# Patient Record
Sex: FEMALE | Race: WHITE | NOT HISPANIC OR LATINO | Employment: PART TIME | URBAN - METROPOLITAN AREA
[De-identification: names, ages, dates, MRNs, and addresses within clinical notes are randomized per-mention and may not be internally consistent; named-entity substitution may affect disease eponyms.]

---

## 2021-04-09 ENCOUNTER — APPOINTMENT (OUTPATIENT)
Dept: CT IMAGING | Facility: HOSPITAL | Age: 51
End: 2021-04-09

## 2021-04-09 ENCOUNTER — HOSPITAL ENCOUNTER (INPATIENT)
Facility: HOSPITAL | Age: 51
LOS: 5 days | Discharge: NURSING, REHAB OR LONG-TERM CARE | End: 2021-04-14
Attending: EMERGENCY MEDICINE | Admitting: STUDENT IN AN ORGANIZED HEALTH CARE EDUCATION/TRAINING PROGRAM

## 2021-04-09 ENCOUNTER — APPOINTMENT (OUTPATIENT)
Dept: GENERAL RADIOLOGY | Facility: HOSPITAL | Age: 51
End: 2021-04-09

## 2021-04-09 DIAGNOSIS — I26.93 SINGLE SUBSEGMENTAL PULMONARY EMBOLISM WITHOUT ACUTE COR PULMONALE (HCC): ICD-10-CM

## 2021-04-09 DIAGNOSIS — Z99.2 ESRD ON HEMODIALYSIS (HCC): ICD-10-CM

## 2021-04-09 DIAGNOSIS — E86.1 HYPOVOLEMIA: ICD-10-CM

## 2021-04-09 DIAGNOSIS — A04.72 COLITIS, CLOSTRIDIUM DIFFICILE: Primary | ICD-10-CM

## 2021-04-09 DIAGNOSIS — N18.6 ESRD ON HEMODIALYSIS (HCC): ICD-10-CM

## 2021-04-09 LAB
ADV 40+41 DNA STL QL NAA+NON-PROBE: NOT DETECTED
ALBUMIN SERPL-MCNC: 3.1 G/DL (ref 3.5–5.2)
ALBUMIN/GLOB SERPL: 0.8 G/DL
ALP SERPL-CCNC: 163 U/L (ref 39–117)
ALT SERPL W P-5'-P-CCNC: 15 U/L (ref 1–33)
ANION GAP SERPL CALCULATED.3IONS-SCNC: 15.6 MMOL/L (ref 5–15)
APTT PPP: 45.2 SECONDS (ref 22.7–35.4)
AST SERPL-CCNC: 12 U/L (ref 1–32)
ASTRO TYP 1-8 RNA STL QL NAA+NON-PROBE: NOT DETECTED
BASOPHILS # BLD MANUAL: 0.17 10*3/MM3 (ref 0–0.2)
BASOPHILS # BLD MANUAL: 0.23 10*3/MM3 (ref 0–0.2)
BASOPHILS # BLD MANUAL: 0.46 10*3/MM3 (ref 0–0.2)
BASOPHILS NFR BLD AUTO: 1 % (ref 0–1.5)
BASOPHILS NFR BLD AUTO: 1 % (ref 0–1.5)
BASOPHILS NFR BLD AUTO: 2 % (ref 0–1.5)
BILIRUB SERPL-MCNC: 0.2 MG/DL (ref 0–1.2)
BUN SERPL-MCNC: 28 MG/DL (ref 6–20)
BUN/CREAT SERPL: 6.7 (ref 7–25)
C CAYETANENSIS DNA STL QL NAA+NON-PROBE: NOT DETECTED
C COLI+JEJ+UPSA DNA STL QL NAA+NON-PROBE: NOT DETECTED
C DIFF TOX GENS STL QL NAA+PROBE: POSITIVE
CALCIUM SPEC-SCNC: 8.6 MG/DL (ref 8.6–10.5)
CHLORIDE SERPL-SCNC: 96 MMOL/L (ref 98–107)
CO2 SERPL-SCNC: 21.4 MMOL/L (ref 22–29)
CREAT SERPL-MCNC: 4.19 MG/DL (ref 0.57–1)
CRYPTOSP DNA STL QL NAA+NON-PROBE: NOT DETECTED
D DIMER PPP FEU-MCNC: 2.22 MCGFEU/ML (ref 0–0.49)
D-LACTATE SERPL-SCNC: 1.8 MMOL/L (ref 0.5–2)
DEPRECATED RDW RBC AUTO: 48.1 FL (ref 37–54)
DEPRECATED RDW RBC AUTO: 49.8 FL (ref 37–54)
E HISTOLYT DNA STL QL NAA+NON-PROBE: NOT DETECTED
EAEC PAA PLAS AGGR+AATA ST NAA+NON-PRB: NOT DETECTED
EC STX1+STX2 GENES STL QL NAA+NON-PROBE: NOT DETECTED
EPEC EAE GENE STL QL NAA+NON-PROBE: NOT DETECTED
ERYTHROCYTE [DISTWIDTH] IN BLOOD BY AUTOMATED COUNT: 14.5 % (ref 12.3–15.4)
ERYTHROCYTE [DISTWIDTH] IN BLOOD BY AUTOMATED COUNT: 14.5 % (ref 12.3–15.4)
ETEC LTA+ST1A+ST1B TOX ST NAA+NON-PROBE: NOT DETECTED
G LAMBLIA DNA STL QL NAA+NON-PROBE: NOT DETECTED
GFR SERPL CREATININE-BSD FRML MDRD: 11 ML/MIN/1.73
GFR SERPL CREATININE-BSD FRML MDRD: ABNORMAL ML/MIN/{1.73_M2}
GLOBULIN UR ELPH-MCNC: 3.8 GM/DL
GLUCOSE SERPL-MCNC: 106 MG/DL (ref 65–99)
HCT VFR BLD AUTO: 29.7 % (ref 34–46.6)
HCT VFR BLD AUTO: 31.2 % (ref 34–46.6)
HGB BLD-MCNC: 10.3 G/DL (ref 12–15.9)
HGB BLD-MCNC: 9.7 G/DL (ref 12–15.9)
HOLD SPECIMEN: NORMAL
HOLD SPECIMEN: NORMAL
INR PPP: 1.48 (ref 0.9–1.1)
LYMPHOCYTES # BLD MANUAL: 1.42 10*3/MM3 (ref 0.7–3.1)
LYMPHOCYTES # BLD MANUAL: 3.06 10*3/MM3 (ref 0.7–3.1)
LYMPHOCYTES # BLD MANUAL: 5.53 10*3/MM3 (ref 0.7–3.1)
LYMPHOCYTES NFR BLD MANUAL: 13.3 % (ref 19.6–45.3)
LYMPHOCYTES NFR BLD MANUAL: 24 % (ref 19.6–45.3)
LYMPHOCYTES NFR BLD MANUAL: 3.1 % (ref 5–12)
LYMPHOCYTES NFR BLD MANUAL: 4 % (ref 5–12)
LYMPHOCYTES NFR BLD MANUAL: 8.2 % (ref 19.6–45.3)
LYMPHOCYTES NFR BLD MANUAL: 8.2 % (ref 5–12)
MAGNESIUM SERPL-MCNC: 1.8 MG/DL (ref 1.6–2.6)
MCH RBC QN AUTO: 30.6 PG (ref 26.6–33)
MCH RBC QN AUTO: 30.7 PG (ref 26.6–33)
MCHC RBC AUTO-ENTMCNC: 32.7 G/DL (ref 31.5–35.7)
MCHC RBC AUTO-ENTMCNC: 33 G/DL (ref 31.5–35.7)
MCV RBC AUTO: 92.6 FL (ref 79–97)
MCV RBC AUTO: 94 FL (ref 79–97)
METAMYELOCYTES NFR BLD MANUAL: 1 % (ref 0–0)
MONOCYTES # BLD AUTO: 0.54 10*3/MM3 (ref 0.1–0.9)
MONOCYTES # BLD AUTO: 0.92 10*3/MM3 (ref 0.1–0.9)
MONOCYTES # BLD AUTO: 1.89 10*3/MM3 (ref 0.1–0.9)
MYELOCYTES NFR BLD MANUAL: 2.1 % (ref 0–0)
NEUTROPHILS # BLD AUTO: 14.64 10*3/MM3 (ref 1.7–7)
NEUTROPHILS # BLD AUTO: 16.21 10*3/MM3 (ref 1.7–7)
NEUTROPHILS # BLD AUTO: 16.35 10*3/MM3 (ref 1.7–7)
NEUTROPHILS NFR BLD MANUAL: 70.4 % (ref 42.7–76)
NEUTROPHILS NFR BLD MANUAL: 71 % (ref 42.7–76)
NEUTROPHILS NFR BLD MANUAL: 84.5 % (ref 42.7–76)
NOROVIRUS GI+II RNA STL QL NAA+NON-PROBE: NOT DETECTED
NRBC BLD AUTO-RTO: 0 /100 WBC (ref 0–0.2)
NRBC BLD AUTO-RTO: 0 /100 WBC (ref 0–0.2)
NT-PROBNP SERPL-MCNC: 4129 PG/ML (ref 0–450)
OTHER CELLS %: 6.1 % (ref 0–0)
P SHIGELLOIDES DNA STL QL NAA+NON-PROBE: NOT DETECTED
PLAT MORPH BLD: NORMAL
PLATELET # BLD AUTO: 287 10*3/MM3 (ref 140–450)
PLATELET # BLD AUTO: 323 10*3/MM3 (ref 140–450)
PMV BLD AUTO: 9.3 FL (ref 6–12)
PMV BLD AUTO: 9.5 FL (ref 6–12)
POTASSIUM SERPL-SCNC: 3.3 MMOL/L (ref 3.5–5.2)
PROCALCITONIN SERPL-MCNC: 3.83 NG/ML (ref 0–0.25)
PROT SERPL-MCNC: 6.9 G/DL (ref 6–8.5)
PROTHROMBIN TIME: 17.7 SECONDS (ref 11.7–14.2)
RBC # BLD AUTO: 3.16 10*6/MM3 (ref 3.77–5.28)
RBC # BLD AUTO: 3.37 10*6/MM3 (ref 3.77–5.28)
RBC MORPH BLD: NORMAL
RVA RNA STL QL NAA+NON-PROBE: NOT DETECTED
S ENT+BONG DNA STL QL NAA+NON-PROBE: NOT DETECTED
SAPO I+II+IV+V RNA STL QL NAA+NON-PROBE: NOT DETECTED
SHIGELLA SP+EIEC IPAH ST NAA+NON-PROBE: NOT DETECTED
SODIUM SERPL-SCNC: 133 MMOL/L (ref 136–145)
TROPONIN T SERPL-MCNC: 0.03 NG/ML (ref 0–0.03)
V CHOL+PARA+VUL DNA STL QL NAA+NON-PROBE: NOT DETECTED
V CHOLERAE DNA STL QL NAA+NON-PROBE: NOT DETECTED
WBC # BLD AUTO: 17.33 10*3/MM3 (ref 3.4–10.8)
WBC # BLD AUTO: 23.03 10*3/MM3 (ref 3.4–10.8)
WBC MORPH BLD: NORMAL
WHOLE BLOOD HOLD SPECIMEN: NORMAL
WHOLE BLOOD HOLD SPECIMEN: NORMAL
Y ENTEROCOL DNA STL QL NAA+NON-PROBE: NOT DETECTED

## 2021-04-09 PROCEDURE — 80053 COMPREHEN METABOLIC PANEL: CPT | Performed by: EMERGENCY MEDICINE

## 2021-04-09 PROCEDURE — 85730 THROMBOPLASTIN TIME PARTIAL: CPT | Performed by: EMERGENCY MEDICINE

## 2021-04-09 PROCEDURE — 87493 C DIFF AMPLIFIED PROBE: CPT | Performed by: EMERGENCY MEDICINE

## 2021-04-09 PROCEDURE — U0005 INFEC AGEN DETEC AMPLI PROBE: HCPCS | Performed by: EMERGENCY MEDICINE

## 2021-04-09 PROCEDURE — 93005 ELECTROCARDIOGRAM TRACING: CPT | Performed by: EMERGENCY MEDICINE

## 2021-04-09 PROCEDURE — 0097U HC BIOFIRE FILMARRAY GI PANEL: CPT | Performed by: EMERGENCY MEDICINE

## 2021-04-09 PROCEDURE — 93010 ELECTROCARDIOGRAM REPORT: CPT | Performed by: INTERNAL MEDICINE

## 2021-04-09 PROCEDURE — 85610 PROTHROMBIN TIME: CPT | Performed by: EMERGENCY MEDICINE

## 2021-04-09 PROCEDURE — U0004 COV-19 TEST NON-CDC HGH THRU: HCPCS | Performed by: EMERGENCY MEDICINE

## 2021-04-09 PROCEDURE — 84484 ASSAY OF TROPONIN QUANT: CPT | Performed by: EMERGENCY MEDICINE

## 2021-04-09 PROCEDURE — 85379 FIBRIN DEGRADATION QUANT: CPT | Performed by: EMERGENCY MEDICINE

## 2021-04-09 PROCEDURE — 71045 X-RAY EXAM CHEST 1 VIEW: CPT

## 2021-04-09 PROCEDURE — 71275 CT ANGIOGRAPHY CHEST: CPT

## 2021-04-09 PROCEDURE — 25010000002 HEPARIN (PORCINE) PER 1000 UNITS: Performed by: EMERGENCY MEDICINE

## 2021-04-09 PROCEDURE — 74177 CT ABD & PELVIS W/CONTRAST: CPT

## 2021-04-09 PROCEDURE — 83880 ASSAY OF NATRIURETIC PEPTIDE: CPT | Performed by: EMERGENCY MEDICINE

## 2021-04-09 PROCEDURE — 87040 BLOOD CULTURE FOR BACTERIA: CPT | Performed by: EMERGENCY MEDICINE

## 2021-04-09 PROCEDURE — 83605 ASSAY OF LACTIC ACID: CPT | Performed by: EMERGENCY MEDICINE

## 2021-04-09 PROCEDURE — 0 IOPAMIDOL PER 1 ML: Performed by: EMERGENCY MEDICINE

## 2021-04-09 PROCEDURE — 83735 ASSAY OF MAGNESIUM: CPT | Performed by: NURSE PRACTITIONER

## 2021-04-09 PROCEDURE — 85025 COMPLETE CBC W/AUTO DIFF WBC: CPT | Performed by: EMERGENCY MEDICINE

## 2021-04-09 PROCEDURE — 99285 EMERGENCY DEPT VISIT HI MDM: CPT

## 2021-04-09 PROCEDURE — 84145 PROCALCITONIN (PCT): CPT | Performed by: EMERGENCY MEDICINE

## 2021-04-09 PROCEDURE — 85007 BL SMEAR W/DIFF WBC COUNT: CPT | Performed by: EMERGENCY MEDICINE

## 2021-04-09 RX ORDER — HEPARIN SODIUM 10000 [USP'U]/100ML
18 INJECTION, SOLUTION INTRAVENOUS
Status: DISCONTINUED | OUTPATIENT
Start: 2021-04-09 | End: 2021-04-10

## 2021-04-09 RX ORDER — HEPARIN SODIUM 5000 [USP'U]/ML
80 INJECTION, SOLUTION INTRAVENOUS; SUBCUTANEOUS ONCE
Status: COMPLETED | OUTPATIENT
Start: 2021-04-09 | End: 2021-04-09

## 2021-04-09 RX ORDER — SODIUM BICARBONATE 650 MG/1
650 TABLET ORAL 4 TIMES DAILY
COMMUNITY
End: 2021-04-29 | Stop reason: HOSPADM

## 2021-04-09 RX ORDER — MONTELUKAST SODIUM 10 MG/1
10 TABLET ORAL NIGHTLY
Status: ON HOLD | COMMUNITY
End: 2021-04-29 | Stop reason: SDUPTHER

## 2021-04-09 RX ORDER — LEVOTHYROXINE SODIUM 112 UG/1
CAPSULE ORAL EVERY 24 HOURS
COMMUNITY
End: 2021-04-14 | Stop reason: HOSPADM

## 2021-04-09 RX ORDER — SODIUM CHLORIDE 0.9 % (FLUSH) 0.9 %
10 SYRINGE (ML) INJECTION AS NEEDED
Status: DISCONTINUED | OUTPATIENT
Start: 2021-04-09 | End: 2021-04-14 | Stop reason: HOSPADM

## 2021-04-09 RX ORDER — POTASSIUM CHLORIDE 20 MEQ/1
TABLET, EXTENDED RELEASE ORAL EVERY 24 HOURS
COMMUNITY
End: 2021-04-14 | Stop reason: HOSPADM

## 2021-04-09 RX ORDER — HEPARIN SODIUM 5000 [USP'U]/ML
40-80 INJECTION, SOLUTION INTRAVENOUS; SUBCUTANEOUS EVERY 6 HOURS PRN
Status: DISCONTINUED | OUTPATIENT
Start: 2021-04-09 | End: 2021-04-10

## 2021-04-09 RX ORDER — ESCITALOPRAM OXALATE 20 MG/1
TABLET ORAL EVERY 24 HOURS
Status: ON HOLD | COMMUNITY
End: 2021-04-26

## 2021-04-09 RX ADMIN — SODIUM CHLORIDE 1000 ML: 9 INJECTION, SOLUTION INTRAVENOUS at 16:25

## 2021-04-09 RX ADMIN — HEPARIN SODIUM 18 UNITS/KG/HR: 10000 INJECTION, SOLUTION INTRAVENOUS at 20:22

## 2021-04-09 RX ADMIN — HEPARIN SODIUM 6100 UNITS: 5000 INJECTION INTRAVENOUS; SUBCUTANEOUS at 20:22

## 2021-04-09 RX ADMIN — IOPAMIDOL 100 ML: 755 INJECTION, SOLUTION INTRAVENOUS at 18:39

## 2021-04-09 RX ADMIN — VANCOMYCIN 125 MG: KIT at 19:20

## 2021-04-09 RX ADMIN — SODIUM CHLORIDE 1000 ML: 9 INJECTION, SOLUTION INTRAVENOUS at 19:19

## 2021-04-09 NOTE — ED NOTES
Cande with [facility] called.When they went to check her PD dialysis cather, it was noted to have pus from the site and concerned there is an infection. They believe it was not used or looked at during her stay at [place]. Pt has a alvarado pd dialysis tubing. Nurse was able to drain it and flush it today but patient is having pain at the site and with use.      Jane Beckford, RN  04/09/21 2165

## 2021-04-09 NOTE — PHARMACY RECOMMENDATION
[facility] Clinical Pharmacy Services: C. Difficile Medication Changes    Pharmacy has been consulted to look over Ambar Lara's profile to check patient's medications for changes due to C. Difficile diagnosis per Marcos Jenkins MD's request.    Relevant clinical data and objective history reviewed:  51 y.o. female        Past Medical History:   Diagnosis Date   • Dialysis patient (CMS/[facility])    • Renal disorder      Creatinine   Date Value Ref Range Status   04/09/2021 4.19 (H) 0.57 - 1.00 mg/dL Final     BUN   Date Value Ref Range Status   04/09/2021 28 (H) 6 - 20 mg/dL Final     CrCl cannot be calculated (Unknown ideal weight.).    Assessment/Plan    1. Proton pump inhibitor - none noted on home med rec    2. Antiperistalic agents or stool softeners/laxatives - nothing noted on med rec or inpatient at this time    Thank you for allowing me to participate in your patient's care.  Please call pharmacy with any questions or concerns.  Dayne Ballard [facility]    Clinical Staff Pharmacist

## 2021-04-09 NOTE — ED PROVIDER NOTES
EMERGENCY DEPARTMENT ENCOUNTER    Room Number:  12/12  Date seen:  4/9/2021  PCP: Jet Manuel MD  Historian: Patient      HPI:  Chief Complaint: Weakness, lightheaded  A complete HPI/ROS/PMH/PSH/SH/FH are unobtainable due to: Nothing  Context: Ambar Lara is a 51 y.o. female who presents to the ED c/o weakness and lightheadedness onset this morning.  Patient reports she was just discharged from [facility] yesterday.  She reports that she was diagnosed with Covid in February and then was in a coma for a month.  She also has a history of ESRD and has been on peritoneal dialysis for the last 3 years.  Since February 13, she has been getting hemodialysis, last got dialysis yesterday.  She also reports that she has been having chronic diarrhea and she believes that she may have had C. difficile but she is not sure.  She denies chest pain currently but has had some shortness of air today.  She believes that she was getting Lovenox injections while she was at [facility].  She has a tracheostomy but has been decannulated.  She apparently was told by dialysis nurse that she may have an infection involving her peritoneal dialysis catheter.            PAST MEDICAL HISTORY  Active Ambulatory Problems     Diagnosis Date Noted   • No Active Ambulatory Problems     Resolved Ambulatory Problems     Diagnosis Date Noted   • No Resolved Ambulatory Problems     Past Medical History:   Diagnosis Date   • Dialysis patient (CMS/[facility])    • Renal disorder          PAST SURGICAL HISTORY  Past Surgical History:   Procedure Laterality Date   • HYSTERECTOMY     • TRACHEOSTOMY           FAMILY HISTORY  No family history on file.      SOCIAL HISTORY  Social History     Socioeconomic History   • Marital status:      Spouse name: Not on file   • Number of children: Not on file   • Years of education: Not on file   • Highest education level: Not on file         ALLERGIES  Penicillins        REVIEW OF SYSTEMS  Review of Systems    Review of all 14 systems is negative other than stated in the HPI above.      PHYSICAL EXAM  ED Triage Vitals   Temp Heart Rate Resp BP SpO2   04/09/21 1533 04/09/21 1533 04/09/21 1533 04/09/21 1533 04/09/21 1533   98.5 °F (36.9 °C) 116 16 92/58 96 %      Temp src Heart Rate Source Patient Position BP Location FiO2 (%)   -- -- 04/09/21 1609 -- --     Sitting           GENERAL: Awake and alert, no acute distress  HENT: nares patent  EYES: no scleral icterus, pupils 3 mm reactive bilaterally  CV: regular rhythm, tachycardic, dialysis catheter right anterior chest wall  RESPIRATORY: normal effort, lungs clear to auscultation bilaterally.  Tracheostomy present with occlusive bandage in place.  ABDOMEN: soft, nondistended, nontender throughout, PD catheter in place with no surrounding erythema, no drainage around the tubing.  MUSCULOSKELETAL: no deformity  NEURO: alert, moves all extremities, follows commands  PSYCH:  calm, cooperative  SKIN: warm, dry    Vital signs and nursing notes reviewed.          LAB RESULTS  Recent Results (from the past 24 hour(s))   Light Blue Top    Collection Time: 04/09/21  4:31 PM   Result Value Ref Range    Extra Tube hold for add-on    Green Top (Gel)    Collection Time: 04/09/21  4:31 PM   Result Value Ref Range    Extra Tube Hold for add-ons.    Lavender Top    Collection Time: 04/09/21  4:31 PM   Result Value Ref Range    Extra Tube hold for add-on    Gold Top - SST    Collection Time: 04/09/21  4:31 PM   Result Value Ref Range    Extra Tube Hold for add-ons.    Comprehensive Metabolic Panel    Collection Time: 04/09/21  4:31 PM    Specimen: Blood   Result Value Ref Range    Glucose 106 (H) 65 - 99 mg/dL    BUN 28 (H) 6 - 20 mg/dL    Creatinine 4.19 (H) 0.57 - 1.00 mg/dL    Sodium 133 (L) 136 - 145 mmol/L    Potassium 3.3 (L) 3.5 - 5.2 mmol/L    Chloride 96 (L) 98 - 107 mmol/L    CO2 21.4 (L) 22.0 - 29.0 mmol/L    Calcium 8.6 8.6 - 10.5 mg/dL    Total Protein 6.9 6.0 - 8.5 g/dL     Albumin 3.10 (L) 3.50 - 5.20 g/dL    ALT (SGPT) 15 1 - 33 U/L    AST (SGOT) 12 1 - 32 U/L    Alkaline Phosphatase 163 (H) 39 - 117 U/L    Total Bilirubin 0.2 0.0 - 1.2 mg/dL    eGFR Non African Amer 11 (L) >60 mL/min/1.73    eGFR  African Amer      Globulin 3.8 gm/dL    A/G Ratio 0.8 g/dL    BUN/Creatinine Ratio 6.7 (L) 7.0 - 25.0    Anion Gap 15.6 (H) 5.0 - 15.0 mmol/L   Troponin    Collection Time: 04/09/21  4:31 PM    Specimen: Blood   Result Value Ref Range    Troponin T 0.030 0.000 - 0.030 ng/mL   BNP    Collection Time: 04/09/21  4:31 PM    Specimen: Blood   Result Value Ref Range    proBNP 4,129.0 (H) 0.0 - 450.0 pg/mL   D-dimer, Quantitative    Collection Time: 04/09/21  4:31 PM    Specimen: Blood   Result Value Ref Range    D-Dimer, Quantitative 2.22 (H) 0.00 - 0.49 MCGFEU/mL   Lactic Acid, Plasma    Collection Time: 04/09/21  4:31 PM    Specimen: Blood   Result Value Ref Range    Lactate 1.8 0.5 - 2.0 mmol/L   Procalcitonin    Collection Time: 04/09/21  4:31 PM    Specimen: Blood   Result Value Ref Range    Procalcitonin 3.83 (H) 0.00 - 0.25 ng/mL   CBC Auto Differential    Collection Time: 04/09/21  4:31 PM    Specimen: Blood   Result Value Ref Range    WBC 23.03 (H) 3.40 - 10.80 10*3/mm3    RBC 3.37 (L) 3.77 - 5.28 10*6/mm3    Hemoglobin 10.3 (L) 12.0 - 15.9 g/dL    Hematocrit 31.2 (L) 34.0 - 46.6 %    MCV 92.6 79.0 - 97.0 fL    MCH 30.6 26.6 - 33.0 pg    MCHC 33.0 31.5 - 35.7 g/dL    RDW 14.5 12.3 - 15.4 %    RDW-SD 48.1 37.0 - 54.0 fl    MPV 9.5 6.0 - 12.0 fL    Platelets 323 140 - 450 10*3/mm3    nRBC 0.0 0.0 - 0.2 /100 WBC   Manual Differential    Collection Time: 04/09/21  4:31 PM    Specimen: Blood   Result Value Ref Range    Neutrophil % 70.4 42.7 - 76.0 %    Lymphocyte % 13.3 (L) 19.6 - 45.3 %    Monocyte % 8.2 5.0 - 12.0 %    Basophil % 2.0 (H) 0.0 - 1.5 %    Other Cells % 6.1 (H) 0.0 - 0.0 %    Neutrophils Absolute 16.21 (H) 1.70 - 7.00 10*3/mm3    Lymphocytes Absolute 3.06 0.70 - 3.10 10*3/mm3     Monocytes Absolute 1.89 (H) 0.10 - 0.90 10*3/mm3    Basophils Absolute 0.46 (H) 0.00 - 0.20 10*3/mm3    RBC Morphology Normal Normal    WBC Morphology Normal Normal    Platelet Morphology Normal Normal   Manual Differential    Collection Time: 04/09/21  4:31 PM    Specimen: Blood   Result Value Ref Range    Neutrophil % 71.0 42.7 - 76.0 %    Lymphocyte % 24.0 19.6 - 45.3 %    Monocyte % 4.0 (L) 5.0 - 12.0 %    Basophil % 1.0 0.0 - 1.5 %    Neutrophils Absolute 16.35 (H) 1.70 - 7.00 10*3/mm3    Lymphocytes Absolute 5.53 (H) 0.70 - 3.10 10*3/mm3    Monocytes Absolute 0.92 (H) 0.10 - 0.90 10*3/mm3    Basophils Absolute 0.23 (H) 0.00 - 0.20 10*3/mm3    RBC Morphology Normal Normal    WBC Morphology Normal Normal    Platelet Morphology Normal Normal   Gastrointestinal Panel, PCR - Stool, Per Rectum    Collection Time: 04/09/21  5:22 PM    Specimen: Per Rectum; Stool   Result Value Ref Range    Campylobacter Not Detected Not Detected    Plesiomonas shigelloides Not Detected Not Detected    Salmonella Not Detected Not Detected    Vibrio Not Detected Not Detected    Vibrio cholerae Not Detected Not Detected    Yersinia enterocolitica Not Detected Not Detected    Enteroaggregative E. coli (EAEC) Not Detected Not Detected    Enteropathogenic E. coli (EPEC) Not Detected Not Detected    Enterotoxigenic E. coli (ETEC) lt/st Not Detected Not Detected    Shiga-like toxin-producing E. coli (STEC) stx1/stx2 Not Detected Not Detected    Shigella/Enteroinvasive E. coli (EIEC) Not Detected Not Detected    Cryptosporidium Not Detected Not Detected    Cyclospora cayetanensis Not Detected Not Detected    Entamoeba histolytica Not Detected Not Detected    Giardia lamblia Not Detected Not Detected    Adenovirus F40/41 Not Detected Not Detected    Astrovirus Not Detected Not Detected    Norovirus GI/GII Not Detected Not Detected    Rotavirus A Not Detected Not Detected    Sapovirus (I, II, IV or V) Not Detected Not Detected   Clostridium  Difficile Toxin, PCR - Stool, Per Rectum    Collection Time: 04/09/21  5:22 PM    Specimen: Per Rectum; Stool   Result Value Ref Range    C. Difficile Toxins by PCR Positive (A) Negative   ECG 12 Lead    Collection Time: 04/09/21  5:46 PM   Result Value Ref Range    QT Interval 323 ms   Protime-INR    Collection Time: 04/09/21  8:17 PM    Specimen: Blood   Result Value Ref Range    Protime 17.7 (H) 11.7 - 14.2 Seconds    INR 1.48 (H) 0.90 - 1.10   aPTT    Collection Time: 04/09/21  8:17 PM    Specimen: Blood   Result Value Ref Range    PTT 45.2 (H) 22.7 - 35.4 seconds   CBC Auto Differential    Collection Time: 04/09/21  8:17 PM    Specimen: Blood   Result Value Ref Range    WBC 17.33 (H) 3.40 - 10.80 10*3/mm3    RBC 3.16 (L) 3.77 - 5.28 10*6/mm3    Hemoglobin 9.7 (L) 12.0 - 15.9 g/dL    Hematocrit 29.7 (L) 34.0 - 46.6 %    MCV 94.0 79.0 - 97.0 fL    MCH 30.7 26.6 - 33.0 pg    MCHC 32.7 31.5 - 35.7 g/dL    RDW 14.5 12.3 - 15.4 %    RDW-SD 49.8 37.0 - 54.0 fl    MPV 9.3 6.0 - 12.0 fL    Platelets 287 140 - 450 10*3/mm3    nRBC 0.0 0.0 - 0.2 /100 WBC   Manual Differential    Collection Time: 04/09/21  8:17 PM    Specimen: Blood   Result Value Ref Range    Neutrophil % 84.5 (H) 42.7 - 76.0 %    Lymphocyte % 8.2 (L) 19.6 - 45.3 %    Monocyte % 3.1 (L) 5.0 - 12.0 %    Basophil % 1.0 0.0 - 1.5 %    Metamyelocyte % 1.0 (H) 0.0 - 0.0 %    Myelocyte % 2.1 (H) 0.0 - 0.0 %    Neutrophils Absolute 14.64 (H) 1.70 - 7.00 10*3/mm3    Lymphocytes Absolute 1.42 0.70 - 3.10 10*3/mm3    Monocytes Absolute 0.54 0.10 - 0.90 10*3/mm3    Basophils Absolute 0.17 0.00 - 0.20 10*3/mm3    RBC Morphology Normal Normal    WBC Morphology Normal Normal    Platelet Morphology Normal Normal       Ordered the above labs and reviewed the results.        RADIOLOGY  XR Chest 1 View    Result Date: 4/9/2021  PORTABLE CHEST 01/09/2021 AT 4:34 PM  CLINICAL HISTORY: Hypotension  There is minimal patchy bibasilar atelectasis. No acute focal infiltrates are  identified. There are no pleural effusions. The heart is normal in size. A right internal jugular dialysis catheter is in place in satisfactory position.  IMPRESSIONS: No evidence of acute disease within the chest.  This report was finalized on 4/9/2021 4:57 PM by Dr. Cecil Roldan M.D.      CT Angiogram Chest, CT Abdomen Pelvis With Contrast    Result Date: 4/9/2021  CT ANGIOGRAM CHEST-, CT ABDOMEN PELVIS W CONTRAST-  CLINICAL HISTORY: Tachycardia. Dyspnea. Elevated d-dimer. Abdominal pain. Leukocytosis. History of severe COVID 19 infection requiring intubation in the recent past.  TECHNIQUE: Spiral CT images were obtained through the chest during rapid IV injection of contrast and were reconstructed in 2 mm thick axial slices. Multiple coronal and sagittal and 3-D reconstructions were produced. Subsequently, spiral CT images were obtained through the abdomen and pelvis with IV contrast.  Radiation dose reduction techniques were utilized, including automated exposure control and exposure modulation based on body size.  COMPARISON: None  FINDINGS: A patent tracheostomy is noted. The main pulmonary arteries and their lobar and segmental branches are fairly well opacified. There is a small filling defect within a subsegmental branch of the right lower lobe pulmonary artery in the medial aspect of the right lung base suspicious for a pulmonary embolus. No other filling defects are identified. The thoracic aorta was also fairly well opacified and is unremarkable. There is no evidence of aneurysm or dissection. Lung window images demonstrate moderately extensive coarse reticular and linear opacities distributed throughout both lungs compatible with fibrotic scarring due to previous pneumonia. Patchy groundglass opacities are also present within both lungs indicating at least some degree of residual acute inflammation.. There are no focal areas of dense consolidation. There is no bronchiectasis. There are no discrete  lung masses. No emphysematous changes are evident. There are no pleural effusions. There are a few mildly enlarged lymph nodes scattered throughout the mediastinum that are quite likely benign reactive lymph nodes.  There is diminished attenuation throughout the liver parenchyma consistent with mild hepatic steatosis. No focal hepatic lesions are identified. The spleen and pancreas  are unremarkable. Numerous tiny bilateral simple renal cysts are present. An addition, both kidneys appear somewhat small in size. The right kidney measures 8.7 cm in length. The left kidney measures 7.7 cm in length. There is no hydronephrosis or hydroureter. There is moderate diffuse thickening of the left adrenal gland consistent with hyperplasia. The right adrenal gland is markedly atrophic. The stomach and small bowel appear within normal limits. There is moderate diffuse edema throughout the walls of the colon that is most prominent in the rectosigmoid region where there is marked edema and mucosal hyperenhancement. The majority of the colon is contracted and contains no formed stool. The findings are consistent with extensive colitis. A peritoneal dialysis catheter is in place and is looped within the anterior aspect of the pelvis to the right of midline. There is very tiny amount of free fluid adjacent to the right lobe of the liver. The uterus is absent.      1. Solitary small filling defect within a subsegmental branch of the right lower lobe pulmonary artery suspicious for a small pulmonary embolus. No other pulmonary emboli are identified.  2. Patchy areas of fairly extensive linear and reticular fibrotic scarring throughout both lungs that also mild patchy groundglass opacities within both lungs. Mildly enlarged mediastinal lymph nodes are likely benign reactive lymph nodes.  4. CT findings consistent with extensive diffuse colitis as described. Bowel wall edema is most prominent within the sigmoid colon and rectum.  5.  Atrophic right adrenal gland. Diffusely thickened left adrenal gland consistent with hyperplasia.  6. Atrophic kidneys containing multiple tiny cysts. Peritoneal dialysis catheter in place within the pelvis.  This report was finalized on 4/9/2021 7:52 PM by Dr. Cecil Roldan M.D.        Ordered the above noted radiological studies. Reviewed by me in PACS.            PROCEDURES  Critical Care  Performed by: Marcos Blackamn MD  Authorized by: Marcos Blackman MD     Critical care provider statement:     Critical care time (minutes):  30    Critical care was necessary to treat or prevent imminent or life-threatening deterioration of the following conditions:  Circulatory failure (hypotension, hypovolemia, PE)    Critical care was time spent personally by me on the following activities:  Discussions with consultants, evaluation of patient's response to treatment, examination of patient, obtaining history from patient or surrogate, ordering and review of radiographic studies, ordering and review of laboratory studies, ordering and performing treatments and interventions, pulse oximetry, re-evaluation of patient's condition and development of treatment plan with patient or surrogate                  MEDICATIONS GIVEN IN ER  Medications   sodium chloride 0.9 % flush 10 mL (has no administration in time range)   Pharmacy Consult (has no administration in time range)   vancomycin oral solution reconstituted 125 mg (125 mg Oral Given 4/9/21 1920)   heparin 51122 units/250 mL (100 units/mL) in 0.45 % NaCl infusion (18 Units/kg/hr × 76.6 kg Intravenous New Bag 4/9/21 2022)   heparin (porcine) 5000 UNIT/ML injection 3,100-6,100 Units (has no administration in time range)   sodium chloride 0.9 % bolus 1,000 mL (0 mL Intravenous Stopped 4/9/21 1625)   sodium chloride 0.9 % bolus 1,000 mL (1,000 mL Intravenous New Bag 4/9/21 1919)   iopamidol (ISOVUE-370) 76 % injection 100 mL (100 mL Intravenous Given by Other 4/9/21  1839)   heparin (porcine) 5000 UNIT/ML injection 6,100 Units (6,100 Units Intravenous Given 4/9/21 2022)                   MEDICAL DECISION MAKING, PROGRESS, and CONSULTS    All labs have been independently reviewed by me.  All radiology studies have been reviewed by me and discussed with radiologist dictating the report.   EKG's independently viewed and interpreted by me.  Discussion below represents my analysis of pertinent findings related to patient's condition, differential diagnosis, treatment plan and final disposition.    ED Course as of Apr 09 2208 Fri Apr 09, 2021 1719 Creatinine(!): 4.19 [JR]   1719 Potassium(!): 3.3 [JR]   1719 Troponin T: 0.030 [JR]   1719 Lactate: 1.8 [JR]   1719 WBC(!): 23.03 [JR]   1720 D-Dimer, Quant(!): 2.22 [JR]   1759 EKG          EKG time: 1746  Rhythm/Rate: Sinus tach, 109  P waves and FL: Borderline first-degree AV block  QRS, axis: Normal axis  ST and T waves: No acute ischemic changes    Interpreted Contemporaneously by me, independently viewed            [JR]   1825 Clostridium difficile (toxin A/B)(!): Positive [JR]   1958 I discussed the CTA chest and CT abdomen with Dr. Roldan, radiologist.  He reports findings of colitis as well as a single pulmonary embolus in the right lower lobe.    [JR]   1959 I have ordered a heparin infusion for the pulmonary embolus.  There is no evidence of RV strain on CT.  I think that her hypotension and tachycardia are related to hypovolemia in the setting of her C. difficile colitis rather than obstructive shock.    [JR]   2101 Discussed with FACUNDO Bonner for A, who agrees to admit on behalf of of Dr. Mitchell.    [JR]      ED Course User Index  [JR] Marcos Blackman MD              I wore a mask, face shield, and gloves during this patient encounter.  Patient also wearing a surgical mask.  Hand hygeine performed before and after seeing the patient.    DIAGNOSIS  Final diagnoses:   Colitis, Clostridium difficile   Single  subsegmental pulmonary embolism without acute cor pulmonale (CMS/HCC)   Hypovolemia   ESRD on hemodialysis (CMS/HCC)         DISPOSITION  ADMIT            Latest Documented Vital Signs:  As of 22:08 EDT  BP- 107/67 HR- 113 Temp- 98.5 °F (36.9 °C) O2 sat- 93%        --    Please note that portions of this were completed with a voice recognition program.          Marcos Blackman MD  04/09/21 3934

## 2021-04-09 NOTE — ED TRIAGE NOTES
EMS called out for SOA, on arrival, pt not SOA but symptomatic hypotension. Pt recent hospitalization for COVID. Pt was decanulated and stoma may have infection. PT was complaining of dizziness on scene. A&O x 4 in triage, in no apparent distress. Pt had a nursing home stay but is currently residing at home.   Patient wearing mask during interaction. Universal precautions plus mask, goggles utilized by this RN

## 2021-04-10 PROBLEM — I50.22 CHRONIC SYSTOLIC HEART FAILURE: Status: ACTIVE | Noted: 2020-06-03

## 2021-04-10 PROBLEM — K21.9 GASTROESOPHAGEAL REFLUX DISEASE: Status: ACTIVE | Noted: 2021-02-19

## 2021-04-10 PROBLEM — Z98.890 HISTORY OF TRACHEOSTOMY: Status: ACTIVE | Noted: 2021-04-10

## 2021-04-10 PROBLEM — F41.9 ANXIETY: Status: ACTIVE | Noted: 2018-06-07

## 2021-04-10 PROBLEM — E03.9 HYPOTHYROIDISM: Status: ACTIVE | Noted: 2018-06-06

## 2021-04-10 PROBLEM — I42.9 CARDIOMYOPATHY: Status: ACTIVE | Noted: 2020-06-03

## 2021-04-10 PROBLEM — N18.6 ESRD (END STAGE RENAL DISEASE) ON DIALYSIS (HCC): Status: ACTIVE | Noted: 2018-06-15

## 2021-04-10 PROBLEM — E87.6 GITELMAN SYNDROME: Status: ACTIVE | Noted: 2019-06-19

## 2021-04-10 PROBLEM — I95.9 HYPOTENSION: Status: ACTIVE | Noted: 2021-04-10

## 2021-04-10 PROBLEM — Z99.2 PERITONEAL DIALYSIS STATUS (HCC): Status: ACTIVE | Noted: 2021-02-15

## 2021-04-10 PROBLEM — E83.42 GITELMAN SYNDROME: Status: ACTIVE | Noted: 2019-06-19

## 2021-04-10 PROBLEM — E78.5 HLD (HYPERLIPIDEMIA): Status: ACTIVE | Noted: 2018-06-07

## 2021-04-10 PROBLEM — Z99.2 ESRD (END STAGE RENAL DISEASE) ON DIALYSIS (HCC): Status: ACTIVE | Noted: 2018-06-15

## 2021-04-10 PROBLEM — I26.99 ACUTE PULMONARY EMBOLISM (HCC): Status: ACTIVE | Noted: 2021-04-10

## 2021-04-10 LAB
ANION GAP SERPL CALCULATED.3IONS-SCNC: 14.4 MMOL/L (ref 5–15)
APTT PPP: 46 SECONDS (ref 22.7–35.4)
APTT PPP: 71.9 SECONDS (ref 22.7–35.4)
BUN SERPL-MCNC: 29 MG/DL (ref 6–20)
BUN/CREAT SERPL: 6.9 (ref 7–25)
CALCIUM SPEC-SCNC: 8.1 MG/DL (ref 8.6–10.5)
CHLORIDE SERPL-SCNC: 97 MMOL/L (ref 98–107)
CO2 SERPL-SCNC: 20.6 MMOL/L (ref 22–29)
CREAT SERPL-MCNC: 4.18 MG/DL (ref 0.57–1)
DEPRECATED RDW RBC AUTO: 53 FL (ref 37–54)
ERYTHROCYTE [DISTWIDTH] IN BLOOD BY AUTOMATED COUNT: 14.9 % (ref 12.3–15.4)
GFR SERPL CREATININE-BSD FRML MDRD: 11 ML/MIN/1.73
GFR SERPL CREATININE-BSD FRML MDRD: ABNORMAL ML/MIN/{1.73_M2}
GLUCOSE SERPL-MCNC: 92 MG/DL (ref 65–99)
HCT VFR BLD AUTO: 30.1 % (ref 34–46.6)
HGB BLD-MCNC: 9.3 G/DL (ref 12–15.9)
INR PPP: 1.41 (ref 0.9–1.1)
LYMPHOCYTES # BLD MANUAL: 1.51 10*3/MM3 (ref 0.7–3.1)
LYMPHOCYTES NFR BLD MANUAL: 4 % (ref 5–12)
LYMPHOCYTES NFR BLD MANUAL: 9.1 % (ref 19.6–45.3)
MAGNESIUM SERPL-MCNC: 1.9 MG/DL (ref 1.6–2.6)
MCH RBC QN AUTO: 30.2 PG (ref 26.6–33)
MCHC RBC AUTO-ENTMCNC: 30.9 G/DL (ref 31.5–35.7)
MCV RBC AUTO: 97.7 FL (ref 79–97)
MONOCYTES # BLD AUTO: 0.66 10*3/MM3 (ref 0.1–0.9)
MYELOCYTES NFR BLD MANUAL: 6.1 % (ref 0–0)
NEUTROPHILS # BLD AUTO: 13.41 10*3/MM3 (ref 1.7–7)
NEUTROPHILS NFR BLD MANUAL: 80.8 % (ref 42.7–76)
NRBC BLD AUTO-RTO: 0 /100 WBC (ref 0–0.2)
NRBC SPEC MANUAL: 1 /100 WBC (ref 0–0.2)
PHOSPHATE SERPL-MCNC: 4.7 MG/DL (ref 2.5–4.5)
PLAT MORPH BLD: NORMAL
PLATELET # BLD AUTO: 285 10*3/MM3 (ref 140–450)
PMV BLD AUTO: 9.7 FL (ref 6–12)
POTASSIUM SERPL-SCNC: 3.2 MMOL/L (ref 3.5–5.2)
PROTHROMBIN TIME: 17 SECONDS (ref 11.7–14.2)
RBC # BLD AUTO: 3.08 10*6/MM3 (ref 3.77–5.28)
RBC MORPH BLD: NORMAL
SARS-COV-2 ORF1AB RESP QL NAA+PROBE: NOT DETECTED
SODIUM SERPL-SCNC: 132 MMOL/L (ref 136–145)
TSH SERPL DL<=0.05 MIU/L-ACNC: 2.91 UIU/ML (ref 0.27–4.2)
WBC # BLD AUTO: 16.6 10*3/MM3 (ref 3.4–10.8)
WBC MORPH BLD: NORMAL

## 2021-04-10 PROCEDURE — 80048 BASIC METABOLIC PNL TOTAL CA: CPT | Performed by: NURSE PRACTITIONER

## 2021-04-10 PROCEDURE — 25010000002 HEPARIN (PORCINE) PER 1000 UNITS: Performed by: EMERGENCY MEDICINE

## 2021-04-10 PROCEDURE — 97161 PT EVAL LOW COMPLEX 20 MIN: CPT

## 2021-04-10 PROCEDURE — 83735 ASSAY OF MAGNESIUM: CPT | Performed by: NURSE PRACTITIONER

## 2021-04-10 PROCEDURE — 36415 COLL VENOUS BLD VENIPUNCTURE: CPT | Performed by: EMERGENCY MEDICINE

## 2021-04-10 PROCEDURE — 99223 1ST HOSP IP/OBS HIGH 75: CPT | Performed by: INTERNAL MEDICINE

## 2021-04-10 PROCEDURE — 97110 THERAPEUTIC EXERCISES: CPT

## 2021-04-10 PROCEDURE — 85610 PROTHROMBIN TIME: CPT | Performed by: NURSE PRACTITIONER

## 2021-04-10 PROCEDURE — 63710000001 DIPHENHYDRAMINE PER 50 MG: Performed by: NURSE PRACTITIONER

## 2021-04-10 PROCEDURE — 97530 THERAPEUTIC ACTIVITIES: CPT

## 2021-04-10 PROCEDURE — 85730 THROMBOPLASTIN TIME PARTIAL: CPT | Performed by: STUDENT IN AN ORGANIZED HEALTH CARE EDUCATION/TRAINING PROGRAM

## 2021-04-10 PROCEDURE — 85007 BL SMEAR W/DIFF WBC COUNT: CPT | Performed by: EMERGENCY MEDICINE

## 2021-04-10 PROCEDURE — 85025 COMPLETE CBC W/AUTO DIFF WBC: CPT | Performed by: EMERGENCY MEDICINE

## 2021-04-10 PROCEDURE — 84443 ASSAY THYROID STIM HORMONE: CPT | Performed by: NURSE PRACTITIONER

## 2021-04-10 PROCEDURE — 84100 ASSAY OF PHOSPHORUS: CPT | Performed by: NURSE PRACTITIONER

## 2021-04-10 RX ORDER — NITROGLYCERIN 0.4 MG/1
0.4 TABLET SUBLINGUAL
Status: DISCONTINUED | OUTPATIENT
Start: 2021-04-10 | End: 2021-04-14 | Stop reason: HOSPADM

## 2021-04-10 RX ORDER — ACETAMINOPHEN 650 MG/1
650 SUPPOSITORY RECTAL EVERY 4 HOURS PRN
Status: DISCONTINUED | OUTPATIENT
Start: 2021-04-10 | End: 2021-04-14 | Stop reason: HOSPADM

## 2021-04-10 RX ORDER — DIPHENHYDRAMINE HCL 25 MG
25 CAPSULE ORAL EVERY 6 HOURS PRN
Status: DISCONTINUED | OUTPATIENT
Start: 2021-04-10 | End: 2021-04-14 | Stop reason: HOSPADM

## 2021-04-10 RX ORDER — MONTELUKAST SODIUM 10 MG/1
5 TABLET ORAL NIGHTLY
Status: DISCONTINUED | OUTPATIENT
Start: 2021-04-10 | End: 2021-04-14 | Stop reason: HOSPADM

## 2021-04-10 RX ORDER — LEVOTHYROXINE SODIUM 0.03 MG/1
25 TABLET ORAL
Status: DISCONTINUED | OUTPATIENT
Start: 2021-04-10 | End: 2021-04-14 | Stop reason: HOSPADM

## 2021-04-10 RX ORDER — POTASSIUM CHLORIDE 750 MG/1
20 TABLET, FILM COATED, EXTENDED RELEASE ORAL ONCE
Status: COMPLETED | OUTPATIENT
Start: 2021-04-10 | End: 2021-04-10

## 2021-04-10 RX ORDER — SODIUM CHLORIDE 0.9 % (FLUSH) 0.9 %
10 SYRINGE (ML) INJECTION EVERY 12 HOURS SCHEDULED
Status: DISCONTINUED | OUTPATIENT
Start: 2021-04-10 | End: 2021-04-14 | Stop reason: HOSPADM

## 2021-04-10 RX ORDER — SODIUM CHLORIDE, SODIUM LACTATE, POTASSIUM CHLORIDE, CALCIUM CHLORIDE 600; 310; 30; 20 MG/100ML; MG/100ML; MG/100ML; MG/100ML
75 INJECTION, SOLUTION INTRAVENOUS CONTINUOUS
Status: ACTIVE | OUTPATIENT
Start: 2021-04-10 | End: 2021-04-11

## 2021-04-10 RX ORDER — ONDANSETRON 2 MG/ML
4 INJECTION INTRAMUSCULAR; INTRAVENOUS EVERY 6 HOURS PRN
Status: DISCONTINUED | OUTPATIENT
Start: 2021-04-10 | End: 2021-04-14 | Stop reason: HOSPADM

## 2021-04-10 RX ORDER — SODIUM CHLORIDE 0.9 % (FLUSH) 0.9 %
10 SYRINGE (ML) INJECTION AS NEEDED
Status: DISCONTINUED | OUTPATIENT
Start: 2021-04-10 | End: 2021-04-14 | Stop reason: HOSPADM

## 2021-04-10 RX ORDER — ESCITALOPRAM OXALATE 20 MG/1
20 TABLET ORAL DAILY
Status: DISCONTINUED | OUTPATIENT
Start: 2021-04-10 | End: 2021-04-14 | Stop reason: HOSPADM

## 2021-04-10 RX ORDER — SODIUM BICARBONATE 650 MG/1
650 TABLET ORAL 3 TIMES DAILY
Status: DISCONTINUED | OUTPATIENT
Start: 2021-04-10 | End: 2021-04-14 | Stop reason: HOSPADM

## 2021-04-10 RX ORDER — ACETAMINOPHEN 325 MG/1
650 TABLET ORAL EVERY 4 HOURS PRN
Status: DISCONTINUED | OUTPATIENT
Start: 2021-04-10 | End: 2021-04-14 | Stop reason: HOSPADM

## 2021-04-10 RX ORDER — ACETAMINOPHEN 160 MG/5ML
650 SOLUTION ORAL EVERY 4 HOURS PRN
Status: DISCONTINUED | OUTPATIENT
Start: 2021-04-10 | End: 2021-04-14 | Stop reason: HOSPADM

## 2021-04-10 RX ADMIN — VANCOMYCIN 125 MG: KIT at 00:52

## 2021-04-10 RX ADMIN — DIPHENHYDRAMINE HYDROCHLORIDE 25 MG: 25 CAPSULE ORAL at 21:22

## 2021-04-10 RX ADMIN — VANCOMYCIN 125 MG: KIT at 17:30

## 2021-04-10 RX ADMIN — SODIUM CHLORIDE, POTASSIUM CHLORIDE, SODIUM LACTATE AND CALCIUM CHLORIDE 75 ML/HR: 600; 310; 30; 20 INJECTION, SOLUTION INTRAVENOUS at 15:23

## 2021-04-10 RX ADMIN — VANCOMYCIN 125 MG: KIT at 06:48

## 2021-04-10 RX ADMIN — VANCOMYCIN 125 MG: KIT at 11:41

## 2021-04-10 RX ADMIN — HEPARIN SODIUM 22 UNITS/KG/HR: 10000 INJECTION, SOLUTION INTRAVENOUS at 04:59

## 2021-04-10 RX ADMIN — SODIUM BICARBONATE 650 MG: 650 TABLET ORAL at 11:40

## 2021-04-10 RX ADMIN — HEPARIN SODIUM 6100 UNITS: 5000 INJECTION INTRAVENOUS; SUBCUTANEOUS at 04:58

## 2021-04-10 RX ADMIN — ESCITALOPRAM 20 MG: 20 TABLET, FILM COATED ORAL at 11:40

## 2021-04-10 RX ADMIN — SODIUM CHLORIDE, PRESERVATIVE FREE 10 ML: 5 INJECTION INTRAVENOUS at 11:40

## 2021-04-10 RX ADMIN — POTASSIUM CHLORIDE 20 MEQ: 750 TABLET, EXTENDED RELEASE ORAL at 17:29

## 2021-04-10 RX ADMIN — DIPHENHYDRAMINE HYDROCHLORIDE 25 MG: 25 CAPSULE ORAL at 06:48

## 2021-04-10 RX ADMIN — LEVOTHYROXINE SODIUM 25 MCG: 0.03 TABLET ORAL at 06:48

## 2021-04-10 RX ADMIN — SODIUM BICARBONATE 650 MG: 650 TABLET ORAL at 17:30

## 2021-04-10 RX ADMIN — APIXABAN 5 MG: 5 TABLET, FILM COATED ORAL at 21:21

## 2021-04-10 RX ADMIN — SODIUM CHLORIDE, PRESERVATIVE FREE 10 ML: 5 INJECTION INTRAVENOUS at 21:21

## 2021-04-10 RX ADMIN — MONTELUKAST SODIUM 5 MG: 10 TABLET, FILM COATED ORAL at 21:22

## 2021-04-10 RX ADMIN — SODIUM BICARBONATE 650 MG: 650 TABLET ORAL at 21:22

## 2021-04-10 NOTE — THERAPY EVALUATION
Patient Name: Ambar Lara  : 1970    MRN: [id number]                              Today's Date: 4/10/2021       Admit Date: 2021    Visit Dx:     ICD-10-CM ICD-9-CM   1. Colitis, Clostridium difficile  A04.72 008.45   2. Single subsegmental pulmonary embolism without acute cor pulmonale (CMS/[facility])  I26.93 415.19   3. Hypovolemia  E86.1 276.52   4. ESRD on hemodialysis (CMS/[facility])  N18.6 585.6    Z99.2 V45.11     Patient Active Problem List   Diagnosis   • Colitis, Clostridium difficile   • Anxiety   • Cardiomyopathy (CMS/[facility])   • Chronic systolic heart failure (CMS/[facility])   • ESRD (end stage renal disease) on dialysis (CMS/[facility])   • Gastroesophageal reflux disease   • Gitelman syndrome   • HLD (hyperlipidemia)   • Hypothyroidism   • Peritoneal dialysis status (CMS/[facility])   • History of tracheostomy   • Hypotension   • Acute pulmonary embolism (CMS/[facility])     Past Medical History:   Diagnosis Date   • CHF (congestive heart failure) (CMS/[facility])    • Dialysis patient (CMS/[facility])    • Disease of thyroid gland    • Elevated cholesterol    • GERD (gastroesophageal reflux disease)    • Renal disorder    • Sleep apnea      Past Surgical History:   Procedure Laterality Date   • HYSTERECTOMY     • TRACHEOSTOMY       General Information     Row Name 04/10/21 1238          Physical Therapy Time and Intention    Document Type  evaluation  -     Mode of Treatment  physical therapy  -MW     Row Name 04/10/21 1238          General Information    Patient Profile Reviewed  yes  -MW     Prior Level of Function  (S) mod assist:;min assist:;transfer;w/c or scooter after recent d/c from rehab: pt able to peform STS at RW with A, took 3 steps with RW at rehab, uses w/c otherwise  -MW     Existing Precautions/Restrictions  fall  -MW     Barriers to Rehab  medically complex  -MW     Row Name 04/10/21 1238          Home Main Entrance    Number of Stairs, Main Entrance  two  -MW     Row Name 04/10/21 1238          Cognition     Orientation Status (Cognition)  oriented x 4  -MW     Row Name 04/10/21 1238          Safety Issues, Functional Mobility    Impairments Affecting Function (Mobility)  balance;endurance/activity tolerance;pain;postural/trunk control;strength  -MW       User Key  (r) = Recorded By, (t) = Taken By, (c) = Cosigned By    Initials Name Provider Type    Miracle Davison PT Physical Therapist        Mobility     Row Name 04/10/21 1242          Bed Mobility    Bed Mobility  rolling right;sidelying-sit;scooting/bridging  -MW     Rolling Right Portal (Bed Mobility)  verbal cues;minimum assist (75% patient effort)  -MW     Scooting/Bridging Portal (Bed Mobility)  maximum assist (25% patient effort);verbal cues  -MW     Sidelying-Sit Portal (Bed Mobility)  maximum assist (25% patient effort);verbal cues  -MW     Assistive Device (Bed Mobility)  bed rails;head of bed elevated  -MW     Comment (Bed Mobility)  HOB partially elevated to assist sidely to sit transition  -     Row Name 04/10/21 1242          Transfers    Comment (Transfers)  Unable to tolerate transfers this date due to weakness and decreased activity tolerance with sitting EOB, also c/o dizziness  -MW     Row Name 04/10/21 1242          Bed-Chair Transfer    Bed-Chair Portal (Transfers)  unable to assess  -MW     Row Name 04/10/21 1242          Sit-Stand Transfer    Sit-Stand Portal (Transfers)  unable to assess  -MW     Row Name 04/10/21 1242          Gait/Stairs (Locomotion)    Portal Level (Gait)  unable to assess  -MW       User Key  (r) = Recorded By, (t) = Taken By, (c) = Cosigned By    Initials Name Provider Type    Miracle Davison PT Physical Therapist        Obj/Interventions     Row Name 04/10/21 1244          Range of Motion Comprehensive    General Range of Motion  bilateral lower extremity ROM WFL  -MW     Comment, General Range of Motion  Endrange pain at ankles and knees due to muscle tightness  -     Row  Name 04/10/21 1244          Strength Comprehensive (MMT)    General Manual Muscle Testing (MMT) Assessment  lower extremity strength deficits identified  -MW     Comment, General Manual Muscle Testing (MMT) Assessment  (B) knees and ankles 3/5; R hip 3-/5, L hip 2+/5  -MW     Row Name 04/10/21 1244          Balance    Balance Assessment  sitting static balance  -MW     Static Sitting Balance  mild impairment;supported  -MW     Balance Interventions  sitting  -MW     Comment, Balance  Able to sit for approx 3 min. C/o dizziness when sitting, BP and O2 sats WNL  -MW     Row Name 04/10/21 1244          Sensory Assessment (Somatosensory)    Sensory Assessment (Somatosensory)  (S) other (see comments) Pt reports decreased sensation to light touch R plantar surface of foot  -MW       User Key  (r) = Recorded By, (t) = Taken By, (c) = Cosigned By    Initials Name Provider Type    Miracle Davison, PT Physical Therapist        Goals/Plan     Row Name 04/10/21 1259          Bed Mobility Goal 1 (PT)    Activity/Assistive Device (Bed Mobility Goal 1, PT)  bed mobility activities, all  -MW     [place] Level/Cues Needed (Bed Mobility Goal 1, PT)  minimum assist (75% or more patient effort)  -MW     Time Frame (Bed Mobility Goal 1, PT)  5 days  -MW     Row Name 04/10/21 1259          Transfer Goal 1 (PT)    Activity/Assistive Device (Transfer Goal 1, PT)  transfers, all;walker, rolling  -MW     [place] Level/Cues Needed (Transfer Goal 1, PT)  minimum assist (75% or more patient effort);contact guard assist  -MW     Time Frame (Transfer Goal 1, PT)  5 days  -MW     Row Name 04/10/21 1259          Gait Training Goal 1 (PT)    Activity/Assistive Device (Gait Training Goal 1, PT)  gait (walking locomotion);assistive device use  -MW     [place] Level (Gait Training Goal 1, PT)  minimum assist (75% or more patient effort)  -MW     Distance (Gait Training Goal 1, PT)  25  -MW     Time Frame (Gait Training Goal 1, PT)  5  days  -MW     Row Name 04/10/21 1259          Stairs Goal 1 (PT)    Activity/Assistive Device (Stairs Goal 1, PT)  stairs, all skills;using handrail, left;using handrail, right  -MW     [place] Level/Cues Needed (Stairs Goal 1, PT)  minimum assist (75% or more patient effort)  -MW     Number of Stairs (Stairs Goal 1, PT)  2  -MW     Time Frame (Stairs Goal 1, PT)  5 days  -MW       User Key  (r) = Recorded By, (t) = Taken By, (c) = Cosigned By    Initials Name Provider Type    MW Miracle Vora, PT Physical Therapist        Clinical Impression     Row Name 04/10/21 7811          Pain    Additional Documentation  Pain Scale: Numbers Pre/Post-Treatment (Group)  -     Row Name 04/10/21 7403          Pain Scale: Numbers Pre/Post-Treatment    Pretreatment Pain Rating  0/10 - no pain  -MW     Posttreatment Pain Rating  0/10 - no pain  -MW     Pre/Posttreatment Pain Comment  Reports pain related to muscle tightness during mobility  -MW     Pain Intervention(s)  Repositioned;Rest  -     Row Name 04/10/21 6267          Plan of Care Review    Plan of Care Reviewed With  patient  -MW     Outcome Summary  Pt is a 52 yo female with h/o COVID pneumonia with ARDS s/p trach, dialysis, GItleman syndrome, ESRD, & acute PE admitted for C-diff, subsegmental right lower lobe PE, and ESRD. She was recently discharged home from [facility] 2 days ago. She lives in a home with 2 ANA, daughter present to assist. Has been using w/c for mobility and RW for transfers with assistance. Today she presents with overall weakness and decreased activity tolerance. She was able to sit at EOB with Max A for approx 3 min, c/o dizziness, vitals WFL. Acute PT indicated to address functional deficits.  -     Row Name 04/10/21 5895          Therapy Assessment/Plan (PT)    Rehab Potential (PT)  good, to achieve stated therapy goals  -     Criteria for Skilled Interventions Met (PT)  yes;meets criteria;skilled treatment is necessary  -MW      Predicted Duration of Therapy Intervention (PT)  5 days  -MW     Row Name 04/10/21 1247          Vital Signs    Pre Systolic BP Rehab  97  -MW     Pre Treatment Diastolic BP  65  -MW     O2 Delivery Pre Treatment  room air  -MW     O2 Delivery Intra Treatment  room air  -MW     O2 Delivery Post Treatment  room air  -MW     Pre Patient Position  Supine  -MW     Intra Patient Position  Sitting  -MW     Post Patient Position  Supine  -MW     Row Name 04/10/21 1247          Positioning and Restraints    Pre-Treatment Position  in bed  -MW     Post Treatment Position  bed  -MW     In Bed  supine;exit alarm on;encouraged to call for assist;call light within reach;heels elevated  -MW       User Key  (r) = Recorded By, (t) = Taken By, (c) = Cosigned By    Initials Name Provider Type    Miracle Davison PT Physical Therapist        Outcome Measures     Row Name 04/10/21 1305          How much help from another person do you currently need...    Turning from your back to your side while in flat bed without using bedrails?  3  -MW     Moving from lying on back to sitting on the side of a flat bed without bedrails?  2  -MW     Moving to and from a bed to a chair (including a wheelchair)?  1  -MW     Standing up from a chair using your arms (e.g., wheelchair, bedside chair)?  1  -MW     Climbing 3-5 steps with a railing?  1  -MW     To walk in hospital room?  1  -MW     AM-PAC 6 Clicks Score (PT)  9  -MW     Row Name 04/10/21 1305          Functional Assessment    Outcome Measure Options  AM-PAC 6 Clicks Basic Mobility (PT)  -MW       User Key  (r) = Recorded By, (t) = Taken By, (c) = Cosigned By    Initials Name Provider Type    Miracle Davison, CARINA Physical Therapist        Physical Therapy Education                 Title: PT OT SLP Therapies (In Progress)     Topic: Physical Therapy (In Progress)     Point: Mobility training (Done)     Learning Progress Summary           Patient Acceptance, E, VU,NR by RD at 4/10/2021 1309     Comment: Elevate heels, AROM, safety with mobility, call for assist, use of call light                   Point: Home exercise program (Done)     Learning Progress Summary           Patient Acceptance, E, VU,NR by  at 4/10/2021 1306    Comment: Elevate heels, AROM, safety with mobility, call for assist, use of call light                   Point: Body mechanics (Not Started)     Learner Progress:  Not documented in this visit.          Point: Precautions (Done)     Learning Progress Summary           Patient Acceptance, E, VU,NR by  at 4/10/2021 1306    Comment: Elevate heels, AROM, safety with mobility, call for assist, use of call light                               User Key     Initials Effective Dates Name Provider Type Discipline     09/17/19 -  Miracle Vora, PT Physical Therapist PT              PT Recommendation and Plan  Planned Therapy Interventions (PT): balance training, bed mobility training, gait training, home exercise program, patient/family education, postural re-education, stair training, ROM (range of motion), strengthening, stretching, transfer training  Plan of Care Reviewed With: patient  Outcome Summary: Pt is a 50 yo female with h/o COVID pneumonia with ARDS s/p trach, dialysis, GItleman syndrome, ESRD, & acute PE admitted for C-diff, subsegmental right lower lobe PE, and ESRD. She was recently discharged home from [facility] 2 days ago. She lives in a home with 2 ANA, daughter present to assist. Has been using w/c for mobility and RW for transfers with assistance. Today she presents with overall weakness and decreased activity tolerance. She was able to sit at EOB with Max A for approx 3 min, c/o dizziness, vitals WFL. Acute PT indicated to address functional deficits.     Time Calculation:   PT Charges     Row Name 04/10/21 1213             Time Calculation    Start Time  1135  -MW      Stop Time  1201  -MW      Time Calculation (min)  26 min  -MW      PT Received On  04/10/21   -MW      PT - Next Appointment  04/12/21  -MW      PT Goal Re-Cert Due Date  04/17/21  -MW         Time Calculation- PT    Total Timed Code Minutes- PT  24 minute(s)  -MW        User Key  (r) = Recorded By, (t) = Taken By, (c) = Cosigned By    Initials Name Provider Type    Miracle Davison PT Physical Therapist        Therapy Charges for Today     Code Description Service Date Service Provider Modifiers Qty    30882395652 HC PT EVAL LOW COMPLEXITY 2 4/10/2021 Miracle Vora, PT GP 1    24239393232 HC PT THERAPEUTIC ACT EA 15 MIN 4/10/2021 Miracle Vora, PT GP 1    15179393952 HC PT THER PROC EA 15 MIN 4/10/2021 Miracle Vora, PT GP 1          PT G-Codes  Outcome Measure Options: AM-PAC 6 Clicks Basic Mobility (PT)  AM-PAC 6 Clicks Score (PT): 9    Miracle Vora PT  4/10/2021

## 2021-04-10 NOTE — CONSULTS
Referring Provider: Mauricio Mitchell MD  Reason for Consultation:   Chief Complaint   Patient presents with   • Hypotension   • Dizziness         Subjective   History of present illness:    Very nice 51-year-old we are asked for evaluation regarding cough recurrent C. difficile.    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  She contracted Covid earlier this year and require prolonged hospitalization at [facility].  There she developed Covid associated ARDS and required tracheostomy.  She was in a prolonged coma but eventually improved and was transferred to [facility].  During her admission at [facility], she developed C. difficile colitis and was started on oral vancomycin which she completed.    Her tracheostomy has now been decannulated.  Due to her critical illness and hospitalization, she had a right tunneled dialysis catheter placed and had been receiving hemodialysis.  Yesterday she went to her dialysis clinic for peritoneal dialysis and was found to be hypotensive and weak so was transferred to [facility] by EMS.  Here she was found to be C. difficile positive again and also found to have a right lower lobe segmental PE.  CT abdomen pelvis showed diffuse colitis.    Patient says that she gets her days a little bit confused given her prolonged coma but basically think she has been having watery diarrhea over the past 10 days which occurs as frequently as every 15 minutes.  This is associated with some mild abdominal pain.  She is not having any constitutional symptoms of infection such as fever, chills, night sweats.  She was started on oral vancomycin on admission but has not yet had much improvement in her symptoms.        Past Medical History:   Diagnosis Date   • CHF (congestive heart failure) (CMS/[facility])    • Dialysis patient (CMS/[facility])    • Disease of thyroid gland     • Elevated cholesterol    • GERD (gastroesophageal reflux disease)    • Renal disorder    • Sleep apnea        Past Surgical History:   Procedure Laterality Date   • HYSTERECTOMY     • TRACHEOSTOMY         No family history of infectious diseases  Social history: She lives in [place], [place].  .    Allergies   Allergen Reactions   • Penicillins Anaphylaxis   • Nickel Rash       Medication:  Antibiotics:  Anti-Infectives (From admission, onward)    Ordered     Dose/Rate Route Frequency Start Stop    04/10/21 0042  vancomycin oral solution reconstituted 125 mg     Ordering Provider: Jesusita Pozo, APRN    125 mg Oral Every Other Day 05/04/21 0900 05/18/21 0859    04/10/21 0042  vancomycin oral solution reconstituted 125 mg     Ordering Provider: Jesusita Pozo, APRN    125 mg Oral Every 24 Hours 04/27/21 0000 05/03/21 2359    04/10/21 0042  vancomycin oral solution reconstituted 125 mg     Ordering Provider: Jesusita Pozo, APRN    125 mg Oral Every 12 Hours 04/20/21 0000 04/26/21 2359    04/10/21 0042  vancomycin oral solution reconstituted 125 mg     Ordering Provider: Jesusita Pozo, APRN    125 mg Oral Every 6 Hours Scheduled 04/10/21 0130 04/19/21 2359          Review of Systems  Pertinent items are noted in HPI, all other systems reviewed and negative    Objective     Physical Exam:   Vital Signs   Temp:  [98.5 °F (36.9 °C)-99 °F (37.2 °C)] 99 °F (37.2 °C)  Heart Rate:  [102-117] 106  Resp:  [16-28] 16  BP: ()/(43-69) 97/65    GENERAL: Awake and alert, in no acute distress.   HEENT: Oropharynx is clear. Hearing is grossly normal.   EYES: PERRL. No conjunctival injection. No lid lag.   LYMPHATICS: No lymphadenopathy of the neck or inguinal regions.   HEART: Regular rate and regular rhythm. No peripheral edema.   LUNGS: Clear to auscultation anteriorly with normal respiratory effort.   GI: Soft, nontender, nondistended. No appreciable organomegaly.  No erythema or  drainage from peritoneal catheter  SKIN: Warm and dry without cutaneous eruptions   PSYCHIATRIC: Appropriate mood, affect, insight, and judgment.     Results Review:   White count was 23.03 on admission now down to 16.6  Procalcitonin 3.83  Creatinine 4.18  Alk phos 163  Liver function tests otherwise normal  Potassium 3.2  Hemoglobin 9.3  Platelets 285  Microbiology:  4/9 C. difficile positive  4/9 GI PCR negative  4/9 blood cultures pending  4/9 Covid screen pending (+ on 2/14/2021)    Radiology: As per HPI      Assessment/Plan   1.  Sepsis secondary to #2  2.  Recurrent C. Difficile  3.  Recent history of Covid  4.  Subsegmental right lower lobe PE  5.  End-stage renal disease on peritoneal dialysis traditionally, but now on hemodialysis  6.  IBS, complicating above    Agree with vancomycin 125 mg p.o. every 6 hours.  She will need prolonged taper.  There is some report apparently that the PD catheter was initially filled with purulent material.  This may have just been inflammation from the C. difficile colitis, but I am going to check peritoneal cell counts and cultures to make sure that there was no infection on her peritoneal catheter.           Thank you for this consult.  We will continue to follow along and tailor antibiotics as the patient's clinical course evolves.      Mayco Contreras MD  04/10/21  09:27 EDT

## 2021-04-10 NOTE — CONSULTS
Referring Provider: Dr. Joseph Maria  Reason for Consultation: ESRD    Subjective     Chief complaint   Chief Complaint   Patient presents with   • Hypotension   • Dizziness       History of present illness:  52 yo WF with ESRD attributed to interstitial nephritis on PD for the past few years, tho recently transitioned to HD 1-2 months ago due to critical illness from Covid-19 leading to respiratory failure and multiorgan compromise, complicated by C. difficile colitis and immobility syndrome, admitted from home yesterday for further evaluation of diarrhea, hypotension, and near-syncope.  Found to have a pulmonary embolism and recurrent C. difficile colitis.      Dialysis care provided by Dr. Gino Contreras ([facility]).  Full PMH outlined below.  Last HD was 4/8 via right chest TDC.  She reports visit yesterday with PD nurse at [facility] renal clinic, where a PD exchange was done without any difficulty or complication.  She was just released from [facility] to home two days ago.    · Continuous diarrhea for several days  · No fever or chills  · Reports that dressing around PD catheter was unchanged for over 1 month; states that her daughter cleaned the area upon patient's arrival home 2 days ago; patient describes serous drainage from PD catheter at the time of dressing change and no discharge since  · No abdominal pain  · Significant weakness in legs; remains unable to walk  · No shortness of breath; no orthopnea; no chest pain; no significant leg swelling  · Still makes urine, and no dysuria or hematuria    Past Medical History:   Diagnosis Date   • CHF (congestive heart failure) (CMS/[facility])    • Dialysis patient (CMS/[facility])    • Disease of thyroid gland    • Elevated cholesterol    • GERD (gastroesophageal reflux disease)    • Renal disorder    • Sleep apnea      Past Surgical History:   Procedure Laterality Date   • HYSTERECTOMY     • TRACHEOSTOMY       History reviewed. No pertinent  "family history.  Social History     Tobacco Use   • Smoking status: Never Smoker   Substance Use Topics   • Alcohol use: Not on file   • Drug use: Not on file     Medications Prior to Admission   Medication Sig Dispense Refill Last Dose   • escitalopram (Lexapro) 20 MG tablet Daily.   4/8/2021 at Unknown time   • levothyroxine sodium (TIROSINT) 112 MCG capsule Daily.   4/8/2021 at Unknown time   • montelukast (Singulair) 10 MG tablet Daily.   4/8/2021 at Unknown time   • potassium chloride (KLOR-CON) 20 MEQ CR tablet Daily.   4/8/2021 at Unknown time   • sodium bicarbonate 650 MG tablet Every 8 (Eight) Hours.   4/8/2021 at Unknown time   • SPIRONOLACTONE PO Take  by mouth.        Allergies:  Penicillins and Nickel    Review of Systems  14-point ROS performed and all negative except for pertinent +/-'s detailed in HPI.     Objective     Vital Signs  Temp:  [98.5 °F (36.9 °C)-99 °F (37.2 °C)] 99 °F (37.2 °C)  Heart Rate:  [102-117] 106  Resp:  [16-28] 16  BP: ()/(43-69) 97/65    Flowsheet Rows      First Filed Value   Admission Height  154.9 cm (61\") Documented at 04/09/2021 2000   Admission Weight  76.6 kg (168 lb 14.4 oz) Documented at 04/09/2021 2000           I/O this shift:  In: 120 [P.O.:120]  Out: -   I/O last 3 completed shifts:  In: 2900 [P.O.:150; I.V.:750; IV Piggyback:2000]  Out: -     Intake/Output Summary (Last 24 hours) at 4/10/2021 1244  Last data filed at 4/10/2021 0900  Gross per 24 hour   Intake 3020 ml   Output --   Net 3020 ml       Physical Exam:  NAD; pleasant; oriented; looks stated age  Chronically ill-appearing  Flat affect, depressed mood  Dry MM; AT/NC   No eye discharge; no scleral icterus  No JVD; no carotid bruits; tracheostomy  Coarse breath sounds bilat; not labored  RRR, no rub  Right chest TDC  Soft, NT, +D, BS+, PD catheter right lower quadrant without any exudate or drainage  No significant edema  No clubbing  No asterixis  Moves all extremities  Speech is fluent    Results " Review:  Results from last 7 days   Lab Units 04/10/21  0235 04/09/21  1631   SODIUM mmol/L 132* 133*   POTASSIUM mmol/L 3.2* 3.3*   CHLORIDE mmol/L 97* 96*   CO2 mmol/L 20.6* 21.4*   BUN mg/dL 29* 28*   CREATININE mg/dL 4.18* 4.19*   CALCIUM mg/dL 8.1* 8.6   BILIRUBIN mg/dL  --  0.2   ALK PHOS U/L  --  163*   ALT (SGPT) U/L  --  15   AST (SGOT) U/L  --  12   GLUCOSE mg/dL 92 106*       Estimated Creatinine Clearance: 15 mL/min (A) (by C-G formula based on SCr of 4.18 mg/dL (H)).    Results from last 7 days   Lab Units 04/10/21  0235 04/09/21  1631   MAGNESIUM mg/dL 1.9 1.8   PHOSPHORUS mg/dL 4.7*  --        Results from last 7 days   Lab Units 04/10/21  0235 04/09/21  2017 04/09/21  1631   WBC 10*3/mm3 16.60* 17.33* 23.03*   HEMOGLOBIN g/dL 9.3* 9.7* 10.3*   PLATELETS 10*3/mm3 285 287 323       Results from last 7 days   Lab Units 04/10/21  0231 04/09/21  2017   INR  1.41* 1.48*       Active Medications  escitalopram, 20 mg, Oral, Daily  levothyroxine, 25 mcg, Oral, Q AM  montelukast, 5 mg, Oral, Nightly  sodium bicarbonate, 650 mg, Oral, TID  sodium chloride, 10 mL, Intravenous, Q12H  vancomycin, 125 mg, Oral, Q6H  [START ON 4/20/2021] vancomycin, 125 mg, Oral, Q12H  [START ON 4/27/2021] vancomycin, 125 mg, Oral, Q24H  [START ON 5/4/2021] vancomycin, 125 mg, Oral, Every Other Day      heparin (porcine), 18 Units/kg/hr, Last Rate: 22 Units/kg/hr (04/10/21 0459)  Pharmacy Consult,         Assessment/Plan   Assessment  1.  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  Hypovolemic by exam  2.  Recurrent C. difficile colitis  3.  Covid-pneumonia earlier this year, with prolonged respiratory failure requiring ultimately tracheostomy  4.  Immobility syndrome  5.  Pulmonary embolism  6.  Anemia of ESRD  7.  Hypotension      Colitis, Clostridium difficile    Anxiety     Cardiomyopathy (CMS/HCC)    Chronic systolic heart failure (CMS/HCC)    ESRD (end stage renal disease) on dialysis (CMS/HCC)    Gastroesophageal reflux disease    Gitelman syndrome    HLD (hyperlipidemia)    Hypothyroidism    Peritoneal dialysis status (CMS/HCC)    History of tracheostomy    Hypotension    Acute pulmonary embolism (CMS/HCC)      Plan  1.  Replace potassium, and give 1 L of LR  2.  HD tomorrow without fluid removal; will return her to TTS schedule next week  3.  Once her colitis has completely resolved, suspect PD can be resumed then.  I will discuss with her primary nephrologist, Dr. Gino Contreras after weekend  4.  Surveillance labs    I discussed the patient's findings and my recommendations with the patient.     Sanjay Waggoner MD  04/10/21  12:44 EDT

## 2021-04-10 NOTE — PLAN OF CARE
Goal Outcome Evaluation:  Plan of Care Reviewed With: patient     Outcome Summary: Pt is a 52 yo female with h/o COVID pneumonia with ARDS s/p trach, dialysis, GItleman syndrome, ESRD, & acute PE admitted for C-diff, subsegmental right lower lobe PE, and ESRD. She was recently discharged home from [facility] 2 days ago. She lives in a home with 2 ANA, daughter present to assist. Has been using w/c for mobility and RW for transfers with assistance. Today she presents with overall weakness and decreased activity tolerance. She was able to sit at EOB with Max A for approx 3 min, c/o dizziness, vitals WFL. Acute PT indicated to address functional deficits.  ..Patient was not wearing a face mask during this therapy encounter. Therapist used appropriate personal protective equipment including gown, eye protection, mask and gloves.  Mask used was standard procedure mask. Appropriate PPE was worn during the entire therapy session. Hand hygiene was completed before and after therapy session. Patient is not in enhanced droplet precautions.

## 2021-04-10 NOTE — ED NOTES
Pt and bed cleaned and new bedding placed by EDT and soheila RN. PPE worn by all individuals.      Mauricio Carrero, SRIDEVI  04/09/21 7537

## 2021-04-10 NOTE — PROGRESS NOTES
"Pharmacy Consult - Apixaban    Ambar Lara has been consulted for pharmacy to dose apixaban for PE per Dr. Maria's request.     Patient is undergoing hemodialysis     Relevant clinical data and objective history reviewed:  51 y.o. female 154.9 cm (61\") 73.9 kg (162 lb 14.7 oz)    Home Anticoagulation: none    Past Medical History:   Diagnosis Date    CHF (congestive heart failure) (CMS/[facility])     Dialysis patient (CMS/[facility])     Disease of thyroid gland     Elevated cholesterol     GERD (gastroesophageal reflux disease)     Renal disorder     Sleep apnea      is allergic to penicillins and nickel.    Lab Results   Component Value Date    WBC 16.60 (H) 04/10/2021    HGB 9.3 (L) 04/10/2021    HCT 30.1 (L) 04/10/2021    MCV 97.7 (H) 04/10/2021     04/10/2021     Lab Results   Component Value Date    GLUCOSE 92 04/10/2021    CALCIUM 8.1 (L) 04/10/2021     (L) 04/10/2021    K 3.2 (L) 04/10/2021    CO2 20.6 (L) 04/10/2021    CL 97 (L) 04/10/2021    BUN 29 (H) 04/10/2021    CREATININE 4.18 (H) 04/10/2021    EGFRIFAFRI  04/10/2021      Comment:      <15 Indicative of kidney failure.    EGFRIFNONA 11 (L) 04/10/2021    BCR 6.9 (L) 04/10/2021    ANIONGAP 14.4 04/10/2021       Estimated Creatinine Clearance: 15 mL/min (A) (by C-G formula based on SCr of 4.18 mg/dL (H)).    Active Inpatient Anticoagulation Orders: weight based heparin protocol     Discussion was had with Dr. Crow alfonso risks and benefits of apixaban loading dose. Based on current anticoagulation and ESRD on HD, will forego loading apixaban.     Assessment/Plan  Will initiate patient on apixaban 5mg BID   Discussed with RN about transition of UFH to apixaban.   Will monitor for s/sx of bleeding, and the consult will be discontinued at this time.     Fern Saldana, [facility]    "

## 2021-04-10 NOTE — PROGRESS NOTES
Name: Ambar Lara ADMIT: 2021   : 1970  PCP: Jet Manuel MD    MRN: [id number] LOS: 1 days   AGE/SEX: 51 y.o. female  ROOM: [facility]     Subjective   Subjective   Patient reports feeling better.  Decreased abdominal pain.  No nausea or vomiting.  Tolerating regular diet well.  Continues with diarrhea but less frequency.  No melena or fresh bright blood per rectum.  No fever or chills.    Review of Systems  .  No dysuria or hematuria.  Cardiovascular/respiratory.  No chest pain/no shortness of breath/no cough/no wheeze/no hemoptysis.     Objective   Objective   Vital Signs  Temp:  [98.9 °F (37.2 °C)-99 °F (37.2 °C)] 99 °F (37.2 °C)  Heart Rate:  [102-113] 104  Resp:  [16-18] 18  BP: ()/(59-71) 104/71  SpO2:  [91 %-95 %] 92 %  on   ;   Device (Oxygen Therapy): room air    Intake/Output Summary (Last 24 hours) at 4/10/2021 1851  Last data filed at 4/10/2021 1825  Gross per 24 hour   Intake 1630 ml   Output --   Net 1630 ml     Body mass index is 30.78 kg/m².      21  2353   Weight: 76.6 kg (168 lb 14.4 oz) 73.9 kg (162 lb 14.7 oz)     Physical Exam  General.  Middle-aged female.  Alert oriented x3 in no apparent pain distress or diaphoresis normal mood and affect  Eyes.  No pallor or jaundice normal conjunctive and lids intact extraocular musculature is  Oral cavity moist mucous membrane  Neck.  Dressed tracheostomy wound.  No JVD.  Chest.  Tunneled catheter in the right upper chest.  Clear to auscultation bilaterally with no added sounds.  Abdomen.  Positive lower abdominal tenderness.  No guarding or rebound.  No distention.  No organomegaly.  Normal bowel sounds.    Extremities.  No clubbing cyanosis or edema.  CNS.  No acute focal neurological deficits.    Results Review:      Results from last 7 days   Lab Units 04/10/21  0235 21  1631   SODIUM mmol/L 132* 133*   POTASSIUM mmol/L 3.2* 3.3*   CHLORIDE mmol/L 97* 96*   CO2 mmol/L 20.6* 21.4*   BUN mg/dL 29*  28*   CREATININE mg/dL 4.18* 4.19*   GLUCOSE mg/dL 92 106*   CALCIUM mg/dL 8.1* 8.6   AST (SGOT) U/L  --  12   ALT (SGPT) U/L  --  15     Estimated Creatinine Clearance: 15 mL/min (A) (by C-G formula based on SCr of 4.18 mg/dL (H)).          Results from last 7 days   Lab Units 04/09/21  1631   TROPONIN T ng/mL 0.030     Results from last 7 days   Lab Units 04/09/21  1631   PROBNP pg/mL 4,129.0*     Results from last 7 days   Lab Units 04/10/21  0235   TSH uIU/mL 2.910     Results from last 7 days   Lab Units 04/10/21  0235 04/09/21  1631   MAGNESIUM mg/dL 1.9 1.8           Invalid input(s): LDLCALC  Results from last 7 days   Lab Units 04/10/21  0235 04/09/21 2017 04/09/21  1631   WBC 10*3/mm3 16.60* 17.33* 23.03*   HEMOGLOBIN g/dL 9.3* 9.7* 10.3*   HEMATOCRIT % 30.1* 29.7* 31.2*   PLATELETS 10*3/mm3 285 287 323   MCV fL 97.7* 94.0 92.6   MCH pg 30.2 30.7 30.6   MCHC g/dL 30.9* 32.7 33.0   RDW % 14.9 14.5 14.5   RDW-SD fl 53.0 49.8 48.1   MPV fL 9.7 9.3 9.5   NEUTROS ABS 10*3/mm3 13.41* 14.64* 16.35*  16.21*   BASOS ABS 10*3/mm3  --  0.17 0.23*  0.46*   NRBC /100 WBC 1.0*  0.0 0.0 0.0     Results from last 7 days   Lab Units 04/10/21  1047 04/10/21  0231 04/09/21  2017   INR   --  1.41* 1.48*   APTT seconds 71.9* 46.0* 45.2*         Results from last 7 days   Lab Units 04/09/21  1631   PROCALCITONIN ng/mL 3.83*   LACTATE mmol/L 1.8             Results from last 7 days   Lab Units 04/09/21  1804 04/09/21  1631   BLOODCX  No growth at 24 hours No growth at 24 hours     Results from last 7 days   Lab Units 04/09/21  1722   ADENOVIRUS  Not Detected               Imaging:  Imaging Results (Last 24 Hours)     ** No results found for the last 24 hours. **             I reviewed the patient's new clinical results / labs / tests / procedures      Assessment/Plan     Active Hospital Problems    Diagnosis  POA   • **Colitis, Clostridium difficile [A04.72]  Yes   • History of tracheostomy [Z98.890]  Not Applicable   •  Hypotension [I95.9]  Unknown   • Acute pulmonary embolism (CMS/HCC) [I26.99]  Unknown   • Gastroesophageal reflux disease [K21.9]  Yes   • Peritoneal dialysis status (CMS/[facility]) [Z99.2]  Not Applicable   • Cardiomyopathy (CMS/HCC) [I42.9]  Yes   • Chronic systolic heart failure (CMS/[facility]) [I50.22]  Yes   • Gitelman syndrome [E83.42, E87.6]  Yes   • ESRD (end stage renal disease) on dialysis (CMS/[facility]) [N18.6, Z99.2]  Not Applicable   • Anxiety [F41.9]  Yes   • HLD (hyperlipidemia) [E78.5]  Yes   • Hypothyroidism [E03.9]  Yes      Resolved Hospital Problems   No resolved problems to display.           · Sepsis secondary to C. difficile colitis.  Improving sepsis indices.  Improving symptomatology of the C. difficile colitis.  Benign GI examination except lower abdominal tenderness.  Negative blood cultures till now.  Negative GI PCR.  Currently on vancomycin.  Infectious disease following and is checking peritoneal fluid for any infection.  Proving leukocytosis.  · End-stage renal disease/hypokalemia.  Magnesium is normal.  For hemodialysis without volume removal tomorrow per nephrology.  Potassium being substituted per nephrology.  · Recent Covid infection.  Asymptomatic.  Repeat Covid is negative.  · Small right pulmonary embolus.  No respiratory compromise or right ventricular strain.  Will ask pharmacy to switch to Eliquis.  · Anemia.  Stable hemoglobin.  Will monitor.    · Discussed with patient.      Joseph Maria MD  [facility]  04/10/21  18:51 EDT

## 2021-04-10 NOTE — ED NOTES
Pt has been changed a total of 6 times thus far, 2 of which have been full bed/linen changes.    Pt reports needing to be changed again due to large watery bowel movement. Will change/clean pt and then transport upstairs.      Ambar Arellano RN  04/09/21 6831

## 2021-04-10 NOTE — PLAN OF CARE
Goal Outcome Evaluation:  Plan of Care Reviewed With: patient  Progress: no change  Outcome Summary: SBP 90s, asymptomatic. No falls. No c/o pain. Pt having frequent stools. Wound on right buttock cleaned and covered with a mepliex, wound consult placed. Turning Q2. Heparin gtt continued. Resting comfortably. Monitoring closely.

## 2021-04-10 NOTE — H&P
"    Patient Name:  Ambar Lara  YOB: 1970  MRN:  [id number]  Admit Date:  4/9/2021  Patient Care Team:  Jet Manuel MD as PCP - General (Internal Medicine)      Subjective   History Present Illness     Chief Complaint   Patient presents with   • Hypotension   • Dizziness     History of Present Illness   Mrs. Lara is a 51 year  With history of covid 19, ESRD, CHF, cardiomyopathy, hypothyroidism, Gitelman syndrome who presents to the ED with hypotension from dialysis center.  Patient was just released from [facility] a few days ago after and extensive hospital stay when she was diagnosed with COVID-19 pneumonia with ARDS and septic shock from C. difficile colitis.  She was treated with a course of antibiotics (IV flagyl and po vanco) as well as steroids, remdesivir, plasma and then eventually transferred to [facility]. She did require trach placement while hospitalized.  She states that she was sent home and has not really been able to ambulate on her own due to severe weakness in her legs.  Ang denies any significant shortness of breath or chest pain. She diud have a trach that has been removed.  Patient states she has been able to eat a regular diet at home.  She does complain of 10-12 episodes of diarrhea daily at  Home, she has occasional abdominal pain and some incontinence of stool.  SHe state she r legs just feel generally weak but she denies any swelling or specific pain in her legs or calves.She did do peritoneal dialysis at home prior to her illness in February but has since been started on hemodialysis.  At the dialysis center today she was noted to have some hypotension and some \"pus\" from her peritoneal catheter, they were able to drain some fluid and flush it at the center.  In the ED INR 1.48, PTT 45.2, Potssium 3.3, sodium 133, calcium 8.6, creat 4.19, bun 28, d dimer 2.22, procal 3.83, lactic 1.8.  Cta chest shows small PE, started on heparin drip.  Ct abdomen shows diffuse " colitis, stool positive for c diff.  WBC 23.03, hgb 10.3, hct 31.2, platelet 323.    Review of Systems   Constitutional: Negative for appetite change and fever.   HENT: Negative for nosebleeds and trouble swallowing.    Eyes: Negative for photophobia, redness and visual disturbance.   Respiratory: Negative for cough, chest tightness, shortness of breath and wheezing.    Cardiovascular: Negative for chest pain, palpitations and leg swelling.        Hypotension   Gastrointestinal: Positive for abdominal pain and diarrhea. Negative for abdominal distention, nausea and vomiting.   Endocrine: Negative.    Genitourinary: Negative.    Musculoskeletal: Negative for gait problem and joint swelling.   Skin: Negative.    Neurological: Positive for weakness (generalized). Negative for dizziness, seizures, speech difficulty, light-headedness and headaches.   Hematological: Negative.    Psychiatric/Behavioral: Negative for behavioral problems and confusion.        Personal History     Past Medical History:   Diagnosis Date   • CHF (congestive heart failure) (CMS/[facility])    • Dialysis patient (CMS/[facility])    • Disease of thyroid gland    • Elevated cholesterol    • GERD (gastroesophageal reflux disease)    • Renal disorder    • Sleep apnea      Past Surgical History:   Procedure Laterality Date   • HYSTERECTOMY     • TRACHEOSTOMY       No family history on file.  Social History     Tobacco Use   • Smoking status: Not on file   Substance Use Topics   • Alcohol use: Not on file   • Drug use: Not on file     No current facility-administered medications on file prior to encounter.     Current Outpatient Medications on File Prior to Encounter   Medication Sig Dispense Refill   • escitalopram (Lexapro) 20 MG tablet Daily.     • levothyroxine sodium (TIROSINT) 112 MCG capsule Daily.     • montelukast (Singulair) 10 MG tablet Daily.     • potassium chloride (KLOR-CON) 20 MEQ CR tablet Daily.     • sodium bicarbonate 650 MG tablet Every 8  (Eight) Hours.     • SPIRONOLACTONE PO Take  by mouth.       Allergies   Allergen Reactions   • Penicillins Anaphylaxis   • Nickel Rash       Objective    Objective     Vital Signs  Temp:  [98.5 °F (36.9 °C)-98.9 °F (37.2 °C)] 98.9 °F (37.2 °C)  Heart Rate:  [102-117] 102  Resp:  [16-28] 16  BP: ()/(43-69) 97/69  SpO2:  [91 %-98 %] 95 %  on   ;   Device (Oxygen Therapy): room air  Body mass index is 30.78 kg/m².    Physical Exam  Vitals and nursing note reviewed.   Constitutional:       General: She is not in acute distress.     Appearance: She is well-developed.   HENT:      Head: Normocephalic.   Neck:      Vascular: No JVD.   Cardiovascular:      Rate and Rhythm: Normal rate and regular rhythm.      Heart sounds: Normal heart sounds.      Comments: NSR on the monitor with rat 72 during my exam  Pulmonary:      Effort: Pulmonary effort is normal.      Breath sounds: Normal breath sounds.      Comments: Lung sounds clear, sats 97% on room air, trach stoma clean, covered with dressing, no drainage  Chest:      Comments: shiley catheter in right chest, no redness or drainage around site  Abdominal:      General: Bowel sounds are increased. There is no distension.      Palpations: Abdomen is soft.      Tenderness: There is abdominal tenderness in the periumbilical area and suprapubic area.      Comments: Peritoneal dialysis catheter in place, no surrounding redness or drainage noted, some mild tenderness on plapation.   Musculoskeletal:         General: Normal range of motion.      Cervical back: Normal range of motion.      Right lower leg: No edema.      Left lower leg: No edema.   Skin:     General: Skin is warm and dry.      Capillary Refill: Capillary refill takes less than 2 seconds.   Neurological:      General: No focal deficit present.      Mental Status: She is alert and oriented to person, place, and time.      Comments: No focal weakness, but did not attempt to ambulate patient at this time    Psychiatric:         Attention and Perception: Attention normal.         Mood and Affect: Mood normal.         Speech: Speech normal.         Behavior: Behavior normal.         Thought Content: Thought content normal.         Cognition and Memory: Cognition normal.         Judgment: Judgment normal.         Results Review:  I reviewed the patient's new clinical results.  I reviewed the patient's new imaging results and agree with the interpretation.  I reviewed the patient's other test results and agree with the interpretation  I personally viewed and interpreted the patient's EKG/Telemetry data  Discussed with ED provider.    Lab Results (last 24 hours)     Procedure Component Value Units Date/Time    CBC & Differential [[id number]]  (Abnormal) Collected: 04/09/21 1631    Specimen: Blood Updated: 04/09/21 1656    Narrative:      The following orders were created for panel order CBC & Differential.  Procedure                               Abnormality         Status                     ---------                               -----------         ------                     CBC Auto Differential[[id number]]        Abnormal            Final result                 Please view results for these tests on the individual orders.    Comprehensive Metabolic Panel [[id number]]  (Abnormal) Collected: 04/09/21 1631    Specimen: Blood Updated: 04/09/21 1718     Glucose 106 mg/dL      BUN 28 mg/dL      Creatinine 4.19 mg/dL      Sodium 133 mmol/L      Potassium 3.3 mmol/L      Chloride 96 mmol/L      CO2 21.4 mmol/L      Calcium 8.6 mg/dL      Total Protein 6.9 g/dL      Albumin 3.10 g/dL      ALT (SGPT) 15 U/L      AST (SGOT) 12 U/L      Alkaline Phosphatase 163 U/L      Total Bilirubin 0.2 mg/dL      eGFR Non African Amer 11 mL/min/1.73      Comment: <15 Indicative of kidney failure.        eGFR   Amer --     Comment: <15 Indicative of kidney failure.        Globulin 3.8 gm/dL      A/G Ratio 0.8 g/dL      BUN/Creatinine  Ratio 6.7     Anion Gap 15.6 mmol/L     Narrative:      GFR Normal >60  Chronic Kidney Disease <60  Kidney Failure <15      Troponin [[id number]]  (Normal) Collected: 04/09/21 1631    Specimen: Blood Updated: 04/09/21 1710     Troponin T 0.030 ng/mL     Narrative:      Troponin T Reference Range:  <= 0.03 ng/mL-   Negative for AMI  >0.03 ng/mL-     Abnormal for myocardial necrosis.  Clinicians would have to utilize clinical acumen, EKG, Troponin and serial changes to determine if it is an Acute Myocardial Infarction or myocardial injury due to an underlying chronic condition.       Results may be falsely decreased if patient taking Biotin.      BNP [[id number]]  (Abnormal) Collected: 04/09/21 1631    Specimen: Blood Updated: 04/09/21 1710     proBNP 4,129.0 pg/mL     Narrative:      Among patients with dyspnea, NT-proBNP is highly sensitive for the detection of acute congestive heart failure. In addition NT-proBNP of <300 pg/ml effectively rules out acute congestive heart failure with 99% negative predictive value.    Results may be falsely decreased if patient taking Biotin.      D-dimer, Quantitative [[id number]]  (Abnormal) Collected: 04/09/21 1631    Specimen: Blood Updated: 04/09/21 1658     D-Dimer, Quantitative 2.22 MCGFEU/mL     Narrative:      The Stago D-Dimer test used in conjunction with a clinical pretest probability (PTP) assessment model, has been approved by the FDA to rule out the presence of venous thromboembolism (VTE) in outpatients suspected of deep venous thrombosis (DVT) or pulmonary embolism (PE). The cut-off for negative predictive value is <0.50 MCGFEU/mL.    Blood Culture - Blood, Arm, Left [[id number]] Collected: 04/09/21 1631    Specimen: Blood from Arm, Left Updated: 04/09/21 1640    Lactic Acid, Plasma [[id number]]  (Normal) Collected: 04/09/21 1631    Specimen: Blood Updated: 04/09/21 1712     Lactate 1.8 mmol/L     Procalcitonin [[id number]]  (Abnormal) Collected: 04/09/21 1631     "Specimen: Blood Updated: 04/09/21 1719     Procalcitonin 3.83 ng/mL     Narrative:      As a Marker for Sepsis (Non-Neonates):     1. <0.5 ng/mL represents a low risk of severe sepsis and/or septic shock.  2. >2 ng/mL represents a high risk of severe sepsis and/or septic shock.    As a Marker for Lower Respiratory Tract Infections that require antibiotic therapy:  PCT on Admission     Antibiotic Therapy             6-12 Hrs later  >0.5                          Strongly Recommended            >0.25 - <0.5             Recommended  0.1 - 0.25                  Discouraged                       Remeasure/reassess PCT  <0.1                         Strongly Discouraged         Remeasure/reassess PCT      As 28 day mortality risk marker: \"Change in Procalcitonin Result\" (>80% or <=80%) if Day 0 (or Day 1) and Day 4 values are available. Refer to http://www.ClearSaleingpct-calculator.com/    Change in PCT <=80 %   A decrease of PCT levels below or equal to 80% defines a positive change in PCT test result representing a higher risk for 28-day all-cause mortality of patients diagnosed with severe sepsis or septic shock.    Change in PCT >80 %   A decrease of PCT levels of more than 80% defines a negative change in PCT result representing a lower risk for 28-day all-cause mortality of patients diagnosed with severe sepsis or septic shock.              Results may be falsely decreased if patient taking Biotin.     CBC Auto Differential [[id number]]  (Abnormal) Collected: 04/09/21 1631    Specimen: Blood Updated: 04/09/21 1656     WBC 23.03 10*3/mm3      RBC 3.37 10*6/mm3      Hemoglobin 10.3 g/dL      Hematocrit 31.2 %      MCV 92.6 fL      MCH 30.6 pg      MCHC 33.0 g/dL      RDW 14.5 %      RDW-SD 48.1 fl      MPV 9.5 fL      Platelets 323 10*3/mm3      nRBC 0.0 /100 WBC     Manual Differential [[id number]]  (Abnormal) Collected: 04/09/21 1631    Specimen: Blood Updated: 04/09/21 1725     Neutrophil % 70.4 %      Lymphocyte % 13.3 %  "     Monocyte % 8.2 %      Basophil % 2.0 %      Other Cells % 6.1 %      Neutrophils Absolute 16.21 10*3/mm3      Lymphocytes Absolute 3.06 10*3/mm3      Monocytes Absolute 1.89 10*3/mm3      Basophils Absolute 0.46 10*3/mm3      RBC Morphology Normal     WBC Morphology Normal     Platelet Morphology Normal    Manual Differential [[id number]]  (Abnormal) Collected: 04/09/21 1631    Specimen: Blood Updated: 04/09/21 1733     Neutrophil % 71.0 %      Lymphocyte % 24.0 %      Monocyte % 4.0 %      Basophil % 1.0 %      Neutrophils Absolute 16.35 10*3/mm3      Lymphocytes Absolute 5.53 10*3/mm3      Monocytes Absolute 0.92 10*3/mm3      Basophils Absolute 0.23 10*3/mm3      RBC Morphology Normal     WBC Morphology Normal     Platelet Morphology Normal    Magnesium [[id number]]  (Normal) Collected: 04/09/21 1631    Specimen: Blood Updated: 04/09/21 2302     Magnesium 1.8 mg/dL     Gastrointestinal Panel, PCR - Stool, Per Rectum [[id number]]  (Normal) Collected: 04/09/21 1722    Specimen: Stool from Per Rectum Updated: 04/09/21 1850     Campylobacter Not Detected     Plesiomonas shigelloides Not Detected     Salmonella Not Detected     Vibrio Not Detected     Vibrio cholerae Not Detected     Yersinia enterocolitica Not Detected     Enteroaggregative E. coli (EAEC) Not Detected     Enteropathogenic E. coli (EPEC) Not Detected     Enterotoxigenic E. coli (ETEC) lt/st Not Detected     Shiga-like toxin-producing E. coli (STEC) stx1/stx2 Not Detected     Shigella/Enteroinvasive E. coli (EIEC) Not Detected     Cryptosporidium Not Detected     Cyclospora cayetanensis Not Detected     Entamoeba histolytica Not Detected     Giardia lamblia Not Detected     Adenovirus F40/41 Not Detected     Astrovirus Not Detected     Norovirus GI/GII Not Detected     Rotavirus A Not Detected     Sapovirus (I, II, IV or V) Not Detected    Narrative:      If Aeromonas, Staphylococcus aureus or Bacillus cereus are suspected, please order [id number]: Stool  Culture, Aeromonas, S aureus, B Cereus.    Clostridium Difficile Toxin - Stool, Per Rectum [[id number]]  (Abnormal) Collected: 04/09/21 1722    Specimen: Stool from Per Rectum Updated: 04/09/21 1824    Narrative:      The following orders were created for panel order Clostridium Difficile Toxin - Stool, Per Rectum.  Procedure                               Abnormality         Status                     ---------                               -----------         ------                     Clostridium Difficile To...[[id number]]  Abnormal            Final result                 Please view results for these tests on the individual orders.    Clostridium Difficile Toxin, PCR - Stool, Per Rectum [[id number]]  (Abnormal) Collected: 04/09/21 1722    Specimen: Stool from Per Rectum Updated: 04/09/21 1824     C. Difficile Toxins by PCR Positive    Blood Culture - Blood, Arm, Right [[id number]] Collected: 04/09/21 1804    Specimen: Blood from Arm, Right Updated: 04/09/21 1807    Protime-INR [[id number]]  (Abnormal) Collected: 04/09/21 2017    Specimen: Blood Updated: 04/09/21 2045     Protime 17.7 Seconds      INR 1.48    aPTT [[id number]]  (Abnormal) Collected: 04/09/21 2017    Specimen: Blood Updated: 04/09/21 2045     PTT 45.2 seconds     CBC & Differential [[id number]]  (Abnormal) Collected: 04/09/21 2017    Specimen: Blood Updated: 04/09/21 2039    Narrative:      The following orders were created for panel order CBC & Differential.  Procedure                               Abnormality         Status                     ---------                               -----------         ------                     CBC Auto Differential[[id number]]        Abnormal            Final result                 Please view results for these tests on the individual orders.    CBC Auto Differential [[id number]]  (Abnormal) Collected: 04/09/21 2017    Specimen: Blood Updated: 04/09/21 2039     WBC 17.33 10*3/mm3      RBC 3.16 10*6/mm3      Hemoglobin  9.7 g/dL      Hematocrit 29.7 %      MCV 94.0 fL      MCH 30.7 pg      MCHC 32.7 g/dL      RDW 14.5 %      RDW-SD 49.8 fl      MPV 9.3 fL      Platelets 287 10*3/mm3      nRBC 0.0 /100 WBC     Manual Differential [[id number]]  (Abnormal) Collected: 04/09/21 2017    Specimen: Blood Updated: 04/09/21 2133     Neutrophil % 84.5 %      Lymphocyte % 8.2 %      Monocyte % 3.1 %      Basophil % 1.0 %      Metamyelocyte % 1.0 %      Myelocyte % 2.1 %      Neutrophils Absolute 14.64 10*3/mm3      Lymphocytes Absolute 1.42 10*3/mm3      Monocytes Absolute 0.54 10*3/mm3      Basophils Absolute 0.17 10*3/mm3      RBC Morphology Normal     WBC Morphology Normal     Platelet Morphology Normal    COVID PRE-OP / PRE-PROCEDURE SCREENING ORDER (NO ISOLATION) - Swab, Nasopharynx [[id number]] Collected: 04/09/21 2304    Specimen: Swab from Nasopharynx Updated: 04/09/21 2359    Narrative:      The following orders were created for panel order COVID PRE-OP / PRE-PROCEDURE SCREENING ORDER (NO ISOLATION) - Swab, Nasopharynx.  Procedure                               Abnormality         Status                     ---------                               -----------         ------                     COVID-19,APTIMA PANTHER,...[[id number]]                      In process                   Please view results for these tests on the individual orders.    COVID-19,APTIMA PANTHER,[facility], NP/OP SWAB IN UTM/VTM/[facility]SPORT MEDIA,24 HR TAT - Swab, Nasopharynx [[id number]] Collected: 04/09/21 2304    Specimen: Swab from Nasopharynx Updated: 04/09/21 2359          Imaging Results (Last 24 Hours)     Procedure Component Value Units Date/Time    CT Angiogram Chest [[id number]] Collected: 04/09/21 1922     Updated: 04/09/21 1955    Narrative:      CT ANGIOGRAM CHEST-, CT ABDOMEN PELVIS W CONTRAST-     CLINICAL HISTORY: Tachycardia. Dyspnea. Elevated d-dimer. Abdominal  pain. Leukocytosis. History of severe COVID 19 infection requiring  intubation in  the recent past.     TECHNIQUE: Spiral CT images were obtained through the chest during rapid  IV injection of contrast and were reconstructed in 2 mm thick axial  slices. Multiple coronal and sagittal and 3-D reconstructions were  produced. Subsequently, spiral CT images were obtained through the  abdomen and pelvis with IV contrast.     Radiation dose reduction techniques were utilized, including automated  exposure control and exposure modulation based on body size.     COMPARISON: None     FINDINGS: A patent tracheostomy is noted. The main pulmonary arteries  and their lobar and segmental branches are fairly well opacified. There  is a small filling defect within a subsegmental branch of the right  lower lobe pulmonary artery in the medial aspect of the right lung base  suspicious for a pulmonary embolus. No other filling defects are  identified. The thoracic aorta was also fairly well opacified and is  unremarkable. There is no evidence of aneurysm or dissection. Lung  window images demonstrate moderately extensive coarse reticular and  linear opacities distributed throughout both lungs compatible with  fibrotic scarring due to previous pneumonia. Patchy groundglass  opacities are also present within both lungs indicating at least some  degree of residual acute inflammation.. There are no focal areas of  dense consolidation. There is no bronchiectasis. There are no discrete  lung masses. No emphysematous changes are evident. There are no pleural  effusions. There are a few mildly enlarged lymph nodes scattered  throughout the mediastinum that are quite likely benign reactive lymph  nodes.     There is diminished attenuation throughout the liver parenchyma  consistent with mild hepatic steatosis. No focal hepatic lesions are  identified. The spleen and pancreas  are unremarkable. Numerous tiny  bilateral simple renal cysts are present. An addition, both kidneys  appear somewhat small in size. The right kidney  measures 8.7 cm in  length. The left kidney measures 7.7 cm in length. There is no  hydronephrosis or hydroureter. There is moderate diffuse thickening of  the left adrenal gland consistent with hyperplasia. The right adrenal  gland is markedly atrophic. The stomach and small bowel appear within  normal limits. There is moderate diffuse edema throughout the walls of  the colon that is most prominent in the rectosigmoid region where there  is marked edema and mucosal hyperenhancement. The majority of the colon  is contracted and contains no formed stool. The findings are consistent  with extensive colitis. A peritoneal dialysis catheter is in place and  is looped within the anterior aspect of the pelvis to the right of  midline. There is very tiny amount of free fluid adjacent to the right  lobe of the liver. The uterus is absent.       Impression:      1. Solitary small filling defect within a subsegmental branch of the  right lower lobe pulmonary artery suspicious for a small pulmonary  embolus. No other pulmonary emboli are identified.     2. Patchy areas of fairly extensive linear and reticular fibrotic  scarring throughout both lungs that also mild patchy groundglass  opacities within both lungs. Mildly enlarged mediastinal lymph nodes are  likely benign reactive lymph nodes.     4. CT findings consistent with extensive diffuse colitis as described.  Bowel wall edema is most prominent within the sigmoid colon and rectum.     5. Atrophic right adrenal gland. Diffusely thickened left adrenal gland  consistent with hyperplasia.     6. Atrophic kidneys containing multiple tiny cysts. Peritoneal dialysis  catheter in place within the pelvis.     This report was finalized on 4/9/2021 7:52 PM by Dr. Cecil Roldan M.D.       CT Abdomen Pelvis With Contrast [[id number]] Collected: 04/09/21 1922     Updated: 04/09/21 1955    Narrative:      CT ANGIOGRAM CHEST-, CT ABDOMEN PELVIS W CONTRAST-     CLINICAL HISTORY:  Tachycardia. Dyspnea. Elevated d-dimer. Abdominal  pain. Leukocytosis. History of severe COVID 19 infection requiring  intubation in the recent past.     TECHNIQUE: Spiral CT images were obtained through the chest during rapid  IV injection of contrast and were reconstructed in 2 mm thick axial  slices. Multiple coronal and sagittal and 3-D reconstructions were  produced. Subsequently, spiral CT images were obtained through the  abdomen and pelvis with IV contrast.     Radiation dose reduction techniques were utilized, including automated  exposure control and exposure modulation based on body size.     COMPARISON: None     FINDINGS: A patent tracheostomy is noted. The main pulmonary arteries  and their lobar and segmental branches are fairly well opacified. There  is a small filling defect within a subsegmental branch of the right  lower lobe pulmonary artery in the medial aspect of the right lung base  suspicious for a pulmonary embolus. No other filling defects are  identified. The thoracic aorta was also fairly well opacified and is  unremarkable. There is no evidence of aneurysm or dissection. Lung  window images demonstrate moderately extensive coarse reticular and  linear opacities distributed throughout both lungs compatible with  fibrotic scarring due to previous pneumonia. Patchy groundglass  opacities are also present within both lungs indicating at least some  degree of residual acute inflammation.. There are no focal areas of  dense consolidation. There is no bronchiectasis. There are no discrete  lung masses. No emphysematous changes are evident. There are no pleural  effusions. There are a few mildly enlarged lymph nodes scattered  throughout the mediastinum that are quite likely benign reactive lymph  nodes.     There is diminished attenuation throughout the liver parenchyma  consistent with mild hepatic steatosis. No focal hepatic lesions are  identified. The spleen and pancreas  are unremarkable.  Numerous tiny  bilateral simple renal cysts are present. An addition, both kidneys  appear somewhat small in size. The right kidney measures 8.7 cm in  length. The left kidney measures 7.7 cm in length. There is no  hydronephrosis or hydroureter. There is moderate diffuse thickening of  the left adrenal gland consistent with hyperplasia. The right adrenal  gland is markedly atrophic. The stomach and small bowel appear within  normal limits. There is moderate diffuse edema throughout the walls of  the colon that is most prominent in the rectosigmoid region where there  is marked edema and mucosal hyperenhancement. The majority of the colon  is contracted and contains no formed stool. The findings are consistent  with extensive colitis. A peritoneal dialysis catheter is in place and  is looped within the anterior aspect of the pelvis to the right of  midline. There is very tiny amount of free fluid adjacent to the right  lobe of the liver. The uterus is absent.       Impression:      1. Solitary small filling defect within a subsegmental branch of the  right lower lobe pulmonary artery suspicious for a small pulmonary  embolus. No other pulmonary emboli are identified.     2. Patchy areas of fairly extensive linear and reticular fibrotic  scarring throughout both lungs that also mild patchy groundglass  opacities within both lungs. Mildly enlarged mediastinal lymph nodes are  likely benign reactive lymph nodes.     4. CT findings consistent with extensive diffuse colitis as described.  Bowel wall edema is most prominent within the sigmoid colon and rectum.     5. Atrophic right adrenal gland. Diffusely thickened left adrenal gland  consistent with hyperplasia.     6. Atrophic kidneys containing multiple tiny cysts. Peritoneal dialysis  catheter in place within the pelvis.     This report was finalized on 4/9/2021 7:52 PM by Dr. Cecil Roldan M.D.       XR Chest 1 View [[id number]] Collected: 04/09/21 6172     Updated:  04/09/21 1700    Narrative:      PORTABLE CHEST 01/09/2021 AT 4:34 PM     CLINICAL HISTORY: Hypotension     There is minimal patchy bibasilar atelectasis. No acute focal  infiltrates are identified. There are no pleural effusions. The heart is  normal in size. A right internal jugular dialysis catheter is in place  in satisfactory position.     IMPRESSIONS: No evidence of acute disease within the chest.     This report was finalized on 4/9/2021 4:57 PM by Dr. Cecil Roldan M.D.                 ECG 12 Lead   Preliminary Result   HEART RATE= 109  bpm   RR Interval= 548  ms   LA Interval= 209  ms   P Horizontal Axis= 48  deg   P Front Axis= 20  deg   QRSD Interval= 98  ms   QT Interval= 323  ms   QRS Axis= 4  deg   T Wave Axis=   deg   - ABNORMAL ECG -   Sinus tachycardia   Prolonged LA interval   Probable left ventricular hypertrophy   Nonspecific T abnormalities, lateral leads   Electronically Signed By:    Date and Time of Study: 2021-04-09 17:46:29           Assessment/Plan     Active Hospital Problems    Diagnosis  POA   • **Colitis, Clostridium difficile [A04.72]  Yes   • History of tracheostomy [Z98.890]  Not Applicable   • Hypotension [I95.9]  Unknown   • Acute pulmonary embolism (CMS/HCC) [I26.99]  Unknown   • Gastroesophageal reflux disease [K21.9]  Yes   • Peritoneal dialysis status (CMS/HCC) [Z99.2]  Not Applicable   • Cardiomyopathy (CMS/HCC) [I42.9]  Yes   • Chronic systolic heart failure (CMS/HCC) [I50.22]  Yes   • 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 Lara is a 51 year  With history of covid 19, ESRD, CHF, cardiomyopathy, hypothyroidism, Gitelman syndrome who presents to the ED with hypotension from dialysis center.    Colitis/C-diff  -start on oral vancomycin  -consult infectious disease  - diet as  tolerated    Pulmonary embolism  -started on heparin drip in the ed, will continue  -telemetry unit for monitoring   -o2 to keep sats above 93%    ESRD  -consult nephrology  -patient was doing peritoneal dialysis at home prior to recent lengthy hospitalization when she was started on hemodialysis.   -culture fluid from peritoneal dialysis catheter, gram stain and cell count  -patient was unable to receive dialysis today.  --bmp in am    Gitelman syndrome  -monitor bmp, check magnesium  -nephrology consulted      · I discussed the patient's findings and my recommendations with patient and ED provider.    VTE Prophylaxis - heparin drip.  Code Status - Full code.       FACUNDO Mckinney  [facility]  04/10/21  00:44 EDT

## 2021-04-11 PROBLEM — E43 SEVERE MALNUTRITION (HCC): Status: ACTIVE | Noted: 2021-04-11

## 2021-04-11 LAB
ALBUMIN SERPL-MCNC: 2.8 G/DL (ref 3.5–5.2)
ALBUMIN/GLOB SERPL: 0.9 G/DL
ALP SERPL-CCNC: 125 U/L (ref 39–117)
ALT SERPL W P-5'-P-CCNC: 14 U/L (ref 1–33)
ANION GAP SERPL CALCULATED.3IONS-SCNC: 18.1 MMOL/L (ref 5–15)
APPEARANCE FLD: CLEAR
APTT PPP: 46.5 SECONDS (ref 22.7–35.4)
AST SERPL-CCNC: 15 U/L (ref 1–32)
BILIRUB SERPL-MCNC: 0.2 MG/DL (ref 0–1.2)
BUN SERPL-MCNC: 37 MG/DL (ref 6–20)
BUN/CREAT SERPL: 8.1 (ref 7–25)
CALCIUM SPEC-SCNC: 8.4 MG/DL (ref 8.6–10.5)
CHLORIDE SERPL-SCNC: 95 MMOL/L (ref 98–107)
CO2 SERPL-SCNC: 18.9 MMOL/L (ref 22–29)
COLOR FLD: YELLOW
CREAT SERPL-MCNC: 4.56 MG/DL (ref 0.57–1)
DEPRECATED RDW RBC AUTO: 50.1 FL (ref 37–54)
EOSINOPHIL # BLD MANUAL: 0.12 10*3/MM3 (ref 0–0.4)
EOSINOPHIL NFR BLD MANUAL: 1 % (ref 0.3–6.2)
ERYTHROCYTE [DISTWIDTH] IN BLOOD BY AUTOMATED COUNT: 14.5 % (ref 12.3–15.4)
GFR SERPL CREATININE-BSD FRML MDRD: 10 ML/MIN/1.73
GFR SERPL CREATININE-BSD FRML MDRD: ABNORMAL ML/MIN/{1.73_M2}
GLOBULIN UR ELPH-MCNC: 3.1 GM/DL
GLUCOSE SERPL-MCNC: 93 MG/DL (ref 65–99)
HCT VFR BLD AUTO: 29.7 % (ref 34–46.6)
HGB BLD-MCNC: 9.6 G/DL (ref 12–15.9)
INR PPP: 1.47 (ref 0.9–1.1)
LYMPHOCYTES # BLD MANUAL: 1.99 10*3/MM3 (ref 0.7–3.1)
LYMPHOCYTES NFR BLD MANUAL: 16.2 % (ref 19.6–45.3)
LYMPHOCYTES NFR BLD MANUAL: 6.1 % (ref 5–12)
LYMPHOCYTES NFR FLD MANUAL: 90 %
MAGNESIUM SERPL-MCNC: 2 MG/DL (ref 1.6–2.6)
MCH RBC QN AUTO: 30.4 PG (ref 26.6–33)
MCHC RBC AUTO-ENTMCNC: 32.3 G/DL (ref 31.5–35.7)
MCV RBC AUTO: 94 FL (ref 79–97)
METAMYELOCYTES NFR BLD MANUAL: 1 % (ref 0–0)
METHOD: NORMAL
MONOCYTES # BLD AUTO: 0.75 10*3/MM3 (ref 0.1–0.9)
MYELOCYTES NFR BLD MANUAL: 3 % (ref 0–0)
NEUTROPHILS # BLD AUTO: 8.91 10*3/MM3 (ref 1.7–7)
NEUTROPHILS NFR BLD MANUAL: 72.7 % (ref 42.7–76)
NEUTROPHILS NFR FLD MANUAL: 10 %
NRBC BLD AUTO-RTO: 0.1 /100 WBC (ref 0–0.2)
NUC CELL # FLD: 39 /MM3
PHOSPHATE SERPL-MCNC: 7.1 MG/DL (ref 2.5–4.5)
PLAT MORPH BLD: NORMAL
PLATELET # BLD AUTO: 300 10*3/MM3 (ref 140–450)
PMV BLD AUTO: 9.1 FL (ref 6–12)
POTASSIUM SERPL-SCNC: 3.2 MMOL/L (ref 3.5–5.2)
PROT SERPL-MCNC: 5.9 G/DL (ref 6–8.5)
PROTHROMBIN TIME: 17.6 SECONDS (ref 11.7–14.2)
QT INTERVAL: 323 MS
RBC # BLD AUTO: 3.16 10*6/MM3 (ref 3.77–5.28)
RBC # FLD AUTO: 32 /MM3
RBC MORPH BLD: NORMAL
SODIUM SERPL-SCNC: 132 MMOL/L (ref 136–145)
URATE SERPL-MCNC: 11.8 MG/DL (ref 2.4–5.7)
WBC # BLD AUTO: 12.26 10*3/MM3 (ref 3.4–10.8)
WBC MORPH BLD: NORMAL

## 2021-04-11 PROCEDURE — 87205 SMEAR GRAM STAIN: CPT | Performed by: NURSE PRACTITIONER

## 2021-04-11 PROCEDURE — 25010000002 HEPARIN (PORCINE) PER 1000 UNITS: Performed by: INTERNAL MEDICINE

## 2021-04-11 PROCEDURE — 84100 ASSAY OF PHOSPHORUS: CPT | Performed by: INTERNAL MEDICINE

## 2021-04-11 PROCEDURE — 99232 SBSQ HOSP IP/OBS MODERATE 35: CPT | Performed by: INTERNAL MEDICINE

## 2021-04-11 PROCEDURE — 85610 PROTHROMBIN TIME: CPT | Performed by: INTERNAL MEDICINE

## 2021-04-11 PROCEDURE — 85007 BL SMEAR W/DIFF WBC COUNT: CPT | Performed by: EMERGENCY MEDICINE

## 2021-04-11 PROCEDURE — 84550 ASSAY OF BLOOD/URIC ACID: CPT | Performed by: INTERNAL MEDICINE

## 2021-04-11 PROCEDURE — 85025 COMPLETE CBC W/AUTO DIFF WBC: CPT | Performed by: EMERGENCY MEDICINE

## 2021-04-11 PROCEDURE — 89051 BODY FLUID CELL COUNT: CPT | Performed by: NURSE PRACTITIONER

## 2021-04-11 PROCEDURE — 63710000001 DIPHENHYDRAMINE PER 50 MG: Performed by: NURSE PRACTITIONER

## 2021-04-11 PROCEDURE — 85730 THROMBOPLASTIN TIME PARTIAL: CPT | Performed by: INTERNAL MEDICINE

## 2021-04-11 PROCEDURE — 83735 ASSAY OF MAGNESIUM: CPT | Performed by: INTERNAL MEDICINE

## 2021-04-11 PROCEDURE — 5A1D70Z PERFORMANCE OF URINARY FILTRATION, INTERMITTENT, LESS THAN 6 HOURS PER DAY: ICD-10-PCS | Performed by: INTERNAL MEDICINE

## 2021-04-11 PROCEDURE — 25010000002 ALBUMIN HUMAN 25% PER 50 ML: Performed by: INTERNAL MEDICINE

## 2021-04-11 PROCEDURE — 80053 COMPREHEN METABOLIC PANEL: CPT | Performed by: INTERNAL MEDICINE

## 2021-04-11 PROCEDURE — 87070 CULTURE OTHR SPECIMN AEROBIC: CPT | Performed by: NURSE PRACTITIONER

## 2021-04-11 PROCEDURE — P9047 ALBUMIN (HUMAN), 25%, 50ML: HCPCS | Performed by: INTERNAL MEDICINE

## 2021-04-11 RX ORDER — SODIUM CHLORIDE, SODIUM LACTATE, POTASSIUM CHLORIDE, CALCIUM CHLORIDE 600; 310; 30; 20 MG/100ML; MG/100ML; MG/100ML; MG/100ML
75 INJECTION, SOLUTION INTRAVENOUS CONTINUOUS
Status: ACTIVE | OUTPATIENT
Start: 2021-04-11 | End: 2021-04-12

## 2021-04-11 RX ORDER — HEPARIN SODIUM 5000 [USP'U]/ML
40-80 INJECTION, SOLUTION INTRAVENOUS; SUBCUTANEOUS EVERY 6 HOURS PRN
Status: DISCONTINUED | OUTPATIENT
Start: 2021-04-11 | End: 2021-04-14 | Stop reason: HOSPADM

## 2021-04-11 RX ORDER — HEPARIN SODIUM 10000 [USP'U]/100ML
18 INJECTION, SOLUTION INTRAVENOUS
Status: DISCONTINUED | OUTPATIENT
Start: 2021-04-11 | End: 2021-04-11

## 2021-04-11 RX ORDER — HEPARIN SODIUM 10000 [USP'U]/100ML
18 INJECTION, SOLUTION INTRAVENOUS
Status: DISCONTINUED | OUTPATIENT
Start: 2021-04-11 | End: 2021-04-14

## 2021-04-11 RX ORDER — WARFARIN SODIUM 7.5 MG/1
7.5 TABLET ORAL
Status: DISCONTINUED | OUTPATIENT
Start: 2021-04-11 | End: 2021-04-11

## 2021-04-11 RX ORDER — WARFARIN SODIUM 7.5 MG/1
7.5 TABLET ORAL
Status: DISCONTINUED | OUTPATIENT
Start: 2021-04-11 | End: 2021-04-12

## 2021-04-11 RX ORDER — HEPARIN SODIUM 1000 [USP'U]/ML
3800 INJECTION, SOLUTION INTRAVENOUS; SUBCUTANEOUS AS NEEDED
Status: DISCONTINUED | OUTPATIENT
Start: 2021-04-11 | End: 2021-04-14 | Stop reason: HOSPADM

## 2021-04-11 RX ORDER — ALBUMIN (HUMAN) 12.5 G/50ML
12.5 SOLUTION INTRAVENOUS AS NEEDED
Status: DISPENSED | OUTPATIENT
Start: 2021-04-11 | End: 2021-04-11

## 2021-04-11 RX ADMIN — VANCOMYCIN 125 MG: KIT at 06:43

## 2021-04-11 RX ADMIN — ACETAMINOPHEN 650 MG: 325 TABLET ORAL at 22:13

## 2021-04-11 RX ADMIN — ACETAMINOPHEN 650 MG: 325 TABLET ORAL at 01:15

## 2021-04-11 RX ADMIN — SODIUM CHLORIDE, PRESERVATIVE FREE 10 ML: 5 INJECTION INTRAVENOUS at 08:14

## 2021-04-11 RX ADMIN — ALBUMIN HUMAN 12.5 G: 0.25 SOLUTION INTRAVENOUS at 13:03

## 2021-04-11 RX ADMIN — SODIUM BICARBONATE 650 MG: 650 TABLET ORAL at 21:43

## 2021-04-11 RX ADMIN — ACETAMINOPHEN 650 MG: 325 TABLET ORAL at 11:40

## 2021-04-11 RX ADMIN — ESCITALOPRAM 20 MG: 20 TABLET, FILM COATED ORAL at 08:13

## 2021-04-11 RX ADMIN — APIXABAN 5 MG: 5 TABLET, FILM COATED ORAL at 08:13

## 2021-04-11 RX ADMIN — SODIUM BICARBONATE 650 MG: 650 TABLET ORAL at 15:53

## 2021-04-11 RX ADMIN — HEPARIN SODIUM 3800 UNITS: 1000 INJECTION INTRAVENOUS; SUBCUTANEOUS at 11:17

## 2021-04-11 RX ADMIN — VANCOMYCIN 125 MG: KIT at 11:40

## 2021-04-11 RX ADMIN — SODIUM CHLORIDE, POTASSIUM CHLORIDE, SODIUM LACTATE AND CALCIUM CHLORIDE 75 ML/HR: 600; 310; 30; 20 INJECTION, SOLUTION INTRAVENOUS at 17:05

## 2021-04-11 RX ADMIN — VANCOMYCIN 125 MG: KIT at 00:58

## 2021-04-11 RX ADMIN — MONTELUKAST SODIUM 5 MG: 10 TABLET, FILM COATED ORAL at 21:44

## 2021-04-11 RX ADMIN — LEVOTHYROXINE SODIUM 25 MCG: 0.03 TABLET ORAL at 06:43

## 2021-04-11 RX ADMIN — SODIUM BICARBONATE 650 MG: 650 TABLET ORAL at 08:13

## 2021-04-11 RX ADMIN — VANCOMYCIN 125 MG: KIT at 17:15

## 2021-04-11 RX ADMIN — DIPHENHYDRAMINE HYDROCHLORIDE 25 MG: 25 CAPSULE ORAL at 19:56

## 2021-04-11 RX ADMIN — DIPHENHYDRAMINE HYDROCHLORIDE 25 MG: 25 CAPSULE ORAL at 12:05

## 2021-04-11 RX ADMIN — SODIUM CHLORIDE, PRESERVATIVE FREE 10 ML: 5 INJECTION INTRAVENOUS at 21:44

## 2021-04-11 RX ADMIN — HEPARIN SODIUM 18 UNITS/KG/HR: 10000 INJECTION, SOLUTION INTRAVENOUS at 21:05

## 2021-04-11 RX ADMIN — ALBUMIN HUMAN 12.5 G: 0.25 SOLUTION INTRAVENOUS at 11:17

## 2021-04-11 NOTE — PROGRESS NOTES
Pharmacy Consult: Warfarin Dosing/ Monitoring    Ambar Lara is a 51 y.o. female, estimated creatinine clearance is 13.7 mL/min (A) (by C-G formula based on SCr of 4.56 mg/dL (H)). weighing 73.9 kg (162 lb 14.7 oz).     has a past medical history of CHF (congestive heart failure) (CMS/[facility]), Dialysis patient (CMS/[facility]), Disease of thyroid gland, Elevated cholesterol, GERD (gastroesophageal reflux disease), Renal disorder, and Sleep apnea.    Social History     Tobacco Use   • Smoking status: Never Smoker   Substance Use Topics   • Alcohol use: Not on file   • Drug use: Not on file       Results from last 7 days   Lab Units 04/11/21  0522 04/10/21  1047 04/10/21  0235 04/10/21  0231 04/09/21  2017 04/09/21  1631   INR   --   --   --  1.41* 1.48*  --    APTT seconds  --  71.9*  --  46.0* 45.2*  --    HEMOGLOBIN g/dL 9.6*  --  9.3*  --  9.7* 10.3*   HEMATOCRIT % 29.7*  --  30.1*  --  29.7* 31.2*   PLATELETS 10*3/mm3 300  --  285  --  287 323     Results from last 7 days   Lab Units 04/11/21  0522 04/10/21  0235 04/09/21  1631   SODIUM mmol/L 132* 132* 133*   POTASSIUM mmol/L 3.2* 3.2* 3.3*   CHLORIDE mmol/L 95* 97* 96*   CO2 mmol/L 18.9* 20.6* 21.4*   BUN mg/dL 37* 29* 28*   CREATININE mg/dL 4.56* 4.18* 4.19*   CALCIUM mg/dL 8.4* 8.1* 8.6   BILIRUBIN mg/dL 0.2  --  0.2   ALK PHOS U/L 125*  --  163*   ALT (SGPT) U/L 14  --  15   AST (SGOT) U/L 15  --  12   GLUCOSE mg/dL 93 92 106*     Anticoagulation history: not previously on warfarin    Hospital Anticoagulation:  Consulting provider: Dr. Maria  Start date: 4/11/21  Indication:active dvt/pe   (Acute RLL subsegmental PE)  Target INR: 2-3  Expected duration: TBD  Bridge Therapy: Yes              heparin drip  Date 4/10  4/11           INR 1.41 pendi           Warfarin dose   7.5 mg             Potential drug interactions: Acetaminophen: May enhance the anticoagulation effect of  ( usually exceeding 2 grams/day)   Levothyroxine: increases metabolism of vitamin  K-dependent clotting factors, resulting in an increased risk of bleeding..   Escitalopram : Increased risk of bleeding        Relevant nutrition status: regular diet/ hs snack    Other:     Education complete?/ Date: TBD    Assessment/Plan:  Dose 7.5mg x1 dose  Monitor   INR and s/s bleeding   Follow up 4/12/21    Pharmacy will continue to follow until discharge or discontinuation of warfarin.   Marian Huerta, [facility]  4/11/2021

## 2021-04-11 NOTE — PROGRESS NOTES
ID note for C. difficile  Subjective: She is feeling tremendously better.  Diarrhea has significantly slowed down.  She denies any fevers chills or night sweats.  Her appetite is much improved.    Objective: Afebrile, chronically ill-appearing but no acute distress, abdomen is soft nontender nondistended, appropriate mood and affect    Assessment and plan  1.  Sepsis secondary to #2  2.  Recurrent C. Difficile  3.  Recent history of Covid, negative for Covid here   4.  Subsegmental right lower lobe PE  5.  End-stage renal disease on peritoneal dialysis traditionally, but now on hemodialysis  6.  IBS, complicating above       Continue vancomycin 125 mg p.o. every 6 hours x10 days for now.  She will then proceed with prolonged vancomycin taper as ordered in epic.

## 2021-04-11 NOTE — PROGRESS NOTES
"Malnutrition Severity Assessment    Patient Name:  Ambar Lara  YOB: 1970  MRN: [id number]  Admit Date:  4/9/2021    Patient meets criteria for : Severe Malnutrition    Comments:  Pt meets criteria for severe malnutrition based on weight loss of 47 lb since 2/14/21 (22% change). Pt reports very poor intake & appetite since she was diagnosed with covid-19 virus in February 2021. Pt indicated she spent the past three weeks at a rehab facility and \"just could not eat the food - no appetite.\"     Full RD assessment in separate note.    Malnutrition Severity Assessment  Malnutrition Type: Chronic Disease - Related Malnutrition     Malnutrition Type (last 8 hours)      Malnutrition Severity Assessment     Row Name 04/11/21 1339       Malnutrition Severity Assessment    Malnutrition Type  Chronic Disease - Related Malnutrition    Row Name 04/11/21 1339       Insufficient Energy Intake     Insufficient Energy Intake Findings  Severe    Insufficient Energy Intake   <75% of est. energy requirement for > or equal to 1 month    Row Name 04/11/21 1339       Unintentional Weight Loss     Unintentional Weight Loss Findings  Severe    Unintentional Weight Loss   Weight loss greater than 7.5% in three months 22% loss since 2/14/21    Row Name 04/11/21 1339       Criteria Met (Must meet criteria for severity in at least 2 of these categories: M Wasting, Fat Loss, Fluid, Secondary Signs, Wt. Status, Intake)    Patient meets criteria for   Severe Malnutrition          Electronically signed by:  Lily Chacon RD  04/11/21 13:41 EDT  "

## 2021-04-11 NOTE — CONSULTS
Adult Nutrition  Assessment/PES    Patient Name:  Ambar Lara  YOB: 1970  MRN: [id number]  Admit Date:  4/9/2021    Assessment Date:  4/11/2021    Comments:  Consult per nurse admission screen for unintentional weight loss. Pt with R gluteal pressure injury as well.     EMR reviewed and spoke w/ pt by room phone - she is currently in isolation for (+) cdiff. She reports since her hospitalization with covid-19 in February, she has lost ~ >40 lb. She weighed 209 lb on day she went into hospital. She believes most of the weight was lost while at [facility] over the past month - says she could not eat the food there, had no appetite. Current wt 162 lb - 22% change which is quite significant.     Meal/food preferences obtained & adjusted menus likewise. She declines any type of oral nutrition supplement, as they are too sweet and cause nausea. I encouraged her to follow more of a GI soft/low residue diet while her diarrhea is so severe. She did not want me to change her order in Epic. HS snack ordered as well per pt request.     MSA done - severe malnutrition based on energy intake and weight loss. RD to continue to follow.     Reason for Assessment     Row Name 04/11/21 1255          Reason for Assessment    Reason For Assessment  nurse/nurse practitioner consult     Diagnosis  infection/sepsis;gastrointestinal disease;renal disease;cardiac disease Cdiff infection; hx CKD/PD, chf, anxiety; giltelmans syndrome     Identified At Risk by Screening Criteria  unintentional loss of 10 lbs or more in the past 2 mos;MST SCORE 2+;large or nonhealing wound, burn or pressure injury         Nutrition/Diet History     Row Name 04/11/21 1303          Nutrition/Diet History    Typical Food/Fluid Intake  Info from ED note: Pt had covid in February, in coma/vent for ~ 1 month reportedly. Had been on PD for ESRD for 3 yrs but since Feb, on HD. She had just been dc from [facility] on 4/8. She's been having  "10-15 watery BMs daily. +cdiff in ED.     Food Preferences  Spoke w/ pt by room phone: loves fresh fruit plate w/ cheese cubes (ordered for HS snack), spaghetti w/ marinara, biscuits/gravy, eggs, OJ, 2% milk, soups     Meal/Snack Patterns  Has not been eating well since Feb d/t covid and complications. She spent last 3 weeks at a step-down rehab center and says she \"just couldn't eat there.\"     Supplemental Drinks/Foods/Additives  Has tried boost - too sweet, makes her nauseated - she declined any type of supplement I offered     Factors Affecting Nutritional Intake  abdominal pain;altered gastrointestinal function;diarrhea         Anthropometrics     Row Name 04/11/21 1258          Anthropometrics    Height  154.9 cm (61\")        Admit Weight    Admit Weight Method  measured     Admit Weight  73.5 kg (162 lb)        Ideal Body Weight (IBW)    Ideal Body Weight (IBW) (kg)  48.15     % of Ideal Body Weight Assessment  other (see comments) 159%        Usual Body Weight (UBW)    Usual Body Weight  94.8 kg (209 lb) 202# 3/19/21 per [facility]'s chart note; 1/2016 156#, 182 in 2018 (kid[facility] center note)     % of Usual Body Weight Assessment  75-84% - moderate deficit 77     Weight Loss  unintentional loss of ~ 47 lb since her covid dx     Weight Loss Time Frame  2 months (2/14/21) - wt on that date 209 lb. Pt states with her dialysis, weight can vary from 189-209 lb range.        Body Mass Index (BMI)    BMI Assessment  BMI 30-34.9: obesity grade I BMI 30.7         Labs/Tests/Procedures/Meds     Row Name 04/11/21 1309          Labs/Procedures/Meds    Lab Results Reviewed  reviewed, pertinent     Lab Results Comments  Na 132, K 3.2, Cl 95, Cr 4.56, BUN 37, Alb 2.8, PHos 7.1, PCN 3.83, WBC 12k, Hgb 9.6,        Diagnostic Tests/Procedures    Diagnostic Test/Procedure Reviewed  reviewed, pertinent     Diagnostic Test/Procedures Comments  CTA chest and CT abdomen - findings of colitis as well as a single pulmonary embolus in the " "right lower lobe.        Medications    Pertinent Medications Reviewed  reviewed, pertinent     Pertinent Medications Comments  eliquis, synthroid, vanc,         Physical Findings     Row Name 04/11/21 1312          Physical Findings    Overall Physical Appearance  obese     Gastrointestinal  diarrhea     Skin  pressure injury R gluteal PI indicated - pink, intact         Estimated/Assessed Needs     Row Name 04/11/21 1319 04/11/21 1258       Calculation Measurements    Weight Used For Calculations  73.5 kg (162 lb)  --    Height  --  154.9 cm (61\")       Estimated/Assessed Needs    Additional Documentation  Protein Requirements (Group);KCAL/KG (Group);Fluid Requirements (Group)  --       KCAL/KG    KCAL/KG  25 Kcal/Kg (kcal);30 Kcal/Kg (kcal)  --    25 Kcal/Kg (kcal)  1837.075  --    30 Kcal/Kg (kcal)  2204.49  --       Protein Requirements    Weight Used For Protein Calculations  73.5 kg (162 lb)  --    Est Protein Requirement Amount (gms/kg)  1.3 gm protein  --    Estimated Protein Requirements (gms/day)  95.53  --       Fluid Requirements    Fluid Requirements (mL/day)  1800  --    Estimated Fluid Requirement Method  RDA Method  --    RDA Method (mL)  1800  --        Nutrition Prescription Ordered     Row Name 04/11/21 1339          Nutrition Prescription PO    Current PO Diet  Regular     Fluid Consistency  Thin         Evaluation of Received Nutrient/Fluid Intake     Row Name 04/11/21 1339          PO Evaluation    Number of Days PO Intake Evaluated  1 day     Number of Meals  1     % PO Intake  25%           Malnutrition Severity Assessment     Row Name 04/11/21 1330          Malnutrition Severity Assessment    Malnutrition Type  Chronic Disease - Related Malnutrition        Insufficient Energy Intake     Insufficient Energy Intake Findings  Severe     Insufficient Energy Intake   <75% of est. energy requirement for > or equal to 1 month        Unintentional Weight Loss     Unintentional Weight Loss Findings  " Severe     Unintentional Weight Loss   Weight loss greater than 7.5% in three months 22% loss since 2/14/21        Criteria Met (Must meet criteria for severity in at least 2 of these categories: M Wasting, Fat Loss, Fluid, Secondary Signs, Wt. Status, Intake)    Patient meets criteria for   Severe Malnutrition           Problem/Interventions:  Problem 1     Row Name 04/11/21 1344          Nutrition Diagnoses Problem 1    Problem 1  Malnutrition     Etiology (related to)  Medical Diagnosis     Infectious Disease  Sepsis;Other (comment) covid 19     Pulmonary/Critical Care  Acute respiratory failure     Renal  ESRD;Hemodialysis     Signs/Symptoms (evidenced by)  Report of Mnimal PO Intake;% UBW;Unintended Weight Change     Percent (%) UBW  77 %     Unintended Weight Change  Loss     Number of Pounds Lost  47 lb     Weight loss time period  2 months               Intervention Goal     Row Name 04/11/21 1345          Intervention Goal    General  Maintain nutrition;Disease management/therapy;Reduce/improve symptoms;Meet nutritional needs for age/condition     PO  Tolerate PO;Increase intake;PO intake (%)     PO Intake %  75 %     Weight  Maintain weight         Nutrition Intervention     Row Name 04/11/21 1349          Nutrition Intervention    RD/Tech Action  Advise alternate selection;Advise available snack;Interview for preference;Menu adjusted;Encourage intake;Recommend/ordered;Supplement offered/refused;Follow Tx progress;Care plan reviewd     Recommended/Ordered  Snack         Nutrition Prescription     Row Name 04/11/21 1343          Nutrition Prescription PO    PO Prescription  Begin/change supplement     Supplement  Other (comment) small fresh fruit plate with cheese cubes     Supplement Frequency  Snack time     Snack Times  Bedtime     New PO Prescription Ordered?  Yes         Education/Evaluation     Row Name 04/11/21 6782          Education    Education  Provided education regarding;Advised regarding  habits/behavior     Provided education regarding  Key food habit change     Advised Regarding Habits/Behavior  Food choices;Snacks;Other (comment) advised pt to try lower fiber foods for now - avoid raw veggies, hard fruits. She declined change to GI soft diet.        Monitor/Evaluation    Monitor  Per protocol           Electronically signed by:  Lily Chacon RD  04/11/21 13:49 EDT

## 2021-04-11 NOTE — PROGRESS NOTES
Name: Ambar Lara ADMIT: 2021   : 1970  PCP: Jet Manuel MD    MRN: [id number] LOS: 2 days   AGE/SEX: 51 y.o. female  ROOM: [facility]     Subjective   Subjective   Decreased frequency of the diarrhea now semiformed.  Decreased abdominal pain.  No nausea or vomiting.  Tolerating regular diet well.    No melena or fresh bright blood per rectum.  No fever or chills.    Review of Systems    .  No dysuria or hematuria.  Cardiovascular/respiratory.  No chest pain/no shortness of breath/no cough/no wheeze/no hemoptysis.     Objective   Objective   Vital Signs  Temp:  [97.4 °F (36.3 °C)-98.4 °F (36.9 °C)] 97.7 °F (36.5 °C)  Heart Rate:  [] 100  Resp:  [16-18] 16  BP: ()/(60-71) 96/66  SpO2:  [92 %-98 %] 93 %  on   ;   Device (Oxygen Therapy): room air    Intake/Output Summary (Last 24 hours) at 2021 1458  Last data filed at 2021 1400  Gross per 24 hour   Intake 1361.25 ml   Output 0 ml   Net 1361.25 ml     Body mass index is 30.78 kg/m².      21  2353   Weight: 76.6 kg (168 lb 14.4 oz) 73.9 kg (162 lb 14.7 oz)     Physical Exam    General.  Middle-aged female.  Alert oriented x3 in no apparent pain distress or diaphoresis normal mood and affect  Eyes.  No pallor or jaundice normal conjunctive and lids intact extraocular musculature is  Oral cavity moist mucous membrane  Neck.  Dressed tracheostomy wound.  No JVD.  Chest.  Tunneled catheter in the right upper chest.  Clear to auscultation bilaterally with no added sounds.  Cardiovascular.  Regular rate and rhythm grade 2 systolic murmur.  Abdomen.  Resolved lower abdominal tenderness..  No guarding or rebound.  No distention.  No organomegaly.  Normal bowel sounds.    Extremities.  No clubbing cyanosis or edema.  CNS.  No acute focal neurological deficits.    Results Review:      Results from last 7 days   Lab Units 21  0522 04/10/21  0235 21  1631   SODIUM mmol/L 132* 132* 133*   POTASSIUM  mmol/L 3.2* 3.2* 3.3*   CHLORIDE mmol/L 95* 97* 96*   CO2 mmol/L 18.9* 20.6* 21.4*   BUN mg/dL 37* 29* 28*   CREATININE mg/dL 4.56* 4.18* 4.19*   GLUCOSE mg/dL 93 92 106*   CALCIUM mg/dL 8.4* 8.1* 8.6   AST (SGOT) U/L 15  --  12   ALT (SGPT) U/L 14  --  15     Estimated Creatinine Clearance: 13.7 mL/min (A) (by C-G formula based on SCr of 4.56 mg/dL (H)).          Results from last 7 days   Lab Units 04/09/21  1631   TROPONIN T ng/mL 0.030     Results from last 7 days   Lab Units 04/09/21  1631   PROBNP pg/mL 4,129.0*     Results from last 7 days   Lab Units 04/10/21  0235   TSH uIU/mL 2.910     Results from last 7 days   Lab Units 04/11/21  0522 04/10/21  0235 04/09/21  1631   MAGNESIUM mg/dL 2.0 1.9 1.8           Invalid input(s): LDLCALC  Results from last 7 days   Lab Units 04/11/21  0522 04/10/21  0235 04/09/21  2017 04/09/21  1631   WBC 10*3/mm3 12.26* 16.60* 17.33* 23.03*   HEMOGLOBIN g/dL 9.6* 9.3* 9.7* 10.3*   HEMATOCRIT % 29.7* 30.1* 29.7* 31.2*   PLATELETS 10*3/mm3 300 285 287 323   MCV fL 94.0 97.7* 94.0 92.6   MCH pg 30.4 30.2 30.7 30.6   MCHC g/dL 32.3 30.9* 32.7 33.0   RDW % 14.5 14.9 14.5 14.5   RDW-SD fl 50.1 53.0 49.8 48.1   MPV fL 9.1 9.7 9.3 9.5   NEUTROS ABS 10*3/mm3 8.91* 13.41* 14.64* 16.35*  16.21*   EOS ABS 10*3/mm3 0.12  --   --   --    BASOS ABS 10*3/mm3  --   --  0.17 0.23*  0.46*   NRBC /100 WBC 0.1 1.0*  0.0 0.0 0.0     Results from last 7 days   Lab Units 04/10/21  1047 04/10/21  0231 04/09/21 2017   INR   --  1.41* 1.48*   APTT seconds 71.9* 46.0* 45.2*         Results from last 7 days   Lab Units 04/09/21  1631   PROCALCITONIN ng/mL 3.83*   LACTATE mmol/L 1.8             Results from last 7 days   Lab Units 04/09/21  1804 04/09/21  1631   BLOODCX  No growth at 24 hours No growth at 24 hours     Results from last 7 days   Lab Units 04/09/21  1722   ADENOVIRUS  Not Detected         Results from last 7 days   Lab Units 04/11/21  0522   URIC ACID mg/dL 11.8*       Imaging:  Imaging  Results (Last 24 Hours)     ** No results found for the last 24 hours. **             I reviewed the patient's new clinical results / labs / tests / procedures      Assessment/Plan     Active Hospital Problems    Diagnosis  POA   • **Colitis, Clostridium difficile [A04.72]  Yes   • Severe malnutrition (CMS/HCC) [E43]  Yes   • History of tracheostomy [Z98.890]  Not Applicable   • Hypotension [I95.9]  Unknown   • Acute pulmonary embolism (CMS/[facility]) [I26.99]  Unknown   • Gastroesophageal reflux disease [K21.9]  Yes   • Peritoneal dialysis status (CMS/[facility]) [Z99.2]  Not Applicable   • Cardiomyopathy (CMS/[facility]) [I42.9]  Yes   • Chronic systolic heart failure (CMS/[facility]) [I50.22]  Yes   • 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           · Sepsis secondary to C. difficile colitis.  Improving sepsis indices.  Improving symptomatology of the C. difficile colitis.  Benign GI examination.  Negative blood cultures till now.  Negative GI PCR.  Currently on vancomycin.  Infectious disease following and is checking peritoneal fluid for any infection.  Improving leukocytosis.  · End-stage renal disease/hypokalemia.  Magnesium is normal.  Status post hemodialysis without volume removal today.  Nephrology decided that the patient will be on hemodialysis until C. difficile infection has resolved..  Potassium being substituted per nephrology.  · Recent Covid infection.  Asymptomatic.  Repeat Covid is negative.  · Small right pulmonary embolus.  No respiratory compromise or right ventricular strain.  Secondary to patient being on dialysis for the time being we will stop Eliquis and initiate Coumadin for anticoagulation.  · Anemia.  Stable hemoglobin.  Will monitor.    · Discussed with  patient.      Joseph Maria MD  [facility]  04/11/21  14:58 EDT

## 2021-04-11 NOTE — PROGRESS NOTES
Assessment & Plan  Subjective:  Symptoms:  No shortness of breath, chest pain, chest pressure or anxiety.  (No further diarrhea).      Temp:  [97.4 °F (36.3 °C)-98.4 °F (36.9 °C)] 97.4 °F (36.3 °C)  Heart Rate:  [] 85  Resp:  [18] 18  BP: ()/(60-71) 86/60  I/O last 3 completed shifts:  In: 2531.3 [P.O.:490; I.V.:1041.3; IV Piggyback:1000]  Out: -   No intake/output data recorded.       Current Facility-Administered Medications:   •  acetaminophen (TYLENOL) tablet 650 mg, 650 mg, Oral, Q4H PRN, 650 mg at 04/11/21 0115 **OR** acetaminophen (TYLENOL) 160 MG/5ML solution 650 mg, 650 mg, Oral, Q4H PRN **OR** acetaminophen (TYLENOL) suppository 650 mg, 650 mg, Rectal, Q4H PRN, Jesusita Pozo APRN  •  albumin human 25 % IV SOLN 12.5 g, 12.5 g, Intravenous, PRN, Sanjay Waggoner MD  •  apixaban (ELIQUIS) tablet 5 mg, 5 mg, Oral, Q12H, Joseph Maria MD, 5 mg at 04/11/21 0813  •  diphenhydrAMINE (BENADRYL) capsule 25 mg, 25 mg, Oral, Q6H PRN, Jesusita Pozo APRN, 25 mg at 04/10/21 2122  •  escitalopram (LEXAPRO) tablet 20 mg, 20 mg, Oral, Daily, Jesusita Pozo APRN, 20 mg at 04/11/21 0813  •  heparin (porcine) injection 3,800 Units, 3,800 Units, Intracatheter, PRN, Sanjay Waggoner MD  •  levothyroxine (SYNTHROID, LEVOTHROID) tablet 25 mcg, 25 mcg, Oral, Q AM, Jesusita Pozo APRN, 25 mcg at 04/11/21 0643  •  montelukast (SINGULAIR) tablet 5 mg, 5 mg, Oral, Nightly, Jesusita Pozo APRN, 5 mg at 04/10/21 2122  •  nitroglycerin (NITROSTAT) SL tablet 0.4 mg, 0.4 mg, Sublingual, Q5 Min PRN, Jesusita Pozo APRN  •  ondansetron (ZOFRAN) injection 4 mg, 4 mg, Intravenous, Q6H PRN, Jesusita Pozo APRN  •  Pharmacy Consult, , Does not apply, Continuous PRN, Jesusita Pozo APRN  •  sodium bicarbonate tablet 650 mg, 650 mg, Oral, TID, Jesusita Pozo APRN, 650 mg at 04/11/21 0813  •  [COMPLETED] Insert peripheral IV, , , Once **AND** sodium chloride 0.9 %  "flush 10 mL, 10 mL, Intravenous, PRN, Marcos Blackman MD  •  sodium chloride 0.9 % flush 10 mL, 10 mL, Intravenous, Q12H, Jesusita Pozo, APRN, 10 mL at 04/11/21 0814  •  sodium chloride 0.9 % flush 10 mL, 10 mL, Intravenous, PRN, Sánchez Jesusita M, APRN  •  vancomycin oral solution reconstituted 125 mg, 125 mg, Oral, Q6H, SánchezJesusita M, APRN, 125 mg at 04/11/21 0643  •  [START ON 4/20/2021] vancomycin oral solution reconstituted 125 mg, 125 mg, Oral, Q12H, SánchezJesusita M, APRN  •  [START ON 4/27/2021] vancomycin oral solution reconstituted 125 mg, 125 mg, Oral, Q24H, Jesusita Pozo M, APRN  •  [START ON 5/4/2021] vancomycin oral solution reconstituted 125 mg, 125 mg, Oral, Every Other Day, Mayco Cnotreras MD      Objective:  General Appearance:  Comfortable and in no acute distress.    Vital signs: (most recent): Blood pressure (!) 86/60, pulse 85, temperature 97.4 °F (36.3 °C), temperature source Oral, resp. rate 18, height 154.9 cm (61\"), weight 73.9 kg (162 lb 14.7 oz), SpO2 93 %.  Vital signs are normal.    Output: Producing urine and producing stool.    HEENT: Normal HEENT exam.    Lungs:  Normal effort and normal respiratory rate.  Breath sounds clear to auscultation.    Heart: Normal rate.  Regular rhythm.  S1 normal and S2 normal.  Positive for murmur.    Abdomen: Bowel sounds are normal.   There is no mass. There is no splenomegaly. There is no hepatomegaly.   Extremities: There is no dependent edema.    Neurological: Patient is alert and oriented to person, place and time.    Pupils:  Pupils are equal, round, and reactive to light.    Skin:  Warm and dry.            Lab Results (last 24 hours)     Procedure Component Value Units Date/Time    Manual Differential [[id number]]  (Abnormal) Collected: 04/11/21 0522    Specimen: Blood Updated: 04/11/21 0630     Neutrophil % 72.7 %      Lymphocyte % 16.2 %      Monocyte % 6.1 %      Eosinophil % 1.0 %      Metamyelocyte % " 1.0 %      Myelocyte % 3.0 %      Neutrophils Absolute 8.91 10*3/mm3      Lymphocytes Absolute 1.99 10*3/mm3      Monocytes Absolute 0.75 10*3/mm3      Eosinophils Absolute 0.12 10*3/mm3      RBC Morphology Normal     WBC Morphology Normal     Platelet Morphology Normal    Phosphorus [[id number]]  (Abnormal) Collected: 04/11/21 0522    Specimen: Blood Updated: 04/11/21 0620     Phosphorus 7.1 mg/dL     Comprehensive Metabolic Panel [[id number]]  (Abnormal) Collected: 04/11/21 0522    Specimen: Blood Updated: 04/11/21 0620     Glucose 93 mg/dL      BUN 37 mg/dL      Creatinine 4.56 mg/dL      Sodium 132 mmol/L      Potassium 3.2 mmol/L      Chloride 95 mmol/L      CO2 18.9 mmol/L      Calcium 8.4 mg/dL      Total Protein 5.9 g/dL      Albumin 2.80 g/dL      ALT (SGPT) 14 U/L      AST (SGOT) 15 U/L      Alkaline Phosphatase 125 U/L      Total Bilirubin 0.2 mg/dL      eGFR Non African Amer 10 mL/min/1.73      Comment: <15 Indicative of kidney failure.        eGFR   Amer --     Comment: <15 Indicative of kidney failure.        Globulin 3.1 gm/dL      A/G Ratio 0.9 g/dL      BUN/Creatinine Ratio 8.1     Anion Gap 18.1 mmol/L     Narrative:      GFR Normal >60  Chronic Kidney Disease <60  Kidney Failure <15      Uric Acid [[id number]]  (Abnormal) Collected: 04/11/21 0522    Specimen: Blood Updated: 04/11/21 0614     Uric Acid 11.8 mg/dL     Magnesium [[id number]]  (Normal) Collected: 04/11/21 0522    Specimen: Blood Updated: 04/11/21 0614     Magnesium 2.0 mg/dL     CBC & Differential [[id number]]  (Abnormal) Collected: 04/11/21 0522    Specimen: Blood Updated: 04/11/21 0547    Narrative:      The following orders were created for panel order CBC & Differential.  Procedure                               Abnormality         Status                     ---------                               -----------         ------                     CBC Auto Differential[[id number]]        Abnormal            Final result                  Please view results for these tests on the individual orders.    CBC Auto Differential [[id number]]  (Abnormal) Collected: 04/11/21 0522    Specimen: Blood Updated: 04/11/21 0547     WBC 12.26 10*3/mm3      RBC 3.16 10*6/mm3      Hemoglobin 9.6 g/dL      Hematocrit 29.7 %      MCV 94.0 fL      MCH 30.4 pg      MCHC 32.3 g/dL      RDW 14.5 %      RDW-SD 50.1 fl      MPV 9.1 fL      Platelets 300 10*3/mm3      nRBC 0.1 /100 WBC     Blood Culture - Blood, Arm, Right [[id number]] Collected: 04/09/21 1804    Specimen: Blood from Arm, Right Updated: 04/10/21 1815     Blood Culture No growth at 24 hours    Blood Culture - Blood, Arm, Left [[id number]] Collected: 04/09/21 1631    Specimen: Blood from Arm, Left Updated: 04/10/21 1645     Blood Culture No growth at 24 hours              Colitis, Clostridium difficile    Anxiety    Cardiomyopathy (CMS/HCC)    Chronic systolic heart failure (CMS/HCC)    ESRD (end stage renal disease) on dialysis (CMS/[facility])    Gastroesophageal reflux disease    Gitelman syndrome    HLD (hyperlipidemia)    Hypothyroidism    Peritoneal dialysis status (CMS/[facility])    History of tracheostomy    Hypotension    Acute pulmonary embolism (CMS/HCC)        1. esrd-hd today with no volume removal and a 4K bath- she is having no diarrhea but dr foster recommended to her to continue hemodialysis on her outpatient schedule until she has been told that her c diff colitis is resolved; I recommended this to her as well; dc home anytime is ok from a renal standpoint  2. giltelmans syndrome  3. HFrEF  4. Hypotension  5. hyperlipidemia

## 2021-04-12 LAB
ALBUMIN SERPL-MCNC: 3.3 G/DL (ref 3.5–5.2)
ALBUMIN/GLOB SERPL: 1.1 G/DL
ALP SERPL-CCNC: 120 U/L (ref 39–117)
ALT SERPL W P-5'-P-CCNC: 18 U/L (ref 1–33)
ANION GAP SERPL CALCULATED.3IONS-SCNC: 11.1 MMOL/L (ref 5–15)
APTT PPP: 66.1 SECONDS (ref 22.7–35.4)
APTT PPP: 85.8 SECONDS (ref 22.7–35.4)
AST SERPL-CCNC: 19 U/L (ref 1–32)
BILIRUB SERPL-MCNC: <0.2 MG/DL (ref 0–1.2)
BUN SERPL-MCNC: 12 MG/DL (ref 6–20)
BUN/CREAT SERPL: 4.5 (ref 7–25)
CALCIUM SPEC-SCNC: 7.8 MG/DL (ref 8.6–10.5)
CHLORIDE SERPL-SCNC: 100 MMOL/L (ref 98–107)
CO2 SERPL-SCNC: 24.9 MMOL/L (ref 22–29)
CREAT SERPL-MCNC: 2.67 MG/DL (ref 0.57–1)
DEPRECATED RDW RBC AUTO: 50.8 FL (ref 37–54)
EOSINOPHIL # BLD MANUAL: 0.23 10*3/MM3 (ref 0–0.4)
EOSINOPHIL NFR BLD MANUAL: 2 % (ref 0.3–6.2)
ERYTHROCYTE [DISTWIDTH] IN BLOOD BY AUTOMATED COUNT: 14.6 % (ref 12.3–15.4)
GFR SERPL CREATININE-BSD FRML MDRD: 19 ML/MIN/1.73
GLOBULIN UR ELPH-MCNC: 2.9 GM/DL
GLUCOSE SERPL-MCNC: 82 MG/DL (ref 65–99)
HCT VFR BLD AUTO: 29.9 % (ref 34–46.6)
HGB BLD-MCNC: 9.3 G/DL (ref 12–15.9)
INR PPP: 1.28 (ref 0.9–1.1)
LYMPHOCYTES # BLD MANUAL: 2.25 10*3/MM3 (ref 0.7–3.1)
LYMPHOCYTES NFR BLD MANUAL: 19.2 % (ref 19.6–45.3)
LYMPHOCYTES NFR BLD MANUAL: 3 % (ref 5–12)
MCH RBC QN AUTO: 29.2 PG (ref 26.6–33)
MCHC RBC AUTO-ENTMCNC: 31.1 G/DL (ref 31.5–35.7)
MCV RBC AUTO: 93.7 FL (ref 79–97)
METAMYELOCYTES NFR BLD MANUAL: 3 % (ref 0–0)
MONOCYTES # BLD AUTO: 0.35 10*3/MM3 (ref 0.1–0.9)
MYELOCYTES NFR BLD MANUAL: 4 % (ref 0–0)
NEUTROPHILS # BLD AUTO: 8.05 10*3/MM3 (ref 1.7–7)
NEUTROPHILS NFR BLD MANUAL: 68.7 % (ref 42.7–76)
NRBC BLD AUTO-RTO: 0 /100 WBC (ref 0–0.2)
PLAT MORPH BLD: NORMAL
PLATELET # BLD AUTO: 316 10*3/MM3 (ref 140–450)
PMV BLD AUTO: 9.3 FL (ref 6–12)
POTASSIUM SERPL-SCNC: 3.1 MMOL/L (ref 3.5–5.2)
PROT SERPL-MCNC: 6.2 G/DL (ref 6–8.5)
PROTHROMBIN TIME: 15.8 SECONDS (ref 11.7–14.2)
RBC # BLD AUTO: 3.19 10*6/MM3 (ref 3.77–5.28)
RBC MORPH BLD: NORMAL
SODIUM SERPL-SCNC: 136 MMOL/L (ref 136–145)
WBC # BLD AUTO: 11.72 10*3/MM3 (ref 3.4–10.8)
WBC MORPH BLD: NORMAL

## 2021-04-12 PROCEDURE — 97535 SELF CARE MNGMENT TRAINING: CPT

## 2021-04-12 PROCEDURE — 85025 COMPLETE CBC W/AUTO DIFF WBC: CPT | Performed by: EMERGENCY MEDICINE

## 2021-04-12 PROCEDURE — 85730 THROMBOPLASTIN TIME PARTIAL: CPT | Performed by: INTERNAL MEDICINE

## 2021-04-12 PROCEDURE — 63710000001 DIPHENHYDRAMINE PER 50 MG: Performed by: NURSE PRACTITIONER

## 2021-04-12 PROCEDURE — 85007 BL SMEAR W/DIFF WBC COUNT: CPT | Performed by: EMERGENCY MEDICINE

## 2021-04-12 PROCEDURE — 25010000002 HEPARIN (PORCINE) PER 1000 UNITS: Performed by: INTERNAL MEDICINE

## 2021-04-12 PROCEDURE — 97110 THERAPEUTIC EXERCISES: CPT

## 2021-04-12 PROCEDURE — 85610 PROTHROMBIN TIME: CPT | Performed by: INTERNAL MEDICINE

## 2021-04-12 PROCEDURE — 97530 THERAPEUTIC ACTIVITIES: CPT

## 2021-04-12 PROCEDURE — 80053 COMPREHEN METABOLIC PANEL: CPT | Performed by: INTERNAL MEDICINE

## 2021-04-12 PROCEDURE — 99232 SBSQ HOSP IP/OBS MODERATE 35: CPT | Performed by: INTERNAL MEDICINE

## 2021-04-12 PROCEDURE — 97166 OT EVAL MOD COMPLEX 45 MIN: CPT

## 2021-04-12 RX ORDER — ALBUMIN (HUMAN) 12.5 G/50ML
12.5 SOLUTION INTRAVENOUS AS NEEDED
Status: CANCELLED | OUTPATIENT
Start: 2021-04-13 | End: 2021-04-13

## 2021-04-12 RX ORDER — CALCIUM ACETATE 667 MG/1
1334 CAPSULE ORAL
Status: DISCONTINUED | OUTPATIENT
Start: 2021-04-12 | End: 2021-04-14 | Stop reason: HOSPADM

## 2021-04-12 RX ORDER — ZOLPIDEM TARTRATE 5 MG/1
5 TABLET ORAL NIGHTLY PRN
Status: DISCONTINUED | OUTPATIENT
Start: 2021-04-12 | End: 2021-04-14 | Stop reason: HOSPADM

## 2021-04-12 RX ORDER — WARFARIN SODIUM 10 MG/1
10 TABLET ORAL
Status: COMPLETED | OUTPATIENT
Start: 2021-04-12 | End: 2021-04-12

## 2021-04-12 RX ADMIN — SODIUM BICARBONATE 650 MG: 650 TABLET ORAL at 20:00

## 2021-04-12 RX ADMIN — VANCOMYCIN 125 MG: KIT at 06:08

## 2021-04-12 RX ADMIN — ZOLPIDEM TARTRATE 5 MG: 5 TABLET ORAL at 21:29

## 2021-04-12 RX ADMIN — SODIUM BICARBONATE 650 MG: 650 TABLET ORAL at 09:06

## 2021-04-12 RX ADMIN — DIPHENHYDRAMINE HYDROCHLORIDE 25 MG: 25 CAPSULE ORAL at 19:54

## 2021-04-12 RX ADMIN — WARFARIN 10 MG: 10 TABLET ORAL at 17:29

## 2021-04-12 RX ADMIN — CALCIUM ACETATE 1334 MG: 667 CAPSULE ORAL at 19:53

## 2021-04-12 RX ADMIN — SODIUM BICARBONATE 650 MG: 650 TABLET ORAL at 17:29

## 2021-04-12 RX ADMIN — ESCITALOPRAM 20 MG: 20 TABLET, FILM COATED ORAL at 09:06

## 2021-04-12 RX ADMIN — MONTELUKAST SODIUM 5 MG: 10 TABLET, FILM COATED ORAL at 20:00

## 2021-04-12 RX ADMIN — ACETAMINOPHEN 650 MG: 325 TABLET ORAL at 19:54

## 2021-04-12 RX ADMIN — VANCOMYCIN 125 MG: KIT at 00:21

## 2021-04-12 RX ADMIN — HEPARIN SODIUM 3000 UNITS: 5000 INJECTION INTRAVENOUS; SUBCUTANEOUS at 04:39

## 2021-04-12 RX ADMIN — SODIUM CHLORIDE, PRESERVATIVE FREE 10 ML: 5 INJECTION INTRAVENOUS at 09:07

## 2021-04-12 RX ADMIN — VANCOMYCIN 125 MG: KIT at 17:29

## 2021-04-12 RX ADMIN — LEVOTHYROXINE SODIUM 25 MCG: 0.03 TABLET ORAL at 06:08

## 2021-04-12 RX ADMIN — VANCOMYCIN 125 MG: KIT at 12:02

## 2021-04-12 RX ADMIN — HEPARIN SODIUM 20 UNITS/KG/HR: 10000 INJECTION, SOLUTION INTRAVENOUS at 12:02

## 2021-04-12 RX ADMIN — SODIUM CHLORIDE, PRESERVATIVE FREE 10 ML: 5 INJECTION INTRAVENOUS at 20:00

## 2021-04-12 NOTE — PROGRESS NOTES
ID note for C. difficile  Subjective: She denies any significant diarrhea.  Abdominal pain basically resolved at this point.  She is having fevers or chills or night sweats.  She has a vigorous appetite.    Objective: Afebrile, chronically ill-appearing but no acute distress, abdomen is soft nontender nondistended, appropriate mood and affect    Assessment and plan  1.  Sepsis secondary to #2  2.  Recurrent C. Difficile  3.  Recent history of Covid, negative for Covid here   4.  Subsegmental right lower lobe PE  5.  End-stage renal disease on peritoneal dialysis traditionally, but now on hemodialysis  6.  IBS, complicating above     Seems to be doing pretty well.  Plans are to hold on any type of peritoneal dialysis for the time being.  She continues on vancomycin 125 mg right now every 6 hours.  We will plan to do this for 10 days then proceed with prolonged vancomycin taper.  Those orders are in epic.  I think she can be discharged whenever okay with others.

## 2021-04-12 NOTE — NURSING NOTE
"Patient refusing to take scheduled coumadin, she did however agree to begin the heparin drip as prescribed. Patient states she does not want to introduce \"another new drug\" into her body.  She would like clarification from MD about which drugs he is prescribing. Educated patient benefits vs risks taking medications as ordered.   "

## 2021-04-12 NOTE — PLAN OF CARE
Goal Outcome Evaluation:  Plan of Care Reviewed With: patient  Progress: improving  VSS except BP low since dialysis, AOX4, room air, patient tearful and agitated most of shift, several BM's perineal care maintained orange cream applied, wound consult ordered for today, heparin drip as ordered, will continue to monitor.      Problem: Skin Injury Risk Increased  Goal: Skin Health and Integrity  Outcome: Ongoing, Progressing  Intervention: Optimize Skin Protection  Recent Flowsheet Documentation  Taken 4/12/2021 0610 by Apryl Herrera, RN  Head of Bed (HOB): HOB at 30 degrees  Taken 4/12/2021 0410 by Apryl Herrera, RN  Head of Bed (HOB): HOB at 20-30 degrees  Taken 4/11/2021 2000 by Apryl Herrera, RN  Pressure Reduction Techniques:   frequent weight shift encouraged   weight shift assistance provided  Pressure Reduction Devices:   alternating pressure pump (ADD)   pressure-redistributing mattress utilized  Skin Protection:   adhesive use limited   incontinence pads utilized   transparent dressing maintained   tubing/devices free from skin contact

## 2021-04-12 NOTE — PROGRESS NOTES
Discharge Planning Assessment  [facility]     Patient Name: Ambar Lara  MRN: [id number]  Today's Date: 4/12/2021    Admit Date: 4/9/2021    Discharge Needs Assessment     Row Name 04/12/21 1122       Living Environment    Lives With  child(richar), adult    Current Living Arrangements  home/apartment/condo    Potentially Unsafe Housing Conditions  other (see comments) no concerns    Primary Care Provided by  child(richar);self    Provides Primary Care For  no one    Family Caregiver if Needed  child(richar), adult    Quality of Family Relationships  involved;helpful;supportive       Transition Planning    Patient/Family Anticipates Transition to  inpatient rehabilitation facility    Patient/Family Anticipated Services at Transition  skilled nursing    Transportation Anticipated  family or friend will provide       Discharge Needs Assessment    Current Outpatient/Agency/Support Group  skilled nursing facility    Equipment Currently Used at Home  grab bar;shower chair;commode;walker, rolling;wheelchair    Concerns to be Addressed  discharge planning    Outpatient/Agency/Support Group Needs  skilled nursing facility    Discharge Facility/Level of Care Needs  nursing facility, skilled        Discharge Plan     Row Name 04/12/21 1123       Plan    Plan  Pending referral to [facility]    [facility] Post Acute Provider Quality & Resource List?  N/A    Patient/Family in Agreement with Plan  yes    Plan Comments  CCP made inbound call to patient’s room due to isolation precautions. CCP role explained and discharge planning discussed. Face sheet verified and IMM noted. Patient’s PCP is Dr. Manuel. Patient states her daughter has been staying with her 24/7 and her son assists as needed. Patient has two steps to the entrance of her home (handrail present). Patient’s bedroom and bathroom are on the main level. Patient has a commode, wheelchair, walker and states her daughter is getting a shower chair for her. Patient reports  she was recently at [facility]. Patient states they did have home health set up but could not recall which agency. Patient states she is setup for HD at [facility]. Patient states she feels like she needs more rehab and has requested a referral to [facility]. Patient reports her family can transport her to her HD appointments while she is in rehab. Patient reports her family or wheelchair van can transport at discharge. CCP will follow for pending referral to [facility] and assist as needed. Cecilia GOODWIN        Continued Care and Services - Admitted Since 4/9/2021    Coordination has not been started for this encounter.         Demographic Summary     Row Name 04/12/21 1122       General Information    Admission Type  inpatient    Arrived From  emergency department    Required Notices Provided  Important Message from Medicare    Referral Source  admission list    Reason for Consult  discharge planning    Preferred Language  English     Used During This Interaction  no        Functional Status     Row Name 04/12/21 1122       Functional Status    Usual Activity Tolerance  moderate    Current Activity Tolerance  fair       Functional Status, IADL    Medications  assistive equipment and person    Meal Preparation  assistive equipment and person    Housekeeping  assistive equipment and person    Laundry  assistive equipment and person    Shopping  assistive equipment and person        Psychosocial    No documentation.       Abuse/Neglect    No documentation.       Legal    No documentation.       Substance Abuse    No documentation.       Patient Forms    No documentation.           BUDDY Sinclair

## 2021-04-12 NOTE — PROGRESS NOTES
Pharmacy Consult: Warfarin Dosing/ Monitoring    Ambar Lara is a 51 y.o. female, estimated creatinine clearance is 23.4 mL/min (A) (by C-G formula based on SCr of 2.67 mg/dL (H)). weighing 73.9 kg (162 lb 14.7 oz).     has a past medical history of CHF (congestive heart failure) (CMS/[facility]), Dialysis patient (CMS/[facility]), Disease of thyroid gland, Elevated cholesterol, GERD (gastroesophageal reflux disease), Renal disorder, and Sleep apnea.    Social History     Tobacco Use   • Smoking status: Never Smoker   Substance Use Topics   • Alcohol use: Not on file   • Drug use: Not on file       Results from last 7 days   Lab Units 04/12/21  1020 04/12/21  0307 04/11/21  1607 04/11/21  0522 04/10/21  1047 04/10/21  0235 04/10/21  0231 04/09/21 2017 04/09/21  1631   INR   --  1.28* 1.47*  --   --   --  1.41* 1.48*  --    APTT seconds 85.8* 66.1* 46.5*  --  71.9*  --  46.0* 45.2*  --    HEMOGLOBIN g/dL  --  9.3*  --  9.6*  --  9.3*  --  9.7* 10.3*   HEMATOCRIT %  --  29.9*  --  29.7*  --  30.1*  --  29.7* 31.2*   PLATELETS 10*3/mm3  --  316  --  300  --  285  --  287 323     Results from last 7 days   Lab Units 04/12/21  0307 04/11/21  0522 04/10/21  0235 04/09/21  1631   SODIUM mmol/L 136 132* 132* 133*   POTASSIUM mmol/L 3.1* 3.2* 3.2* 3.3*   CHLORIDE mmol/L 100 95* 97* 96*   CO2 mmol/L 24.9 18.9* 20.6* 21.4*   BUN mg/dL 12 37* 29* 28*   CREATININE mg/dL 2.67* 4.56* 4.18* 4.19*   CALCIUM mg/dL 7.8* 8.4* 8.1* 8.6   BILIRUBIN mg/dL <0.2 0.2  --  0.2   ALK PHOS U/L 120* 125*  --  163*   ALT (SGPT) U/L 18 14  --  15   AST (SGOT) U/L 19 15  --  12   GLUCOSE mg/dL 82 93 92 106*     Anticoagulation history: New start     Hospital Anticoagulation:  Consulting provider: Dr. Maria  Start date: 4/11/21  Indication: Acute RLL subsegmental PE  Target INR: 2-3  Expected duration: TBD  Bridge Therapy: Yes              heparin drip  Date 4/10  4/11 4/12          INR 1.41 1.47 1.28          Warfarin dose  Patient refused  10 mg             Potential drug interactions:   · Levothyroxine: increases metabolism of vitamin K-dependent clotting factors, resulting in an increased risk of bleeding  · Escitalopram : Increased risk of bleeding  · Heparin: increased risk of bleeding         Relevant nutrition status: Regular diet/ Bedtime snack (fruit/cheese/crackers)    Education complete?/ Date: TBD    Assessment/Plan:  · INR is subtherapeutic at 1.28 (Goal 2-3). Patient refused her planned dose of warfarin yesterday evening but is presently agreeable. In light of missed dose and downtrend on INR I will give the patient a bolus dose of warfarin 10 mg x 1 dose and follow up INR in the am. CBC remains stable.  · Continue heparin drip to bridge until INR >/= 2.    Pharmacy will continue to follow until discharge or discontinuation of warfarin.   Cheyanne Ashford [facility]  4/12/2021

## 2021-04-12 NOTE — DISCHARGE PLACEMENT REQUEST
"Ambar Doan (51 y.o. Female)     Date of Birth Social Security Number Address Home Phone MRN    1970  [address] [address] [phone] [id number]    Mu-ism Marital Status          Unknown        Admission Date Admission Type Admitting Provider Attending Provider Department, Room/Bed    4/9/21 Emergency Mauricio Mitchell MD Hussein, Samer H, MD [address], [address]    Discharge Date Discharge Disposition Discharge Destination                       Attending Provider: Joseph Maria MD    Allergies: Penicillins, Nickel    Isolation: Spore   Infection: C.difficile (04/09/21)   Code Status: CPR    Ht: 154.9 cm (61\")   Wt: 73.9 kg (162 lb 14.7 oz)    Admission Cmt: None   Principal Problem: Colitis, Clostridium difficile [A04.72]                 Active Insurance as of 4/9/2021     Primary Coverage     Payor Plan Insurance Group Employer/Plan Group    MEDICARE MEDICARE A & B      Payor Plan Address Payor Plan Phone Number Payor Plan Fax Number Effective Dates    [address] [phone]  4/1/2019 - None Entered    [facility] [id number]       Subscriber Name Subscriber Birth Date Member ID       AMBAR DOAN 1970 [id number]           Secondary Coverage     Payor Plan Insurance Group Employer/Plan Group    [facility] [id number]     Payor Plan Address Payor Plan Phone Number Payor Plan Fax Number Effective Dates    [address]   1/1/2021 - None Entered    [facility] [id number]       Subscriber Name Subscriber Birth Date Member ID       AMBAR DOAN 1970 [id number]                 Emergency Contacts      (Rel.) Home Phone Work Phone Mobile Phone    Roland Doan (Son) [phone] -- --        "

## 2021-04-12 NOTE — THERAPY EVALUATION
Patient Name: Ambar Lara  : 1970    MRN: [id number]                              Today's Date: 2021       Admit Date: 2021    Visit Dx:     ICD-10-CM ICD-9-CM   1. Colitis, Clostridium difficile  A04.72 008.45   2. Single subsegmental pulmonary embolism without acute cor pulmonale (CMS/[facility])  I26.93 415.19   3. Hypovolemia  E86.1 276.52   4. ESRD on hemodialysis (CMS/[facility])  N18.6 585.6    Z99.2 V45.11     Patient Active Problem List   Diagnosis   • Colitis, Clostridium difficile   • Anxiety   • Cardiomyopathy (CMS/[facility])   • Chronic systolic heart failure (CMS/[facility])   • ESRD (end stage renal disease) on dialysis (CMS/[facility])   • Gastroesophageal reflux disease   • Gitelman syndrome   • HLD (hyperlipidemia)   • Hypothyroidism   • Peritoneal dialysis status (CMS/[facility])   • History of tracheostomy   • Hypotension   • Acute pulmonary embolism (CMS/[facility])   • Severe malnutrition (CMS/[facility])     Past Medical History:   Diagnosis Date   • CHF (congestive heart failure) (CMS/[facility])    • Dialysis patient (CMS/[facility])    • Disease of thyroid gland    • Elevated cholesterol    • GERD (gastroesophageal reflux disease)    • Renal disorder    • Sleep apnea      Past Surgical History:   Procedure Laterality Date   • HYSTERECTOMY     • TRACHEOSTOMY       General Information     Row Name 21 1351          OT Time and Intention    Document Type  evaluation;therapy note (daily note)  -LE     Mode of Treatment  individual therapy;occupational therapy  -LE     Row Name 21 1354          General Information    Patient Profile Reviewed  yes  -LE     Prior Level of Function  -- d/c home from [place] with w/c.  pt reports not walking. home one day from [place] before admit to [facility]  -LE     Existing Precautions/Restrictions  fall trach stoma, Cdiff, hemodialysis pt.  Re: small PE, RN reports on blood thinnner and therapeutic and activity OK  -LE     Barriers to Rehab  medically complex;previous functional deficit  -LE     Row  Name 21 1350          Occupational Profile    Reason for Services/Referral (Occupational Profile)  Pt home one day from [place] (h/o COVID in Feb., on vent, in coma for a month, trach).  Pt with h/o Pertinoneal dialysis and now hemodialysis.  Pt with reported weakness and presents from home with weakness and lightheaded  Pt's gerald  suddenly this past labor day, pt bought a new house in January and only in house 2 weeks before admit with COVID in Feb.  Pt has 3 grown children.  -LE     Row Name 21 1352          Living Environment    Lives With  child(richar), adult  -LE     Row Name 21 1352          Cognition    Orientation Status (Cognition)  oriented x 4  -LE     Row Name 21 1352          Safety Issues, Functional Mobility    Comment, Safety Issues/Impairments (Mobility)  pt reports feeling anxious and scared during standing and xfers.  afterward felt good about herself for being able to xfer to chair.  -LE       User Key  (r) = Recorded By, (t) = Taken By, (c) = Cosigned By    Initials Name Provider Type    Fernanda Pickett, CORINA Occupational Therapist          Mobility/ADL's     Row Name 21 5747          Bed Mobility    Bed Mobility  supine-sit  -LE     Supine-Sit [place] (Bed Mobility)  minimum assist (75% patient effort);set up;verbal cues  -RODERICK     Assistive Device (Bed Mobility)  bed rails;head of bed elevated  -LE     Row Name 21 7898          Transfers    Transfers  sit-stand transfer;bed-chair transfer  -LE     Bed-Chair [place] (Transfers)  moderate assist (50% patient effort);set up;verbal cues  -RODERICK     Assistive Device (Bed-Chair Transfers)  -- OT in front of pt, minimal blocking of knees at times.  -LE     Sit-Stand [place] (Transfers)  moderate assist (50% patient effort) first time stand had buckle of L knee after about 30 seconds of standing.  second  prep for xfer no overt buckle.  cue pt on posture for upright standing.  -LE     Row Name  04/12/21 1359          Functional Mobility    Functional Mobility- Comment  defer due to fatigue with first time to chair and xfer  -[facility] Name 04/12/21 1359          Activities of Daily Living    BADL Assessment/Intervention  lower body dressing;toileting;feeding;grooming  -LE     Row Name 04/12/21 1359          Lower Body Dressing Assessment/Training    [place] Level (Lower Body Dressing)  don;socks;verbal cues;standby assist  -LE     Position (Lower Body Dressing)  supine  -LE     Comment (Lower Body Dressing)  while supine. cues to bring legs up to chest.  -[facility] Name 04/12/21 1359          Toileting Assessment/Training    Comment (Toileting)  assist brief change due to Cdiff.   discuss use of BSC with RN and aid. pt has not yet attempted but was able to xfer to chair.  rec.squat pivot xfer with chair/BSC close.  -[facility] 04/12/21 1359          Self-Feeding Assessment/Training    [place] Level (Feeding)  feeding skills  -     Comment (Feeding)  pt denies difficulty  -LE     Row Name 04/12/21 [facility]9          Grooming Assessment/Training    Comment (Grooming)  set up per pt.  -       User Key  (r) = Recorded By, (t) = Taken By, (c) = Cosigned By    Initials Name Provider Type    Fernanda Pickett OTR Occupational Therapist        Obj/Interventions     [facility] Name 04/12/21 1401          Range of Motion Comprehensive    Comment, General Range of Motion  B UE 2/3 AROM.  -LE     Row Name 04/12/21 1401          Strength Comprehensive (MMT)    Comment, General Manual Muscle Testing (MMT) Assessment  B UE grossly 4-/5.  generalized weakness from prolonged hospitalizations  -LE     Row Name 04/12/21 1401          Shoulder (Therapeutic Exercise)    Shoulder (Therapeutic Exercise)  AROM (active range of motion)  -     Shoulder AROM (Therapeutic Exercise)  10 repetitions;bilateral;flexion pt reports want to increase activity. ed to complete 10 arm raises several times throughout the day  -RODERICK      Row Name 04/12/21 1401          Balance    Balance Assessment  sitting static balance  -LE     Static Sitting Balance  WFL over 5 min at EOB  -LE     Balance Interventions  standing;supported;core stability exercise  -LE     Comment, Balance  pt stands about 30 seconds then for xfer to chair. cued for trunk activation, posture with pt correction and increase in stability.  -LE     Row Name 04/12/21 1401          Therapeutic Exercise    Therapeutic Exercise  shoulder  -LE       User Key  (r) = Recorded By, (t) = Taken By, (c) = Cosigned By    Initials Name Provider Type    Fernanda Pickett OTR Occupational Therapist        Goals/Plan     Row Name 04/12/21 1413          Transfer Goal 1 (OT)    Activity/Assistive Device (Transfer Goal 1, OT)  bed-to-chair/chair-to-bed;sit-to-stand/stand-to-sit;toilet;commode, 3-in-1;walker, rolling  -LE     [place] Level/Cues Needed (Transfer Goal 1, OT)  minimum assist (75% or more patient effort)  -LE     Time Frame (Transfer Goal 1, OT)  2 weeks  -LE     Progress/Outcome (Transfer Goal 1, OT)  goal ongoing  -LE     Row Name 04/12/21 1413          Dressing Goal 1 (OT)    Activity/Device (Dressing Goal 1, OT)  upper body dressing;lower body dressing  -LE     [place]/Cues Needed (Dressing Goal 1, OT)  minimum assist (75% or more patient effort);set-up required;standby assist  -LE     Time Frame (Dressing Goal 1, OT)  2 weeks  -LE     Progress/Outcome (Dressing Goal 1, OT)  goal ongoing  -LE     Row Name 04/12/21 1413          Toileting Goal 1 (OT)    Activity/Device (Toileting Goal 1, OT)  adjust/manage clothing;perform perineal hygiene;commode, 3-in-1  -LE     [place] Level/Cues Needed (Toileting Goal 1, OT)  moderate assist (50-74% patient effort)  -LE     Time Frame (Toileting Goal 1, OT)  2 weeks  -LE     Progress/Outcome (Toileting Goal 1, OT)  goal ongoing  -LE     Row Name 04/12/21 1413          Strength Goal 1 (OT)    Strength Goal 1 (OT)  SBA for B UE strength  HEP to increase sustained reach and hold during ADL tasks.  -LE     Time Frame (Strength Goal 1, OT)  2 weeks  -LE     Progress/Outcome (Strength Goal 1, OT)  goal ongoing  -     Row Name 04/12/21 1413          Therapy Assessment/Plan (OT)    Planned Therapy Interventions (OT)  activity tolerance training;adaptive equipment training;BADL retraining;occupation/activity based interventions;patient/caregiver education/training;transfer/mobility retraining;functional balance retraining;strengthening exercise  -LE       User Key  (r) = Recorded By, (t) = Taken By, (c) = Cosigned By    Initials Name Provider Type    Fernanda Pickett OTR Occupational Therapist        Clinical Impression     Row Name 04/12/21 1403          Pain Scale: Numbers Pre/Post-Treatment    Pretreatment Pain Rating  0/10 - no pain  -LE     Posttreatment Pain Rating  0/10 - no pain  -LE     Row Name 04/12/21 1403          Plan of Care Review    Plan of Care Reviewed With  patient  -LE     Outcome Summary  Pt presents from home with recent d/c home after prolonged hospitalization for COVID.  Pt seen by OT today and was assisted to chair with one person assist.  Pt with generalized weakness and buckling at times at knees.  Pt requiring assist with ADL tasks of lower body and may benefit from skilled OT to increase safety and independence with ADL, BR mobility and UE function.   Recommend rehab at d/c.  -RODERICK     Row Name 04/12/21 1403          Therapy Assessment/Plan (OT)    Patient/Family Therapy Goal Statement (OT)  get stronger, feel better, take care of self  -LE     Rehab Potential (OT)  good, to achieve stated therapy goals  -LE     Criteria for Skilled Therapeutic Interventions Met (OT)  meets criteria;yes  -LE     Therapy Frequency (OT)  5 times/wk  -     Row Name 04/12/21 1403          Therapy Plan Review/Discharge Plan (OT)    Equipment Needs Upon Discharge (OT)  -- owns w/c, walker, shower chair.  -[facility] Name 04/12/21 1403           Vital Signs    Pre Systolic BP Rehab  95  -LE     Pre Treatment Diastolic BP  64  -LE     Post Systolic BP Rehab  88  -LE     Post Treatment Diastolic BP  65 aid called RN to room. pt not symptomatic and pt states BP in high 80's at times.  -LE     Posttreatment Heart Rate (beats/min)  106  -LE     Pre SpO2 (%)  98  -LE     O2 Delivery Pre Treatment  room air  -LE     O2 Delivery Intra Treatment  room air  -LE     O2 Delivery Post Treatment  room air  -LE     Pre Patient Position  Supine  -LE     Intra Patient Position  Standing  -LE     Post Patient Position  Sitting  -LE     Row Name 04/12/21 1403          Positioning and Restraints    Pre-Treatment Position  in bed  -LE     Post Treatment Position  chair  -LE     In Chair  notified nsg;reclined;call light within reach;encouraged to call for assist;exit alarm on;with nsg with aid and RN  -LE       User Key  (r) = Recorded By, (t) = Taken By, (c) = Cosigned By    Initials Name Provider Type    Fernanda Pickett OTR Occupational Therapist        Outcome Measures     Row Name 04/12/21 1415          How much help from another is currently needed...    Putting on and taking off regular lower body clothing?  2  -LE     Bathing (including washing, rinsing, and drying)  2  -LE     Toileting (which includes using toilet bed pan or urinal)  2  -LE     Putting on and taking off regular upper body clothing  3  -LE     Taking care of personal grooming (such as brushing teeth)  3  -LE     Eating meals  4  -LE     AM-PAC 6 Clicks Score (OT)  16  -LE     Row Name 04/12/21 1415          Modified [place] Scale    Modified [place] Scale  4 - Moderately severe disability.  Unable to walk without assistance, and unable to attend to own bodily needs without assistance.  -LE     Row Name 04/12/21 1415          Functional Assessment    Outcome Measure Options  AM-PAC 6 Clicks Daily Activity (OT);Modified [place]  -LE       User Key  (r) = Recorded By, (t) = Taken By, (c) = Cosigned By     Initials Name Provider Type    Fernanda Pickett OTR Occupational Therapist        Occupational Therapy Education                 Title: PT OT SLP Therapies (In Progress)     Topic: Occupational Therapy (Done)     Point: ADL training (Done)     Description:   Instruct learner(s) on proper safety adaptation and remediation techniques during self care or transfers.   Instruct in proper use of assistive devices.              Learning Progress Summary           Patient Acceptance, E,D, DU,VU by RODERICK at 4/12/2021 1415    Comment: role of OT,plan of care. encouragemet and ed step by step for xfer to chair.  ed benefit of activity. fall risk and call light use.                   Point: Precautions (Done)     Description:   Instruct learner(s) on prescribed precautions during self-care and functional transfers.              Learning Progress Summary           Patient Acceptance, E,D, DU,VU by RODERICK at 4/12/2021 1415    Comment: role of OT,plan of care. encouragemet and ed step by step for xfer to chair.  ed benefit of activity. fall risk and call light use.                   Point: Body mechanics (Done)     Description:   Instruct learner(s) on proper positioning and spine alignment during self-care, functional mobility activities and/or exercises.              Learning Progress Summary           Patient Acceptance, E,D, DU,VU by RODERICK at 4/12/2021 1415    Comment: role of OT,plan of care. encouragemet and ed step by step for xfer to chair.  ed benefit of activity. fall risk and call light use.                               User Key     Initials Effective Dates Name Provider Type Discipline    RODERICK 06/08/18 -  Fernanda Porter OTR Occupational Therapist OT              OT Recommendation and Plan  Planned Therapy Interventions (OT): activity tolerance training, adaptive equipment training, BADL retraining, occupation/activity based interventions, patient/caregiver education/training, transfer/mobility retraining, functional balance  retraining, strengthening exercise  Therapy Frequency (OT): 5 times/wk  Plan of Care Review  Plan of Care Reviewed With: patient  Outcome Summary: Pt presents from home with recent d/c home after prolonged hospitalization for COVID.  Pt seen by OT today and was assisted to chair with one person assist.  Pt with generalized weakness and buckling at times at knees.  Pt requiring assist with ADL tasks of lower body and may benefit from skilled OT to increase safety and independence with ADL, BR mobility and UE function.   Recommend rehab at d/c.     Time Calculation:   Time Calculation- OT     Row Name 04/12/21 1416             Time Calculation- OT    OT Start Time  1320  -LE      OT Stop Time  1348  -LE      OT Time Calculation (min)  28 min  -LE      Total Timed Code Minutes- OT  23 minute(s)  -LE      OT Received On  04/12/21  -LE      OT - Next Appointment  04/13/21  -LE      OT Goal Re-Cert Due Date  04/26/21  -LE        User Key  (r) = Recorded By, (t) = Taken By, (c) = Cosigned By    Initials Name Provider Type    Fernanda Pickett OTR Occupational Therapist        Therapy Charges for Today     Code Description Service Date Service Provider Modifiers Qty    76326608444 HC OT EVAL MOD COMPLEXITY 2 4/12/2021 Fernanda Porter OTR GO 1    [id number]  OT SELF CARE/MGMT/TRAIN EA 15 MIN 4/12/2021 Fernanda Porter OTR GO 1    [id number]  OT THERAPEUTIC ACT EA 15 MIN 4/12/2021 Fernanda Porter OTR GO 1               CORINA Lopez  4/12/2021

## 2021-04-12 NOTE — PLAN OF CARE
Goal Outcome Evaluation:  Plan of Care Reviewed With: patient  Progress: improving  Outcome Summary: Pt tolerated treatment with no complaints. Pt in chair for about an hour and ready to get btb. Pt performed 2 sit<->stand transfers from chair. Pt needed Birgit/CGA. Pt used walker for support while standing to get cleaned up. slight R knee buckling noted with first stand. Pt took several side steps from the chair to bed with Birgit. sequencing cues given but no knee buckling noted. Pt needed CGA with sit to supine. Will continue to progress pt as tolerated. Pt would continue to benefit from skilled PT to improve strength/balance and improve gait.  ..Patient was not wearing a face mask during this therapy encounter. Therapist used appropriate personal protective equipment including eye protection, mask, and gloves.  Mask used was standard procedure mask. Appropriate PPE was worn during the entire therapy session. Hand hygiene was completed before and after therapy session. Patient is not in enhanced droplet precautions.

## 2021-04-12 NOTE — THERAPY TREATMENT NOTE
Patient Name: Ambar Lara  : 1970    MRN: [id number]                              Today's Date: 2021       Admit Date: 2021    Visit Dx:     ICD-10-CM ICD-9-CM   1. Colitis, Clostridium difficile  A04.72 008.45   2. Single subsegmental pulmonary embolism without acute cor pulmonale (CMS/[facility])  I26.93 415.19   3. Hypovolemia  E86.1 276.52   4. ESRD on hemodialysis (CMS/[facility])  N18.6 585.6    Z99.2 V45.11     Patient Active Problem List   Diagnosis   • Colitis, Clostridium difficile   • Anxiety   • Cardiomyopathy (CMS/[facility])   • Chronic systolic heart failure (CMS/[facility])   • ESRD (end stage renal disease) on dialysis (CMS/[facility])   • Gastroesophageal reflux disease   • Gitelman syndrome   • HLD (hyperlipidemia)   • Hypothyroidism   • Peritoneal dialysis status (CMS/[facility])   • History of tracheostomy   • Hypotension   • Acute pulmonary embolism (CMS/[facility])   • Severe malnutrition (CMS/[facility])     Past Medical History:   Diagnosis Date   • CHF (congestive heart failure) (CMS/[facility])    • Dialysis patient (CMS/[facility])    • Disease of thyroid gland    • Elevated cholesterol    • GERD (gastroesophageal reflux disease)    • Renal disorder    • Sleep apnea      Past Surgical History:   Procedure Laterality Date   • HYSTERECTOMY     • TRACHEOSTOMY       General Information     Row Name 21          Physical Therapy Time and Intention    Document Type  therapy note (daily note)  -EB     Mode of Treatment  individual therapy;physical therapy  -EB     Row Name 21 164          General Information    Existing Precautions/Restrictions  fall  -EB     Row Name 21 164          Cognition    Orientation Status (Cognition)  oriented x 4  -EB     Row Name 21 164          Safety Issues, Functional Mobility    Impairments Affecting Function (Mobility)  balance;endurance/activity tolerance;strength;postural/trunk control  -EB       User Key  (r) = Recorded By, (t) = Taken By, (c) = Cosigned By    Initials Name  Provider Type     Erin Zayas PTA Physical Therapy Assistant        Mobility     Row Name 04/12/21 1641          Bed Mobility    Bed Mobility  sit-supine  -EB     Supine-Sit [place] (Bed Mobility)  not tested  -EB     Sit-Supine [place] (Bed Mobility)  contact guard;nonverbal cues (demo/gesture);verbal cues  -EB     Assistive Device (Bed Mobility)  bed rails;head of bed elevated  -EB     Comment (Bed Mobility)  pt needed some assistance with getting LEs into bed.  -EB     Row Name 04/12/21 1641          Transfers    Comment (Transfers)  2 sit<->stand transfers from chair, for functional strengthening and for pt to stand to assist nsg with brief needs.  -EB     Row Name 04/12/21 1641          Sit-Stand Transfer    Sit-Stand [place] (Transfers)  minimum assist (75% patient effort);contact guard pt needed CGA/Min. slight right knee buckling. Less assistance when getting up from chair.  -     Row Name 04/12/21 1641          Gait/Stairs (Locomotion)    [place] Level (Gait)  minimum assist (75% patient effort);nonverbal cues (demo/gesture);verbal cues  -EB     Assistive Device (Gait)  walker, front-wheeled  -EB     Distance in Feet (Gait)  Pt took several side steps from chair to bed.  -EB     Bilateral Gait Deviations  forward flexed posture  -EB     Comment (Gait/Stairs)  cues for posture correction. Pt has not ambulated since February. pt did well with getting back from the chair to the bed, no knee buckling. TS needed for working on further ambulation.  -EB       User Key  (r) = Recorded By, (t) = Taken By, (c) = Cosigned By    Initials Name Provider Type     Erin Zayas PTA Physical Therapy Assistant        Obj/Interventions     Row Name 04/12/21 1646          Motor Skills    Therapeutic Exercise  -- BLE: AP, HS, hip abd/add (X10)  -     Row Name 04/12/21 1646          Balance    Balance Assessment  sitting static balance;standing static balance  -EB     Static Sitting Balance   sitting, edge of bed;WFL  -EB     Static Standing Balance  mild impairment CGA/Min. stood for a couple of minutes while na getting pt's brief changed.  -EB       User Key  (r) = Recorded By, (t) = Taken By, (c) = Cosigned By    Initials Name Provider Type    Erin Calloway PTA Physical Therapy Assistant        Goals/Plan    No documentation.       Clinical Impression     Row Name 04/12/21 1646          Plan of Care Review    Plan of Care Reviewed With  patient  -EB     Progress  improving  -EB     Outcome Summary  Pt tolerated treatment with no complaints. Pt in chair for about an hour and ready to get btb. Pt performed 2 sit<->stand transfers from chair. Pt needed Birgit/CGA. Pt used walker for support while standing to get cleaned up. slight R knee buckling noted with first stand. Pt took several side steps from the chair to bed with Birgit. sequencing cues given but no knee buckling noted. Pt needed CGA with sit to supine. Will continue to progress pt as tolerated. Pt would continue to benefit from skilled PT to improve strength/balance and improve gait.  -     Row Name 04/12/21 2222          Positioning and Restraints    Pre-Treatment Position  sitting in chair/recliner  -EB     Post Treatment Position  bed  -EB     In Bed  supine;call light within reach;encouraged to call for assist;exit alarm on  -EB       User Key  (r) = Recorded By, (t) = Taken By, (c) = Cosigned By    Initials Name Provider Type    Erin Calloway PTA Physical Therapy Assistant        Outcome Measures     Row Name 04/12/21 1121          How much help from another person do you currently need...    Turning from your back to your side while in flat bed without using bedrails?  3  -EB     Moving from lying on back to sitting on the side of a flat bed without bedrails?  2  -EB     Moving to and from a bed to a chair (including a wheelchair)?  3  -EB     Standing up from a chair using your arms (e.g., wheelchair, bedside chair)?  3  -EB      Climbing 3-5 steps with a railing?  1  -EB     To walk in hospital room?  2  -EB     AM-PAC 6 Clicks Score (PT)  14  -EB     Row Name 04/12/21 1651          Modified Otis Scale    Modified [place] Scale  4 - Moderately severe disability.  Unable to walk without assistance, and unable to attend to own bodily needs without assistance.  -       User Key  (r) = Recorded By, (t) = Taken By, (c) = Cosigned By    Initials Name Provider Type    EB Erin Zayas PTA Physical Therapy Assistant        Physical Therapy Education                 Title: PT OT SLP Therapies (Done)     Topic: Physical Therapy (Done)     Point: Mobility training (Done)     Learning Progress Summary           Patient Acceptance, E,D, VU,NR by  at 4/12/2021 1651    Acceptance, E, VU,NR by  at 4/10/2021 1306    Comment: Elevate heels, AROM, safety with mobility, call for assist, use of call light                   Point: Home exercise program (Done)     Learning Progress Summary           Patient Acceptance, E,D, VU,NR by  at 4/12/2021 1651    Acceptance, E, VU,NR by  at 4/10/2021 1306    Comment: Elevate heels, AROM, safety with mobility, call for assist, use of call light                   Point: Body mechanics (Done)     Learning Progress Summary           Patient Acceptance, E,D, VU,NR by  at 4/12/2021 1651                   Point: Precautions (Done)     Learning Progress Summary           Patient Acceptance, E,D, VU,NR by  at 4/12/2021 1651    Acceptance, E, VU,NR by  at 4/10/2021 1306    Comment: Elevate heels, AROM, safety with mobility, call for assist, use of call light                               User Key     Initials Effective Dates Name Provider Type Discipline     03/10/21 -  Erin Zayas PTA Physical Therapy Assistant PT     09/17/19 -  Miracle Vora PT Physical Therapist PT              PT Recommendation and Plan     Plan of Care Reviewed With: patient  Progress: improving  Outcome Summary: Pt tolerated  treatment with no complaints. Pt in chair for about an hour and ready to get btb. Pt performed 2 sit<->stand transfers from chair. Pt needed Birgit/CGA. Pt used walker for support while standing to get cleaned up. slight R knee buckling noted with first stand. Pt took several side steps from the chair to bed with Birgit. sequencing cues given but no knee buckling noted. Pt needed CGA with sit to supine. Will continue to progress pt as tolerated. Pt would continue to benefit from skilled PT to improve strength/balance and improve gait.     Time Calculation:   PT Charges     Row Name 04/12/21 1640             Time Calculation    Start Time  1541  -EB      Stop Time  1604  -EB      Time Calculation (min)  23 min  -EB      PT Received On  04/12/21  -EB      PT - Next Appointment  04/13/21  -         Time Calculation- PT    Total Timed Code Minutes- PT  23 minute(s)  -EB        User Key  (r) = Recorded By, (t) = Taken By, (c) = Cosigned By    Initials Name Provider Type     Erin Zayas PTA Physical Therapy Assistant        Therapy Charges for Today     Code Description Service Date Service Provider Modifiers Qty    52528146881  PT THER PROC EA 15 MIN 4/12/2021 Erin Zayas, VICKIE GP 1    [id number]  PT THERAPEUTIC ACT EA 15 MIN 4/12/2021 Erin Zayas, VICKIE GP 1          PT G-Codes  Outcome Measure Options: AM-PAC 6 Clicks Daily Activity (OT), Modified [place]  AM-PAC 6 Clicks Score (PT): 14  AM-PAC 6 Clicks Score (OT): 16  Modified Otis Scale: 4 - Moderately severe disability.  Unable to walk without assistance, and unable to attend to own bodily needs without assistance.    Erin Zayas PTA  4/12/2021

## 2021-04-12 NOTE — PLAN OF CARE
Goal Outcome Evaluation:  Plan of Care Reviewed With: patient     Outcome Summary: Pt presents from home with recent d/c home after prolonged hospitalization for COVID.  Pt seen by OT today and was assisted to chair with one person assist.  Pt with generalized weakness and buckling at times at knees.  Pt requiring assist with ADL tasks of lower body and may benefit from skilled OT to increase safety and independence with ADL, BR mobility and UE function.   Recommend rehab at d/c.        Patient was placed in a face mask during this therapy encounter. Therapist used appropriate personal protective equipment including surgical mask, eye shield and gloves during the entire therapy session. Hand hygiene was completed before and after therapy session. Patient is not in enhanced droplet precautions.

## 2021-04-12 NOTE — PROGRESS NOTES
Name: Ambar Lara ADMIT: 2021   : 1970  PCP: Jet Manuel MD    MRN: [id number] LOS: 3 days   AGE/SEX: 51 y.o. female  ROOM: [facility]     Subjective   Subjective   Patient very upset and weepy today.  She reports that she HAS to get out of the hospital to a rehab center today.  She has refused her Coumadin yesterday.  She reports decreased frequency of the diarrhea now semiformed.  No abdominal pain.  No nausea or vomiting.  Tolerating regular diet well.    No melena or fresh bright blood per rectum.  No fever or chills.    Review of Systems    .  No dysuria or hematuria.  Cardiovascular/respiratory.  No chest pain/no shortness of breath/no cough/no wheeze/no hemoptysis.     Objective   Objective   Vital Signs  Temp:  [96.6 °F (35.9 °C)-99.4 °F (37.4 °C)] 98.8 °F (37.1 °C)  Heart Rate:  [] 109  Resp:  [16-18] 16  BP: (93-99)/(54-66) 95/64  SpO2:  [93 %-96 %] 93 %  on   ;   Device (Oxygen Therapy): room air    Intake/Output Summary (Last 24 hours) at 2021 1224  Last data filed at 2021 1400  Gross per 24 hour   Intake 50 ml   Output 0 ml   Net 50 ml     Body mass index is 30.78 kg/m².      21  2353   Weight: 76.6 kg (168 lb 14.4 oz) 73.9 kg (162 lb 14.7 oz)     Physical Exam    General.  Middle-aged female.  Alert oriented x3 in no apparent pain distress or diaphoresis.  Depressed.  Eyes.  No pallor or jaundice normal conjunctive and lids intact extraocular musculature is  Oral cavity moist mucous membrane  Neck.  Dressed tracheostomy wound.  No JVD.  Chest.  Tunneled catheter in the right upper chest.  Clear to auscultation bilaterally with no added sounds.  Cardiovascular.  Regular rate and rhythm grade 2 systolic murmur.  Abdomen.  No  tenderness..  No guarding or rebound.  No distention.  No organomegaly.  Normal bowel sounds.    Extremities.  No clubbing cyanosis or edema.  CNS.  No acute focal neurological deficits.    Results Review:      Results  from last 7 days   Lab Units 04/12/21  0307 04/11/21  0522 04/10/21  0235 04/09/21  1631   SODIUM mmol/L 136 132* 132* 133*   POTASSIUM mmol/L 3.1* 3.2* 3.2* 3.3*   CHLORIDE mmol/L 100 95* 97* 96*   CO2 mmol/L 24.9 18.9* 20.6* 21.4*   BUN mg/dL 12 37* 29* 28*   CREATININE mg/dL 2.67* 4.56* 4.18* 4.19*   GLUCOSE mg/dL 82 93 92 106*   CALCIUM mg/dL 7.8* 8.4* 8.1* 8.6   AST (SGOT) U/L 19 15  --  12   ALT (SGPT) U/L 18 14  --  15     Estimated Creatinine Clearance: 23.4 mL/min (A) (by C-G formula based on SCr of 2.67 mg/dL (H)).          Results from last 7 days   Lab Units 04/09/21  1631   TROPONIN T ng/mL 0.030     Results from last 7 days   Lab Units 04/09/21  1631   PROBNP pg/mL 4,129.0*     Results from last 7 days   Lab Units 04/10/21  0235   TSH uIU/mL 2.910     Results from last 7 days   Lab Units 04/11/21  0522 04/10/21  0235 04/09/21  1631   MAGNESIUM mg/dL 2.0 1.9 1.8           Invalid input(s): LDLCALC  Results from last 7 days   Lab Units 04/12/21  0307 04/11/21  0522 04/10/21  0235 04/10/21  0235 04/09/21  2017 04/09/21  1631   WBC 10*3/mm3 11.72* 12.26*  --  16.60* 17.33* 23.03*   HEMOGLOBIN g/dL 9.3* 9.6*  --  9.3* 9.7* 10.3*   HEMATOCRIT % 29.9* 29.7*  --  30.1* 29.7* 31.2*   PLATELETS 10*3/mm3 316 300  --  285 287 323   MCV fL 93.7 94.0  --  97.7* 94.0 92.6   MCH pg 29.2 30.4  --  30.2 30.7 30.6   MCHC g/dL 31.1* 32.3  --  30.9* 32.7 33.0   RDW % 14.6 14.5  --  14.9 14.5 14.5   RDW-SD fl 50.8 50.1  --  53.0 49.8 48.1   MPV fL 9.3 9.1  --  9.7 9.3 9.5   NEUTROS ABS 10*3/mm3 8.05* 8.91*  --  13.41* 14.64* 16.35*  16.21*   EOS ABS 10*3/mm3 0.23 0.12   < >  --   --   --    BASOS ABS 10*3/mm3  --   --   --   --  0.17 0.23*  0.46*   NRBC /100 WBC 0.0 0.1  --  1.0*  0.0 0.0 0.0    < > = values in this interval not displayed.     Results from last 7 days   Lab Units 04/12/21  1020 04/12/21  0307 04/11/21  1607 04/10/21  1047 04/10/21  0231 04/09/21 2017   INR   --  1.28* 1.47*  --  1.41* 1.48*   APTT  seconds 85.8* 66.1* 46.5* 71.9* 46.0* 45.2*         Results from last 7 days   Lab Units 04/09/21  1631   PROCALCITONIN ng/mL 3.83*   LACTATE mmol/L 1.8             Results from last 7 days   Lab Units 04/11/21  2046 04/09/21  1804 04/09/21  1631   BLOODCX   --  No growth at 2 days No growth at 2 days   BODYFLDCX  Culture in progress  --   --      Results from last 7 days   Lab Units 04/09/21  1722   ADENOVIRUS  Not Detected         Results from last 7 days   Lab Units 04/11/21  0522   URIC ACID mg/dL 11.8*       Imaging:  Imaging Results (Last 24 Hours)     ** No results found for the last 24 hours. **             I reviewed the patient's new clinical results / labs / tests / procedures      Assessment/Plan     Active Hospital Problems    Diagnosis  POA   • **Colitis, Clostridium difficile [A04.72]  Yes   • Severe malnutrition (CMS/[facility]) [E43]  Yes   • History of tracheostomy [Z98.890]  Not Applicable   • Hypotension [I95.9]  Unknown   • Acute pulmonary embolism (CMS/[facility]) [I26.99]  Unknown   • Gastroesophageal reflux disease [K21.9]  Yes   • Peritoneal dialysis status (CMS/[facility]) [Z99.2]  Not Applicable   • Cardiomyopathy (CMS/[facility]) [I42.9]  Yes   • Chronic systolic heart failure (CMS/[facility]) [I50.22]  Yes   • Gitelman syndrome [E83.42, E87.6]  Yes   • ESRD (end stage renal disease) on dialysis (CMS/[facility]) [N18.6, Z99.2]  Not Applicable   • Anxiety [F41.9]  Yes   • HLD (hyperlipidemia) [E78.5]  Yes   • Hypothyroidism [E03.9]  Yes      Resolved Hospital Problems   No resolved problems to display.           · Sepsis secondary to C. difficile colitis.  Improving sepsis indices.  Improving symptomatology of the C. difficile colitis.  Benign GI examination.  Negative blood cultures till now.  Negative GI PCR.  Currently on vancomycin.  Infectious disease following peritoneal fluid culture is pending.  Improved leukocytosis.  No fever.  ID okay to discharge.  · End-stage renal disease/hypokalemia.  Magnesium is normal.  Status  post hemodialysis without volume removal yesterday.  Nephrology decided that the patient will be on hemodialysis until C. difficile infection has resolved..  Potassium being substituted per nephrology.  · Recent Covid infection.  Asymptomatic.  Repeat Covid is negative.  · Small right pulmonary embolus.  No respiratory compromise or right ventricular strain.  Secondary to patient being on HD she is being anticoagulated with Coumadin.  Currently on heparin.  She was counseled about the importance of compliance with the Coumadin and she is agreeable now.  · Anemia.  Stable hemoglobin.  Will monitor.  · Disposition.  Awaiting rehab placement.  Referral has been done and we are awaiting for confirmation.    · Discussed with patient/discharge planner..      Joseph Maria MD  [facility]  04/12/21  12:24 EDT

## 2021-04-12 NOTE — NURSING NOTE
CWOCN- patient has a partial thickness wound on her right gluteal. Unknown origin, possibly pressure prior to admission. Additionally, patient has diagnosis of c-diff which could be exacerbating any skin issues. Recommend to use a silicone border dressing over the wound to manage drainage and protect it from loose bm. Change every other day + PRN.      04/12/21 1540   Wound 04/10/21 0037 Right gluteal   Placement Date/Time: 04/10/21 0037   Present on Hospital Admission: Yes  Side: Right  Location: gluteal  Stage, Pressure Injury : Stage 2   Dressing Appearance intact   Base moist;pink   Periwound pink   Edges open

## 2021-04-12 NOTE — PROGRESS NOTES
NEPHROLOGY PROGRESS NOTE    PATIENT IDENTIFICATION:   Name:  Ambar Lara      MRN:  [id number]     51 y.o.  female             Reason for visit: ESRD    SUBJECTIVE:   HD yesterday without difficulty; was very depressed and tearful this morning, but states that she feels much better now, and is eager for release to rehab near her home hopefully soon; no shortness of breath on room air; Coumadin loading in progress; heparin drip infusing    OBJECTIVE:  Vitals:    04/12/21 0405 04/12/21 1050 04/12/21 1344 04/12/21 1349   BP: 93/54 95/64 (!) 88/65 96/64   BP Location: Right arm Right arm Left arm    Patient Position: Lying Lying Sitting    Pulse: 87 109 108 107   Resp: 16 16 16    Temp: 96.6 °F (35.9 °C) 98.8 °F (37.1 °C) 97.9 °F (36.6 °C)    TempSrc: Oral Oral Oral    SpO2: 96% 93% 96% 97%   Weight:       Height:               Body mass index is 30.78 kg/m².  No intake or output data in the 24 hours ending 04/12/21 1651  Wt Readings from Last 1 Encounters:   04/09/21 2353 73.9 kg (162 lb 14.7 oz)   04/09/21 2000 76.6 kg (168 lb 14.4 oz)     Wt Readings from Last 3 Encounters:   04/09/21 73.9 kg (162 lb 14.7 oz)   01/20/16 70.8 kg (156 lb)   11/17/15 74.8 kg (164 lb 15.9 oz)         Exam:  NAD; pleasant; oriented; looks stated age  Chronically ill-appearing  Flat affect, depressed mood  Dry MM; AT/NC   No eye discharge; no scleral icterus  No JVD; no carotid bruits; tracheostomy  Coarse breath sounds bilat; not labored  RRR, no rub  Right chest TDC  Soft, NT, +D, BS+, PD catheter right lower quadrant without any exudate or drainage  No significant edema  No clubbing  No asterixis  Moves all extremities  Speech is fluent    Scheduled meds:    escitalopram, 20 mg, Oral, Daily  levothyroxine, 25 mcg, Oral, Q AM  montelukast, 5 mg, Oral, Nightly  sodium bicarbonate, 650 mg, Oral, TID  sodium chloride, 10 mL, Intravenous, Q12H  vancomycin, 125 mg, Oral, Q6H  [START ON 4/20/2021] vancomycin, 125 mg, Oral,  Q12H  [START ON 4/27/2021] vancomycin, 125 mg, Oral, Q24H  [START ON 5/4/2021] vancomycin, 125 mg, Oral, Every Other Day  warfarin, 10 mg, Oral, Once      IV meds:                        heparin (porcine), 18 Units/kg/hr, Last Rate: 20 Units/kg/hr (04/12/21 1202)  Pharmacy Consult,   Pharmacy to dose warfarin,         Data Review:    Results from last 7 days   Lab Units 04/12/21  0307 04/11/21  0522 04/10/21  0235 04/09/21  1631   SODIUM mmol/L 136 132* 132* 133*   POTASSIUM mmol/L 3.1* 3.2* 3.2* 3.3*   CHLORIDE mmol/L 100 95* 97* 96*   CO2 mmol/L 24.9 18.9* 20.6* 21.4*   BUN mg/dL 12 37* 29* 28*   CREATININE mg/dL 2.67* 4.56* 4.18* 4.19*   CALCIUM mg/dL 7.8* 8.4* 8.1* 8.6   BILIRUBIN mg/dL <0.2 0.2  --  0.2   ALK PHOS U/L 120* 125*  --  163*   ALT (SGPT) U/L 18 14  --  15   AST (SGOT) U/L 19 15  --  12   GLUCOSE mg/dL 82 93 92 106*     Estimated Creatinine Clearance: 23.4 mL/min (A) (by C-G formula based on SCr of 2.67 mg/dL (H)).  Results from last 7 days   Lab Units 04/11/21 0522   URIC ACID mg/dL 11.8*     Results from last 7 days   Lab Units 04/11/21  0522 04/10/21  0235 04/09/21  1631   MAGNESIUM mg/dL 2.0 1.9 1.8   PHOSPHORUS mg/dL 7.1* 4.7*  --        Results from last 7 days   Lab Units 04/12/21  0307 04/11/21  0522 04/10/21  0235 04/09/21  2017 04/09/21  1631   WBC 10*3/mm3 11.72* 12.26* 16.60* 17.33* 23.03*   HEMOGLOBIN g/dL 9.3* 9.6* 9.3* 9.7* 10.3*   PLATELETS 10*3/mm3 316 300 285 287 323       Results from last 7 days   Lab Units 04/12/21  0307 04/11/21  1607 04/10/21  0231 04/09/21 2017   INR  1.28* 1.47* 1.41* 1.48*             ASSESSMENT:     Colitis, Clostridium difficile    Anxiety    Cardiomyopathy (CMS/[facility])    Chronic systolic heart failure (CMS/[facility])    ESRD (end stage renal disease) on dialysis (CMS/[facility])    Gastroesophageal reflux disease    Gitelman syndrome    HLD (hyperlipidemia)    Hypothyroidism    Peritoneal dialysis status (CMS/[facility])    History of tracheostomy    Hypotension    Acute  pulmonary embolism (CMS/[facility])    Severe malnutrition (CMS/[facility])    1.  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  High phosphorus and low calcium due to bone mineral disease of ESRD.  Volume status appropriate  2.  Recurrent C. difficile colitis  3.  Covid-pneumonia earlier this year, with prolonged respiratory failure requiring ultimately tracheostomy  4.  Immobility syndrome  5.  Pulmonary embolism  6.  Anemia of ESRD  7.  Hypotension: Spironolactone off for now        PLAN:  1.  Begin calcium acetate  2.  HD tomorrow (TTS)  3.  Coumadin loading, with ongoing heparin bridge  4.  Outpatient arrangements for HD and rehab being made  5.  Discussed with Dr. Gino Contreras ([facility]), patient's primary nephrologist    Sanjay Waggoner MD  4/12/2021    16:51 EDT

## 2021-04-12 NOTE — PLAN OF CARE
Goal Outcome Evaluation:   VSS. SBP in 90's. Heparin theraputic and PTT scheduled for am. Coumadin given this evening. To have HD tomorrow. Still having multiple liquid BM's. Will continue to monitor

## 2021-04-13 LAB
ALBUMIN SERPL-MCNC: 3 G/DL (ref 3.5–5.2)
ANION GAP SERPL CALCULATED.3IONS-SCNC: 14.8 MMOL/L (ref 5–15)
APTT PPP: 108.3 SECONDS (ref 22.7–35.4)
APTT PPP: 109.6 SECONDS (ref 22.7–35.4)
APTT PPP: 117.7 SECONDS (ref 22.7–35.4)
BUN SERPL-MCNC: 19 MG/DL (ref 6–20)
BUN/CREAT SERPL: 5.6 (ref 7–25)
CALCIUM SPEC-SCNC: 7.8 MG/DL (ref 8.6–10.5)
CHLORIDE SERPL-SCNC: 99 MMOL/L (ref 98–107)
CO2 SERPL-SCNC: 22.2 MMOL/L (ref 22–29)
CREAT SERPL-MCNC: 3.4 MG/DL (ref 0.57–1)
DEPRECATED RDW RBC AUTO: 48.6 FL (ref 37–54)
EOSINOPHIL # BLD MANUAL: 0.26 10*3/MM3 (ref 0–0.4)
EOSINOPHIL NFR BLD MANUAL: 2 % (ref 0.3–6.2)
ERYTHROCYTE [DISTWIDTH] IN BLOOD BY AUTOMATED COUNT: 14.5 % (ref 12.3–15.4)
GFR SERPL CREATININE-BSD FRML MDRD: 14 ML/MIN/1.73
GFR SERPL CREATININE-BSD FRML MDRD: ABNORMAL ML/MIN/{1.73_M2}
GLUCOSE SERPL-MCNC: 83 MG/DL (ref 65–99)
HCT VFR BLD AUTO: 28.8 % (ref 34–46.6)
HGB BLD-MCNC: 9.4 G/DL (ref 12–15.9)
HYPOCHROMIA BLD QL: ABNORMAL
INR PPP: 1.32 (ref 0.9–1.1)
LYMPHOCYTES # BLD MANUAL: 1.18 10*3/MM3 (ref 0.7–3.1)
LYMPHOCYTES NFR BLD MANUAL: 4 % (ref 5–12)
LYMPHOCYTES NFR BLD MANUAL: 9 % (ref 19.6–45.3)
MAGNESIUM SERPL-MCNC: 1.6 MG/DL (ref 1.6–2.6)
MCH RBC QN AUTO: 30.4 PG (ref 26.6–33)
MCHC RBC AUTO-ENTMCNC: 32.6 G/DL (ref 31.5–35.7)
MCV RBC AUTO: 93.2 FL (ref 79–97)
METAMYELOCYTES NFR BLD MANUAL: 10 % (ref 0–0)
MONOCYTES # BLD AUTO: 0.53 10*3/MM3 (ref 0.1–0.9)
MYELOCYTES NFR BLD MANUAL: 3 % (ref 0–0)
NEUTROPHILS # BLD AUTO: 9.21 10*3/MM3 (ref 1.7–7)
NEUTROPHILS NFR BLD MANUAL: 70 % (ref 42.7–76)
PHOSPHATE SERPL-MCNC: 3.8 MG/DL (ref 2.5–4.5)
PLAT MORPH BLD: NORMAL
PLATELET # BLD AUTO: 315 10*3/MM3 (ref 140–450)
PMV BLD AUTO: 9.4 FL (ref 6–12)
POTASSIUM SERPL-SCNC: 3 MMOL/L (ref 3.5–5.2)
PROMYELOCYTES NFR BLD MANUAL: 2 % (ref 0–0)
PROTHROMBIN TIME: 16.2 SECONDS (ref 11.7–14.2)
RBC # BLD AUTO: 3.09 10*6/MM3 (ref 3.77–5.28)
SODIUM SERPL-SCNC: 136 MMOL/L (ref 136–145)
WBC # BLD AUTO: 13.16 10*3/MM3 (ref 3.4–10.8)
WBC MORPH BLD: NORMAL

## 2021-04-13 PROCEDURE — 85610 PROTHROMBIN TIME: CPT | Performed by: INTERNAL MEDICINE

## 2021-04-13 PROCEDURE — 83735 ASSAY OF MAGNESIUM: CPT | Performed by: INTERNAL MEDICINE

## 2021-04-13 PROCEDURE — 25010000003 MAGNESIUM SULFATE 4 GM/100ML SOLUTION: Performed by: INTERNAL MEDICINE

## 2021-04-13 PROCEDURE — 99232 SBSQ HOSP IP/OBS MODERATE 35: CPT | Performed by: INTERNAL MEDICINE

## 2021-04-13 PROCEDURE — 85730 THROMBOPLASTIN TIME PARTIAL: CPT | Performed by: INTERNAL MEDICINE

## 2021-04-13 PROCEDURE — 63710000001 DIPHENHYDRAMINE PER 50 MG: Performed by: NURSE PRACTITIONER

## 2021-04-13 PROCEDURE — 25010000002 HEPARIN (PORCINE) PER 1000 UNITS: Performed by: INTERNAL MEDICINE

## 2021-04-13 PROCEDURE — 80069 RENAL FUNCTION PANEL: CPT | Performed by: INTERNAL MEDICINE

## 2021-04-13 PROCEDURE — 5A1D70Z PERFORMANCE OF URINARY FILTRATION, INTERMITTENT, LESS THAN 6 HOURS PER DAY: ICD-10-PCS | Performed by: INTERNAL MEDICINE

## 2021-04-13 PROCEDURE — 85025 COMPLETE CBC W/AUTO DIFF WBC: CPT | Performed by: EMERGENCY MEDICINE

## 2021-04-13 PROCEDURE — 85007 BL SMEAR W/DIFF WBC COUNT: CPT | Performed by: EMERGENCY MEDICINE

## 2021-04-13 RX ORDER — POTASSIUM CHLORIDE 1.5 G/1.77G
40 POWDER, FOR SOLUTION ORAL AS NEEDED
Status: DISCONTINUED | OUTPATIENT
Start: 2021-04-13 | End: 2021-04-13

## 2021-04-13 RX ORDER — MAGNESIUM SULFATE HEPTAHYDRATE 40 MG/ML
2 INJECTION, SOLUTION INTRAVENOUS AS NEEDED
Status: DISCONTINUED | OUTPATIENT
Start: 2021-04-13 | End: 2021-04-13

## 2021-04-13 RX ORDER — MAGNESIUM SULFATE HEPTAHYDRATE 40 MG/ML
4 INJECTION, SOLUTION INTRAVENOUS AS NEEDED
Status: DISCONTINUED | OUTPATIENT
Start: 2021-04-13 | End: 2021-04-13

## 2021-04-13 RX ORDER — POTASSIUM CHLORIDE 750 MG/1
20 TABLET, FILM COATED, EXTENDED RELEASE ORAL ONCE
Status: DISCONTINUED | OUTPATIENT
Start: 2021-04-13 | End: 2021-04-14 | Stop reason: HOSPADM

## 2021-04-13 RX ORDER — GENTAMICIN SULFATE 1 MG/G
OINTMENT TOPICAL DAILY
Status: DISCONTINUED | OUTPATIENT
Start: 2021-04-13 | End: 2021-04-14 | Stop reason: HOSPADM

## 2021-04-13 RX ORDER — WARFARIN SODIUM 10 MG/1
10 TABLET ORAL
Status: COMPLETED | OUTPATIENT
Start: 2021-04-13 | End: 2021-04-13

## 2021-04-13 RX ORDER — POTASSIUM CHLORIDE 750 MG/1
40 TABLET, FILM COATED, EXTENDED RELEASE ORAL AS NEEDED
Status: DISCONTINUED | OUTPATIENT
Start: 2021-04-13 | End: 2021-04-13

## 2021-04-13 RX ADMIN — DIPHENHYDRAMINE HYDROCHLORIDE 25 MG: 25 CAPSULE ORAL at 20:27

## 2021-04-13 RX ADMIN — HEPARIN SODIUM 18 UNITS/KG/HR: 10000 INJECTION, SOLUTION INTRAVENOUS at 12:58

## 2021-04-13 RX ADMIN — LEVOTHYROXINE SODIUM 25 MCG: 0.03 TABLET ORAL at 06:33

## 2021-04-13 RX ADMIN — ACETAMINOPHEN 650 MG: 325 TABLET ORAL at 20:26

## 2021-04-13 RX ADMIN — MONTELUKAST SODIUM 5 MG: 10 TABLET, FILM COATED ORAL at 20:26

## 2021-04-13 RX ADMIN — SODIUM BICARBONATE 650 MG: 650 TABLET ORAL at 17:11

## 2021-04-13 RX ADMIN — CALCIUM ACETATE 1334 MG: 667 CAPSULE ORAL at 17:11

## 2021-04-13 RX ADMIN — ZOLPIDEM TARTRATE 5 MG: 5 TABLET ORAL at 20:27

## 2021-04-13 RX ADMIN — POTASSIUM CHLORIDE 40 MEQ: 750 TABLET, EXTENDED RELEASE ORAL at 14:52

## 2021-04-13 RX ADMIN — VANCOMYCIN 125 MG: KIT at 06:33

## 2021-04-13 RX ADMIN — SODIUM CHLORIDE, PRESERVATIVE FREE 10 ML: 5 INJECTION INTRAVENOUS at 20:27

## 2021-04-13 RX ADMIN — CALCIUM ACETATE 1334 MG: 667 CAPSULE ORAL at 12:53

## 2021-04-13 RX ADMIN — WARFARIN 10 MG: 10 TABLET ORAL at 17:11

## 2021-04-13 RX ADMIN — VANCOMYCIN 125 MG: KIT at 23:50

## 2021-04-13 RX ADMIN — SODIUM BICARBONATE 650 MG: 650 TABLET ORAL at 12:53

## 2021-04-13 RX ADMIN — MAGNESIUM SULFATE 4 G: 4 INJECTION INTRAVENOUS at 14:52

## 2021-04-13 RX ADMIN — VANCOMYCIN 125 MG: KIT at 17:10

## 2021-04-13 RX ADMIN — VANCOMYCIN 125 MG: KIT at 00:38

## 2021-04-13 RX ADMIN — VANCOMYCIN 125 MG: KIT at 12:53

## 2021-04-13 RX ADMIN — SODIUM BICARBONATE 650 MG: 650 TABLET ORAL at 20:26

## 2021-04-13 RX ADMIN — ESCITALOPRAM 20 MG: 20 TABLET, FILM COATED ORAL at 12:53

## 2021-04-13 NOTE — PROGRESS NOTES
Continued Stay Note  [facility]     Patient Name: Ambar Lara  MRN: [id number]  Today's Date: 4/13/2021    Admit Date: 4/9/2021    Discharge Plan     Row Name 04/13/21 1141       Plan    Plan  [place] pending HD set up as outpatient    Plan Comments  CCP received call from Arlene/[facility] [phone]; states patient’s family has requested patient to be transferred to [facility] clinic in [place] which is closer to her house. Arlene states she has started the referral process for [facility] but they have no confirmed if they will have a chair for her. Per Arlene; if dialysis is transitioned to [facility], MD will need to address PD catheter. CCP spoke with patient via hospital phone due to isolation; patient confirmed she is hoping to change to clinic closer to her home so it is easier on her children. CCP spoke with Vickey/[facility] [phone]; states he will follow [facility] with [facility] process and let CCP know if it is finalized. Cecilia GOODWIN    Row Name 04/13/21 1031       Plan    Plan  [facility], bed available when medically stable    Plan Comments  Outbound call to Annette/[facility]. They are able to accept and confirmed with insurance no pre-cert is needed (they have patient's [place] policy as primary). Provided information regarding HD center to Annette. She confirmed she had spoken with a friend who came to visit facility yesterday that between the friend and patient's children they will transport her to her HD clinic. Partial packet in CCP office and CCP will follow. Apryl CARMONA RN CCP        Discharge Codes    No documentation.       Expected Discharge Date and Time     Expected Discharge Date Expected Discharge Time    Apr 14, 2021             BUDDY Sinclair

## 2021-04-13 NOTE — PROGRESS NOTES
ID note for C. difficile  Subjective: She apparently had a rough day with dialysis yesterday but is feeling pretty well this morning. Diarrhea improving. No fevers or chills or night sweats. Appetite good.    Objective: Afebrile, chronically ill-appearing but no acute distress, abdomen is soft nontender nondistended, appropriate mood and affect    Assessment and plan  1.  Sepsis secondary to #2  2.  Recurrent C. Difficile  3.  Recent history of Covid, negative for Covid here   4.  Subsegmental right lower lobe PE  5.  End-stage renal disease on peritoneal dialysis traditionally, but now on hemodialysis  6.  IBS, complicating above     Diarrhea is improving. Sepsis is resolving. Continues on vancomycin 125 mg p.o. every 6 hours x10 days followed by prolonged vancomycin taper.  Discussed with patient potential probiotics and need to avoid systemic antibiotics in the future.  Plan for discharge whenever okay with others

## 2021-04-13 NOTE — PLAN OF CARE
Goal Outcome Evaluation:   VSS. Heparin adjusted per protocol, currently at 16units/kg/hr. Mag given and KCL 40meq. Pt still waiting for HD placement at HD. PD catheter dsg changed per Dr. Moura. Will continue to monitor

## 2021-04-13 NOTE — PAYOR COMM NOTE
"CLINICAL FOR REVIEW    REF #[id number]    F: [phone]  P: [phone]        Ambar Doan (51 y.o. Female)     Date of Birth Social Security Number Address Home Phone MRN    1970  [address] [address] [phone] [id number]    Mandaen Marital Status          Unknown        Admission Date Admission Type Admitting Provider Attending Provider Department, Room/Bed    4/9/21 Emergency Mauricio Mitchell MD Hussein, Samer H, MD [address], P593/1    Discharge Date Discharge Disposition Discharge Destination                       Attending Provider: Joseph Maria MD    Allergies: Penicillins, Nickel    Isolation: Spore   Infection: C.difficile (04/09/21)   Code Status: CPR    Ht: 154.9 cm (61\")   Wt: 73.9 kg (162 lb 14.7 oz)    Admission Cmt: None   Principal Problem: Colitis, Clostridium difficile [A04.72]                 Active Insurance as of 4/9/2021     Primary Coverage     Payor Plan Insurance Group Employer/Plan Group    MEDICARE MEDICARE A & B      Payor Plan Address Payor Plan Phone Number Payor Plan Fax Number Effective Dates    [address] [phone]  4/1/2019 - None Entered    [facility] [id number]       Subscriber Name Subscriber Birth Date Member ID       AMBAR DOAN 1970 [id number]           Secondary Coverage     Payor Plan Insurance Group Employer/Plan Group    [facility] [id number]     Payor Plan Address Payor Plan Phone Number Payor Plan Fax Number Effective Dates    [address]   1/1/2021 - None Entered    [facility] [id number]       Subscriber Name Subscriber Birth Date Member ID       AMBAR DOAN 1970 [id number]                 Emergency Contacts      (Rel.) Home Phone Work Phone Mobile Phone    Roland Doan (Son) [phone] -- --               History & Physical      Jesusita Pozo, APRN at 04/09/21 7993     Attestation signed by Mauricio Mitchell MD at " 04/10/21 020      Addendum: I have reviewed the history and plan as obtained by FACUNDO Hendrickson and preformed my own independent history adjust documentation as needed. I have personally examined the patient. My exam confirms her physical findings and I agree with the plan as listed above, with the addition to the followin-year-old woman who presents with weakness and lightheadedness.  She was just discharged from [facility] yesterday.  Dialysis center called and said that they noted that she was having pus coming from her PD dialysis catheter site.  Reportedly, the nurse was able to drain it and flush it today but the patient was having pain at the site with use. She also endorses 10-15 episodes of diarrhea per day. She complains of intermittent lower abdominal cramping. She is c. Diff positive in the ED.    Patient was hospitalized at [facility] from 2021 to 2021 after initially presenting there with near syncope and lightheadedness.  She was diagnosed with COVID-19 pneumonia with ARDS and septic shock from C. difficile colitis.  She was treated with a course of antibiotics (IV flagyl and po vanco) as well as steroids, remdesivir, plasma and then eventually transferred to LTAC. She did require trach placement while hospitalized.    On presentation, pt was hypotensive with tachycardia  Labs were significant for:  Probnp 4129  Potassium 3.3  D-dimer 2.22  Procal 3.83  Wbc 23  Hgb 10.3  C. Diff pcr positive  Cxray right dialysis catheter, no acute disease  CT chest/abdomen/pelvis:   1) RLL subsegmental PE  2) mild patchy GGO in both lungs with mildly enlarged lymph nodes, extensive linear and reticular fibrotic scarring in both lungs  3) extensive diffuse colitis    General: alert and oriented, no distress, anxious  HEENT: eomi, ncat, bandage over decannulated trach  Chest: right tunneled dialysis catheter without erythema or tenderness  Abd: right sided PD catheter  without erythema, swelling, or tenderness, abd soft, nt/nd    Acute RLL subsegmental PE-continue heparin drip for now, likely will need to be transitioned to warfarin, or a low dose DOAC if okay with nephrology    Second recurrence of C. Diff colitis causing sepsis-hypotension is improved. check peritoneal cell count, gram stain, and cultures to rule out peritonitis. continue oral vancomycin. Follow blood cultures. Consult to ID.    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    Mauricio Mitchell MD  01:59 EDT                       Patient Name:  Ambar Lara  YOB: 1970  MRN:  [id number]  Admit Date:  4/9/2021  Patient Care Team:  Jet Manuel MD as PCP - General (Internal Medicine)      Subjective   History Present Illness     Chief Complaint   Patient presents with   • Hypotension   • Dizziness     History of Present Illness   Mrs. Lara is a 51 year  With history of covid 19, ESRD, CHF, cardiomyopathy, hypothyroidism, Gitelman syndrome who presents to the ED with hypotension from dialysis center.  Patient was just released from [facility] a few days ago after and extensive hospital stay when she was diagnosed with COVID-19 pneumonia with ARDS and septic shock from C. difficile colitis.  She was treated with a course of antibiotics (IV flagyl and po vanco) as well as steroids, remdesivir, plasma and then eventually transferred to LTAC. She did require trach placement while hospitalized.  She states that she was sent home and has not really been  "able to ambulate on her own due to severe weakness in her legs.  Ang denies any significant shortness of breath or chest pain. She diud have a trach that has been removed.  Patient states she has been able to eat a regular diet at home.  She does complain of 10-12 episodes of diarrhea daily at  Home, she has occasional abdominal pain and some incontinence of stool.  SHe state she r legs just feel generally weak but she denies any swelling or specific pain in her legs or calves.She did do peritoneal dialysis at home prior to her illness in February but has since been started on hemodialysis.  At the dialysis center today she was noted to have some hypotension and some \"pus\" from her peritoneal catheter, they were able to drain some fluid and flush it at the center.  In the ED INR 1.48, PTT 45.2, Potssium 3.3, sodium 133, calcium 8.6, creat 4.19, bun 28, d dimer 2.22, procal 3.83, lactic 1.8.  Cta chest shows small PE, started on heparin drip.  Ct abdomen shows diffuse colitis, stool positive for c diff.  WBC 23.03, hgb 10.3, hct 31.2, platelet 323.    Review of Systems   Constitutional: Negative for appetite change and fever.   HENT: Negative for nosebleeds and trouble swallowing.    Eyes: Negative for photophobia, redness and visual disturbance.   Respiratory: Negative for cough, chest tightness, shortness of breath and wheezing.    Cardiovascular: Negative for chest pain, palpitations and leg swelling.        Hypotension   Gastrointestinal: Positive for abdominal pain and diarrhea. Negative for abdominal distention, nausea and vomiting.   Endocrine: Negative.    Genitourinary: Negative.    Musculoskeletal: Negative for gait problem and joint swelling.   Skin: Negative.    Neurological: Positive for weakness (generalized). Negative for dizziness, seizures, speech difficulty, light-headedness and headaches.   Hematological: Negative.    Psychiatric/Behavioral: Negative for behavioral problems and confusion.        "     Personal History     Past Medical History:   Diagnosis Date   • CHF (congestive heart failure) (CMS/[facility])    • Dialysis patient (CMS/[facility])    • Disease of thyroid gland    • Elevated cholesterol    • GERD (gastroesophageal reflux disease)    • Renal disorder    • Sleep apnea      Past Surgical History:   Procedure Laterality Date   • HYSTERECTOMY     • TRACHEOSTOMY       No family history on file.  Social History     Tobacco Use   • Smoking status: Not on file   Substance Use Topics   • Alcohol use: Not on file   • Drug use: Not on file     No current facility-administered medications on file prior to encounter.     Current Outpatient Medications on File Prior to Encounter   Medication Sig Dispense Refill   • escitalopram (Lexapro) 20 MG tablet Daily.     • levothyroxine sodium (TIROSINT) 112 MCG capsule Daily.     • montelukast (Singulair) 10 MG tablet Daily.     • potassium chloride (KLOR-CON) 20 MEQ CR tablet Daily.     • sodium bicarbonate 650 MG tablet Every 8 (Eight) Hours.     • SPIRONOLACTONE PO Take  by mouth.       Allergies   Allergen Reactions   • Penicillins Anaphylaxis   • Nickel Rash       Objective    Objective     Vital Signs  Temp:  [98.5 °F (36.9 °C)-98.9 °F (37.2 °C)] 98.9 °F (37.2 °C)  Heart Rate:  [102-117] 102  Resp:  [16-28] 16  BP: ()/(43-69) 97/69  SpO2:  [91 %-98 %] 95 %  on   ;   Device (Oxygen Therapy): room air  Body mass index is 30.78 kg/m².    Physical Exam  Vitals and nursing note reviewed.   Constitutional:       General: She is not in acute distress.     Appearance: She is well-developed.   HENT:      Head: Normocephalic.   Neck:      Vascular: No JVD.   Cardiovascular:      Rate and Rhythm: Normal rate and regular rhythm.      Heart sounds: Normal heart sounds.      Comments: NSR on the monitor with rat 72 during my exam  Pulmonary:      Effort: Pulmonary effort is normal.      Breath sounds: Normal breath sounds.      Comments: Lung sounds clear, sats 97% on room air,  trach stoma clean, covered with dressing, no drainage  Chest:      Comments: shiley catheter in right chest, no redness or drainage around site  Abdominal:      General: Bowel sounds are increased. There is no distension.      Palpations: Abdomen is soft.      Tenderness: There is abdominal tenderness in the periumbilical area and suprapubic area.      Comments: Peritoneal dialysis catheter in place, no surrounding redness or drainage noted, some mild tenderness on plapation.   Musculoskeletal:         General: Normal range of motion.      Cervical back: Normal range of motion.      Right lower leg: No edema.      Left lower leg: No edema.   Skin:     General: Skin is warm and dry.      Capillary Refill: Capillary refill takes less than 2 seconds.   Neurological:      General: No focal deficit present.      Mental Status: She is alert and oriented to person, place, and time.      Comments: No focal weakness, but did not attempt to ambulate patient at this time   Psychiatric:         Attention and Perception: Attention normal.         Mood and Affect: Mood normal.         Speech: Speech normal.         Behavior: Behavior normal.         Thought Content: Thought content normal.         Cognition and Memory: Cognition normal.         Judgment: Judgment normal.         Results Review:  I reviewed the patient's new clinical results.  I reviewed the patient's new imaging results and agree with the interpretation.  I reviewed the patient's other test results and agree with the interpretation  I personally viewed and interpreted the patient's EKG/Telemetry data  Discussed with ED provider.    Lab Results (last 24 hours)     Procedure Component Value Units Date/Time    CBC & Differential [[id number]]  (Abnormal) Collected: 04/09/21 1631    Specimen: Blood Updated: 04/09/21 1656    Narrative:      The following orders were created for panel order CBC & Differential.  Procedure                               Abnormality          Status                     ---------                               -----------         ------                     CBC Auto Differential[[id number]]        Abnormal            Final result                 Please view results for these tests on the individual orders.    Comprehensive Metabolic Panel [[id number]]  (Abnormal) Collected: 04/09/21 1631    Specimen: Blood Updated: 04/09/21 1718     Glucose 106 mg/dL      BUN 28 mg/dL      Creatinine 4.19 mg/dL      Sodium 133 mmol/L      Potassium 3.3 mmol/L      Chloride 96 mmol/L      CO2 21.4 mmol/L      Calcium 8.6 mg/dL      Total Protein 6.9 g/dL      Albumin 3.10 g/dL      ALT (SGPT) 15 U/L      AST (SGOT) 12 U/L      Alkaline Phosphatase 163 U/L      Total Bilirubin 0.2 mg/dL      eGFR Non African Amer 11 mL/min/1.73      Comment: <15 Indicative of kidney failure.        eGFR   Amer --     Comment: <15 Indicative of kidney failure.        Globulin 3.8 gm/dL      A/G Ratio 0.8 g/dL      BUN/Creatinine Ratio 6.7     Anion Gap 15.6 mmol/L     Narrative:      GFR Normal >60  Chronic Kidney Disease <60  Kidney Failure <15      Troponin [[id number]]  (Normal) Collected: 04/09/21 1631    Specimen: Blood Updated: 04/09/21 1710     Troponin T 0.030 ng/mL     Narrative:      Troponin T Reference Range:  <= 0.03 ng/mL-   Negative for AMI  >0.03 ng/mL-     Abnormal for myocardial necrosis.  Clinicians would have to utilize clinical acumen, EKG, Troponin and serial changes to determine if it is an Acute Myocardial Infarction or myocardial injury due to an underlying chronic condition.       Results may be falsely decreased if patient taking Biotin.      BNP [[id number]]  (Abnormal) Collected: 04/09/21 1631    Specimen: Blood Updated: 04/09/21 1710     proBNP 4,129.0 pg/mL     Narrative:      Among patients with dyspnea, NT-proBNP is highly sensitive for the detection of acute congestive heart failure. In addition NT-proBNP of <300 pg/ml effectively rules out acute congestive  "heart failure with 99% negative predictive value.    Results may be falsely decreased if patient taking Biotin.      D-dimer, Quantitative [[id number]]  (Abnormal) Collected: 04/09/21 1631    Specimen: Blood Updated: 04/09/21 1658     D-Dimer, Quantitative 2.22 MCGFEU/mL     Narrative:      The Stago D-Dimer test used in conjunction with a clinical pretest probability (PTP) assessment model, has been approved by the FDA to rule out the presence of venous thromboembolism (VTE) in outpatients suspected of deep venous thrombosis (DVT) or pulmonary embolism (PE). The cut-off for negative predictive value is <0.50 MCGFEU/mL.    Blood Culture - Blood, Arm, Left [[id number]] Collected: 04/09/21 1631    Specimen: Blood from Arm, Left Updated: 04/09/21 1640    Lactic Acid, Plasma [[id number]]  (Normal) Collected: 04/09/21 1631    Specimen: Blood Updated: 04/09/21 1712     Lactate 1.8 mmol/L     Procalcitonin [[id number]]  (Abnormal) Collected: 04/09/21 1631    Specimen: Blood Updated: 04/09/21 1719     Procalcitonin 3.83 ng/mL     Narrative:      As a Marker for Sepsis (Non-Neonates):     1. <0.5 ng/mL represents a low risk of severe sepsis and/or septic shock.  2. >2 ng/mL represents a high risk of severe sepsis and/or septic shock.    As a Marker for Lower Respiratory Tract Infections that require antibiotic therapy:  PCT on Admission     Antibiotic Therapy             6-12 Hrs later  >0.5                          Strongly Recommended            >0.25 - <0.5             Recommended  0.1 - 0.25                  Discouraged                       Remeasure/reassess PCT  <0.1                         Strongly Discouraged         Remeasure/reassess PCT      As 28 day mortality risk marker: \"Change in Procalcitonin Result\" (>80% or <=80%) if Day 0 (or Day 1) and Day 4 values are available. Refer to http://www.PPSs-pct-calculator.com/    Change in PCT <=80 %   A decrease of PCT levels below or equal to 80% defines a positive change " in PCT test result representing a higher risk for 28-day all-cause mortality of patients diagnosed with severe sepsis or septic shock.    Change in PCT >80 %   A decrease of PCT levels of more than 80% defines a negative change in PCT result representing a lower risk for 28-day all-cause mortality of patients diagnosed with severe sepsis or septic shock.              Results may be falsely decreased if patient taking Biotin.     CBC Auto Differential [[id number]]  (Abnormal) Collected: 04/09/21 1631    Specimen: Blood Updated: 04/09/21 1656     WBC 23.03 10*3/mm3      RBC 3.37 10*6/mm3      Hemoglobin 10.3 g/dL      Hematocrit 31.2 %      MCV 92.6 fL      MCH 30.6 pg      MCHC 33.0 g/dL      RDW 14.5 %      RDW-SD 48.1 fl      MPV 9.5 fL      Platelets 323 10*3/mm3      nRBC 0.0 /100 WBC     Manual Differential [[id number]]  (Abnormal) Collected: 04/09/21 1631    Specimen: Blood Updated: 04/09/21 1725     Neutrophil % 70.4 %      Lymphocyte % 13.3 %      Monocyte % 8.2 %      Basophil % 2.0 %      Other Cells % 6.1 %      Neutrophils Absolute 16.21 10*3/mm3      Lymphocytes Absolute 3.06 10*3/mm3      Monocytes Absolute 1.89 10*3/mm3      Basophils Absolute 0.46 10*3/mm3      RBC Morphology Normal     WBC Morphology Normal     Platelet Morphology Normal    Manual Differential [[id number]]  (Abnormal) Collected: 04/09/21 1631    Specimen: Blood Updated: 04/09/21 1733     Neutrophil % 71.0 %      Lymphocyte % 24.0 %      Monocyte % 4.0 %      Basophil % 1.0 %      Neutrophils Absolute 16.35 10*3/mm3      Lymphocytes Absolute 5.53 10*3/mm3      Monocytes Absolute 0.92 10*3/mm3      Basophils Absolute 0.23 10*3/mm3      RBC Morphology Normal     WBC Morphology Normal     Platelet Morphology Normal    Magnesium [[id number]]  (Normal) Collected: 04/09/21 1631    Specimen: Blood Updated: 04/09/21 2302     Magnesium 1.8 mg/dL     Gastrointestinal Panel, PCR - Stool, Per Rectum [[id number]]  (Normal) Collected: 04/09/21 1722     Specimen: Stool from Per Rectum Updated: 04/09/21 1850     Campylobacter Not Detected     Plesiomonas shigelloides Not Detected     Salmonella Not Detected     Vibrio Not Detected     Vibrio cholerae Not Detected     Yersinia enterocolitica Not Detected     Enteroaggregative E. coli (EAEC) Not Detected     Enteropathogenic E. coli (EPEC) Not Detected     Enterotoxigenic E. coli (ETEC) lt/st Not Detected     Shiga-like toxin-producing E. coli (STEC) stx1/stx2 Not Detected     Shigella/Enteroinvasive E. coli (EIEC) Not Detected     Cryptosporidium Not Detected     Cyclospora cayetanensis Not Detected     Entamoeba histolytica Not Detected     Giardia lamblia Not Detected     Adenovirus F40/41 Not Detected     Astrovirus Not Detected     Norovirus GI/GII Not Detected     Rotavirus A Not Detected     Sapovirus (I, II, IV or V) Not Detected    Narrative:      If Aeromonas, Staphylococcus aureus or Bacillus cereus are suspected, please order [id number]: Stool Culture, Aeromonas, S aureus, B Cereus.    Clostridium Difficile Toxin - Stool, Per Rectum [[id number]]  (Abnormal) Collected: 04/09/21 1722    Specimen: Stool from Per Rectum Updated: 04/09/21 1824    Narrative:      The following orders were created for panel order Clostridium Difficile Toxin - Stool, Per Rectum.  Procedure                               Abnormality         Status                     ---------                               -----------         ------                     Clostridium Difficile To...[[id number]]  Abnormal            Final result                 Please view results for these tests on the individual orders.    Clostridium Difficile Toxin, PCR - Stool, Per Rectum [[id number]]  (Abnormal) Collected: 04/09/21 1722    Specimen: Stool from Per Rectum Updated: 04/09/21 1824     C. Difficile Toxins by PCR Positive    Blood Culture - Blood, Arm, Right [[id number]] Collected: 04/09/21 1804    Specimen: Blood from Arm, Right Updated: 04/09/21 1807     Protime-INR [[id number]]  (Abnormal) Collected: 04/09/21 2017    Specimen: Blood Updated: 04/09/21 2045     Protime 17.7 Seconds      INR 1.48    aPTT [[id number]]  (Abnormal) Collected: 04/09/21 2017    Specimen: Blood Updated: 04/09/21 2045     PTT 45.2 seconds     CBC & Differential [[id number]]  (Abnormal) Collected: 04/09/21 2017    Specimen: Blood Updated: 04/09/21 2039    Narrative:      The following orders were created for panel order CBC & Differential.  Procedure                               Abnormality         Status                     ---------                               -----------         ------                     CBC Auto Differential[[id number]]        Abnormal            Final result                 Please view results for these tests on the individual orders.    CBC Auto Differential [[id number]]  (Abnormal) Collected: 04/09/21 2017    Specimen: Blood Updated: 04/09/21 2039     WBC 17.33 10*3/mm3      RBC 3.16 10*6/mm3      Hemoglobin 9.7 g/dL      Hematocrit 29.7 %      MCV 94.0 fL      MCH 30.7 pg      MCHC 32.7 g/dL      RDW 14.5 %      RDW-SD 49.8 fl      MPV 9.3 fL      Platelets 287 10*3/mm3      nRBC 0.0 /100 WBC     Manual Differential [[id number]]  (Abnormal) Collected: 04/09/21 2017    Specimen: Blood Updated: 04/09/21 2133     Neutrophil % 84.5 %      Lymphocyte % 8.2 %      Monocyte % 3.1 %      Basophil % 1.0 %      Metamyelocyte % 1.0 %      Myelocyte % 2.1 %      Neutrophils Absolute 14.64 10*3/mm3      Lymphocytes Absolute 1.42 10*3/mm3      Monocytes Absolute 0.54 10*3/mm3      Basophils Absolute 0.17 10*3/mm3      RBC Morphology Normal     WBC Morphology Normal     Platelet Morphology Normal    COVID PRE-OP / PRE-PROCEDURE SCREENING ORDER (NO ISOLATION) - Swab, Nasopharynx [[id number]] Collected: 04/09/21 2304    Specimen: Swab from Nasopharynx Updated: 04/09/21 9736    Narrative:      The following orders were created for panel order COVID PRE-OP / PRE-PROCEDURE SCREENING ORDER  (NO ISOLATION) - Swab, Nasopharynx.  Procedure                               Abnormality         Status                     ---------                               -----------         ------                     COVID-19,APTIMA PANTHER,...[[id number]]                      In process                   Please view results for these tests on the individual orders.    COVID-19,APTIMA PANTHER,[facility] IN-HOUSE, NP/OP SWAB IN UTM/VTM/[facility]ALINE TRANSPORT MEDIA,24 HR TAT - Swab, Nasopharynx [[id number]] Collected: 04/09/21 2304    Specimen: Swab from Nasopharynx Updated: 04/09/21 0525          Imaging Results (Last 24 Hours)     Procedure Component Value Units Date/Time    CT Angiogram Chest [[id number]] Collected: 04/09/21 1922     Updated: 04/09/21 1955    Narrative:      CT ANGIOGRAM CHEST-, CT ABDOMEN PELVIS W CONTRAST-     CLINICAL HISTORY: Tachycardia. Dyspnea. Elevated d-dimer. Abdominal  pain. Leukocytosis. History of severe COVID 19 infection requiring  intubation in the recent past.     TECHNIQUE: Spiral CT images were obtained through the chest during rapid  IV injection of contrast and were reconstructed in 2 mm thick axial  slices. Multiple coronal and sagittal and 3-D reconstructions were  produced. Subsequently, spiral CT images were obtained through the  abdomen and pelvis with IV contrast.     Radiation dose reduction techniques were utilized, including automated  exposure control and exposure modulation based on body size.     COMPARISON: None     FINDINGS: A patent tracheostomy is noted. The main pulmonary arteries  and their lobar and segmental branches are fairly well opacified. There  is a small filling defect within a subsegmental branch of the right  lower lobe pulmonary artery in the medial aspect of the right lung base  suspicious for a pulmonary embolus. No other filling defects are  identified. The thoracic aorta was also fairly well opacified and is  unremarkable. There is no evidence of aneurysm or  dissection. Lung  window images demonstrate moderately extensive coarse reticular and  linear opacities distributed throughout both lungs compatible with  fibrotic scarring due to previous pneumonia. Patchy groundglass  opacities are also present within both lungs indicating at least some  degree of residual acute inflammation.. There are no focal areas of  dense consolidation. There is no bronchiectasis. There are no discrete  lung masses. No emphysematous changes are evident. There are no pleural  effusions. There are a few mildly enlarged lymph nodes scattered  throughout the mediastinum that are quite likely benign reactive lymph  nodes.     There is diminished attenuation throughout the liver parenchyma  consistent with mild hepatic steatosis. No focal hepatic lesions are  identified. The spleen and pancreas  are unremarkable. Numerous tiny  bilateral simple renal cysts are present. An addition, both kidneys  appear somewhat small in size. The right kidney measures 8.7 cm in  length. The left kidney measures 7.7 cm in length. There is no  hydronephrosis or hydroureter. There is moderate diffuse thickening of  the left adrenal gland consistent with hyperplasia. The right adrenal  gland is markedly atrophic. The stomach and small bowel appear within  normal limits. There is moderate diffuse edema throughout the walls of  the colon that is most prominent in the rectosigmoid region where there  is marked edema and mucosal hyperenhancement. The majority of the colon  is contracted and contains no formed stool. The findings are consistent  with extensive colitis. A peritoneal dialysis catheter is in place and  is looped within the anterior aspect of the pelvis to the right of  midline. There is very tiny amount of free fluid adjacent to the right  lobe of the liver. The uterus is absent.       Impression:      1. Solitary small filling defect within a subsegmental branch of the  right lower lobe pulmonary artery  suspicious for a small pulmonary  embolus. No other pulmonary emboli are identified.     2. Patchy areas of fairly extensive linear and reticular fibrotic  scarring throughout both lungs that also mild patchy groundglass  opacities within both lungs. Mildly enlarged mediastinal lymph nodes are  likely benign reactive lymph nodes.     4. CT findings consistent with extensive diffuse colitis as described.  Bowel wall edema is most prominent within the sigmoid colon and rectum.     5. Atrophic right adrenal gland. Diffusely thickened left adrenal gland  consistent with hyperplasia.     6. Atrophic kidneys containing multiple tiny cysts. Peritoneal dialysis  catheter in place within the pelvis.     This report was finalized on 4/9/2021 7:52 PM by Dr. Cecil Roldan M.D.       CT Abdomen Pelvis With Contrast [[id number]] Collected: 04/09/21 1922     Updated: 04/09/21 1955    Narrative:      CT ANGIOGRAM CHEST-, CT ABDOMEN PELVIS W CONTRAST-     CLINICAL HISTORY: Tachycardia. Dyspnea. Elevated d-dimer. Abdominal  pain. Leukocytosis. History of severe COVID 19 infection requiring  intubation in the recent past.     TECHNIQUE: Spiral CT images were obtained through the chest during rapid  IV injection of contrast and were reconstructed in 2 mm thick axial  slices. Multiple coronal and sagittal and 3-D reconstructions were  produced. Subsequently, spiral CT images were obtained through the  abdomen and pelvis with IV contrast.     Radiation dose reduction techniques were utilized, including automated  exposure control and exposure modulation based on body size.     COMPARISON: None     FINDINGS: A patent tracheostomy is noted. The main pulmonary arteries  and their lobar and segmental branches are fairly well opacified. There  is a small filling defect within a subsegmental branch of the right  lower lobe pulmonary artery in the medial aspect of the right lung base  suspicious for a pulmonary embolus. No other filling defects  are  identified. The thoracic aorta was also fairly well opacified and is  unremarkable. There is no evidence of aneurysm or dissection. Lung  window images demonstrate moderately extensive coarse reticular and  linear opacities distributed throughout both lungs compatible with  fibrotic scarring due to previous pneumonia. Patchy groundglass  opacities are also present within both lungs indicating at least some  degree of residual acute inflammation.. There are no focal areas of  dense consolidation. There is no bronchiectasis. There are no discrete  lung masses. No emphysematous changes are evident. There are no pleural  effusions. There are a few mildly enlarged lymph nodes scattered  throughout the mediastinum that are quite likely benign reactive lymph  nodes.     There is diminished attenuation throughout the liver parenchyma  consistent with mild hepatic steatosis. No focal hepatic lesions are  identified. The spleen and pancreas  are unremarkable. Numerous tiny  bilateral simple renal cysts are present. An addition, both kidneys  appear somewhat small in size. The right kidney measures 8.7 cm in  length. The left kidney measures 7.7 cm in length. There is no  hydronephrosis or hydroureter. There is moderate diffuse thickening of  the left adrenal gland consistent with hyperplasia. The right adrenal  gland is markedly atrophic. The stomach and small bowel appear within  normal limits. There is moderate diffuse edema throughout the walls of  the colon that is most prominent in the rectosigmoid region where there  is marked edema and mucosal hyperenhancement. The majority of the colon  is contracted and contains no formed stool. The findings are consistent  with extensive colitis. A peritoneal dialysis catheter is in place and  is looped within the anterior aspect of the pelvis to the right of  midline. There is very tiny amount of free fluid adjacent to the right  lobe of the liver. The uterus is absent.        Impression:      1. Solitary small filling defect within a subsegmental branch of the  right lower lobe pulmonary artery suspicious for a small pulmonary  embolus. No other pulmonary emboli are identified.     2. Patchy areas of fairly extensive linear and reticular fibrotic  scarring throughout both lungs that also mild patchy groundglass  opacities within both lungs. Mildly enlarged mediastinal lymph nodes are  likely benign reactive lymph nodes.     4. CT findings consistent with extensive diffuse colitis as described.  Bowel wall edema is most prominent within the sigmoid colon and rectum.     5. Atrophic right adrenal gland. Diffusely thickened left adrenal gland  consistent with hyperplasia.     6. Atrophic kidneys containing multiple tiny cysts. Peritoneal dialysis  catheter in place within the pelvis.     This report was finalized on 4/9/2021 7:52 PM by Dr. Cecil Roldan M.D.       XR Chest 1 View [[id number]] Collected: 04/09/21 1656     Updated: 04/09/21 1700    Narrative:      PORTABLE CHEST 01/09/2021 AT 4:34 PM     CLINICAL HISTORY: Hypotension     There is minimal patchy bibasilar atelectasis. No acute focal  infiltrates are identified. There are no pleural effusions. The heart is  normal in size. A right internal jugular dialysis catheter is in place  in satisfactory position.     IMPRESSIONS: No evidence of acute disease within the chest.     This report was finalized on 4/9/2021 4:57 PM by Dr. Cecil Roldan M.D.                 ECG 12 Lead   Preliminary Result   HEART RATE= 109  bpm   RR Interval= 548  ms   ME Interval= 209  ms   P Horizontal Axis= 48  deg   P Front Axis= 20  deg   QRSD Interval= 98  ms   QT Interval= 323  ms   QRS Axis= 4  deg   T Wave Axis=   deg   - ABNORMAL ECG -   Sinus tachycardia   Prolonged ME interval   Probable left ventricular hypertrophy   Nonspecific T abnormalities, lateral leads   Electronically Signed By:    Date and Time of Study: 2021-04-09 17:46:29              Assessment/Plan     Active Hospital Problems    Diagnosis  POA   • **Colitis, Clostridium difficile [A04.72]  Yes   • History of tracheostomy [Z98.890]  Not Applicable   • Hypotension [I95.9]  Unknown   • Acute pulmonary embolism (CMS/HCC) [I26.99]  Unknown   • Gastroesophageal reflux disease [K21.9]  Yes   • Peritoneal dialysis status (CMS/HCC) [Z99.2]  Not Applicable   • Cardiomyopathy (CMS/HCC) [I42.9]  Yes   • Chronic systolic heart failure (CMS/HCC) [I50.22]  Yes   • 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 Lara is a 51 year  With history of covid 19, ESRD, CHF, cardiomyopathy, hypothyroidism, Gitelman syndrome who presents to the ED with hypotension from dialysis center.    Colitis/C-diff  -start on oral vancomycin  -consult infectious disease  - diet as tolerated    Pulmonary embolism  -started on heparin drip in the ed, will continue  -telemetry unit for monitoring   -o2 to keep sats above 93%    ESRD  -consult nephrology  -patient was doing peritoneal dialysis at home prior to recent lengthy hospitalization when she was started on hemodialysis.   -culture fluid from peritoneal dialysis catheter, gram stain and cell count  -patient was unable to receive dialysis today.  --bmp in am    Gitelman syndrome  -monitor bmp, check magnesium  -nephrology consulted      · I discussed the patient's findings and my recommendations with patient and ED provider.    VTE Prophylaxis - heparin drip.  Code Status - Full code.       FACUNDO Mckinney  [facility]  04/10/21  00:44 EDT      Electronically signed by Mauricio Mitchell MD at 04/10/21 0202          Emergency Department Notes      Jane Beckford, RN at 04/09/21 1529        EMS called out for SOA, on arrival, pt not SOA but symptomatic  hypotension. Pt recent hospitalization for COVID. Pt was decanulated and stoma may have infection. PT was complaining of dizziness on scene. A&O x 4 in triage, in no apparent distress. Pt had a nursing home stay but is currently residing at home.   Patient wearing mask during interaction. Universal precautions plus mask, goggles utilized by this RN    Electronically signed by Jane Beckford RN at 04/09/21 1532     Jane Beckford RN at 04/09/21 1547        Cande with [facility] called.When they went to check her PD dialysis cather, it was noted to have pus from the site and concerned there is an infection. They believe it was not used or looked at during her stay at [facility]. Pt has a alvarado pd dialysis tubing. Nurse was able to drain it and flush it today but patient is having pain at the site and with use.      Jane Beckford RN  04/09/21 1551      Electronically signed by Jane Beckford RN at 04/09/21 1551     Marcos Blackman MD at 04/09/21 1624      Procedure Orders    1. Critical Care [[id number]] ordered by Marcos Blackman MD                EMERGENCY DEPARTMENT ENCOUNTER    Room Number:  12/12  Date seen:  4/9/2021  PCP: Jet Manuel MD  Historian: Patient      HPI:  Chief Complaint: Weakness, lightheaded  A complete HPI/ROS/PMH/PSH/SH/FH are unobtainable due to: Nothing  Context: Ambar Lara is a 51 y.o. female who presents to the ED c/o weakness and lightheadedness onset this morning.  Patient reports she was just discharged from [facility] yesterday.  She reports that she was diagnosed with Covid in February and then was in a coma for a month.  She also has a history of ESRD and has been on peritoneal dialysis for the last 3 years.  Since February 13, she has been getting hemodialysis, last got dialysis yesterday.  She also reports that she has been having chronic diarrhea and she believes that she may have had C.  difficile but she is not sure.  She denies chest pain currently but has had some shortness of air today.  She believes that she was getting Lovenox injections while she was at [facility].  She has a tracheostomy but has been decannulated.  She apparently was told by dialysis nurse that she may have an infection involving her peritoneal dialysis catheter.            PAST MEDICAL HISTORY  Active Ambulatory Problems     Diagnosis Date Noted   • No Active Ambulatory Problems     Resolved Ambulatory Problems     Diagnosis Date Noted   • No Resolved Ambulatory Problems     Past Medical History:   Diagnosis Date   • Dialysis patient (CMS/[facility])    • Renal disorder          PAST SURGICAL HISTORY  Past Surgical History:   Procedure Laterality Date   • HYSTERECTOMY     • TRACHEOSTOMY           FAMILY HISTORY  No family history on file.      SOCIAL HISTORY  Social History     Socioeconomic History   • Marital status:      Spouse name: Not on file   • Number of children: Not on file   • Years of education: Not on file   • Highest education level: Not on file         ALLERGIES  Penicillins        REVIEW OF SYSTEMS  Review of Systems   Review of all 14 systems is negative other than stated in the HPI above.      PHYSICAL EXAM  ED Triage Vitals   Temp Heart Rate Resp BP SpO2   04/09/21 1533 04/09/21 1533 04/09/21 1533 04/09/21 1533 04/09/21 1533   98.5 °F (36.9 °C) 116 16 92/58 96 %      Temp src Heart Rate Source Patient Position BP Location FiO2 (%)   -- -- 04/09/21 1609 -- --     Sitting           GENERAL: Awake and alert, no acute distress  HENT: nares patent  EYES: no scleral icterus, pupils 3 mm reactive bilaterally  CV: regular rhythm, tachycardic, dialysis catheter right anterior chest wall  RESPIRATORY: normal effort, lungs clear to auscultation bilaterally.  Tracheostomy present with occlusive bandage in place.  ABDOMEN: soft, nondistended, nontender throughout, PD catheter in place with no surrounding erythema, no  drainage around the tubing.  MUSCULOSKELETAL: no deformity  NEURO: alert, moves all extremities, follows commands  PSYCH:  calm, cooperative  SKIN: warm, dry    Vital signs and nursing notes reviewed.          LAB RESULTS  Recent Results (from the past 24 hour(s))   Light Blue Top    Collection Time: 04/09/21  4:31 PM   Result Value Ref Range    Extra Tube hold for add-on    Green Top (Gel)    Collection Time: 04/09/21  4:31 PM   Result Value Ref Range    Extra Tube Hold for add-ons.    Lavender Top    Collection Time: 04/09/21  4:31 PM   Result Value Ref Range    Extra Tube hold for add-on    Gold Top - SST    Collection Time: 04/09/21  4:31 PM   Result Value Ref Range    Extra Tube Hold for add-ons.    Comprehensive Metabolic Panel    Collection Time: 04/09/21  4:31 PM    Specimen: Blood   Result Value Ref Range    Glucose 106 (H) 65 - 99 mg/dL    BUN 28 (H) 6 - 20 mg/dL    Creatinine 4.19 (H) 0.57 - 1.00 mg/dL    Sodium 133 (L) 136 - 145 mmol/L    Potassium 3.3 (L) 3.5 - 5.2 mmol/L    Chloride 96 (L) 98 - 107 mmol/L    CO2 21.4 (L) 22.0 - 29.0 mmol/L    Calcium 8.6 8.6 - 10.5 mg/dL    Total Protein 6.9 6.0 - 8.5 g/dL    Albumin 3.10 (L) 3.50 - 5.20 g/dL    ALT (SGPT) 15 1 - 33 U/L    AST (SGOT) 12 1 - 32 U/L    Alkaline Phosphatase 163 (H) 39 - 117 U/L    Total Bilirubin 0.2 0.0 - 1.2 mg/dL    eGFR Non African Amer 11 (L) >60 mL/min/1.73    eGFR  African Amer      Globulin 3.8 gm/dL    A/G Ratio 0.8 g/dL    BUN/Creatinine Ratio 6.7 (L) 7.0 - 25.0    Anion Gap 15.6 (H) 5.0 - 15.0 mmol/L   Troponin    Collection Time: 04/09/21  4:31 PM    Specimen: Blood   Result Value Ref Range    Troponin T 0.030 0.000 - 0.030 ng/mL   BNP    Collection Time: 04/09/21  4:31 PM    Specimen: Blood   Result Value Ref Range    proBNP 4,129.0 (H) 0.0 - 450.0 pg/mL   D-dimer, Quantitative    Collection Time: 04/09/21  4:31 PM    Specimen: Blood   Result Value Ref Range    D-Dimer, Quantitative 2.22 (H) 0.00 - 0.49 MCGFEU/mL   Lactic  Acid, Plasma    Collection Time: 04/09/21  4:31 PM    Specimen: Blood   Result Value Ref Range    Lactate 1.8 0.5 - 2.0 mmol/L   Procalcitonin    Collection Time: 04/09/21  4:31 PM    Specimen: Blood   Result Value Ref Range    Procalcitonin 3.83 (H) 0.00 - 0.25 ng/mL   CBC Auto Differential    Collection Time: 04/09/21  4:31 PM    Specimen: Blood   Result Value Ref Range    WBC 23.03 (H) 3.40 - 10.80 10*3/mm3    RBC 3.37 (L) 3.77 - 5.28 10*6/mm3    Hemoglobin 10.3 (L) 12.0 - 15.9 g/dL    Hematocrit 31.2 (L) 34.0 - 46.6 %    MCV 92.6 79.0 - 97.0 fL    MCH 30.6 26.6 - 33.0 pg    MCHC 33.0 31.5 - 35.7 g/dL    RDW 14.5 12.3 - 15.4 %    RDW-SD 48.1 37.0 - 54.0 fl    MPV 9.5 6.0 - 12.0 fL    Platelets 323 140 - 450 10*3/mm3    nRBC 0.0 0.0 - 0.2 /100 WBC   Manual Differential    Collection Time: 04/09/21  4:31 PM    Specimen: Blood   Result Value Ref Range    Neutrophil % 70.4 42.7 - 76.0 %    Lymphocyte % 13.3 (L) 19.6 - 45.3 %    Monocyte % 8.2 5.0 - 12.0 %    Basophil % 2.0 (H) 0.0 - 1.5 %    Other Cells % 6.1 (H) 0.0 - 0.0 %    Neutrophils Absolute 16.21 (H) 1.70 - 7.00 10*3/mm3    Lymphocytes Absolute 3.06 0.70 - 3.10 10*3/mm3    Monocytes Absolute 1.89 (H) 0.10 - 0.90 10*3/mm3    Basophils Absolute 0.46 (H) 0.00 - 0.20 10*3/mm3    RBC Morphology Normal Normal    WBC Morphology Normal Normal    Platelet Morphology Normal Normal   Manual Differential    Collection Time: 04/09/21  4:31 PM    Specimen: Blood   Result Value Ref Range    Neutrophil % 71.0 42.7 - 76.0 %    Lymphocyte % 24.0 19.6 - 45.3 %    Monocyte % 4.0 (L) 5.0 - 12.0 %    Basophil % 1.0 0.0 - 1.5 %    Neutrophils Absolute 16.35 (H) 1.70 - 7.00 10*3/mm3    Lymphocytes Absolute 5.53 (H) 0.70 - 3.10 10*3/mm3    Monocytes Absolute 0.92 (H) 0.10 - 0.90 10*3/mm3    Basophils Absolute 0.23 (H) 0.00 - 0.20 10*3/mm3    RBC Morphology Normal Normal    WBC Morphology Normal Normal    Platelet Morphology Normal Normal   Gastrointestinal Panel, PCR - Stool, Per  Rectum    Collection Time: 04/09/21  5:22 PM    Specimen: Per Rectum; Stool   Result Value Ref Range    Campylobacter Not Detected Not Detected    Plesiomonas shigelloides Not Detected Not Detected    Salmonella Not Detected Not Detected    Vibrio Not Detected Not Detected    Vibrio cholerae Not Detected Not Detected    Yersinia enterocolitica Not Detected Not Detected    Enteroaggregative E. coli (EAEC) Not Detected Not Detected    Enteropathogenic E. coli (EPEC) Not Detected Not Detected    Enterotoxigenic E. coli (ETEC) lt/st Not Detected Not Detected    Shiga-like toxin-producing E. coli (STEC) stx1/stx2 Not Detected Not Detected    Shigella/Enteroinvasive E. coli (EIEC) Not Detected Not Detected    Cryptosporidium Not Detected Not Detected    Cyclospora cayetanensis Not Detected Not Detected    Entamoeba histolytica Not Detected Not Detected    Giardia lamblia Not Detected Not Detected    Adenovirus F40/41 Not Detected Not Detected    Astrovirus Not Detected Not Detected    Norovirus GI/GII Not Detected Not Detected    Rotavirus A Not Detected Not Detected    Sapovirus (I, II, IV or V) Not Detected Not Detected   Clostridium Difficile Toxin, PCR - Stool, Per Rectum    Collection Time: 04/09/21  5:22 PM    Specimen: Per Rectum; Stool   Result Value Ref Range    C. Difficile Toxins by PCR Positive (A) Negative   ECG 12 Lead    Collection Time: 04/09/21  5:46 PM   Result Value Ref Range    QT Interval 323 ms   Protime-INR    Collection Time: 04/09/21  8:17 PM    Specimen: Blood   Result Value Ref Range    Protime 17.7 (H) 11.7 - 14.2 Seconds    INR 1.48 (H) 0.90 - 1.10   aPTT    Collection Time: 04/09/21  8:17 PM    Specimen: Blood   Result Value Ref Range    PTT 45.2 (H) 22.7 - 35.4 seconds   CBC Auto Differential    Collection Time: 04/09/21  8:17 PM    Specimen: Blood   Result Value Ref Range    WBC 17.33 (H) 3.40 - 10.80 10*3/mm3    RBC 3.16 (L) 3.77 - 5.28 10*6/mm3    Hemoglobin 9.7 (L) 12.0 - 15.9 g/dL     Hematocrit 29.7 (L) 34.0 - 46.6 %    MCV 94.0 79.0 - 97.0 fL    MCH 30.7 26.6 - 33.0 pg    MCHC 32.7 31.5 - 35.7 g/dL    RDW 14.5 12.3 - 15.4 %    RDW-SD 49.8 37.0 - 54.0 fl    MPV 9.3 6.0 - 12.0 fL    Platelets 287 140 - 450 10*3/mm3    nRBC 0.0 0.0 - 0.2 /100 WBC   Manual Differential    Collection Time: 04/09/21  8:17 PM    Specimen: Blood   Result Value Ref Range    Neutrophil % 84.5 (H) 42.7 - 76.0 %    Lymphocyte % 8.2 (L) 19.6 - 45.3 %    Monocyte % 3.1 (L) 5.0 - 12.0 %    Basophil % 1.0 0.0 - 1.5 %    Metamyelocyte % 1.0 (H) 0.0 - 0.0 %    Myelocyte % 2.1 (H) 0.0 - 0.0 %    Neutrophils Absolute 14.64 (H) 1.70 - 7.00 10*3/mm3    Lymphocytes Absolute 1.42 0.70 - 3.10 10*3/mm3    Monocytes Absolute 0.54 0.10 - 0.90 10*3/mm3    Basophils Absolute 0.17 0.00 - 0.20 10*3/mm3    RBC Morphology Normal Normal    WBC Morphology Normal Normal    Platelet Morphology Normal Normal       Ordered the above labs and reviewed the results.        RADIOLOGY  XR Chest 1 View    Result Date: 4/9/2021  PORTABLE CHEST 01/09/2021 AT 4:34 PM  CLINICAL HISTORY: Hypotension  There is minimal patchy bibasilar atelectasis. No acute focal infiltrates are identified. There are no pleural effusions. The heart is normal in size. A right internal jugular dialysis catheter is in place in satisfactory position.  IMPRESSIONS: No evidence of acute disease within the chest.  This report was finalized on 4/9/2021 4:57 PM by Dr. Cecil Roldan M.D.      CT Angiogram Chest, CT Abdomen Pelvis With Contrast    Result Date: 4/9/2021  CT ANGIOGRAM CHEST-, CT ABDOMEN PELVIS W CONTRAST-  CLINICAL HISTORY: Tachycardia. Dyspnea. Elevated d-dimer. Abdominal pain. Leukocytosis. History of severe COVID 19 infection requiring intubation in the recent past.  TECHNIQUE: Spiral CT images were obtained through the chest during rapid IV injection of contrast and were reconstructed in 2 mm thick axial slices. Multiple coronal and sagittal and 3-D reconstructions were  produced. Subsequently, spiral CT images were obtained through the abdomen and pelvis with IV contrast.  Radiation dose reduction techniques were utilized, including automated exposure control and exposure modulation based on body size.  COMPARISON: None  FINDINGS: A patent tracheostomy is noted. The main pulmonary arteries and their lobar and segmental branches are fairly well opacified. There is a small filling defect within a subsegmental branch of the right lower lobe pulmonary artery in the medial aspect of the right lung base suspicious for a pulmonary embolus. No other filling defects are identified. The thoracic aorta was also fairly well opacified and is unremarkable. There is no evidence of aneurysm or dissection. Lung window images demonstrate moderately extensive coarse reticular and linear opacities distributed throughout both lungs compatible with fibrotic scarring due to previous pneumonia. Patchy groundglass opacities are also present within both lungs indicating at least some degree of residual acute inflammation.. There are no focal areas of dense consolidation. There is no bronchiectasis. There are no discrete lung masses. No emphysematous changes are evident. There are no pleural effusions. There are a few mildly enlarged lymph nodes scattered throughout the mediastinum that are quite likely benign reactive lymph nodes.  There is diminished attenuation throughout the liver parenchyma consistent with mild hepatic steatosis. No focal hepatic lesions are identified. The spleen and pancreas  are unremarkable. Numerous tiny bilateral simple renal cysts are present. An addition, both kidneys appear somewhat small in size. The right kidney measures 8.7 cm in length. The left kidney measures 7.7 cm in length. There is no hydronephrosis or hydroureter. There is moderate diffuse thickening of the left adrenal gland consistent with hyperplasia. The right adrenal gland is markedly atrophic. The stomach and  small bowel appear within normal limits. There is moderate diffuse edema throughout the walls of the colon that is most prominent in the rectosigmoid region where there is marked edema and mucosal hyperenhancement. The majority of the colon is contracted and contains no formed stool. The findings are consistent with extensive colitis. A peritoneal dialysis catheter is in place and is looped within the anterior aspect of the pelvis to the right of midline. There is very tiny amount of free fluid adjacent to the right lobe of the liver. The uterus is absent.      1. Solitary small filling defect within a subsegmental branch of the right lower lobe pulmonary artery suspicious for a small pulmonary embolus. No other pulmonary emboli are identified.  2. Patchy areas of fairly extensive linear and reticular fibrotic scarring throughout both lungs that also mild patchy groundglass opacities within both lungs. Mildly enlarged mediastinal lymph nodes are likely benign reactive lymph nodes.  4. CT findings consistent with extensive diffuse colitis as described. Bowel wall edema is most prominent within the sigmoid colon and rectum.  5. Atrophic right adrenal gland. Diffusely thickened left adrenal gland consistent with hyperplasia.  6. Atrophic kidneys containing multiple tiny cysts. Peritoneal dialysis catheter in place within the pelvis.  This report was finalized on 4/9/2021 7:52 PM by Dr. Cecil Roldan M.D.        Ordered the above noted radiological studies. Reviewed by me in PACS.            PROCEDURES  Critical Care  Performed by: Marcos Blackman MD  Authorized by: Marcos Blackman MD     Critical care provider statement:     Critical care time (minutes):  30    Critical care was necessary to treat or prevent imminent or life-threatening deterioration of the following conditions:  Circulatory failure (hypotension, hypovolemia, PE)    Critical care was time spent personally by me on the following  activities:  Discussions with consultants, evaluation of patient's response to treatment, examination of patient, obtaining history from patient or surrogate, ordering and review of radiographic studies, ordering and review of laboratory studies, ordering and performing treatments and interventions, pulse oximetry, re-evaluation of patient's condition and development of treatment plan with patient or surrogate                  MEDICATIONS GIVEN IN ER  Medications   sodium chloride 0.9 % flush 10 mL (has no administration in time range)   Pharmacy Consult (has no administration in time range)   vancomycin oral solution reconstituted 125 mg (125 mg Oral Given 4/9/21 1920)   heparin 72108 units/250 mL (100 units/mL) in 0.45 % NaCl infusion (18 Units/kg/hr × 76.6 kg Intravenous New Bag 4/9/21 2022)   heparin (porcine) 5000 UNIT/ML injection 3,100-6,100 Units (has no administration in time range)   sodium chloride 0.9 % bolus 1,000 mL (0 mL Intravenous Stopped 4/9/21 1625)   sodium chloride 0.9 % bolus 1,000 mL (1,000 mL Intravenous New Bag 4/9/21 1919)   iopamidol (ISOVUE-370) 76 % injection 100 mL (100 mL Intravenous Given by Other 4/9/21 1839)   heparin (porcine) 5000 UNIT/ML injection 6,100 Units (6,100 Units Intravenous Given 4/9/21 2022)                   MEDICAL DECISION MAKING, PROGRESS, and CONSULTS    All labs have been independently reviewed by me.  All radiology studies have been reviewed by me and discussed with radiologist dictating the report.   EKG's independently viewed and interpreted by me.  Discussion below represents my analysis of pertinent findings related to patient's condition, differential diagnosis, treatment plan and final disposition.    ED Course as of Apr 09 2208 Fri Apr 09, 2021 1719 Creatinine(!): 4.19 [JR]   1719 Potassium(!): 3.3 [JR]   1719 Troponin T: 0.030 [JR]   1719 Lactate: 1.8 [JR]   1719 WBC(!): 23.03 [JR]   1720 D-Dimer, Quant(!): 2.22 [JR]   1759 EKG          EKG time:  1746  Rhythm/Rate: Sinus tach, 109  P waves and NM: Borderline first-degree AV block  QRS, axis: Normal axis  ST and T waves: No acute ischemic changes    Interpreted Contemporaneously by me, independently viewed            [JR]   1825 Clostridium difficile (toxin A/B)(!): Positive [JR]   1958 I discussed the CTA chest and CT abdomen with Dr. Roldan, radiologist.  He reports findings of colitis as well as a single pulmonary embolus in the right lower lobe.    [JR]   1959 I have ordered a heparin infusion for the pulmonary embolus.  There is no evidence of RV strain on CT.  I think that her hypotension and tachycardia are related to hypovolemia in the setting of her C. difficile colitis rather than obstructive shock.    [JR]   2101 Discussed with FACUNDO Bonner for [facility], who agrees to admit on behalf of of Dr. Mitchell.    [JR]      ED Course User Index  [JR] Marcos Blackman MD              I wore a mask, face shield, and gloves during this patient encounter.  Patient also wearing a surgical mask.  Hand hygeine performed before and after seeing the patient.    DIAGNOSIS  Final diagnoses:   Colitis, Clostridium difficile   Single subsegmental pulmonary embolism without acute cor pulmonale (CMS/HCC)   Hypovolemia   ESRD on hemodialysis (CMS/HCC)         DISPOSITION  ADMIT            Latest Documented Vital Signs:  As of 22:08 EDT  BP- 107/67 HR- 113 Temp- 98.5 °F (36.9 °C) O2 sat- 93%        --    Please note that portions of this were completed with a voice recognition program.          Marcos Blackman MD  04/09/21 2208      Electronically signed by Marcos Blackman MD at 04/09/21 2208     Ambar Arellano, RN at 04/09/21 1803        Roland son [phone]     Ambar Arellano, RN  04/09/21 1803      Electronically signed by Ambar Arellano RN at 04/09/21 1803     Ana Johnson RN at 04/09/21 2038        Andres Watkins states he will be designated visitor     Ana Johnson RN  04/09/21  2039      Electronically signed by Ana Johnson RN at 04/09/21 2039     Ambar Arellano RN at 04/09/21 2245        Pt has been changed a total of 6 times thus far, 2 of which have been full bed/linen changes.    Pt reports needing to be changed again due to large watery bowel movement. Will change/clean pt and then transport upstairs.      Ambar Arellano RN  04/09/21 2308      Electronically signed by Ambar Arellano RN at 04/09/21 2308     Ambar Arellano RN at 04/09/21 2308        Report given to ER Ambar Lion RN, RN  04/09/21 2308      Electronically signed by Ambar Arellano RN at 04/09/21 2308     Mauricio Carrero RN at 04/09/21 2315        Pt and bed cleaned and new bedding placed by EDT and this RN. PPE worn by all individuals.      Mauricio Carrero RN  04/09/21 2316      Electronically signed by Mauricio Carrero RN at 04/09/21 2316       {Outbreak/Travel/Exposure Documentation......;  Question Available Choices Patient Response   COVID-19 Outbreak Screen:  Do you currently have a new onset of the following symptoms?        Fever/Chills, Cough, Shortness of air, Loss of taste or smell, No, Unknown  (!) Loss of taste or smell (04/09/21 2358)   COVID-19 Outbreak Screen: In the last 14 days, have you had contact with anyone who is ill, has show any of the symptoms listed above and/or has been diagnosis with the 2019 Novel Coronavirus? This includes any immediate household members but excludes any patients with whom you have been in contact within your normal work duties wearing proper PPE, if you are a healthcare worker.  Yes, No, Unknown              No (04/09/21 2358)   COVID-19 Outbreak Screen: Who was notified? Free text (not recorded)   Ebola Screening Outbreak Screen: Have you traveled to the [place] or [place] within the past 21 days?  Yes, No, Unknown No (04/09/21 2358)   Ebola Screening Outbreak Screen: Do you have ANY of the following symptoms:  Fever/Chills, Vomiting, Diarrhea, Fatigue, Headache, Muscle pain, Unexplained bleeding, Abdominal (stomach) pain, No, Unknown (not recorded)   Ebola Screening Outbreak Screen: Name of Person notified Free text (not recorded)   Travel Screen: Have you traveled in the last month? If so, to what country have you traveled? If [place] what state? Yes, No, Unknown  List of all countries  List of all States No (04/09/21 2359)  (not recorded)  (not recorded)   Infection Risk: Do you currently have the following symptoms?  (If cough is selected, the Tuberculosis Screen is performed.) Cough, Fever, Rash, No No (04/09/21 2359)   Tuberculosis Screen: Do you have any of the following Tuberculosis Risks?  · Have you lived or spent time with anyone who had or may have TB?  · Have you lived in or visited any of the following areas for more than one month: [place], [place], [place], [place] or [place], the [place] or [place]?  · Do you have HIV/AIDS?  · Have you lived in or worked in a nursing home, homeless shelter, correctional facility, or substance abuse treatment facility?   · No    If Yes do you have any of the following symptoms? Yes responses display to the right    If Yes, symptoms listed are:  Cough greater than or equal to 3 weeks, Loss of appetite, Unexplained weight loss, Night sweats, Bloody sputum or hemoptysis, Hoarseness, Fever, Fatigue, Chest pain, No (not recorded)  (not recorded)   Exposure Screen: Have you been exposed to any of these contagious diseases in the last month? Measles, Chickenpox, Meningitis, Pertussis, Whooping Cough, No No (04/09/21 2359)     Vital Signs (last day)     Date/Time   Temp   Temp src   Pulse   Resp   BP   Patient Position   SpO2    04/13/21 0948   98.1 (36.7)   Oral   105   16   96/67   Lying   95    04/13/21 0617   97 (36.1)   Oral   97   16   103/70   Lying   97    04/13/21 0356   96.8 (36)   Oral   92   16   107/70   Lying   95    04/12/21 2105   97.6 (36.4)   Oral   97   16    98/63   Lying   96          Lines, Drains & Airways    Active LDAs     Name:   Placement date:   Placement time:   Site:   Days:    Peripheral IV 04/09/21 1529 Left Antecubital   04/09/21    1529    Antecubital   3    Peripheral IV 04/10/21 1000 Posterior;Left Hand   04/10/21    1000    Hand   3    Peritoneal Dialysis Catheter Right lower abdomen   --    --    Right lower abdomen       Surgical Airway   --    --    --       Hemodialysis Cath Double   --    --    Subclavian                     Medication Administration Report for Ambar Lara JESSE as of 04/13/21 1151    Legend:    Given Hold Not Given Due Canceled Entry Other Actions    Time Time (Time) Time  Time-Action       Discontinued     Completed     Future     MAR Hold     Linked           Medications 04/11/21 04/12/21 04/13/21    calcium acetate (PHOS BINDER)) capsule 1,334 mg  Dose: 1,334 mg  Freq: 3 Times Daily With Meals Route: PO  Start: 04/12/21 1800    Admin Instructions:   Take with food.      1953          (3711) [C]     1200     1800           escitalopram (LEXAPRO) tablet 20 mg  Dose: 20 mg  Freq: Daily Route: PO  Start: 04/10/21 0900    Admin Instructions:   Caution: Look alike/sound alike drug alert.     0813          0906          1300             levothyroxine (SYNTHROID, LEVOTHROID) tablet 25 mcg  Dose: 25 mcg  Freq: Every Early Morning Route: PO  Start: 04/10/21 0600    0643          0608          0633             montelukast (SINGULAIR) tablet 5 mg  Dose: 5 mg  Freq: Nightly Route: PO  Start: 04/10/21 0230    2144          2000 2100             sodium bicarbonate tablet 650 mg  Dose: 650 mg  Freq: 3 Times Daily Route: PO  Start: 04/10/21 0900    0813     1553     2143        0906     1729     2000        1300     1600     2100           sodium chloride 0.9 % flush 10 mL  Dose: 10 mL  Freq: Every 12 Hours Scheduled Route: IV  Start: 04/10/21 0130    0814     2144         0907     2000         (0922) [C]     2100             vancomycin oral solution reconstituted 125 mg  Dose: 125 mg  Freq: Every 6 Hours Scheduled Route: PO  Indications of Use: CLOSTRIDIOIDES DIFFICILE INFECTION  Start: 04/10/21 0130   End: 04/19/21 5049    Admin Instructions:   Medication Stored in Refrigerator     0058     0643     1140       1715          0021     0608     1202       1729          0038     0633     1200       1800               ,   Medication Administration Report for Ambar Lara as of 04/13/21 1151    Legend:    Given Hold Not Given Due Canceled Entry Other Actions    Time Time (Time) Time  Time-Action       Discontinued     Completed     Future     MAR Hold     Linked           Medications 04/11/21 04/12/21 04/13/21    heparin 20319 units/250 mL (100 units/mL) in 0.45 % NaCl infusion  Rate: 13.3 mL/hr Dose: 18 Units/kg/hr  Weight Dosing Info: 73.9 kg  Freq: Titrated Route: IV  Indications of Use: DVT/PE (active thrombosis)  Start: 04/11/21 2000    Admin Instructions:   Weight Based Heparin Nomogram: VTE / PE: Initial Heparin Infusion 18 units/kg/hr    aPTT Less Than 60: Bolus 80 units/kg & Increase Dose By 4 units/kg/hr.  aPTT 60-70: Bolus 40 units/kg & Increase Dose By 2 units/kg/hr.  aPTT 71-95: No Bolus, No Dose Change  aPTT : No Bolus, Decrease Dose By 2 units/kg/hr.  aPTT Greater Than 115: No Bolus. Hold Infusion For 1 hour.  Decrease Dose By 3 units/kg/hr. Repeat aPTT 6 Hours After Restarted.  If aPTT Remains Greater Than 115 Stop Heparin Drip & Contact Provider.  RN to release aPTT order 6 hours after heparin bolus 6 hours after any heparin rate change     1668 8760     0737     1207 5187 1196 0615            Pharmacy Consult  Freq: Continuous PRN Route: XX  PRN Reason: Consult  Start: 04/10/21 0041    Order specific questions:   Consult for Change proton pump inhibitors (PPIs) to famotidine unless documented or history of GI bleed or to discontinue antiperistalic agents and stool  softeners/laxatives.            Pharmacy to dose warfarin  Freq: Continuous PRN Route: XX  PRN Reason: Consult  Indications of Use: DVT/PE (active thrombosis)  Start: 04/11/21 1443    Admin Instructions:   If INR Greater Than Target, Contact Pharmacy Prior to Giving Dose.  Acutely Hazardous. Waste BOTH Residual Medication and/or Empty Package. .     Order specific questions:   Target INR 2 - 3           Discontinued Medications  Medications 04/11/21 04/12/21 04/13/21       heparin 64026 units/250 mL (100 units/mL) in 0.45 % NaCl infusion  Rate: 13.3 mL/hr Dose: 18 Units/kg/hr  Weight Dosing Info: 73.9 kg  Freq: Titrated Route: IV  Indications of Use: DVT/PE (active thrombosis)  Start: 04/11/21 1545   End: 04/11/21 1618    Admin Instructions:   Weight Based Heparin Nomogram: VTE / PE: Initial Heparin Infusion 18 units/kg/hr    aPTT Less Than 60: Bolus 80 units/kg & Increase Dose By 4 units/kg/hr.  aPTT 60-70: Bolus 40 units/kg & Increase Dose By 2 units/kg/hr.  aPTT 71-95: No Bolus, No Dose Change  aPTT : No Bolus, Decrease Dose By 2 units/kg/hr.  aPTT Greater Than 115: No Bolus. Hold Infusion For 1 hour.  Decrease Dose By 3 units/kg/hr. Repeat aPTT 6 Hours After Restarted.  If aPTT Remains Greater Than 115 Stop Heparin Drip & Contact Provider.  RN to release aPTT order 6 hours after heparin bolus 6 hours after any heparin rate change     (1706) [C]               heparin 12888 units/250 mL (100 units/mL) in 0.45 % NaCl infusion  Rate: 13.78 mL/hr Dose: 18 Units/kg/hr  Weight Dosing Info: 76.6 kg  Freq: Titrated Route: IV  Indications of Use: DVT/PE (active thrombosis)  Start: 04/09/21 2000   End: 04/10/21 1933    Admin Instructions:   Weight Based Heparin Nomogram: VTE / PE: Initial Heparin Infusion 18 units/kg/hr    aPTT Less Than 60: Bolus 80 units/kg & Increase Dose By 4 units/kg/hr.  aPTT 60-70: Bolus 40 units/kg & Increase Dose By 2 units/kg/hr.  aPTT 71-95: No Bolus, No Dose Change  aPTT : No Bolus,  Decrease Dose By 2 units/kg/hr.  aPTT Greater Than 115: No Bolus. Hold Infusion For 1 hour.  Decrease Dose By 3 units/kg/hr. Repeat aPTT 6 Hours After Restarted.  If aPTT Remains Greater Than 115 Stop Heparin Drip & Contact Provider.  RN to release aPTT order 6 hours after heparin bolus 6 hours after any heparin rate change           lactated ringers infusion  Rate: 75 mL/hr Dose: 75 mL/hr  Freq: Continuous Route: IV  Start: 04/11/21 1545   End: 04/12/21 0504    1705               lactated ringers infusion  Rate: 75 mL/hr Dose: 75 mL/hr  Freq: Continuous Route: IV  Start: 04/10/21 1400   End: 04/11/21 0322    0330               Pharmacy Consult  Freq: Continuous PRN Route: XX  PRN Reason: Consult  Start: 04/09/21 1825   End: 04/10/21 1122    Order specific questions:   Consult for Change proton pump inhibitors (PPIs) to famotidine unless documented or history of GI bleed or to discontinue antiperistalic agents and stool softeners/laxatives.            Pharmacy Consult - Pharmacy to dose  Freq: Continuous PRN Route: XX  PRN Reason: Consult  Start: 04/11/21 1443   End: 04/11/21 1451    Order specific questions:   Pharmacy to Dose: Coumadin  Indication of Use PE            Pharmacy Consult - Pharmacy to dose  Freq: Continuous PRN Route: XX  PRN Reason: Consult  Start: 04/10/21 1851   End: 04/10/21 1933    Order specific questions:   Pharmacy to Dose: Eliquis  Indication of Use Pulmonary embolus            Pharmacy to dose warfarin  Freq: Continuous PRN Route: XX  PRN Reason: Consult  Indications of Use: DVT/PE (active thrombosis)  Start: 04/11/21 1444   End: 04/11/21 1451    Admin Instructions:   If INR Greater Than Target, Contact Pharmacy Prior to Giving Dose.  Acutely Hazardous. Waste BOTH Residual Medication and/or Empty Package. .     Order specific questions:   Target INR 2 - 3                  and   Medication Administration Report for Ambar Lara as of 04/13/21 1151    Legend:    Given Hold Not Given Due  Canceled Entry Other Actions    Time Time (Time) Time  Time-Action       Discontinued     Completed     Future     MAR Hold     Linked           Medications 04/11/21 04/12/21 04/13/21    acetaminophen (TYLENOL) tablet 650 mg  Dose: 650 mg  Freq: Every 4 Hours PRN Route: PO  PRN Reason: Mild Pain   Start: 04/10/21 0039    Admin Instructions:   Do not exceed 4 grams of acetaminophen in a 24 hr period. Max dose of 2gm for AST/ALT greater than 120 units/L      If given for pain, use the following pain scale:   Mild Pain = Pain Score of 1-3, CPOT 1-2  Moderate Pain = Pain Score of 4-6, CPOT 3-4  Severe Pain = Pain Score of 7-10, CPOT 5-8     0114 0538 [id number] 3064             Or  acetaminophen (TYLENOL) 160 MG/5ML solution 650 mg  Dose: 650 mg  Freq: Every 4 Hours PRN Route: PO  PRN Reason: Mild Pain   Start: 04/10/21 0039    Admin Instructions:   Do not exceed 4 grams of acetaminophen in a 24 hr period. Max dose of 2gm for AST/ALT greater than 120 units/L      If given for pain, use the following pain scale:   Mild Pain = Pain Score of 1-3, CPOT 1-2  Moderate Pain = Pain Score of 4-6, CPOT 3-4  Severe Pain = Pain Score of 7-10, CPOT 5-8                                    Or  acetaminophen (TYLENOL) suppository 650 mg  Dose: 650 mg  Freq: Every 4 Hours PRN Route: RE  PRN Reason: Mild Pain   Start: 04/10/21 0039    Admin Instructions:   Do not exceed 4 grams of acetaminophen in a 24 hr period. Max dose of 2gm for AST/ALT greater than 120 units/L      If given for pain, use the following pain scale:   Mild Pain = Pain Score of 1-3, CPOT 1-2  Moderate Pain = Pain Score of 4-6, CPOT 3-4  Severe Pain = Pain Score of 7-10, CPOT 5-8                                     diphenhydrAMINE (BENADRYL) capsule 25 mg  Dose: 25 mg  Freq: Every 6 Hours PRN Route: PO  PRN Reasons: Itching,Sleep  Start: 04/10/21 0116    Admin Instructions:   Caution: Look alike/sound alike drug alert. This med may be ordered in other forms and  routes. Before giving verify the last time the drug was given by any route/form.       1205 [C]     1954 1954              heparin (porcine) 5000 UNIT/ML injection 3,000-5,900 Units  Dose: 40-80 Units/kg  Weight Dosing Info: 73.9 kg  Freq: Every 6 Hours PRN Route: IV  PRN Comment: Per Heparin Nomogram  Indications of Use: DVT/PE (active thrombosis)  Start: 04/11/21 1443    Admin Instructions:   **Max Dose of 10,000 units** Bolus for VTE / PE:  PTT Less Than 60 sec, Administer 80 units/kg Bolus   PTT 60 - 70 sec, Administer 40 units/kg Bolus      0439              heparin (porcine) injection 3,800 Units  Dose: 3,800 Units  Freq: As Needed Route: IK  PRN Comment: ic dwell post hd  Indications Comment: ic dwell  Start: 04/11/21 0953    1117 [C]               nitroglycerin (NITROSTAT) SL tablet 0.4 mg  Dose: 0.4 mg  Freq: Every 5 Minutes PRN Route: SL  PRN Reason: Chest Pain  PRN Comment: Only if SBP Greater Than 100  Start: 04/10/21 0039    Admin Instructions:   If Pain Unrelieved After 3 Doses Notify MD           ondansetron (ZOFRAN) injection 4 mg  Dose: 4 mg  Freq: Every 6 Hours PRN Route: IV  PRN Reasons: Nausea,Vomiting  Start: 04/10/21 0039    Admin Instructions:   If BOTH ondansetron (ZOFRAN) and promethazine (PHENERGAN) are ordered use ondansetron first and THEN promethazine IF ondansetron is ineffective.           Pharmacy Consult  Freq: Continuous PRN Route: XX  PRN Reason: Consult  Start: 04/10/21 0041    Order specific questions:   Consult for Change proton pump inhibitors (PPIs) to famotidine unless documented or history of GI bleed or to discontinue antiperistalic agents and stool softeners/laxatives.            Pharmacy to dose warfarin  Freq: Continuous PRN Route: XX  PRN Reason: Consult  Indications of Use: DVT/PE (active thrombosis)  Start: 04/11/21 1443    Admin Instructions:   If INR Greater Than Target, Contact Pharmacy Prior to Giving Dose.  Acutely Hazardous. Waste BOTH Residual  Medication and/or Empty Package. .     Order specific questions:   Target INR 2 - 3            sodium chloride 0.9 % flush 10 mL  Dose: 10 mL  Freq: As Needed Route: IV  PRN Reason: Line Care  Start: 04/10/21 0038          sodium chloride 0.9 % flush 10 mL  Dose: 10 mL  Freq: As Needed Route: IV  PRN Reason: Line Care  Start: 04/09/21 1621          zolpidem (AMBIEN) tablet 5 mg  Dose: 5 mg  Freq: Nightly PRN Route: PO  PRN Reason: Sleep  Start: 04/12/21 0755   End: 04/19/21 0754    Admin Instructions:         2129               Orders (active)      Start     Ordered    05/04/21 0900  vancomycin oral solution reconstituted 125 mg  Every Other Day      04/10/21 0042    04/27/21 0000  vancomycin oral solution reconstituted 125 mg  Every 24 Hours      04/10/21 0042    04/20/21 0000  vancomycin oral solution reconstituted 125 mg  Every 12 Hours      04/10/21 0042    04/13/21 1400  aPTT  Timed      04/13/21 0742    04/13/21 0000  Hemodialysis Inpatient  Once     Comments: No heparin except to lock catheter  Please perform HD in morning 4/13 as patient might be discharged home afterwards    04/12/21 1703    04/12/21 1800  calcium acetate (PHOS BINDER)) capsule 1,334 mg  3 Times Daily With Meals      04/12/21 1702    04/12/21 1728  Wound Care  Every Other Day     Comments: If a dressing will not stay in place related to stool, apply barrier ointment    04/12/21 1728    04/12/21 1224  Please start Coumadin.  Patient is agreeable now.  Nursing Communication  Once     Comments: Please start Coumadin.  Patient is agreeable now.    04/12/21 1223    04/12/21 0755  zolpidem (AMBIEN) tablet 5 mg  Nightly PRN      04/12/21 0755    04/12/21 0430  aPTT  Daily     Comments: Cancel If Patient Has Infusion Changes      04/11/21 1446    04/11/21 2000  Snack: Small fresh fruit plate with cheddar cheese cubes, saltine crackers.  HS Snack     Comments: Small fresh fruit plate with cheddar cheese cubes, saltine crackers.    04/11/21 1347     04/11/21 2000  heparin 91769 units/250 mL (100 units/mL) in 0.45 % NaCl infusion  Titrated      04/11/21 1619    04/11/21 1545  lactated ringers infusion  Continuous      04/11/21 1457    04/11/21 1447  Protime-INR  Daily      04/11/21 1446    04/11/21 1443  Pharmacy to dose warfarin  Continuous PRN      04/11/21 1446    04/11/21 1443  heparin (porcine) 5000 UNIT/ML injection 3,000-5,900 Units  Every 6 Hours PRN      04/11/21 1446    04/11/21 1443  Adjust Heparin Rate Based on aPTT Using Nomogram  Continuous      04/11/21 1446    04/11/21 1443  Verify All Anticoagulant Orders Are Discontinued Upon Initiation of Heparin Protocol (eg Enoxaparin, Fondaparinux, Apixaban, Dabigatran, Edoxaban, or Rivaroxaban)  Once      04/11/21 1446    04/11/21 1443  RN To Release aPTT Order 6 Hours After Heparin Bolus & 6 Hours After Any Heparin Rate Change  Continuous      04/11/21 1446    04/11/21 0954  albumin human 25 % IV SOLN 12.5 g  As Needed      04/11/21 0954    04/11/21 0953  heparin (porcine) injection 3,800 Units  As Needed      04/11/21 0953    04/10/21 1400  lactated ringers infusion  Continuous      04/10/21 1307    04/10/21 0900  escitalopram (LEXAPRO) tablet 20 mg  Daily      04/10/21 0132    04/10/21 0900  sodium bicarbonate tablet 650 mg  3 Times Daily      04/10/21 0132    04/10/21 0600  levothyroxine (SYNTHROID, LEVOTHROID) tablet 25 mcg  Every Early Morning      04/10/21 0132    04/10/21 0430  CBC & Differential  Daily     Comments: Discontinue After Heparin Stopped      04/09/21 1958    04/10/21 0400  Vital Signs  Every 4 Hours      04/10/21 0042    04/10/21 0230  montelukast (SINGULAIR) tablet 5 mg  Nightly      04/10/21 0132    04/10/21 0130  sodium chloride 0.9 % flush 10 mL  Every 12 Hours Scheduled      04/10/21 0042    04/10/21 0130  vancomycin oral solution reconstituted 125 mg  Every 6 Hours Scheduled      04/10/21 0042    04/10/21 0116  diphenhydrAMINE (BENADRYL) capsule 25 mg  Every 6 Hours PRN       04/10/21 0116    04/10/21 0051  OT Consult: Eval & Treat  Once      04/10/21 0050    04/10/21 0041  Pharmacy Consult  Continuous PRN      04/10/21 0042    04/10/21 0040  Maintain Sequential Compression Device  Continuous      04/10/21 0042    04/10/21 0040  Telemetry - Maintain IV Access  Continuous      04/10/21 0042    04/10/21 0040  Continuous Cardiac Monitoring  Continuous      04/10/21 0042    04/10/21 0040  May Be Off Telemetry for Tests  Continuous      04/10/21 0042    04/10/21 0040  ACLS Protocol For Life Threatening Dysrhythmias (Unless Code Status Indicates Otherwise)  Continuous      04/10/21 0042    04/10/21 0040  Notify Provider if ACLS Protocol Activated  Until Discontinued      04/10/21 0042    04/10/21 0040  Pulse Oximetry, Continuous  Continuous      04/10/21 0042    04/10/21 0040  Diet Regular  Diet Effective Now      04/10/21 0042    04/10/21 0039  ondansetron (ZOFRAN) injection 4 mg  Every 6 Hours PRN      04/10/21 0042    04/10/21 0039  acetaminophen (TYLENOL) tablet 650 mg  Every 4 Hours PRN      04/10/21 0042    04/10/21 0039  acetaminophen (TYLENOL) 160 MG/5ML solution 650 mg  Every 4 Hours PRN      04/10/21 0042    04/10/21 0039  acetaminophen (TYLENOL) suppository 650 mg  Every 4 Hours PRN      04/10/21 0042    04/10/21 0039  nitroglycerin (NITROSTAT) SL tablet 0.4 mg  Every 5 Minutes PRN      04/10/21 0042    04/10/21 0039  Code Status and Medical Interventions:  Continuous      04/10/21 0042    04/10/21 0039  Intake & Output  Every Shift      04/10/21 0042    04/10/21 0039  Weigh Patient  Once      04/10/21 0042    04/10/21 0039  Oxygen Therapy- Nasal Cannula; Titrate for SPO2: 90% - 95%  Continuous      04/10/21 0042    04/10/21 0039  Insert Peripheral IV  Once      04/10/21 0042    04/10/21 0038  sodium chloride 0.9 % flush 10 mL  As Needed      04/10/21 0042    04/10/21 0034  Inpatient Nephrology Consult  Once     Specialty:  Nephrology  Provider:  Brenna Colin MD    04/10/21  0042    04/10/21 0012  PT Consult: Eval & Treat As Tolerated; Functional Mobility Below Baseline  Once      04/10/21 0011    04/09/21 1622  Insert peripheral IV  Once      04/09/21 1624    04/09/21 1621  sodium chloride 0.9 % flush 10 mL  As Needed      04/09/21 1624    Unscheduled  Oxygen Therapy- Nasal Cannula; Titrate for SPO2: 90% - 95%  Continuous PRN     Comments: If Patient Develops Unresponsiveness, Acute Dyspnea, Cyanosis or Suspected Hypoxemia Start Continuous Pulse Ox Monitoring, Apply Oxygen & Notify Provider    04/10/21 0042    Unscheduled  ECG 12 Lead  As Needed     Comments: Nurse to Release if Patient Expericences Acute Chest Pain or Dysrhythmias    04/10/21 0042    Unscheduled  Potassium  As Needed     Comments: For Ventricular Arrhythmias      04/10/21 0042    Unscheduled  Magnesium  As Needed     Comments: For Ventricular Arrhythmias      04/10/21 0042    Unscheduled  Troponin  As Needed     Comments: For Chest Pain      04/10/21 0042    Unscheduled  Digoxin Level  As Needed     Comments: For Atrial Arrhythmias      04/10/21 0042    Unscheduled  Blood Gas, Arterial -  As Needed     Comments: Per O2 PolicyNotify Physician      04/10/21 0042    Unscheduled  Up With Assistance  As Needed      04/10/21 0042    Unscheduled  aPTT  As Needed      04/11/21 1446    Signed and Held  albumin human 25 % IV SOLN 12.5 g  As Needed      Signed and Held                         Physician Progress Notes       Mayco Contreras MD at 04/13/21 1022        ID note for C. difficile  Subjective: She apparently had a rough day with dialysis yesterday but is feeling pretty well this morning. Diarrhea improving. No fevers or chills or night sweats. Appetite good.    Objective: Afebrile, chronically ill-appearing but no acute distress, abdomen is soft nontender nondistended, appropriate mood and affect    Assessment and plan  1.  Sepsis secondary to #2  2.  Recurrent C. Difficile  3.  Recent history of Covid,  negative for Covid here   4.  Subsegmental right lower lobe PE  5.  End-stage renal disease on peritoneal dialysis traditionally, but now on hemodialysis  6.  IBS, complicating above     Diarrhea is improving. Sepsis is resolving. Continues on vancomycin 125 mg p.o. every 6 hours x10 days followed by prolonged vancomycin taper.  Discussed with patient potential probiotics and need to avoid systemic antibiotics in the future.  Plan for discharge whenever okay with others                Electronically signed by Mayco Contreras MD at 04/13/21 1022     Sanjay Waggoner MD at 04/12/21 1651              NEPHROLOGY PROGRESS NOTE    PATIENT IDENTIFICATION:   Name:  Ambar Lara      MRN:  [id number]     51 y.o.  female             Reason for visit: ESRD    SUBJECTIVE:   HD yesterday without difficulty; was very depressed and tearful this morning, but states that she feels much better now, and is eager for release to rehab near her home hopefully soon; no shortness of breath on room air; Coumadin loading in progress; heparin drip infusing    OBJECTIVE:  Vitals:    04/12/21 0405 04/12/21 1050 04/12/21 1344 04/12/21 1349   BP: 93/54 95/64 (!) 88/65 96/64   BP Location: Right arm Right arm Left arm    Patient Position: Lying Lying Sitting    Pulse: 87 109 108 107   Resp: 16 16 16    Temp: 96.6 °F (35.9 °C) 98.8 °F (37.1 °C) 97.9 °F (36.6 °C)    TempSrc: Oral Oral Oral    SpO2: 96% 93% 96% 97%   Weight:       Height:               Body mass index is 30.78 kg/m².  No intake or output data in the 24 hours ending 04/12/21 1651  Wt Readings from Last 1 Encounters:   04/09/21 2353 73.9 kg (162 lb 14.7 oz)   04/09/21 2000 76.6 kg (168 lb 14.4 oz)     Wt Readings from Last 3 Encounters:   04/09/21 73.9 kg (162 lb 14.7 oz)   01/20/16 70.8 kg (156 lb)   11/17/15 74.8 kg (164 lb 15.9 oz)         Exam:  NAD; pleasant; oriented; looks stated age  Chronically ill-appearing  Flat affect, depressed mood  Dry MM; AT/NC    No eye discharge; no scleral icterus  No JVD; no carotid bruits; tracheostomy  Coarse breath sounds bilat; not labored  RRR, no rub  Right chest TDC  Soft, NT, +D, BS+, PD catheter right lower quadrant without any exudate or drainage  No significant edema  No clubbing  No asterixis  Moves all extremities  Speech is fluent    Scheduled meds:    escitalopram, 20 mg, Oral, Daily  levothyroxine, 25 mcg, Oral, Q AM  montelukast, 5 mg, Oral, Nightly  sodium bicarbonate, 650 mg, Oral, TID  sodium chloride, 10 mL, Intravenous, Q12H  vancomycin, 125 mg, Oral, Q6H  [START ON 4/20/2021] vancomycin, 125 mg, Oral, Q12H  [START ON 4/27/2021] vancomycin, 125 mg, Oral, Q24H  [START ON 5/4/2021] vancomycin, 125 mg, Oral, Every Other Day  warfarin, 10 mg, Oral, Once      IV meds:                        heparin (porcine), 18 Units/kg/hr, Last Rate: 20 Units/kg/hr (04/12/21 1202)  Pharmacy Consult,   Pharmacy to dose warfarin,         Data Review:    Results from last 7 days   Lab Units 04/12/21  0307 04/11/21  0522 04/10/21  0235 04/09/21  1631   SODIUM mmol/L 136 132* 132* 133*   POTASSIUM mmol/L 3.1* 3.2* 3.2* 3.3*   CHLORIDE mmol/L 100 95* 97* 96*   CO2 mmol/L 24.9 18.9* 20.6* 21.4*   BUN mg/dL 12 37* 29* 28*   CREATININE mg/dL 2.67* 4.56* 4.18* 4.19*   CALCIUM mg/dL 7.8* 8.4* 8.1* 8.6   BILIRUBIN mg/dL <0.2 0.2  --  0.2   ALK PHOS U/L 120* 125*  --  163*   ALT (SGPT) U/L 18 14  --  15   AST (SGOT) U/L 19 15  --  12   GLUCOSE mg/dL 82 93 92 106*     Estimated Creatinine Clearance: 23.4 mL/min (A) (by C-G formula based on SCr of 2.67 mg/dL (H)).  Results from last 7 days   Lab Units 04/11/21  0522   URIC ACID mg/dL 11.8*     Results from last 7 days   Lab Units 04/11/21  0522 04/10/21  0235 04/09/21  1631   MAGNESIUM mg/dL 2.0 1.9 1.8   PHOSPHORUS mg/dL 7.1* 4.7*  --        Results from last 7 days   Lab Units 04/12/21  0307 04/11/21  0522 04/10/21  0235 04/09/21 2017 04/09/21  1631   WBC 10*3/mm3 11.72* 12.26* 16.60* 17.33*  23.03*   HEMOGLOBIN g/dL 9.3* 9.6* 9.3* 9.7* 10.3*   PLATELETS 10*3/mm3 316 300 285 287 323       Results from last 7 days   Lab Units 21  0307 21  1607 04/10/21  0231 21   INR  1.28* 1.47* 1.41* 1.48*             ASSESSMENT:     Colitis, Clostridium difficile    Anxiety    Cardiomyopathy (CMS/HCC)    Chronic systolic heart failure (CMS/[facility])    ESRD (end stage renal disease) on dialysis (CMS/[facility])    Gastroesophageal reflux disease    Gitelman syndrome    HLD (hyperlipidemia)    Hypothyroidism    Peritoneal dialysis status (CMS/[facility])    History of tracheostomy    Hypotension    Acute pulmonary embolism (CMS/[facility])    Severe malnutrition (CMS/[facility])    1.  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  High phosphorus and low calcium due to bone mineral disease of ESRD.  Volume status appropriate  2.  Recurrent C. difficile colitis  3.  Covid-pneumonia earlier this year, with prolonged respiratory failure requiring ultimately tracheostomy  4.  Immobility syndrome  5.  Pulmonary embolism  6.  Anemia of ESRD  7.  Hypotension: Spironolactone off for now        PLAN:  1.  Begin calcium acetate  2.  HD tomorrow (TTS)  3.  Coumadin loading, with ongoing heparin bridge  4.  Outpatient arrangements for HD and rehab being made  5.  Discussed with Dr. Gino Contreras ([facility]), patient's primary nephrologist    Sanjay Waggoner MD  2021    16:51 EDT     Electronically signed by Sanjay Waggoner MD at 21 1717     Joseph Maria MD at 21 1224              Name: Ambar Lara ADMIT: 2021   : 1970  PCP: Jet Manuel MD    MRN: [id number] LOS: 3 days   AGE/SEX: 51 y.o. female  ROOM: [facility]     Subjective   Subjective   Patient very upset and weepy today.  She reports that she HAS to get out of the hospital  to a rehab center today.  She has refused her Coumadin yesterday.  She reports decreased frequency of the diarrhea now semiformed.  No abdominal pain.  No nausea or vomiting.  Tolerating regular diet well.    No melena or fresh bright blood per rectum.  No fever or chills.    Review of Systems    .  No dysuria or hematuria.  Cardiovascular/respiratory.  No chest pain/no shortness of breath/no cough/no wheeze/no hemoptysis.    Objective   Objective   Vital Signs  Temp:  [96.6 °F (35.9 °C)-99.4 °F (37.4 °C)] 98.8 °F (37.1 °C)  Heart Rate:  [] 109  Resp:  [16-18] 16  BP: (93-99)/(54-66) 95/64  SpO2:  [93 %-96 %] 93 %  on   ;   Device (Oxygen Therapy): room air    Intake/Output Summary (Last 24 hours) at 4/12/2021 1224  Last data filed at 4/11/2021 1400  Gross per 24 hour   Intake 50 ml   Output 0 ml   Net 50 ml     Body mass index is 30.78 kg/m².      04/09/21 2000 04/09/21  2353   Weight: 76.6 kg (168 lb 14.4 oz) 73.9 kg (162 lb 14.7 oz)     Physical Exam    General.  Middle-aged female.  Alert oriented x3 in no apparent pain distress or diaphoresis.  Depressed.  Eyes.  No pallor or jaundice normal conjunctive and lids intact extraocular musculature is  Oral cavity moist mucous membrane  Neck.  Dressed tracheostomy wound.  No JVD.  Chest.  Tunneled catheter in the right upper chest.  Clear to auscultation bilaterally with no added sounds.  Cardiovascular.  Regular rate and rhythm grade 2 systolic murmur.  Abdomen.  No  tenderness..  No guarding or rebound.  No distention.  No organomegaly.  Normal bowel sounds.    Extremities.  No clubbing cyanosis or edema.  CNS.  No acute focal neurological deficits.    Results Review:      Results from last 7 days   Lab Units 04/12/21  0307 04/11/21  0522 04/10/21  0235 04/09/21  1631   SODIUM mmol/L 136 132* 132* 133*   POTASSIUM mmol/L 3.1* 3.2* 3.2* 3.3*   CHLORIDE mmol/L 100 95* 97* 96*   CO2 mmol/L 24.9 18.9* 20.6* 21.4*   BUN mg/dL 12 37* 29* 28*   CREATININE mg/dL  2.67* 4.56* 4.18* 4.19*   GLUCOSE mg/dL 82 93 92 106*   CALCIUM mg/dL 7.8* 8.4* 8.1* 8.6   AST (SGOT) U/L 19 15  --  12   ALT (SGPT) U/L 18 14  --  15     Estimated Creatinine Clearance: 23.4 mL/min (A) (by C-G formula based on SCr of 2.67 mg/dL (H)).              Invalid input(s): LDLCALC  Results from last 7 days   Lab Units 04/12/21  0307 04/11/21  0522 04/10/21  0235 04/10/21  0235 04/09/21 2017 04/09/21  1631   WBC 10*3/mm3 11.72* 12.26*  --  16.60* 17.33* 23.03*   HEMOGLOBIN g/dL 9.3* 9.6*  --  9.3* 9.7* 10.3*   HEMATOCRIT % 29.9* 29.7*  --  30.1* 29.7* 31.2*   PLATELETS 10*3/mm3 316 300  --  285 287 323   MCV fL 93.7 94.0  --  97.7* 94.0 92.6   MCH pg 29.2 30.4  --  30.2 30.7 30.6   MCHC g/dL 31.1* 32.3  --  30.9* 32.7 33.0   RDW % 14.6 14.5  --  14.9 14.5 14.5   RDW-SD fl 50.8 50.1  --  53.0 49.8 48.1   MPV fL 9.3 9.1  --  9.7 9.3 9.5   NEUTROS ABS 10*3/mm3 8.05* 8.91*  --  13.41* 14.64* 16.35*  16.21*   EOS ABS 10*3/mm3 0.23 0.12   < >  --   --   --    BASOS ABS 10*3/mm3  --   --   --   --  0.17 0.23*  0.46*   NRBC /100 WBC 0.0 0.1  --  1.0*  0.0 0.0 0.0    < > = values in this interval not displayed.     Results from last 7 days   Lab Units 04/12/21  1020 04/12/21  0307 04/11/21  1607 04/10/21  1047 04/10/21  0231 04/09/21 2017   INR   --  1.28* 1.47*  --  1.41* 1.48*   APTT seconds 85.8* 66.1* 46.5* 71.9* 46.0* 45.2*            I reviewed the patient's new clinical results / labs / tests / procedures      Assessment/Plan     Active Hospital Problems    Diagnosis  POA   • **Colitis, Clostridium difficile [A04.72]  Yes   • Severe malnutrition (CMS/HCC) [E43]  Yes   • History of tracheostomy [Z98.890]  Not Applicable   • Hypotension [I95.9]  Unknown   • Acute pulmonary embolism (CMS/HCC) [I26.99]  Unknown   • Gastroesophageal reflux disease [K21.9]  Yes   • Peritoneal dialysis status (CMS/HCC) [Z99.2]  Not Applicable   • Cardiomyopathy (CMS/HCC) [I42.9]  Yes   • Chronic systolic heart failure (CMS/HCC)  [I50.22]  Yes   • Gitelman syndrome [E83.42, E87.6]  Yes   • ESRD (end stage renal disease) on dialysis (CMS/[facility]) [N18.6, Z99.2]  Not Applicable   • Anxiety [F41.9]  Yes   • HLD (hyperlipidemia) [E78.5]  Yes   • Hypothyroidism [E03.9]  Yes      Resolved Hospital Problems   No resolved problems to display.           · Sepsis secondary to C. difficile colitis.  Improving sepsis indices.  Improving symptomatology of the C. difficile colitis.  Benign GI examination.  Negative blood cultures till now.  Negative GI PCR.  Currently on vancomycin.  Infectious disease following peritoneal fluid culture is pending.  Improved leukocytosis.  No fever.  ID okay to discharge.  · End-stage renal disease/hypokalemia.  Magnesium is normal.  Status post hemodialysis without volume removal yesterday.  Nephrology decided that the patient will be on hemodialysis until C. difficile infection has resolved..  Potassium being substituted per nephrology.  · Recent Covid infection.  Asymptomatic.  Repeat Covid is negative.  · Small right pulmonary embolus.  No respiratory compromise or right ventricular strain.  Secondary to patient being on HD she is being anticoagulated with Coumadin.  Currently on heparin.  She was counseled about the importance of compliance with the Coumadin and she is agreeable now.  · Anemia.  Stable hemoglobin.  Will monitor.  · Disposition.  Awaiting rehab placement.  Referral has been done and we are awaiting for confirmation.    · Discussed with patient/discharge planner..      Joseph Maria MD  [facility]  04/12/21  12:24 EDT          Electronically signed by Joseph Maria MD at 04/12/21 1228     aMyco Contreras MD at 04/12/21 0920        ID note for C. difficile  Subjective: She denies any significant diarrhea.  Abdominal pain basically resolved at this point.  She is having fevers or chills or night sweats.  She has a vigorous appetite.    Objective: Afebrile, chronically  ill-appearing but no acute distress, abdomen is soft nontender nondistended, appropriate mood and affect    Assessment and plan  1.  Sepsis secondary to #2  2.  Recurrent C. Difficile  3.  Recent history of Covid, negative for Covid here   4.  Subsegmental right lower lobe PE  5.  End-stage renal disease on peritoneal dialysis traditionally, but now on hemodialysis  6.  IBS, complicating above     Seems to be doing pretty well.  Plans are to hold on any type of peritoneal dialysis for the time being.  She continues on vancomycin 125 mg right now every 6 hours.  We will plan to do this for 10 days then proceed with prolonged vancomycin taper.  Those orders are in epic.  I think she can be discharged whenever okay with others.                Electronically signed by Mayco Contreras MD at 21 0929     Joseph Maria MD at 21 1457              Name: Ambar Lara ADMIT: 2021   : 1970  PCP: Jet Manuel MD    MRN: [id number] LOS: 2 days   AGE/SEX: 51 y.o. female  ROOM: [facility]     Subjective   Subjective   Decreased frequency of the diarrhea now semiformed.  Decreased abdominal pain.  No nausea or vomiting.  Tolerating regular diet well.    No melena or fresh bright blood per rectum.  No fever or chills.    Review of Systems    .  No dysuria or hematuria.  Cardiovascular/respiratory.  No chest pain/no shortness of breath/no cough/no wheeze/no hemoptysis.    Objective   Objective   Vital Signs  Temp:  [97.4 °F (36.3 °C)-98.4 °F (36.9 °C)] 97.7 °F (36.5 °C)  Heart Rate:  [] 100  Resp:  [16-18] 16  BP: ()/(60-71) 96/66  SpO2:  [92 %-98 %] 93 %  on   ;   Device (Oxygen Therapy): room air    Intake/Output Summary (Last 24 hours) at 2021 1458  Last data filed at 2021 1400  Gross per 24 hour   Intake 1361.25 ml   Output 0 ml   Net 1361.25 ml     Body mass index is 30.78 kg/m².      21  2353   Weight: 76.6 kg (168 lb 14.4 oz) 73.9 kg (162 lb  14.7 oz)     Physical Exam    General.  Middle-aged female.  Alert oriented x3 in no apparent pain distress or diaphoresis normal mood and affect  Eyes.  No pallor or jaundice normal conjunctive and lids intact extraocular musculature is  Oral cavity moist mucous membrane  Neck.  Dressed tracheostomy wound.  No JVD.  Chest.  Tunneled catheter in the right upper chest.  Clear to auscultation bilaterally with no added sounds.  Cardiovascular.  Regular rate and rhythm grade 2 systolic murmur.  Abdomen.  Resolved lower abdominal tenderness..  No guarding or rebound.  No distention.  No organomegaly.  Normal bowel sounds.    Extremities.  No clubbing cyanosis or edema.  CNS.  No acute focal neurological deficits.     I reviewed the patient's new clinical results / labs / tests / procedures      Assessment/Plan     Active Hospital Problems    Diagnosis  POA   • **Colitis, Clostridium difficile [A04.72]  Yes   • Severe malnutrition (CMS/[facility]) [E43]  Yes   • History of tracheostomy [Z98.890]  Not Applicable   • Hypotension [I95.9]  Unknown   • Acute pulmonary embolism (CMS/[facility]) [I26.99]  Unknown   • Gastroesophageal reflux disease [K21.9]  Yes   • Peritoneal dialysis status (CMS/[facility]) [Z99.2]  Not Applicable   • Cardiomyopathy (CMS/[facility]) [I42.9]  Yes   • Chronic systolic heart failure (CMS/[facility]) [I50.22]  Yes   • Gitelman syndrome [E83.42, E87.6]  Yes   • ESRD (end stage renal disease) on dialysis (CMS/[facility]) [N18.6, Z99.2]  Not Applicable   • Anxiety [F41.9]  Yes   • HLD (hyperlipidemia) [E78.5]  Yes   • Hypothyroidism [E03.9]  Yes      Resolved Hospital Problems   No resolved problems to display.           · Sepsis secondary to C. difficile colitis.  Improving sepsis indices.  Improving symptomatology of the C. difficile colitis.  Benign GI examination.  Negative blood cultures till now.  Negative GI PCR.  Currently on vancomycin.  Infectious disease following and is checking peritoneal fluid for any infection.  Improving  leukocytosis.  · End-stage renal disease/hypokalemia.  Magnesium is normal.  Status post hemodialysis without volume removal today.  Nephrology decided that the patient will be on hemodialysis until C. difficile infection has resolved..  Potassium being substituted per nephrology.  · Recent Covid infection.  Asymptomatic.  Repeat Covid is negative.  · Small right pulmonary embolus.  No respiratory compromise or right ventricular strain.  Secondary to patient being on dialysis for the time being we will stop Eliquis and initiate Coumadin for anticoagulation.  · Anemia.  Stable hemoglobin.  Will monitor.    · Discussed with patient.      Joseph Maria MD  [facility]  04/11/21  14:58 EDT          Electronically signed by Joseph Maria MD at 04/11/21 1501     Juan Carlos Dubose MD at 04/11/21 1103          Assessment & Plan  Subjective:  Symptoms:  No shortness of breath, chest pain, chest pressure or anxiety.  (No further diarrhea).      Temp:  [97.4 °F (36.3 °C)-98.4 °F (36.9 °C)] 97.4 °F (36.3 °C)  Heart Rate:  [] 85  Resp:  [18] 18  BP: ()/(60-71) 86/60  I/O last 3 completed shifts:  In: 2531.3 [P.O.:490; I.V.:1041.3; IV Piggyback:1000]  Out: -   No intake/output data recorded.       Current Facility-Administered Medications:   •  acetaminophen (TYLENOL) tablet 650 mg, 650 mg, Oral, Q4H PRN, 650 mg at 04/11/21 0115 **OR** acetaminophen (TYLENOL) 160 MG/5ML solution 650 mg, 650 mg, Oral, Q4H PRN **OR** acetaminophen (TYLENOL) suppository 650 mg, 650 mg, Rectal, Q4H PRN, Jesusita Pozo, APRN  •  albumin human 25 % IV SOLN 12.5 g, 12.5 g, Intravenous, PRN, Sanjay Waggoner MD  •  apixaban (ELIQUIS) tablet 5 mg, 5 mg, Oral, Q12H, Joseph Maria MD, 5 mg at 04/11/21 0813  •  diphenhydrAMINE (BENADRYL) capsule 25 mg, 25 mg, Oral, Q6H PRN, Jesusita Pozo M, APRN, 25 mg at 04/10/21 2122  •  escitalopram (LEXAPRO) tablet 20 mg, 20 mg, Oral, Daily, Jesusita Pozo  "SAMIRA APRN, 20 mg at 04/11/21 0813  •  heparin (porcine) injection 3,800 Units, 3,800 Units, Intracatheter, PRN, Sanjay Waggoner MD  •  levothyroxine (SYNTHROID, LEVOTHROID) tablet 25 mcg, 25 mcg, Oral, Q AM, Jesusita Pozo APRN, 25 mcg at 04/11/21 0643  •  montelukast (SINGULAIR) tablet 5 mg, 5 mg, Oral, Nightly, Jesusita Pozo APRN, 5 mg at 04/10/21 2122  •  nitroglycerin (NITROSTAT) SL tablet 0.4 mg, 0.4 mg, Sublingual, Q5 Min PRN, Jesusita Pozo APRN  •  ondansetron (ZOFRAN) injection 4 mg, 4 mg, Intravenous, Q6H PRN, Jesusita Pozo APRBRISSA  •  Pharmacy Consult, , Does not apply, Continuous PRN, Jesusita Pozo APRBRISSA  •  sodium bicarbonate tablet 650 mg, 650 mg, Oral, TID, Jesusita Pozo APRN, 650 mg at 04/11/21 0813  •  [COMPLETED] Insert peripheral IV, , , Once **AND** sodium chloride 0.9 % flush 10 mL, 10 mL, Intravenous, PRN, Marcos Blackman MD  •  sodium chloride 0.9 % flush 10 mL, 10 mL, Intravenous, Q12H, Jesusita Pozo APRN, 10 mL at 04/11/21 0814  •  sodium chloride 0.9 % flush 10 mL, 10 mL, Intravenous, PRN, Jesusita Pozo APRN  •  vancomycin oral solution reconstituted 125 mg, 125 mg, Oral, Q6H, Jesusita Pozo APRN, 125 mg at 04/11/21 0643  •  [START ON 4/20/2021] vancomycin oral solution reconstituted 125 mg, 125 mg, Oral, Q12H, Jesusita Pozo APRN  •  [START ON 4/27/2021] vancomycin oral solution reconstituted 125 mg, 125 mg, Oral, Q24H, Jesusita Pozo APRN  •  [START ON 5/4/2021] vancomycin oral solution reconstituted 125 mg, 125 mg, Oral, Every Other Day, Mayco Contreras MD      Objective:  General Appearance:  Comfortable and in no acute distress.    Vital signs: (most recent): Blood pressure (!) 86/60, pulse 85, temperature 97.4 °F (36.3 °C), temperature source Oral, resp. rate 18, height 154.9 cm (61\"), weight 73.9 kg (162 lb 14.7 oz), SpO2 93 %.  Vital signs are normal.    Output: Producing urine and producing " stool.    HEENT: Normal HEENT exam.    Lungs:  Normal effort and normal respiratory rate.  Breath sounds clear to auscultation.    Heart: Normal rate.  Regular rhythm.  S1 normal and S2 normal.  Positive for murmur.    Abdomen: Bowel sounds are normal.   There is no mass. There is no splenomegaly. There is no hepatomegaly.   Extremities: There is no dependent edema.    Neurological: Patient is alert and oriented to person, place and time.    Pupils:  Pupils are equal, round, and reactive to light.    Skin:  Warm and dry.            Colitis, Clostridium difficile    Anxiety    Cardiomyopathy (CMS/HCC)    Chronic systolic heart failure (CMS/HCC)    ESRD (end stage renal disease) on dialysis (CMS/HCC)    Gastroesophageal reflux disease    Gitelman syndrome    HLD (hyperlipidemia)    Hypothyroidism    Peritoneal dialysis status (CMS/HCC)    History of tracheostomy    Hypotension    Acute pulmonary embolism (CMS/HCC)        1. esrd-hd today with no volume removal and a 4K bath- she is having no diarrhea but dr foster recommended to her to continue hemodialysis on her outpatient schedule until she has been told that her c diff colitis is resolved; I recommended this to her as well; dc home anytime is ok from a renal standpoint  2. giltelmans syndrome  3. HFrEF  4. Hypotension  5. hyperlipidemia    Electronically signed by Juan Carlos Dubose MD at 04/11/21 1106     Mayoc Contreras MD at 04/11/21 1051        ID note for C. difficile  Subjective: She is feeling tremendously better.  Diarrhea has significantly slowed down.  She denies any fevers chills or night sweats.  Her appetite is much improved.    Objective: Afebrile, chronically ill-appearing but no acute distress, abdomen is soft nontender nondistended, appropriate mood and affect    Assessment and plan  1.  Sepsis secondary to #2  2.  Recurrent C. Difficile  3.  Recent history of Covid, negative for Covid here   4.  Subsegmental right lower lobe PE  5.   End-stage renal disease on peritoneal dialysis traditionally, but now on hemodialysis  6.  IBS, complicating above       Continue vancomycin 125 mg p.o. every 6 hours x10 days for now.  She will then proceed with prolonged vancomycin taper as ordered in epic.              Electronically signed by Mayco Contreras MD at 21 1053     Joseph Maria MD at 04/10/21 1851              Name: Ambar Lara ADMIT: 2021   : 1970  PCP: Jet Manuel MD    MRN: [id number] LOS: 1 days   AGE/SEX: 51 y.o. female  ROOM: [facility]     Subjective   Subjective   Patient reports feeling better.  Decreased abdominal pain.  No nausea or vomiting.  Tolerating regular diet well.  Continues with diarrhea but less frequency.  No melena or fresh bright blood per rectum.  No fever or chills.    Review of Systems  .  No dysuria or hematuria.  Cardiovascular/respiratory.  No chest pain/no shortness of breath/no cough/no wheeze/no hemoptysis.    Objective   Objective   Vital Signs  Temp:  [98.9 °F (37.2 °C)-99 °F (37.2 °C)] 99 °F (37.2 °C)  Heart Rate:  [102-113] 104  Resp:  [16-18] 18  BP: ()/(59-71) 104/71  SpO2:  [91 %-95 %] 92 %  on   ;   Device (Oxygen Therapy): room air    Intake/Output Summary (Last 24 hours) at 4/10/2021 185  Last data filed at 4/10/2021 1825  Gross per 24 hour   Intake 1630 ml   Output --   Net 1630 ml     Body mass index is 30.78 kg/m².      21  6120   Weight: 76.6 kg (168 lb 14.4 oz) 73.9 kg (162 lb 14.7 oz)     Physical Exam  General.  Middle-aged female.  Alert oriented x3 in no apparent pain distress or diaphoresis normal mood and affect  Eyes.  No pallor or jaundice normal conjunctive and lids intact extraocular musculature is  Oral cavity moist mucous membrane  Neck.  Dressed tracheostomy wound.  No JVD.  Chest.  Tunneled catheter in the right upper chest.  Clear to auscultation bilaterally with no added sounds.  Abdomen.  Positive lower abdominal  tenderness.  No guarding or rebound.  No distention.  No organomegaly.  Normal bowel sounds.    Extremities.  No clubbing cyanosis or edema.  CNS.  No acute focal neurological deficits.        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           · Sepsis secondary to C. difficile colitis.  Improving sepsis indices.  Improving symptomatology of the C. difficile colitis.  Benign GI examination except lower abdominal tenderness.  Negative blood cultures till now.  Negative GI PCR.  Currently on vancomycin.  Infectious disease following and is checking peritoneal fluid for any infection.  Proving leukocytosis.  · End-stage renal disease/hypokalemia.  Magnesium is normal.  For hemodialysis without volume removal tomorrow per nephrology.  Potassium being substituted per nephrology.  · Recent Covid infection.  Asymptomatic.  Repeat Covid is negative.  · Small right pulmonary embolus.  No respiratory compromise or right ventricular strain.  Will ask pharmacy to switch to Eliquis.  · Anemia.  Stable hemoglobin.  Will monitor.    · Discussed with patient.      Joseph Maria MD  [facility]  04/10/21  18:51 EDT          Electronically signed by Joseph Maria MD at 04/10/21 0797           Consult Notes       Sanjay Waggoner MD at 04/10/21 7497            Referring Provider: Dr. Joseph Maria  Reason for Consultation: ESRD    Subjective     Chief complaint   Chief Complaint   Patient presents with   • Hypotension   • Dizziness       History of present illness:  52 yo WF with ESRD attributed to interstitial nephritis on PD for the past few years, tho recently transitioned to HD 1-2 months ago due to critical illness from Covid-19 leading to respiratory failure and multiorgan compromise, complicated by C. difficile colitis and immobility syndrome, admitted from home yesterday for further evaluation of diarrhea, hypotension, and near-syncope.  Found to have a pulmonary embolism and recurrent C. difficile colitis.      Dialysis care provided by Dr. Gino Contreras ([facility]).  Full PMH outlined below.  Last HD was 4/8 via right chest TDC.  She reports visit yesterday with PD nurse at [facility] renal clinic, where a PD exchange was done without any difficulty or complication.  She was just released from [facility] to home two days ago.    · Continuous diarrhea for several days  · No fever or chills  · Reports that dressing around PD catheter was unchanged for over 1 month; states that her daughter cleaned the area upon patient's arrival home 2 days ago; patient describes serous drainage from PD catheter at the time of dressing change and no discharge since  · No abdominal pain  · Significant weakness in legs; remains unable to walk  · No shortness of breath; no orthopnea; no chest pain; no significant leg swelling  · Still makes urine, and no dysuria or hematuria  Allergies:  Penicillins and Nickel    Review of Systems  14-point ROS performed and all negative except for pertinent +/-'s detailed in HPI.     Objective     Vital Signs  Temp:  [98.5 °F (36.9 °C)-99 °F (37.2 °C)] 99 °F (37.2 °C)  Heart Rate:  [102-117] 106  Resp:  [16-28] 16  BP: ()/(43-69)  "97/65    Flowsheet Rows      First Filed Value   Admission Height  154.9 cm (61\") Documented at 04/09/2021 2000   Admission Weight  76.6 kg (168 lb 14.4 oz) Documented at 04/09/2021 2000           I/O this shift:  In: 120 [P.O.:120]  Out: -   I/O last 3 completed shifts:  In: 2900 [P.O.:150; I.V.:750; IV Piggyback:2000]  Out: -     Intake/Output Summary (Last 24 hours) at 4/10/2021 1244  Last data filed at 4/10/2021 0900  Gross per 24 hour   Intake 3020 ml   Output --   Net 3020 ml       Physical Exam:  NAD; pleasant; oriented; looks stated age  Chronically ill-appearing  Flat affect, depressed mood  Dry MM; AT/NC   No eye discharge; no scleral icterus  No JVD; no carotid bruits; tracheostomy  Coarse breath sounds bilat; not labored  RRR, no rub  Right chest TDC  Soft, NT, +D, BS+, PD catheter right lower quadrant without any exudate or drainage  No significant edema  No clubbing  No asterixis  Moves all extremities  Speech is fluent    Results Review:  Results from last 7 days   Lab Units 04/10/21  0235 04/09/21  1631   SODIUM mmol/L 132* 133*   POTASSIUM mmol/L 3.2* 3.3*   CHLORIDE mmol/L 97* 96*   CO2 mmol/L 20.6* 21.4*   BUN mg/dL 29* 28*   CREATININE mg/dL 4.18* 4.19*   CALCIUM mg/dL 8.1* 8.6   BILIRUBIN mg/dL  --  0.2   ALK PHOS U/L  --  163*   ALT (SGPT) U/L  --  15   AST (SGOT) U/L  --  12   GLUCOSE mg/dL 92 106*       Estimated Creatinine Clearance: 15 mL/min (A) (by C-G formula based on SCr of 4.18 mg/dL (H)).    Results from last 7 days   Lab Units 04/10/21  0235 04/09/21  1631   MAGNESIUM mg/dL 1.9 1.8   PHOSPHORUS mg/dL 4.7*  --        Results from last 7 days   Lab Units 04/10/21  0235 04/09/21  2017 04/09/21  1631   WBC 10*3/mm3 16.60* 17.33* 23.03*   HEMOGLOBIN g/dL 9.3* 9.7* 10.3*   PLATELETS 10*3/mm3 285 287 323       Results from last 7 days   Lab Units 04/10/21  0231 04/09/21 2017   INR  1.41* 1.48*       Active Medications  escitalopram, 20 mg, Oral, Daily  levothyroxine, 25 mcg, Oral, Q " AM  montelukast, 5 mg, Oral, Nightly  sodium bicarbonate, 650 mg, Oral, TID  sodium chloride, 10 mL, Intravenous, Q12H  vancomycin, 125 mg, Oral, Q6H  [START ON 4/20/2021] vancomycin, 125 mg, Oral, Q12H  [START ON 4/27/2021] vancomycin, 125 mg, Oral, Q24H  [START ON 5/4/2021] vancomycin, 125 mg, Oral, Every Other Day      heparin (porcine), 18 Units/kg/hr, Last Rate: 22 Units/kg/hr (04/10/21 6528)  Pharmacy Consult,         Assessment/Plan   Assessment  1.  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  Hypovolemic by exam  2.  Recurrent C. difficile colitis  3.  Covid-pneumonia earlier this year, with prolonged respiratory failure requiring ultimately tracheostomy  4.  Immobility syndrome  5.  Pulmonary embolism  6.  Anemia of ESRD  7.  Hypotension      Colitis, Clostridium difficile    Anxiety    Cardiomyopathy (CMS/HCC)    Chronic systolic heart failure (CMS/HCC)    ESRD (end stage renal disease) on dialysis (CMS/[facility])    Gastroesophageal reflux disease    Gitelman syndrome    HLD (hyperlipidemia)    Hypothyroidism    Peritoneal dialysis status (CMS/[facility])    History of tracheostomy    Hypotension    Acute pulmonary embolism (CMS/HCC)      Plan  1.  Replace potassium, and give 1 L of LR  2.  HD tomorrow without fluid removal; will return her to TTS schedule next week  3.  Once her colitis has completely resolved, suspect PD can be resumed then.  I will discuss with her primary nephrologist, Dr. Gino Contreras after weekend  4.  Surveillance labs    I discussed the patient's findings and my recommendations with the patient.     Sanjay Waggoner MD  04/10/21  12:44 EDT              Electronically signed by Sanjay Waggoner MD at 04/10/21 2542     Mayco Contreras MD at 04/10/21 1479      Consult Orders    1. Inpatient Infectious Diseases Consult  [[id number]] ordered by Jesusita Pozo APRN at 04/10/21 0042               Referring Provider: Mauricio Mitchell MD  Reason for Consultation:   Chief Complaint   Patient presents with   • Hypotension   • Dizziness         Subjective   History of present illness:    Very nice 51-year-old we are asked for evaluation regarding cough recurrent C. difficile.    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  She contracted Covid earlier this year and require prolonged hospitalization at [facility].  There she developed Covid associated ARDS and required tracheostomy.  She was in a prolonged coma but eventually improved and was transferred to [place].  During her admission at [place], she developed C. difficile colitis and was started on oral vancomycin which she completed.    Her tracheostomy has now been decannulated.  Due to her critical illness and hospitalization, she had a right tunneled dialysis catheter placed and had been receiving hemodialysis.  Yesterday she went to her dialysis clinic for peritoneal dialysis and was found to be hypotensive and weak so was transferred to [facility] by EMS.  Here she was found to be C. difficile positive again and also found to have a right lower lobe segmental PE.  CT abdomen pelvis showed diffuse colitis.    Patient says that she gets her days a little bit confused given her prolonged coma but basically think she has been having watery diarrhea over the past 10 days which occurs as frequently as every 15 minutes.  This is associated with some mild abdominal pain.  She is not having any constitutional symptoms of infection such as fever, chills, night sweats.  She was started on oral vancomycin on admission but has not yet had much improvement in her symptoms.        Allergies   Allergen Reactions   • Penicillins Anaphylaxis   •  Nickel Rash     Review of Systems  Pertinent items are noted in HPI, all other systems reviewed and negative    Objective     Physical Exam:   Vital Signs   Temp:  [98.5 °F (36.9 °C)-99 °F (37.2 °C)] 99 °F (37.2 °C)  Heart Rate:  [102-117] 106  Resp:  [16-28] 16  BP: ()/(43-69) 97/65    GENERAL: Awake and alert, in no acute distress.   HEENT: Oropharynx is clear. Hearing is grossly normal.   EYES: PERRL. No conjunctival injection. No lid lag.   LYMPHATICS: No lymphadenopathy of the neck or inguinal regions.   HEART: Regular rate and regular rhythm. No peripheral edema.   LUNGS: Clear to auscultation anteriorly with normal respiratory effort.   GI: Soft, nontender, nondistended. No appreciable organomegaly.  No erythema or drainage from peritoneal catheter  SKIN: Warm and dry without cutaneous eruptions   PSYCHIATRIC: Appropriate mood, affect, insight, and judgment.     Results Review:   White count was 23.03 on admission now down to 16.6  Procalcitonin 3.83  Creatinine 4.18  Alk phos 163  Liver function tests otherwise normal  Potassium 3.2  Hemoglobin 9.3  Platelets 285  Microbiology:  4/9 C. difficile positive  4/9 GI PCR negative  4/9 blood cultures pending  4/9 Covid screen pending (+ on 2/14/2021)    Radiology: As per HPI      Assessment/Plan   1.  Sepsis secondary to #2  2.  Recurrent C. Difficile  3.  Recent history of Covid  4.  Subsegmental right lower lobe PE  5.  End-stage renal disease on peritoneal dialysis traditionally, but now on hemodialysis  6.  IBS, complicating above    Agree with vancomycin 125 mg p.o. every 6 hours.  She will need prolonged taper.  There is some report apparently that the PD catheter was initially filled with purulent material.  This may have just been inflammation from the C. difficile colitis, but I am going to check peritoneal cell counts and cultures to make sure that there was no infection on her peritoneal catheter.           Thank you for this consult.  We will  continue to follow along and tailor antibiotics as the patient's clinical course evolves.      Mayco Contreras MD  04/10/21  09:27 EDT          Electronically signed by Mayco Contreras MD at 04/10/21 0935         Emperatriz Kaplan CSW      Case Management   Progress Notes       Signed   Date of Service:  04/13/21 1141   Creation Time:  04/13/21 1141            Signed             Continued Stay Note  [facility]     Patient Name: Ambar Lara             MRN: [id number]  Today's Date: 4/13/2021                     Admit Date: 4/9/2021          Discharge Plan      Row Name 04/13/21 1141           Plan     Plan  [place] pending HD set up as outpatient     Plan Comments  CCP received call from [facility] [phone]; states patient’s family has requested patient to be transferred to [facility] clinic in [place] which is closer to her house. Arlene states she has started the referral process for [facility] but they have no confirmed if they will have a chair for her. Per Arlene; if dialysis is transitioned to [facility], MD will need to address PD catheter. CCP spoke with patient via hospital phone due to isolation; patient confirmed she is hoping to change to clinic closer to her home so it is easier on her children. [phone]; states he will follow up with [facility] referral process and let CCP know if it is finalized. Apryl Keenan, RN   Registered Nurse      Plan of Care   Signed   Date of Service:  04/13/21 0653   Creation Time:  04/13/21 0653            Signed             Goal Outcome Evaluation:  Plan of Care Reviewed With: patient  Progress: improving   VSS, room air, AOX4, continues several loose Bm's, complains moderate abdominal discomfort, PRN Ambien at bedtime with good results, patient compliant in all medications, heparin drip maintained at therapeutic rate, dialysis  scheduled for today, will continue to monitor.                   Juanita Chavez, RN   Registered Nurse   Medicine   Plan of Care   Signed   Date of Service:  04/12/21 1811   Creation Time:  04/12/21 1811            Signed             Goal Outcome Evaluation:   VSS. SBP in 90's. Heparin theraputic and PTT scheduled for am. Coumadin given this evening. To have HD tomorrow. Still having multiple liquid BM's. Will continue to monitor

## 2021-04-13 NOTE — PROGRESS NOTES
"    NEPHROLOGY PROGRESS NOTE    PATIENT IDENTIFICATION:   Name:  Ambar Lara      MRN:  [id number]     51 y.o.  female             Reason for visit: ESRD    SUBJECTIVE:   Very upset and frustrated with \"kidney doctors\".  She was supposed to dc to [facility] today, but waiting on hemodialysis chair in [facility] in [place] to be arranged. PER [facility] note, family requested this transfer.  I did speak to home training RN Cande.  Plan is for patient to return to pd after acute illness.  She was concerned about tunnel infection.  Patient still having very frequent diarrhea.    Dialyzed this am for 2.5 hours, but machine quit working.  Still makes a lot of urine and K chronically low .    OBJECTIVE:  Vitals:    04/13/21 0948 04/13/21 1257 04/13/21 1401 04/13/21 1420   BP: 96/67 103/69 (!) 86/57 96/66   BP Location: Right arm  Right arm    Patient Position: Lying  Lying    Pulse: 105 110 106 108   Resp: 16 16 16    Temp: 98.1 °F (36.7 °C) 98.1 °F (36.7 °C) 98.5 °F (36.9 °C)    TempSrc: Oral Oral Oral    SpO2: 95%  96% 96%   Weight:       Height:               Body mass index is 30.78 kg/m².  No intake or output data in the 24 hours ending 04/13/21 1820  Wt Readings from Last 1 Encounters:   04/09/21 2353 73.9 kg (162 lb 14.7 oz)   04/09/21 2000 76.6 kg (168 lb 14.4 oz)     Wt Readings from Last 3 Encounters:   04/09/21 73.9 kg (162 lb 14.7 oz)   01/20/16 70.8 kg (156 lb)   11/17/15 74.8 kg (164 lb 15.9 oz)         Exam:  Lying in bed .  Chronically ill-appearing  Flat affect, depressed mood.  Irritable .  HEENTNo eye discharge; no scleral icterus  No JVD; no carotid bruits; tracheostomy covered.   Heart RRR, no rub  Right chest TDC  Lungs clear to auscultation.   Abd Soft, nontender. + bs, Mask applied .PD catheter right lower quadrant without any exudate or drainage. Tunnel nontender. Skin scaling around exit site.   Ext no edema .  Moves all extremities  Speech is fluent    Scheduled meds:    " calcium acetate, 1,334 mg, Oral, TID With Meals  escitalopram, 20 mg, Oral, Daily  gentamicin, , Topical, Daily  levothyroxine, 25 mcg, Oral, Q AM  montelukast, 5 mg, Oral, Nightly  potassium chloride, 20 mEq, Oral, Once  sodium bicarbonate, 650 mg, Oral, TID  sodium chloride, 10 mL, Intravenous, Q12H  vancomycin, 125 mg, Oral, Q6H  [START ON 4/20/2021] vancomycin, 125 mg, Oral, Q12H  [START ON 4/27/2021] vancomycin, 125 mg, Oral, Q24H  [START ON 5/4/2021] vancomycin, 125 mg, Oral, Every Other Day      IV meds:                        heparin (porcine), 18 Units/kg/hr, Last Rate: 16 Units/kg/hr (04/13/21 1710)  Pharmacy Consult,   Pharmacy to dose warfarin,         Data Review:    Results from last 7 days   Lab Units 04/13/21  0505 04/12/21  0307 04/11/21  0522 04/09/21  1631   SODIUM mmol/L 136 136 132* 133*   POTASSIUM mmol/L 3.0* 3.1* 3.2* 3.3*   CHLORIDE mmol/L 99 100 95* 96*   CO2 mmol/L 22.2 24.9 18.9* 21.4*   BUN mg/dL 19 12 37* 28*   CREATININE mg/dL 3.40* 2.67* 4.56* 4.19*   CALCIUM mg/dL 7.8* 7.8* 8.4* 8.6   BILIRUBIN mg/dL  --  <0.2 0.2 0.2   ALK PHOS U/L  --  120* 125* 163*   ALT (SGPT) U/L  --  18 14 15   AST (SGOT) U/L  --  19 15 12   GLUCOSE mg/dL 83 82 93 106*     Estimated Creatinine Clearance: 18.4 mL/min (A) (by C-G formula based on SCr of 3.4 mg/dL (H)).  Results from last 7 days   Lab Units 04/11/21  0522   URIC ACID mg/dL 11.8*     Results from last 7 days   Lab Units 04/13/21  0505 04/11/21  0522 04/10/21  0235   MAGNESIUM mg/dL 1.6 2.0 1.9   PHOSPHORUS mg/dL 3.8 7.1* 4.7*       Results from last 7 days   Lab Units 04/13/21  0505 04/12/21  0307 04/11/21  0522 04/10/21  0235 04/09/21 2017   WBC 10*3/mm3 13.16* 11.72* 12.26* 16.60* 17.33*   HEMOGLOBIN g/dL 9.4* 9.3* 9.6* 9.3* 9.7*   PLATELETS 10*3/mm3 315 316 300 285 287       Results from last 7 days   Lab Units 04/13/21  0505 04/12/21  0307 04/11/21  1607 04/10/21  0231 04/09/21 2017   INR  1.32* 1.28* 1.47* 1.41* 1.48*              ASSESSMENT:     Colitis, Clostridium difficile    Anxiety    Cardiomyopathy (CMS/HCC)    Chronic systolic heart failure (CMS/HCC)    ESRD (end stage renal disease) on dialysis (CMS/HCC)    Gastroesophageal reflux disease    Gitelman syndrome    HLD (hyperlipidemia)    Hypothyroidism    Peritoneal dialysis status (CMS/HCC)    History of tracheostomy    Hypotension    Acute pulmonary embolism (CMS/HCC)    Severe malnutrition (CMS/[facility])    1.  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  Euvolemic .  Will check on outpt spot in am with CCP and Vickey Hart .  2.  Recurrent C. difficile colitis po vanc .  3.  Covid-pneumonia earlier this year, with prolonged respiratory failure requiring  tracheostomy.  4.  Immobility syndrome  5.  Pulmonary embolism on heparin as bridge to coumadin .  INR subtherapeutic.  DW Dr. Maria.  He has plan for coumadin dosing and I agree.   6.  Anemia of ESRD.  JULEE as outpt .          PLAN:  1.  Once outpt chair arranged, will call orders tomorrow .  2.   Instructions for nursing for dressing change on PD catheter provided.   3.  Discussed with Home training Cande LAUREANO at [facility].     Faviola Moura MD  4/13/2021    18:20 EDT

## 2021-04-13 NOTE — PLAN OF CARE
Goal Outcome Evaluation:  Plan of Care Reviewed With: patient  Progress: improving   VSS, room air, AOX4, continues several loose Bm's, complains moderate abdominal discomfort, PRN Ambien at bedtime with good results, patient compliant in all medications, heparin drip maintained at therapeutic rate, dialysis scheduled for today, will continue to monitor.      Problem: Skin Injury Risk Increased  Goal: Skin Health and Integrity  Outcome: Ongoing, Progressing  Intervention: Optimize Skin Protection  Recent Flowsheet Documentation  Taken 4/12/2021 2215 by Apryl Herrera, RN  Head of Bed (HOB): HOB elevated  Taken 4/12/2021 2010 by Apryl Herrera, RN  Pressure Reduction Techniques:   frequent weight shift encouraged   weight shift assistance provided  Head of Bed (HOB): \[facility]\"" elevated  Pressure Reduction Devices:   alternating pressure pump (ADD)   pressure-redistributing mattress utilized  Skin Protection:   adhesive use limited   incontinence pads utilized   transparent dressing maintained   tubing/devices free from skin contact

## 2021-04-13 NOTE — PROGRESS NOTES
Continued Stay Note  [facility]     Patient Name: Ambar Lara  MRN: [id number]  Today's Date: 4/13/2021    Admit Date: 4/9/2021    Discharge Plan     Row Name 04/13/21 1529       Plan    Plan Comments  Nurse to [facility] office stating patient upset regarding not being able to DC until HD was set up with [facility]. Patient in isolation precautions for C-diff. Nurse placed room phone within reach of patient. CCP called in to patient room and explained CCP is just waiting to get a final chair time from [facility] and once we receive that DC to [facility] can be arranged. Explained [facility] has accepted her and will have a bed once HD is set up. Patient very upset and frustrated that she can not discharge. CCP explained we will update her as soon as we have a chair time from [facility]. Apryl CARMONA RN CCP        Discharge Codes    No documentation.       Expected Discharge Date and Time     Expected Discharge Date Expected Discharge Time    Apr 14, 2021             Apryl Boyd RN

## 2021-04-13 NOTE — SIGNIFICANT NOTE
04/13/21 1611   OTHER   Discipline physical therapy assistant   Rehab Time/Intention   Session Not Performed patient/family declined treatment  (Pt declined PT treatment this afternoon. Pt reports of being in a bad mood and does not want to participate in PT today. Will f/u with pt tomorrow.)   Recommendation   PT - Next Appointment 04/14/21

## 2021-04-13 NOTE — PROGRESS NOTES
Continued Stay Note  [facility]     Patient Name: Ambar Lara  MRN: [id number]  Today's Date: 4/13/2021    Admit Date: 4/9/2021    Discharge Plan     Row Name 04/13/21 1031       Plan    Plan  [facility], bed available when medically stable    Plan Comments  Outbound call to [facility]. They are able to accept and confirmed with insurance no pre-cert is needed (they have patient's [place] policy as primary). Provided information regarding HD center to Annette. She confirmed she had spoken with a friend who came to visit facility yesterday that between the friend and patient's children they will transport her to her HD clinic. Partial packet in CCP office and CCP will follow. Apryl CARMONA RN CCP        Discharge Codes    No documentation.             Apryl Boyd RN

## 2021-04-13 NOTE — SIGNIFICANT NOTE
04/13/21 1529   OTHER   Discipline occupational therapist   Rehab Time/Intention   Session Not Performed patient/family declined treatment  (pt states she is in a very bad mood, doesn't want to do anything. 1514)   Recommendation   OT - Next Appointment 04/14/21

## 2021-04-13 NOTE — PROGRESS NOTES
Name: Ambar Lara ADMIT: 2021   : 1970  PCP: Jet Manuel MD    MRN: [id number] LOS: 4 days   AGE/SEX: 51 y.o. female  ROOM: [facility]     Subjective   Subjective   Still very disappointed that she is still in the hospital..  She reports decreased frequency of the diarrhea now semiformed.  No abdominal pain.  No nausea or vomiting.  Tolerating regular diet well.    No melena or fresh bright blood per rectum.  No fever or chills.    Review of Systems    .  No dysuria or hematuria.  Cardiovascular/respiratory.  No chest pain/no shortness of breath/no cough/no wheeze/no hemoptysis.     Objective   Objective   Vital Signs  Temp:  [96.8 °F (36 °C)-98.8 °F (37.1 °C)] 98.5 °F (36.9 °C)  Heart Rate:  [] 108  Resp:  [16] 16  BP: ()/(57-70) 96/66  SpO2:  [95 %-97 %] 96 %  on   ;   Device (Oxygen Therapy): room air  No intake or output data in the 24 hours ending 21 1523  Body mass index is 30.78 kg/m².      21  2353   Weight: 76.6 kg (168 lb 14.4 oz) 73.9 kg (162 lb 14.7 oz)     Physical Exam    General.  Middle-aged female.  Alert oriented x3 in no apparent pain distress or diaphoresis.  Depressed.  Eyes.  No pallor or jaundice normal conjunctive and lids intact extraocular musculature is  Oral cavity moist mucous membrane  Neck.  Dressed tracheostomy wound.  No JVD.  Chest.  Tunneled catheter in the right upper chest.  Clear to auscultation bilaterally with no added sounds.  Cardiovascular.  Regular rate and rhythm grade 2 systolic murmur.  Abdomen.  No  tenderness..  No guarding or rebound.  No distention.  No organomegaly.  Normal bowel sounds.    Extremities.  No clubbing cyanosis or edema.  CNS.  No acute focal neurological deficits.    Results Review:      Results from last 7 days   Lab Units 21  0505 21  0307 21  0522 04/10/21  0235 21  1631   SODIUM mmol/L 136 136 132* 132* 133*   POTASSIUM mmol/L 3.0* 3.1* 3.2* 3.2* 3.3*    CHLORIDE mmol/L 99 100 95* 97* 96*   CO2 mmol/L 22.2 24.9 18.9* 20.6* 21.4*   BUN mg/dL 19 12 37* 29* 28*   CREATININE mg/dL 3.40* 2.67* 4.56* 4.18* 4.19*   GLUCOSE mg/dL 83 82 93 92 106*   CALCIUM mg/dL 7.8* 7.8* 8.4* 8.1* 8.6   AST (SGOT) U/L  --  19 15  --  12   ALT (SGPT) U/L  --  18 14  --  15     Estimated Creatinine Clearance: 18.4 mL/min (A) (by C-G formula based on SCr of 3.4 mg/dL (H)).          Results from last 7 days   Lab Units 04/09/21  1631   TROPONIN T ng/mL 0.030     Results from last 7 days   Lab Units 04/09/21  1631   PROBNP pg/mL 4,129.0*     Results from last 7 days   Lab Units 04/10/21  0235   TSH uIU/mL 2.910     Results from last 7 days   Lab Units 04/13/21  0505 04/11/21  0522 04/10/21  0235 04/09/21  1631   MAGNESIUM mg/dL 1.6 2.0 1.9 1.8           Invalid input(s): LDLCALC  Results from last 7 days   Lab Units 04/13/21  0505 04/12/21  0307 04/11/21  0522 04/10/21  0235 04/10/21  0235 04/09/21 2017 04/09/21  1631   WBC 10*3/mm3 13.16* 11.72* 12.26*  --  16.60* 17.33* 23.03*   HEMOGLOBIN g/dL 9.4* 9.3* 9.6*  --  9.3* 9.7* 10.3*   HEMATOCRIT % 28.8* 29.9* 29.7*  --  30.1* 29.7* 31.2*   PLATELETS 10*3/mm3 315 316 300  --  285 287 323   MCV fL 93.2 93.7 94.0  --  97.7* 94.0 92.6   MCH pg 30.4 29.2 30.4  --  30.2 30.7 30.6   MCHC g/dL 32.6 31.1* 32.3  --  30.9* 32.7 33.0   RDW % 14.5 14.6 14.5  --  14.9 14.5 14.5   RDW-SD fl 48.6 50.8 50.1  --  53.0 49.8 48.1   MPV fL 9.4 9.3 9.1  --  9.7 9.3 9.5   NEUTROS ABS 10*3/mm3 9.21* 8.05* 8.91*  --  13.41* 14.64* 16.35*  16.21*   EOS ABS 10*3/mm3 0.26 0.23 0.12   < >  --   --   --    BASOS ABS 10*3/mm3  --   --   --   --   --  0.17 0.23*  0.46*   NRBC /100 WBC  --  0.0 0.1  --  1.0*  0.0 0.0 0.0    < > = values in this interval not displayed.     Results from last 7 days   Lab Units 04/13/21  0505 04/12/21  1020 04/12/21  0307 04/11/21  1607 04/10/21  1047 04/10/21  0231 04/09/21 2017   INR  1.32*  --  1.28* 1.47*  --  1.41* 1.48*   APTT seconds  108.3* 85.8* 66.1* 46.5* 71.9* 46.0* 45.2*         Results from last 7 days   Lab Units 04/09/21  1631   PROCALCITONIN ng/mL 3.83*   LACTATE mmol/L 1.8             Results from last 7 days   Lab Units 04/11/21  2046 04/09/21  1804 04/09/21  1631   BLOODCX   --  No growth at 3 days No growth at 3 days   BODYFLDCX  No growth at 2 days  --   --      Results from last 7 days   Lab Units 04/09/21  1722   ADENOVIRUS  Not Detected         Results from last 7 days   Lab Units 04/11/21  0522   URIC ACID mg/dL 11.8*       Imaging:  Imaging Results (Last 24 Hours)     ** No results found for the last 24 hours. **             I reviewed the patient's new clinical results / labs / tests / procedures      Assessment/Plan     Active Hospital Problems    Diagnosis  POA   • **Colitis, Clostridium difficile [A04.72]  Yes   • Severe malnutrition (CMS/[facility]) [E43]  Yes   • History of tracheostomy [Z98.890]  Not Applicable   • Hypotension [I95.9]  Unknown   • Acute pulmonary embolism (CMS/[facility]) [I26.99]  Unknown   • Gastroesophageal reflux disease [K21.9]  Yes   • Peritoneal dialysis status (CMS/[facility]) [Z99.2]  Not Applicable   • Cardiomyopathy (CMS/[facility]) [I42.9]  Yes   • Chronic systolic heart failure (CMS/[facility]) [I50.22]  Yes   • 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           · Sepsis secondary to C. difficile colitis.  Improving sepsis indices.  Improving symptomatology of the C. difficile colitis.  Benign GI examination.  Negative blood cultures till now.  Negative GI PCR.  Currently on vancomycin.  Infectious disease following peritoneal fluid culture is negative till now.  Improved leukocytosis.  No fever.  ID okay to discharge.  · End-stage renal disease/hypokalemia.  Magnesium is normal.   Status post hemodialysis without volume removal yesterday.  Nephrology decided that the patient will be on hemodialysis until C. difficile infection has resolved..  Potassium being substituted per nephrology.  Will give a single dose of KCl.    · Recent Covid infection.  Asymptomatic.  Repeat Covid is negative.  · Small right pulmonary embolus.  No respiratory compromise or right ventricular strain.  Secondary to patient being on HD she is being anticoagulated with Coumadin.  Currently on heparin.  INR still subtherapeutic.    · Anemia.  Stable hemoglobin.  Will monitor.  · Disposition.  Patient has placed in a rehab center however they want to make sure that the dialysis unit nearby has a chair for the patient and the date and we are waiting to arrange that.  · Discussed with patient/discharge planner..      Joseph Maria MD  [facility]  04/13/21  15:23 EDT

## 2021-04-13 NOTE — PROGRESS NOTES
Pharmacy Consult: Warfarin Dosing/ Monitoring    Ambar Lara is a 51 y.o. female, estimated creatinine clearance is 18.4 mL/min (A) (by C-G formula based on SCr of 3.4 mg/dL (H)). weighing 73.9 kg (162 lb 14.7 oz).     has a past medical history of CHF (congestive heart failure) (CMS/[facility]), Dialysis patient (CMS/[facility]), Disease of thyroid gland, Elevated cholesterol, GERD (gastroesophageal reflux disease), Renal disorder, and Sleep apnea.    Social History     Tobacco Use   • Smoking status: Never Smoker   Substance Use Topics   • Alcohol use: Not on file   • Drug use: Not on file       Results from last 7 days   Lab Units 04/13/21  0505 04/12/21  1020 04/12/21  0307 04/11/21  1607 04/11/21  0522 04/10/21  1047 04/10/21  0235 04/10/21  0231 04/09/21 2017 04/09/21  1631   INR  1.32*  --  1.28* 1.47*  --   --   --  1.41* 1.48*  --    APTT seconds 108.3* 85.8* 66.1* 46.5*  --  71.9*  --  46.0* 45.2*  --    HEMOGLOBIN g/dL 9.4*  --  9.3*  --  9.6*  --  9.3*  --  9.7* 10.3*   HEMATOCRIT % 28.8*  --  29.9*  --  29.7*  --  30.1*  --  29.7* 31.2*   PLATELETS 10*3/mm3 315  --  316  --  300  --  285  --  287 323     Results from last 7 days   Lab Units 04/13/21  0505 04/12/21  0307 04/11/21  0522 04/09/21  1631   SODIUM mmol/L 136 136 132* 133*   POTASSIUM mmol/L 3.0* 3.1* 3.2* 3.3*   CHLORIDE mmol/L 99 100 95* 96*   CO2 mmol/L 22.2 24.9 18.9* 21.4*   BUN mg/dL 19 12 37* 28*   CREATININE mg/dL 3.40* 2.67* 4.56* 4.19*   CALCIUM mg/dL 7.8* 7.8* 8.4* 8.6   BILIRUBIN mg/dL  --  <0.2 0.2 0.2   ALK PHOS U/L  --  120* 125* 163*   ALT (SGPT) U/L  --  18 14 15   AST (SGOT) U/L  --  19 15 12   GLUCOSE mg/dL 83 82 93 106*     Anticoagulation history: New start     Hospital Anticoagulation:  Consulting provider: Dr. Maria  Start date: 4/11/21  Indication: Acute RLL subsegmental PE  Target INR: 2-3  Expected duration: TBD  Bridge Therapy: Yes              heparin drip  Date 4/10  4/11 4/12 4/13         INR 1.41 1.47 1.28 1.32          Warfarin dose  Patient refused  10 mg 10 mg            Potential drug interactions:   · Levothyroxine: increases metabolism of vitamin K-dependent clotting factors, resulting in an increased risk of bleeding  · Escitalopram : Increased risk of bleeding  · Heparin: increased risk of bleeding         Relevant nutrition status: Regular diet/ Bedtime snack (fruit/cheese/crackers)    Education complete?/ Date: TBD    Assessment/Plan:  · INR is subtherapeutic at 1.32 (goal 2-3) but is trending up. She has her first dose yesterday. Will continue 10 mg today.  Recheck INR in AM and will adjust dose accordingly.  · Continue heparin drip to bridge until INR >/= 2.    Pharmacy will continue to follow until discharge or discontinuation of warfarin.   Zaki Cortez RPH  4/13/2021

## 2021-04-14 VITALS
TEMPERATURE: 98.6 F | BODY MASS INDEX: 30.09 KG/M2 | RESPIRATION RATE: 17 BRPM | WEIGHT: 159.39 LBS | HEART RATE: 102 BPM | SYSTOLIC BLOOD PRESSURE: 110 MMHG | HEIGHT: 61 IN | OXYGEN SATURATION: 95 % | DIASTOLIC BLOOD PRESSURE: 68 MMHG

## 2021-04-14 PROBLEM — I95.9 HYPOTENSION: Status: RESOLVED | Noted: 2021-04-10 | Resolved: 2021-04-14

## 2021-04-14 LAB
ANION GAP SERPL CALCULATED.3IONS-SCNC: 13.5 MMOL/L (ref 5–15)
APTT PPP: 76 SECONDS (ref 22.7–35.4)
BACTERIA FLD CULT: NORMAL
BACTERIA SPEC AEROBE CULT: NORMAL
BACTERIA SPEC AEROBE CULT: NORMAL
BASOPHILS # BLD MANUAL: 0.14 10*3/MM3 (ref 0–0.2)
BASOPHILS NFR BLD AUTO: 1 % (ref 0–1.5)
BUN SERPL-MCNC: 18 MG/DL (ref 6–20)
BUN/CREAT SERPL: 6.1 (ref 7–25)
CALCIUM SPEC-SCNC: 8.7 MG/DL (ref 8.6–10.5)
CHLORIDE SERPL-SCNC: 100 MMOL/L (ref 98–107)
CO2 SERPL-SCNC: 22.5 MMOL/L (ref 22–29)
CREAT SERPL-MCNC: 2.96 MG/DL (ref 0.57–1)
DEPRECATED RDW RBC AUTO: 48.7 FL (ref 37–54)
ERYTHROCYTE [DISTWIDTH] IN BLOOD BY AUTOMATED COUNT: 14.5 % (ref 12.3–15.4)
GFR SERPL CREATININE-BSD FRML MDRD: 17 ML/MIN/1.73
GLUCOSE SERPL-MCNC: 86 MG/DL (ref 65–99)
GRAM STN SPEC: NORMAL
GRAM STN SPEC: NORMAL
HCT VFR BLD AUTO: 28.8 % (ref 34–46.6)
HGB BLD-MCNC: 9.5 G/DL (ref 12–15.9)
INR PPP: 1.96 (ref 0.9–1.1)
LYMPHOCYTES # BLD MANUAL: 2.03 10*3/MM3 (ref 0.7–3.1)
LYMPHOCYTES NFR BLD MANUAL: 15 % (ref 19.6–45.3)
LYMPHOCYTES NFR BLD MANUAL: 6 % (ref 5–12)
MCH RBC QN AUTO: 30.7 PG (ref 26.6–33)
MCHC RBC AUTO-ENTMCNC: 33 G/DL (ref 31.5–35.7)
MCV RBC AUTO: 93.2 FL (ref 79–97)
MONOCYTES # BLD AUTO: 0.81 10*3/MM3 (ref 0.1–0.9)
MYELOCYTES NFR BLD MANUAL: 5 % (ref 0–0)
NEUTROPHILS # BLD AUTO: 9.86 10*3/MM3 (ref 1.7–7)
NEUTROPHILS NFR BLD MANUAL: 73 % (ref 42.7–76)
PLAT MORPH BLD: NORMAL
PLATELET # BLD AUTO: 295 10*3/MM3 (ref 140–450)
PMV BLD AUTO: 9.5 FL (ref 6–12)
POTASSIUM SERPL-SCNC: 3.8 MMOL/L (ref 3.5–5.2)
PROTHROMBIN TIME: 22.1 SECONDS (ref 11.7–14.2)
RBC # BLD AUTO: 3.09 10*6/MM3 (ref 3.77–5.28)
RBC MORPH BLD: NORMAL
SODIUM SERPL-SCNC: 136 MMOL/L (ref 136–145)
WBC # BLD AUTO: 13.5 10*3/MM3 (ref 3.4–10.8)
WBC MORPH BLD: NORMAL

## 2021-04-14 PROCEDURE — 85007 BL SMEAR W/DIFF WBC COUNT: CPT | Performed by: EMERGENCY MEDICINE

## 2021-04-14 PROCEDURE — 80048 BASIC METABOLIC PNL TOTAL CA: CPT | Performed by: INTERNAL MEDICINE

## 2021-04-14 PROCEDURE — 85610 PROTHROMBIN TIME: CPT | Performed by: INTERNAL MEDICINE

## 2021-04-14 PROCEDURE — 85025 COMPLETE CBC W/AUTO DIFF WBC: CPT | Performed by: EMERGENCY MEDICINE

## 2021-04-14 PROCEDURE — 63710000001 DIPHENHYDRAMINE PER 50 MG: Performed by: NURSE PRACTITIONER

## 2021-04-14 PROCEDURE — 99232 SBSQ HOSP IP/OBS MODERATE 35: CPT | Performed by: INTERNAL MEDICINE

## 2021-04-14 PROCEDURE — 85730 THROMBOPLASTIN TIME PARTIAL: CPT | Performed by: INTERNAL MEDICINE

## 2021-04-14 RX ORDER — CHOLESTYRAMINE 4 G/9G
1 POWDER, FOR SUSPENSION ORAL EVERY 12 HOURS SCHEDULED
Qty: 60 PACKET | Refills: 0 | Status: ON HOLD | OUTPATIENT
Start: 2021-04-14 | End: 2021-04-26

## 2021-04-14 RX ORDER — LEVOTHYROXINE SODIUM 0.03 MG/1
25 TABLET ORAL DAILY
Qty: 30 TABLET | Refills: 3 | Status: ON HOLD | OUTPATIENT
Start: 2021-04-14 | End: 2021-04-27

## 2021-04-14 RX ORDER — WARFARIN SODIUM 7.5 MG/1
7.5 TABLET ORAL
Status: DISCONTINUED | OUTPATIENT
Start: 2021-04-14 | End: 2021-04-14 | Stop reason: HOSPADM

## 2021-04-14 RX ORDER — CHOLESTYRAMINE 4 G/9G
1 POWDER, FOR SUSPENSION ORAL EVERY 12 HOURS SCHEDULED
Status: DISCONTINUED | OUTPATIENT
Start: 2021-04-14 | End: 2021-04-14 | Stop reason: HOSPADM

## 2021-04-14 RX ORDER — GENTAMICIN SULFATE 1 MG/G
OINTMENT TOPICAL DAILY
Qty: 60 G | Refills: 3 | Status: ON HOLD | OUTPATIENT
Start: 2021-04-14 | End: 2021-04-26

## 2021-04-14 RX ORDER — CALCIUM ACETATE 667 MG/1
1334 CAPSULE ORAL
Qty: 180 CAPSULE | Refills: 3 | Status: ON HOLD | OUTPATIENT
Start: 2021-04-14 | End: 2021-04-26

## 2021-04-14 RX ORDER — WARFARIN SODIUM 4 MG/1
TABLET ORAL
Qty: 30 TABLET | Refills: 3 | Status: ON HOLD | OUTPATIENT
Start: 2021-04-14 | End: 2021-04-26

## 2021-04-14 RX ADMIN — ESCITALOPRAM 20 MG: 20 TABLET, FILM COATED ORAL at 09:21

## 2021-04-14 RX ADMIN — ACETAMINOPHEN 650 MG: 325 TABLET ORAL at 01:06

## 2021-04-14 RX ADMIN — CALCIUM ACETATE 1334 MG: 667 CAPSULE ORAL at 09:21

## 2021-04-14 RX ADMIN — SODIUM BICARBONATE 650 MG: 650 TABLET ORAL at 09:21

## 2021-04-14 RX ADMIN — SODIUM CHLORIDE, PRESERVATIVE FREE 10 ML: 5 INJECTION INTRAVENOUS at 09:23

## 2021-04-14 RX ADMIN — GENTAMICIN SULFATE: 1 OINTMENT TOPICAL at 13:50

## 2021-04-14 RX ADMIN — CHOLESTYRAMINE 1 PACKET: 4 POWDER, FOR SUSPENSION ORAL at 13:49

## 2021-04-14 RX ADMIN — DIPHENHYDRAMINE HYDROCHLORIDE 25 MG: 25 CAPSULE ORAL at 05:34

## 2021-04-14 RX ADMIN — VANCOMYCIN 125 MG: KIT at 12:41

## 2021-04-14 RX ADMIN — VANCOMYCIN 125 MG: KIT at 05:34

## 2021-04-14 RX ADMIN — CALCIUM ACETATE 1334 MG: 667 CAPSULE ORAL at 12:41

## 2021-04-14 RX ADMIN — LEVOTHYROXINE SODIUM 25 MCG: 0.03 TABLET ORAL at 05:34

## 2021-04-14 NOTE — PAYOR COMM NOTE
"DISCHARGED    REF #[id number]        Ambar Doan (51 y.o. Female)     Date of Birth Social Security Number Address Home Phone MRN    1970  [address] [place] [address] [phone] [id number]    Jainism Marital Status          Unknown        Admission Date Admission Type Admitting Provider Attending Provider Department, Room/Bed    21 Emergency Mauricio Mitchell MD  [address], 93/1    Discharge Date Discharge Disposition Discharge Destination        2021 Skilled Nursing Facility (DC - External)              Attending Provider: (none)   Allergies: Penicillins, Nickel    Isolation: Spore   Infection: C.difficile (21)   Code Status: CPR    Ht: 154.9 cm (61\")   Wt: 72.3 kg (159 lb 6.3 oz)    Admission Cmt: None   Principal Problem: Colitis, Clostridium difficile [A04.72]                 Active Insurance as of 2021     Primary Coverage     Payor Plan Insurance Group Employer/Plan Group    MEDICARE MEDICARE A & B      Payor Plan Address Payor Plan Phone Number Payor Plan Fax Number Effective Dates    [address] [phone]  2019 - None Entered    [facility] [id number]       Subscriber Name Subscriber Birth Date Member ID       AMBAR DOAN 1970 [id number]           Secondary Coverage     Payor Plan Insurance Group Employer/Plan Group    [employer] [facility] [id number]     Payor Plan Address Payor Plan Phone Number Payor Plan Fax Number Effective Dates    [address]   2021 - None Entered    [facility] [id number]       Subscriber Name Subscriber Birth Date Member ID       AMBAR DOAN 1970 [id number]                 Emergency Contacts      (Rel.) Home Phone Work Phone Mobile Phone    Roland Doan (Son) [phone] -- --               Discharge Summary      Joseph Maria MD at 21 1306              Patient Name: Ambar Doan  : 1970  MRN: [id number]    Date of " Admission: 4/9/2021  Date of Discharge:  4/14/2021  Primary Care Physician: Jet Manuel MD      Discharge Diagnoses     Active Hospital Problems    Diagnosis  POA   • **Colitis, Clostridium difficile [A04.72]  Yes   • Severe malnutrition (CMS/HCC) [E43]  Yes   • History of tracheostomy [Z98.890]  Not Applicable   • Acute pulmonary embolism (CMS/HCC) [I26.99]  Yes   • Gastroesophageal reflux disease [K21.9]  Yes   • Peritoneal dialysis status (CMS/[facility]) [Z99.2]  Not Applicable   • Cardiomyopathy (CMS/HCC) [I42.9]  Yes   • Chronic systolic heart failure (CMS/[facility]) [I50.22]  Yes   • 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  Please refer to the H&P for full details.  A pleasant 51 years old white female with a past history of COVID-19 infection/end-stage renal disease on peritoneal dialysis/congestive heart failure/cardiomyopathy/hypothyroidism/Gitelman syndrome.  She was recently discharged from [facility] after extensive hospital stay with COVID-19 pneumonia ARDS septic shock from C. difficile colitis presented with progressive weakness, persistence of diarrhea, abdominal pain.  Was no fever chills no melena no fresh bright blood per rectum no nausea or vomiting.  No shortness of breath no cough and no wheezing.  Physical examination on admission significant for a temperature 98.9 a pulse of 104 respiratory rate of 16 and blood pressure 97/69 and O2 sats of 95% on room air the rest of the examination is remarkable for trach stoma that appears to be without infection Shiley catheter in the right chest without evidence of infection.  Increased bowel sounds, mild periumbilical and  suprapubic tenderness.  Hospital course.  Initial ER evaluation included an EKG revealing a sinus tachycardia and prolonged AR interval and possible left ventricular hypertrophy with nonspecific T wave changes in the lateral leads.  Chest x-ray revealed no evidence of an acute disease.  CTA of the chest revealed mild pulmonary embolus in the right lower lobe/reticular fibrotic scarring throughout the lungs.  CT scan of the abdomen revealed extensive diffuse colitis/atrophic right adrenal gland and atrophic kidneys with tiny cysts.  CBC normal except a white count of 17.3 hemoglobin 9.7.  INR 1.4 PTT 45.2 PT 17.7.  C. difficile toxin PCR was positive.  GI PCR panel was negative.  Procalcitonin was elevated.  Lactic acid was normal.  D-dimer was elevated.  proBNP was 4129.  Troponin is negative.  CMP was normal except a glucose of 106 BUN 28 creatinine 4.19 sodium 133 potassium 3.3 chloride 96 CO2 of 21.4 albumin 3.1 alkaline phosphatase 164 GFR of 11.  Covid was negative.  Patient this lady was that of recurrence of sepsis secondary to persistent C. difficile colitis.  Infectious disease was consulted.  A prolonged course of p.o. vancomycin was initiated.  Patient symptomatology improved with decreasing diarrhea but was still persistent and cholestyramine was added.  Her GI examination remained benign.  Blood cultures were negative.  GI PCR panel was negative.  Infectious disease wanted a prolonged course of p.o. vancomycin and he has okayed the patient to be discharged.  Her leukocytosis has improved.  No fever was present for the last few days prior to discharge.  She has a history of end-stage renal disease on peritoneal dialysis and she was found to be hypokalemic during this admission.  Magnesium was normal.  Nephrology was consulted.  The decision was against peritoneal dialysis at this time secondary to the C. difficile colitis.  Hemodialysis was initiated.  Her potassium was replaced.  Nephrology has arranged  for the patient to have an outpatient hemodialysis on Monday Wednesday and Friday.  They have also arranged for peritoneal catheter care.  Patient will follow up as an outpatient with nephrology.  She had a recent COVID-19 infection.  Her respiratory status remained stable and repeat Covid was negative.  She was diagnosed with a small right-sided pulmonary embolus there was no evidence of right ventricular strain since hemodialysis was started she was anticoagulated initially with IV heparin and subsequently Coumadin was added.  Her INR at discharge was 1.9.  She is to continue the prescribed dose of Coumadin and INR should be checked in 3 days and followed up by the primary care physician in the skilled facility.  She has a history of anemia.  Her hemoglobin remained stable.  Because of her weakness and fatigue physical therapy evaluated the patient and deemed her appropriate for skilled facility physical therapy and that is where she is being transferred to in a hemodynamically stable condition.  Patient is looking forward to participate in physical therapy and become stronger again so she can go home.  Nephrology and infectious disease okayed the discharge.  Consultants     Consult Orders (all) (From admission, onward)     Start     Ordered    04/10/21 0039  Inpatient Infectious Diseases Consult  Once     Specialty:  Infectious Diseases  Provider:  Mayco Contreras MD    04/10/21 0042    04/10/21 0034  Inpatient Nephrology Consult  Once     Specialty:  Nephrology  Provider:  Brenna Colin MD    04/10/21 0042    04/10/21 0012  Inpatient Spiritual Care Consult  Once     Provider:  (Not yet assigned)    04/10/21 0011    04/10/21 0012  Inpatient Nutrition Consult  Once     Provider:  (Not yet assigned)    04/10/21 0011    04/09/21 1957  [facility] (on-call MD unless specified) Details  Once     Specialty:  Hospitalist  Provider:  (Not yet assigned)    04/09/21 1956              Procedures     Imaging Results  (All)     Procedure Component Value Units Date/Time    CT Angiogram Chest [[id number]] Collected: 04/09/21 1922     Updated: 04/09/21 1955    Narrative:      CT ANGIOGRAM CHEST-, CT ABDOMEN PELVIS W CONTRAST-     CLINICAL HISTORY: Tachycardia. Dyspnea. Elevated d-dimer. Abdominal  pain. Leukocytosis. History of severe COVID 19 infection requiring  intubation in the recent past.     TECHNIQUE: Spiral CT images were obtained through the chest during rapid  IV injection of contrast and were reconstructed in 2 mm thick axial  slices. Multiple coronal and sagittal and 3-D reconstructions were  produced. Subsequently, spiral CT images were obtained through the  abdomen and pelvis with IV contrast.     Radiation dose reduction techniques were utilized, including automated  exposure control and exposure modulation based on body size.     COMPARISON: None     FINDINGS: A patent tracheostomy is noted. The main pulmonary arteries  and their lobar and segmental branches are fairly well opacified. There  is a small filling defect within a subsegmental branch of the right  lower lobe pulmonary artery in the medial aspect of the right lung base  suspicious for a pulmonary embolus. No other filling defects are  identified. The thoracic aorta was also fairly well opacified and is  unremarkable. There is no evidence of aneurysm or dissection. Lung  window images demonstrate moderately extensive coarse reticular and  linear opacities distributed throughout both lungs compatible with  fibrotic scarring due to previous pneumonia. Patchy groundglass  opacities are also present within both lungs indicating at least some  degree of residual acute inflammation.. There are no focal areas of  dense consolidation. There is no bronchiectasis. There are no discrete  lung masses. No emphysematous changes are evident. There are no pleural  effusions. There are a few mildly enlarged lymph nodes scattered  throughout the mediastinum that are quite  likely benign reactive lymph  nodes.     There is diminished attenuation throughout the liver parenchyma  consistent with mild hepatic steatosis. No focal hepatic lesions are  identified. The spleen and pancreas  are unremarkable. Numerous tiny  bilateral simple renal cysts are present. An addition, both kidneys  appear somewhat small in size. The right kidney measures 8.7 cm in  length. The left kidney measures 7.7 cm in length. There is no  hydronephrosis or hydroureter. There is moderate diffuse thickening of  the left adrenal gland consistent with hyperplasia. The right adrenal  gland is markedly atrophic. The stomach and small bowel appear within  normal limits. There is moderate diffuse edema throughout the walls of  the colon that is most prominent in the rectosigmoid region where there  is marked edema and mucosal hyperenhancement. The majority of the colon  is contracted and contains no formed stool. The findings are consistent  with extensive colitis. A peritoneal dialysis catheter is in place and  is looped within the anterior aspect of the pelvis to the right of  midline. There is very tiny amount of free fluid adjacent to the right  lobe of the liver. The uterus is absent.       Impression:      1. Solitary small filling defect within a subsegmental branch of the  right lower lobe pulmonary artery suspicious for a small pulmonary  embolus. No other pulmonary emboli are identified.     2. Patchy areas of fairly extensive linear and reticular fibrotic  scarring throughout both lungs that also mild patchy groundglass  opacities within both lungs. Mildly enlarged mediastinal lymph nodes are  likely benign reactive lymph nodes.     4. CT findings consistent with extensive diffuse colitis as described.  Bowel wall edema is most prominent within the sigmoid colon and rectum.     5. Atrophic right adrenal gland. Diffusely thickened left adrenal gland  consistent with hyperplasia.     6. Atrophic kidneys  containing multiple tiny cysts. Peritoneal dialysis  catheter in place within the pelvis.     This report was finalized on 4/9/2021 7:52 PM by Dr. Cecil oRldan M.D.       CT Abdomen Pelvis With Contrast [[id number]] Collected: 04/09/21 1922     Updated: 04/09/21 1955    Narrative:      CT ANGIOGRAM CHEST-, CT ABDOMEN PELVIS W CONTRAST-     CLINICAL HISTORY: Tachycardia. Dyspnea. Elevated d-dimer. Abdominal  pain. Leukocytosis. History of severe COVID 19 infection requiring  intubation in the recent past.     TECHNIQUE: Spiral CT images were obtained through the chest during rapid  IV injection of contrast and were reconstructed in 2 mm thick axial  slices. Multiple coronal and sagittal and 3-D reconstructions were  produced. Subsequently, spiral CT images were obtained through the  abdomen and pelvis with IV contrast.     Radiation dose reduction techniques were utilized, including automated  exposure control and exposure modulation based on body size.     COMPARISON: None     FINDINGS: A patent tracheostomy is noted. The main pulmonary arteries  and their lobar and segmental branches are fairly well opacified. There  is a small filling defect within a subsegmental branch of the right  lower lobe pulmonary artery in the medial aspect of the right lung base  suspicious for a pulmonary embolus. No other filling defects are  identified. The thoracic aorta was also fairly well opacified and is  unremarkable. There is no evidence of aneurysm or dissection. Lung  window images demonstrate moderately extensive coarse reticular and  linear opacities distributed throughout both lungs compatible with  fibrotic scarring due to previous pneumonia. Patchy groundglass  opacities are also present within both lungs indicating at least some  degree of residual acute inflammation.. There are no focal areas of  dense consolidation. There is no bronchiectasis. There are no discrete  lung masses. No emphysematous changes are evident.  There are no pleural  effusions. There are a few mildly enlarged lymph nodes scattered  throughout the mediastinum that are quite likely benign reactive lymph  nodes.     There is diminished attenuation throughout the liver parenchyma  consistent with mild hepatic steatosis. No focal hepatic lesions are  identified. The spleen and pancreas  are unremarkable. Numerous tiny  bilateral simple renal cysts are present. An addition, both kidneys  appear somewhat small in size. The right kidney measures 8.7 cm in  length. The left kidney measures 7.7 cm in length. There is no  hydronephrosis or hydroureter. There is moderate diffuse thickening of  the left adrenal gland consistent with hyperplasia. The right adrenal  gland is markedly atrophic. The stomach and small bowel appear within  normal limits. There is moderate diffuse edema throughout the walls of  the colon that is most prominent in the rectosigmoid region where there  is marked edema and mucosal hyperenhancement. The majority of the colon  is contracted and contains no formed stool. The findings are consistent  with extensive colitis. A peritoneal dialysis catheter is in place and  is looped within the anterior aspect of the pelvis to the right of  midline. There is very tiny amount of free fluid adjacent to the right  lobe of the liver. The uterus is absent.       Impression:      1. Solitary small filling defect within a subsegmental branch of the  right lower lobe pulmonary artery suspicious for a small pulmonary  embolus. No other pulmonary emboli are identified.     2. Patchy areas of fairly extensive linear and reticular fibrotic  scarring throughout both lungs that also mild patchy groundglass  opacities within both lungs. Mildly enlarged mediastinal lymph nodes are  likely benign reactive lymph nodes.     4. CT findings consistent with extensive diffuse colitis as described.  Bowel wall edema is most prominent within the sigmoid colon and rectum.     5.  Atrophic right adrenal gland. Diffusely thickened left adrenal gland  consistent with hyperplasia.     6. Atrophic kidneys containing multiple tiny cysts. Peritoneal dialysis  catheter in place within the pelvis.     This report was finalized on 4/9/2021 7:52 PM by Dr. Cecil Roldan M.D.       XR Chest 1 View [[id number]] Collected: 04/09/21 1656     Updated: 04/09/21 1700    Narrative:      PORTABLE CHEST 01/09/2021 AT 4:34 PM     CLINICAL HISTORY: Hypotension     There is minimal patchy bibasilar atelectasis. No acute focal  infiltrates are identified. There are no pleural effusions. The heart is  normal in size. A right internal jugular dialysis catheter is in place  in satisfactory position.     IMPRESSIONS: No evidence of acute disease within the chest.     This report was finalized on 4/9/2021 4:57 PM by Dr. Cecil Roldan M.D.             Pertinent Labs     Results from last 7 days   Lab Units 04/14/21  0648 04/13/21  0505 04/12/21 0307 04/11/21  0522   WBC 10*3/mm3 13.50* 13.16* 11.72* 12.26*   HEMOGLOBIN g/dL 9.5* 9.4* 9.3* 9.6*   PLATELETS 10*3/mm3 295 315 316 300     Results from last 7 days   Lab Units 04/14/21  0648 04/13/21  0505 04/12/21 0307 04/11/21  0522   SODIUM mmol/L 136 136 136 132*   POTASSIUM mmol/L 3.8 3.0* 3.1* 3.2*   CHLORIDE mmol/L 100 99 100 95*   CO2 mmol/L 22.5 22.2 24.9 18.9*   BUN mg/dL 18 19 12 37*   CREATININE mg/dL 2.96* 3.40* 2.67* 4.56*   GLUCOSE mg/dL 86 83 82 93   Estimated Creatinine Clearance: 20.9 mL/min (A) (by C-G formula based on SCr of 2.96 mg/dL (H)).  Results from last 7 days   Lab Units 04/13/21  0505 04/12/21  0307 04/11/21  0522 04/09/21  1631   ALBUMIN g/dL 3.00* 3.30* 2.80* 3.10*   BILIRUBIN mg/dL  --  <0.2 0.2 0.2   ALK PHOS U/L  --  120* 125* 163*   AST (SGOT) U/L  --  19 15 12   ALT (SGPT) U/L  --  18 14 15     Results from last 7 days   Lab Units 04/14/21  0648 04/13/21  0505 04/12/21  0307 04/11/21  0522 04/10/21  0235 04/09/21  1631   CALCIUM mg/dL 8.7  7.8* 7.8* 8.4* 8.1* 8.6   ALBUMIN g/dL  --  3.00* 3.30* 2.80*  --  3.10*   MAGNESIUM mg/dL  --  1.6  --  2.0 1.9 1.8   PHOSPHORUS mg/dL  --  3.8  --  7.1* 4.7*  --        Results from last 7 days   Lab Units 04/09/21  1631   TROPONIN T ng/mL 0.030   PROBNP pg/mL 4,129.0*   D DIMER QUANT MCGFEU/mL 2.22*     Results from last 7 days   Lab Units 04/11/21  0522   URIC ACID mg/dL 11.8*         Invalid input(s): LDLCALC  Results from last 7 days   Lab Units 04/11/21  2046 04/09/21  1804 04/09/21  1631   BLOODCX   --  No growth at 4 days No growth at 4 days   BODYFLDCX  No growth at 3 days  --   --      Imaging Results (Last 24 Hours)     ** No results found for the last 24 hours. **          Test Results Pending at Discharge     Pending Labs     Order Current Status    Blood Culture - Blood, Arm, Left Preliminary result    Blood Culture - Blood, Arm, Right Preliminary result            Discharge Exam   Physical Exam  Vitals.  Temperature 97 a pulse of 100 respiratory rate of 17 and blood pressure 106/73 and O2 sats of 96% on room air  General.  Middle-aged female.  Alert oriented x3 in no apparent pain distress or diaphoresis.  Depressed.  Eyes.  No pallor or jaundice normal conjunctive and lids intact extraocular musculature is  Oral cavity.  Moist mucous membrane  Neck.  Dressed tracheostomy wound.  No JVD.  Chest.  Tunneled catheter in the right upper chest no erythema or discharge.  Clear to auscultation bilaterally with no added sounds.  Cardiovascular.  Regular rate and rhythm grade 2 systolic murmur.  Abdomen.  No  tenderness..  No guarding or rebound.  No distention.  No organomegaly.  Normal bowel sounds.    No distention  Extremities.  No clubbing cyanosis or edema.  CNS.  No acute focal neurological deficits.     Discharge Details        Discharge Medications      New Medications      Instructions Start Date   calcium acetate 667 MG capsule capsule  Commonly known as: PHOS BINDER)   1,334 mg, Oral, 3 Times  Daily With Meals      cholestyramine 4 g packet  Commonly known as: QUESTRAN   1 packet, Oral, Every 12 Hours Scheduled      gentamicin 0.1 % ointment  Commonly known as: GARAMYCIN   Topical, Daily      levothyroxine 25 MCG tablet  Commonly known as: SYNTHROID, LEVOTHROID  Replaces: levothyroxine sodium 112 MCG capsule   25 mcg, Oral, Daily      vancomycin 50 MG/ML reconstituted solution oral solution reconstituted   125 mg, Oral, Every 6 Hours Scheduled      vancomycin 50 MG/ML reconstituted solution oral solution reconstituted   125 mg, Oral, Every 12 Hours   Start Date: April 20, 2021     vancomycin 50 MG/ML reconstituted solution oral solution reconstituted   125 mg, Oral, Every 24 Hours   Start Date: April 27, 2021     vancomycin 50 MG/ML reconstituted solution oral solution reconstituted   125 mg, Oral, Every Other Day   Start Date: May 4, 2021     warfarin 4 MG tablet  Commonly known as: Coumadin   1 tablet p.o. every day.         Continue These Medications      Instructions Start Date   Lexapro 20 MG tablet  Generic drug: escitalopram   Every 24 Hours      Singulair 10 MG tablet  Generic drug: montelukast   Every 24 Hours      sodium bicarbonate 650 MG tablet   Every 8 Hours Scheduled         Stop These Medications    KLOR-CON 20 MEQ CR tablet  Generic drug: potassium chloride     levothyroxine sodium 112 MCG capsule  Commonly known as: TIROSINT  Replaced by: levothyroxine 25 MCG tablet     SPIRONOLACTONE PO            Allergies   Allergen Reactions   • Penicillins Anaphylaxis   • Nickel Rash         Discharge Disposition:  Condition: Stable    Diet:   Diet Order   Procedures   • Diet Regular   Cardiac/renal    Activity:   Activity Instructions     Activity as Tolerated      Other Activity Instructions      Activity Instructions: PT and OT to evaluate and treat.    Up WIth Assist            CODE STATUS:    Code Status and Medical Interventions:   Ordered at: 04/10/21 0042     Code Status:    [facility]     Medical  Interventions (Level of Support Prior to Arrest):    Full       No future appointments.  Additional Instructions for the Follow-ups that You Need to Schedule     Call MD With Problems / Concerns   As directed      Instructions: Call MD or return to ER if worsening diarrhea.  Fresh bright blood per rectum.  No melena.  Abdominal pain.  Nausea or vomiting.  Bleeding diathesis.  Chest pain.  Shortness of breath.  Leg swelling.    Order Comments: Instructions: Call MD or return to ER if worsening diarrhea.  Fresh bright blood per rectum.  No melena.  Abdominal pain.  Nausea or vomiting.  Bleeding diathesis.  Chest pain.  Shortness of breath.  Leg swelling.          Discharge Follow-up with PCP   As directed       Currently Documented PCP:    Jet Manuel MD    PCP Phone Number:    [phone]     Follow Up Details: Primary MD at the skilled facility in 3 days to check INR/pulmonary embolus/C. difficile colitis/end-stage renal failure         Discharge Follow-up with Specified Provider: Nephrology.  As directed for hemodialysis and peritoneal dialysis care.   As directed      To: Nephrology.  As directed for hemodialysis and peritoneal dialysis care.            Contact information for follow-up providers     [facility]. Go in 21 day(s).    Specialty: Dialysis  Why: She has appt with SRIDEVI Castellon at [facility]  location May 5 at 1 pm  for Peritoneal dialysis exit site eval.   Contact information:  [address] [address] [phone]           Jet Manuel MD .    Specialty: Internal Medicine  Why: Primary MD at the skilled facility in 3 days to check INR/pulmonary embolus/C. difficile colitis/end-stage renal failure  Contact information:  [address]  [facility] [address] [phone]                   Contact information for after-discharge care     Destination     [facility] .    Service: Skilled Nursing  Contact  information:  [address]  [facility] [id number] [phone]                               Time Spent on Discharge:  Greater than 30 minutes      Laura Maria MD  [facility]  04/14/21  13:06 EDT    Electronically signed by Laura Maria MD at 04/14/21 1320       Discharge Order (From admission, onward)     Start     Ordered    04/14/21 1259  Discharge patient  Once     Expected Discharge Date: 04/14/21    Expected Discharge Time: Afternoon    Discharge Disposition: Skilled Nursing Facility (DC - External)    Physician of Record for Attribution - Please select from Treatment Team: LAURA MARIA [[id number]]    Review needed by CMO to determine Physician of Record: No       Question Answer Comment   Physician of Record for Attribution - Please select from Treatment Team LAURA MARIA    Review needed by CMO to determine Physician of Record No        04/14/21 7537

## 2021-04-14 NOTE — PROGRESS NOTES
Continued Stay Note  [facility]     Patient Name: Ambar Lara  MRN: [id number]  Today's Date: 4/14/2021    Admit Date: 4/9/2021    Discharge Plan     Row Name 04/14/21 1316       Plan    Plan Comments  Spoke with Annette with [facility] they will accept patient today. Dialysis has been set up at [facility] in [facility], and arrival time 10:45, chair time 11:00am. Daughter will transport to [facility] today.    Row Name 04/14/21 1215       Plan    Plan  [facility] HD chair MWF @ 11:00 A.M starting Friday 4/16    Plan Comments  GERI spoke with Vickey/[facility] [phone]; states patient was approved for chair time for 11:00 A.M MWF at [facility] location ([address] [place] [address]). Per Vickey, patient will need to arrive at 10:45 A.M on Friday. Per Vickey; he will have [facility] RN call and discuss PD catheter with Dr. Moura. Cecilia GOODWIN        Discharge Codes    No documentation.       Expected Discharge Date and Time     Expected Discharge Date Expected Discharge Time    Apr 14, 2021             Nancy Posada RN

## 2021-04-14 NOTE — PLAN OF CARE
Goal Outcome Evaluation:     Pt see by Joseph Tong and ok to d/c to SNF. Ok to d/c also from Dr Moura standpoint; pt scheduled for dialysis at [facility] on Friday at 10:45am. Report called to [facility] at [phone], taken by Alysa. Patient taking cell phone and  with her. Pt's son Roland providing transportation from hospital to [facility].

## 2021-04-14 NOTE — PROGRESS NOTES
Her hemodialysis spot is MWF at 11 am.  Although she dialyzed last yesterday and next time will be Friday, I think she is safe to do this.  Her K is usually low, she is losing a lot of volume with diarrhea and bicarb is 22.  I called orders to [facility] dialysis unit.    She also has follow up with Home training SRIDEVI Castellon at [facility] location for PD flush may 5 at 1 pm.

## 2021-04-14 NOTE — PROGRESS NOTES
Continued Stay Note  [facility]     Patient Name: Ambar Lara  MRN: [id number]  Today's Date: 4/14/2021    Admit Date: 4/9/2021    Discharge Plan     Row Name 04/14/21 0841       Plan    Plan  [facility] pending HD setup    Plan Comments  [facility] spoke with Berta [phone]; he states he is awaiting to hear back from the medical director and should know this afternoon. Cecilia GOODWIN        Discharge Codes    No documentation.       Expected Discharge Date and Time     Expected Discharge Date Expected Discharge Time    Apr 14, 2021             BUDDY Sinclair

## 2021-04-14 NOTE — PROGRESS NOTES
ID note for C. difficile  Subjective: Diarrhea is improving although she still having some more incontinence like bowel movements.  No fevers or chills or night sweats. Appetite good.    Objective: Afebrile, chronically ill-appearing but no acute distress, abdomen is soft nontender nondistended, appropriate mood and affect    Labs pending from today    Assessment and plan  1.  Sepsis secondary to #2  2.  Recurrent C. Difficile  3.  Recent history of Covid, negative for Covid here   4.  Subsegmental right lower lobe PE  5.  End-stage renal disease on peritoneal dialysis traditionally, but now on hemodialysis  6.  IBS, complicating above     Okay to discharge from ID perspective.  Awaiting placement.  Continue vancomycin 125 mg every 6 hours x10 days followed by prolonged vancomycin taper over about the next 2 months.    Awaiting therapeutic INR and continues on heparin bridge well titrating warfarin

## 2021-04-14 NOTE — PROGRESS NOTES
Pharmacy Consult: Warfarin Dosing/ Monitoring    Ambar Lara is a 51 y.o. female, estimated creatinine clearance is 20.9 mL/min (A) (by C-G formula based on SCr of 2.96 mg/dL (H)). weighing 72.3 kg (159 lb 6.3 oz).     has a past medical history of CHF (congestive heart failure) (CMS/[facility]), Dialysis patient (CMS/[facility]), Disease of thyroid gland, Elevated cholesterol, GERD (gastroesophageal reflux disease), Renal disorder, and Sleep apnea.    Social History     Tobacco Use   • Smoking status: Never Smoker   Substance Use Topics   • Alcohol use: Not on file   • Drug use: Not on file       Results from last 7 days   Lab Units 04/14/21  0648 04/13/21  2257 04/13/21  1534 04/13/21  0505 04/12/21  1020 04/12/21  0307 04/11/21  1607 04/11/21  0522 04/10/21  0235 04/10/21  0231 04/09/21  2017 04/09/21  1631   INR  1.96*  --   --  1.32*  --  1.28* 1.47*  --   --  1.41* 1.48*  --    APTT seconds 76.0* 117.7* 109.6* 108.3* 85.8* 66.1* 46.5*  --   --  46.0* 45.2*  --    HEMOGLOBIN g/dL 9.5*  --   --  9.4*  --  9.3*  --  9.6* 9.3*  --  9.7* 10.3*   HEMATOCRIT % 28.8*  --   --  28.8*  --  29.9*  --  29.7* 30.1*  --  29.7* 31.2*   PLATELETS 10*3/mm3 295  --   --  315  --  316  --  300 285  --  287 323     Results from last 7 days   Lab Units 04/14/21  0648 04/13/21  0505 04/12/21  0307 04/11/21  0522 04/09/21  1631   SODIUM mmol/L 136 136 136 132* 133*   POTASSIUM mmol/L 3.8 3.0* 3.1* 3.2* 3.3*   CHLORIDE mmol/L 100 99 100 95* 96*   CO2 mmol/L 22.5 22.2 24.9 18.9* 21.4*   BUN mg/dL 18 19 12 37* 28*   CREATININE mg/dL 2.96* 3.40* 2.67* 4.56* 4.19*   CALCIUM mg/dL 8.7 7.8* 7.8* 8.4* 8.6   BILIRUBIN mg/dL  --   --  <0.2 0.2 0.2   ALK PHOS U/L  --   --  120* 125* 163*   ALT (SGPT) U/L  --   --  18 14 15   AST (SGOT) U/L  --   --  19 15 12   GLUCOSE mg/dL 86 83 82 93 106*     Anticoagulation history: New start     Hospital Anticoagulation:  Consulting provider: Dr. Maria  Start date: 4/11/21  Indication: Acute RLL subsegmental  PE  Target INR: 2-3  Expected duration: TBD  Bridge Therapy: Yes              heparin drip  Date 4/10  4/11 4/12 4/13 4/14        INR 1.41 1.47 1.28 1.32 1.96        Warfarin dose  Patient refused  10 mg 10 mg  7.5 mg          Potential drug interactions:   · Levothyroxine: increases metabolism of vitamin K-dependent clotting factors, resulting in an increased risk of bleeding  · Escitalopram : Increased risk of bleeding  · Heparin: increased risk of bleeding         Relevant nutrition status: Regular diet/ Bedtime snack (fruit/cheese/crackers)    Education complete?/ Date: TBD    Assessment/Plan:  · INR is only slightly subtherapeutic at 1.96 (goal 2-3). I will give the patient warfarin 7.5 mg x 1 tonight and recheck INR in AM and adjust dose accordingly.  · Continue heparin drip to bridge until INR >/= 2.    Pharmacy will continue to follow until discharge or discontinuation of warfarin.   Cheyanne Ashford, [facility]  4/14/2021

## 2021-04-14 NOTE — PLAN OF CARE
Alert and following commands. Pt seems to be in better spirits. Cdiff positive, in contact spore isolation. Medications whole with water. Several stools tonight, barrier cream applied to excoriated areas and pressure ulcer. Heparin gtt titrated, see MAR. PTT lab due at 0700.     Problem: Adult Inpatient Plan of Care  Goal: Absence of Hospital-Acquired Illness or Injury  Intervention: Identify and Manage Fall Risk  Recent Flowsheet Documentation  Taken 4/14/2021 0340 by Melanie Paredes RN  Safety Promotion/Fall Prevention: safety round/check completed  Taken 4/14/2021 0145 by Melanie Paredes RN  Safety Promotion/Fall Prevention: safety round/check completed  Taken 4/13/2021 2350 by Melanie Paredes RN  Safety Promotion/Fall Prevention: safety round/check completed  Taken 4/13/2021 2034 by Melanie Paredes RN  Safety Promotion/Fall Prevention: safety round/check completed     Problem: Adult Inpatient Plan of Care  Goal: Absence of Hospital-Acquired Illness or Injury  Intervention: Prevent Skin Injury  Recent Flowsheet Documentation  Taken 4/14/2021 0340 by Melanie Paredes RN  Body Position: position changed independently  Taken 4/14/2021 0145 by Melanie Paredes RN  Body Position: sitting up in bed  Taken 4/13/2021 2350 by Melanie Paredes RN  Body Position: side-lying, left  Taken 4/13/2021 2034 by Melanie Paredes RN  Body Position: position maintained  Skin Protection: adhesive use limited   oal Outcome Evaluation:  Plan of Care Reviewed With: patient  Progress: no change

## 2021-04-14 NOTE — DISCHARGE SUMMARY
Patient Name: Ambar Lara  : 1970  MRN: [id number]    Date of Admission: 2021  Date of Discharge:  2021  Primary Care Physician: Jet Manuel MD      Discharge Diagnoses     Active Hospital Problems    Diagnosis  POA   • **Colitis, Clostridium difficile [A04.72]  Yes   • Severe malnutrition (CMS/HCC) [E43]  Yes   • History of tracheostomy [Z98.890]  Not Applicable   • Acute pulmonary embolism (CMS/HCC) [I26.99]  Yes   • Gastroesophageal reflux disease [K21.9]  Yes   • Peritoneal dialysis status (CMS/HCC) [Z99.2]  Not Applicable   • Cardiomyopathy (CMS/HCC) [I42.9]  Yes   • Chronic systolic heart failure (CMS/HCC) [I50.22]  Yes   • 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  Please refer to the H&P for full details.  A pleasant 51 years old white female with a past history of COVID-19 infection/end-stage renal disease on peritoneal dialysis/congestive heart failure/cardiomyopathy/hypothyroidism/Gitelman syndrome.  She was recently discharged from [place] after extensive hospital stay with COVID-19 pneumonia ARDS septic shock from C. difficile colitis presented with progressive weakness, persistence of diarrhea, abdominal pain.  Was no fever chills no melena no fresh bright blood per rectum no nausea or vomiting.  No shortness of breath no cough and no wheezing.  Physical examination on admission significant for a temperature 98.9 a pulse of 104 respiratory rate of 16 and blood pressure 97/69 and O2 sats of 95% on room air the rest of the examination is remarkable for trach stoma that appears to be without infection Shiley catheter in the right  chest without evidence of infection.  Increased bowel sounds, mild periumbilical and suprapubic tenderness.  Hospital course.  Initial ER evaluation included an EKG revealing a sinus tachycardia and prolonged GA interval and possible left ventricular hypertrophy with nonspecific T wave changes in the lateral leads.  Chest x-ray revealed no evidence of an acute disease.  CTA of the chest revealed mild pulmonary embolus in the right lower lobe/reticular fibrotic scarring throughout the lungs.  CT scan of the abdomen revealed extensive diffuse colitis/atrophic right adrenal gland and atrophic kidneys with tiny cysts.  CBC normal except a white count of 17.3 hemoglobin 9.7.  INR 1.4 PTT 45.2 PT 17.7.  C. difficile toxin PCR was positive.  GI PCR panel was negative.  Procalcitonin was elevated.  Lactic acid was normal.  D-dimer was elevated.  proBNP was 4129.  Troponin is negative.  CMP was normal except a glucose of 106 BUN 28 creatinine 4.19 sodium 133 potassium 3.3 chloride 96 CO2 of 21.4 albumin 3.1 alkaline phosphatase 164 GFR of 11.  Covid was negative.  Patient this lady was that of recurrence of sepsis secondary to persistent C. difficile colitis.  Infectious disease was consulted.  A prolonged course of p.o. vancomycin was initiated.  Patient symptomatology improved with decreasing diarrhea but was still persistent and cholestyramine was added.  Her GI examination remained benign.  Blood cultures were negative.  GI PCR panel was negative.  Infectious disease wanted a prolonged course of p.o. vancomycin and he has okayed the patient to be discharged.  Her leukocytosis has improved.  No fever was present for the last few days prior to discharge.  She has a history of end-stage renal disease on peritoneal dialysis and she was found to be hypokalemic during this admission.  Magnesium was normal.  Nephrology was consulted.  The decision was against peritoneal dialysis at this time secondary to the C. difficile  colitis.  Hemodialysis was initiated.  Her potassium was replaced.  Nephrology has arranged for the patient to have an outpatient hemodialysis on Monday Wednesday and Friday.  They have also arranged for peritoneal catheter care.  Patient will follow up as an outpatient with nephrology.  She had a recent COVID-19 infection.  Her respiratory status remained stable and repeat Covid was negative.  She was diagnosed with a small right-sided pulmonary embolus there was no evidence of right ventricular strain since hemodialysis was started she was anticoagulated initially with IV heparin and subsequently Coumadin was added.  Her INR at discharge was 1.9.  She is to continue the prescribed dose of Coumadin and INR should be checked in 3 days and followed up by the primary care physician in the skilled facility.  She has a history of anemia.  Her hemoglobin remained stable.  Because of her weakness and fatigue physical therapy evaluated the patient and deemed her appropriate for skilled facility physical therapy and that is where she is being transferred to in a hemodynamically stable condition.  Patient is looking forward to participate in physical therapy and become stronger again so she can go home.  Nephrology and infectious disease okayed the discharge.  Consultants     Consult Orders (all) (From admission, onward)     Start     Ordered    04/10/21 0039  Inpatient Infectious Diseases Consult  Once     Specialty:  Infectious Diseases  Provider:  Mayco Contreras MD    04/10/21 0042    04/10/21 0034  Inpatient Nephrology Consult  Once     Specialty:  Nephrology  Provider:  Brenna Colin MD    04/10/21 0042    04/10/21 0012  Inpatient Spiritual Care Consult  Once     Provider:  (Not yet assigned)    04/10/21 0011    04/10/21 0012  Inpatient Nutrition Consult  Once     Provider:  (Not yet assigned)    04/10/21 0011    04/09/21 1957  [facility] (on-call MD unless specified) Details  Once     Specialty:  Hospitalist   Provider:  (Not yet assigned)    04/09/21 1956              Procedures     Imaging Results (All)     Procedure Component Value Units Date/Time    CT Angiogram Chest [[id number]] Collected: 04/09/21 1922     Updated: 04/09/21 1955    Narrative:      CT ANGIOGRAM CHEST-, CT ABDOMEN PELVIS W CONTRAST-     CLINICAL HISTORY: Tachycardia. Dyspnea. Elevated d-dimer. Abdominal  pain. Leukocytosis. History of severe COVID 19 infection requiring  intubation in the recent past.     TECHNIQUE: Spiral CT images were obtained through the chest during rapid  IV injection of contrast and were reconstructed in 2 mm thick axial  slices. Multiple coronal and sagittal and 3-D reconstructions were  produced. Subsequently, spiral CT images were obtained through the  abdomen and pelvis with IV contrast.     Radiation dose reduction techniques were utilized, including automated  exposure control and exposure modulation based on body size.     COMPARISON: None     FINDINGS: A patent tracheostomy is noted. The main pulmonary arteries  and their lobar and segmental branches are fairly well opacified. There  is a small filling defect within a subsegmental branch of the right  lower lobe pulmonary artery in the medial aspect of the right lung base  suspicious for a pulmonary embolus. No other filling defects are  identified. The thoracic aorta was also fairly well opacified and is  unremarkable. There is no evidence of aneurysm or dissection. Lung  window images demonstrate moderately extensive coarse reticular and  linear opacities distributed throughout both lungs compatible with  fibrotic scarring due to previous pneumonia. Patchy groundglass  opacities are also present within both lungs indicating at least some  degree of residual acute inflammation.. There are no focal areas of  dense consolidation. There is no bronchiectasis. There are no discrete  lung masses. No emphysematous changes are evident. There are no pleural  effusions. There  are a few mildly enlarged lymph nodes scattered  throughout the mediastinum that are quite likely benign reactive lymph  nodes.     There is diminished attenuation throughout the liver parenchyma  consistent with mild hepatic steatosis. No focal hepatic lesions are  identified. The spleen and pancreas  are unremarkable. Numerous tiny  bilateral simple renal cysts are present. An addition, both kidneys  appear somewhat small in size. The right kidney measures 8.7 cm in  length. The left kidney measures 7.7 cm in length. There is no  hydronephrosis or hydroureter. There is moderate diffuse thickening of  the left adrenal gland consistent with hyperplasia. The right adrenal  gland is markedly atrophic. The stomach and small bowel appear within  normal limits. There is moderate diffuse edema throughout the walls of  the colon that is most prominent in the rectosigmoid region where there  is marked edema and mucosal hyperenhancement. The majority of the colon  is contracted and contains no formed stool. The findings are consistent  with extensive colitis. A peritoneal dialysis catheter is in place and  is looped within the anterior aspect of the pelvis to the right of  midline. There is very tiny amount of free fluid adjacent to the right  lobe of the liver. The uterus is absent.       Impression:      1. Solitary small filling defect within a subsegmental branch of the  right lower lobe pulmonary artery suspicious for a small pulmonary  embolus. No other pulmonary emboli are identified.     2. Patchy areas of fairly extensive linear and reticular fibrotic  scarring throughout both lungs that also mild patchy groundglass  opacities within both lungs. Mildly enlarged mediastinal lymph nodes are  likely benign reactive lymph nodes.     4. CT findings consistent with extensive diffuse colitis as described.  Bowel wall edema is most prominent within the sigmoid colon and rectum.     5. Atrophic right adrenal gland. Diffusely  thickened left adrenal gland  consistent with hyperplasia.     6. Atrophic kidneys containing multiple tiny cysts. Peritoneal dialysis  catheter in place within the pelvis.     This report was finalized on 4/9/2021 7:52 PM by Dr. Cecil Roldan M.D.       CT Abdomen Pelvis With Contrast [[id number]] Collected: 04/09/21 1922     Updated: 04/09/21 1955    Narrative:      CT ANGIOGRAM CHEST-, CT ABDOMEN PELVIS W CONTRAST-     CLINICAL HISTORY: Tachycardia. Dyspnea. Elevated d-dimer. Abdominal  pain. Leukocytosis. History of severe COVID 19 infection requiring  intubation in the recent past.     TECHNIQUE: Spiral CT images were obtained through the chest during rapid  IV injection of contrast and were reconstructed in 2 mm thick axial  slices. Multiple coronal and sagittal and 3-D reconstructions were  produced. Subsequently, spiral CT images were obtained through the  abdomen and pelvis with IV contrast.     Radiation dose reduction techniques were utilized, including automated  exposure control and exposure modulation based on body size.     COMPARISON: None     FINDINGS: A patent tracheostomy is noted. The main pulmonary arteries  and their lobar and segmental branches are fairly well opacified. There  is a small filling defect within a subsegmental branch of the right  lower lobe pulmonary artery in the medial aspect of the right lung base  suspicious for a pulmonary embolus. No other filling defects are  identified. The thoracic aorta was also fairly well opacified and is  unremarkable. There is no evidence of aneurysm or dissection. Lung  window images demonstrate moderately extensive coarse reticular and  linear opacities distributed throughout both lungs compatible with  fibrotic scarring due to previous pneumonia. Patchy groundglass  opacities are also present within both lungs indicating at least some  degree of residual acute inflammation.. There are no focal areas of  dense consolidation. There is no  bronchiectasis. There are no discrete  lung masses. No emphysematous changes are evident. There are no pleural  effusions. There are a few mildly enlarged lymph nodes scattered  throughout the mediastinum that are quite likely benign reactive lymph  nodes.     There is diminished attenuation throughout the liver parenchyma  consistent with mild hepatic steatosis. No focal hepatic lesions are  identified. The spleen and pancreas  are unremarkable. Numerous tiny  bilateral simple renal cysts are present. An addition, both kidneys  appear somewhat small in size. The right kidney measures 8.7 cm in  length. The left kidney measures 7.7 cm in length. There is no  hydronephrosis or hydroureter. There is moderate diffuse thickening of  the left adrenal gland consistent with hyperplasia. The right adrenal  gland is markedly atrophic. The stomach and small bowel appear within  normal limits. There is moderate diffuse edema throughout the walls of  the colon that is most prominent in the rectosigmoid region where there  is marked edema and mucosal hyperenhancement. The majority of the colon  is contracted and contains no formed stool. The findings are consistent  with extensive colitis. A peritoneal dialysis catheter is in place and  is looped within the anterior aspect of the pelvis to the right of  midline. There is very tiny amount of free fluid adjacent to the right  lobe of the liver. The uterus is absent.       Impression:      1. Solitary small filling defect within a subsegmental branch of the  right lower lobe pulmonary artery suspicious for a small pulmonary  embolus. No other pulmonary emboli are identified.     2. Patchy areas of fairly extensive linear and reticular fibrotic  scarring throughout both lungs that also mild patchy groundglass  opacities within both lungs. Mildly enlarged mediastinal lymph nodes are  likely benign reactive lymph nodes.     4. CT findings consistent with extensive diffuse colitis as  described.  Bowel wall edema is most prominent within the sigmoid colon and rectum.     5. Atrophic right adrenal gland. Diffusely thickened left adrenal gland  consistent with hyperplasia.     6. Atrophic kidneys containing multiple tiny cysts. Peritoneal dialysis  catheter in place within the pelvis.     This report was finalized on 4/9/2021 7:52 PM by Dr. Cecil Roldan M.D.       XR Chest 1 View [[id number]] Collected: 04/09/21 1656     Updated: 04/09/21 1700    Narrative:      PORTABLE CHEST 01/09/2021 AT 4:34 PM     CLINICAL HISTORY: Hypotension     There is minimal patchy bibasilar atelectasis. No acute focal  infiltrates are identified. There are no pleural effusions. The heart is  normal in size. A right internal jugular dialysis catheter is in place  in satisfactory position.     IMPRESSIONS: No evidence of acute disease within the chest.     This report was finalized on 4/9/2021 4:57 PM by Dr. Cecil Roldan M.D.             Pertinent Labs     Results from last 7 days   Lab Units 04/14/21  0648 04/13/21  0505 04/12/21 0307 04/11/21  0522   WBC 10*3/mm3 13.50* 13.16* 11.72* 12.26*   HEMOGLOBIN g/dL 9.5* 9.4* 9.3* 9.6*   PLATELETS 10*3/mm3 295 315 316 300     Results from last 7 days   Lab Units 04/14/21  0648 04/13/21  0505 04/12/21  0307 04/11/21  0522   SODIUM mmol/L 136 136 136 132*   POTASSIUM mmol/L 3.8 3.0* 3.1* 3.2*   CHLORIDE mmol/L 100 99 100 95*   CO2 mmol/L 22.5 22.2 24.9 18.9*   BUN mg/dL 18 19 12 37*   CREATININE mg/dL 2.96* 3.40* 2.67* 4.56*   GLUCOSE mg/dL 86 83 82 93   Estimated Creatinine Clearance: 20.9 mL/min (A) (by C-G formula based on SCr of 2.96 mg/dL (H)).  Results from last 7 days   Lab Units 04/13/21  0505 04/12/21  0307 04/11/21  0522 04/09/21  1631   ALBUMIN g/dL 3.00* 3.30* 2.80* 3.10*   BILIRUBIN mg/dL  --  <0.2 0.2 0.2   ALK PHOS U/L  --  120* 125* 163*   AST (SGOT) U/L  --  19 15 12   ALT (SGPT) U/L  --  18 14 15     Results from last 7 days   Lab Units 04/14/21  0648  04/13/21  0505 04/12/21  0307 04/11/21  0522 04/10/21  0235 04/09/21  1631   CALCIUM mg/dL 8.7 7.8* 7.8* 8.4* 8.1* 8.6   ALBUMIN g/dL  --  3.00* 3.30* 2.80*  --  3.10*   MAGNESIUM mg/dL  --  1.6  --  2.0 1.9 1.8   PHOSPHORUS mg/dL  --  3.8  --  7.1* 4.7*  --        Results from last 7 days   Lab Units 04/09/21  1631   TROPONIN T ng/mL 0.030   PROBNP pg/mL 4,129.0*   D DIMER QUANT MCGFEU/mL 2.22*     Results from last 7 days   Lab Units 04/11/21  0522   URIC ACID mg/dL 11.8*         Invalid input(s): LDLCALC  Results from last 7 days   Lab Units 04/11/21  2046 04/09/21  1804 04/09/21  1631   BLOODCX   --  No growth at 4 days No growth at 4 days   BODYFLDCX  No growth at 3 days  --   --      Imaging Results (Last 24 Hours)     ** No results found for the last 24 hours. **          Test Results Pending at Discharge     Pending Labs     Order Current Status    Blood Culture - Blood, Arm, Left Preliminary result    Blood Culture - Blood, Arm, Right Preliminary result            Discharge Exam   Physical Exam  Vitals.  Temperature 97 a pulse of 100 respiratory rate of 17 and blood pressure 106/73 and O2 sats of 96% on room air  General.  Middle-aged female.  Alert oriented x3 in no apparent pain distress or diaphoresis.  Depressed.  Eyes.  No pallor or jaundice normal conjunctive and lids intact extraocular musculature is  Oral cavity.  Moist mucous membrane  Neck.  Dressed tracheostomy wound.  No JVD.  Chest.  Tunneled catheter in the right upper chest no erythema or discharge.  Clear to auscultation bilaterally with no added sounds.  Cardiovascular.  Regular rate and rhythm grade 2 systolic murmur.  Abdomen.  No  tenderness..  No guarding or rebound.  No distention.  No organomegaly.  Normal bowel sounds.    No distention  Extremities.  No clubbing cyanosis or edema.  CNS.  No acute focal neurological deficits.     Discharge Details        Discharge Medications      New Medications      Instructions Start Date   calcium  acetate 667 MG capsule capsule  Commonly known as: PHOS BINDER)   1,334 mg, Oral, 3 Times Daily With Meals      cholestyramine 4 g packet  Commonly known as: QUESTRAN   1 packet, Oral, Every 12 Hours Scheduled      gentamicin 0.1 % ointment  Commonly known as: GARAMYCIN   Topical, Daily      levothyroxine 25 MCG tablet  Commonly known as: SYNTHROID, LEVOTHROID  Replaces: levothyroxine sodium 112 MCG capsule   25 mcg, Oral, Daily      vancomycin 50 MG/ML reconstituted solution oral solution reconstituted   125 mg, Oral, Every 6 Hours Scheduled      vancomycin 50 MG/ML reconstituted solution oral solution reconstituted   125 mg, Oral, Every 12 Hours   Start Date: April 20, 2021     vancomycin 50 MG/ML reconstituted solution oral solution reconstituted   125 mg, Oral, Every 24 Hours   Start Date: April 27, 2021     vancomycin 50 MG/ML reconstituted solution oral solution reconstituted   125 mg, Oral, Every Other Day   Start Date: May 4, 2021     warfarin 4 MG tablet  Commonly known as: Coumadin   1 tablet p.o. every day.         Continue These Medications      Instructions Start Date   Lexapro 20 MG tablet  Generic drug: escitalopram   Every 24 Hours      Singulair 10 MG tablet  Generic drug: montelukast   Every 24 Hours      sodium bicarbonate 650 MG tablet   Every 8 Hours Scheduled         Stop These Medications    KLOR-CON 20 MEQ CR tablet  Generic drug: potassium chloride     levothyroxine sodium 112 MCG capsule  Commonly known as: TIROSINT  Replaced by: levothyroxine 25 MCG tablet     SPIRONOLACTONE PO            Allergies   Allergen Reactions   • Penicillins Anaphylaxis   • Nickel Rash         Discharge Disposition:  Condition: Stable    Diet:   Diet Order   Procedures   • Diet Regular   Cardiac/renal    Activity:   Activity Instructions     Activity as Tolerated      Other Activity Instructions      Activity Instructions: PT and OT to evaluate and treat.    Up WIth Assist            CODE STATUS:    Code Status  and Medical Interventions:   Ordered at: 04/10/21 0042     Code Status:    CPR     Medical Interventions (Level of Support Prior to Arrest):    Full       No future appointments.  Additional Instructions for the Follow-ups that You Need to Schedule     Call MD With Problems / Concerns   As directed      Instructions: Call MD or return to ER if worsening diarrhea.  Fresh bright blood per rectum.  No melena.  Abdominal pain.  Nausea or vomiting.  Bleeding diathesis.  Chest pain.  Shortness of breath.  Leg swelling.    Order Comments: Instructions: Call MD or return to ER if worsening diarrhea.  Fresh bright blood per rectum.  No melena.  Abdominal pain.  Nausea or vomiting.  Bleeding diathesis.  Chest pain.  Shortness of breath.  Leg swelling.          Discharge Follow-up with PCP   As directed       Currently Documented PCP:    Jet Manuel MD    PCP Phone Number:    [phone]     Follow Up Details: Primary MD at the [facility] facility in 3 days to check INR/pulmonary embolus/C. difficile colitis/end-stage renal failure         Discharge Follow-up with Specified Provider: Nephrology.  As directed for hemodialysis and peritoneal dialysis care.   As directed      To: Nephrology.  As directed for hemodialysis and peritoneal dialysis care.            Contact information for follow-up providers     [facility]. Go in 21 day(s).    Specialty: Dialysis  Why: She has appt with SRIDEVI Castellon at [facility]  location May 5 at 1 pm  for Peritoneal dialysis exit site eval.   Contact information:  [address] [address] [phone]           Jet Manuel MD .    Specialty: Internal Medicine  Why: Primary MD at the [facility] in 3 days to check INR/pulmonary embolus/C. difficile colitis/end-stage renal failure  Contact information:  [address]  [facility] [address] [phone]                   Contact information for  after-discharge care     Destination     [facility] .    Service: Skilled Nursing  Contact information:  [address]  [facility] [address] [phone]                               Time Spent on Discharge:  Greater than 30 minutes      Joseph Maria MD  [facility]  04/14/21  13:06 EDT

## 2021-04-14 NOTE — PROGRESS NOTES
Continued Stay Note  [facility]     Patient Name: Ambar Lara  MRN: [id number]  Today's Date: 4/14/2021    Admit Date: 4/9/2021    Discharge Plan     Row Name 04/14/21 1215       Plan    [facility] 11:00 A.[facility] starting Friday 4/16    Plan Comments  CCP spoke with Vickey/Coby [phone]; states patient was approved for chair time for 11:00 A.M MWF at [facility] ([address] [place] [address]). Per Vickey, patient will need to arrive at 10:45 A.M on Friday. Per Vickey; he will have Coby RN call and discuss PD catheter with Dr. Moura. Cecilia GOODWIN        Discharge Codes    No documentation.       Expected Discharge Date and Time     Expected Discharge Date Expected Discharge Time    Apr 14, 2021             BUDDY Sinclair

## 2021-04-14 NOTE — PROGRESS NOTES
NEPHROLOGY PROGRESS NOTE    PATIENT IDENTIFICATION:   Name:  Ambar Lara      MRN:  [id number]     51 y.o.  female             Reason for visit: ESRD    SUBJECTIVE:   Better spirits today.  I texted with Vickey Hrat this am.  Waiting on medical director at [facility] approval.  Still with very frequent diarrhea, radha after eating.  Making a lot of urine.     OBJECTIVE:  Vitals:    04/14/21 0139 04/14/21 0316 04/14/21 0540 04/14/21 0811   BP:  91/63  106/73   BP Location:  Right arm  Right arm   Patient Position:  Lying  Lying   Pulse: 82 85  100   Resp:  17  17   Temp:  96.5 °F (35.8 °C)  97 °F (36.1 °C)   TempSrc:  Oral  Oral   SpO2: 95% 96%  96%   Weight:  72.3 kg (159 lb 6.3 oz) 72.3 kg (159 lb 6.3 oz)    Height:               Body mass index is 30.12 kg/m².  No intake or output data in the 24 hours ending 04/14/21 1111  Wt Readings from Last 1 Encounters:   04/14/21 0540 72.3 kg (159 lb 6.3 oz)   04/14/21 0316 72.3 kg (159 lb 6.3 oz)   04/09/21 2353 73.9 kg (162 lb 14.7 oz)   04/09/21 2000 76.6 kg (168 lb 14.4 oz)     Wt Readings from Last 3 Encounters:   04/14/21 72.3 kg (159 lb 6.3 oz)   01/20/16 70.8 kg (156 lb)   11/17/15 74.8 kg (164 lb 15.9 oz)         Exam:  Lying in bed .  Chronically ill-appearing  Better spirits.  HEENT No eye discharge; no scleral icterus  No JVD; no carotid bruits; tracheostomy covered.   Heart RRR, no rub  Right chest TDC  Lungs clear to auscultation.   Abd Soft, nontender. + bs, Mask applied .PD catheter right lower quadrant dressed.  Tunnel nontender.    Ext no edema .  Moves all extremities  Speech is fluent    Scheduled meds:    calcium acetate, 1,334 mg, Oral, TID With Meals  escitalopram, 20 mg, Oral, Daily  gentamicin, , Topical, Daily  levothyroxine, 25 mcg, Oral, Q AM  montelukast, 5 mg, Oral, Nightly  potassium chloride, 20 mEq, Oral, Once  sodium bicarbonate, 650 mg, Oral, TID  sodium chloride, 10 mL, Intravenous, Q12H  vancomycin, 125 mg, Oral,  Q6H  [START ON 4/20/2021] vancomycin, 125 mg, Oral, Q12H  [START ON 4/27/2021] vancomycin, 125 mg, Oral, Q24H  [START ON 5/4/2021] vancomycin, 125 mg, Oral, Every Other Day  warfarin, 7.5 mg, Oral, Once      IV meds:                        heparin (porcine), 18 Units/kg/hr, Last Rate: 13 Units/kg/hr (04/14/21 0107)  Pharmacy Consult,   Pharmacy to dose warfarin,         Data Review:    Results from last 7 days   Lab Units 04/14/21  0648 04/13/21  0505 04/12/21  0307 04/11/21  0522 04/09/21  1631   SODIUM mmol/L 136 136 136 132* 133*   POTASSIUM mmol/L 3.8 3.0* 3.1* 3.2* 3.3*   CHLORIDE mmol/L 100 99 100 95* 96*   CO2 mmol/L 22.5 22.2 24.9 18.9* 21.4*   BUN mg/dL 18 19 12 37* 28*   CREATININE mg/dL 2.96* 3.40* 2.67* 4.56* 4.19*   CALCIUM mg/dL 8.7 7.8* 7.8* 8.4* 8.6   BILIRUBIN mg/dL  --   --  <0.2 0.2 0.2   ALK PHOS U/L  --   --  120* 125* 163*   ALT (SGPT) U/L  --   --  18 14 15   AST (SGOT) U/L  --   --  19 15 12   GLUCOSE mg/dL 86 83 82 93 106*     Estimated Creatinine Clearance: 20.9 mL/min (A) (by C-G formula based on SCr of 2.96 mg/dL (H)).  Results from last 7 days   Lab Units 04/11/21  0522   URIC ACID mg/dL 11.8*     Results from last 7 days   Lab Units 04/13/21  0505 04/11/21  0522 04/10/21  0235   MAGNESIUM mg/dL 1.6 2.0 1.9   PHOSPHORUS mg/dL 3.8 7.1* 4.7*       Results from last 7 days   Lab Units 04/14/21  0648 04/13/21  0505 04/12/21  0307 04/11/21  0522 04/10/21  0235   WBC 10*3/mm3 13.50* 13.16* 11.72* 12.26* 16.60*   HEMOGLOBIN g/dL 9.5* 9.4* 9.3* 9.6* 9.3*   PLATELETS 10*3/mm3 295 315 316 300 285       Results from last 7 days   Lab Units 04/14/21  0648 04/13/21  0505 04/12/21  0307 04/11/21  1607 04/10/21  0231   INR  1.96* 1.32* 1.28* 1.47* 1.41*             ASSESSMENT:     Colitis, Clostridium difficile    Anxiety    Cardiomyopathy (CMS/HCC)    Chronic systolic heart failure (CMS/HCC)    ESRD (end stage renal disease) on dialysis (CMS/[facility])    Gastroesophageal reflux disease    Gitelman  syndrome    HLD (hyperlipidemia)    Hypothyroidism    Peritoneal dialysis status (CMS/HCC)    History of tracheostomy    Hypotension    Acute pulmonary embolism (CMS/HCC)    Severe malnutrition (CMS/HCC)    1.  ESRD:  Hemodialysis tomorrow . K ok. Chronically low due to renal potassium wasting (reportedly has Gitelman syndrome).  Euvolemic .  Waiting  on outpt spot at [facility] .  Should have answer today.   2.  Recurrent C. difficile colitis po vanc .  Add cholestyramine today.   DW patient.   3.  Covid-pneumonia earlier this year, with prolonged respiratory failure requiring  tracheostomy.  4.  Immobility syndrome  5.  Pulmonary embolism on heparin as bridge to coumadin .  INR subtherapeutic.  JARETT Maria.  He has plan for coumadin dosing and I agree.   6.  Anemia of ESRD.  JULEE as outpt .          PLAN:  1.  Once outpt chair arranged, will call orders tomorrow .  2.   Instructions for nursing for dressing change on PD catheter provided.   3. I will make  Home training appointment with Cande LAUREANO at [facility].   4. Cholestyramine .    Faviola Moura MD  4/14/2021    11:11 EDT

## 2021-04-15 NOTE — PROGRESS NOTES
Case Management Discharge Note      Final Note: Patient Dc'd to [facility]    Provided Post Acute Provider Quality & Resource List?: N/A    Selected Continued Care - Discharged on 4/14/2021 Admission date: 4/9/2021 - Discharge disposition: Skilled Nursing Facility (DC - External)    Destination Coordination complete    Service Provider Selected Services Address Phone Fax Patient Preferred    [facility]  [facility] [address] [id number] [phone] --          Durable Medical Equipment    No services have been selected for the patient.              Dialysis/Infusion    No services have been selected for the patient.              Home Medical Care    No services have been selected for the patient.              Therapy    No services have been selected for the patient.              Community Resources    No services have been selected for the patient.                  Transportation Services  Private: Car    Final Discharge Disposition Code: 03 - skilled nursing facility (SNF)

## 2021-04-16 ENCOUNTER — HOSPITAL ENCOUNTER (INPATIENT)
Facility: HOSPITAL | Age: 51
LOS: 8 days | Discharge: NURSING, REHAB OR LONG-TERM CARE | End: 2021-04-24
Attending: EMERGENCY MEDICINE | Admitting: INTERNAL MEDICINE

## 2021-04-16 ENCOUNTER — APPOINTMENT (OUTPATIENT)
Dept: GENERAL RADIOLOGY | Facility: HOSPITAL | Age: 51
End: 2021-04-16

## 2021-04-16 DIAGNOSIS — Z99.2 ESRD ON HEMODIALYSIS (HCC): ICD-10-CM

## 2021-04-16 DIAGNOSIS — N18.6 ESRD ON HEMODIALYSIS (HCC): ICD-10-CM

## 2021-04-16 DIAGNOSIS — I26.99 OTHER PULMONARY EMBOLISM WITHOUT ACUTE COR PULMONALE, UNSPECIFIED CHRONICITY (HCC): ICD-10-CM

## 2021-04-16 DIAGNOSIS — A04.72 CLOSTRIDIUM DIFFICILE COLITIS: Primary | ICD-10-CM

## 2021-04-16 DIAGNOSIS — R07.9 CHEST PAIN, UNSPECIFIED TYPE: ICD-10-CM

## 2021-04-16 LAB
ALBUMIN SERPL-MCNC: 3.6 G/DL (ref 3.5–5.2)
ALBUMIN/GLOB SERPL: 1 G/DL
ALP SERPL-CCNC: 131 U/L (ref 39–117)
ALT SERPL W P-5'-P-CCNC: 14 U/L (ref 1–33)
ANION GAP SERPL CALCULATED.3IONS-SCNC: 12.8 MMOL/L (ref 5–15)
AST SERPL-CCNC: 17 U/L (ref 1–32)
BILIRUB SERPL-MCNC: 0.2 MG/DL (ref 0–1.2)
BUN SERPL-MCNC: 9 MG/DL (ref 6–20)
BUN/CREAT SERPL: 5.5 (ref 7–25)
CALCIUM SPEC-SCNC: 8 MG/DL (ref 8.6–10.5)
CHLORIDE SERPL-SCNC: 97 MMOL/L (ref 98–107)
CO2 SERPL-SCNC: 23.2 MMOL/L (ref 22–29)
CREAT SERPL-MCNC: 1.63 MG/DL (ref 0.57–1)
DEPRECATED RDW RBC AUTO: 51.8 FL (ref 37–54)
ERYTHROCYTE [DISTWIDTH] IN BLOOD BY AUTOMATED COUNT: 15.1 % (ref 12.3–15.4)
GFR SERPL CREATININE-BSD FRML MDRD: 34 ML/MIN/1.73
GLOBULIN UR ELPH-MCNC: 3.6 GM/DL
GLUCOSE SERPL-MCNC: 146 MG/DL (ref 65–99)
HCT VFR BLD AUTO: 33.4 % (ref 34–46.6)
HGB BLD-MCNC: 10.7 G/DL (ref 12–15.9)
HOLD SPECIMEN: NORMAL
HOLD SPECIMEN: NORMAL
INR PPP: 2.24 (ref 0.9–1.1)
LIPASE SERPL-CCNC: 84 U/L (ref 13–60)
LYMPHOCYTES # BLD MANUAL: 0.92 10*3/MM3 (ref 0.7–3.1)
LYMPHOCYTES NFR BLD MANUAL: 3.1 % (ref 5–12)
LYMPHOCYTES NFR BLD MANUAL: 5.1 % (ref 19.6–45.3)
MCH RBC QN AUTO: 29.8 PG (ref 26.6–33)
MCHC RBC AUTO-ENTMCNC: 32 G/DL (ref 31.5–35.7)
MCV RBC AUTO: 93 FL (ref 79–97)
METAMYELOCYTES NFR BLD MANUAL: 1 % (ref 0–0)
MONOCYTES # BLD AUTO: 0.56 10*3/MM3 (ref 0.1–0.9)
MYELOCYTES NFR BLD MANUAL: 2 % (ref 0–0)
NEUTROPHILS # BLD AUTO: 16.1 10*3/MM3 (ref 1.7–7)
NEUTROPHILS NFR BLD MANUAL: 88.8 % (ref 42.7–76)
NT-PROBNP SERPL-MCNC: 1723 PG/ML (ref 0–450)
PLAT MORPH BLD: NORMAL
PLATELET # BLD AUTO: 398 10*3/MM3 (ref 140–450)
PMV BLD AUTO: 9.7 FL (ref 6–12)
POLYCHROMASIA BLD QL SMEAR: ABNORMAL
POTASSIUM SERPL-SCNC: 4.1 MMOL/L (ref 3.5–5.2)
PROT SERPL-MCNC: 7.2 G/DL (ref 6–8.5)
PROTHROMBIN TIME: 24.5 SECONDS (ref 11.7–14.2)
QT INTERVAL: 386 MS
RBC # BLD AUTO: 3.59 10*6/MM3 (ref 3.77–5.28)
SARS-COV-2 ORF1AB RESP QL NAA+PROBE: NOT DETECTED
SODIUM SERPL-SCNC: 133 MMOL/L (ref 136–145)
TROPONIN T SERPL-MCNC: 0.04 NG/ML (ref 0–0.03)
TROPONIN T SERPL-MCNC: 0.04 NG/ML (ref 0–0.03)
WBC # BLD AUTO: 18.13 10*3/MM3 (ref 3.4–10.8)
WBC MORPH BLD: NORMAL
WHOLE BLOOD HOLD SPECIMEN: NORMAL
WHOLE BLOOD HOLD SPECIMEN: NORMAL

## 2021-04-16 PROCEDURE — 85025 COMPLETE CBC W/AUTO DIFF WBC: CPT | Performed by: EMERGENCY MEDICINE

## 2021-04-16 PROCEDURE — 85007 BL SMEAR W/DIFF WBC COUNT: CPT | Performed by: EMERGENCY MEDICINE

## 2021-04-16 PROCEDURE — 83690 ASSAY OF LIPASE: CPT | Performed by: EMERGENCY MEDICINE

## 2021-04-16 PROCEDURE — U0004 COV-19 TEST NON-CDC HGH THRU: HCPCS | Performed by: EMERGENCY MEDICINE

## 2021-04-16 PROCEDURE — 85610 PROTHROMBIN TIME: CPT | Performed by: EMERGENCY MEDICINE

## 2021-04-16 PROCEDURE — 71045 X-RAY EXAM CHEST 1 VIEW: CPT

## 2021-04-16 PROCEDURE — 93005 ELECTROCARDIOGRAM TRACING: CPT | Performed by: EMERGENCY MEDICINE

## 2021-04-16 PROCEDURE — 99284 EMERGENCY DEPT VISIT MOD MDM: CPT

## 2021-04-16 PROCEDURE — 83880 ASSAY OF NATRIURETIC PEPTIDE: CPT | Performed by: EMERGENCY MEDICINE

## 2021-04-16 PROCEDURE — G0378 HOSPITAL OBSERVATION PER HR: HCPCS

## 2021-04-16 PROCEDURE — U0005 INFEC AGEN DETEC AMPLI PROBE: HCPCS | Performed by: EMERGENCY MEDICINE

## 2021-04-16 PROCEDURE — 80053 COMPREHEN METABOLIC PANEL: CPT | Performed by: EMERGENCY MEDICINE

## 2021-04-16 PROCEDURE — 25010000002 ONDANSETRON PER 1 MG: Performed by: INTERNAL MEDICINE

## 2021-04-16 PROCEDURE — 93010 ELECTROCARDIOGRAM REPORT: CPT | Performed by: INTERNAL MEDICINE

## 2021-04-16 PROCEDURE — 84484 ASSAY OF TROPONIN QUANT: CPT | Performed by: EMERGENCY MEDICINE

## 2021-04-16 RX ORDER — LEVOTHYROXINE SODIUM 0.03 MG/1
25 TABLET ORAL
Status: DISCONTINUED | OUTPATIENT
Start: 2021-04-17 | End: 2021-04-20

## 2021-04-16 RX ORDER — ALBUMIN (HUMAN) 12.5 G/50ML
12.5 SOLUTION INTRAVENOUS AS NEEDED
Status: ACTIVE | OUTPATIENT
Start: 2021-04-16 | End: 2021-04-16

## 2021-04-16 RX ORDER — ONDANSETRON 4 MG/1
4 TABLET, FILM COATED ORAL EVERY 6 HOURS PRN
Status: DISCONTINUED | OUTPATIENT
Start: 2021-04-16 | End: 2021-04-24 | Stop reason: HOSPADM

## 2021-04-16 RX ORDER — SODIUM CHLORIDE 9 MG/ML
75 INJECTION, SOLUTION INTRAVENOUS CONTINUOUS
Status: DISCONTINUED | OUTPATIENT
Start: 2021-04-16 | End: 2021-04-17

## 2021-04-16 RX ORDER — NITROGLYCERIN 0.4 MG/1
0.4 TABLET SUBLINGUAL
Status: DISCONTINUED | OUTPATIENT
Start: 2021-04-16 | End: 2021-04-24 | Stop reason: HOSPADM

## 2021-04-16 RX ORDER — ACETAMINOPHEN 325 MG/1
650 TABLET ORAL EVERY 4 HOURS PRN
Status: DISCONTINUED | OUTPATIENT
Start: 2021-04-16 | End: 2021-04-24 | Stop reason: HOSPADM

## 2021-04-16 RX ORDER — ESCITALOPRAM OXALATE 20 MG/1
20 TABLET ORAL EVERY 24 HOURS
Status: DISCONTINUED | OUTPATIENT
Start: 2021-04-16 | End: 2021-04-24 | Stop reason: HOSPADM

## 2021-04-16 RX ORDER — UREA 10 %
3 LOTION (ML) TOPICAL NIGHTLY PRN
Status: DISCONTINUED | OUTPATIENT
Start: 2021-04-16 | End: 2021-04-24 | Stop reason: HOSPADM

## 2021-04-16 RX ORDER — CALCIUM ACETATE 667 MG/1
1334 CAPSULE ORAL
Status: DISCONTINUED | OUTPATIENT
Start: 2021-04-17 | End: 2021-04-24 | Stop reason: HOSPADM

## 2021-04-16 RX ORDER — SODIUM CHLORIDE 0.9 % (FLUSH) 0.9 %
10 SYRINGE (ML) INJECTION AS NEEDED
Status: DISCONTINUED | OUTPATIENT
Start: 2021-04-16 | End: 2021-04-24 | Stop reason: HOSPADM

## 2021-04-16 RX ORDER — SODIUM BICARBONATE 650 MG/1
650 TABLET ORAL EVERY 8 HOURS SCHEDULED
Status: DISCONTINUED | OUTPATIENT
Start: 2021-04-16 | End: 2021-04-24 | Stop reason: HOSPADM

## 2021-04-16 RX ORDER — WARFARIN SODIUM 4 MG/1
4 TABLET ORAL
Status: DISCONTINUED | OUTPATIENT
Start: 2021-04-16 | End: 2021-04-19

## 2021-04-16 RX ORDER — ONDANSETRON 2 MG/ML
4 INJECTION INTRAMUSCULAR; INTRAVENOUS EVERY 6 HOURS PRN
Status: DISCONTINUED | OUTPATIENT
Start: 2021-04-16 | End: 2021-04-24 | Stop reason: HOSPADM

## 2021-04-16 RX ORDER — CHOLESTYRAMINE 4 G/9G
1 POWDER, FOR SUSPENSION ORAL EVERY 12 HOURS SCHEDULED
Status: DISCONTINUED | OUTPATIENT
Start: 2021-04-16 | End: 2021-04-18

## 2021-04-16 RX ORDER — MONTELUKAST SODIUM 10 MG/1
10 TABLET ORAL DAILY
Status: DISCONTINUED | OUTPATIENT
Start: 2021-04-17 | End: 2021-04-24 | Stop reason: HOSPADM

## 2021-04-16 RX ORDER — VANCOMYCIN HYDROCHLORIDE 125 MG/1
125 CAPSULE ORAL EVERY 6 HOURS SCHEDULED
Status: DISCONTINUED | OUTPATIENT
Start: 2021-04-16 | End: 2021-04-18

## 2021-04-16 RX ADMIN — SODIUM CHLORIDE 500 ML: 9 INJECTION, SOLUTION INTRAVENOUS at 23:29

## 2021-04-16 RX ADMIN — SODIUM CHLORIDE 75 ML/HR: 9 INJECTION, SOLUTION INTRAVENOUS at 21:23

## 2021-04-16 RX ADMIN — WARFARIN 4 MG: 4 TABLET ORAL at 18:00

## 2021-04-16 RX ADMIN — ESCITALOPRAM 20 MG: 20 TABLET, FILM COATED ORAL at 23:34

## 2021-04-16 RX ADMIN — Medication 3 MG: at 23:08

## 2021-04-16 RX ADMIN — VANCOMYCIN HYDROCHLORIDE 125 MG: 125 CAPSULE ORAL at 23:34

## 2021-04-16 RX ADMIN — SODIUM BICARBONATE 650 MG: 650 TABLET ORAL at 23:34

## 2021-04-16 RX ADMIN — ONDANSETRON 4 MG: 2 INJECTION INTRAMUSCULAR; INTRAVENOUS at 23:08

## 2021-04-16 RX ADMIN — VANCOMYCIN HYDROCHLORIDE 125 MG: 125 CAPSULE ORAL at 18:00

## 2021-04-16 RX ADMIN — SODIUM CHLORIDE 250 ML: 9 INJECTION, SOLUTION INTRAVENOUS at 19:02

## 2021-04-16 NOTE — ED NOTES
Patient from home with complaints of hypotension and pain all over.     Emilee Atlman, SRIDEVI  04/16/21 5269

## 2021-04-16 NOTE — ED NOTES
Pt states she had dialysis today but they couldn't complete it. Pt states she had 3-3.5 hours of 4.     Casie Negron RN  04/16/21 6951

## 2021-04-16 NOTE — ED PROVIDER NOTES
EMERGENCY DEPARTMENT ENCOUNTER    Room Number:  33/33  Date seen:  4/16/2021  PCP: Jet Manuel MD  Historian: Patient, son      HPI:  Chief Complaint: Diarrhea, chest pain  A complete HPI/ROS/PMH/PSH/SH/FH are unobtainable due to: Nothing  Context: Ambar Lara is a 51 y.o. female who presents to the ED c/o persistent diarrhea after recent hospitalization for C. difficile colitis.  She also reports that she developed chest pain today while at hemodialysis.  She was able to complete approximately 3.5 hours of her 4-hour treatment.  They stopped the treatment prematurely due to her complaining of chest pain.  She reports that the pain has been intermittent, localized to the left side of her chest, sharp.  She was also recently diagnosed with a pulmonary embolus and has been on warfarin.  She reports that she has not received her blood thinner medication for the last 1 or 2 days since getting to rehab facility.  She has also not received her oral vancomycin.  Patient has a tracheostomy stoma secondary to a prolonged marie with COVID-19 earlier this year.  She reportedly had some low oxygen at dialysis briefly requiring nasal cannula oxygen however she is not hypoxic currently denies shortness of breath at this time.  She continues to have copious diarrhea and mild abdominal discomfort.  She denies fever or chills.  She has been participating in rehab at [facility], however patient and family are not happy with the care they have been receiving there.            PAST MEDICAL HISTORY  Active Ambulatory Problems     Diagnosis Date Noted   • Colitis, Clostridium difficile 04/09/2021   • Anxiety 06/07/2018   • Cardiomyopathy (CMS/[facility]) 06/03/2020   • Chronic systolic heart failure (CMS/[facility]) 06/03/2020   • ESRD (end stage renal disease) on dialysis (CMS/[facility]) 06/15/2018   • Gastroesophageal reflux disease 02/19/2021   • 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   • HLD (hyperlipidemia) 06/07/2018   • Hypothyroidism  06/06/2018   • Peritoneal dialysis status (CMS/[facility]) 02/15/2021   • History of tracheostomy 04/10/2021   • Acute pulmonary embolism (CMS/[facility]) 04/10/2021   • Severe malnutrition (CMS/HCC) 04/11/2021     Resolved Ambulatory Problems     Diagnosis Date Noted   • Hypotension 04/10/2021     Past Medical History:   Diagnosis Date   • CHF (congestive heart failure) (CMS/[facility])    • Dialysis patient (CMS/HCC)    • Disease of thyroid gland    • Elevated cholesterol    • GERD (gastroesophageal reflux disease)    • Renal disorder    • Sleep apnea          PAST SURGICAL HISTORY  Past Surgical History:   Procedure Laterality Date   • HYSTERECTOMY     • TRACHEOSTOMY           FAMILY HISTORY  No family history on file.      SOCIAL HISTORY  Social History     Socioeconomic History   • Marital status:      Spouse name: Not on file   • Number of children: Not on file   • Years of education: Not on file   • Highest education level: Not on file   Tobacco Use   • Smoking status: Never Smoker         ALLERGIES  Penicillins and Nickel        REVIEW OF SYSTEMS  Review of Systems   Review of all 14 systems is negative other than stated in the HPI above.      PHYSICAL EXAM  ED Triage Vitals   Temp Heart Rate Resp BP SpO2   04/16/21 1549 04/16/21 1549 04/16/21 1549 04/16/21 1549 04/16/21 1549   97.5 °F (36.4 °C) 112 20 (!) 87/59 97 %      Temp src Heart Rate Source Patient Position BP Location FiO2 (%)   04/16/21 1549 04/16/21 1600 04/16/21 1600 04/16/21 1600 --   Tympanic Monitor Lying Left arm          GENERAL: Awake and alert, chronically ill-appearing, no acute distress  HENT: nares patent, trach stoma nearly completely closed, occlusive dressing in place  EYES: no scleral icterus, EOMI, pupils 3 mm reactive bilaterally  CV: regular rhythm, normal rate  RESPIRATORY: normal effort, lungs clear to auscultation bilaterally  ABDOMEN: soft, nondistended, mild tenderness of the left lower abdominal wall where there is some abdominal wall  ecchymosis present.  Peritoneal dialysis catheter present right lower abdomen with no surrounding erythema, no surrounding drainage.  MUSCULOSKELETAL: no deformity, no peripheral edema  NEURO: alert, moves all extremities, follows commands  PSYCH:  calm, cooperative  SKIN: warm, dry    Vital signs and nursing notes reviewed.          LAB RESULTS  Recent Results (from the past 24 hour(s))   Comprehensive Metabolic Panel    Collection Time: 04/16/21  4:02 PM    Specimen: Blood   Result Value Ref Range    Glucose 146 (H) 65 - 99 mg/dL    BUN 9 6 - 20 mg/dL    Creatinine 1.63 (H) 0.57 - 1.00 mg/dL    Sodium 133 (L) 136 - 145 mmol/L    Potassium 4.1 3.5 - 5.2 mmol/L    Chloride 97 (L) 98 - 107 mmol/L    CO2 23.2 22.0 - 29.0 mmol/L    Calcium 8.0 (L) 8.6 - 10.5 mg/dL    Total Protein 7.2 6.0 - 8.5 g/dL    Albumin 3.60 3.50 - 5.20 g/dL    ALT (SGPT) 14 1 - 33 U/L    AST (SGOT) 17 1 - 32 U/L    Alkaline Phosphatase 131 (H) 39 - 117 U/L    Total Bilirubin 0.2 0.0 - 1.2 mg/dL    eGFR Non African Amer 34 (L) >60 mL/min/1.73    Globulin 3.6 gm/dL    A/G Ratio 1.0 g/dL    BUN/Creatinine Ratio 5.5 (L) 7.0 - 25.0    Anion Gap 12.8 5.0 - 15.0 mmol/L   Lipase    Collection Time: 04/16/21  4:02 PM    Specimen: Blood   Result Value Ref Range    Lipase 84 (H) 13 - 60 U/L   Protime-INR    Collection Time: 04/16/21  4:02 PM    Specimen: Blood   Result Value Ref Range    Protime 24.5 (H) 11.7 - 14.2 Seconds    INR 2.24 (H) 0.90 - 1.10   Light Blue Top    Collection Time: 04/16/21  4:02 PM   Result Value Ref Range    Extra Tube hold for add-on    Green Top (Gel)    Collection Time: 04/16/21  4:02 PM   Result Value Ref Range    Extra Tube Hold for add-ons.    Lavender Top    Collection Time: 04/16/21  4:02 PM   Result Value Ref Range    Extra Tube hold for add-on    Gold Top - SST    Collection Time: 04/16/21  4:02 PM   Result Value Ref Range    Extra Tube Hold for add-ons.    BNP    Collection Time: 04/16/21  4:02 PM    Specimen: Blood    Result Value Ref Range    proBNP 1,723.0 (H) 0.0 - 450.0 pg/mL   Troponin    Collection Time: 04/16/21  4:02 PM    Specimen: Blood   Result Value Ref Range    Troponin T 0.037 (C) 0.000 - 0.030 ng/mL   CBC Auto Differential    Collection Time: 04/16/21  4:03 PM    Specimen: Blood   Result Value Ref Range    WBC 18.13 (H) 3.40 - 10.80 10*3/mm3    RBC 3.59 (L) 3.77 - 5.28 10*6/mm3    Hemoglobin 10.7 (L) 12.0 - 15.9 g/dL    Hematocrit 33.4 (L) 34.0 - 46.6 %    MCV 93.0 79.0 - 97.0 fL    MCH 29.8 26.6 - 33.0 pg    MCHC 32.0 31.5 - 35.7 g/dL    RDW 15.1 12.3 - 15.4 %    RDW-SD 51.8 37.0 - 54.0 fl    MPV 9.7 6.0 - 12.0 fL    Platelets 398 140 - 450 10*3/mm3   Manual Differential    Collection Time: 04/16/21  4:03 PM    Specimen: Blood   Result Value Ref Range    Neutrophil % 88.8 (H) 42.7 - 76.0 %    Lymphocyte % 5.1 (L) 19.6 - 45.3 %    Monocyte % 3.1 (L) 5.0 - 12.0 %    Metamyelocyte % 1.0 (H) 0.0 - 0.0 %    Myelocyte % 2.0 (H) 0.0 - 0.0 %    Neutrophils Absolute 16.10 (H) 1.70 - 7.00 10*3/mm3    Lymphocytes Absolute 0.92 0.70 - 3.10 10*3/mm3    Monocytes Absolute 0.56 0.10 - 0.90 10*3/mm3    Polychromasia Slight/1+ None Seen    WBC Morphology Normal Normal    Platelet Morphology Normal Normal   ECG 12 Lead    Collection Time: 04/16/21  5:18 PM   Result Value Ref Range    QT Interval 386 ms   Troponin    Collection Time: 04/16/21  5:59 PM    Specimen: Blood   Result Value Ref Range    Troponin T 0.040 (C) 0.000 - 0.030 ng/mL       Ordered the above labs and reviewed the results.        RADIOLOGY  XR Chest 1 View    Result Date: 4/16/2021  ONE VIEW PORTABLE CHEST AT 5 PM  HISTORY: Hypotension. End-stage renal disease. Chest pain.  Recent Covid 19 pneumonia.  FINDINGS: The lungs are moderately expanded with some patchy infiltrates at the lung bases likely related to residual changes of Covid 19 pneumonia and showing some minimal improvement at the left base when compared to the study of 04/09/2021. The heart size  remains normal. A large gauge vascular catheter ends near the junction of the SVC and right atrium without change.          Ordered the above noted radiological studies. Reviewed by me in PACS.            PROCEDURES  Procedures            MEDICATIONS GIVEN IN ER  Medications   sodium chloride 0.9 % flush 10 mL (has no administration in time range)   warfarin (COUMADIN) tablet 4 mg (4 mg Oral Given 4/16/21 1800)   Pharmacy Consult (has no administration in time range)   vancomycin (VANCOCIN) capsule 125 mg (125 mg Oral Given 4/16/21 1800)   sodium chloride 0.9 % bolus 250 mL (250 mL Intravenous New Bag 4/16/21 1902)                   MEDICAL DECISION MAKING, PROGRESS, and CONSULTS    All labs have been independently reviewed by me.  All radiology studies have been reviewed by me and discussed with radiologist dictating the report.   EKG's independently viewed and interpreted by me.  Discussion below represents my analysis of pertinent findings related to patient's condition, differential diagnosis, treatment plan and final disposition.    ED Course as of Apr 16 1907 Fri Apr 16, 2021   1638 Medical record review: I reviewed discharge summary from 4/9/2021.  Patient was admitted with hypotension and C. difficile colitis.  She was also found to have a small right-sided pulmonary embolus.  She was treated with heparin and then warfarin.  INR at discharge was 1.9.    [JR]   1638 INR(!): 2.24 [JR]   1646 Patient presents due to chest pain during hemodialysis today.  She has known pulmonary embolus and is on warfarin.  INR is therapeutic today 2.24.  She also has known C. difficile colitis currently on oral vancomycin therapy.  I have ordered her routine vancomycin and warfarin doses here.  Have also ordered EKG, troponin to further evaluate her chest pain.    [JR]   1647 EKG          EKG time: 1718  Rhythm/Rate: Sinus tach, 101  P waves and MO: Normal  QRS, axis: Normal axis  ST and T waves: Minimal ST depression in  leads V4, V5, V6    Interpreted Contemporaneously by me, independently viewed  Similar compared to prior 4/9/2021          [JR]   1734 Creatinine(!): 1.63 [JR]   1734 proBNP(!): 1,723.0 [JR]   1734 Troponin T(!!): 0.037 [JR]   1734 WBC(!): 18.13 [JR]   1734 Lipase(!): 84 [JR]   1734 INR(!): 2.24 [JR]   1734 Chest x-ray interpreted by me in PACS.  Lung fields are clear without infiltrate, pleural effusion, pneumothorax.  There is a dialysis catheter projecting over the right thorax.    [JR]   1736 Cardiac troponin is slightly elevated today which is likely secondary to patient's renal insufficiency, however she had complained of some chest discomfort.  She will at minimum warrant a repeat cardiac troponin in 2 hours.  She has leukocytosis which is likely attributed to her known C. difficile colitis as she continues to have diarrhea.  She completed 3-1/2 hours of hemodialysis today and labs revealed no emergent indication for hemodialysis at this time.  Her INR is therapeutic today.  Blood pressure has been marginal, most recently 94/71.  Given her renal failure and also history of heart failure, I have not ordered IV fluids at this time but will continue to monitor closely and provide gentle IV fluids if needed.  She will be admitted for further monitoring and for further evaluation of her chest pain as warranted.    [JR]   1747 Discussed with Dr. Patel [facility], who agrees to admit.    [JR]      ED Course User Index  [JR] Marcos Blackman MD              I wore a mask, face shield, and gloves during this patient encounter.  Patient also wearing a surgical mask.  Hand hygeine performed before and after seeing the patient.    DIAGNOSIS  Final diagnoses:   Clostridium difficile colitis   ESRD on hemodialysis (CMS/[facility])   Other pulmonary embolism without acute cor pulmonale, unspecified chronicity (CMS/[facility])   Chest pain, unspecified type         DISPOSITION  ADMIT              Latest Documented Vital Signs:  As of  19:07 EDT  BP- 91/62 HR- 102 Temp- 97.5 °F (36.4 °C) (Tympanic) O2 sat- 93%        --    Please note that portions of this were completed with a voice recognition program.          Marcos Blackman MD  04/16/21 3692

## 2021-04-16 NOTE — ED NOTES
".  Nursing report ED to floor  Ambar Lara  51 y.o.  female    HPI (triage note):   Chief Complaint   Patient presents with   • Abdominal Pain   • Hypotension       Admitting doctor:   Binta Patel MD    Admitting diagnosis:   The primary encounter diagnosis was Clostridium difficile colitis. Diagnoses of ESRD on hemodialysis (CMS/[facility]), Other pulmonary embolism without acute cor pulmonale, unspecified chronicity (CMS/[facility]), and Chest pain, unspecified type were also pertinent to this visit.    Code status:   Current Code Status     Date Active Code Status Order ID Comments User Context       Prior    Advance Care Planning Activity          Allergies:   Penicillins and Nickel    Weight:       04/16/21  1559   Weight: 68.5 kg (151 lb)       Most recent vitals:   Vitals:    04/16/21 1559 04/16/21 1600 04/16/21 1800 04/16/21 1840   BP:  94/71 (!) 83/58 91/62   BP Location:  Left arm Left arm    Patient Position:  Lying Lying    Pulse:  103 102 102   Resp:  16 16    Temp:       TempSrc:       SpO2:  92% 93% 93%   Weight: 68.5 kg (151 lb)      Height: 154.9 cm (61\")          Active LDAs/IV Access:   Lines, Drains & Airways    Active LDAs     Name:   Placement date:   Placement time:   Site:   Days:    Peripheral IV 04/16/21 1557 Left Forearm   04/16/21    1557    Forearm   less than 1    Peritoneal Dialysis Catheter Right lower abdomen   --    --    Right lower abdomen       Surgical Airway   --    --    --       Hemodialysis Cath Double   --    --    Subclavian                   Labs (abnormal labs have a star):   Labs Reviewed   COMPREHENSIVE METABOLIC PANEL - Abnormal; Notable for the following components:       Result Value    Glucose 146 (*)     Creatinine 1.63 (*)     Sodium 133 (*)     Chloride 97 (*)     Calcium 8.0 (*)     Alkaline Phosphatase 131 (*)     eGFR Non African Amer 34 (*)     BUN/Creatinine Ratio 5.5 (*)     All other components within normal limits    Narrative:     GFR Normal " >60  Chronic Kidney Disease <60  Kidney Failure <15     LIPASE - Abnormal; Notable for the following components:    Lipase 84 (*)     All other components within normal limits   PROTIME-INR - Abnormal; Notable for the following components:    Protime 24.5 (*)     INR 2.24 (*)     All other components within normal limits   CBC WITH AUTO DIFFERENTIAL - Abnormal; Notable for the following components:    WBC 18.13 (*)     RBC 3.59 (*)     Hemoglobin 10.7 (*)     Hematocrit 33.4 (*)     All other components within normal limits    Narrative:     The previously reported component NRBC is no longer being reported. Previous result was 0.0 /100 WBC (Reference Range: 0.0-0.2 /100 WBC) on 4/16/2021 at 1712 EDT.   BNP (IN-HOUSE) - Abnormal; Notable for the following components:    proBNP 1,723.0 (*)     All other components within normal limits    Narrative:     Among patients with dyspnea, NT-proBNP is highly sensitive for the detection of acute congestive heart failure. In addition NT-proBNP of <300 pg/ml effectively rules out acute congestive heart failure with 99% negative predictive value.    Results may be falsely decreased if patient taking Biotin.     TROPONIN (IN-HOUSE) - Abnormal; Notable for the following components:    Troponin T 0.037 (*)     All other components within normal limits    Narrative:     Troponin T Reference Range:  <= 0.03 ng/mL-   Negative for AMI  >0.03 ng/mL-     Abnormal for myocardial necrosis.  Clinicians would have to utilize clinical acumen, EKG, Troponin and serial changes to determine if it is an Acute Myocardial Infarction or myocardial injury due to an underlying chronic condition.       Results may be falsely decreased if patient taking Biotin.     MANUAL DIFFERENTIAL - Abnormal; Notable for the following components:    Neutrophil % 88.8 (*)     Lymphocyte % 5.1 (*)     Monocyte % 3.1 (*)     Metamyelocyte % 1.0 (*)     Myelocyte % 2.0 (*)     Neutrophils Absolute 16.10 (*)     All  other components within normal limits   TROPONIN (IN-HOUSE) - Abnormal; Notable for the following components:    Troponin T 0.040 (*)     All other components within normal limits    Narrative:     Troponin T Reference Range:  <= 0.03 ng/mL-   Negative for AMI  >0.03 ng/mL-     Abnormal for myocardial necrosis.  Clinicians would have to utilize clinical acumen, EKG, Troponin and serial changes to determine if it is an Acute Myocardial Infarction or myocardial injury due to an underlying chronic condition.       Results may be falsely decreased if patient taking Biotin.     COVID PRE-OP / PRE-PROCEDURE SCREENING ORDER (NO ISOLATION)    Narrative:     The following orders were created for panel order COVID PRE-OP / PRE-PROCEDURE SCREENING ORDER (NO ISOLATION) - Swab, Nasopharynx.  Procedure                               Abnormality         Status                     ---------                               -----------         ------                     COVID-19,APTIMA PANTHER,...[[id number]]                      In process                   Please view results for these tests on the individual orders.   COVID-19,APTIMA PANTHERADRIANO IN-HOUSE,NP/OP SWAB IN UTM/VTM/SALINE TRANSPORT MEDIA,24 HR TAT   RAINBOW DRAW    Narrative:     The following orders were created for panel order [place] Draw.  Procedure                               Abnormality         Status                     ---------                               -----------         ------                     Light Blue Top[[id number]]                                   Final result               Green Top (Gel)[[id number]]                                  Final result               Lavender Top[[id number]]                                     Final result               Gold Top - SST[[id number]]                                   Final result                 Please view results for these tests on the individual orders.   URINALYSIS W/ MICROSCOPIC IF INDICATED (NO CULTURE)    LIGHT BLUE TOP   GREEN TOP   LAVENDER TOP   GOLD TOP - [facility]   CBC AND DIFFERENTIAL    Narrative:     The following orders were created for panel order CBC & Differential.  Procedure                               Abnormality         Status                     ---------                               -----------         ------                     CBC Auto Differential[[id number]]        Abnormal            Final result                 Please view results for these tests on the individual orders.       EKG:   ECG 12 Lead   Preliminary Result   HEART RATE= 101  bpm   RR Interval= 596  ms   UT Interval= 195  ms   P Horizontal Axis= 21  deg   P Front Axis= 6  deg   QRSD Interval= 95  ms   QT Interval= 386  ms   QRS Axis= 11  deg   T Wave Axis= 109  deg   - ABNORMAL ECG -   Sinus tachycardia   Left ventricular hypertrophy   Nonspecific T abnormalities, lateral leads   Borderline prolonged QT interval   Electronically Signed By:    Date and Time of Study: 2021-04-16 17:18:54          Meds given in ED:   Medications   sodium chloride 0.9 % flush 10 mL (has no administration in time range)   warfarin (COUMADIN) tablet 4 mg (4 mg Oral Given 4/16/21 1800)   Pharmacy Consult (has no administration in time range)   vancomycin (VANCOCIN) capsule 125 mg (125 mg Oral Given 4/16/21 1800)   sodium chloride 0.9 % bolus 250 mL (250 mL Intravenous New Bag 4/16/21 1902)       Imaging results:  No radiology results for the last day    Ambulatory status:   -     Social issues:   Social History     Socioeconomic History   • Marital status:      Spouse name: Not on file   • Number of children: Not on file   • Years of education: Not on file   • Highest education level: Not on file   Tobacco Use   • Smoking status: Never Smoker    Nursing report ED to floor       Merary Zayas RN  04/16/21 9163

## 2021-04-16 NOTE — ED PROVIDER NOTES
EMERGENCY DEPARTMENT ENCOUNTER    Room Number:  33/33  Date seen:  4/16/2021  Time seen: 16:30 EDT  PCP: Jet Manuel MD  Historian:     HPI:  Chief complaint:***  A complete HPI/ROS/PMH/PSH/SH/FH are unobtainable due to:   Context:Ambar Lara is a 51 y.o. female, hx of ***, who presents to the ED with c/o ***    Timing:***  Duration: ***  Location:***  Radiation:***  Quality:***  Intensity/Severity:***  Associated Symptoms:***  Aggravating Factors:***  Alleviating Factors:***  Previous Episodes:***  Treatment before arrival:***    MEDICAL RECORD REVIEW  ***    ALLERGIES  Penicillins and Nickel    PAST MEDICAL HISTORY  Active Ambulatory Problems     Diagnosis Date Noted   • Colitis, Clostridium difficile 04/09/2021   • Anxiety 06/07/2018   • Cardiomyopathy (CMS/HCC) 06/03/2020   • Chronic systolic heart failure (CMS/HCC) 06/03/2020   • ESRD (end stage renal disease) on dialysis (CMS/HCC) 06/15/2018   • Gastroesophageal reflux disease 02/19/2021   • 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   • HLD (hyperlipidemia) 06/07/2018   • Hypothyroidism 06/06/2018   • Peritoneal dialysis status (CMS/HCC) 02/15/2021   • History of tracheostomy 04/10/2021   • Acute pulmonary embolism (CMS/HCC) 04/10/2021   • Severe malnutrition (CMS/HCC) 04/11/2021     Resolved Ambulatory Problems     Diagnosis Date Noted   • Hypotension 04/10/2021     Past Medical History:   Diagnosis Date   • CHF (congestive heart failure) (CMS/HCC)    • Dialysis patient (CMS/HCC)    • Disease of thyroid gland    • Elevated cholesterol    • GERD (gastroesophageal reflux disease)    • Renal disorder    • Sleep apnea        PAST SURGICAL HISTORY  Past Surgical History:   Procedure Laterality Date   • HYSTERECTOMY     • TRACHEOSTOMY         FAMILY HISTORY  No family history on file.    SOCIAL HISTORY  Social History     Socioeconomic History   • Marital status:      Spouse name: Not on file   • Number of children: Not on file   • Years of education: Not on  file   • Highest education level: Not on file   Tobacco Use   • Smoking status: Never Smoker       REVIEW OF SYSTEMS  Review of Systems    All systems reviewed and negative except for those discussed in HPI.     PHYSICAL EXAM    ED Triage Vitals   Temp Heart Rate Resp BP SpO2   04/16/21 1549 04/16/21 1549 04/16/21 1549 04/16/21 1549 04/16/21 1549   97.5 °F (36.4 °C) 112 20 (!) 87/59 97 %      Temp src Heart Rate Source Patient Position BP Location FiO2 (%)   04/16/21 1549 04/16/21 1600 04/16/21 1600 04/16/21 1600 --   Tympanic Monitor Lying Left arm      Physical Exam    I have reviewed the triage vital signs and nursing notes.      GENERAL: ***not distressed  HENT: nares patent  EYES: no scleral icterus  NECK: no ROM limitations  CV: regular rhythm, regular rate  RESPIRATORY: normal effort  ABDOMEN: soft  : deferred  MUSCULOSKELETAL: no deformity  NEURO: alert, moves all extremities, follows commands  SKIN: warm, dry    LAB RESULTS  No results found for this or any previous visit (from the past 24 hour(s)).      RADIOLOGY RESULTS  No orders to display         PROGRESS, DATA ANALYSIS, CONSULTS AND MEDICAL DECISION MAKING  All labs have been independently reviewed by me.  All radiology studies have been reviewed by me and discussed with radiologist dictating the report. Discussion below represents my analysis of pertinent findings related to patient's condition, differential diagnosis, treatment plan and final disposition.          The differential diagnosis include but are not limited to ***.    LABS- *** ordered, see chart/reviewed.    (MEDS ORDERED) *** ordered for patient's ***.    (IMAGING INTERPRETATION) *** was viewed and interpreted myself which showed ***.    (DISPOSITION RE-EXAM) Re-examined the patient prior to disposition. ***    Phone call with ***.  Discussed the patient, relevant history, exam, diagnostics, ED findings/progress, and concerns. They agree to admit the patient to ***. Care assumed by  the admitting physician at this time.    *** Reviewed pt's history and workup with  ***.  After a bedside evaluation,  *** agrees with the plan of care.    ***(FOR DISCHARGE)The patient's history, physical exam, and lab findings were discussed with the physician, who also performed a face to face history and physical exam.  I discussed all results and noted any abnormalities with patient.  Discussed absoute need to recheck abnormalities with their family physician.  I answered any of the patient's questions.  Discussed plan for discharge, as there is no emergent indication for admission.  Pt is agreeable and understands need for follow up and repeat testing.  Pt is aware that discharge does not mean that nothing is wrong but it indicates no emergency is present and they must continue care with their family physician.  Pt is discharged with instructions to follow up with primary care doctor to have their blood pressure rechecked.     ***(FOR AMA)The patient would like to leave Against Medical Advice.  I have explained the risks of leaving prior to completion of evaluation.  The patient verbally agrees to accept these risks, including worsening of condition, complications and death.  They were of sound mind and mentally stable to make decisions at the time of their AMA departure.  I have encouarged the patient to return the ED at any time if symptoms persist or for any additional concerns.  I verbally advised them to follow up with their primary physician or local health clinic.    ***(FOR ADMIT) Based on the patient's lab findings and presenting symptoms, the doctor and I feel it is appropriate to admit the patient for further management, evaluation, and treatment.  I have discussed this with the admitting team.  I have also discussed this with the patient/family.  They are in agreement with admission.          Disposition vitals:  BP 94/71 (BP Location: Left arm, Patient Position: Lying)   Pulse 103   Temp  "97.5 °F (36.4 °C) (Tympanic)   Resp 16   Ht 154.9 cm (61\")   Wt 68.5 kg (151 lb)   SpO2 92%   BMI 28.53 kg/m²       DIAGNOSIS  Final diagnoses:   None       FOLLOW UP   No follow-up provider specified.    "

## 2021-04-17 LAB
ANION GAP SERPL CALCULATED.3IONS-SCNC: 11.9 MMOL/L (ref 5–15)
BUN SERPL-MCNC: 16 MG/DL (ref 6–20)
BUN/CREAT SERPL: 6 (ref 7–25)
CALCIUM SPEC-SCNC: 8.5 MG/DL (ref 8.6–10.5)
CHLORIDE SERPL-SCNC: 102 MMOL/L (ref 98–107)
CO2 SERPL-SCNC: 22.1 MMOL/L (ref 22–29)
CREAT SERPL-MCNC: 2.67 MG/DL (ref 0.57–1)
DEPRECATED RDW RBC AUTO: 52.2 FL (ref 37–54)
ERYTHROCYTE [DISTWIDTH] IN BLOOD BY AUTOMATED COUNT: 15.1 % (ref 12.3–15.4)
GFR SERPL CREATININE-BSD FRML MDRD: 19 ML/MIN/1.73
GLUCOSE BLDC GLUCOMTR-MCNC: 101 MG/DL (ref 70–130)
GLUCOSE SERPL-MCNC: 98 MG/DL (ref 65–99)
HCT VFR BLD AUTO: 29.7 % (ref 34–46.6)
HGB BLD-MCNC: 9.5 G/DL (ref 12–15.9)
MCH RBC QN AUTO: 30.3 PG (ref 26.6–33)
MCHC RBC AUTO-ENTMCNC: 32 G/DL (ref 31.5–35.7)
MCV RBC AUTO: 94.6 FL (ref 79–97)
PLATELET # BLD AUTO: 272 10*3/MM3 (ref 140–450)
PMV BLD AUTO: 9.1 FL (ref 6–12)
POTASSIUM SERPL-SCNC: 4.1 MMOL/L (ref 3.5–5.2)
RBC # BLD AUTO: 3.14 10*6/MM3 (ref 3.77–5.28)
SODIUM SERPL-SCNC: 136 MMOL/L (ref 136–145)
WBC # BLD AUTO: 9.3 10*3/MM3 (ref 3.4–10.8)

## 2021-04-17 PROCEDURE — 99222 1ST HOSP IP/OBS MODERATE 55: CPT | Performed by: INTERNAL MEDICINE

## 2021-04-17 PROCEDURE — 97162 PT EVAL MOD COMPLEX 30 MIN: CPT

## 2021-04-17 PROCEDURE — 82962 GLUCOSE BLOOD TEST: CPT

## 2021-04-17 PROCEDURE — 85027 COMPLETE CBC AUTOMATED: CPT | Performed by: INTERNAL MEDICINE

## 2021-04-17 PROCEDURE — 80048 BASIC METABOLIC PNL TOTAL CA: CPT | Performed by: INTERNAL MEDICINE

## 2021-04-17 PROCEDURE — 25010000002 ONDANSETRON PER 1 MG: Performed by: INTERNAL MEDICINE

## 2021-04-17 PROCEDURE — 97110 THERAPEUTIC EXERCISES: CPT

## 2021-04-17 RX ADMIN — SODIUM BICARBONATE 650 MG: 650 TABLET ORAL at 05:43

## 2021-04-17 RX ADMIN — CHOLESTYRAMINE 1 PACKET: 4 POWDER, FOR SUSPENSION ORAL at 09:33

## 2021-04-17 RX ADMIN — VANCOMYCIN HYDROCHLORIDE 125 MG: 125 CAPSULE ORAL at 23:31

## 2021-04-17 RX ADMIN — LEVOTHYROXINE SODIUM 25 MCG: 0.03 TABLET ORAL at 05:43

## 2021-04-17 RX ADMIN — VANCOMYCIN HYDROCHLORIDE 125 MG: 125 CAPSULE ORAL at 05:43

## 2021-04-17 RX ADMIN — CALCIUM ACETATE 1334 MG: 667 CAPSULE ORAL at 12:13

## 2021-04-17 RX ADMIN — SODIUM CHLORIDE 250 ML: 9 INJECTION, SOLUTION INTRAVENOUS at 15:37

## 2021-04-17 RX ADMIN — SODIUM CHLORIDE 75 ML/HR: 9 INJECTION, SOLUTION INTRAVENOUS at 05:44

## 2021-04-17 RX ADMIN — SODIUM BICARBONATE 650 MG: 650 TABLET ORAL at 15:37

## 2021-04-17 RX ADMIN — CALCIUM ACETATE 1334 MG: 667 CAPSULE ORAL at 09:33

## 2021-04-17 RX ADMIN — ESCITALOPRAM 20 MG: 20 TABLET, FILM COATED ORAL at 21:19

## 2021-04-17 RX ADMIN — VANCOMYCIN HYDROCHLORIDE 125 MG: 125 CAPSULE ORAL at 17:40

## 2021-04-17 RX ADMIN — MONTELUKAST SODIUM 10 MG: 10 TABLET, FILM COATED ORAL at 09:33

## 2021-04-17 RX ADMIN — CALCIUM ACETATE 1334 MG: 667 CAPSULE ORAL at 17:40

## 2021-04-17 RX ADMIN — Medication 3 MG: at 21:39

## 2021-04-17 RX ADMIN — VANCOMYCIN HYDROCHLORIDE 125 MG: 125 CAPSULE ORAL at 12:14

## 2021-04-17 RX ADMIN — WARFARIN 4 MG: 4 TABLET ORAL at 17:40

## 2021-04-17 RX ADMIN — SODIUM CHLORIDE, PRESERVATIVE FREE 10 ML: 5 INJECTION INTRAVENOUS at 21:18

## 2021-04-17 RX ADMIN — SODIUM BICARBONATE 650 MG: 650 TABLET ORAL at 21:17

## 2021-04-17 RX ADMIN — ONDANSETRON 4 MG: 2 INJECTION INTRAMUSCULAR; INTRAVENOUS at 12:20

## 2021-04-17 RX ADMIN — CHOLESTYRAMINE 1 PACKET: 4 POWDER, FOR SUSPENSION ORAL at 21:18

## 2021-04-17 RX ADMIN — CHOLESTYRAMINE 1 PACKET: 4 POWDER, FOR SUSPENSION ORAL at 01:07

## 2021-04-17 RX ADMIN — ACETAMINOPHEN 650 MG: 325 TABLET, FILM COATED ORAL at 21:39

## 2021-04-17 RX ADMIN — ACETAMINOPHEN 650 MG: 325 TABLET, FILM COATED ORAL at 01:07

## 2021-04-17 NOTE — NURSING NOTE
Called and spoke with Dr. Wang and made him aware that patient's bp is 78/62. New orders received.

## 2021-04-17 NOTE — PLAN OF CARE
Goal Outcome Evaluation:  Plan of Care Reviewed With: patient  Progress: improving  Outcome Summary: Patient alert and oriented. BP was low. Dr. Wang and Dr. Wan aware. New order received for bolus. Bp improved after bolus. On room air. On oral vancomycin. Will continue to monitor.

## 2021-04-17 NOTE — PLAN OF CARE
Goal Outcome Evaluation:      Patient is resting abed and has IV fluids infusing as ordered and did receive a fluid bolus last night for hypotension support. Her lungs are clear and respirations equal without coughing or congestion. She has had multiple liquid stools tonight and is currently on PO antibiotics for GI infection. Patient has a small but growing pressure area to her right gluteus and has been instructed that it would help her to get OOB onto BSC for diarrhea episodes. She is alert, oriented, with skin pale and dry. This patient is weak to ambulate but still able with assistance and needs lots of encouragement.

## 2021-04-17 NOTE — CONSULTS
[facility]  Initial Inpatient Consult Note    Referring Provider: Dr Wan    Reason for Consultation: C diff    Subjective     History of present illness:    51 y.o. female with history of CHF ESRD on dialysis recent diagnosis of C. difficile treated at this facility and discharged on .  She was sent back from rehab facility due to weakness and malaise.  The patient reports that she has had worsening of her diarrhea since she left the hospital apparently she was not getting her vancomycin for her C. difficile at the rehab facility.  GI consultation has been requested by the admitting team.  She was seen by ID during last hospitalization and recommended she have treatment with Vancomycin 125mg QID x 10 days, then to complete a prolonged 2 mos taper afterwards.  She reports colonoscopy at [facility] in January of this year.      Past Medical History:  Past Medical History:   Diagnosis Date   • CHF (congestive heart failure) (CMS/[facility])    • Dialysis patient (CMS/[facility])    • Disease of thyroid gland    • Elevated cholesterol    • GERD (gastroesophageal reflux disease)    • Renal disorder    • Sleep apnea      Past Surgical History:  Past Surgical History:   Procedure Laterality Date   • ADRENAL GLAND SURGERY N/A `   •  SECTION Bilateral 2001    Has had x2   • HYSTERECTOMY     • TRACHEOSTOMY        Social History:   Social History     Tobacco Use   • Smoking status: Former Smoker     Packs/day: 1.00     Years: 25.00     Pack years: 25.00     Types: Cigarettes     Quit date: 2018     Years since quitting: 3.0   • Smokeless tobacco: Never Used   Substance Use Topics   • Alcohol use: Not Currently      Family History:  History reviewed. No pertinent family history.    Home Meds:  Medications Prior to Admission   Medication Sig Dispense Refill Last Dose   • calcium acetate (PHOS BINDER,) 667 MG capsule capsule Take 2 capsules by mouth 3 (Three) Times a Day With Meals.  180 capsule 3    • cholestyramine (QUESTRAN) 4 g packet Take 1 packet by mouth Every 12 (Twelve) Hours. 60 packet 0    • escitalopram (Lexapro) 20 MG tablet Daily.      • gentamicin (GARAMYCIN) 0.1 % ointment Apply  topically to the appropriate area as directed Daily. 60 g 3    • levothyroxine (SYNTHROID, LEVOTHROID) 25 MCG tablet Take 1 tablet by mouth Daily. 30 tablet 3    • montelukast (Singulair) 10 MG tablet Daily.      • sodium bicarbonate 650 MG tablet Every 8 (Eight) Hours.      • vancomycin 50 MG/ML reconstituted solution oral solution reconstituted Take 2.5 mL by mouth Every 6 (Six) Hours for 21 doses. Indications: Clostridium Difficile Infection 52.5 mL 0    • [START ON 4/20/2021] vancomycin 50 MG/ML reconstituted solution oral solution reconstituted Take 2.5 mL by mouth Every 12 (Twelve) Hours for 14 doses. Indications: Clostridium Difficile Infection 35 mL 0    • [START ON 4/27/2021] vancomycin 50 MG/ML reconstituted solution oral solution reconstituted Take 2.5 mL by mouth Daily for 7 doses. Indications: Clostridium Difficile Infection 17.5 mL 0    • [START ON 5/4/2021] vancomycin 50 MG/ML reconstituted solution oral solution reconstituted Take 2.5 mL by mouth Every Other Day for 14 doses. Indications: Clostridium Difficile Infection 35 mL 0    • warfarin (Coumadin) 4 MG tablet 1 tablet p.o. every day. 30 tablet 3      Current Meds:   calcium acetate, 1,334 mg, Oral, TID With Meals  cholestyramine, 1 packet, Oral, Q12H  escitalopram, 20 mg, Oral, Q24H  levothyroxine, 25 mcg, Oral, Q AM  montelukast, 10 mg, Oral, Daily  sodium bicarbonate, 650 mg, Oral, Q8H  vancomycin, 125 mg, Oral, Q6H  warfarin, 4 mg, Oral, Daily      Allergies:  Allergies   Allergen Reactions   • Penicillins Anaphylaxis   • Nickel Rash     Review of Systems  All systems were reviewed and negative except for:  Constitution:  positive for fatigue  Gastrointestinal: positive for  diarrhea     Objective     Vital Signs  Temp:  [97.5 °F  (36.4 °C)-98.4 °F (36.9 °C)] 98.3 °F (36.8 °C)  Heart Rate:  [] 94  Resp:  [16-20] 16  BP: (78-96)/(46-71) 78/46  Physical Exam:  General Appearance:     Alert, cooperative, in no acute distress   Head:    Normocephalic, without obvious abnormality, atraumatic   Eyes:            Lids and lashes normal, conjunctivae and sclerae normal, no icterus   Throat:   No oral lesions, no thrush, oral mucosa moist   Neck:   No adenopathy, supple, trachea midline, no thyromegaly, no carotid bruit, no JVD   Lungs:     Clear to auscultation,respirations regular, even and            unlabored    Heart:    Regular rhythm and normal rate, normal S1 and S2, no        murmur, no gallop, no rub, no click   Chest Wall:    No abnormalities observed   Abdomen:     Normal bowel sounds, no masses, no organomegaly, soft     nontender, nondistended, no guarding, no rebound                 tenderness   Rectal:     Deferred   Extremities:   no edema, no cyanosis, no redness   Skin:   No bleeding, bruising or rash   Lymph nodes:   No palpable adenopathy   Psychiatric:  Judgement and insight: normal   Orientation to person place and time: normal   Mood and affect: normal   Results Review:   I reviewed the patient's new clinical results.    Results from last 7 days   Lab Units 04/17/21  0555 04/16/21  1603 04/14/21  0648   WBC 10*3/mm3 9.30 18.13* 13.50*   HEMOGLOBIN g/dL 9.5* 10.7* 9.5*   HEMATOCRIT % 29.7* 33.4* 28.8*   PLATELETS 10*3/mm3 272 398 295     Results from last 7 days   Lab Units 04/17/21  0555 04/16/21  1602 04/14/21  0648 04/12/21  0307 04/11/21  0522   SODIUM mmol/L 136 133* 136 136 132*   POTASSIUM mmol/L 4.1 4.1 3.8 3.1* 3.2*   CHLORIDE mmol/L 102 97* 100 100 95*   CO2 mmol/L 22.1 23.2 22.5 24.9 18.9*   BUN mg/dL 16 9 18 12 37*   CREATININE mg/dL 2.67* 1.63* 2.96* 2.67* 4.56*   CALCIUM mg/dL 8.5* 8.0* 8.7 7.8* 8.4*   BILIRUBIN mg/dL  --  0.2  --  <0.2 0.2   ALK PHOS U/L  --  131*  --  120* 125*   ALT (SGPT) U/L  --  14  --   18 14   AST (SGOT) U/L  --  17  --  19 15   GLUCOSE mg/dL 98 146* 86 82 93     Results from last 7 days   Lab Units 04/16/21  1602 04/14/21  0648 04/13/21  0505   INR  2.24* 1.96* 1.32*     Lab Results   Lab Value Date/Time    LIPASE 84 (H) 04/16/2021 1602       Radiology:  XR Chest 1 View   Final Result          Assessment/Plan   Assessment:   1.  C diff colitis  2.  ESRD on HD  3.  CHF    Plan:   Patient has a recent diagnosis of C. difficile she was seen by our infectious disease doctors during last hospitalization with appropriate recommendations for vancomycin given during that hospitalization.  The patient was discharged to rehab and has not been receiving her vancomycin.  It has now been restarted.  She should continue vancomycin as previously recommended.  No further recommendations from GI at this time.    I discussed the patients findings and my recommendations with patient.         Sixto Sauer M.D.  [facility]  [address] [place] [address]  Office: [phone]

## 2021-04-17 NOTE — CODE DOCUMENTATION
Responded to rapid response for hypotension, primary RN already contacted Dr Wang and fluid bolus given.  Pt awake, alert, oriented, aysmptomatic.  Bolus started and systolic BP increased to acceptable map WNL.  Primary RN instructed to follow up with Dr Wang if needed, re-call rapid response if BP drops.

## 2021-04-17 NOTE — H&P
HISTORY AND PHYSICAL   [facility]        Patient Identification:  Name: Ambar Lara  Age: 51 y.o.  Sex: female  :  1970  MRN: [id number]                     Primary Care Physician: Jet Manuel MD    Chief Complaint:  51 year old female who was sent in from a rehab facility with weakness, diarrhea, malaise for the last several days; she was recently admitted with c diff colitis but had improved prior to discharge; she says she was not receiving her medications at the rehab facility and started getting worse right away; she was at dialysis today and had some chest pain; she denies any now but just generally feels bad; no fever or chills; no shortness of breath    History of Present Illness:   As above    Past Medical History:  Past Medical History:   Diagnosis Date   • CHF (congestive heart failure) (CMS/[facility])    • Dialysis patient (CMS/[facility])    • Disease of thyroid gland    • Elevated cholesterol    • GERD (gastroesophageal reflux disease)    • Renal disorder    • Sleep apnea      Past Surgical History:  Past Surgical History:   Procedure Laterality Date   • ADRENAL GLAND SURGERY N/A `   •  SECTION Bilateral 2001    Has had x2   • HYSTERECTOMY     • TRACHEOSTOMY        Home Meds:  Medications Prior to Admission   Medication Sig Dispense Refill Last Dose   • calcium acetate (PHOS BINDER,) 667 MG capsule capsule Take 2 capsules by mouth 3 (Three) Times a Day With Meals. 180 capsule 3    • cholestyramine (QUESTRAN) 4 g packet Take 1 packet by mouth Every 12 (Twelve) Hours. 60 packet 0    • escitalopram (Lexapro) 20 MG tablet Daily.      • gentamicin (GARAMYCIN) 0.1 % ointment Apply  topically to the appropriate area as directed Daily. 60 g 3    • levothyroxine (SYNTHROID, LEVOTHROID) 25 MCG tablet Take 1 tablet by mouth Daily. 30 tablet 3    • montelukast (Singulair) 10 MG tablet Daily.      • sodium bicarbonate 650 MG tablet Every 8 (Eight) Hours.      • vancomycin 50  MG/ML reconstituted solution oral solution reconstituted Take 2.5 mL by mouth Every 6 (Six) Hours for 21 doses. Indications: Clostridium Difficile Infection 52.5 mL 0    • [START ON 4/20/2021] vancomycin 50 MG/ML reconstituted solution oral solution reconstituted Take 2.5 mL by mouth Every 12 (Twelve) Hours for 14 doses. Indications: Clostridium Difficile Infection 35 mL 0    • [START ON 4/27/2021] vancomycin 50 MG/ML reconstituted solution oral solution reconstituted Take 2.5 mL by mouth Daily for 7 doses. Indications: Clostridium Difficile Infection 17.5 mL 0    • [START ON 5/4/2021] vancomycin 50 MG/ML reconstituted solution oral solution reconstituted Take 2.5 mL by mouth Every Other Day for 14 doses. Indications: Clostridium Difficile Infection 35 mL 0    • warfarin (Coumadin) 4 MG tablet 1 tablet p.o. every day. 30 tablet 3        Allergies:  Allergies   Allergen Reactions   • Penicillins Anaphylaxis   • Nickel Rash     Immunizations:    There is no immunization history on file for this patient.  Social History:   Social History     Social History Narrative   • Not on file     Social History     Socioeconomic History   • Marital status:      Spouse name: Not on file   • Number of children: Not on file   • Years of education: Not on file   • Highest education level: Not on file   Tobacco Use   • Smoking status: Former Smoker     Packs/day: 1.00     Years: 25.00     Pack years: 25.00     Types: Cigarettes     Quit date: 4/16/2018     Years since quitting: 3.0   • Smokeless tobacco: Never Used   Vaping Use   • Vaping Use: Former   • Quit date: 2/14/2021   • Substances: Nicotine, Flavoring   • Devices: Pre-filled or refillable cartridge   Substance and Sexual Activity   • Alcohol use: Not Currently   • Drug use: Not Currently   • Sexual activity: Defer       Family History:  History reviewed. No pertinent family history.     Review of Systems  See history of present illness and past medical history.   "Patient denies headache, dizziness, syncope, falls, trauma, change in vision, change in hearing, focal weakness, numbness, or paresthesia.  Patient denies chest pain, palpitations, dyspnea, orthopnea, PND, cough, sinus pressure, rhinorrhea, epistaxis, hemoptysis,  Vomiting,hematemesis , constipation or hematchezia.  Denies cold or heat intolerance, polydipsia, polyuria, polyphagia. Denies hematuria, pyuria, dysuria, hesitancy, frequency or urgency. Denies consumption of raw and under cooked meats foods or change in water source.  Denies fever, chills, sweats, night sweats.  Denies missing any routine medications. Remainder of ROS is negative.    Objective:  T Max 24 hrs: Temp (24hrs), Av.9 °F (36.6 °C), Min:97.5 °F (36.4 °C), Max:98.3 °F (36.8 °C)    Vitals Ranges:   Temp:  [97.5 °F (36.4 °C)-98.3 °F (36.8 °C)] 98.3 °F (36.8 °C)  Heart Rate:  [100-112] 100  Resp:  [16-20] 18  BP: (83-94)/(58-71) 91/60      Exam:  BP 91/60 (BP Location: Right arm, Patient Position: Lying)   Pulse 100   Temp 98.3 °F (36.8 °C) (Oral)   Resp 18   Ht 154.9 cm (61\")   Wt 68.5 kg (151 lb)   LMP 2004 (Approximate)   SpO2 94%   BMI 28.53 kg/m²     General Appearance:    Alert, cooperative, no distress, appears stated age   Head:    Normocephalic, without obvious abnormality, atraumatic   Eyes:    PERRL, conjunctivae/corneas clear, EOM's intact, both eyes   Ears:    Normal external ear canals, both ears   Nose:   Nares normal, septum midline, mucosa normal, no drainage    or sinus tenderness   Throat:   Lips, mucosa, and tongue normal   Neck:   Supple, symmetrical, trachea midline, no adenopathy;     thyroid:  no enlargement/tenderness/nodules; no carotid    bruit or JVD   Back:     Symmetric, no curvature, ROM normal, no CVA tenderness   Lungs:     Decreased breath sounds bilaterally, respirations unlabored   Chest Wall:    No tenderness or deformity    Heart:    Regular rate and rhythm, S1 and S2 normal, no murmur, rub   " or gallop   Abdomen:     Soft, nontender, bowel sounds active all four quadrants,     no masses, no hepatomegaly, no splenomegaly   Extremities:   Extremities normal, atraumatic, no cyanosis or edema   Pulses:   2+ and symmetric all extremities   Skin:   Skin color, texture, turgor normal, no rashes or lesions   Lymph nodes:   Cervical, supraclavicular, and axillary nodes normal   Neurologic:   CNII-XII intact, normal strength, sensation intact throughout      .    Data Review:  Labs in chart were reviewed.  WBC   Date Value Ref Range Status   04/16/2021 18.13 (H) 3.40 - 10.80 10*3/mm3 Final     Hemoglobin   Date Value Ref Range Status   04/16/2021 10.7 (L) 12.0 - 15.9 g/dL Final     Hematocrit   Date Value Ref Range Status   04/16/2021 33.4 (L) 34.0 - 46.6 % Final     Platelets   Date Value Ref Range Status   04/16/2021 398 140 - 450 10*3/mm3 Final     Sodium   Date Value Ref Range Status   04/16/2021 133 (L) 136 - 145 mmol/L Final     Potassium   Date Value Ref Range Status   04/16/2021 4.1 3.5 - 5.2 mmol/L Final     Chloride   Date Value Ref Range Status   04/16/2021 97 (L) 98 - 107 mmol/L Final     CO2   Date Value Ref Range Status   04/16/2021 23.2 22.0 - 29.0 mmol/L Final     BUN   Date Value Ref Range Status   04/16/2021 9 6 - 20 mg/dL Final     Creatinine   Date Value Ref Range Status   04/16/2021 1.63 (H) 0.57 - 1.00 mg/dL Final     Glucose   Date Value Ref Range Status   04/16/2021 146 (H) 65 - 99 mg/dL Final     Calcium   Date Value Ref Range Status   04/16/2021 8.0 (L) 8.6 - 10.5 mg/dL Final     AST (SGOT)   Date Value Ref Range Status   04/16/2021 17 1 - 32 U/L Final     ALT (SGPT)   Date Value Ref Range Status   04/16/2021 14 1 - 33 U/L Final     Alkaline Phosphatase   Date Value Ref Range Status   04/16/2021 131 (H) 39 - 117 U/L Final     INR   Date Value Ref Range Status   04/16/2021 2.24 (H) 0.90 - 1.10 Final       Results from last 7 days   Lab Units 04/10/21  0235   TSH uIU/mL 2.910           Imaging Results (All)     Procedure Component Value Units Date/Time    XR Chest 1 View [[id number]] Collected: 04/16/21 1830     Updated: 04/16/21 2004    Narrative:      ONE VIEW PORTABLE CHEST AT 5 PM     HISTORY: Hypotension. End-stage renal disease. Chest pain.      Recent Covid 19 pneumonia.     FINDINGS: The lungs are moderately expanded with some patchy infiltrates  at the lung bases likely related to residual changes of Covid 19  pneumonia and showing some minimal improvement at the left base when  compared to the study of 04/09/2021. The heart size remains normal. A  large gauge vascular catheter ends near the junction of the SVC and  right atrium without change.     This report was finalized on 4/16/2021 8:01 PM by Dr. Kiet Oconnor M.D.           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   • HLD (hyperlipidemia) E78.5   • Hypothyroidism E03.9   • Peritoneal dialysis status (CMS/HCC) Z99.2   • History of tracheostomy Z98.890   • Acute pulmonary embolism (CMS/HCC) I26.99   • Severe malnutrition (CMS/HCC) E43   • Clostridium difficile colitis A04.72       Assessment:    Clostridium difficile colitis  diarrhea  esrd on hd  Chest pain  Hyponatremia  Anemia  Hyperglycemia  Hypothyroidism  malaise    Plan:  Restart po vancomycin  Consult nephrology  Trend hgb  Monitor on telemetry  Continue with physical therapy  Repeat blood sugar in the am  D.w patient and ED Provider    Binta Patel MD  4/16/2021  21:49 EDT

## 2021-04-17 NOTE — PROGRESS NOTES
Pharmacy Consult for CDI treatment    Reviewed medications for antiperistalic agents, stool softeners, laxatives and PPIs    Only issue is the Cholestyramine may interfere with Vancomycin administration.  Currently Cholestyramine is scheduled Q 12 hours and is 3 hours between Vancomycin capsules.  It is extremely important to stay on schedule with these 2 medications to avoid absorption issues with the Vancomycin.      No PPIs, stool softeners, or laxatives are currently ordered.      Thank you for consult,    Fernanda Seay, R.Ph.  Clinical Staff Pharmacist

## 2021-04-17 NOTE — PROGRESS NOTES
Discharge Planning Assessment  [facility]     Patient Name: Ambar Lara  MRN: [id number]  Today's Date: 4/17/2021    Admit Date: 4/16/2021    Discharge Needs Assessment     Row Name 04/17/21 1541       Living Environment    Lives With  child(richar), adult    Name(s) of Who Lives With Patient  Carol bae    Current Living Arrangements  extended care facility currently at [facility] for rehab    Primary Care Provided by  child(richar);self    Provides Primary Care For  no one, unable/limited ability to care for self    Family Caregiver if Needed  child(richar), adult    Quality of Family Relationships  involved;supportive       Resource/Environmental Concerns    Resource/Environmental Concerns  none       Transition Planning    Patient/Family Anticipates Transition to  inpatient rehabilitation facility    Patient/Family Anticipated Services at Transition  rehabilitation services    Transportation Anticipated  family or friend will provide       Discharge Needs Assessment    Readmission Within the Last 30 Days  previous discharge plan unsuccessful    Equipment Currently Used at Home  shower chair;commode;oxygen;walker, standard;wheelchair    Concerns to be Addressed  discharge planning    Concerns Comments  states that she did not receive her medications at [facility] and does not want to return there        Discharge Plan     Row Name 04/17/21 1544       Plan    Plan  SNF- need to follow up with patient/family for facility choices    Patient/Family in Agreement with Plan  yes        Continued Care and Services - Admitted Since 4/16/2021     Destination     Service Provider Request Status Selected Services Address Phone Fax Patient Preferred    [facility]  Pending - No Request Sent N/A [address] [facility] [phone] --       Internal Comment last updated by Nathanael Reeves, RN 4/17/2021 1537    Patient admitted from this facility but does not want to return.  S/w  Antoinette at [facility] reports pt was upset that her medications were delayed but that she did receive them.  Patient is accepted and can return there if she is willing.  Nathanael Reeves RN                         Selected Continued Care - Prior Encounters Includes selections from prior encounters from 1/16/2021 to 4/17/2021    Discharged on 4/14/2021 Admission date: 4/9/2021 - Discharge disposition: Skilled Nursing Facility (DC - External)    Destination     Service Provider Selected Services Address Phone Fax Patient Preferred    [facility] [address] [place] [phone] --                      Demographic Summary     Row Name 04/17/21 1540       General Information    Admission Type  observation    Required Notices Provided  Observation Status Notice    Referral Source  admission list    Reason for Consult  discharge planning    Preferred Language  English        Functional Status     Row Name 04/17/21 1540       Functional Status    Usual Activity Tolerance  moderate    Current Activity Tolerance  fair       Functional Status, IADL    Medications  assistive equipment and person    Meal Preparation  assistive equipment and person    Housekeeping  assistive equipment and person    Laundry  assistive equipment and person    Shopping  assistive equipment and person        Psychosocial    No documentation.       Abuse/Neglect    No documentation.       Legal    No documentation.       Substance Abuse    No documentation.       Patient Forms    No documentation.           Isela Ramos, SRIDEVI

## 2021-04-17 NOTE — PROGRESS NOTES
Name: Ambar Lara ADMIT: 2021   : 1970  PCP: Jet Manuel MD    MRN: [id number] LOS: 0 days   AGE/SEX: 51 y.o. female  ROOM: [facility]     Subjective   Subjective   Patient seen at bedside today.       Objective   Objective   Vital Signs  Temp:  [97.5 °F (36.4 °C)-98.4 °F (36.9 °C)] 97.9 °F (36.6 °C)  Heart Rate:  [] 93  Resp:  [16-20] 16  BP: (83-96)/(54-71) 96/62  SpO2:  [92 %-97 %] 94 %  on   ;   Device (Oxygen Therapy): room air  Body mass index is 31.03 kg/m².  Physical Exam      General Appearance:    Alert, cooperative, no distress, appears stated age   Head:    Normocephalic, without obvious abnormality, atraumatic   Eyes:    PERRL, conjunctivae/corneas clear, EOM's intact, both eyes   Ears:    Normal external ear canals, both ears   Nose:   Nares normal, septum midline, mucosa normal, no drainage    or sinus tenderness   Throat:   Lips, mucosa, and tongue normal   Neck:   Supple, symmetrical, trachea midline, no adenopathy;     thyroid:  no enlargement/tenderness/nodules; no carotid    bruit or JVD   Back:     Symmetric, no curvature, ROM normal, no CVA tenderness   Lungs:     Decreased breath sounds bilaterally, respirations unlabored   Chest Wall:    No tenderness or deformity    Heart:    Regular rate and rhythm, S1 and S2 normal, no murmur, rub   or gallop   Abdomen:     Soft, nontender, bowel sounds active all four quadrants,     no masses, no hepatomegaly, no splenomegaly   Extremities:   Extremities normal, atraumatic, no cyanosis or edema   Pulses:   2+ and symmetric all extremities   Skin:   Skin color, texture, turgor normal, no rashes or lesions   Lymph nodes:   Cervical, supraclavicular, and axillary nodes normal   Neurologic:   CNII-XII intact, normal strength, sensation intact throughout         Results Review     I reviewed the patient's new clinical results.  Results from last 7 days   Lab Units 21  0555 21  1603 21  0648 21  0505   WBC  10*3/mm3 9.30 18.13* 13.50* 13.16*   HEMOGLOBIN g/dL 9.5* 10.7* 9.5* 9.4*   PLATELETS 10*3/mm3 272 398 295 315     Results from last 7 days   Lab Units 04/17/21  0555 04/16/21  1602 04/14/21  0648 04/13/21  0505   SODIUM mmol/L 136 133* 136 136   POTASSIUM mmol/L 4.1 4.1 3.8 3.0*   CHLORIDE mmol/L 102 97* 100 99   CO2 mmol/L 22.1 23.2 22.5 22.2   BUN mg/dL 16 9 18 19   CREATININE mg/dL 2.67* 1.63* 2.96* 3.40*   GLUCOSE mg/dL 98 146* 86 83   Estimated Creatinine Clearance: 23.5 mL/min (A) (by C-G formula based on SCr of 2.67 mg/dL (H)).  Results from last 7 days   Lab Units 04/16/21  1602 04/13/21  0505 04/12/21  0307 04/11/21  0522   ALBUMIN g/dL 3.60 3.00* 3.30* 2.80*   BILIRUBIN mg/dL 0.2  --  <0.2 0.2   ALK PHOS U/L 131*  --  120* 125*   AST (SGOT) U/L 17  --  19 15   ALT (SGPT) U/L 14  --  18 14     Results from last 7 days   Lab Units 04/17/21  0555 04/16/21  1602 04/14/21  0648 04/13/21  0505 04/12/21  0307 04/11/21  0522   CALCIUM mg/dL 8.5* 8.0* 8.7 7.8* 7.8* 8.4*   ALBUMIN g/dL  --  3.60  --  3.00* 3.30* 2.80*   MAGNESIUM mg/dL  --   --   --  1.6  --  2.0   PHOSPHORUS mg/dL  --   --   --  3.8  --  7.1*       COVID19   Date Value Ref Range Status   04/16/2021 Not Detected Not Detected - Ref. Range Final   04/09/2021 Not Detected Not Detected - Ref. Range Final     No results found for: HGBA1C, POCGLU    XR Chest 1 View  ONE VIEW PORTABLE CHEST AT 5 PM     HISTORY: Hypotension. End-stage renal disease. Chest pain.      Recent Covid 19 pneumonia.     FINDINGS: The lungs are moderately expanded with some patchy infiltrates  at the lung bases likely related to residual changes of Covid 19  pneumonia and showing some minimal improvement at the left base when  compared to the study of 04/09/2021. The heart size remains normal. A  large gauge vascular catheter ends near the junction of the SVC and  right atrium without change.     This report was finalized on 4/16/2021 8:01 PM by Dr. Kiet Oconnor M.D.        Scheduled Medications  calcium acetate, 1,334 mg, Oral, TID With Meals  cholestyramine, 1 packet, Oral, Q12H  escitalopram, 20 mg, Oral, Q24H  levothyroxine, 25 mcg, Oral, Q AM  montelukast, 10 mg, Oral, Daily  sodium bicarbonate, 650 mg, Oral, Q8H  vancomycin, 125 mg, Oral, Q6H  warfarin, 4 mg, Oral, Daily    Infusions  Pharmacy Consult,   Pharmacy Consult,   sodium chloride, 75 mL/hr, Last Rate: 75 mL/hr (04/17/21 1008)    Diet  Diet Regular; Cardiac       Assessment/Plan     Active Hospital Problems    Diagnosis  POA   • **Clostridium difficile colitis [A04.72]  Yes   • Gastroesophageal reflux disease [K21.9]  Yes   • Cardiomyopathy (CMS/HCC) [I42.9]  Yes   • Chronic systolic heart failure (CMS/HCC) [I50.22]  Yes   • ESRD (end stage renal disease) on dialysis (CMS/[facility]) [N18.6, Z99.2]  Not Applicable   • HLD (hyperlipidemia) [E78.5]  Yes   • Hypothyroidism [E03.9]  Yes      Resolved Hospital Problems   No resolved problems to display.       51 y.o. female admitted with Clostridium difficile colitis.    Assessment and plan:  1.  C. difficile colitis, patient has been started on p.o. vancomycin.  We will consult gastroenterology.  2.  End-stage renal disease on hemodialysis, continue dialysis per nephrology recommendations.  3.  Anemia of chronic disease, hemoglobin noted to be 9.5, continue to recheck labs.  4.  Hyponatremia, resolved.  5.  Chronic systolic congestive heart failure, we will stop IV fluids.  Watch for volume overload.  6.  Hypothyroidism, continue Synthroid.  7.  Personal history of PE, patient currently on Coumadin.  INR therapeutic.  8.  CODE STATUS is full code.  Further plans based on hospital course.      Nirmal Wan MD  [facility]  04/17/21  11:12 EDT

## 2021-04-17 NOTE — THERAPY EVALUATION
Patient Name: Ambar Lara  : 1970    MRN: [id number]                              Today's Date: 2021       Admit Date: 2021    Visit Dx:     ICD-10-CM ICD-9-CM   1. Clostridium difficile colitis  A04.72 008.45   2. ESRD on hemodialysis (CMS/HCC)  N18.6 585.6    Z99.2 V45.11   3. Other pulmonary embolism without acute cor pulmonale, unspecified chronicity (CMS/HCC)  I26.99 415.19   4. Chest pain, unspecified type  R07.9 786.50     Patient Active Problem List   Diagnosis   • Colitis, Clostridium difficile   • Anxiety   • Cardiomyopathy (CMS/HCC)   • Chronic systolic heart failure (CMS/HCC)   • ESRD (end stage renal disease) on dialysis (CMS/HCC)   • Gastroesophageal reflux disease   • Gitelman syndrome   • HLD (hyperlipidemia)   • Hypothyroidism   • Peritoneal dialysis status (CMS/HCC)   • History of tracheostomy   • Acute pulmonary embolism (CMS/HCC)   • Severe malnutrition (CMS/HCC)   • Clostridium difficile colitis     Past Medical History:   Diagnosis Date   • CHF (congestive heart failure) (CMS/HCC)    • Dialysis patient (CMS/HCC)    • Disease of thyroid gland    • Elevated cholesterol    • GERD (gastroesophageal reflux disease)    • Renal disorder    • Sleep apnea      Past Surgical History:   Procedure Laterality Date   • ADRENAL GLAND SURGERY N/A `   •  SECTION Bilateral 2001    Has had x2   • HYSTERECTOMY     • TRACHEOSTOMY       General Information     Row Name 21 1102          Physical Therapy Time and Intention    Document Type  evaluation  -AL     Mode of Treatment  physical therapy  -AL     Row Name 21 1102          General Information    Patient Profile Reviewed  yes  -AL     Prior Level of Function  mod assist:  -AL     Existing Precautions/Restrictions  no known precautions/restrictions  -AL     Barriers to Rehab  previous functional deficit  -AL     Row Name 21 1102          Living Environment    Lives With  child(richar), adult  -AL     Row  Name 04/17/21 1102          Cognition    Orientation Status (Cognition)  oriented x 4  -AL       User Key  (r) = Recorded By, (t) = Taken By, (c) = Cosigned By    Initials Name Provider Type    Valeria Turner, PT Physical Therapist        Mobility     Row Name 04/17/21 1102          Bed Mobility    Bed Mobility  supine-sit;sit-supine  -AL     Supine-Sit [place] (Bed Mobility)  standby assist;verbal cues  -AL     Sit-Supine [place] (Bed Mobility)  standby assist;verbal cues  -AL     Row Name 04/17/21 1102          Sit-Stand Transfer    Sit-Stand [place] (Transfers)  standby assist;verbal cues;nonverbal cues (demo/gesture)  -AL     Assistive Device (Sit-Stand Transfers)  walker, front-wheeled  -AL     Row Name 04/17/21 1102          Gait/Stairs (Locomotion)    [place] Level (Gait)  minimum assist (75% patient effort);verbal cues;nonverbal cues (demo/gesture)  -AL     Assistive Device (Gait)  walker, front-wheeled  -AL     Distance in Feet (Gait)  40  -AL     Comment (Gait/Stairs)  slow august. no LOB.  -AL       User Key  (r) = Recorded By, (t) = Taken By, (c) = Cosigned By    Initials Name Provider Type    Valeria Turner, PT Physical Therapist        Obj/Interventions     Row Name 04/17/21 1103          Range of Motion Comprehensive    Comment, General Range of Motion  WFL for age  -AL     Row Name 04/17/21 1103          Strength Comprehensive (MMT)    Comment, General Manual Muscle Testing (MMT) Assessment  generalized weakness  -AL       User Key  (r) = Recorded By, (t) = Taken By, (c) = Cosigned By    Initials Name Provider Type    Valeria Turner, PT Physical Therapist        Goals/Plan     Row Name 04/17/21 1104          Bed Mobility Goal 1 (PT)    Activity/Assistive Device (Bed Mobility Goal 1, PT)  sit to supine;supine to sit  -AL     [place] Level/Cues Needed (Bed Mobility Goal 1, PT)  modified independence  -AL     Time Frame (Bed Mobility Goal 1, PT)  long term goal (LTG);1 week   -AL     Row Name 04/17/21 1104          Transfer Goal 1 (PT)    Activity/Assistive Device (Transfer Goal 1, PT)  sit-to-stand/stand-to-sit  -AL     [place] Level/Cues Needed (Transfer Goal 1, PT)  modified independence  -AL     Time Frame (Transfer Goal 1, PT)  long term goal (LTG);1 week  -AL     [facility] Name 04/17/21 1104          Gait Training Goal 1 (PT)    Activity/Assistive Device (Gait Training Goal 1, PT)  gait (walking locomotion);assistive device use;walker, rolling  -AL     [place] Level (Gait Training Goal 1, PT)  contact guard assist  -AL     Distance (Gait Training Goal 1, PT)  100  -AL     Time Frame (Gait Training Goal 1, PT)  long term goal (LTG);1 week  -AL       User Key  (r) = Recorded By, (t) = Taken By, (c) = Cosigned By    Initials Name Provider Type    Valeria Turner, PT Physical Therapist        Clinical Impression     [facility] Name 04/17/21 1104          Pain    Additional Documentation  Pain Scale: Numbers Pre/Post-Treatment (Group)  -AL     Row Name 04/17/21 1104          Pain Scale: Numbers Pre/Post-Treatment    Pretreatment Pain Rating  3/10  -AL     Posttreatment Pain Rating  3/10  -AL     Pain Location - Orientation  anterior  -AL     Pain Location  abdomen  -AL     Pain Intervention(s)  Ambulation/increased activity;Repositioned  -AL     [facility] Name 04/17/21 1104          Plan of Care Review    Plan of Care Reviewed With  patient  -AL     Row Name 04/17/21 1104          Therapy Assessment/Plan (PT)    Rehab Potential (PT)  fair, will monitor progress closely  -AL     Criteria for Skilled Interventions Met (PT)  skilled treatment is necessary  -AL     Row Name 04/17/21 1104          Positioning and Restraints    Pre-Treatment Position  in bed  -AL     Post Treatment Position  bed  -AL     In Bed  supine;call light within reach;encouraged to call for assist;exit alarm on  -AL       User Key  (r) = Recorded By, (t) = Taken By, (c) = Cosigned By    Initials Name Provider Type    JESS Jimenez  Valeira HOGAN, PT Physical Therapist        Outcome Measures     Row Name 04/17/21 1105          How much help from another person do you currently need...    Turning from your back to your side while in flat bed without using bedrails?  3  -AL     Moving from lying on back to sitting on the side of a flat bed without bedrails?  3  -AL     Moving to and from a bed to a chair (including a wheelchair)?  3  -AL     Standing up from a chair using your arms (e.g., wheelchair, bedside chair)?  3  -AL     Climbing 3-5 steps with a railing?  2  -AL     To walk in hospital room?  3  -AL     AM-PAC 6 Clicks Score (PT)  17  -AL     Row Name 04/17/21 1105          Functional Assessment    Outcome Measure Options  AM-PAC 6 Clicks Basic Mobility (PT)  -AL       User Key  (r) = Recorded By, (t) = Taken By, (c) = Cosigned By    Initials Name Provider Type    AL Valeria Jimenez, PT Physical Therapist        Physical Therapy Education                 Title: PT OT SLP Therapies (Done)     Topic: Physical Therapy (Done)     Point: Mobility training (Done)     Learning Progress Summary           Patient Acceptance, E, VU,NR by AL at 4/17/2021 1106                   Point: Home exercise program (Done)     Learning Progress Summary           Patient Acceptance, E, VU,NR by AL at 4/17/2021 1106                   Point: Body mechanics (Done)     Learning Progress Summary           Patient Acceptance, E, VU,NR by AL at 4/17/2021 1106                   Point: Precautions (Done)     Learning Progress Summary           Patient Acceptance, E, VU,NR by AL at 4/17/2021 1106                               User Key     Initials Effective Dates Name Provider Type [facility] 04/03/18 -  Valeria Jimenez, PT Physical Therapist PT              PT Recommendation and Plan     Plan of Care Reviewed With: patient  Outcome Summary: Patient is appropriate for skilled PT secondary to decreased LE strength, impaired gait distance and endurance, and impaired functional  mobility. She ambulated with Rwx, slow august, no LOB. Will continue to treat patient while in this facility. PT wore mask, gloves, and face shield.     Time Calculation:   PT Charges     Row Name 04/17/21 1106             Time Calculation    Start Time  1036  -AL      Stop Time  1053  -AL      Time Calculation (min)  17 min  -AL      PT Received On  04/17/21  -AL      PT - Next Appointment  04/18/21  -AL      PT Goal Re-Cert Due Date  04/24/21  -AL        User Key  (r) = Recorded By, (t) = Taken By, (c) = Cosigned By    Initials Name Provider Type    AL Valeria Jimenez, PT Physical Therapist        Therapy Charges for Today     Code Description Service Date Service Provider Modifiers Qty    78409350181 HC PT EVAL MOD COMPLEXITY 2 4/17/2021 Valeria Jimenez, PT GP 1    21080244444 HC PT THER PROC EA 15 MIN 4/17/2021 Valeria Jimenez, PT GP 1          PT G-Codes  Outcome Measure Options: AM-PAC 6 Clicks Basic Mobility (PT)  AM-PAC 6 Clicks Score (PT): 17    Valeria Jimenez, PT  4/17/2021

## 2021-04-17 NOTE — CONSULTS
Adult Nutrition  Assessment/PES    Patient Name:  Ambar Lara  YOB: 1970  MRN: [id number]  Admit Date:  4/16/2021    Assessment Date:  4/17/2021    Comments:  Consult: RN screen. MST 5. Estimated ~10# wt loss over past month d/t C. Diff colitis based on wt index with UBW of 168#. Currently intake 50-75% on HH diet. Stg 2 R gluteal PI noted. Receives HD 3x weekly. Pt was not receiving antibiotic treatment at rehab facility, restarted on PO vancomycin. Boost Breeze ordered BID to assist in meeting nutritional needs. Will continue to monitor.     Reason for Assessment     Row Name 04/17/21 1454          Reason for Assessment    Reason For Assessment  nurse/nurse practitioner consult     Diagnosis  gastrointestinal disease;infection/sepsis C. Diff colitis, ESRD on HD, hyponatremia, anemia, hyperglycemia, hypothyroid, malaise, cardiomyopathy, CHF, HLD, GERD, h/o trach and malnutrition.     Identified At Risk by Screening Criteria  MST SCORE 2+;reduced oral intake over the last month         Nutrition/Diet History     Row Name 04/17/21 6320          Nutrition/Diet History    Typical Food/Fluid Intake  MST 5. ~10# wt loss over past month d/t C. Diff colitis. Currently intake 50-75% on HH diet. Stg 2 R gluteal PI. Receives HD 3x weekly.     Factors Affecting Nutritional Intake  impaired cognitive status/motor control;diarrhea           Labs/Tests/Procedures/Meds     Row Name 04/17/21 8599          Labs/Procedures/Meds    Lab Results Reviewed  reviewed     Lab Results Comments  Cr 2.67, Ca 8.5, GFR 19, Hgb 9.5/Hct 29.7        Diagnostic Tests/Procedures    Diagnostic Test/Procedure Reviewed  reviewed        Medications    Pertinent Medications Reviewed  reviewed     Pertinent Medications Comments  Ca Acetate, questran, synthroid, PO vanc, coumadin         Physical Findings     Row Name 04/17/21 1458          Physical Findings    Overall Physical Appearance  overweight     Gastrointestinal  diarrhea      Oral/Mouth Cavity  poor dentition     Skin  other (see comments);pressure injury HD cath, Stg 2 R gluteal PI, B=19           Nutrition Prescription Ordered     Row Name 04/17/21 1457          Nutrition Prescription PO    Current PO Diet  Regular     Fluid Consistency  Thin     Common Modifiers  Cardiac         Problem/Interventions:  Problem 1     Row Name 04/17/21 1457          Nutrition Diagnoses Problem 1    Problem 1  Altered GI Function     Etiology (related to)  Medical Diagnosis     Infectious Disease  C. diff     Signs/Symptoms (evidenced by)  Report of Mnimal PO Intake;Unintended Weight Change     Unintended Weight Change  Loss     Number of Pounds Lost  10     Weight loss time period  1 month           Intervention Goal     Row Name 04/17/21 1457          Intervention Goal    General  Maintain nutrition;Improved nutrition related lab(s);Reduce/improve symptoms;Meet nutritional needs for age/condition;Disease management/therapy     PO  Increase intake;Meet estimated needs     Weight  Maintain weight         Nutrition Intervention     Row Name 04/17/21 1452          Nutrition Intervention    RD/Tech Action  Care plan reviewd;Follow Tx progress;Encourage intake;Recommend/ordered     Recommended/Ordered  Supplement         Nutrition Prescription     Row Name 04/17/21 1458          Nutrition Prescription PO    PO Prescription  Begin/change supplement     Supplement  Boost Breeze     Supplement Frequency  2 times a day     New PO Prescription Ordered?  Yes         Education/Evaluation     Row Name 04/17/21 1453          Education    Education  Will Instruct as appropriate        Monitor/Evaluation    Monitor  Per protocol;PO intake;Supplement intake;Pertinent labs;Weight;Skin status;Symptoms           Electronically signed by:  Cande Preston MS,RD,LD  04/17/21 14:59 EDT

## 2021-04-17 NOTE — PLAN OF CARE
Goal Outcome Evaluation:  Plan of Care Reviewed With: patient     Outcome Summary: Patient is appropriate for skilled PT secondary to decreased LE strength, impaired gait distance and endurance, and impaired functional mobility. She ambulated with Rwx, slow august, no LOB. Will continue to treat patient while in this facility. PT wore mask, gloves, and face shield.

## 2021-04-18 LAB
ANION GAP SERPL CALCULATED.3IONS-SCNC: 11.7 MMOL/L (ref 5–15)
BASOPHILS # BLD MANUAL: 0.19 10*3/MM3 (ref 0–0.2)
BASOPHILS NFR BLD AUTO: 2 % (ref 0–1.5)
BUN SERPL-MCNC: 23 MG/DL (ref 6–20)
BUN/CREAT SERPL: 7.6 (ref 7–25)
CALCIUM SPEC-SCNC: 8.6 MG/DL (ref 8.6–10.5)
CHLORIDE SERPL-SCNC: 100 MMOL/L (ref 98–107)
CO2 SERPL-SCNC: 20.3 MMOL/L (ref 22–29)
CREAT SERPL-MCNC: 3.03 MG/DL (ref 0.57–1)
DEPRECATED RDW RBC AUTO: 50.4 FL (ref 37–54)
EOSINOPHIL # BLD MANUAL: 0.3 10*3/MM3 (ref 0–0.4)
EOSINOPHIL NFR BLD MANUAL: 3.1 % (ref 0.3–6.2)
ERYTHROCYTE [DISTWIDTH] IN BLOOD BY AUTOMATED COUNT: 14.9 % (ref 12.3–15.4)
GFR SERPL CREATININE-BSD FRML MDRD: 16 ML/MIN/1.73
GLUCOSE SERPL-MCNC: 99 MG/DL (ref 65–99)
HCT VFR BLD AUTO: 30.6 % (ref 34–46.6)
HGB BLD-MCNC: 10 G/DL (ref 12–15.9)
LYMPHOCYTES # BLD MANUAL: 2.06 10*3/MM3 (ref 0.7–3.1)
LYMPHOCYTES NFR BLD MANUAL: 21.4 % (ref 19.6–45.3)
LYMPHOCYTES NFR BLD MANUAL: 5.1 % (ref 5–12)
MCH RBC QN AUTO: 30.4 PG (ref 26.6–33)
MCHC RBC AUTO-ENTMCNC: 32.7 G/DL (ref 31.5–35.7)
MCV RBC AUTO: 93 FL (ref 79–97)
MONOCYTES # BLD AUTO: 0.49 10*3/MM3 (ref 0.1–0.9)
NEUTROPHILS # BLD AUTO: 6.57 10*3/MM3 (ref 1.7–7)
NEUTROPHILS NFR BLD MANUAL: 68.4 % (ref 42.7–76)
PLAT MORPH BLD: NORMAL
PLATELET # BLD AUTO: 322 10*3/MM3 (ref 140–450)
PMV BLD AUTO: 9.1 FL (ref 6–12)
POTASSIUM SERPL-SCNC: 4.6 MMOL/L (ref 3.5–5.2)
RBC # BLD AUTO: 3.29 10*6/MM3 (ref 3.77–5.28)
RBC MORPH BLD: NORMAL
SMUDGE CELLS BLD QL SMEAR: ABNORMAL
SODIUM SERPL-SCNC: 132 MMOL/L (ref 136–145)
WBC # BLD AUTO: 9.61 10*3/MM3 (ref 3.4–10.8)

## 2021-04-18 PROCEDURE — 85007 BL SMEAR W/DIFF WBC COUNT: CPT | Performed by: INTERNAL MEDICINE

## 2021-04-18 PROCEDURE — 80048 BASIC METABOLIC PNL TOTAL CA: CPT | Performed by: INTERNAL MEDICINE

## 2021-04-18 PROCEDURE — 85025 COMPLETE CBC W/AUTO DIFF WBC: CPT | Performed by: INTERNAL MEDICINE

## 2021-04-18 PROCEDURE — 25010000002 ONDANSETRON PER 1 MG: Performed by: INTERNAL MEDICINE

## 2021-04-18 RX ORDER — VANCOMYCIN HYDROCHLORIDE 125 MG/1
250 CAPSULE ORAL EVERY 6 HOURS SCHEDULED
Status: DISCONTINUED | OUTPATIENT
Start: 2021-04-18 | End: 2021-04-24

## 2021-04-18 RX ORDER — MIDODRINE HYDROCHLORIDE 5 MG/1
5 TABLET ORAL
Status: DISCONTINUED | OUTPATIENT
Start: 2021-04-18 | End: 2021-04-21

## 2021-04-18 RX ADMIN — SODIUM BICARBONATE 650 MG: 650 TABLET ORAL at 22:43

## 2021-04-18 RX ADMIN — VANCOMYCIN HYDROCHLORIDE 250 MG: 125 CAPSULE ORAL at 17:14

## 2021-04-18 RX ADMIN — METRONIDAZOLE 500 MG: 500 INJECTION, SOLUTION INTRAVENOUS at 20:44

## 2021-04-18 RX ADMIN — WARFARIN 4 MG: 4 TABLET ORAL at 17:14

## 2021-04-18 RX ADMIN — ESCITALOPRAM 20 MG: 20 TABLET, FILM COATED ORAL at 22:43

## 2021-04-18 RX ADMIN — CALCIUM ACETATE 1334 MG: 667 CAPSULE ORAL at 09:42

## 2021-04-18 RX ADMIN — LEVOTHYROXINE SODIUM 25 MCG: 0.03 TABLET ORAL at 08:07

## 2021-04-18 RX ADMIN — CHOLESTYRAMINE 1 PACKET: 4 POWDER, FOR SUSPENSION ORAL at 09:44

## 2021-04-18 RX ADMIN — CALCIUM ACETATE 1334 MG: 667 CAPSULE ORAL at 17:14

## 2021-04-18 RX ADMIN — Medication 3 MG: at 22:43

## 2021-04-18 RX ADMIN — ACETAMINOPHEN 650 MG: 325 TABLET, FILM COATED ORAL at 20:44

## 2021-04-18 RX ADMIN — VANCOMYCIN HYDROCHLORIDE 250 MG: 125 CAPSULE ORAL at 12:11

## 2021-04-18 RX ADMIN — MIDODRINE HYDROCHLORIDE 5 MG: 5 TABLET ORAL at 17:14

## 2021-04-18 RX ADMIN — CALCIUM ACETATE 1334 MG: 667 CAPSULE ORAL at 12:14

## 2021-04-18 RX ADMIN — METRONIDAZOLE 500 MG: 500 INJECTION, SOLUTION INTRAVENOUS at 12:11

## 2021-04-18 RX ADMIN — MIDODRINE HYDROCHLORIDE 5 MG: 5 TABLET ORAL at 12:11

## 2021-04-18 RX ADMIN — ONDANSETRON 4 MG: 2 INJECTION INTRAMUSCULAR; INTRAVENOUS at 12:19

## 2021-04-18 RX ADMIN — SODIUM BICARBONATE 650 MG: 650 TABLET ORAL at 08:07

## 2021-04-18 RX ADMIN — VANCOMYCIN HYDROCHLORIDE 125 MG: 125 CAPSULE ORAL at 08:07

## 2021-04-18 RX ADMIN — MONTELUKAST SODIUM 10 MG: 10 TABLET, FILM COATED ORAL at 09:41

## 2021-04-18 RX ADMIN — SODIUM BICARBONATE 650 MG: 650 TABLET ORAL at 14:13

## 2021-04-18 NOTE — NURSING NOTE
Called and spoke with Dr. Wang regarding low bp 89/50 and he verbalized that his partner would see her this morning.

## 2021-04-18 NOTE — PROGRESS NOTES
Name: Ambar Lara ADMIT: 2021   : 1970  PCP: Jet Manuel MD    MRN: [id number] LOS: 1 days   AGE/SEX: 51 y.o. female  ROOM: [facility]     Subjective   Subjective   Patient is seen at bedside.       Objective   Objective   Vital Signs  Temp:  [97 °F (36.1 °C)-98 °F (36.7 °C)] 98 °F (36.7 °C)  Heart Rate:  [85-97] 97  Resp:  [12-16] 16  BP: ()/(50-79) 100/69  SpO2:  [94 %-100 %] 98 %  on   ;   Device (Oxygen Therapy): room air  Body mass index is 30.48 kg/m².  Physical Exam   General Appearance:    Alert, cooperative, no distress, appears stated age   Head:    Normocephalic, without obvious abnormality, atraumatic   Eyes:    PERRL, conjunctivae/corneas clear, EOM's intact, both eyes   Ears:    Normal external ear canals, both ears   Nose:   Nares normal, septum midline, mucosa normal, no drainage    or sinus tenderness   Throat:   Lips, mucosa, and tongue normal   Neck:   Supple, symmetrical, trachea midline, no adenopathy;     thyroid:  no enlargement/tenderness/nodules; no carotid    bruit or JVD   Back:     Symmetric, no curvature, ROM normal, no CVA tenderness   Lungs:     Decreased breath sounds bilaterally, respirations unlabored   Chest Wall:    No tenderness or deformity    Heart:    Regular rate and rhythm, S1 and S2 normal, no murmur, rub   or gallop   Abdomen:     Soft, nontender, bowel sounds active all four quadrants,     no masses, no hepatomegaly, no splenomegaly   Extremities:   Extremities normal, atraumatic, no cyanosis or edema   Pulses:   2+ and symmetric all extremities   Skin:   Skin color, texture, turgor normal, no rashes or lesions   Lymph nodes:   Cervical, supraclavicular, and axillary nodes normal   Neurologic:   CNII-XII intact, normal strength, sensation intact throughout         Results Review     I reviewed the patient's new clinical results.  Results from last 7 days   Lab Units 21  0717 21  0555 21  1603 21  0648   WBC 10*3/mm3  9.61 9.30 18.13* 13.50*   HEMOGLOBIN g/dL 10.0* 9.5* 10.7* 9.5*   PLATELETS 10*3/mm3 322 272 398 295     Results from last 7 days   Lab Units 04/18/21  0717 04/17/21  0555 04/16/21  1602 04/14/21  0648   SODIUM mmol/L 132* 136 133* 136   POTASSIUM mmol/L 4.6 4.1 4.1 3.8   CHLORIDE mmol/L 100 102 97* 100   CO2 mmol/L 20.3* 22.1 23.2 22.5   BUN mg/dL 23* 16 9 18   CREATININE mg/dL 3.03* 2.67* 1.63* 2.96*   GLUCOSE mg/dL 99 98 146* 86   Estimated Creatinine Clearance: 20.6 mL/min (A) (by C-G formula based on SCr of 3.03 mg/dL (H)).  Results from last 7 days   Lab Units 04/16/21  1602 04/13/21  0505 04/12/21  0307   ALBUMIN g/dL 3.60 3.00* 3.30*   BILIRUBIN mg/dL 0.2  --  <0.2   ALK PHOS U/L 131*  --  120*   AST (SGOT) U/L 17  --  19   ALT (SGPT) U/L 14  --  18     Results from last 7 days   Lab Units 04/18/21  0717 04/17/21  0555 04/16/21  1602 04/14/21  0648 04/13/21  0505 04/12/21  0307   CALCIUM mg/dL 8.6 8.5* 8.0* 8.7 7.8* 7.8*   ALBUMIN g/dL  --   --  3.60  --  3.00* 3.30*   MAGNESIUM mg/dL  --   --   --   --  1.6  --    PHOSPHORUS mg/dL  --   --   --   --  3.8  --        COVID19   Date Value Ref Range Status   04/16/2021 Not Detected Not Detected - Ref. Range Final   04/09/2021 Not Detected Not Detected - Ref. Range Final     Glucose   Date/Time Value Ref Range Status   04/17/2021 1515 101 70 - 130 mg/dL Final       XR Chest 1 View  ONE VIEW PORTABLE CHEST AT 5 PM     HISTORY: Hypotension. End-stage renal disease. Chest pain.      Recent Covid 19 pneumonia.     FINDINGS: The lungs are moderately expanded with some patchy infiltrates  at the lung bases likely related to residual changes of Covid 19  pneumonia and showing some minimal improvement at the left base when  compared to the study of 04/09/2021. The heart size remains normal. A  large gauge vascular catheter ends near the junction of the SVC and  right atrium without change.     This report was finalized on 4/16/2021 8:01 PM by Dr. Kiet Oconnor M.D.        Scheduled Medications  calcium acetate, 1,334 mg, Oral, TID With Meals  cholestyramine, 1 packet, Oral, Q12H  escitalopram, 20 mg, Oral, Q24H  levothyroxine, 25 mcg, Oral, Q AM  metroNIDAZOLE, 500 mg, Intravenous, Q8H  midodrine, 5 mg, Oral, TID AC  montelukast, 10 mg, Oral, Daily  sodium bicarbonate, 650 mg, Oral, Q8H  vancomycin, 250 mg, Oral, Q6H  warfarin, 4 mg, Oral, Daily    Infusions  Pharmacy Consult,   Pharmacy Consult,     Diet  Diet Regular       Assessment/Plan     Active Hospital Problems    Diagnosis  POA   • **Clostridium difficile colitis [A04.72]  Yes   • Gastroesophageal reflux disease [K21.9]  Yes   • Cardiomyopathy (CMS/[facility]) [I42.9]  Yes   • Chronic systolic heart failure (CMS/[facility]) [I50.22]  Yes   • ESRD (end stage renal disease) on dialysis (CMS/[facility]) [N18.6, Z99.2]  Not Applicable   • HLD (hyperlipidemia) [E78.5]  Yes   • Hypothyroidism [E03.9]  Yes      Resolved Hospital Problems   No resolved problems to display.       51 y.o. female admitted with Clostridium difficile colitis.    Assessment and plan:  1.  C. difficile colitis, continue p.o. vancomycin and add IV Flagyl.  2.  End-stage renal disease on hemodialysis, continue dialysis per nephrology recommendations.  3.  Anemia of chronic disease, hemoglobin noted to be 10, continue to recheck labs.  4.  Hyponatremia, resolved.  5.  Chronic systolic congestive heart failure, she appears euvolemic at this point of time.  6.  Hypothyroidism, continue Synthroid.  7.  Personal history of PE, patient currently on Coumadin.  INR therapeutic.  8.  Hypotension, we will add midodrine therapy today.  9.  CODE STATUS is full code.  Further plans based on hospital course.      Nirmal Wan MD  [facility]  04/18/21  13:19 EDT

## 2021-04-18 NOTE — SIGNIFICANT NOTE
04/18/21 1623   OTHER   Discipline physical therapist   Rehab Time/Intention   Session Not Performed unable to treat, medical status change  (Per Nurse Stephanie, hold PT until 4/19 due to low blood pressure and c/o dizziness)   Recommendation   PT - Next Appointment 04/19/21

## 2021-04-18 NOTE — PLAN OF CARE
Goal Outcome Evaluation:  Plan of Care Reviewed With: patient  Progress: improving  Outcome Summary: Patient alert and oriented. Bp was low earlier, MD's aware. BP improved after Midodrine was given. On room air. Self turns, up to the recliner. No s/s of acute distress. Will continue tomorrow.

## 2021-04-18 NOTE — PLAN OF CARE
Goal Outcome Evaluation:  Patient resting well tonight without c/o abdominal pain like she had the previous night. She also has been getting up using her BSC with standby assistance. Lungs are clear and respirations equal without coughing or congestion. Nursing continues to encourage her to take of her ADLs independently and she is trying. Vital signs WNL and B/P did hold WNL without IV fluid infusion.

## 2021-04-18 NOTE — CONSULTS
Referring Provider: Binta Patel MD  Reason for Consultation: Management of patient with end-stage renal disease    Subjective     Chief complaint   Chief Complaint   Patient presents with   • Abdominal Pain   • Hypotension       History of present illness:    This 51-year-old  female presented to the emergency department with increasing weakness and having frequent diarrhea she was discharged 4 days prior to admission to rehab facility she did not receive her medications having several loose watery bowel movement patient is feeling very weak having difficulty getting up and having low blood pressure.  The patient has history of end-stage renal disease attributed to interstitial nephritis was on PD for the past 2 years but was recently transitioned to hemodialysis when she became critically ill from Covid led to respiratory failure and multiorgan compromise ended having a tracheostomy also complicated by C. difficile and immobility syndrome the patient having poor oral intake nausea but no vomiting and she has watery diarrhea and she has recurrent abdominal discomfort when she is ready to have a bowel movement.  She is currently undergoing in center hemodialysis at [facility] every Monday, Wednesday and Friday via tunneled dialysis catheter.    She has history of congestive heart failure and hypothyroidism, history of obstructive sleep apnea, and recent tracheostomy currently has dressing over her trach.  Also she has history of dyslipidemia and GERD  She denies any chest pain or shortness of air, she had nausea but no vomiting, poor appetite and oral intake, she has watery diarrhea denies melena, hematochezia or hematemesis, she has significant weakness and having difficulty ambulating.  Past Medical History:   Diagnosis Date   • CHF (congestive heart failure) (CMS/Formerly [facility])    • Dialysis patient (CMS/Formerly [facility])    • Disease of thyroid gland    • Elevated cholesterol    • GERD (gastroesophageal  reflux disease)    • Renal disorder    • Sleep apnea      Past Surgical History:   Procedure Laterality Date   • ADRENAL GLAND SURGERY N/A `   •  SECTION Bilateral 2001    Has had x2   • HYSTERECTOMY     • TRACHEOSTOMY       History reviewed. No pertinent family history.  Negative family history for kidney disease  Social History     Tobacco Use   • Smoking status: Former Smoker     Packs/day: 1.00     Years: 25.00     Pack years: 25.00     Types: Cigarettes     Quit date: 2018     Years since quitting: 3.0   • Smokeless tobacco: Never Used   Vaping Use   • Vaping Use: Former   • Quit date: 2021   • Substances: Nicotine, Flavoring   • Devices: Pre-filled or refillable cartridge   Substance Use Topics   • Alcohol use: Not Currently   • Drug use: Not Currently     Medications Prior to Admission   Medication Sig Dispense Refill Last Dose   • calcium acetate (PHOS BINDER,) 667 MG capsule capsule Take 2 capsules by mouth 3 (Three) Times a Day With Meals. 180 capsule 3    • cholestyramine (QUESTRAN) 4 g packet Take 1 packet by mouth Every 12 (Twelve) Hours. 60 packet 0    • escitalopram (Lexapro) 20 MG tablet Daily.      • gentamicin (GARAMYCIN) 0.1 % ointment Apply  topically to the appropriate area as directed Daily. 60 g 3    • levothyroxine (SYNTHROID, LEVOTHROID) 25 MCG tablet Take 1 tablet by mouth Daily. 30 tablet 3    • montelukast (Singulair) 10 MG tablet Daily.      • sodium bicarbonate 650 MG tablet Every 8 (Eight) Hours.      • vancomycin 50 MG/ML reconstituted solution oral solution reconstituted Take 2.5 mL by mouth Every 6 (Six) Hours for 21 doses. Indications: Clostridium Difficile Infection 52.5 mL 0    • [START ON 2021] vancomycin 50 MG/ML reconstituted solution oral solution reconstituted Take 2.5 mL by mouth Every 12 (Twelve) Hours for 14 doses. Indications: Clostridium Difficile Infection 35 mL 0    • [START ON 2021] vancomycin 50 MG/ML reconstituted solution oral  "solution reconstituted Take 2.5 mL by mouth Daily for 7 doses. Indications: Clostridium Difficile Infection 17.5 mL 0    • [START ON 5/4/2021] vancomycin 50 MG/ML reconstituted solution oral solution reconstituted Take 2.5 mL by mouth Every Other Day for 14 doses. Indications: Clostridium Difficile Infection 35 mL 0    • warfarin (Coumadin) 4 MG tablet 1 tablet p.o. every day. 30 tablet 3      Allergies:  Penicillins and Nickel    Review of Systems  14 points review of system was performed and it was negative other than what noted above in the HPI    Objective     Vital Signs  Temp:  [97 °F (36.1 °C)-98 °F (36.7 °C)] 98 °F (36.7 °C)  Heart Rate:  [85-97] 94  Resp:  [16] 16  BP: ()/(50-80) 112/80    Flowsheet Rows      First Filed Value   Admission Height  154.9 cm (61\") Documented at 04/16/2021 1559   Admission Weight  68.5 kg (151 lb) Documented at 04/16/2021 1559           I/O this shift:  In: 600 [P.O.:600]  Out: -   I/O last 3 completed shifts:  In: 840 [P.O.:840]  Out: -     Intake/Output Summary (Last 24 hours) at 4/18/2021 1804  Last data filed at 4/18/2021 1300  Gross per 24 hour   Intake 600 ml   Output --   Net 600 ml       Physical Exam:  General Appearance: alert, oriented x 3, no acute distress, chronically ill, and frail  Skin: warm and dry  HEENT: pupils round and reactive to light, oral mucosa dry, nonicteric sclera  Neck: supple, no JVD, she had tracheostomy which has a dressing on top of it and the trach is shrinking but not closed, no carotid bruit no cervical or supraclavicular lymphadenopathy, she had tunneled dialysis catheter in the right IJ with exit site below the right clavicle  Lungs: Scattered rhonchi, unlabored breathing effort  Heart: RRR, normal S1 and S2, no S3, no rub  Abdomen: soft, nontender, normoactive bowels, she had PD catheter well dressed with no tenderness over the tunnel  : no palpable bladder,  Extremities: no edema, cyanosis or clubbing  Neuro: normal speech and " mental status    Results Review:  Results for orders placed or performed during the hospital encounter of 04/16/21   COVID-19,[facility] IN-HOUSE, NP/OP SWAB IN UTM/VTM/[facility] TRANSPORT MEDIA,24 HR TAT - Swab, Nasopharynx    Specimen: Nasopharynx; Swab   Result Value Ref Range    COVID19 Not Detected Not Detected - Ref. Range   Comprehensive Metabolic Panel    Specimen: Blood   Result Value Ref Range    Glucose 146 (H) 65 - 99 mg/dL    BUN 9 6 - 20 mg/dL    Creatinine 1.63 (H) 0.57 - 1.00 mg/dL    Sodium 133 (L) 136 - 145 mmol/L    Potassium 4.1 3.5 - 5.2 mmol/L    Chloride 97 (L) 98 - 107 mmol/L    CO2 23.2 22.0 - 29.0 mmol/L    Calcium 8.0 (L) 8.6 - 10.5 mg/dL    Total Protein 7.2 6.0 - 8.5 g/dL    Albumin 3.60 3.50 - 5.20 g/dL    ALT (SGPT) 14 1 - 33 U/L    AST (SGOT) 17 1 - 32 U/L    Alkaline Phosphatase 131 (H) 39 - 117 U/L    Total Bilirubin 0.2 0.0 - 1.2 mg/dL    eGFR Non African Amer 34 (L) >60 mL/min/1.73    Globulin 3.6 gm/dL    A/G Ratio 1.0 g/dL    BUN/Creatinine Ratio 5.5 (L) 7.0 - 25.0    Anion Gap 12.8 5.0 - 15.0 mmol/L   Lipase    Specimen: Blood   Result Value Ref Range    Lipase 84 (H) 13 - 60 U/L   Protime-INR    Specimen: Blood   Result Value Ref Range    Protime 24.5 (H) 11.7 - 14.2 Seconds    INR 2.24 (H) 0.90 - 1.10   CBC Auto Differential    Specimen: Blood   Result Value Ref Range    WBC 18.13 (H) 3.40 - 10.80 10*3/mm3    RBC 3.59 (L) 3.77 - 5.28 10*6/mm3    Hemoglobin 10.7 (L) 12.0 - 15.9 g/dL    Hematocrit 33.4 (L) 34.0 - 46.6 %    MCV 93.0 79.0 - 97.0 fL    MCH 29.8 26.6 - 33.0 pg    MCHC 32.0 31.5 - 35.7 g/dL    RDW 15.1 12.3 - 15.4 %    RDW-SD 51.8 37.0 - 54.0 fl    MPV 9.7 6.0 - 12.0 fL    Platelets 398 140 - 450 10*3/mm3   BNP    Specimen: Blood   Result Value Ref Range    proBNP 1,723.0 (H) 0.0 - 450.0 pg/mL   Troponin    Specimen: Blood   Result Value Ref Range    Troponin T 0.037 (C) 0.000 - 0.030 ng/mL   Manual Differential    Specimen: Blood   Result Value Ref Range     Neutrophil % 88.8 (H) 42.7 - 76.0 %    Lymphocyte % 5.1 (L) 19.6 - 45.3 %    Monocyte % 3.1 (L) 5.0 - 12.0 %    Metamyelocyte % 1.0 (H) 0.0 - 0.0 %    Myelocyte % 2.0 (H) 0.0 - 0.0 %    Neutrophils Absolute 16.10 (H) 1.70 - 7.00 10*3/mm3    Lymphocytes Absolute 0.92 0.70 - 3.10 10*3/mm3    Monocytes Absolute 0.56 0.10 - 0.90 10*3/mm3    Polychromasia Slight/1+ None Seen    WBC Morphology Normal Normal    Platelet Morphology Normal Normal   Troponin    Specimen: Blood   Result Value Ref Range    Troponin T 0.040 (C) 0.000 - 0.030 ng/mL   Basic Metabolic Panel    Specimen: Blood   Result Value Ref Range    Glucose 98 65 - 99 mg/dL    BUN 16 6 - 20 mg/dL    Creatinine 2.67 (H) 0.57 - 1.00 mg/dL    Sodium 136 136 - 145 mmol/L    Potassium 4.1 3.5 - 5.2 mmol/L    Chloride 102 98 - 107 mmol/L    CO2 22.1 22.0 - 29.0 mmol/L    Calcium 8.5 (L) 8.6 - 10.5 mg/dL    eGFR Non African Amer 19 (L) >60 mL/min/1.73    BUN/Creatinine Ratio 6.0 (L) 7.0 - 25.0    Anion Gap 11.9 5.0 - 15.0 mmol/L   CBC (No Diff)    Specimen: Blood   Result Value Ref Range    WBC 9.30 3.40 - 10.80 10*3/mm3    RBC 3.14 (L) 3.77 - 5.28 10*6/mm3    Hemoglobin 9.5 (L) 12.0 - 15.9 g/dL    Hematocrit 29.7 (L) 34.0 - 46.6 %    MCV 94.6 79.0 - 97.0 fL    MCH 30.3 26.6 - 33.0 pg    MCHC 32.0 31.5 - 35.7 g/dL    RDW 15.1 12.3 - 15.4 %    RDW-SD 52.2 37.0 - 54.0 fl    MPV 9.1 6.0 - 12.0 fL    Platelets 272 140 - 450 10*3/mm3   Basic Metabolic Panel    Specimen: Blood   Result Value Ref Range    Glucose 99 65 - 99 mg/dL    BUN 23 (H) 6 - 20 mg/dL    Creatinine 3.03 (H) 0.57 - 1.00 mg/dL    Sodium 132 (L) 136 - 145 mmol/L    Potassium 4.6 3.5 - 5.2 mmol/L    Chloride 100 98 - 107 mmol/L    CO2 20.3 (L) 22.0 - 29.0 mmol/L    Calcium 8.6 8.6 - 10.5 mg/dL    eGFR Non African Amer 16 (L) >60 mL/min/1.73    BUN/Creatinine Ratio 7.6 7.0 - 25.0    Anion Gap 11.7 5.0 - 15.0 mmol/L   CBC Auto Differential    Specimen: Blood   Result Value Ref Range    WBC 9.61 3.40 - 10.80  10*3/mm3    RBC 3.29 (L) 3.77 - 5.28 10*6/mm3    Hemoglobin 10.0 (L) 12.0 - 15.9 g/dL    Hematocrit 30.6 (L) 34.0 - 46.6 %    MCV 93.0 79.0 - 97.0 fL    MCH 30.4 26.6 - 33.0 pg    MCHC 32.7 31.5 - 35.7 g/dL    RDW 14.9 12.3 - 15.4 %    RDW-SD 50.4 37.0 - 54.0 fl    MPV 9.1 6.0 - 12.0 fL    Platelets 322 140 - 450 10*3/mm3   Manual Differential    Specimen: Blood   Result Value Ref Range    Neutrophil % 68.4 42.7 - 76.0 %    Lymphocyte % 21.4 19.6 - 45.3 %    Monocyte % 5.1 5.0 - 12.0 %    Eosinophil % 3.1 0.3 - 6.2 %    Basophil % 2.0 (H) 0.0 - 1.5 %    Neutrophils Absolute 6.57 1.70 - 7.00 10*3/mm3    Lymphocytes Absolute 2.06 0.70 - 3.10 10*3/mm3    Monocytes Absolute 0.49 0.10 - 0.90 10*3/mm3    Eosinophils Absolute 0.30 0.00 - 0.40 10*3/mm3    Basophils Absolute 0.19 0.00 - 0.20 10*3/mm3    RBC Morphology Normal Normal    Smudge Cells Slight/1+ None Seen    Platelet Morphology Normal Normal   POC Glucose Once    Specimen: Blood   Result Value Ref Range    Glucose 101 70 - 130 mg/dL   ECG 12 Lead   Result Value Ref Range    QT Interval 386 ms   Light Blue Top   Result Value Ref Range    Extra Tube hold for add-on    Green Top (Gel)   Result Value Ref Range    Extra Tube Hold for add-ons.    Lavender Top   Result Value Ref Range    Extra Tube hold for add-on    Gold Top - SST   Result Value Ref Range    Extra Tube Hold for add-ons.      Imaging Results (Last 72 Hours)     Procedure Component Value Units Date/Time    XR Chest 1 View [[id number]] Collected: 04/16/21 1830     Updated: 04/16/21 2004    Narrative:      ONE VIEW PORTABLE CHEST AT 5 PM     HISTORY: Hypotension. End-stage renal disease. Chest pain.      Recent Covid 19 pneumonia.     FINDINGS: The lungs are moderately expanded with some patchy infiltrates  at the lung bases likely related to residual changes of Covid 19  pneumonia and showing some minimal improvement at the left base when  compared to the study of 04/09/2021. The heart size remains normal.  A  large gauge vascular catheter ends near the junction of the SVC and  right atrium without change.     This report was finalized on 4/16/2021 8:01 PM by Dr. Kiet Oconnor M.D.               calcium acetate, 1,334 mg, Oral, TID With Meals  escitalopram, 20 mg, Oral, Q24H  levothyroxine, 25 mcg, Oral, Q AM  metroNIDAZOLE, 500 mg, Intravenous, Q8H  midodrine, 5 mg, Oral, TID AC  montelukast, 10 mg, Oral, Daily  sodium bicarbonate, 650 mg, Oral, Q8H  vancomycin, 250 mg, Oral, Q6H  warfarin, 4 mg, Oral, Daily      Pharmacy Consult,   Pharmacy Consult,         Assessment/Plan   1.  End-stage renal disease secondary to interstitial nephritis was on peritoneal dialysis but switched to in center hemodialysis after a catastrophic ailment for the past 6-8 weeks, patient presented with weakness appears to be slightly hypovolemic  2.  C. difficile colitis  3.  Recent Covid pneumonia with prolonged respiratory failure requiring tracheostomy  4.  Immobility syndrome  5.  Anemia of chronic kidney disease, hemoglobin is 10 meeting goal  6.  Hypotension currently stable        Plan:  1.  I agree with the current treatment  2.  I agree with the midodrine  3.  Hemodialysis tomorrow  4.  Surveillance labs          Thank you for asking me to see this patient in consultation        I discussed the patient's findings and my recommendations with the patient    Darci Wang MD  04/18/21  18:04 EDT      Much of this encounter note is an electronic transcription/translation of spoken language to printed text. The electronic translation of spoken language may permit erroneous, or at times, nonsensical words or phrases to be inadvertently transcribed; Although I have reviewed the note for such errors, some may still exist

## 2021-04-19 LAB
ALBUMIN SERPL-MCNC: 3.1 G/DL (ref 3.5–5.2)
ANION GAP SERPL CALCULATED.3IONS-SCNC: 13.8 MMOL/L (ref 5–15)
BACTERIA UR QL AUTO: ABNORMAL /HPF
BILIRUB UR QL STRIP: NEGATIVE
BUN SERPL-MCNC: 30 MG/DL (ref 6–20)
BUN/CREAT SERPL: 7.1 (ref 7–25)
CALCIUM SPEC-SCNC: 9.1 MG/DL (ref 8.6–10.5)
CHLORIDE SERPL-SCNC: 101 MMOL/L (ref 98–107)
CLARITY UR: ABNORMAL
CO2 SERPL-SCNC: 20.2 MMOL/L (ref 22–29)
COLOR UR: YELLOW
CREAT SERPL-MCNC: 4.21 MG/DL (ref 0.57–1)
DEPRECATED RDW RBC AUTO: 54.3 FL (ref 37–54)
EOSINOPHIL # BLD MANUAL: 0.08 10*3/MM3 (ref 0–0.4)
EOSINOPHIL NFR BLD MANUAL: 1 % (ref 0.3–6.2)
ERYTHROCYTE [DISTWIDTH] IN BLOOD BY AUTOMATED COUNT: 15.6 % (ref 12.3–15.4)
GFR SERPL CREATININE-BSD FRML MDRD: 11 ML/MIN/1.73
GFR SERPL CREATININE-BSD FRML MDRD: ABNORMAL ML/MIN/{1.73_M2}
GLUCOSE SERPL-MCNC: 96 MG/DL (ref 65–99)
GLUCOSE UR STRIP-MCNC: NEGATIVE MG/DL
HCT VFR BLD AUTO: 31.8 % (ref 34–46.6)
HGB BLD-MCNC: 10 G/DL (ref 12–15.9)
HGB UR QL STRIP.AUTO: ABNORMAL
HYALINE CASTS UR QL AUTO: ABNORMAL /LPF
INR PPP: 3.38 (ref 0.9–1.1)
KETONES UR QL STRIP: NEGATIVE
LEUKOCYTE ESTERASE UR QL STRIP.AUTO: ABNORMAL
LYMPHOCYTES # BLD MANUAL: 1.67 10*3/MM3 (ref 0.7–3.1)
LYMPHOCYTES NFR BLD MANUAL: 20.6 % (ref 19.6–45.3)
LYMPHOCYTES NFR BLD MANUAL: 4.1 % (ref 5–12)
MCH RBC QN AUTO: 30.4 PG (ref 26.6–33)
MCHC RBC AUTO-ENTMCNC: 31.4 G/DL (ref 31.5–35.7)
MCV RBC AUTO: 96.7 FL (ref 79–97)
MONOCYTES # BLD AUTO: 0.33 10*3/MM3 (ref 0.1–0.9)
NEUTROPHILS # BLD AUTO: 6 10*3/MM3 (ref 1.7–7)
NEUTROPHILS NFR BLD MANUAL: 74.2 % (ref 42.7–76)
NITRITE UR QL STRIP: NEGATIVE
PH UR STRIP.AUTO: 5.5 [PH] (ref 5–8)
PHOSPHATE SERPL-MCNC: 6.2 MG/DL (ref 2.5–4.5)
PLAT MORPH BLD: NORMAL
PLATELET # BLD AUTO: 338 10*3/MM3 (ref 140–450)
PMV BLD AUTO: 9.1 FL (ref 6–12)
POTASSIUM SERPL-SCNC: 4.5 MMOL/L (ref 3.5–5.2)
PROT UR QL STRIP: ABNORMAL
PROTHROMBIN TIME: 33.9 SECONDS (ref 11.7–14.2)
RBC # BLD AUTO: 3.29 10*6/MM3 (ref 3.77–5.28)
RBC # UR: ABNORMAL /HPF
RBC MORPH BLD: NORMAL
REF LAB TEST METHOD: ABNORMAL
SODIUM SERPL-SCNC: 135 MMOL/L (ref 136–145)
SP GR UR STRIP: 1.02 (ref 1–1.03)
SQUAMOUS #/AREA URNS HPF: ABNORMAL /HPF
UROBILINOGEN UR QL STRIP: ABNORMAL
WBC # BLD AUTO: 8.09 10*3/MM3 (ref 3.4–10.8)
WBC MORPH BLD: NORMAL
WBC UR QL AUTO: ABNORMAL /HPF

## 2021-04-19 PROCEDURE — 85007 BL SMEAR W/DIFF WBC COUNT: CPT | Performed by: INTERNAL MEDICINE

## 2021-04-19 PROCEDURE — 85610 PROTHROMBIN TIME: CPT | Performed by: INTERNAL MEDICINE

## 2021-04-19 PROCEDURE — 97166 OT EVAL MOD COMPLEX 45 MIN: CPT

## 2021-04-19 PROCEDURE — 80069 RENAL FUNCTION PANEL: CPT | Performed by: INTERNAL MEDICINE

## 2021-04-19 PROCEDURE — 97535 SELF CARE MNGMENT TRAINING: CPT

## 2021-04-19 PROCEDURE — 81001 URINALYSIS AUTO W/SCOPE: CPT | Performed by: EMERGENCY MEDICINE

## 2021-04-19 PROCEDURE — 85025 COMPLETE CBC W/AUTO DIFF WBC: CPT | Performed by: INTERNAL MEDICINE

## 2021-04-19 RX ORDER — WARFARIN SODIUM 4 MG/1
4 TABLET ORAL
Status: DISCONTINUED | OUTPATIENT
Start: 2021-04-19 | End: 2021-04-19

## 2021-04-19 RX ADMIN — LEVOTHYROXINE SODIUM 25 MCG: 0.03 TABLET ORAL at 06:32

## 2021-04-19 RX ADMIN — SODIUM BICARBONATE 650 MG: 650 TABLET ORAL at 14:11

## 2021-04-19 RX ADMIN — CALCIUM ACETATE 1334 MG: 667 CAPSULE ORAL at 18:28

## 2021-04-19 RX ADMIN — Medication 3 MG: at 21:33

## 2021-04-19 RX ADMIN — CALCIUM ACETATE 1334 MG: 667 CAPSULE ORAL at 14:11

## 2021-04-19 RX ADMIN — METRONIDAZOLE 500 MG: 500 INJECTION, SOLUTION INTRAVENOUS at 14:11

## 2021-04-19 RX ADMIN — VANCOMYCIN HYDROCHLORIDE 250 MG: 125 CAPSULE ORAL at 06:32

## 2021-04-19 RX ADMIN — SODIUM BICARBONATE 650 MG: 650 TABLET ORAL at 21:34

## 2021-04-19 RX ADMIN — METRONIDAZOLE 500 MG: 500 INJECTION, SOLUTION INTRAVENOUS at 21:34

## 2021-04-19 RX ADMIN — MIDODRINE HYDROCHLORIDE 5 MG: 5 TABLET ORAL at 21:34

## 2021-04-19 RX ADMIN — MONTELUKAST SODIUM 10 MG: 10 TABLET, FILM COATED ORAL at 09:32

## 2021-04-19 RX ADMIN — VANCOMYCIN HYDROCHLORIDE 250 MG: 125 CAPSULE ORAL at 00:10

## 2021-04-19 RX ADMIN — VANCOMYCIN HYDROCHLORIDE 250 MG: 125 CAPSULE ORAL at 14:11

## 2021-04-19 RX ADMIN — SODIUM BICARBONATE 650 MG: 650 TABLET ORAL at 06:32

## 2021-04-19 RX ADMIN — MIDODRINE HYDROCHLORIDE 5 MG: 5 TABLET ORAL at 06:32

## 2021-04-19 RX ADMIN — ESCITALOPRAM 20 MG: 20 TABLET, FILM COATED ORAL at 22:27

## 2021-04-19 RX ADMIN — METRONIDAZOLE 500 MG: 500 INJECTION, SOLUTION INTRAVENOUS at 03:48

## 2021-04-19 RX ADMIN — CALCIUM ACETATE 1334 MG: 667 CAPSULE ORAL at 09:32

## 2021-04-19 RX ADMIN — VANCOMYCIN HYDROCHLORIDE 250 MG: 125 CAPSULE ORAL at 18:28

## 2021-04-19 NOTE — PLAN OF CARE
Goal Outcome Evaluation:   A&O x4. Pt medicated for pain with relief. Pt medicated per orders. Pt up with assistance to bedside commode. Pt has no complaints of nausea. Pt denied being turned. RN educated pt on importance of turning. Pt still denies turn and repositioning. RN continued to encourage pt turning throughout shift. BP on lower side, pt asymptomatic. Urine sample sent to lab. VSS. No s/s of distress. Will continue to monitor.

## 2021-04-19 NOTE — THERAPY EVALUATION
Patient Name: Ambar Lara  : 1970    MRN: [id number]                              Today's Date: 2021       Admit Date: 2021    Visit Dx:     ICD-10-CM ICD-9-CM   1. Clostridium difficile colitis  A04.72 008.45   2. ESRD on hemodialysis (CMS/HCC)  N18.6 585.6    Z99.2 V45.11   3. Other pulmonary embolism without acute cor pulmonale, unspecified chronicity (CMS/HCC)  I26.99 415.19   4. Chest pain, unspecified type  R07.9 786.50     Patient Active Problem List   Diagnosis   • Colitis, Clostridium difficile   • Anxiety   • Cardiomyopathy (CMS/HCC)   • Chronic systolic heart failure (CMS/HCC)   • ESRD (end stage renal disease) on dialysis (CMS/HCC)   • Gastroesophageal reflux disease   • Gitelman syndrome   • HLD (hyperlipidemia)   • Hypothyroidism   • Peritoneal dialysis status (CMS/HCC)   • History of tracheostomy   • Acute pulmonary embolism (CMS/HCC)   • Severe malnutrition (CMS/HCC)   • Clostridium difficile colitis     Past Medical History:   Diagnosis Date   • CHF (congestive heart failure) (CMS/HCC)    • Dialysis patient (CMS/HCC)    • Disease of thyroid gland    • Elevated cholesterol    • GERD (gastroesophageal reflux disease)    • Renal disorder    • Sleep apnea      Past Surgical History:   Procedure Laterality Date   • ADRENAL GLAND SURGERY N/A `   •  SECTION Bilateral 2001    Has had x2   • HYSTERECTOMY     • TRACHEOSTOMY       General Information     Row Name 21 1239          OT Time and Intention    Document Type  evaluation  -BT     Mode of Treatment  individual therapy;occupational therapy  -BT     Row Name 21 4502          General Information    Patient Profile Reviewed  yes  -BT     Prior Level of Function  independent:  -BT     Existing Precautions/Restrictions  fall  -BT     Barriers to Rehab  previous functional deficit  -BT     Row Name 21 6373          Living Environment    Lives With  alone pt states she has 24/7 assistance from adult  children  -BT     Row Name 04/19/21 1239          Cognition    Orientation Status (Cognition)  oriented x 4  -BT     Row Name 04/19/21 1239          Safety Issues, Functional Mobility    Impairments Affecting Function (Mobility)  balance;endurance/activity tolerance;strength  -BT       User Key  (r) = Recorded By, (t) = Taken By, (c) = Cosigned By    Initials Name Provider Type    BT Iris Umanzor OT Occupational Therapist          Mobility/ADL's     Row Name 04/19/21 1242          Bed Mobility    Bed Mobility  bed mobility (all) activities  -BT     All Activities, [place] (Bed Mobility)  contact guard assist  -BT     Assistive Device (Bed Mobility)  bed rails;head of bed elevated  -BT     Row Name 04/19/21 1242          Transfers    Transfers  sit-stand transfer  -BT     Sit-Stand [place] (Transfers)  contact guard  -BT     Row Name 04/19/21 1242          Sit-Stand Transfer    Assistive Device (Sit-Stand Transfers)  walker, front-wheeled  -BT     Row Name 04/19/21 1242          Activities of Daily Living    BADL Assessment/Intervention  lower body dressing  -BT     Row Name 04/19/21 1242          Lower Body Dressing Assessment/Training    [place] Level (Lower Body Dressing)  lower body dressing skills;doff;don;socks;contact guard assist  -BT     Position (Lower Body Dressing)  edge of bed sitting  -BT       User Key  (r) = Recorded By, (t) = Taken By, (c) = Cosigned By    Initials Name Provider Type    BT Iris Umanzor OT Occupational Therapist        Obj/Interventions     Row Name 04/19/21 1244          Range of Motion Comprehensive    General Range of Motion  no range of motion deficits identified  -BT     Row Name 04/19/21 1244          Strength Comprehensive (MMT)    Comment, General Manual Muscle Testing (MMT) Assessment  significant BUE weakness  -BT     Row Name 04/19/21 1244          Balance    Balance Assessment  sitting static balance;sitting dynamic balance;standing static  balance;standing dynamic balance  -BT     Static Sitting Balance  WFL  -BT     Dynamic Sitting Balance  WFL  -BT     Static Standing Balance  mild impairment  -BT     Dynamic Standing Balance  moderate impairment  -BT     Balance Interventions  sitting;standing;occupation based/functional task;static;dynamic  -BT       User Key  (r) = Recorded By, (t) = Taken By, (c) = Cosigned By    Initials Name Provider Type    BT Iris Umanzor, OT Occupational Therapist        Goals/Plan     Row Name 04/19/21 1243          Dressing Goal 1 (OT)    Activity/Device (Dressing Goal 1, OT)  dressing skills, all  -BT     [place]/Cues Needed (Dressing Goal 1, OT)  set-up required  -BT     Time Frame (Dressing Goal 1, OT)  short term goal (STG);2 weeks  -BT     Progress/Outcome (Dressing Goal 1, OT)  goal ongoing  -BT     [facility] Name 04/19/21 1243          Toileting Goal 1 (OT)    Activity/Device (Toileting Goal 1, OT)  toileting skills, all  -BT     [place] Level/Cues Needed (Toileting Goal 1, OT)  supervision required  -BT     Time Frame (Toileting Goal 1, OT)  short term goal (STG);2 weeks  -BT     Progress/Outcome (Toileting Goal 1, OT)  goal ongoing  -BT     Row Name 04/19/21 1246          Strength Goal 1 (OT)    Strength Goal 1 (OT)  Pt to be independent with BUE HEP.  -BT     Time Frame (Strength Goal 1, OT)  by discharge  -BT     [facility] Name 04/19/21 1246          Therapy Assessment/Plan (OT)    Planned Therapy Interventions (OT)  activity tolerance training;BADL retraining;occupation/activity based interventions;transfer/mobility retraining;strengthening exercise  -BT       User Key  (r) = Recorded By, (t) = Taken By, (c) = Cosigned By    Initials Name Provider Type    BT Iris Umanzor, OT Occupational Therapist        Clinical Impression     Row Name 04/19/21 5225          Pain Assessment    Additional Documentation  Pain Scale: Numbers Pre/Post-Treatment (Group)  -BT     Row Name 04/19/21 8414          Pain Scale: Numbers  Pre/Post-Treatment    Pretreatment Pain Rating  0/10 - no pain  -BT     Posttreatment Pain Rating  0/10 - no pain  -BT     Row Name 04/19/21 0400          Plan of Care Review    Plan of Care Reviewed With  patient  -BT     Outcome Summary  OT evaluation complete: Pt is a 51 year old female admitted to [facility] from rehab facility with c/o weakness, diarrhea, and mailaise. Pt A&O x 4 and reports being independent with adls and functional transfers with no AD at [facility]. On this date, pt presents with decreased strength and activity tolerance with adls and functional transfers. Pt would benefit from skilled OT services to address these deficits. Recommend inpatient rehab upon dc.  -BT     Row Name 04/19/21 1244          Therapy Assessment/Plan (OT)    Rehab Potential (OT)  good, to achieve stated therapy goals  -BT     Criteria for Skilled Therapeutic Interventions Met (OT)  yes  -BT     Therapy Frequency (OT)  5 times/wk  -BT     Row Name 04/19/21 1241          Therapy Plan Review/Discharge Plan (OT)    Anticipated Discharge Disposition (OT)  inpatient rehabilitation facility  -BT     Row Name 04/19/21 0818          Positioning and Restraints    Pre-Treatment Position  in bed  -BT     Post Treatment Position  bed  -BT     In Bed  supine;call light within reach;encouraged to call for assist;exit alarm on  -BT       User Key  (r) = Recorded By, (t) = Taken By, (c) = Cosigned By    Initials Name Provider Type    BT Iris Umanzor OT Occupational Therapist        Outcome Measures     Row Name 04/19/21 0686          How much help from another is currently needed...    Putting on and taking off regular lower body clothing?  2  -BT     Bathing (including washing, rinsing, and drying)  2  -BT     Toileting (which includes using toilet bed pan or urinal)  2  -BT     Putting on and taking off regular upper body clothing  3  -BT     Taking care of personal grooming (such as brushing teeth)  3  -BT     Eating meals  4  -BT     AM-PAC  6 Clicks Score (OT)  16  -BT     Row Name 04/19/21 1250          Functional Assessment    Outcome Measure Options  AM-PAC 6 Clicks Daily Activity (OT)  -BT       User Key  (r) = Recorded By, (t) = Taken By, (c) = Cosigned By    Initials Name Provider Type    Iris Greer OT Occupational Therapist        Occupational Therapy Education                 Title: PT OT SLP Therapies (Done)     Topic: Occupational Therapy (Done)     Point: ADL training (Done)     Description:   Instruct learner(s) on proper safety adaptation and remediation techniques during self care or transfers.   Instruct in proper use of assistive devices.              Learning Progress Summary           Patient Acceptance, E, VU by BT at 4/19/2021 1251    Comment: purpose of OT, adl retraining, functional transfer training                   Point: Home exercise program (Done)     Description:   Instruct learner(s) on appropriate technique for monitoring, assisting and/or progressing therapeutic exercises/activities.              Learning Progress Summary           Patient Acceptance, E, VU by BT at 4/19/2021 1251    Comment: purpose of OT, adl retraining, functional transfer training                   Point: Precautions (Done)     Description:   Instruct learner(s) on prescribed precautions during self-care and functional transfers.              Learning Progress Summary           Patient Acceptance, E, VU by BT at 4/19/2021 1251    Comment: purpose of OT, adl retraining, functional transfer training                   Point: Body mechanics (Done)     Description:   Instruct learner(s) on proper positioning and spine alignment during self-care, functional mobility activities and/or exercises.              Learning Progress Summary           Patient Acceptance, E, VU by BT at 4/19/2021 1251    Comment: purpose of OT, adl retraining, functional transfer training                               User Key     Initials Effective Dates Name Provider Type  Discipline    BT 11/16/20 -  Iris Umanzor OT Occupational Therapist OT              OT Recommendation and Plan  Planned Therapy Interventions (OT): activity tolerance training, BADL retraining, occupation/activity based interventions, transfer/mobility retraining, strengthening exercise  Therapy Frequency (OT): 5 times/wk  Plan of Care Review  Plan of Care Reviewed With: patient  Outcome Summary: OT evaluation complete: Pt is a 51 year old female admitted to [facility] from rehab facility with c/o weakness, diarrhea, and mailaise. Pt A&O x 4 and reports being independent with adls and functional transfers with no AD at [facility]. On this date, pt presents with decreased strength and activity tolerance with adls and functional transfers. Pt would benefit from skilled OT services to address these deficits. Recommend inpatient rehab upon dc.     Time Calculation:   Time Calculation- OT     Row Name 04/19/21 1252             Time Calculation- OT    OT Start Time  0955  -BT      OT Stop Time  1014  -BT      OT Time Calculation (min)  19 min  -BT      Total Timed Code Minutes- OT  14 minute(s)  -BT      OT Received On  04/19/21  -BT      OT - Next Appointment  04/20/21  -BT      OT Goal Re-Cert Due Date  05/03/21  -BT        User Key  (r) = Recorded By, (t) = Taken By, (c) = Cosigned By    Initials Name Provider Type    BT Iris Umanzor OT Occupational Therapist        Therapy Charges for Today     Code Description Service Date Service Provider Modifiers Qty    [id number]  OT EVAL MOD COMPLEXITY 2 4/19/2021 Iris Umanzor OT GO 1    [id number]  OT SELF CARE/MGMT/TRAIN EA 15 MIN 4/19/2021 Iris Umanzor OT GO 1               Iris Umanzor OT  4/19/2021

## 2021-04-19 NOTE — PLAN OF CARE
Goal Outcome Evaluation:  Plan of Care Reviewed With: patient     Outcome Summary: OT evaluation complete: Pt is a 51 year old female admitted to [facility] from rehab facility with c/o weakness, diarrhea, and mailaise. Pt A&O x 4 and reports being independent with adls and functional transfers with no AD at [facility]. On this date, pt presents with decreased strength and activity tolerance with adls and functional transfers. Pt would benefit from skilled OT services to address these deficits. Recommend inpatient rehab upon dc.    Pt wore face mask during this therapy encounter. Therapist wore appropriate PPE including face mask, gloves, and eye protection. Hand hygiene completed before and after this therapy session. Pt not in enhanced precautions.

## 2021-04-19 NOTE — PROGRESS NOTES
[facility] Acute Inpt Rehab Note     Received consult request for acute inpt rehab from [facility].  Physical therapy notes reviewed along with occupational therapy.  Will need more in depth notes from occupational therapy, have asked department to see again tomorrow.      This office tried for precert on this patient a couple weeks ago when patient was at [facility] and insurance denied her.  Peer to peer was offered but [facility] declined to do.    Will continue to follow at this time.    Thanks,   Casie Mcintyre RN  Rehab Admission Nurse   [phone]

## 2021-04-19 NOTE — PLAN OF CARE
Goal Outcome Evaluation:     Progress: improving  Outcome Summary: Pt AOx4. On RA. NSR/ST on the monitor. Up to BSC/BR very weak on feet but asstx1. Wokring with PT. Dressing changed due to dressing problems. Trach now covered with bandaid. Dialysis is coming after 6pm per [facility]. Perotineal diaysis cath and site C/D/I over tegaderm. IV flagyl and oral vanc. No acute distress. VSS. Will continue to monitor.

## 2021-04-19 NOTE — NURSING NOTE
"[facility] consult for right gluteal wound.  Red, raised area in location of old nodule.  Patient reports she had \"something removed\" but it came back.  Not pressure related.  Used zinc barrier to protect and keep dry.   "

## 2021-04-19 NOTE — NURSING NOTE
Coumadin due at 1800. INR came back at 1500 at 3.38. Her goal is 2-3. Pharmacy says to hold and see if [facility] MD wants to put in an order for Pharmacy to dose med to keep her theraputic. 2 calls placed to MD ([phone]).    Spoke with Dr. Wan. He agrees to hold 1800 dose for this evening and to put in a order for pharmacy to dose coumadin. Orders placed in epic and updated pm shift.

## 2021-04-19 NOTE — DISCHARGE PLACEMENT REQUEST
"Zandra Melosalomón MOLINA (51 y.o. Female)     Date of Birth Social Security Number Address Home Phone MRN    1970  [address] [facility] [facility] [address] [phone] [id number]    [employer] Marital Status          Unknown        Admission Date Admission Type Admitting Provider Attending Provider Department, Room/Bed    4/16/21 Emergency Stingl, MD Savage Garcia Abhishek, MD [address], [address]669/1    Discharge Date Discharge Disposition Discharge Destination                       Attending Provider: Nirmal Wan MD    Allergies: Penicillins, Nickel    Isolation: Spore   Infection: C.difficile (04/09/21), COVID (History) (04/18/21)   Code Status: CPR    Ht: 154.9 cm (61\")   Wt: 66.2 kg (146 lb)    Admission Cmt: None   Principal Problem: Clostridium difficile colitis [A04.72]                 Active Insurance as of 4/16/2021     Primary Coverage     Payor Plan Insurance Group Employer/Plan Group    MEDICARE MEDICARE A & B      Payor Plan Address Payor Plan Phone Number Payor Plan Fax Number Effective Dates    [address] [phone]  4/1/2019 - None Entered    [facility] [address]       Subscriber Name Subscriber Birth Date Member ID       MABAR MELO 1970 [id number]           Secondary Coverage     Payor Plan Insurance Group Employer/Plan Group    [facility] [id number]     Payor Plan Address Payor Plan Phone Number Payor Plan Fax Number Effective Dates    [address]   1/1/2021 - None Entered    [facility] [id number]       Subscriber Name Subscriber Birth Date Member ID       AMBAR MELO 1970 [id number]                 Emergency Contacts      (Rel.) Home Phone Work Phone Mobile Phone    LorieRoland (Son) [phone] -- --    natalie melo (Power of ) [phone] -- --    loriematy (Daughter) [phone] -- --              "

## 2021-04-19 NOTE — PROGRESS NOTES
Name: Ambar Lara ADMIT: 2021   : 1970  PCP: Jet Manuel MD    MRN: [id number] LOS: 2 days   AGE/SEX: 51 y.o. female  ROOM: [facility]     Subjective   Subjective   Patient seen at bedside.  No new complaints.       Objective   Objective   Vital Signs  Temp:  [96.5 °F (35.8 °C)-98 °F (36.7 °C)] 98 °F (36.7 °C)  Heart Rate:  [76-94] 82  Resp:  [18] 18  BP: ()/(55-80) 110/75  SpO2:  [96 %-100 %] 100 %  on   ;   Device (Oxygen Therapy): room air  Body mass index is 27.59 kg/m².  Physical Exam   General Appearance:    Alert, cooperative, no distress, appears stated age   Head:    Normocephalic, without obvious abnormality, atraumatic   Eyes:    PERRL, conjunctivae/corneas clear, EOM's intact, both eyes   Ears:    Normal external ear canals, both ears   Nose:   Nares normal, septum midline, mucosa normal, no drainage    or sinus tenderness   Throat:   Lips, mucosa, and tongue normal   Neck:   Supple, symmetrical, trachea midline, no adenopathy;     thyroid:  no enlargement/tenderness/nodules; no carotid    bruit or JVD   Back:     Symmetric, no curvature, ROM normal, no CVA tenderness   Lungs:     Decreased breath sounds bilaterally, respirations unlabored   Chest Wall:    No tenderness or deformity    Heart:    Regular rate and rhythm, S1 and S2 normal, no murmur, rub   or gallop   Abdomen:     Soft, nontender, bowel sounds active all four quadrants,     no masses, no hepatomegaly, no splenomegaly   Extremities:   Extremities normal, atraumatic, no cyanosis or edema   Pulses:   2+ and symmetric all extremities   Skin:   Skin color, texture, turgor normal, no rashes or lesions   Lymph nodes:   Cervical, supraclavicular, and axillary nodes normal   Neurologic:   CNII-XII intact, normal strength, sensation intact throughout         Results Review     I reviewed the patient's new clinical results.  Results from last 7 days   Lab Units 21  0817 21  0717 21  0555 21  9296    WBC 10*3/mm3 8.09 9.61 9.30 18.13*   HEMOGLOBIN g/dL 10.0* 10.0* 9.5* 10.7*   PLATELETS 10*3/mm3 338 322 272 398     Results from last 7 days   Lab Units 04/19/21  0817 04/18/21  0717 04/17/21  0555 04/16/21  1602   SODIUM mmol/L 135* 132* 136 133*   POTASSIUM mmol/L 4.5 4.6 4.1 4.1   CHLORIDE mmol/L 101 100 102 97*   CO2 mmol/L 20.2* 20.3* 22.1 23.2   BUN mg/dL 30* 23* 16 9   CREATININE mg/dL 4.21* 3.03* 2.67* 1.63*   GLUCOSE mg/dL 96 99 98 146*   Estimated Creatinine Clearance: 14.1 mL/min (A) (by C-G formula based on SCr of 4.21 mg/dL (H)).  Results from last 7 days   Lab Units 04/19/21  0817 04/16/21  1602 04/13/21  0505   ALBUMIN g/dL 3.10* 3.60 3.00*   BILIRUBIN mg/dL  --  0.2  --    ALK PHOS U/L  --  131*  --    AST (SGOT) U/L  --  17  --    ALT (SGPT) U/L  --  14  --      Results from last 7 days   Lab Units 04/19/21  0817 04/18/21  0717 04/17/21  0555 04/16/21  1602 04/13/21  0505   CALCIUM mg/dL 9.1 8.6 8.5* 8.0* 7.8*   ALBUMIN g/dL 3.10*  --   --  3.60 3.00*   MAGNESIUM mg/dL  --   --   --   --  1.6   PHOSPHORUS mg/dL 6.2*  --   --   --  3.8       COVID19   Date Value Ref Range Status   04/16/2021 Not Detected Not Detected - Ref. Range Final   04/09/2021 Not Detected Not Detected - Ref. Range Final     Glucose   Date/Time Value Ref Range Status   04/17/2021 1515 101 70 - 130 mg/dL Final       XR Chest 1 View  ONE VIEW PORTABLE CHEST AT 5 PM     HISTORY: Hypotension. End-stage renal disease. Chest pain.      Recent Covid 19 pneumonia.     FINDINGS: The lungs are moderately expanded with some patchy infiltrates  at the lung bases likely related to residual changes of Covid 19  pneumonia and showing some minimal improvement at the left base when  compared to the study of 04/09/2021. The heart size remains normal. A  large gauge vascular catheter ends near the junction of the SVC and  right atrium without change.     This report was finalized on 4/16/2021 8:01 PM by Dr. Kiet Oconnor M.D.       Scheduled  Medications  calcium acetate, 1,334 mg, Oral, TID With Meals  escitalopram, 20 mg, Oral, Q24H  levothyroxine, 25 mcg, Oral, Q AM  metroNIDAZOLE, 500 mg, Intravenous, Q8H  midodrine, 5 mg, Oral, TID AC  montelukast, 10 mg, Oral, Daily  sodium bicarbonate, 650 mg, Oral, Q8H  vancomycin, 250 mg, Oral, Q6H  warfarin, 4 mg, Oral, Daily    Infusions  Pharmacy Consult,     Diet  Diet Regular       Assessment/Plan     Active Hospital Problems    Diagnosis  POA   • **Clostridium difficile colitis [A04.72]  Yes   • Gastroesophageal reflux disease [K21.9]  Yes   • Cardiomyopathy (CMS/[facility]) [I42.9]  Yes   • Chronic systolic heart failure (CMS/[facility]) [I50.22]  Yes   • ESRD (end stage renal disease) on dialysis (CMS/[facility]) [N18.6, Z99.2]  Not Applicable   • HLD (hyperlipidemia) [E78.5]  Yes   • Hypothyroidism [E03.9]  Yes      Resolved Hospital Problems   No resolved problems to display.       51 y.o. female admitted with Clostridium difficile colitis.    Assessment and plan:  1.  C. difficile colitis, continue p.o. vancomycin and IV Flagyl.  2.  End-stage renal disease on hemodialysis, continue dialysis per nephrology recommendations.  3.  Anemia of chronic disease, hemoglobin noted to be 10, continue to recheck labs.  4.  Hyponatremia, resolved.  5.  Chronic systolic congestive heart failure, she appears euvolemic at this point of time.  6.  Hypothyroidism, continue Synthroid.  7.  Personal history of PE, patient currently on Coumadin.  INR therapeutic.  8.  Hypotension, blood pressure has improved after addition of midodrine.  9.  CODE STATUS is full code.  Further plans based on hospital course.      Nirmal Wan MD  [facility]  04/19/21  17:01 EDT

## 2021-04-19 NOTE — PROGRESS NOTES
Nephrology Daily Progress Note    Assessment/Plan       Clostridium difficile colitis    Cardiomyopathy (CMS/HCC)    Chronic systolic heart failure (CMS/HCC)    ESRD (end stage renal disease) on dialysis (CMS/[facility])    Gastroesophageal reflux disease    HLD (hyperlipidemia)    Hypothyroidism       LOS: 2 days     Ambar Lara is a 51 y.o. female who was admitted with Clostridium difficile colitis. She reports his symptoms are improving with treatment.    Subjective     Patient denies any significant abdominal pain and states that she feeling  better since started her antibiotics, with decreased loose bowel movements and gradually improving appetite    Patient denies any fevers or chills    Continues to feel tired and fatigued    Interval History: Ambar Lara  As above   Medication side effects:None     Current Facility-Administered Medications   Medication Dose Route Frequency Provider Last Rate Last Admin   • acetaminophen (TYLENOL) tablet 650 mg  650 mg Oral Q4H PRN Binta Patel MD   650 mg at 04/18/21 2044   • calcium acetate (PHOS BINDER)) capsule 1,334 mg  1,334 mg Oral TID With Meals Binta Patel MD   1,334 mg at 04/18/21 1714   • escitalopram (LEXAPRO) tablet 20 mg  20 mg Oral Q24H Binta Patel MD   20 mg at 04/18/21 2243   • levothyroxine (SYNTHROID, LEVOTHROID) tablet 25 mcg  25 mcg Oral Q AM Binta Patel MD   25 mcg at 04/19/21 0632   • melatonin tablet 3 mg  3 mg Oral Nightly PRN Binta Patel MD   3 mg at 04/18/21 2243   • metroNIDAZOLE (FLAGYL) 500 mg/100mL IVPB  500 mg Intravenous Q8H Nirmal Wan MD   500 mg at 04/19/21 0348   • midodrine (PROAMATINE) tablet 5 mg  5 mg Oral TID AC Nirmal Wan MD   5 mg at 04/19/21 0632   • montelukast (SINGULAIR) tablet 10 mg  10 mg Oral Daily Binta Patel MD   10 mg at 04/18/21 0941   • nitroglycerin (NITROSTAT) SL tablet 0.4 mg  0.4 mg Sublingual Q5 Min PRN Binta Patel MD        • ondansetron (ZOFRAN) tablet 4 mg  4 mg Oral Q6H PRN Binta Patel MD        Or   • ondansetron (ZOFRAN) injection 4 mg  4 mg Intravenous Q6H PRN Binta Patel MD   4 mg at 04/18/21 1219   • Pharmacy Consult   Does not apply Continuous PRN Marcos Blackman MD       • Pharmacy Consult   Does not apply Continuous PRN Binta Patel MD       • sodium bicarbonate tablet 650 mg  650 mg Oral Q8H Binta Patel MD   650 mg at 04/19/21 0632   • sodium chloride 0.9 % flush 10 mL  10 mL Intravenous PRN Marcos Blackman MD   10 mL at 04/17/21 2118   • vancomycin (VANCOCIN) capsule 250 mg  250 mg Oral Q6H Nirmal Wan MD   250 mg at 04/19/21 0632   • warfarin (COUMADIN) tablet 4 mg  4 mg Oral Daily Marcos Blackman MD   4 mg at 04/18/21 1714       Objective     Vital signs in last 24 hours:  Temp:  [96.5 °F (35.8 °C)-98 °F (36.7 °C)] 96.5 °F (35.8 °C)  Heart Rate:  [76-97] 82  Resp:  [16-18] 18  BP: ()/(50-80) 115/75    Intake/Output last 3 shifts:  I/O last 3 completed shifts:  In: 600 [P.O.:600]  Out: -     Labs:  Results from last 7 days   Lab Units 04/18/21  0717 04/17/21  0555 04/16/21  1603 04/14/21  0648   WBC 10*3/mm3 9.61 9.30 18.13* 13.50*   HEMOGLOBIN g/dL 10.0* 9.5* 10.7* 9.5*   HEMATOCRIT % 30.6* 29.7* 33.4* 28.8*   PLATELETS 10*3/mm3 322 272 398 295   MONOCYTES % % 5.1  --  3.1* 6.0     Results from last 7 days   Lab Units 04/18/21  0717 04/17/21  0555 04/16/21  1602   SODIUM mmol/L 132* 136 133*   POTASSIUM mmol/L 4.6 4.1 4.1   CHLORIDE mmol/L 100 102 97*   CO2 mmol/L 20.3* 22.1 23.2   BUN mg/dL 23* 16 9   CREATININE mg/dL 3.03* 2.67* 1.63*   CALCIUM mg/dL 8.6 8.5* 8.0*   BILIRUBIN mg/dL  --   --  0.2   ALK PHOS U/L  --   --  131*   ALT (SGPT) U/L  --   --  14   AST (SGOT) U/L  --   --  17   GLUCOSE mg/dL 99 98 146*       Physical Exam:  General appearance: alert, chronically ill and deconditioned  Head: Normocephalic, without obvious abnormality,  atraumatic  Eyes: conjunctivae/corneas clear. PERRL, EOM's intact.  Ears: Hearing preserved  Neck: no adenopathy, no carotid bruit, no JVD, supple, symmetrical, trachea midline. RIJPC in place. Trach in place slowly healing   Lungs: clear to auscultation bilaterally  Heart: regular rate and rhythm, S1, S2 normal, KIMMY grade III   Abdomen: soft, non-tender; bowel sounds normal; no masses, PD catheter in place with clean exit site  Extremities: extremities normal, atraumatic, no cyanosis or edema  Pulses: 2+ and symmetric  Neurologic: Alert and oriented X 3, generalized weakness    Impression and plan  Past Medical History:   Diagnosis Date   • CHF (congestive heart failure) (CMS/[facility])    • Dialysis patient (CMS/[facility])    • Disease of thyroid gland    • Elevated cholesterol    • GERD (gastroesophageal reflux disease)    • Renal disorder    • Sleep apnea      Mrs Ambar melo is a pleasant 51-year-old female with a past medical history of chronic systolic congestive heart failure, thyroid disease, gastroesophageal for disease, chronic kidney disease that progressed to end-stage renal disease requiring the initiation hemodialysis through peritoneal dialysis, associated with complications that require transition to in center hemodialysis s/p RIJPC .  Patient has a history of Covid infection.  Patient was admitted for colitis found to have C. Difficile currently on vancomycin oral treatment and metronidazole IV    ESRD on HD ( MWF schedule )   -Secondary to interstitial nephritis  -History of peritoneal  dialysis complicated by require transition to hemodialysis  -s/p PD catheter in place , not used since 3/2021  -s/p RIJPC placement   -Patient undergoing chronic hemodialysis on Monday, Wednesday Friday schedule  -We will arrange for her hemodialysis today     Hypertension   -On minoxidil   -Heart healthy diet  -Continue to follow closely and arrange accordingly    Chronic normocytic anemia  -Likely secondary to end-stage  renal disease  -Her hemoglobin is 10   -We will hold Epogen protocol for now  -Continue to follow H&H closely    Hyperphosphatemia  -We will continue renal diet and phoslo TID with meals   -We will follow Phos trend    Hyponatremia  -Multifactorial etiology  -Arrange sodium while undergoing dialysis    C. difficile colitis  -On  Vancomycin oral and  metronidazole IV  -Currently followed closely by primary team and infectious disease  -With  normalization of white blood count and clinical impression    Plan:   -Arrange HD today   -Follow closely during her admission    Over 25 minutes of time was spent with face-to-face patient care half the time was spent counseling the patient regarding the plan of care.    All question concerns were answered to patient satisfaction.  Patient verbalized understanding    I have coordinated for the patient to receive her dialysis treatment    Thank you for allowing me to participate in this patient care    Bebeto Pinto MD, MD

## 2021-04-20 LAB
ALBUMIN SERPL-MCNC: 3 G/DL (ref 3.5–5.2)
ANION GAP SERPL CALCULATED.3IONS-SCNC: 15.7 MMOL/L (ref 5–15)
BASOPHILS # BLD MANUAL: 0.08 10*3/MM3 (ref 0–0.2)
BASOPHILS NFR BLD AUTO: 1 % (ref 0–1.5)
BUN SERPL-MCNC: 34 MG/DL (ref 6–20)
BUN/CREAT SERPL: 8.4 (ref 7–25)
CALCIUM SPEC-SCNC: 8.9 MG/DL (ref 8.6–10.5)
CHLORIDE SERPL-SCNC: 100 MMOL/L (ref 98–107)
CO2 SERPL-SCNC: 20.3 MMOL/L (ref 22–29)
CREAT SERPL-MCNC: 4.06 MG/DL (ref 0.57–1)
DEPRECATED RDW RBC AUTO: 50.5 FL (ref 37–54)
EOSINOPHIL # BLD MANUAL: 0.08 10*3/MM3 (ref 0–0.4)
EOSINOPHIL NFR BLD MANUAL: 1 % (ref 0.3–6.2)
ERYTHROCYTE [DISTWIDTH] IN BLOOD BY AUTOMATED COUNT: 15.3 % (ref 12.3–15.4)
GFR SERPL CREATININE-BSD FRML MDRD: 12 ML/MIN/1.73
GFR SERPL CREATININE-BSD FRML MDRD: ABNORMAL ML/MIN/{1.73_M2}
GLUCOSE SERPL-MCNC: 98 MG/DL (ref 65–99)
HCT VFR BLD AUTO: 28.8 % (ref 34–46.6)
HGB BLD-MCNC: 9.3 G/DL (ref 12–15.9)
INR PPP: 2.84 (ref 0.9–1.1)
LYMPHOCYTES # BLD MANUAL: 1.11 10*3/MM3 (ref 0.7–3.1)
LYMPHOCYTES NFR BLD MANUAL: 14.1 % (ref 19.6–45.3)
LYMPHOCYTES NFR BLD MANUAL: 5.1 % (ref 5–12)
MCH RBC QN AUTO: 29.9 PG (ref 26.6–33)
MCHC RBC AUTO-ENTMCNC: 32.3 G/DL (ref 31.5–35.7)
MCV RBC AUTO: 92.6 FL (ref 79–97)
MONOCYTES # BLD AUTO: 0.4 10*3/MM3 (ref 0.1–0.9)
NEUTROPHILS # BLD AUTO: 6.22 10*3/MM3 (ref 1.7–7)
NEUTROPHILS NFR BLD MANUAL: 78.8 % (ref 42.7–76)
PHOSPHATE SERPL-MCNC: 6.8 MG/DL (ref 2.5–4.5)
PLAT MORPH BLD: NORMAL
PLATELET # BLD AUTO: 338 10*3/MM3 (ref 140–450)
PMV BLD AUTO: 9.2 FL (ref 6–12)
POTASSIUM SERPL-SCNC: 3.9 MMOL/L (ref 3.5–5.2)
PROTHROMBIN TIME: 29.5 SECONDS (ref 11.7–14.2)
RBC # BLD AUTO: 3.11 10*6/MM3 (ref 3.77–5.28)
RBC MORPH BLD: NORMAL
SODIUM SERPL-SCNC: 136 MMOL/L (ref 136–145)
T3FREE SERPL-MCNC: 0.47 PG/ML (ref 2–4.4)
T4 FREE SERPL-MCNC: 0.22 NG/DL (ref 0.93–1.7)
TSH SERPL DL<=0.05 MIU/L-ACNC: 96.9 UIU/ML (ref 0.27–4.2)
WBC # BLD AUTO: 7.89 10*3/MM3 (ref 3.4–10.8)
WBC MORPH BLD: NORMAL

## 2021-04-20 PROCEDURE — 25010000002 ONDANSETRON PER 1 MG: Performed by: INTERNAL MEDICINE

## 2021-04-20 PROCEDURE — 84439 ASSAY OF FREE THYROXINE: CPT | Performed by: INTERNAL MEDICINE

## 2021-04-20 PROCEDURE — 97110 THERAPEUTIC EXERCISES: CPT

## 2021-04-20 PROCEDURE — 85025 COMPLETE CBC W/AUTO DIFF WBC: CPT | Performed by: INTERNAL MEDICINE

## 2021-04-20 PROCEDURE — 80069 RENAL FUNCTION PANEL: CPT | Performed by: INTERNAL MEDICINE

## 2021-04-20 PROCEDURE — 85610 PROTHROMBIN TIME: CPT | Performed by: INTERNAL MEDICINE

## 2021-04-20 PROCEDURE — 84481 FREE ASSAY (FT-3): CPT | Performed by: INTERNAL MEDICINE

## 2021-04-20 PROCEDURE — 84443 ASSAY THYROID STIM HORMONE: CPT | Performed by: INTERNAL MEDICINE

## 2021-04-20 PROCEDURE — 85007 BL SMEAR W/DIFF WBC COUNT: CPT | Performed by: INTERNAL MEDICINE

## 2021-04-20 RX ORDER — WARFARIN SODIUM 4 MG/1
4 TABLET ORAL
Status: DISCONTINUED | OUTPATIENT
Start: 2021-04-20 | End: 2021-04-21

## 2021-04-20 RX ORDER — LEVOTHYROXINE SODIUM 0.1 MG/1
100 TABLET ORAL
Status: DISCONTINUED | OUTPATIENT
Start: 2021-04-21 | End: 2021-04-24 | Stop reason: HOSPADM

## 2021-04-20 RX ORDER — SACCHAROMYCES BOULARDII 250 MG
500 CAPSULE ORAL 2 TIMES DAILY
Status: DISCONTINUED | OUTPATIENT
Start: 2021-04-20 | End: 2021-04-24 | Stop reason: HOSPADM

## 2021-04-20 RX ORDER — DIPHENOXYLATE HYDROCHLORIDE AND ATROPINE SULFATE 2.5; .025 MG/1; MG/1
1 TABLET ORAL DAILY
Status: DISCONTINUED | OUTPATIENT
Start: 2021-04-21 | End: 2021-04-24 | Stop reason: HOSPADM

## 2021-04-20 RX ADMIN — MIDODRINE HYDROCHLORIDE 5 MG: 5 TABLET ORAL at 10:49

## 2021-04-20 RX ADMIN — SODIUM BICARBONATE 650 MG: 650 TABLET ORAL at 22:00

## 2021-04-20 RX ADMIN — METRONIDAZOLE 500 MG: 500 INJECTION, SOLUTION INTRAVENOUS at 13:01

## 2021-04-20 RX ADMIN — CALCIUM ACETATE 1334 MG: 667 CAPSULE ORAL at 17:25

## 2021-04-20 RX ADMIN — VANCOMYCIN HYDROCHLORIDE 250 MG: 125 CAPSULE ORAL at 00:22

## 2021-04-20 RX ADMIN — WARFARIN 4 MG: 4 TABLET ORAL at 17:26

## 2021-04-20 RX ADMIN — VANCOMYCIN HYDROCHLORIDE 250 MG: 125 CAPSULE ORAL at 13:00

## 2021-04-20 RX ADMIN — METRONIDAZOLE 500 MG: 500 INJECTION, SOLUTION INTRAVENOUS at 04:58

## 2021-04-20 RX ADMIN — CALCIUM ACETATE 1334 MG: 667 CAPSULE ORAL at 09:25

## 2021-04-20 RX ADMIN — Medication 3 MG: at 23:26

## 2021-04-20 RX ADMIN — VANCOMYCIN HYDROCHLORIDE 250 MG: 125 CAPSULE ORAL at 17:25

## 2021-04-20 RX ADMIN — MONTELUKAST SODIUM 10 MG: 10 TABLET, FILM COATED ORAL at 13:00

## 2021-04-20 RX ADMIN — SODIUM BICARBONATE 650 MG: 650 TABLET ORAL at 05:02

## 2021-04-20 RX ADMIN — ESCITALOPRAM 20 MG: 20 TABLET, FILM COATED ORAL at 23:27

## 2021-04-20 RX ADMIN — SODIUM BICARBONATE 650 MG: 650 TABLET ORAL at 20:18

## 2021-04-20 RX ADMIN — LEVOTHYROXINE SODIUM 25 MCG: 0.03 TABLET ORAL at 05:02

## 2021-04-20 RX ADMIN — CALCIUM ACETATE 1334 MG: 667 CAPSULE ORAL at 13:00

## 2021-04-20 RX ADMIN — SODIUM BICARBONATE 650 MG: 650 TABLET ORAL at 13:01

## 2021-04-20 RX ADMIN — ONDANSETRON 4 MG: 2 INJECTION INTRAMUSCULAR; INTRAVENOUS at 19:01

## 2021-04-20 RX ADMIN — METRONIDAZOLE 500 MG: 500 INJECTION, SOLUTION INTRAVENOUS at 20:18

## 2021-04-20 RX ADMIN — VANCOMYCIN HYDROCHLORIDE 250 MG: 125 CAPSULE ORAL at 05:02

## 2021-04-20 RX ADMIN — MIDODRINE HYDROCHLORIDE 5 MG: 5 TABLET ORAL at 17:26

## 2021-04-20 RX ADMIN — Medication 500 MG: at 20:18

## 2021-04-20 RX ADMIN — Medication 500 MG: at 13:00

## 2021-04-20 RX ADMIN — VANCOMYCIN HYDROCHLORIDE 250 MG: 125 CAPSULE ORAL at 23:26

## 2021-04-20 NOTE — THERAPY TREATMENT NOTE
Patient Name: Ambar Lara  : 1970    MRN: [id number]                              Today's Date: 2021       Admit Date: 2021    Visit Dx:     ICD-10-CM ICD-9-CM   1. Clostridium difficile colitis  A04.72 008.45   2. ESRD on hemodialysis (CMS/HCC)  N18.6 585.6    Z99.2 V45.11   3. Other pulmonary embolism without acute cor pulmonale, unspecified chronicity (CMS/HCC)  I26.99 415.19   4. Chest pain, unspecified type  R07.9 786.50     Patient Active Problem List   Diagnosis   • Colitis, Clostridium difficile   • Anxiety   • Cardiomyopathy (CMS/HCC)   • Chronic systolic heart failure (CMS/HCC)   • ESRD (end stage renal disease) on dialysis (CMS/HCC)   • Gastroesophageal reflux disease   • Gitelman syndrome   • HLD (hyperlipidemia)   • Hypothyroidism   • Peritoneal dialysis status (CMS/HCC)   • History of tracheostomy   • Acute pulmonary embolism (CMS/HCC)   • Severe malnutrition (CMS/HCC)   • Clostridium difficile colitis     Past Medical History:   Diagnosis Date   • CHF (congestive heart failure) (CMS/HCC)    • Dialysis patient (CMS/HCC)    • Disease of thyroid gland    • Elevated cholesterol    • GERD (gastroesophageal reflux disease)    • Renal disorder    • Sleep apnea      Past Surgical History:   Procedure Laterality Date   • ADRENAL GLAND SURGERY N/A `   •  SECTION Bilateral 2001    Has had x2   • HYSTERECTOMY     • TRACHEOSTOMY       General Information     Row Name 21 1541          Physical Therapy Time and Intention    Document Type  therapy note (daily note)  -     Mode of Treatment  physical therapy  -     Row Name 21 1541          General Information    Existing Precautions/Restrictions  fall  -     Row Name 21 1541          Cognition    Orientation Status (Cognition)  oriented x 4  -       User Key  (r) = Recorded By, (t) = Taken By, (c) = Cosigned By    Initials Name Provider Type     Jane Mcintyre PTA Physical Therapy Assistant         Mobility     Row Name 04/20/21 1542          Bed Mobility    Bed Mobility  supine-sit;sit-supine  -     Supine-Sit [place] (Bed Mobility)  supervision  -     Sit-Supine [place] (Bed Mobility)  supervision  -     Assistive Device (Bed Mobility)  bed rails;head of bed elevated  -SM     Row Name 04/20/21 1542          Sit-Stand Transfer    Sit-Stand [place] (Transfers)  contact guard  -     Assistive Device (Sit-Stand Transfers)  walker, front-wheeled  -SM     Row Name 04/20/21 1542          Gait/Stairs (Locomotion)    [place] Level (Gait)  minimum assist (75% patient effort)  -     Assistive Device (Gait)  walker, front-wheeled  -     Distance in Feet (Gait)  50  -     Deviations/Abnormal Patterns (Gait)  august decreased;stride length decreased  -     Bilateral Gait Deviations  forward flexed posture  -     Comment (Gait/Stairs)  slightly unsteady, and pt seemed very fatigued by the end  -       User Key  (r) = Recorded By, (t) = Taken By, (c) = Cosigned By    Initials Name Provider Type    Jane Alvarez PTA Physical Therapy Assistant        Obj/Interventions     Row Name 04/20/21 1544          Motor Skills    Therapeutic Exercise  -- seated AP, LAQ, marches x10 reps  -       User Key  (r) = Recorded By, (t) = Taken By, (c) = Cosigned By    Initials Name Provider Type    Jane Alvarez PTA Physical Therapy Assistant        Goals/Plan    No documentation.       Clinical Impression     Row Name 04/20/21 1544          Pain    Additional Documentation  Pain Scale: Numbers Pre/Post-Treatment (Group)  -SM     Row Name 04/20/21 1542          Pain Scale: Numbers Pre/Post-Treatment    Pretreatment Pain Rating  0/10 - no pain  -     Posttreatment Pain Rating  0/10 - no pain  -SM     Row Name 04/20/21 1547          Positioning and Restraints    Pre-Treatment Position  in bed  -     Post Treatment Position  bed  -SM     In Bed  supine;call light within  reach;encouraged to call for assist;exit alarm on  -       User Key  (r) = Recorded By, (t) = Taken By, (c) = Cosigned By    Initials Name Provider Type    Jane Alvarez PTA Physical Therapy Assistant        Outcome Measures     Row Name 04/20/21 1544          How much help from another person do you currently need...    Turning from your back to your side while in flat bed without using bedrails?  3  -SM     Moving from lying on back to sitting on the side of a flat bed without bedrails?  3  -SM     Moving to and from a bed to a chair (including a wheelchair)?  3  -SM     Standing up from a chair using your arms (e.g., wheelchair, bedside chair)?  3  -SM     Climbing 3-5 steps with a railing?  2  -SM     To walk in hospital room?  3  -SM     AM-PAC 6 Clicks Score (PT)  17  -     Row Name 04/20/21 1544          Functional Assessment    Outcome Measure Options  AM-PAC 6 Clicks Basic Mobility (PT)  -       User Key  (r) = Recorded By, (t) = Taken By, (c) = Cosigned By    Initials Name Provider Type    Jane Alvarez PTA Physical Therapy Assistant        Physical Therapy Education                 Title: PT OT SLP Therapies (Done)     Topic: Physical Therapy (Done)     Point: Mobility training (Done)     Learning Progress Summary           Patient Acceptance, E,TB,D, VU,NR by  at 4/20/2021 1545    Acceptance, E,TB, VU by PT at 4/19/2021 1604    Acceptance, E, VU,NR by AL at 4/17/2021 1106                   Point: Home exercise program (Done)     Learning Progress Summary           Patient Acceptance, E,TB,D, VU,NR by  at 4/20/2021 1545    Acceptance, E,TB, VU by PT at 4/19/2021 1604    Acceptance, E, VU,NR by AL at 4/17/2021 1106                   Point: Body mechanics (Done)     Learning Progress Summary           Patient Acceptance, E,TB,D, VU,NR by  at 4/20/2021 1545    Acceptance, E,TB, VU by PT at 4/19/2021 1604    Acceptance, E, VU,NR by AL at 4/17/2021 1106                   Point:  Precautions (Done)     Learning Progress Summary           Patient Acceptance, E,TB,D, VU,NR by  at 4/20/2021 1545    Acceptance, E,TB, VU by PT at 4/19/2021 1604    Acceptance, E, VU,NR by AL at 4/17/2021 1106                               User Key     Initials Effective Dates Name Provider Type Discipline    AL 04/03/18 -  Valeria Jimenez, PT Physical Therapist PT     03/07/18 -  Jane Mcintyre PTA Physical Therapy Assistant PT    PT 06/08/20 -  Sydney Watkins RN Registered Nurse Nurse              PT Recommendation and Plan     Plan of Care Reviewed With: patient  Progress: improving  Outcome Summary: Pt tolerated treatment well this date. Increased gait distance to 50ft w/ Rw and min A, slightly unsteady throughout. Pt was very fatigued by the end, and stated she felt dizzy before lying back down. Encouraged pt to ambulate in room w/ nsg later if feeling better, walker was brought to pt's room to borrow. Patient was intermittently wearing a face mask during this therapy encounter. Therapist used appropriate personal protective equipment including eye protection, mask, and gloves.  Mask used was standard procedure mask. Appropriate PPE was worn during the entire therapy session. Hand hygiene was completed before and after therapy session. Patient is not in enhanced droplet precautions.     Time Calculation:   PT Charges     Row Name 04/20/21 1548             Time Calculation    Start Time  1504  -      Stop Time  1528  -      Time Calculation (min)  24 min  -      PT Received On  04/20/21  -      PT - Next Appointment  04/21/21  -        User Key  (r) = Recorded By, (t) = Taken By, (c) = Cosigned By    Initials Name Provider Type     Jane Mcintyre PTA Physical Therapy Assistant        Therapy Charges for Today     Code Description Service Date Service Provider Modifiers Qty    89480768145 HC PT THER PROC EA 15 MIN 4/20/2021 Jane Mcintyre PTA GP 2          PT G-Codes  Outcome  Measure Options: AM-PAC 6 Clicks Basic Mobility (PT)  AM-PAC 6 Clicks Score (PT): 17  AM-PAC 6 Clicks Score (OT): 16    Jane Mcintyre, PTA  4/20/2021

## 2021-04-20 NOTE — PLAN OF CARE
Goal Outcome Evaluation:  Plan of Care Reviewed With: patient  Progress: improving  Outcome Summary: Patient alert and oriented. Bp low today. Midodrine given. Patient had dialysis treatment today. No s/s of acute distress. Antibiotics given today. Patient had a couple episodes of loose stools. No s/s of acute distress. Will continue to monitor.

## 2021-04-20 NOTE — PROGRESS NOTES
Name: Ambar Lara ADMIT: 2021   : 1970  PCP: Jet Manuel MD    MRN: [id number] LOS: 3 days   AGE/SEX: 51 y.o. female  ROOM: [facility]     Subjective   Subjective   Patient seen at bedside, she is getting dialysis.       Objective   Objective   Vital Signs  Temp:  [96.8 °F (36 °C)-97.7 °F (36.5 °C)] 96.8 °F (36 °C)  Heart Rate:  [77-99] 98  Resp:  [18-20] 18  BP: ()/(60-79) 98/76  SpO2:  [94 %-100 %] 94 %  on   ;   Device (Oxygen Therapy): room air  Body mass index is 30.38 kg/m².  Physical Exam  General Appearance:    Alert, cooperative, no distress, appears stated age   Head:    Normocephalic, without obvious abnormality, atraumatic   Eyes:    PERRL, conjunctivae/corneas clear, EOM's intact, both eyes   Ears:    Normal external ear canals, both ears   Nose:   Nares normal, septum midline, mucosa normal, no drainage    or sinus tenderness   Throat:   Lips, mucosa, and tongue normal   Neck:   Supple, symmetrical, trachea midline, no adenopathy;     thyroid:  no enlargement/tenderness/nodules; no carotid    bruit or JVD   Back:     Symmetric, no curvature, ROM normal, no CVA tenderness   Lungs:     Decreased breath sounds bilaterally, respirations unlabored   Chest Wall:    No tenderness or deformity    Heart:    Regular rate and rhythm, S1 and S2 normal, no murmur, rub   or gallop   Abdomen:     Soft, nontender, bowel sounds active all four quadrants,     no masses, no hepatomegaly, no splenomegaly   Extremities:   Extremities normal, atraumatic, no cyanosis or edema   Pulses:   2+ and symmetric all extremities   Skin:   Skin color, texture, turgor normal, no rashes or lesions   Lymph nodes:   Cervical, supraclavicular, and axillary nodes normal   Neurologic:   CNII-XII intact, normal strength, sensation intact throughout         Results Review     I reviewed the patient's new clinical results.  Results from last 7 days   Lab Units 21  0614 21  0817 21  0722  04/17/21  0555   WBC 10*3/mm3 7.89 8.09 9.61 9.30   HEMOGLOBIN g/dL 9.3* 10.0* 10.0* 9.5*   PLATELETS 10*3/mm3 338 338 322 272     Results from last 7 days   Lab Units 04/20/21  0614 04/19/21  0817 04/18/21  0717 04/17/21  0555   SODIUM mmol/L 136 135* 132* 136   POTASSIUM mmol/L 3.9 4.5 4.6 4.1   CHLORIDE mmol/L 100 101 100 102   CO2 mmol/L 20.3* 20.2* 20.3* 22.1   BUN mg/dL 34* 30* 23* 16   CREATININE mg/dL 4.06* 4.21* 3.03* 2.67*   GLUCOSE mg/dL 98 96 99 98   Estimated Creatinine Clearance: 15.3 mL/min (A) (by C-G formula based on SCr of 4.06 mg/dL (H)).  Results from last 7 days   Lab Units 04/20/21 0614 04/19/21  0817 04/16/21  1602   ALBUMIN g/dL 3.00* 3.10* 3.60   BILIRUBIN mg/dL  --   --  0.2   ALK PHOS U/L  --   --  131*   AST (SGOT) U/L  --   --  17   ALT (SGPT) U/L  --   --  14     Results from last 7 days   Lab Units 04/20/21  0614 04/19/21  0817 04/18/21  0717 04/17/21  0555 04/16/21  1602   CALCIUM mg/dL 8.9 9.1 8.6 8.5* 8.0*   ALBUMIN g/dL 3.00* 3.10*  --   --  3.60   PHOSPHORUS mg/dL 6.8* 6.2*  --   --   --        COVID19   Date Value Ref Range Status   04/16/2021 Not Detected Not Detected - Ref. Range Final   04/09/2021 Not Detected Not Detected - Ref. Range Final     No results found for: HGBA1C, POCGLU    XR Chest 1 View  ONE VIEW PORTABLE CHEST AT 5 PM     HISTORY: Hypotension. End-stage renal disease. Chest pain.      Recent Covid 19 pneumonia.     FINDINGS: The lungs are moderately expanded with some patchy infiltrates  at the lung bases likely related to residual changes of Covid 19  pneumonia and showing some minimal improvement at the left base when  compared to the study of 04/09/2021. The heart size remains normal. A  large gauge vascular catheter ends near the junction of the SVC and  right atrium without change.     This report was finalized on 4/16/2021 8:01 PM by Dr. Kiet Oconnor M.D.       Scheduled Medications  calcium acetate, 1,334 mg, Oral, TID With Meals  [START ON 4/21/2021]  epoetin marco antonio/marco antonio-epbx, 5,000 Units, Subcutaneous, Once per day on Mon Wed Fri  escitalopram, 20 mg, Oral, Q24H  [START ON 4/21/2021] levothyroxine, 100 mcg, Oral, Q AM  metroNIDAZOLE, 500 mg, Intravenous, Q8H  midodrine, 5 mg, Oral, TID AC  montelukast, 10 mg, Oral, Daily  [START ON 4/21/2021] multivitamin, 1 tablet, Oral, Daily  saccharomyces boulardii, 500 mg, Oral, BID  sodium bicarbonate, 650 mg, Oral, Q8H  vancomycin, 250 mg, Oral, Q6H  warfarin, 4 mg, Oral, Daily    Infusions  Pharmacy Consult,   Pharmacy to dose warfarin,     Diet  Diet Regular       Assessment/Plan     Active Hospital Problems    Diagnosis  POA   • **Clostridium difficile colitis [A04.72]  Yes   • History of tracheostomy [Z98.890]  Not Applicable   • Gastroesophageal reflux disease [K21.9]  Yes   • Cardiomyopathy (CMS/[facility]) [I42.9]  Yes   • Chronic systolic heart failure (CMS/[facility]) [I50.22]  Yes   • ESRD (end stage renal disease) on dialysis (CMS/[facility]) [N18.6, Z99.2]  Not Applicable   • HLD (hyperlipidemia) [E78.5]  Yes   • Hypothyroidism [E03.9]  Yes      Resolved Hospital Problems   No resolved problems to display.       51 y.o. female admitted with Clostridium difficile colitis.    Assessment and plan:  1.  C. difficile colitis, continue p.o. vancomycin and IV Flagyl.  2.  End-stage renal disease on hemodialysis, continue dialysis per nephrology recommendations.  3.  Anemia of chronic disease, hemoglobin noted to be 10, continue to recheck labs.  4.  Hyponatremia, resolved.  5.  Chronic systolic congestive heart failure, she appears euvolemic at this point of time.  6.  Hypothyroidism, TSH is checked today, it is almost close to 96.  We will increase Synthroid dose today.  She would benefit from endocrinology follow-up on outpatient basis.   7.  Personal history of PE, patient currently on Coumadin.  INR therapeutic.  8.  Hypotension, blood pressure has improved after addition of midodrine.  9.  CODE STATUS is full code.  Further plans  based on hospital course.       Nirmal Wan MD  [facility]  04/20/21  17:34 EDT

## 2021-04-20 NOTE — NURSING NOTE
HD Cath Ports accessed.  Both blue and red ports draw and flush without resistance.    Below Hemodialysis completed, tolerated treatment.  Max BFR of 400 and UF Goal of 1000mL achieved without difficulty.    HD Cath ports locked with Heparin per protocol. Patient stable, no distress reported/observed.  Verbal report to primary RN.    Pre Vitals  Post Vitals  BP: 121/79  BP: 108/75  HR: 85  HR: 88  RR:  16   RR:  16  Weight 72.9kg  Weight:  71.8kg    Total Fluid Removed:   1000mL    BVP:  75.5    Jayashree Narayan RN  [facility]  [phone]    Duration of Treatment 3.5 Hours    Access Site Tunneled Dialysis Catheter    Dialyzer F160     mL/min    Dialysate Temperature (C) 35    Use Crit-Line? Yes    BFR-As tolerated to a maximum of: 400 mL/min    Prime Dialyzer With NS to Priming Volume? Yes    Dialysate Solution Bath: K+ = 2 mEq, Ca = 2.5mEq    Bicarb 30 mEq    Na+ 137 meq    Fluid Removal: Bring the patient to her dry weight

## 2021-04-20 NOTE — PROGRESS NOTES
Nephrology Daily Progress Note    Assessment/Plan       Clostridium difficile colitis    Cardiomyopathy (CMS/HCC)    Chronic systolic heart failure (CMS/HCC)    ESRD (end stage renal disease) on dialysis (CMS/HCC)    Gastroesophageal reflux disease    HLD (hyperlipidemia)    Hypothyroidism       LOS: 3 days     Ambar Lara is a 51 y.o. female who was admitted with Clostridium difficile colitis. She reports his symptoms are improving with treatment.    Subjective     Continues to tolerate oral intake with gradual improvement of her loose bowel movements without any abdominal pain fevers or chills.  Patient denies any nausea or emesis    Patient was scheduled to undergo hemodialysis yesterday but was delayed, we will make arrangements for the patient to undergo dialysis today.     Contacted the dialysis unit to make sure that the patient gets her treatment today    Interval History: Ambar Lara  As above   Medication side effects:None     Current Facility-Administered Medications   Medication Dose Route Frequency Provider Last Rate Last Admin   • acetaminophen (TYLENOL) tablet 650 mg  650 mg Oral Q4H PRN Binta Patel MD   650 mg at 04/18/21 2044   • calcium acetate (PHOS BINDER)) capsule 1,334 mg  1,334 mg Oral TID With Meals Bnita Patel MD   1,334 mg at 04/20/21 0925   • escitalopram (LEXAPRO) tablet 20 mg  20 mg Oral Q24H Binta Patel MD   20 mg at 04/19/21 2227   • levothyroxine (SYNTHROID, LEVOTHROID) tablet 25 mcg  25 mcg Oral Q AM Binta Patel MD   25 mcg at 04/20/21 0502   • melatonin tablet 3 mg  3 mg Oral Nightly PRN Binta Patel MD   3 mg at 04/19/21 2133   • metroNIDAZOLE (FLAGYL) 500 mg/100mL IVPB  500 mg Intravenous Q8H Nirmal Wan MD   500 mg at 04/20/21 0458   • midodrine (PROAMATINE) tablet 5 mg  5 mg Oral TID AC Nirmal Wan MD   5 mg at 04/20/21 1049   • montelukast (SINGULAIR) tablet 10 mg  10 mg Oral Daily Binta Patel  MD Gil   10 mg at 04/19/21 0932   • nitroglycerin (NITROSTAT) SL tablet 0.4 mg  0.4 mg Sublingual Q5 Min PRN Binta Patel MD       • ondansetron (ZOFRAN) tablet 4 mg  4 mg Oral Q6H PRN Binta Patel MD        Or   • ondansetron (ZOFRAN) injection 4 mg  4 mg Intravenous Q6H PRN Binta Patel MD   4 mg at 04/18/21 1219   • Pharmacy Consult   Does not apply Continuous PRN Binta Patel MD       • Pharmacy to dose warfarin   Does not apply Continuous PRN Nirmal Wan MD       • saccharomyces boulardii (FLORASTOR) capsule 500 mg  500 mg Oral BID Nirmal Wan MD       • sodium bicarbonate tablet 650 mg  650 mg Oral Q8H Binta Patel MD   650 mg at 04/20/21 0502   • sodium chloride 0.9 % flush 10 mL  10 mL Intravenous PRN Marcos Blackman MD   10 mL at 04/17/21 2118   • vancomycin (VANCOCIN) capsule 250 mg  250 mg Oral Q6H Nirmal Wan MD   250 mg at 04/20/21 0502       Objective     Vital signs in last 24 hours:  Temp:  [96.8 °F (36 °C)-98 °F (36.7 °C)] 96.8 °F (36 °C)  Heart Rate:  [77-91] 81  Resp:  [18-20] 20  BP: ()/(60-79) 97/70    Intake/Output last 3 shifts:  No intake/output data recorded.    Labs:  Results from last 7 days   Lab Units 04/20/21  0614 04/19/21  0817 04/18/21  0717   WBC 10*3/mm3 7.89 8.09 9.61   HEMOGLOBIN g/dL 9.3* 10.0* 10.0*   HEMATOCRIT % 28.8* 31.8* 30.6*   PLATELETS 10*3/mm3 338 338 322   MONOCYTES % % 5.1 4.1* 5.1     Results from last 7 days   Lab Units 04/20/21  0614 04/19/21  0817 04/18/21  0717 04/16/21  1602   SODIUM mmol/L 136 135* 132* 133*   POTASSIUM mmol/L 3.9 4.5 4.6 4.1   CHLORIDE mmol/L 100 101 100 97*   CO2 mmol/L 20.3* 20.2* 20.3* 23.2   BUN mg/dL 34* 30* 23* 9   CREATININE mg/dL 4.06* 4.21* 3.03* 1.63*   CALCIUM mg/dL 8.9 9.1 8.6 8.0*   BILIRUBIN mg/dL  --   --   --  0.2   ALK PHOS U/L  --   --   --  131*   ALT (SGPT) U/L  --   --   --  14   AST (SGOT) U/L  --   --   --  17   GLUCOSE mg/dL 98 96 99  146*       Physical Exam:  General appearance: alert deconditioned  Head: Normocephalic, without obvious abnormality, atraumatic  Eyes: conjunctivae/corneas clear. PERRL, EOM's intact.  Ears: Hearing preserved  Neck: no adenopathy, no carotid bruit, no JVD, supple, symmetrical, trachea midline. RIJPC in place. Trach in place slowly healing   Lungs: clear to auscultation bilaterally  Heart: regular rate and rhythm, S1, S2 normal, KIMMY grade III   Abdomen: soft, non-tender; bowel sounds normal; no masses, PD catheter in place with clean exit site  Extremities: extremities normal, atraumatic, no cyanosis or edema  Pulses: 2+ and symmetric  Neurologic: Alert and oriented X 3, generalized weakness    Impression and plan    Mrs Ambar melo is a pleasant 51-year-old female with a past medical history of chronic systolic congestive heart failure, thyroid disease, gastroesophageal for disease, chronic kidney disease that progressed to end-stage renal disease requiring the initiation hemodialysis through peritoneal dialysis, associated with complications that require transition to in center hemodialysis s/p RIJPC .  Patient has a history of Covid infection.  Patient was admitted for colitis found to have C. Difficile currently on vancomycin oral treatment and metronidazole IV    ESRD on HD ( MWF schedule )   -Secondary to interstitial nephritis  -History of peritoneal  dialysis complicated by require transition to hemodialysis  -s/p PD catheter in place , not used since 3/2021  -s/p RIJPC placement   -Patient undergoing chronic hemodialysis on Monday, Wednesday Friday schedule  -We will arrange for her hemodialysis today  ( after missed ttx yesterday )     Hypotension   - On MIDODRINE 5 mg TID   - Pt requires MIDODRINE during HD TTX   - Will follow closely     Chronic normocytic anemia  Results from last 7 days   Lab Units 04/20/21  0614 04/19/21  0817 04/18/21  0717   HEMOGLOBIN g/dL 9.3* 10.0* 10.0*   -Likely secondary  to end-stage renal disease  - Will start patient on EPOGEN 5.000 SC TIW   - RENAVITE  Daily   -Continue to follow H&H closely    Hyperphosphatemia  -We will continue renal diet and phoslo TID with meals   -We will follow Phos trend    Hyponatremia  Lab Results   Lab Value Date/Time     04/20/2021 0614     (L) 04/19/2021 0817     (L) 04/18/2021 0717     04/17/2021 0555     (L) 04/16/2021 1602     04/14/2021 0648     04/13/2021 0505   - Multifactorial etiology  -Arrange sodium while undergoing dialysis    C. difficile colitis  -On  Vancomycin oral and  metronidazole IV  -Currently followed closely by primary team and infectious disease      Plan:   -Arrange HD today , patient was re-schedule from yesterday   -Follow closely during her admission    Over 25 minutes of time was spent with face-to-face patient care half the time was spent counseling the patient regarding the plan of care.    All question concerns were answered to patient satisfaction.  Patient verbalized understanding    Coordinated her HD treatment today with the dialysis unit     Thank you for allowing me to participate in this patient care    Bebeto Pinto MD, MD

## 2021-04-20 NOTE — PROGRESS NOTES
[facility] Acute Inpt Rehab Note     Continue to follow progress.  Note dialysis today since unable to be done yesterday.  Will need updated OT notes, department aware.      Thanks,   Casie Mcintyre RN  Rehab Admission Nurse   [phone]

## 2021-04-20 NOTE — PLAN OF CARE
Goal Outcome Evaluation:  Plan of Care Reviewed With: patient  Progress: improving  Outcome Summary: Pt tolerated treatment well this date. Increased gait distance to 50ft w/ Rw and min A, slightly unsteady throughout. Pt was very fatigued by the end, and stated she felt dizzy before lying back down. Encouraged pt to ambulate in room w/ nsg later if feeling better, walker was brought to pt's room to borrow. Patient was intermittently wearing a face mask during this therapy encounter. Therapist used appropriate personal protective equipment including eye protection, mask, and gloves.  Mask used was standard procedure mask. Appropriate PPE was worn during the entire therapy session. Hand hygiene was completed before and after therapy session. Patient is not in enhanced droplet precautions.

## 2021-04-20 NOTE — PROGRESS NOTES
Pharmacy Consult: Warfarin Dosing/ Monitoring    Amabr Lara is a 51 y.o. female, estimated creatinine clearance is 15.3 mL/min (A) (by C-G formula based on SCr of 4.06 mg/dL (H)). weighing 72.9 kg (160 lb 12.8 oz).     has a past medical history of CHF (congestive heart failure) (CMS/[facility]), Dialysis patient (CMS/[facility]), Disease of thyroid gland, Elevated cholesterol, GERD (gastroesophageal reflux disease), Renal disorder, and Sleep apnea.    Social History     Tobacco Use    Smoking status: Former Smoker     Packs/day: 1.00     Years: 25.00     Pack years: 25.00     Types: Cigarettes     Quit date: 4/16/2018     Years since quitting: 3.0    Smokeless tobacco: Never Used   Vaping Use    Vaping Use: Former    Quit date: 2/14/2021    Substances: Nicotine, Flavoring    Devices: Pre-filled or refillable cartridge   Substance Use Topics    Alcohol use: Not Currently    Drug use: Not Currently       Results from last 7 days   Lab Units 04/20/21  0614 04/19/21  1517 04/19/21  0817 04/18/21  0717 04/17/21  0555 04/16/21  1603 04/16/21  1602 04/14/21  0648 04/13/21  2257 04/13/21  1534   INR  2.84* 3.38*  --   --   --   --  2.24* 1.96*  --   --    APTT seconds  --   --   --   --   --   --   --  76.0* 117.7* 109.6*   HEMOGLOBIN g/dL 9.3*  --  10.0* 10.0* 9.5* 10.7*  --  9.5*  --   --    HEMATOCRIT % 28.8*  --  31.8* 30.6* 29.7* 33.4*  --  28.8*  --   --    PLATELETS 10*3/mm3 338  --  338 322 272 398  --  295  --   --      Results from last 7 days   Lab Units 04/20/21  0614 04/19/21  0817 04/18/21  0717 04/16/21  1602   SODIUM mmol/L 136 135* 132* 133*   POTASSIUM mmol/L 3.9 4.5 4.6 4.1   CHLORIDE mmol/L 100 101 100 97*   CO2 mmol/L 20.3* 20.2* 20.3* 23.2   BUN mg/dL 34* 30* 23* 9   CREATININE mg/dL 4.06* 4.21* 3.03* 1.63*   CALCIUM mg/dL 8.9 9.1 8.6 8.0*   BILIRUBIN mg/dL  --   --   --  0.2   ALK PHOS U/L  --   --   --  131*   ALT (SGPT) U/L  --   --   --  14   AST (SGOT) U/L  --   --   --  17   GLUCOSE mg/dL 98 96 99 146*      Anticoagulation history: Chronic warfarin therapy for history of PE    Hospital Anticoagulation:  Consulting provider: Dr. Wan  Start date: 4/19  Indication: PE history  Target INR: 2-3  Expected duration: ?   Bridge Therapy: No                Date 4/19 4/20           INR 3.3 2.84           Warfarin dose HOLD              Potential drug interactions: metronidazole    Relevant nutrition status:     Other:     Education complete?/ Date: not yet completed    Assessment/Plan:  INR has returned to goal, will restart 4 mg daily  Monitor daily PT/INR  Follow up tomorrow    Pharmacy will continue to follow until discharge or discontinuation of warfarin.   Dayne Ballard, [facility]  4/20/2021

## 2021-04-20 NOTE — PLAN OF CARE
Goal Outcome Evaluation:         A&O x4. Pt denies pain and SOA. Right chest tunnel cath dressing C/D/I. Pt medicated per orders. Pt up with assistance to bedside commode, pt still complaining of weakness. Pt is self turning in bed. Pt has no complaints of nausea. Pt was to receive dialysis around 1900 but did not receive dialysis, see dialysis nurse note. Dialysis was rescheduled for early morning 4/20/21. VSS. Pt has no s/s of distress. Will continue to monitor.

## 2021-04-20 NOTE — NURSING NOTE
DIALYSIS    While setting up machine for treatment, received call for STAT dialysis for another patient. Attempted to get other call staff to cover but all other call staff in [place] area at this time are with STAT treatments. Paged Dr. Mock and Dr. Mock returned page. Informed Dr. Mock of situation and that possible next available staff person will be first thing in morning.  Dr. Mock ordered have first available staff person dialyze this patient preferably before first thing in morning, but if not possible, then first thing in the morning.  Sent his message to call staff forum and on Urgent staff forum as well as to dispatcher.

## 2021-04-21 LAB
ALBUMIN SERPL-MCNC: 2.8 G/DL (ref 3.5–5.2)
ANION GAP SERPL CALCULATED.3IONS-SCNC: 12.6 MMOL/L (ref 5–15)
BASOPHILS # BLD AUTO: 0.1 10*3/MM3 (ref 0–0.2)
BASOPHILS NFR BLD AUTO: 1.3 % (ref 0–1.5)
BUN SERPL-MCNC: 15 MG/DL (ref 6–20)
BUN/CREAT SERPL: 5.7 (ref 7–25)
CALCIUM SPEC-SCNC: 8.1 MG/DL (ref 8.6–10.5)
CHLORIDE SERPL-SCNC: 104 MMOL/L (ref 98–107)
CO2 SERPL-SCNC: 23.4 MMOL/L (ref 22–29)
CREAT SERPL-MCNC: 2.65 MG/DL (ref 0.57–1)
DEPRECATED RDW RBC AUTO: 53.6 FL (ref 37–54)
EOSINOPHIL # BLD AUTO: 0.18 10*3/MM3 (ref 0–0.4)
EOSINOPHIL NFR BLD AUTO: 2.4 % (ref 0.3–6.2)
ERYTHROCYTE [DISTWIDTH] IN BLOOD BY AUTOMATED COUNT: 15.5 % (ref 12.3–15.4)
GFR SERPL CREATININE-BSD FRML MDRD: 19 ML/MIN/1.73
GLUCOSE SERPL-MCNC: 89 MG/DL (ref 65–99)
HCT VFR BLD AUTO: 30.2 % (ref 34–46.6)
HGB BLD-MCNC: 9.6 G/DL (ref 12–15.9)
IMM GRANULOCYTES # BLD AUTO: 0.32 10*3/MM3 (ref 0–0.05)
IMM GRANULOCYTES NFR BLD AUTO: 4.3 % (ref 0–0.5)
INR PPP: 2.14 (ref 0.9–1.1)
LYMPHOCYTES # BLD AUTO: 2.38 10*3/MM3 (ref 0.7–3.1)
LYMPHOCYTES NFR BLD AUTO: 31.9 % (ref 19.6–45.3)
MCH RBC QN AUTO: 30.2 PG (ref 26.6–33)
MCHC RBC AUTO-ENTMCNC: 31.8 G/DL (ref 31.5–35.7)
MCV RBC AUTO: 95 FL (ref 79–97)
MONOCYTES # BLD AUTO: 0.56 10*3/MM3 (ref 0.1–0.9)
MONOCYTES NFR BLD AUTO: 7.5 % (ref 5–12)
NEUTROPHILS NFR BLD AUTO: 3.91 10*3/MM3 (ref 1.7–7)
NEUTROPHILS NFR BLD AUTO: 52.6 % (ref 42.7–76)
NRBC BLD AUTO-RTO: 0 /100 WBC (ref 0–0.2)
PHOSPHATE SERPL-MCNC: 4.8 MG/DL (ref 2.5–4.5)
PLATELET # BLD AUTO: 318 10*3/MM3 (ref 140–450)
PMV BLD AUTO: 9.2 FL (ref 6–12)
POTASSIUM SERPL-SCNC: 4.1 MMOL/L (ref 3.5–5.2)
PROTHROMBIN TIME: 23.6 SECONDS (ref 11.7–14.2)
RBC # BLD AUTO: 3.18 10*6/MM3 (ref 3.77–5.28)
SODIUM SERPL-SCNC: 140 MMOL/L (ref 136–145)
WBC # BLD AUTO: 7.45 10*3/MM3 (ref 3.4–10.8)

## 2021-04-21 PROCEDURE — 25010000002 EPOETIN ALFA-EPBX 3000 UNIT/ML SOLUTION: Performed by: INTERNAL MEDICINE

## 2021-04-21 PROCEDURE — 85610 PROTHROMBIN TIME: CPT | Performed by: INTERNAL MEDICINE

## 2021-04-21 PROCEDURE — 85025 COMPLETE CBC W/AUTO DIFF WBC: CPT | Performed by: INTERNAL MEDICINE

## 2021-04-21 PROCEDURE — 80069 RENAL FUNCTION PANEL: CPT | Performed by: INTERNAL MEDICINE

## 2021-04-21 PROCEDURE — 25010000002 ONDANSETRON PER 1 MG: Performed by: INTERNAL MEDICINE

## 2021-04-21 RX ORDER — MIDODRINE HYDROCHLORIDE 5 MG/1
10 TABLET ORAL
Status: DISCONTINUED | OUTPATIENT
Start: 2021-04-21 | End: 2021-04-24 | Stop reason: HOSPADM

## 2021-04-21 RX ORDER — WARFARIN SODIUM 5 MG/1
5 TABLET ORAL
Status: COMPLETED | OUTPATIENT
Start: 2021-04-21 | End: 2021-04-21

## 2021-04-21 RX ADMIN — MIDODRINE HYDROCHLORIDE 10 MG: 5 TABLET ORAL at 17:27

## 2021-04-21 RX ADMIN — SODIUM BICARBONATE 650 MG: 650 TABLET ORAL at 14:45

## 2021-04-21 RX ADMIN — Medication 500 MG: at 11:46

## 2021-04-21 RX ADMIN — ONDANSETRON 4 MG: 2 INJECTION INTRAMUSCULAR; INTRAVENOUS at 11:49

## 2021-04-21 RX ADMIN — VANCOMYCIN HYDROCHLORIDE 250 MG: 125 CAPSULE ORAL at 17:28

## 2021-04-21 RX ADMIN — METRONIDAZOLE 500 MG: 500 INJECTION, SOLUTION INTRAVENOUS at 11:45

## 2021-04-21 RX ADMIN — VANCOMYCIN HYDROCHLORIDE 250 MG: 125 CAPSULE ORAL at 06:42

## 2021-04-21 RX ADMIN — Medication 500 MG: at 20:06

## 2021-04-21 RX ADMIN — CALCIUM ACETATE 1334 MG: 667 CAPSULE ORAL at 17:27

## 2021-04-21 RX ADMIN — ONDANSETRON 4 MG: 2 INJECTION INTRAMUSCULAR; INTRAVENOUS at 18:01

## 2021-04-21 RX ADMIN — SODIUM BICARBONATE 650 MG: 650 TABLET ORAL at 06:40

## 2021-04-21 RX ADMIN — SODIUM BICARBONATE 650 MG: 650 TABLET ORAL at 20:06

## 2021-04-21 RX ADMIN — CALCIUM ACETATE 1334 MG: 667 CAPSULE ORAL at 11:45

## 2021-04-21 RX ADMIN — EPOETIN ALFA-EPBX 5000 UNITS: 3000 INJECTION, SOLUTION INTRAVENOUS; SUBCUTANEOUS at 11:49

## 2021-04-21 RX ADMIN — METRONIDAZOLE 500 MG: 500 INJECTION, SOLUTION INTRAVENOUS at 20:06

## 2021-04-21 RX ADMIN — MIDODRINE HYDROCHLORIDE 5 MG: 5 TABLET ORAL at 06:40

## 2021-04-21 RX ADMIN — MONTELUKAST SODIUM 10 MG: 10 TABLET, FILM COATED ORAL at 11:46

## 2021-04-21 RX ADMIN — MIDODRINE HYDROCHLORIDE 5 MG: 5 TABLET ORAL at 09:25

## 2021-04-21 RX ADMIN — Medication 1 TABLET: at 11:45

## 2021-04-21 RX ADMIN — LEVOTHYROXINE SODIUM 100 MCG: 0.1 TABLET ORAL at 06:40

## 2021-04-21 RX ADMIN — VANCOMYCIN HYDROCHLORIDE 250 MG: 125 CAPSULE ORAL at 11:45

## 2021-04-21 RX ADMIN — SODIUM CHLORIDE, PRESERVATIVE FREE 10 ML: 5 INJECTION INTRAVENOUS at 20:07

## 2021-04-21 RX ADMIN — ESCITALOPRAM 20 MG: 20 TABLET, FILM COATED ORAL at 20:06

## 2021-04-21 RX ADMIN — WARFARIN 5 MG: 5 TABLET ORAL at 17:27

## 2021-04-21 RX ADMIN — METRONIDAZOLE 500 MG: 500 INJECTION, SOLUTION INTRAVENOUS at 04:00

## 2021-04-21 NOTE — PROGRESS NOTES
Name: Ambar Lara ADMIT: 2021   : 1970  PCP: Jet Manuel MD    MRN: [id number] LOS: 4 days   AGE/SEX: 51 y.o. female  ROOM: [facility]     Subjective   Subjective   Patient seen at bedside, continues to feel weak.       Objective   Objective   Vital Signs  Temp:  [97.8 °F (36.6 °C)-98.6 °F (37 °C)] 98.4 °F (36.9 °C)  Heart Rate:  [78-99] 99  Resp:  [16-18] 16  BP: ()/(53-76) 82/53  SpO2:  [95 %-96 %] 95 %  on   ;   Device (Oxygen Therapy): room air  Body mass index is 30.1 kg/m².  Physical Exam  General Appearance:    Alert, cooperative, no distress, appears stated age   Head:    Normocephalic, without obvious abnormality, atraumatic   Eyes:    PERRL, conjunctivae/corneas clear, EOM's intact, both eyes   Ears:    Normal external ear canals, both ears   Nose:   Nares normal, septum midline, mucosa normal, no drainage    or sinus tenderness   Throat:   Lips, mucosa, and tongue normal   Neck:   Supple, symmetrical, trachea midline, no adenopathy;     thyroid:  no enlargement/tenderness/nodules; no carotid    bruit or JVD   Back:     Symmetric, no curvature, ROM normal, no CVA tenderness   Lungs:     Decreased breath sounds bilaterally, respirations unlabored   Chest Wall:    No tenderness or deformity    Heart:    Regular rate and rhythm, S1 and S2 normal, no murmur, rub   or gallop   Abdomen:     Soft, nontender, bowel sounds active all four quadrants,     no masses, no hepatomegaly, no splenomegaly   Extremities:   Extremities normal, atraumatic, no cyanosis or edema   Pulses:   2+ and symmetric all extremities   Skin:   Skin color, texture, turgor normal, no rashes or lesions   Lymph nodes:   Cervical, supraclavicular, and axillary nodes normal   Neurologic:   CNII-XII intact, normal strength, sensation intact throughout       Results Review     I reviewed the patient's new clinical results.  Results from last 7 days   Lab Units 21  0608 21  0614 21  0851  04/18/21  0717   WBC 10*3/mm3 7.45 7.89 8.09 9.61   HEMOGLOBIN g/dL 9.6* 9.3* 10.0* 10.0*   PLATELETS 10*3/mm3 318 338 338 322     Results from last 7 days   Lab Units 04/21/21  0608 04/20/21  0614 04/19/21  0817 04/18/21  0717   SODIUM mmol/L 140 136 135* 132*   POTASSIUM mmol/L 4.1 3.9 4.5 4.6   CHLORIDE mmol/L 104 100 101 100   CO2 mmol/L 23.4 20.3* 20.2* 20.3*   BUN mg/dL 15 34* 30* 23*   CREATININE mg/dL 2.65* 4.06* 4.21* 3.03*   GLUCOSE mg/dL 89 98 96 99   Estimated Creatinine Clearance: 23.4 mL/min (A) (by C-G formula based on SCr of 2.65 mg/dL (H)).  Results from last 7 days   Lab Units 04/21/21  0608 04/20/21  0614 04/19/21  0817 04/16/21  1602   ALBUMIN g/dL 2.80* 3.00* 3.10* 3.60   BILIRUBIN mg/dL  --   --   --  0.2   ALK PHOS U/L  --   --   --  131*   AST (SGOT) U/L  --   --   --  17   ALT (SGPT) U/L  --   --   --  14     Results from last 7 days   Lab Units 04/21/21  0608 04/20/21  0614 04/19/21  0817 04/18/21  0717 04/16/21  1602   CALCIUM mg/dL 8.1* 8.9 9.1 8.6 8.0*   ALBUMIN g/dL 2.80* 3.00* 3.10*  --  3.60   PHOSPHORUS mg/dL 4.8* 6.8* 6.2*  --   --        COVID19   Date Value Ref Range Status   04/16/2021 Not Detected Not Detected - Ref. Range Final   04/09/2021 Not Detected Not Detected - Ref. Range Final     No results found for: HGBA1C, POCGLU    XR Chest 1 View  ONE VIEW PORTABLE CHEST AT 5 PM     HISTORY: Hypotension. End-stage renal disease. Chest pain.      Recent Covid 19 pneumonia.     FINDINGS: The lungs are moderately expanded with some patchy infiltrates  at the lung bases likely related to residual changes of Covid 19  pneumonia and showing some minimal improvement at the left base when  compared to the study of 04/09/2021. The heart size remains normal. A  large gauge vascular catheter ends near the junction of the SVC and  right atrium without change.     This report was finalized on 4/16/2021 8:01 PM by Dr. Kiet Oconnor M.D.       Scheduled Medications  calcium acetate, 1,334 mg,  Oral, TID With Meals  epoetin marco antonio/marco antonio-epbx, 5,000 Units, Subcutaneous, Once per day on Mon Wed Fri  escitalopram, 20 mg, Oral, Q24H  levothyroxine, 100 mcg, Oral, Q AM  metroNIDAZOLE, 500 mg, Intravenous, Q8H  midodrine, 10 mg, Oral, TID AC  montelukast, 10 mg, Oral, Daily  multivitamin, 1 tablet, Oral, Daily  saccharomyces boulardii, 500 mg, Oral, BID  sodium bicarbonate, 650 mg, Oral, Q8H  vancomycin, 250 mg, Oral, Q6H  warfarin, 5 mg, Oral, Once    Infusions  Pharmacy Consult,   Pharmacy to dose warfarin,     Diet  Diet Regular       Assessment/Plan     Active Hospital Problems    Diagnosis  POA   • **Clostridium difficile colitis [A04.72]  Yes   • History of tracheostomy [Z98.890]  Not Applicable   • Gastroesophageal reflux disease [K21.9]  Yes   • Cardiomyopathy (CMS/HCC) [I42.9]  Yes   • Chronic systolic heart failure (CMS/HCC) [I50.22]  Yes   • ESRD (end stage renal disease) on dialysis (CMS/[facility]) [N18.6, Z99.2]  Not Applicable   • HLD (hyperlipidemia) [E78.5]  Yes   • Hypothyroidism [E03.9]  Yes      Resolved Hospital Problems   No resolved problems to display.       51 y.o. female admitted with Clostridium difficile colitis.    Assessment and plan:  1.  C. difficile colitis, continue p.o. vancomycin and IV Flagyl.  2.  End-stage renal disease on hemodialysis, continue dialysis per nephrology recommendations.  3.  Anemia of chronic disease, hemoglobin noted to be stable, continue to recheck labs.  4.  Hyponatremia, resolved.  5.  Chronic systolic congestive heart failure, she appears euvolemic at this point of time.  6.  Hypothyroidism, TSH is checked, it is almost close to 96.  Continue Synthroid.  She would benefit from endocrinology follow-up on outpatient basis.   7.  Personal history of PE, patient currently on Coumadin.  INR therapeutic.  8.  Hypotension, midodrine dose has been increased.  9.  CODE STATUS is full code.  Further plans based on hospital course.         Nirmal Wan MD  [facility]  04/21/21  16:15 EDT

## 2021-04-21 NOTE — PROGRESS NOTES
[facility]-  Confirmed with Axel Salinas, that pt's primary insurance is [facility] and Medicare is secondary. They will change it in system.     Suzette Molina RN  Acute Rehab Admission Nurse      1100-Spoke with pt. Acute Rehab discussed. States she does NOT want to return to subacute rehab in the event she is not accepted or denied again by insurance for Acute Rehab. Will discuss with Dr. Granados.

## 2021-04-21 NOTE — PLAN OF CARE
Pt denies pain, medicated for nausea, barrier cream applied to gluteal pu, doing well after dialysis today, bp on lower side, md aware, dialysis cath dsg cdi, still having intermittent diarrhea, remains in cdiff isolation, given iv and po abx, will ctm.

## 2021-04-21 NOTE — PAYOR COMM NOTE
"Ambar Melo (51 y.o. Female)     ATTN: INITIAL CLINICALS TO [facility] FOR INPATIENT ADMISSION:  [id number]    [facility] DEPT: EMMANUEL MARTINEZ RN,[facility]; [phone],  [phone]        Date of Birth Social Security Number Address Home Phone MRN    1970  [address] [place] [address] [phone] [id number]    Uatsdin Marital Status          Unknown        Admission Date Admission Type Admitting Provider Attending Provider Department, Room/Bed    4/16/21 Emergency Stingl, MD Savage Garcia Abhishek, MD [address], E669/1    Discharge Date Discharge Disposition Discharge Destination                       Attending Provider: Nirmal Wan MD    Allergies: Penicillins, Nickel    Isolation: Spore   Infection: C.difficile (04/09/21), COVID (History) (04/18/21)   Code Status: CPR    Ht: 154.9 cm (61\")   Wt: 72.2 kg (159 lb 3.2 oz)    Admission Cmt: None   Principal Problem: Clostridium difficile colitis [A04.72]                 Active Insurance as of 4/16/2021     Primary Coverage     Payor Plan Insurance Group Employer/Plan Group    [facility] [id number]     Payor Plan Address Payor Plan Phone Number Payor Plan Fax Number Effective Dates    [address]   1/1/2021 - None Entered    [facility] [id number]       Subscriber Name Subscriber Birth Date Member ID       AMBAR MELO 1970 [id number]           Secondary Coverage     Payor Plan Insurance Group Employer/Plan Group    MEDICARE MEDICARE A & B      Payor Plan Address Payor Plan Phone Number Payor Plan Fax Number Effective Dates    [address] [phone]  4/1/2019 - None Entered    [facility] [id number]       Subscriber Name Subscriber Birth Date Member ID       AMBAR MELO 1970 [id number]                 Emergency Contacts      (Rel.) Home Phone Work Phone Mobile Phone    Roland Melo (Son) [phone] -- --    natalie melo (Power of " ) [phone] -- --    maty melo (Daughter) [phone] -- --               History & Physical      Stingl, Binta Cordero MD at [phone]          HISTORY AND PHYSICAL   [facility]        Patient Identification:  Name: Ambar Melo  Age: 51 y.o.  Sex: female  :  1970  MRN: [id number]                     Primary Care Physician: Jet Manuel MD    Chief Complaint:  51 year old female who was sent in from a rehab facility with weakness, diarrhea, malaise for the last several days; she was recently admitted with c diff colitis but had improved prior to discharge; she says she was not receiving her medications at the rehab facility and started getting worse right away; she was at dialysis today and had some chest pain; she denies any now but just generally feels bad; no fever or chills; no shortness of breath    History of Present Illness:   As above    Past Medical History:  Past Medical History:   Diagnosis Date   • CHF (congestive heart failure) (CMS/[facility])    • Dialysis patient (CMS/[facility])    • Disease of thyroid gland    • Elevated cholesterol    • GERD (gastroesophageal reflux disease)    • Renal disorder    • Sleep apnea      Past Surgical History:  Past Surgical History:   Procedure Laterality Date   • ADRENAL GLAND SURGERY N/A `   •  SECTION Bilateral 2001    Has had x2   • HYSTERECTOMY     • TRACHEOSTOMY        Home Meds:  Medications Prior to Admission   Medication Sig Dispense Refill Last Dose   • calcium acetate (PHOS BINDER,) 667 MG capsule capsule Take 2 capsules by mouth 3 (Three) Times a Day With Meals. 180 capsule 3    • cholestyramine (QUESTRAN) 4 g packet Take 1 packet by mouth Every 12 (Twelve) Hours. 60 packet 0    • escitalopram (Lexapro) 20 MG tablet Daily.      • gentamicin (GARAMYCIN) 0.1 % ointment Apply  topically to the appropriate area as directed Daily. 60 g 3    • levothyroxine (SYNTHROID, LEVOTHROID) 25 MCG tablet Take 1  tablet by mouth Daily. 30 tablet 3    • montelukast (Singulair) 10 MG tablet Daily.      • sodium bicarbonate 650 MG tablet Every 8 (Eight) Hours.      • vancomycin 50 MG/ML reconstituted solution oral solution reconstituted Take 2.5 mL by mouth Every 6 (Six) Hours for 21 doses. Indications: Clostridium Difficile Infection 52.5 mL 0    • [START ON 4/20/2021] vancomycin 50 MG/ML reconstituted solution oral solution reconstituted Take 2.5 mL by mouth Every 12 (Twelve) Hours for 14 doses. Indications: Clostridium Difficile Infection 35 mL 0    • [START ON 4/27/2021] vancomycin 50 MG/ML reconstituted solution oral solution reconstituted Take 2.5 mL by mouth Daily for 7 doses. Indications: Clostridium Difficile Infection 17.5 mL 0    • [START ON 5/4/2021] vancomycin 50 MG/ML reconstituted solution oral solution reconstituted Take 2.5 mL by mouth Every Other Day for 14 doses. Indications: Clostridium Difficile Infection 35 mL 0    • warfarin (Coumadin) 4 MG tablet 1 tablet p.o. every day. 30 tablet 3        Allergies:  Allergies   Allergen Reactions   • Penicillins Anaphylaxis   • Nickel Rash     Immunizations:    There is no immunization history on file for this patient.  Social History:   Social History     Social History Narrative   • Not on file     Social History     Socioeconomic History   • Marital status:      Spouse name: Not on file   • Number of children: Not on file   • Years of education: Not on file   • Highest education level: Not on file   Tobacco Use   • Smoking status: Former Smoker     Packs/day: 1.00     Years: 25.00     Pack years: 25.00     Types: Cigarettes     Quit date: 4/16/2018     Years since quitting: 3.0   • Smokeless tobacco: Never Used   Vaping Use   • Vaping Use: Former   • Quit date: 2/14/2021   • Substances: Nicotine, Flavoring   • Devices: Pre-filled or refillable cartridge   Substance and Sexual Activity   • Alcohol use: Not Currently   • Drug use: Not Currently   • Sexual  "activity: Defer       Family History:  History reviewed. No pertinent family history.     Review of Systems  See history of present illness and past medical history.  Patient denies headache, dizziness, syncope, falls, trauma, change in vision, change in hearing, focal weakness, numbness, or paresthesia.  Patient denies chest pain, palpitations, dyspnea, orthopnea, PND, cough, sinus pressure, rhinorrhea, epistaxis, hemoptysis,  Vomiting,hematemesis , constipation or hematchezia.  Denies cold or heat intolerance, polydipsia, polyuria, polyphagia. Denies hematuria, pyuria, dysuria, hesitancy, frequency or urgency. Denies consumption of raw and under cooked meats foods or change in water source.  Denies fever, chills, sweats, night sweats.  Denies missing any routine medications. Remainder of ROS is negative.    Objective:  T Max 24 hrs: Temp (24hrs), Av.9 °F (36.6 °C), Min:97.5 °F (36.4 °C), Max:98.3 °F (36.8 °C)    Vitals Ranges:   Temp:  [97.5 °F (36.4 °C)-98.3 °F (36.8 °C)] 98.3 °F (36.8 °C)  Heart Rate:  [100-112] 100  Resp:  [16-20] 18  BP: (83-94)/(58-71) 91/60      Exam:  BP 91/60 (BP Location: Right arm, Patient Position: Lying)   Pulse 100   Temp 98.3 °F (36.8 °C) (Oral)   Resp 18   Ht 154.9 cm (61\")   Wt 68.5 kg (151 lb)   LMP 2004 (Approximate)   SpO2 94%   BMI 28.53 kg/m²     General Appearance:    Alert, cooperative, no distress, appears stated age   Head:    Normocephalic, without obvious abnormality, atraumatic   Eyes:    PERRL, conjunctivae/corneas clear, EOM's intact, both eyes   Ears:    Normal external ear canals, both ears   Nose:   Nares normal, septum midline, mucosa normal, no drainage    or sinus tenderness   Throat:   Lips, mucosa, and tongue normal   Neck:   Supple, symmetrical, trachea midline, no adenopathy;     thyroid:  no enlargement/tenderness/nodules; no carotid    bruit or JVD   Back:     Symmetric, no curvature, ROM normal, no CVA tenderness   Lungs:     Decreased " breath sounds bilaterally, respirations unlabored   Chest Wall:    No tenderness or deformity    Heart:    Regular rate and rhythm, S1 and S2 normal, no murmur, rub   or gallop   Abdomen:     Soft, nontender, bowel sounds active all four quadrants,     no masses, no hepatomegaly, no splenomegaly   Extremities:   Extremities normal, atraumatic, no cyanosis or edema   Pulses:   2+ and symmetric all extremities   Skin:   Skin color, texture, turgor normal, no rashes or lesions   Lymph nodes:   Cervical, supraclavicular, and axillary nodes normal   Neurologic:   CNII-XII intact, normal strength, sensation intact throughout      .    Data Review:  Labs in chart were reviewed.  WBC   Date Value Ref Range Status   04/16/2021 18.13 (H) 3.40 - 10.80 10*3/mm3 Final     Hemoglobin   Date Value Ref Range Status   04/16/2021 10.7 (L) 12.0 - 15.9 g/dL Final     Hematocrit   Date Value Ref Range Status   04/16/2021 33.4 (L) 34.0 - 46.6 % Final     Platelets   Date Value Ref Range Status   04/16/2021 398 140 - 450 10*3/mm3 Final     Sodium   Date Value Ref Range Status   04/16/2021 133 (L) 136 - 145 mmol/L Final     Potassium   Date Value Ref Range Status   04/16/2021 4.1 3.5 - 5.2 mmol/L Final     Chloride   Date Value Ref Range Status   04/16/2021 97 (L) 98 - 107 mmol/L Final     CO2   Date Value Ref Range Status   04/16/2021 23.2 22.0 - 29.0 mmol/L Final     BUN   Date Value Ref Range Status   04/16/2021 9 6 - 20 mg/dL Final     Creatinine   Date Value Ref Range Status   04/16/2021 1.63 (H) 0.57 - 1.00 mg/dL Final     Glucose   Date Value Ref Range Status   04/16/2021 146 (H) 65 - 99 mg/dL Final     Calcium   Date Value Ref Range Status   04/16/2021 8.0 (L) 8.6 - 10.5 mg/dL Final     AST (SGOT)   Date Value Ref Range Status   04/16/2021 17 1 - 32 U/L Final     ALT (SGPT)   Date Value Ref Range Status   04/16/2021 14 1 - 33 U/L Final     Alkaline Phosphatase   Date Value Ref Range Status   04/16/2021 131 (H) 39 - 117 U/L Final      INR   Date Value Ref Range Status   04/16/2021 2.24 (H) 0.90 - 1.10 Final       Results from last 7 days   Lab Units 04/10/21  0235   TSH uIU/mL 2.910          Imaging Results (All)     Procedure Component Value Units Date/Time    XR Chest 1 View [[id number]] Collected: 04/16/21 1830     Updated: 04/16/21 2004    Narrative:      ONE VIEW PORTABLE CHEST AT 5 PM     HISTORY: Hypotension. End-stage renal disease. Chest pain.      Recent Covid 19 pneumonia.     FINDINGS: The lungs are moderately expanded with some patchy infiltrates  at the lung bases likely related to residual changes of Covid 19  pneumonia and showing some minimal improvement at the left base when  compared to the study of 04/09/2021. The heart size remains normal. A  large gauge vascular catheter ends near the junction of the SVC and  right atrium without change.     This report was finalized on 4/16/2021 8:01 PM by Dr. Kiet Oconnor M.D.           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   • HLD (hyperlipidemia) E78.5   • Hypothyroidism E03.9   • Peritoneal dialysis status (CMS/[facility]) Z99.2   • History of tracheostomy Z98.890   • Acute pulmonary embolism (CMS/[facility]) I26.99   • Severe malnutrition (CMS/[facility]) E43   • Clostridium difficile colitis A04.72       Assessment:    Clostridium difficile colitis  diarrhea  esrd on hd  Chest pain  Hyponatremia  Anemia  Hyperglycemia  Hypothyroidism  malaise    Plan:  Restart po vancomycin  Consult nephrology  Trend hgb  Monitor on telemetry  Continue with physical therapy  Repeat blood sugar in the am  D.w patient and ED Provider    Binta Patel MD  4/16/2021  21:49 EDT      Electronically signed by Amanda  Binta Cordero MD at 04/16/21 2154          Emergency Department Notes      Emilee Altman RN at 04/16/21 1546        Patient from home with complaints of hypotension and pain all over.     Emilee Altman RN  04/16/21 1548      Electronically signed by Emilee Altman RN at 04/16/21 1548     Casie Negron RN at 04/16/21 1558        Pt states she had dialysis today but they couldn't complete it. Pt states she had 3-3.5 hours of 4.     Casie Negron RN  04/16/21 1559      Electronically signed by Casie Negron RN at 04/16/21 1559     Marcos Blackman MD at 04/16/21 1642           EMERGENCY DEPARTMENT ENCOUNTER    Room Number:  33/33  Date seen:  4/16/2021  PCP: Jet Manuel MD  Historian: Patient, son      HPI:  Chief Complaint: Diarrhea, chest pain  A complete HPI/ROS/PMH/PSH/SH/FH are unobtainable due to: Nothing  Context: Ambar Lara is a 51 y.o. female who presents to the ED c/o persistent diarrhea after recent hospitalization for C. difficile colitis.  She also reports that she developed chest pain today while at hemodialysis.  She was able to complete approximately 3.5 hours of her 4-hour treatment.  They stopped the treatment prematurely due to her complaining of chest pain.  She reports that the pain has been intermittent, localized to the left side of her chest, sharp.  She was also recently diagnosed with a pulmonary embolus and has been on warfarin.  She reports that she has not received her blood thinner medication for the last 1 or 2 days since getting to rehab facility.  She has also not received her oral vancomycin.  Patient has a tracheostomy stoma secondary to a prolonged marie with COVID-19 earlier this year.  She reportedly had some low oxygen at dialysis briefly requiring nasal cannula oxygen however she is not hypoxic currently denies shortness of breath at this time.  She continues to have copious diarrhea and mild abdominal discomfort.  She denies fever or chills.  She has been  participating in rehab at [facility], however patient and family are not happy with the care they have been receiving there.    PAST SURGICAL HISTORY  Past Surgical History:   Procedure Laterality Date   • HYSTERECTOMY     • TRACHEOSTOMY         FAMILY HISTORY  No family history on file.      SOCIAL HISTORY  Social History     Socioeconomic History   • Marital status:      Spouse name: Not on file   • Number of children: Not on file   • Years of education: Not on file   • Highest education level: Not on file   Tobacco Use   • Smoking status: Never Smoker         ALLERGIES  Penicillins and Nickel        REVIEW OF SYSTEMS  Review of Systems   Review of all 14 systems is negative other than stated in the HPI above.      PHYSICAL EXAM  ED Triage Vitals   Temp Heart Rate Resp BP SpO2   04/16/21 1549 04/16/21 1549 04/16/21 1549 04/16/21 1549 04/16/21 1549   97.5 °F (36.4 °C) 112 20 (!) 87/59 97 %      Temp src Heart Rate Source Patient Position BP Location FiO2 (%)   04/16/21 1549 04/16/21 1600 04/16/21 1600 04/16/21 1600 --   Tympanic Monitor Lying Left arm          GENERAL: Awake and alert, chronically ill-appearing, no acute distress  HENT: nares patent, trach stoma nearly completely closed, occlusive dressing in place  EYES: no scleral icterus, EOMI, pupils 3 mm reactive bilaterally  CV: regular rhythm, normal rate  RESPIRATORY: normal effort, lungs clear to auscultation bilaterally  ABDOMEN: soft, nondistended, mild tenderness of the left lower abdominal wall where there is some abdominal wall ecchymosis present.  Peritoneal dialysis catheter present right lower abdomen with no surrounding erythema, no surrounding drainage.  MUSCULOSKELETAL: no deformity, no peripheral edema  NEURO: alert, moves all extremities, follows commands  PSYCH:  calm, cooperative  SKIN: warm, dry    Vital signs and nursing notes reviewed.    Ordered the above labs and reviewed the results.      RADIOLOGY  XR Chest 1 View    Result  Date: 4/16/2021  ONE VIEW PORTABLE CHEST AT 5 PM  HISTORY: Hypotension. End-stage renal disease. Chest pain.  Recent Covid 19 pneumonia.  FINDINGS: The lungs are moderately expanded with some patchy infiltrates at the lung bases likely related to residual changes of Covid 19 pneumonia and showing some minimal improvement at the left base when compared to the study of 04/09/2021. The heart size remains normal. A large gauge vascular catheter ends near the junction of the SVC and right atrium without change.          Ordered the above noted radiological studies. Reviewed by me in PACS.        PROCEDURES  Procedures      MEDICATIONS GIVEN IN ER  Medications   sodium chloride 0.9 % flush 10 mL (has no administration in time range)   warfarin (COUMADIN) tablet 4 mg (4 mg Oral Given 4/16/21 1800)   Pharmacy Consult (has no administration in time range)   vancomycin (VANCOCIN) capsule 125 mg (125 mg Oral Given 4/16/21 1800)   sodium chloride 0.9 % bolus 250 mL (250 mL Intravenous New Bag 4/16/21 1902)       MEDICAL DECISION MAKING, PROGRESS, and CONSULTS    All labs have been independently reviewed by me.  All radiology studies have been reviewed by me and discussed with radiologist dictating the report.   EKG's independently viewed and interpreted by me.  Discussion below represents my analysis of pertinent findings related to patient's condition, differential diagnosis, treatment plan and final disposition.    ED Course as of Apr 16 1907 Fri Apr 16, 2021   1638 Medical record review: I reviewed discharge summary from 4/9/2021.  Patient was admitted with hypotension and C. difficile colitis.  She was also found to have a small right-sided pulmonary embolus.  She was treated with heparin and then warfarin.  INR at discharge was 1.9.    [JR]   1638 INR(!): 2.24 [JR]   1646 Patient presents due to chest pain during hemodialysis today.  She has known pulmonary embolus and is on warfarin.  INR is therapeutic today 2.24.  She  also has known C. difficile colitis currently on oral vancomycin therapy.  I have ordered her routine vancomycin and warfarin doses here.  Have also ordered EKG, troponin to further evaluate her chest pain.    [JR]   1647 EKG          EKG time: 1718  Rhythm/Rate: Sinus tach, 101  P waves and OR: Normal  QRS, axis: Normal axis  ST and T waves: Minimal ST depression in leads V4, V5, V6    Interpreted Contemporaneously by me, independently viewed  Similar compared to prior 4/9/2021          [JR]   1734 Creatinine(!): 1.63 [JR]   1734 proBNP(!): 1,723.0 [JR]   1734 Troponin T(!!): 0.037 [JR]   1734 WBC(!): 18.13 [JR]   1734 Lipase(!): 84 [JR]   1734 INR(!): 2.24 [JR]   1734 Chest x-ray interpreted by me in PACS.  Lung fields are clear without infiltrate, pleural effusion, pneumothorax.  There is a dialysis catheter projecting over the right thorax.    [JR]   1736 Cardiac troponin is slightly elevated today which is likely secondary to patient's renal insufficiency, however she had complained of some chest discomfort.  She will at minimum warrant a repeat cardiac troponin in 2 hours.  She has leukocytosis which is likely attributed to her known C. difficile colitis as she continues to have diarrhea.  She completed 3-1/2 hours of hemodialysis today and labs revealed no emergent indication for hemodialysis at this time.  Her INR is therapeutic today.  Blood pressure has been marginal, most recently 94/71.  Given her renal failure and also history of heart failure, I have not ordered IV fluids at this time but will continue to monitor closely and provide gentle IV fluids if needed.  She will be admitted for further monitoring and for further evaluation of her chest pain as warranted.    [JR]   1747 Discussed with Dr. Patel [facility], who agrees to admit.    [JR]      ED Course User Index  [JR] Marcos Blackman MD        I wore a mask, face shield, and gloves during this patient encounter.  Patient also wearing a surgical  mask.  Hand hygeine performed before and after seeing the patient.    DIAGNOSIS  Final diagnoses:   Clostridium difficile colitis   ESRD on hemodialysis (CMS/[facility])   Other pulmonary embolism without acute cor pulmonale, unspecified chronicity (CMS/[facility])   Chest pain, unspecified type         DISPOSITION  ADMIT      Latest Documented Vital Signs:  As of 19:07 EDT  BP- 91/62 HR- 102 Temp- 97.5 °F (36.4 °C) (Tympanic) O2 sat- 93%      Please note that portions of this were completed with a voice recognition program.      Marcos Blackman MD  04/16/21 1907      Electronically signed by Marcos Blackman MD at 04/16/21 1907     EKG:   ECG 12 Lead   Preliminary Result   HEART RATE= 101  bpm   RR Interval= 596  ms   NH Interval= 195  ms   P Horizontal Axis= 21  deg   P Front Axis= 6  deg   QRSD Interval= 95  ms   QT Interval= 386  ms   QRS Axis= 11  deg   T Wave Axis= 109  deg   - ABNORMAL ECG -   Sinus tachycardia   Left ventricular hypertrophy   Nonspecific T abnormalities, lateral leads   Borderline prolonged QT interval   Electronically Signed By:    Date and Time of Study: 2021-04-16 17:18:54        Vital Signs (last 7 days)     Date/Time   Temp   Temp src   Pulse   Resp   BP   Patient Position   SpO2    04/21/21 0923   --   --   84   --   (!) 83/61   --   --    04/21/21 0640   --   --   85   --   90/64   --   --    04/20/21 2358   98.4 (36.9)   Oral   86   18   (!) 86/55   Lying   96    BP: nurse notified at 04/20/21 2358    04/20/21 1955   97.8 (36.6)   Oral   91   18   91/66   Lying   95    04/20/21 1850   98.6 (37)   Oral   98   --   --   --   --    04/20/21 1726   --   Oral   98   18   98/76   Sitting   --    04/20/21 1500   --   --   99   --   --   --   94    04/20/21 1300   --   Oral   84   18   101/68   Lying   100    04/20/21 1049   --   --   81   --   97/70   --   --    04/20/21 0900   --   --   --   --   --   --   96    04/20/21 0801   96.8 (36)   Oral   77   20   121/79   Lying   99    04/20/21  0253   97.7 (36.5)   Oral   77   18   103/69   Lying   94    04/19/21 2354   97.7 (36.5)   Oral   91   18   93/60   Lying   96    04/19/21 2134   --   --   91   --   103/73   --   --    04/19/21 1900   97.2 (36.2)   Oral   88   18   102/70   Lying   96    04/19/21 1442   98 (36.7)   Oral   --   18   110/75   Lying   --    04/19/21 0728   96.5 (35.8)   Oral   --   18   --   Lying   --    04/19/21 0641   --   --   82   --   115/75   Lying   100    04/19/21 0346   --   --   76   --   90/65   Lying   98    04/19/21 0330   97.8 (36.6)   Oral   81   18   (!) 84/55   Lying   98    04/18/21 2300   97.7 (36.5)   Oral   85   18   97/71   Lying   96    04/18/21 2100   --   --   --   --   93/63   Lying   --    04/18/21 1900   97.7 (36.5)   Oral   92   18   119/80   Lying   97    04/18/21 1713   --   --   94   --   112/80   Lying   96    04/18/21 1300   98 (36.7)   Oral   92   16   100/69   Lying   97    04/18/21 1240   --   --   97   --   105/69   Lying   98    04/18/21 1047   --   --   --   --   --   --   100    04/18/21 1038   --   --   --   --   (!) 89/50   Lying   96    04/18/21 0700   98 (36.7)   Oral   89   16   93/63   Lying   --    04/17/21 2336   97.8 (36.6)   Oral   85   16   92/63   Lying   97    04/17/21 2001   97 (36.1)   Oral   96   16   103/69   Lying   96    04/17/21 1900   --   --   95   --   102/71   Lying   --    04/17/21 1700   --   --   96   --   111/79   Lying   95    04/17/21 1630   --   --   90   --   104/71   Lying   95    04/17/21 15:24:48   --   --   95   --   95/64   Lying   94    04/17/21 15:21:26   --   --   95   12   (!) 86/65   --   96    04/17/21 1500   --   --   --   --   --   --   95    04/17/21 1447   --   --   97   --   (!) 78/62   Lying   96    BP: Patient denies any dizziness.  at 04/17/21 1447    04/17/21 1300   98.3 (36.8)   Oral   94   16   (!) 78/46   Lying   --    04/17/21 1100   --   --   95   --   --   --   --    04/17/21 0900   --   --   93   --   --   --   --    04/17/21 0700    97.9 (36.6)   Oral   --   16   96/62   Lying   --    04/17/21 0453   --   --   90   --   --   --   96    04/17/21 0347   --   --   --   --   --   --   96    04/17/21 0145   --   --   91   --   --   --   97    04/17/21 0107   --   --   92   --   --   --   94    04/16/21 2351   98.4 (36.9)   Oral   96   18   90/54   Lying   93    04/16/21 2304   --   --   101   --   --   --   --    04/16/21 2150   --   --   103   --   --   --   93    04/16/21 2103   --   --   100   --   --   --   94    04/16/21 2018   98.3 (36.8)   Oral   101   18   91/60   Lying   93    04/16/21 1910   --   --   100   --   91/69   --   95    04/16/21 1840   --   --   102   --   91/62   --   93    04/16/21 18:00:47   --   --   102   16   (!) 83/58   Lying   93    04/16/21 16:00:36   --   --   103   16   94/71   Lying   92    04/16/21 1549   97.5 (36.4)   Tympanic   112   20   (!) 87/59   --   97            Oxygen Therapy (last 7 days)     Date/Time   SpO2   Device (Oxygen Therapy)   Flow (L/min)   Oxygen Concentration (%)   ETCO2 (mmHg)    04/20/21 2358   96   room air   --   --   --    04/20/21 2000   --   room air   --   --   --    04/20/21 1955   95   room air   --   --   --    04/20/21 1500   94   room air   --   --   --    04/20/21 1300   100   room air   --   --   --    04/20/21 0900   96   room air   --   --   --    04/20/21 0801   99   --   --   --   --    04/20/21 0253   94   room air   --   --   --    04/19/21 2354   96   room air   --   --   --    04/19/21 1900   96   room air   --   --   --    04/19/21 1417   --   room air   --   --   --    04/19/21 0928   --   room air   --   --   --    04/19/21 0641   100   room air   --   --   --    04/19/21 0346   98   --   --   --   --    04/19/21 0013   --   room air   --   --   --    04/18/21 2300   96   room air   --   --   --    04/18/21 2044   --   room air   --   --   --    04/18/21 1900   97   room air   --   --   --    04/18/21 1713   96   room air   --   --   --    04/18/21 1215   --   room  air   --   --   --    04/18/21 1047   100   room air   --   --   --    04/18/21 1038   96   room air   --   --   --    04/18/21 0700   --   room air   --   --   --    04/18/21 0000   --   room air   --   --   --    04/17/21 2336   97   room air   --   --   --    04/17/21 2001   96   room air   --   --   --    04/17/21 1630   95   room air   --   --   --    04/17/21 15:24:48   94   --   --   --   --    04/17/21 15:21:26   96   --   --   --   --    04/17/21 1500   95   room air   --   --   --    04/17/21 1217   --   room air   --   --   --    04/17/21 0700   --   room air   --   --   --    04/17/21 0145   97   --   --   --   --    04/17/21 0107   94   --   --   --   --    04/17/21 0000   --   room air   --   --   --    04/16/21 2351   93   room air   --   --   --    04/16/21 2103   94   --   --   --   --    04/16/21 2018   93   room air   --   --   --    04/16/21 1910   95   --   --   --   --    04/16/21 18:00:47   93   room air   --   --   --    04/16/21 16:00:36   92   room air   --   --   --    04/16/21 1549   97   --   --   --   --         Intake & Output (last 7 days)       04/14 0701 - 04/15 0700 04/15 0701 - 04/16 0700 04/16 0701 - 04/17 0700 04/17 0701 - 04/18 0700 04/18 0701 - 04/19 0700 04/19 0701 - 04/20 0700 04/20 0701 - 04/21 0700 04/21 0701 - 04/22 0700    P.O.    840 600       Total Intake(mL/kg)    840 (11.3) 600 (9.1)       Net    +840 +600                   Urine Unmeasured Occurrence    4 x 8 x 3 x 3 x     Stool Unmeasured Occurrence   6 x 6 x 12 x 6 x 6 x           Lines, Drains & Airways    Active LDAs     Name:   Placement date:   Placement time:   Site:   Days:    Peripheral IV 04/16/21 1557 Left Forearm   04/16/21    1557    Forearm   4    Peritoneal Dialysis Catheter Right lower abdomen   --    --    Right lower abdomen       Surgical Airway   --    --    --       Hemodialysis Cath Double   --    --    Subclavian                 Current Facility-Administered Medications   Medication Dose Route  Frequency Provider Last Rate Last Admin   • acetaminophen (TYLENOL) tablet 650 mg  650 mg Oral Q4H PRN Binta Patel MD   650 mg at 04/18/21 2044   • calcium acetate (PHOS BINDER)) capsule 1,334 mg  1,334 mg Oral TID With Meals Binta Patel MD   1,334 mg at 04/20/21 1725   • epoetin marco antonio-epbx (RETACRIT) injection 5,000 Units  5,000 Units Subcutaneous Once per day on Mon Wed Fri Bebeto Pinto MD       • escitalopram (LEXAPRO) tablet 20 mg  20 mg Oral Q24H Binta Patel MD   20 mg at 04/20/21 2327   • levothyroxine (SYNTHROID, LEVOTHROID) tablet 100 mcg  100 mcg Oral Q AM Nirmal Wan MD   100 mcg at 04/21/21 0640   • melatonin tablet 3 mg  3 mg Oral Nightly PRN Binta Patel MD   3 mg at 04/20/21 2326   • metroNIDAZOLE (FLAGYL) 500 mg/100mL IVPB  500 mg Intravenous Q8H Nirmal Wan MD   500 mg at 04/21/21 0400   • midodrine (PROAMATINE) tablet 5 mg  5 mg Oral TID AC Nirmal Wan MD   5 mg at 04/21/21 0925   • montelukast (SINGULAIR) tablet 10 mg  10 mg Oral Daily Binta Patel MD   10 mg at 04/20/21 1300   • multivitamin (THERAGRAN) tablet 1 tablet  1 tablet Oral Daily Bebeto Pinto MD       • nitroglycerin (NITROSTAT) SL tablet 0.4 mg  0.4 mg Sublingual Q5 Min PRN Binta Patel MD       • ondansetron (ZOFRAN) tablet 4 mg  4 mg Oral Q6H PRN Binta Patel MD        Or   • ondansetron (ZOFRAN) injection 4 mg  4 mg Intravenous Q6H PRN Binta Patel MD   4 mg at 04/20/21 1901   • Pharmacy Consult   Does not apply Continuous PRN Binta Patel MD       • Pharmacy to dose warfarin   Does not apply Continuous PRN Nirmal Wan MD       • saccharomyces boulardii (FLORASTOR) capsule 500 mg  500 mg Oral BID Nirmal Wan MD   500 mg at 04/20/21 2018   • sodium bicarbonate tablet 650 mg  650 mg Oral Q8H Binta Patel MD   650 mg at 04/21/21 0640   • sodium chloride 0.9 % flush 10 mL  10 mL Intravenous  Marcos Rao MD   10 mL at 04/17/21 2118   • vancomycin (VANCOCIN) capsule 250 mg  250 mg Oral Q6H Nirmal Wan MD   250 mg at 04/21/21 0642   • warfarin (COUMADIN) tablet 4 mg  4 mg Oral Daily Nirmal Wan MD   4 mg at 04/20/21 1726       Lab Results (last 7 days)     Procedure Component Value Units Date/Time    Protime-INR [[id number]] Collected: 04/21/21 1047    Specimen: Blood, Venous Line Updated: 04/21/21 1102    Renal Function Panel [[id number]]  (Abnormal) Collected: 04/21/21 0608    Specimen: Blood Updated: 04/21/21 0714     Glucose 89 mg/dL      BUN 15 mg/dL      Creatinine 2.65 mg/dL      Sodium 140 mmol/L      Potassium 4.1 mmol/L      Chloride 104 mmol/L      CO2 23.4 mmol/L      Calcium 8.1 mg/dL      Albumin 2.80 g/dL      Phosphorus 4.8 mg/dL      Anion Gap 12.6 mmol/L      BUN/Creatinine Ratio 5.7     eGFR Non African Amer 19 mL/min/1.73     Narrative:      GFR Normal >60  Chronic Kidney Disease <60  Kidney Failure <15      CBC & Differential [[id number]]  (Abnormal) Collected: 04/21/21 0608    Specimen: Blood Updated: 04/21/21 0628    Narrative:      The following orders were created for panel order CBC & Differential.  Procedure                               Abnormality         Status                     ---------                               -----------         ------                     CBC Auto Differential[[id number]]        Abnormal            Final result                 Please view results for these tests on the individual orders.    CBC Auto Differential [[id number]]  (Abnormal) Collected: 04/21/21 0608    Specimen: Blood Updated: 04/21/21 0628     WBC 7.45 10*3/mm3      RBC 3.18 10*6/mm3      Hemoglobin 9.6 g/dL      Hematocrit 30.2 %      MCV 95.0 fL      MCH 30.2 pg      MCHC 31.8 g/dL      RDW 15.5 %      RDW-SD 53.6 fl      MPV 9.2 fL      Platelets 318 10*3/mm3      Neutrophil % 52.6 %      Lymphocyte % 31.9 %      Monocyte % 7.5 %      Eosinophil % 2.4 %       Basophil % 1.3 %      Immature Grans % 4.3 %      Neutrophils, Absolute 3.91 10*3/mm3      Lymphocytes, Absolute 2.38 10*3/mm3      Monocytes, Absolute 0.56 10*3/mm3      Eosinophils, Absolute 0.18 10*3/mm3      Basophils, Absolute 0.10 10*3/mm3      Immature Grans, Absolute 0.32 10*3/mm3      nRBC 0.0 /100 WBC     T4, Free [[id number]]  (Abnormal) Collected: 04/20/21 1247    Specimen: Blood Updated: 04/20/21 1807     Free T4 0.22 ng/dL     Narrative:      Results may be falsely increased if patient taking Biotin.      T3, Free [[id number]]  (Abnormal) Collected: 04/20/21 1247    Specimen: Blood Updated: 04/20/21 1807     T3, Free 0.47 pg/mL     Narrative:      Results may be falsely increased if patient taking Biotin.      TSH [[id number]]  (Abnormal) Collected: 04/20/21 1247    Specimen: Blood Updated: 04/20/21 1331     TSH 96.900 uIU/mL     Manual Differential [[id number]]  (Abnormal) Collected: 04/20/21 0614    Specimen: Blood Updated: 04/20/21 0800     Neutrophil % 78.8 %      Lymphocyte % 14.1 %      Monocyte % 5.1 %      Eosinophil % 1.0 %      Basophil % 1.0 %      Neutrophils Absolute 6.22 10*3/mm3      Lymphocytes Absolute 1.11 10*3/mm3      Monocytes Absolute 0.40 10*3/mm3      Eosinophils Absolute 0.08 10*3/mm3      Basophils Absolute 0.08 10*3/mm3      RBC Morphology Normal     WBC Morphology Normal     Platelet Morphology Normal    CBC & Differential [[id number]]  (Abnormal) Collected: 04/20/21 0614    Specimen: Blood Updated: 04/20/21 0800    Narrative:      The following orders were created for panel order CBC & Differential.  Procedure                               Abnormality         Status                     ---------                               -----------         ------                     CBC Auto Differential[[id number]]        Abnormal            Final result                 Please view results for these tests on the individual orders.    CBC Auto Differential [[id number]]  (Abnormal) Collected:  04/20/21 0614    Specimen: Blood Updated: 04/20/21 0800     WBC 7.89 10*3/mm3      RBC 3.11 10*6/mm3      Hemoglobin 9.3 g/dL      Hematocrit 28.8 %      MCV 92.6 fL      MCH 29.9 pg      MCHC 32.3 g/dL      RDW 15.3 %      RDW-SD 50.5 fl      MPV 9.2 fL      Platelets 338 10*3/mm3     Renal Function Panel [[id number]]  (Abnormal) Collected: 04/20/21 0614    Specimen: Blood Updated: 04/20/21 0732     Glucose 98 mg/dL      BUN 34 mg/dL      Creatinine 4.06 mg/dL      Sodium 136 mmol/L      Potassium 3.9 mmol/L      Chloride 100 mmol/L      CO2 20.3 mmol/L      Calcium 8.9 mg/dL      Albumin 3.00 g/dL      Phosphorus 6.8 mg/dL      Anion Gap 15.7 mmol/L      BUN/Creatinine Ratio 8.4     eGFR Non African Amer 12 mL/min/1.73      Comment: <15 Indicative of kidney failure.        eGFR   Amer --     Comment: <15 Indicative of kidney failure.       Narrative:      GFR Normal >60  Chronic Kidney Disease <60  Kidney Failure <15      Protime-INR [[id number]]  (Abnormal) Collected: 04/20/21 0614    Specimen: Blood from Hand, Left Updated: 04/20/21 0714     Protime 29.5 Seconds      INR 2.84    Protime-INR [[id number]]  (Abnormal) Collected: 04/19/21 1517    Specimen: Blood from Arm, Left Updated: 04/19/21 1730     Protime 33.9 Seconds      INR 3.38    Manual Differential [[id number]]  (Abnormal) Collected: 04/19/21 0817    Specimen: Blood Updated: 04/19/21 1022     Neutrophil % 74.2 %      Lymphocyte % 20.6 %      Monocyte % 4.1 %      Eosinophil % 1.0 %      Neutrophils Absolute 6.00 10*3/mm3      Lymphocytes Absolute 1.67 10*3/mm3      Monocytes Absolute 0.33 10*3/mm3      Eosinophils Absolute 0.08 10*3/mm3      RBC Morphology Normal     WBC Morphology Normal     Platelet Morphology Normal    CBC & Differential [[id number]]  (Abnormal) Collected: 04/19/21 0817    Specimen: Blood Updated: 04/19/21 1022    Narrative:      The following orders were created for panel order CBC & Differential.  Procedure                                Abnormality         Status                     ---------                               -----------         ------                     CBC Auto Differential[[id number]]        Abnormal            Final result                 Please view results for these tests on the individual orders.    CBC Auto Differential [[id number]]  (Abnormal) Collected: 04/19/21 0817    Specimen: Blood Updated: 04/19/21 1022     WBC 8.09 10*3/mm3      RBC 3.29 10*6/mm3      Hemoglobin 10.0 g/dL      Hematocrit 31.8 %      MCV 96.7 fL      MCH 30.4 pg      MCHC 31.4 g/dL      RDW 15.6 %      RDW-SD 54.3 fl      MPV 9.1 fL      Platelets 338 10*3/mm3     Renal Function Panel [[id number]]  (Abnormal) Collected: 04/19/21 0817    Specimen: Blood Updated: 04/19/21 0906     Glucose 96 mg/dL      BUN 30 mg/dL      Creatinine 4.21 mg/dL      Sodium 135 mmol/L      Potassium 4.5 mmol/L      Chloride 101 mmol/L      CO2 20.2 mmol/L      Calcium 9.1 mg/dL      Albumin 3.10 g/dL      Phosphorus 6.2 mg/dL      Anion Gap 13.8 mmol/L      BUN/Creatinine Ratio 7.1     eGFR Non African Amer 11 mL/min/1.73      Comment: <15 Indicative of kidney failure.        eGFR   Amer --     Comment: <15 Indicative of kidney failure.       Narrative:      GFR Normal >60  Chronic Kidney Disease <60  Kidney Failure <15      Urinalysis, Microscopic Only - Urine, Clean Catch [[id number]]  (Abnormal) Collected: 04/19/21 0202    Specimen: Urine, Clean Catch Updated: 04/19/21 0231     RBC, UA 3-5 /HPF      WBC, UA 3-5 /HPF      Bacteria, UA Trace /HPF      Squamous Epithelial Cells, UA 31-50 /HPF      Hyaline Casts, UA 0-2 /LPF      Methodology Manual Light Microscopy    Urinalysis With Microscopic If Indicated (No Culture) - Urine, Clean Catch [[id number]]  (Abnormal) Collected: 04/19/21 0202    Specimen: Urine, Clean Catch Updated: 04/19/21 0218     Color, UA Yellow     Appearance, UA Cloudy     pH, UA 5.5     Specific Gravity, UA 1.016     Glucose, UA Negative      Ketones, UA Negative     Bilirubin, UA Negative     Blood, UA Trace     Protein,  mg/dL (2+)     Leuk Esterase, UA Moderate (2+)     Nitrite, UA Negative     Urobilinogen, UA 0.2 E.U./dL    Manual Differential [[id number]]  (Abnormal) Collected: 04/18/21 0717    Specimen: Blood Updated: 04/18/21 0811     Neutrophil % 68.4 %      Lymphocyte % 21.4 %      Monocyte % 5.1 %      Eosinophil % 3.1 %      Basophil % 2.0 %      Neutrophils Absolute 6.57 10*3/mm3      Lymphocytes Absolute 2.06 10*3/mm3      Monocytes Absolute 0.49 10*3/mm3      Eosinophils Absolute 0.30 10*3/mm3      Basophils Absolute 0.19 10*3/mm3      RBC Morphology Normal     Smudge Cells Slight/1+     Platelet Morphology Normal    CBC & Differential [[id number]]  (Abnormal) Collected: 04/18/21 0717    Specimen: Blood Updated: 04/18/21 0811    Narrative:      The following orders were created for panel order CBC & Differential.  Procedure                               Abnormality         Status                     ---------                               -----------         ------                     CBC Auto Differential[[id number]]        Abnormal            Final result                 Please view results for these tests on the individual orders.    CBC Auto Differential [[id number]]  (Abnormal) Collected: 04/18/21 0717    Specimen: Blood Updated: 04/18/21 0811     WBC 9.61 10*3/mm3      RBC 3.29 10*6/mm3      Hemoglobin 10.0 g/dL      Hematocrit 30.6 %      MCV 93.0 fL      MCH 30.4 pg      MCHC 32.7 g/dL      RDW 14.9 %      RDW-SD 50.4 fl      MPV 9.1 fL      Platelets 322 10*3/mm3     Basic Metabolic Panel [[id number]]  (Abnormal) Collected: 04/18/21 0717    Specimen: Blood Updated: 04/18/21 0753     Glucose 99 mg/dL      BUN 23 mg/dL      Creatinine 3.03 mg/dL      Sodium 132 mmol/L      Potassium 4.6 mmol/L      Chloride 100 mmol/L      CO2 20.3 mmol/L      Calcium 8.6 mg/dL      eGFR Non African Amer 16 mL/min/1.73      BUN/Creatinine Ratio 7.6      Anion Gap 11.7 mmol/L     Narrative:      GFR Normal >60  Chronic Kidney Disease <60  Kidney Failure <15      POC Glucose Once [[id number]]  (Normal) Collected: 04/17/21 1515    Specimen: Blood Updated: 04/17/21 1517     Glucose 101 mg/dL     Basic Metabolic Panel [[id number]]  (Abnormal) Collected: 04/17/21 0555    Specimen: Blood Updated: 04/17/21 0648     Glucose 98 mg/dL      BUN 16 mg/dL      Creatinine 2.67 mg/dL      Sodium 136 mmol/L      Potassium 4.1 mmol/L      Chloride 102 mmol/L      CO2 22.1 mmol/L      Calcium 8.5 mg/dL      eGFR Non African Amer 19 mL/min/1.73      BUN/Creatinine Ratio 6.0     Anion Gap 11.9 mmol/L     Narrative:      GFR Normal >60  Chronic Kidney Disease <60  Kidney Failure <15      CBC (No Diff) [[id number]]  (Abnormal) Collected: 04/17/21 0555    Specimen: Blood Updated: 04/17/21 0606     WBC 9.30 10*3/mm3      RBC 3.14 10*6/mm3      Hemoglobin 9.5 g/dL      Hematocrit 29.7 %      MCV 94.6 fL      MCH 30.3 pg      MCHC 32.0 g/dL      RDW 15.1 %      RDW-SD 52.2 fl      MPV 9.1 fL      Platelets 272 10*3/mm3     COVID PRE-OP / PRE-PROCEDURE SCREENING ORDER (NO ISOLATION) - Swab, Nasopharynx [[id number]]  (Normal) Collected: 04/16/21 1757    Specimen: Swab from Nasopharynx Updated: 04/16/21 2202    Narrative:      The following orders were created for panel order COVID PRE-OP / PRE-PROCEDURE SCREENING ORDER (NO ISOLATION) - Swab, Nasopharynx.  Procedure                               Abnormality         Status                     ---------                               -----------         ------                     COVID-19,APTIMA PANTHER,...[[id number]]  Normal              Final result                 Please view results for these tests on the individual orders.    COVID-19,APTIMA PANTHERBROWNU IN-HOUSE, NP/OP SWAB IN UTM/VTM/SALINE TRANSPORT MEDIA,24 HR TAT - Swab, Nasopharynx [[id number]]  (Normal) Collected: 04/16/21 1757    Specimen: Swab from Nasopharynx Updated: 04/16/21 7839      COVID19 Not Detected    Narrative:      Fact sheet for providers: https://www.fda.gov/media/435317/download     Fact sheet for patients: https://www.fda.gov/media/579393/download    Test performed by RT PCR.    Troponin [[id number]]  (Abnormal) Collected: 04/16/21 1759    Specimen: Blood Updated: 04/16/21 1843     Troponin T 0.040 ng/mL     Narrative:      Troponin T Reference Range:  <= 0.03 ng/mL-   Negative for AMI  >0.03 ng/mL-     Abnormal for myocardial necrosis.  Clinicians would have to utilize clinical acumen, EKG, Troponin and serial changes to determine if it is an Acute Myocardial Infarction or myocardial injury due to an underlying chronic condition.       Results may be falsely decreased if patient taking Biotin.      Manual Differential [[id number]]  (Abnormal) Collected: 04/16/21 1603    Specimen: Blood Updated: 04/16/21 1738     Neutrophil % 88.8 %      Lymphocyte % 5.1 %      Monocyte % 3.1 %      Metamyelocyte % 1.0 %      Myelocyte % 2.0 %      Neutrophils Absolute 16.10 10*3/mm3      Lymphocytes Absolute 0.92 10*3/mm3      Monocytes Absolute 0.56 10*3/mm3      Polychromasia Slight/1+     WBC Morphology Normal     Platelet Morphology Normal    CBC & Differential [[id number]]  (Abnormal) Collected: 04/16/21 1603    Specimen: Blood Updated: 04/16/21 1738    Narrative:      The following orders were created for panel order CBC & Differential.  Procedure                               Abnormality         Status                     ---------                               -----------         ------                     CBC Auto Differential[[id number]]        Abnormal            Final result                 Please view results for these tests on the individual orders.    CBC Auto Differential [[id number]]  (Abnormal) Collected: 04/16/21 1603    Specimen: Blood Updated: 04/16/21 1738     WBC 18.13 10*3/mm3      RBC 3.59 10*6/mm3      Hemoglobin 10.7 g/dL      Hematocrit 33.4 %      MCV 93.0 fL      MCH 29.8  pg      MCHC 32.0 g/dL      RDW 15.1 %      RDW-SD 51.8 fl      MPV 9.7 fL      Platelets 398 10*3/mm3     Narrative:      The previously reported component NRBC is no longer being reported. Previous result was 0.0 /100 WBC (Reference Range: 0.0-0.2 /100 WBC) on 4/16/2021 at 1712 EDT.    Troponin [[id number]]  (Abnormal) Collected: 04/16/21 1602    Specimen: Blood Updated: 04/16/21 1722     Troponin T 0.037 ng/mL     Narrative:      Troponin T Reference Range:  <= 0.03 ng/mL-   Negative for AMI  >0.03 ng/mL-     Abnormal for myocardial necrosis.  Clinicians would have to utilize clinical acumen, EKG, Troponin and serial changes to determine if it is an Acute Myocardial Infarction or myocardial injury due to an underlying chronic condition.       Results may be falsely decreased if patient taking Biotin.      BNP [[id number]]  (Abnormal) Collected: 04/16/21 1602    Specimen: Blood Updated: 04/16/21 1720     proBNP 1,723.0 pg/mL     Narrative:      Among patients with dyspnea, NT-proBNP is highly sensitive for the detection of acute congestive heart failure. In addition NT-proBNP of <300 pg/ml effectively rules out acute congestive heart failure with 99% negative predictive value.    Results may be falsely decreased if patient taking Biotin.      [place] Draw [[id number]] Collected: 04/16/21 1602    Specimen: Blood Updated: 04/16/21 1715    Narrative:      The following orders were created for panel order [place] Draw.  Procedure                               Abnormality         Status                     ---------                               -----------         ------                     Light Blue Top[[id number]]                                   Final result               Green Top (Gel)[[id number]]                                  Final result               Lavender Top[[id number]]                                     Final result               Gold Top - SST[[id number]]                                   Final result                  Please view results for these tests on the individual orders.    Comprehensive Metabolic Panel [[id number]]  (Abnormal) Collected: 04/16/21 1602    Specimen: Blood Updated: 04/16/21 1710     Glucose 146 mg/dL      BUN 9 mg/dL      Creatinine 1.63 mg/dL      Sodium 133 mmol/L      Potassium 4.1 mmol/L      Chloride 97 mmol/L      CO2 23.2 mmol/L      Calcium 8.0 mg/dL      Total Protein 7.2 g/dL      Albumin 3.60 g/dL      ALT (SGPT) 14 U/L      AST (SGOT) 17 U/L      Alkaline Phosphatase 131 U/L      Total Bilirubin 0.2 mg/dL      eGFR Non African Amer 34 mL/min/1.73      Globulin 3.6 gm/dL      A/G Ratio 1.0 g/dL      BUN/Creatinine Ratio 5.5     Anion Gap 12.8 mmol/L     Narrative:      GFR Normal >60  Chronic Kidney Disease <60  Kidney Failure <15      Lipase [[id number]]  (Abnormal) Collected: 04/16/21 1602    Specimen: Blood Updated: 04/16/21 1646     Lipase 84 U/L     Protime-INR [[id number]]  (Abnormal) Collected: 04/16/21 1602    Specimen: Blood Updated: 04/16/21 1638     Protime 24.5 Seconds      INR 2.24          Imaging Results (Last 7 Days)     Procedure Component Value Units Date/Time    XR Chest 1 View [[id number]] Collected: 04/16/21 1830     Updated: 04/16/21 2004    Narrative:      ONE VIEW PORTABLE CHEST AT 5 PM     HISTORY: Hypotension. End-stage renal disease. Chest pain.      Recent Covid 19 pneumonia.     FINDINGS: The lungs are moderately expanded with some patchy infiltrates  at the lung bases likely related to residual changes of Covid 19  pneumonia and showing some minimal improvement at the left base when  compared to the study of 04/09/2021. The heart size remains normal. A  large gauge vascular catheter ends near the junction of the SVC and  right atrium without change.     This report was finalized on 4/16/2021 8:01 PM by Dr. Kiet Oconnor M.D.        Orders (last 7 days)      Start     Ordered    04/22/21 0600  Protime-INR  Daily      04/21/21 1015    04/21/21 1015   Protime-INR  STAT      04/21/21 1014    04/21/21 0900  epoetin marco antonio-epbx (RETACRIT) injection 5,000 Units  Once per day on Mon Wed Fri 04/20/21 1249    04/21/21 0900  multivitamin (THERAGRAN) tablet 1 tablet  Daily      04/20/21 1250    04/21/21 0751  Hemodialysis Inpatient  Once     Comments: Give midodrine 5 mg, 2 hours in the treatment  Heparin 2000 units IV with dialysis  Lock the dialysis cath with proper dose of heparin    04/21/21 0751    04/21/21 0600  Renal Function Panel  Morning Draw      04/20/21 0653    04/21/21 0600  CBC & Differential  Morning Draw      04/20/21 0653    04/21/21 0600  Hemodialysis Inpatient  Once,   Status:  Canceled     Comments: Give midodrine 5 mg, 2 hours in the treatment  Heparin 2000 units IV with dialysis  Lock the dialysis cath with proper dose of heparin    04/20/21 0708    04/21/21 0600  levothyroxine (SYNTHROID, LEVOTHROID) tablet 100 mcg  Every Early Morning      04/20/21 1733    04/21/21 0600  CBC Auto Differential  PROCEDURE ONCE      04/21/21 0004    04/20/21 1800  warfarin (COUMADIN) tablet 4 mg  Daily Warfarin      04/20/21 1300    04/20/21 1734  T4, Free  Once      04/20/21 1733    04/20/21 1733  T3, Free  Once      04/20/21 1733    04/20/21 1230  saccharomyces boulardii (FLORASTOR) capsule 500 mg  2 Times Daily      04/20/21 1130    04/20/21 1131  TSH  Once      04/20/21 1130    04/20/21 1031  Hemodialysis Inpatient  Once     Comments: Give midodrine 5 mg, 2 hours in the treatment  Heparin 2000 units IV with dialysis  Lock the dialysis cath with proper dose of heparin    04/20/21 1031    04/20/21 0700  Manual Differential  Once      04/20/21 0659    04/20/21 0600  Renal Function Panel  Morning Draw      04/19/21 0813    04/20/21 0600  CBC & Differential  Morning Draw      04/19/21 0813    04/20/21 0600  Protime-INR  Morning Draw      04/19/21 1704    04/20/21 0600  CBC Auto Differential  PROCEDURE ONCE      04/20/21 0004    04/19/21 2000  Skin Care  Every 12  Hours      04/19/21 1229    04/19/21 1945  warfarin (COUMADIN) tablet 4 mg  Daily Warfarin,   Status:  Discontinued      04/19/21 1859    04/19/21 1858  Pharmacy to dose warfarin  Continuous PRN      04/19/21 1859 04/19/21 1836  Hepatitis B Surface Antigen  STAT,   Status:  Canceled      04/19/21 1836    04/19/21 1256  Protime-INR  Once      04/19/21 1256    04/19/21 0958  Inpatient Rehab Admission Consult  Once     Provider:  (Not yet assigned)    04/19/21 0958    04/19/21 0833  Manual Differential  Once      04/19/21 0832    04/19/21 0600  CBC & Differential  Morning Draw,   Status:  Canceled      04/18/21 1321    04/19/21 0600  Basic Metabolic Panel  Morning Draw,   Status:  Canceled      04/18/21 1321    04/19/21 0430  Renal Function Panel  Morning Draw      04/18/21 1815    04/19/21 0430  CBC & Differential  Morning Draw      04/18/21 1815    04/19/21 0430  CBC Auto Differential  PROCEDURE ONCE      04/18/21 2230    04/19/21 0218  Urinalysis, Microscopic Only - Urine, Clean Catch  Once      04/19/21 0217    04/19/21 0000  Hemodialysis Inpatient  Once,   Status:  Canceled     Comments: Give midodrine 5 mg, 2 hours in the treatment  Heparin 2000 units IV with dialysis  Lock the dialysis cath with proper dose of heparin    04/18/21 1817    04/18/21 1200  midodrine (PROAMATINE) tablet 5 mg  3 Times Daily Before Meals      04/18/21 1112    04/18/21 1200  vancomycin (VANCOCIN) capsule 250 mg  Every 6 Hours Scheduled      04/18/21 1113    04/18/21 1200  metroNIDAZOLE (FLAGYL) 500 mg/100mL IVPB  Every 8 Hours      04/18/21 1113    04/18/21 1130  Diet Regular  Diet Effective Now      04/18/21 1129    04/18/21 1038  Daily Weights  Daily      04/18/21 1037    04/18/21 0735  Manual Differential  Once      04/18/21 0734    04/18/21 0600  CBC & Differential  Morning Draw      04/17/21 1116    04/18/21 0600  Basic Metabolic Panel  Morning Draw      04/17/21 1116    04/18/21 0600  CBC Auto Differential  PROCEDURE ONCE       04/18/21 0004    04/17/21 1800  Dietary Nutrition Supplements Boost Breeze (Ensure Clear)  Daily With Lunch & Dinner      04/17/21 1504    04/17/21 1600  sodium chloride 0.9 % bolus 250 mL  Once      04/17/21 1513    04/17/21 1518  POC Glucose Once  Once      04/17/21 1515    04/17/21 1438  Inpatient Admission  Once      04/17/21 1437    04/17/21 1117  Inpatient Gastroenterology Consult  Once     Specialty:  Gastroenterology  Provider:  Chente Rivas MD    04/17/21 1116    04/17/21 0900  montelukast (SINGULAIR) tablet 10 mg  Daily      04/16/21 2157 04/17/21 0800  calcium acetate (PHOS BINDER)) capsule 1,334 mg  3 Times Daily With Meals      04/16/21 2157 04/17/21 0600  Basic Metabolic Panel  Morning Draw      04/16/21 2019 04/17/21 0600  CBC (No Diff)  Morning Draw      04/16/21 2019 04/17/21 0600  levothyroxine (SYNTHROID, LEVOTHROID) tablet 25 mcg  Every Early Morning,   Status:  Discontinued      04/16/21 2157 04/17/21 0015  sodium chloride 0.9 % bolus 500 mL  Once      04/16/21 2321    04/17/21 0000  Vital Signs  Every 4 Hours     Comments: Per per hospital policy    04/16/21 2019 04/17/21 0000  PT Consult: Eval & Treat as tolerated  Once      04/16/21 2019 04/16/21 2300  cholestyramine (QUESTRAN) packet 1 packet  Every 12 Hours Scheduled,   Status:  Discontinued      04/16/21 2157 04/16/21 2300  escitalopram (LEXAPRO) tablet 20 mg  Every 24 Hours      04/16/21 2157 04/16/21 2245  sodium bicarbonate tablet 650 mg  Every 8 Hours Scheduled      04/16/21 2157 04/16/21 2156  Inpatient Nephrology Consult  Once     Specialty:  Nephrology  Provider:  Darci Wang MD    04/16/21 2155 04/16/21 2156  OT Consult: Eval & Treat  Once      04/16/21 2155 04/16/21 2115  sodium chloride 0.9 % infusion  Continuous,   Status:  Discontinued      04/16/21 2019 04/16/21 2105  Inpatient Spiritual Care Consult  Once     Provider:  (Not yet assigned)    04/16/21 2104 04/16/21 2105   Inpatient Nutrition Consult  Once     Provider:  (Not yet assigned)    04/16/21 2104 04/16/21 2104  Inpatient Case Management  Consult  Once     Provider:  (Not yet assigned)    04/16/21 2104 04/16/21 2103  Wound Ostomy Eval & Treat  Once      04/16/21 2104 04/16/21 2026  albumin human 25 % IV SOLN 12.5 g  As Needed      04/16/21 2026 04/16/21 2020  Daily Weights  Daily      04/16/21 2019 04/16/21 2019  Patient Isolation Contact Spore  Continuous     Comments: Isolation Precautions Per Clostridium Difficile (CDI) Infection Control Policy / Process    04/16/21 2019 04/16/21 2018  Pharmacy Consult  Continuous PRN      04/16/21 2019 04/16/21 2018  Code Status and Medical Interventions:  Continuous      04/16/21 2019 04/16/21 2018  Cardiac Monitoring  Continuous,   Status:  Canceled      04/16/21 2019 04/16/21 2018  Telemetry - Maintain IV Access  Continuous      04/16/21 2019 04/16/21 2018  Continuous Cardiac Monitoring  Continuous      04/16/21 2019 04/16/21 2018  May Be Off Telemetry for Tests  Continuous      04/16/21 2019 04/16/21 2018  ACLS Protocol For Life Threatening Dysrhythmias (Unless Code Status Indicates Otherwise)  Continuous      04/16/21 2019 04/16/21 2018  Notify Provider if ACLS Protocol Activated  Until Discontinued      04/16/21 2019 04/16/21 2018  Diet Regular; Cardiac  Diet Effective Now,   Status:  Canceled      04/16/21 2019 04/16/21 2018  Strict Intake & Output  Every Shift      04/16/21 2019 04/16/21 2017  melatonin tablet 3 mg  Nightly PRN      04/16/21 2019 04/16/21 2017  Pharmacy to Dose enoxaparin (LOVENOX)  Continuous PRN,   Status:  Discontinued      04/16/21 2019 04/16/21 2017  nitroglycerin (NITROSTAT) SL tablet 0.4 mg  Every 5 Minutes PRN      04/16/21 2019 04/16/21 2017  acetaminophen (TYLENOL) tablet 650 mg  Every 4 Hours PRN      04/16/21 2019 04/16/21 2017  ondansetron (ZOFRAN) tablet 4 mg  Every 6 Hours  PRN      04/16/21 2019 04/16/21 2017  ondansetron (ZOFRAN) injection 4 mg  Every 6 Hours PRN      04/16/21 2019    04/16/21 1901  sodium chloride 0.9 % bolus 250 mL  Once      04/16/21 1859    04/16/21 1800  Troponin  Once      04/16/21 1738    04/16/21 1747  Initiate Observation Status  Once      04/16/21 1746    04/16/21 1746  COVID PRE-OP / PRE-PROCEDURE SCREENING ORDER (NO ISOLATION) - Swab, Nasopharynx  Once      04/16/21 1746    04/16/21 1746  COVID-19,[facility], NP/OP SWAB IN UTM/VTM/SALINE TRANSPORT MEDIA,24 HR TAT - Swab, Nasopharynx  PROCEDURE ONCE      04/16/21 1746    04/16/21 1737  sodium chloride 0.9 % bolus 1,000 mL  Once,   Status:  Discontinued      04/16/21 1735    04/16/21 1736  [facility] (on-call MD unless specified) Details  Once     Specialty:  Hospitalist  Provider:  (Not yet assigned)    04/16/21 1735    04/16/21 1710  Manual Differential  Once      04/16/21 1709    04/16/21 1700  warfarin (COUMADIN) tablet 4 mg  Daily Warfarin,   Status:  Discontinued      04/16/21 1639    04/16/21 1700  vancomycin (VANCOCIN) capsule 125 mg  Every 6 Hours Scheduled,   Status:  Discontinued      04/16/21 1640    04/16/21 1642  ECG 12 Lead  Once      04/16/21 1641    04/16/21 1642  BNP  Once      04/16/21 1641    04/16/21 1642  Troponin  Once      04/16/21 1641    04/16/21 1642  XR Chest 1 View  1 Time Imaging      04/16/21 1641    04/16/21 1640  Patient Isolation Contact Spore  Continuous,   Status:  Canceled     Comments: Isolation Precautions Per Clostridium Difficile (CDI) Infection Control Policy / Process    04/16/21 1640    04/16/21 1639  Pharmacy Consult  Continuous PRN,   Status:  Discontinued      04/16/21 1640    04/16/21 1639  Please change trach stoma bandage  Misc Nursing Order (Specify)  Once     Comments: Please change trach stoma bandage    04/16/21 1638    04/16/21 1603  CBC & Differential  Once      04/16/21 1603    04/16/21 1603  CBC Auto Differential  PROCEDURE ONCE       04/16/21 1603    04/16/21 1602  NPO Diet  Diet Effective Now,   Status:  Canceled      04/16/21 1601    04/16/21 1602  Undress and Gown  Once      04/16/21 1601    04/16/21 1602  Insert peripheral IV  Once      04/16/21 1601    04/16/21 1602  [place] Draw  Once      04/16/21 1601    04/16/21 1602  CBC & Differential  Once,   Status:  Canceled      04/16/21 1601    04/16/21 1602  Comprehensive Metabolic Panel  Once      04/16/21 1601    04/16/21 1602  Lipase  Once      04/16/21 1601    04/16/21 1602  Urinalysis With Microscopic If Indicated (No Culture) - Urine, Clean Catch  Once      04/16/21 1601    04/16/21 1602  Protime-INR  Once      04/16/21 1601    04/16/21 1602  Light Blue Top  PROCEDURE ONCE      04/16/21 1601    04/16/21 1602  Green Top (Gel)  PROCEDURE ONCE      04/16/21 1601    04/16/21 1602  Lavender Top  PROCEDURE ONCE      04/16/21 1601    04/16/21 1602  Gold Top - SST  PROCEDURE ONCE      04/16/21 1601    04/16/21 1602  CBC Auto Differential  PROCEDURE ONCE,   Status:  Canceled      04/16/21 1601    04/16/21 1601  sodium chloride 0.9 % flush 10 mL  As Needed      04/16/21 1601    Unscheduled  Telemetry - Pulse Oximetry  Continuous PRN     Comments: If Patient Develops Unresponsiveness, Acute Dyspnea, Cyanosis or Suspected Hypoxemia Start Continuous Pulse Ox Monitoring, Apply Oxygen & Notify Provider    04/16/21 2019    Unscheduled  Oxygen Therapy- Nasal Cannula; Titrate for SPO2: 90% - 95%  Continuous PRN     Comments: If Patient Develops Unresponsiveness, Acute Dyspnea, Cyanosis or Suspected Hypoxemia Start Continuous Pulse Ox Monitoring, Apply Oxygen & Notify Provider    04/16/21 2019    Unscheduled  ECG 12 Lead  As Needed     Comments: Nurse to Release if Patient Expericences Acute Chest Pain or Dysrhythmias    04/16/21 2019    Unscheduled  Potassium  As Needed     Comments: For Ventricular Arrhythmias      04/16/21 2019    Unscheduled  Magnesium  As Needed     Comments: For Ventricular  Arrhythmias      21    Unscheduled  Troponin  As Needed     Comments: For Chest Pain      21    Unscheduled  Blood Gas, Arterial -  As Needed     Comments: Per O2 PolicyNotify Physician      21    Unscheduled  Up with assistance  As Needed      21    Signed and Held  albumin human 25 % IV SOLN 12.5 g  As Needed      Signed and Held    Signed and Held  sodium chloride 0.9 % bolus 1,000 mL  As Needed      Signed and Held                Physician Progress Notes (last 7 days) (Notes from 21 1104 through 21 1104)      Nirmal Wan MD at 21 1924              Name: Ambar Lara ADMIT: 2021   : 1970  PCP: Jet Manuel MD    MRN: [id number] LOS: 3 days   AGE/SEX: 51 y.o. female  ROOM: [facility]     Subjective   Subjective   Patient seen at bedside, she is getting dialysis.      Objective   Objective   Vital Signs  Temp:  [96.8 °F (36 °C)-97.7 °F (36.5 °C)] 96.8 °F (36 °C)  Heart Rate:  [77-99] 98  Resp:  [18-20] 18  BP: ()/(60-79) 98/76  SpO2:  [94 %-100 %] 94 %  on   ;   Device (Oxygen Therapy): room air  Body mass index is 30.38 kg/m².  Physical Exam  General Appearance:    Alert, cooperative, no distress, appears stated age   Head:    Normocephalic, without obvious abnormality, atraumatic   Eyes:    PERRL, conjunctivae/corneas clear, EOM's intact, both eyes   Ears:    Normal external ear canals, both ears   Nose:   Nares normal, septum midline, mucosa normal, no drainage    or sinus tenderness   Throat:   Lips, mucosa, and tongue normal   Neck:   Supple, symmetrical, trachea midline, no adenopathy;     thyroid:  no enlargement/tenderness/nodules; no carotid    bruit or JVD   Back:     Symmetric, no curvature, ROM normal, no CVA tenderness   Lungs:     Decreased breath sounds bilaterally, respirations unlabored   Chest Wall:    No tenderness or deformity    Heart:    Regular rate and rhythm, S1 and S2 normal, no murmur, rub   or  gallop   Abdomen:     Soft, nontender, bowel sounds active all four quadrants,     no masses, no hepatomegaly, no splenomegaly   Extremities:   Extremities normal, atraumatic, no cyanosis or edema   Pulses:   2+ and symmetric all extremities   Skin:   Skin color, texture, turgor normal, no rashes or lesions   Lymph nodes:   Cervical, supraclavicular, and axillary nodes normal   Neurologic:   CNII-XII intact, normal strength, sensation intact throughout     Results Review     I reviewed the patient's new clinical results.    Scheduled Medications  calcium acetate, 1,334 mg, Oral, TID With Meals  [START ON 4/21/2021] epoetin marco antonio/marco antonio-epbx, 5,000 Units, Subcutaneous, Once per day on Mon Wed Fri  escitalopram, 20 mg, Oral, Q24H  [START ON 4/21/2021] levothyroxine, 100 mcg, Oral, Q AM  metroNIDAZOLE, 500 mg, Intravenous, Q8H  midodrine, 5 mg, Oral, TID AC  montelukast, 10 mg, Oral, Daily  [START ON 4/21/2021] multivitamin, 1 tablet, Oral, Daily  saccharomyces boulardii, 500 mg, Oral, BID  sodium bicarbonate, 650 mg, Oral, Q8H  vancomycin, 250 mg, Oral, Q6H  warfarin, 4 mg, Oral, Daily    Infusions  Pharmacy Consult,   Pharmacy to dose warfarin,     Diet  Diet Regular      Assessment/Plan     Active Hospital Problems    Diagnosis  POA   • **Clostridium difficile colitis [A04.72]  Yes   • History of tracheostomy [Z98.890]  Not Applicable   • Gastroesophageal reflux disease [K21.9]  Yes   • Cardiomyopathy (CMS/HCC) [I42.9]  Yes   • Chronic systolic heart failure (CMS/HCC) [I50.22]  Yes   • ESRD (end stage renal disease) on dialysis (CMS/[facility]) [N18.6, Z99.2]  Not Applicable   • HLD (hyperlipidemia) [E78.5]  Yes   • Hypothyroidism [E03.9]  Yes      Resolved Hospital Problems   No resolved problems to display.       51 y.o. female admitted with Clostridium difficile colitis.    Assessment and plan:  1.  C. difficile colitis, continue p.o. vancomycin and IV Flagyl.  2.  End-stage renal disease on hemodialysis, continue dialysis  per nephrology recommendations.  3.  Anemia of chronic disease, hemoglobin noted to be 10, continue to recheck labs.  4.  Hyponatremia, resolved.  5.  Chronic systolic congestive heart failure, she appears euvolemic at this point of time.  6.  Hypothyroidism, TSH is checked today, it is almost close to 96.  We will increase Synthroid dose today.  She would benefit from endocrinology follow-up on outpatient basis.   7.  Personal history of PE, patient currently on Coumadin.  INR therapeutic.  8.  Hypotension, blood pressure has improved after addition of midodrine.  9.  CODE STATUS is full code.  Further plans based on hospital course.       Nirmal Wan MD  [facility]  04/20/21  17:34 EDT      Electronically signed by Nirmal Wan MD at 04/20/21 0324     Bebeto Pinto MD at 04/20/21 1249          Nephrology Daily Progress Note    Assessment/Plan       Clostridium difficile colitis    Cardiomyopathy (CMS/HCC)    Chronic systolic heart failure (CMS/HCC)    ESRD (end stage renal disease) on dialysis (CMS/HCC)    Gastroesophageal reflux disease    HLD (hyperlipidemia)    Hypothyroidism       LOS: 3 days     Ambar Lara is a 51 y.o. female who was admitted with Clostridium difficile colitis. She reports his symptoms are improving with treatment.    Subjective     Continues to tolerate oral intake with gradual improvement of her loose bowel movements without any abdominal pain fevers or chills.  Patient denies any nausea or emesis    Patient was scheduled to undergo hemodialysis yesterday but was delayed, we will make arrangements for the patient to undergo dialysis today.     Contacted the dialysis unit to make sure that the patient gets her treatment today    Interval History: Ambar Lara  As above   Medication side effects:None     Current Facility-Administered Medications   Medication Dose Route Frequency Provider Last Rate Last Admin   • acetaminophen (TYLENOL)  tablet 650 mg  650 mg Oral Q4H PRN Binta Patel MD   650 mg at 04/18/21 2044   • calcium acetate (PHOS BINDER)) capsule 1,334 mg  1,334 mg Oral TID With Meals Binta Patel MD   1,334 mg at 04/20/21 0925   • escitalopram (LEXAPRO) tablet 20 mg  20 mg Oral Q24H Binta Patel MD   20 mg at 04/19/21 2227   • levothyroxine (SYNTHROID, LEVOTHROID) tablet 25 mcg  25 mcg Oral Q AM Binta Patel MD   25 mcg at 04/20/21 0502   • melatonin tablet 3 mg  3 mg Oral Nightly PRN Binta Patel MD   3 mg at 04/19/21 2133   • metroNIDAZOLE (FLAGYL) 500 mg/100mL IVPB  500 mg Intravenous Q8H Nirmal Wan MD   500 mg at 04/20/21 0458   • midodrine (PROAMATINE) tablet 5 mg  5 mg Oral TID AC Nirmal Wan MD   5 mg at 04/20/21 1049   • montelukast (SINGULAIR) tablet 10 mg  10 mg Oral Daily Binta Patel MD   10 mg at 04/19/21 0932   • nitroglycerin (NITROSTAT) SL tablet 0.4 mg  0.4 mg Sublingual Q5 Min PRN Binta Patel MD       • ondansetron (ZOFRAN) tablet 4 mg  4 mg Oral Q6H PRN Binta Patel MD        Or   • ondansetron (ZOFRAN) injection 4 mg  4 mg Intravenous Q6H PRN Binta Patel MD   4 mg at 04/18/21 1219   • Pharmacy Consult   Does not apply Continuous PRN Binta Patel MD       • Pharmacy to dose warfarin   Does not apply Continuous PRN Nirmal Wan MD       • saccharomyces boulardii (FLORASTOR) capsule 500 mg  500 mg Oral BID Nirmal Wan MD       • sodium bicarbonate tablet 650 mg  650 mg Oral Q8H Binta Patel MD   650 mg at 04/20/21 0502   • sodium chloride 0.9 % flush 10 mL  10 mL Intravenous PRN Marcos Blackman MD   10 mL at 04/17/21 2118   • vancomycin (VANCOCIN) capsule 250 mg  250 mg Oral Q6H Nirmal Wan MD   250 mg at 04/20/21 0502       Objective     Vital signs in last 24 hours:  Temp:  [96.8 °F (36 °C)-98 °F (36.7 °C)] 96.8 °F (36 °C)  Heart Rate:  [77-91] 81  Resp:  [18-20] 20  BP:  ()/(60-79) 97/70    Intake/Output last 3 shifts:  No intake/output data recorded.    Labs:    Physical Exam:  General appearance: alert deconditioned  Head: Normocephalic, without obvious abnormality, atraumatic  Eyes: conjunctivae/corneas clear. PERRL, EOM's intact.  Ears: Hearing preserved  Neck: no adenopathy, no carotid bruit, no JVD, supple, symmetrical, trachea midline. RIJPC in place. Trach in place slowly healing   Lungs: clear to auscultation bilaterally  Heart: regular rate and rhythm, S1, S2 normal, KIMMY grade III   Abdomen: soft, non-tender; bowel sounds normal; no masses, PD catheter in place with clean exit site  Extremities: extremities normal, atraumatic, no cyanosis or edema  Pulses: 2+ and symmetric  Neurologic: Alert and oriented X 3, generalized weakness    Impression and plan    Mrs Ambar melo is a pleasant 51-year-old female with a past medical history of chronic systolic congestive heart failure, thyroid disease, gastroesophageal for disease, chronic kidney disease that progressed to end-stage renal disease requiring the initiation hemodialysis through peritoneal dialysis, associated with complications that require transition to in center hemodialysis s/p RIJPC .  Patient has a history of Covid infection.  Patient was admitted for colitis found to have C. Difficile currently on vancomycin oral treatment and metronidazole IV    ESRD on HD ( MWF schedule )   -Secondary to interstitial nephritis  -History of peritoneal  dialysis complicated by require transition to hemodialysis  -s/p PD catheter in place , not used since 3/2021  -s/p RIJPC placement   -Patient undergoing chronic hemodialysis on Monday, Wednesday Friday schedule  -We will arrange for her hemodialysis today  ( after missed ttx yesterday )     Hypotension   - On MIDODRINE 5 mg TID   - Pt requires MIDODRINE during HD TTX   - Will follow closely     Chronic normocytic anemia  Results from last 7 days   Lab Units  21  0614 21  0817 21  0717   HEMOGLOBIN g/dL 9.3* 10.0* 10.0*   -Likely secondary to end-stage renal disease  - Will start patient on EPOGEN 5.000 SC TIW   - RENAVITE  Daily   -Continue to follow H&H closely    Hyperphosphatemia  -We will continue renal diet and phoslo TID with meals   -We will follow Phos trend    Hyponatremia  Lab Results   Lab Value Date/Time     2021 0614     (L) 2021 0817     (L) 2021 0717     2021 0555     (L) 2021 1602     2021 0648     2021 0505   - Multifactorial etiology  -Arrange sodium while undergoing dialysis    C. difficile colitis  -On  Vancomycin oral and  metronidazole IV  -Currently followed closely by primary team and infectious disease      Plan:   -Arrange HD today , patient was re-schedule from yesterday   -Follow closely during her admission    Over 25 minutes of time was spent with face-to-face patient care half the time was spent counseling the patient regarding the plan of care.    All question concerns were answered to patient satisfaction.  Patient verbalized understanding    Coordinated her HD treatment today with the dialysis unit     Thank you for allowing me to participate in this patient care    Bebeto Pinto MD, MD    Electronically signed by Bebeto Pinto MD at 21 1257     Nirmal Wan MD at 21 1701              Name: Ambar Lara ADMIT: 2021   : 1970  PCP: Jet Manuel MD    MRN: [id number] LOS: 2 days   AGE/SEX: 51 y.o. female  ROOM: [facility]/     Subjective   Subjective   Patient seen at bedside.  No new complaints.      Objective   Objective   Vital Signs  Temp:  [96.5 °F (35.8 °C)-98 °F (36.7 °C)] 98 °F (36.7 °C)  Heart Rate:  [76-94] 82  Resp:  [18] 18  BP: ()/(55-80) 110/75  SpO2:  [96 %-100 %] 100 %  on   ;   Device (Oxygen Therapy): room air  Body mass index is 27.59 kg/m².  Physical Exam   General  Appearance:    Alert, cooperative, no distress, appears stated age   Head:    Normocephalic, without obvious abnormality, atraumatic   Eyes:    PERRL, conjunctivae/corneas clear, EOM's intact, both eyes   Ears:    Normal external ear canals, both ears   Nose:   Nares normal, septum midline, mucosa normal, no drainage    or sinus tenderness   Throat:   Lips, mucosa, and tongue normal   Neck:   Supple, symmetrical, trachea midline, no adenopathy;     thyroid:  no enlargement/tenderness/nodules; no carotid    bruit or JVD   Back:     Symmetric, no curvature, ROM normal, no CVA tenderness   Lungs:     Decreased breath sounds bilaterally, respirations unlabored   Chest Wall:    No tenderness or deformity    Heart:    Regular rate and rhythm, S1 and S2 normal, no murmur, rub   or gallop   Abdomen:     Soft, nontender, bowel sounds active all four quadrants,     no masses, no hepatomegaly, no splenomegaly   Extremities:   Extremities normal, atraumatic, no cyanosis or edema   Pulses:   2+ and symmetric all extremities   Skin:   Skin color, texture, turgor normal, no rashes or lesions   Lymph nodes:   Cervical, supraclavicular, and axillary nodes normal   Neurologic:   CNII-XII intact, normal strength, sensation intact throughout         Results Review     I reviewed the patient's new clinical results.    Scheduled Medications  calcium acetate, 1,334 mg, Oral, TID With Meals  escitalopram, 20 mg, Oral, Q24H  levothyroxine, 25 mcg, Oral, Q AM  metroNIDAZOLE, 500 mg, Intravenous, Q8H  midodrine, 5 mg, Oral, TID AC  montelukast, 10 mg, Oral, Daily  sodium bicarbonate, 650 mg, Oral, Q8H  vancomycin, 250 mg, Oral, Q6H  warfarin, 4 mg, Oral, Daily    Infusions  Pharmacy Consult,     Diet  Diet Regular      Assessment/Plan     Active Hospital Problems    Diagnosis  POA   • **Clostridium difficile colitis [A04.72]  Yes   • Gastroesophageal reflux disease [K21.9]  Yes   • Cardiomyopathy (CMS/HCC) [I42.9]  Yes   • Chronic systolic  heart failure (CMS/[facility]) [I50.22]  Yes   • ESRD (end stage renal disease) on dialysis (CMS/[facility]) [N18.6, Z99.2]  Not Applicable   • HLD (hyperlipidemia) [E78.5]  Yes   • Hypothyroidism [E03.9]  Yes      Resolved Hospital Problems   No resolved problems to display.       51 y.o. female admitted with Clostridium difficile colitis.    Assessment and plan:  1.  C. difficile colitis, continue p.o. vancomycin and IV Flagyl.  2.  End-stage renal disease on hemodialysis, continue dialysis per nephrology recommendations.  3.  Anemia of chronic disease, hemoglobin noted to be 10, continue to recheck labs.  4.  Hyponatremia, resolved.  5.  Chronic systolic congestive heart failure, she appears euvolemic at this point of time.  6.  Hypothyroidism, continue Synthroid.  7.  Personal history of PE, patient currently on Coumadin.  INR therapeutic.  8.  Hypotension, blood pressure has improved after addition of midodrine.  9.  CODE STATUS is full code.  Further plans based on hospital course.      Nirmal Wan MD  [facility]  04/19/21  17:01 EDT      Electronically signed by Nirmal Wan MD at 04/19/21 1703     Bebeto Pinto MD at 04/19/21 0801          Nephrology Daily Progress Note    Assessment/Plan       Clostridium difficile colitis    Cardiomyopathy (CMS/[facility])    Chronic systolic heart failure (CMS/[facility])    ESRD (end stage renal disease) on dialysis (CMS/[facility])    Gastroesophageal reflux disease    HLD (hyperlipidemia)    Hypothyroidism       LOS: 2 days     Ambar Lara is a 51 y.o. female who was admitted with Clostridium difficile colitis. She reports his symptoms are improving with treatment.    Subjective     Patient denies any significant abdominal pain and states that she feeling  better since started her antibiotics, with decreased loose bowel movements and gradually improving appetite    Patient denies any fevers or chills    Continues to feel tired and fatigued    Interval  History: Ambar Melo  As above   Medication side effects:None       Objective     Vital signs in last 24 hours:  Temp:  [96.5 °F (35.8 °C)-98 °F (36.7 °C)] 96.5 °F (35.8 °C)  Heart Rate:  [76-97] 82  Resp:  [16-18] 18  BP: ()/(50-80) 115/75    Intake/Output last 3 shifts:  I/O last 3 completed shifts:  In: 600 [P.O.:600]  Out: -       Physical Exam:  General appearance: alert, chronically ill and deconditioned  Head: Normocephalic, without obvious abnormality, atraumatic  Eyes: conjunctivae/corneas clear. PERRL, EOM's intact.  Ears: Hearing preserved  Neck: no adenopathy, no carotid bruit, no JVD, supple, symmetrical, trachea midline. RIJPC in place. Trach in place slowly healing   Lungs: clear to auscultation bilaterally  Heart: regular rate and rhythm, S1, S2 normal, KIMMY grade III   Abdomen: soft, non-tender; bowel sounds normal; no masses, PD catheter in place with clean exit site  Extremities: extremities normal, atraumatic, no cyanosis or edema  Pulses: 2+ and symmetric  Neurologic: Alert and oriented X 3, generalized weakness    Impression and plan    Mrs Ambar melo is a pleasant 51-year-old female with a past medical history of chronic systolic congestive heart failure, thyroid disease, gastroesophageal for disease, chronic kidney disease that progressed to end-stage renal disease requiring the initiation hemodialysis through peritoneal dialysis, associated with complications that require transition to in center hemodialysis s/p RIJPC .  Patient has a history of Covid infection.  Patient was admitted for colitis found to have C. Difficile currently on vancomycin oral treatment and metronidazole IV    ESRD on HD ( MWF schedule )   -Secondary to interstitial nephritis  -History of peritoneal  dialysis complicated by require transition to hemodialysis  -s/p PD catheter in place , not used since 3/2021  -s/p RIJPC placement   -Patient undergoing chronic hemodialysis on Monday, Wednesday Friday  schedule  -We will arrange for her hemodialysis today     Hypertension   -On minoxidil   -Heart healthy diet  -Continue to follow closely and arrange accordingly    Chronic normocytic anemia  -Likely secondary to end-stage renal disease  -Her hemoglobin is 10   -We will hold Epogen protocol for now  -Continue to follow H&H closely    Hyperphosphatemia  -We will continue renal diet and phoslo TID with meals   -We will follow Phos trend    Hyponatremia  -Multifactorial etiology  -Arrange sodium while undergoing dialysis    C. difficile colitis  -On  Vancomycin oral and  metronidazole IV  -Currently followed closely by primary team and infectious disease  -With  normalization of white blood count and clinical impression    Plan:   -Arrange HD today   -Follow closely during her admission    Over 25 minutes of time was spent with face-to-face patient care half the time was spent counseling the patient regarding the plan of care.    All question concerns were answered to patient satisfaction.  Patient verbalized understanding    I have coordinated for the patient to receive her dialysis treatment    Thank you for allowing me to participate in this patient care    Bebeto Pinto MD, MD    Electronically signed by Bebeto Pinto MD at 21 1237     Nirmal Wan MD at 21 1319              Name: Ambar Lara ADMIT: 2021   : 1970  PCP: Jet Manuel MD    MRN: [id number] LOS: 1 days   AGE/SEX: 51 y.o. female  ROOM: [facility]     Subjective   Subjective   Patient is seen at bedside.      Objective   Objective   Vital Signs  Temp:  [97 °F (36.1 °C)-98 °F (36.7 °C)] 98 °F (36.7 °C)  Heart Rate:  [85-97] 97  Resp:  [12-16] 16  BP: ()/(50-79) 100/69  SpO2:  [94 %-100 %] 98 %  on   ;   Device (Oxygen Therapy): room air  Body mass index is 30.48 kg/m².  Physical Exam   General Appearance:    Alert, cooperative, no distress, appears stated age   Head:    Normocephalic, without obvious  abnormality, atraumatic   Eyes:    PERRL, conjunctivae/corneas clear, EOM's intact, both eyes   Ears:    Normal external ear canals, both ears   Nose:   Nares normal, septum midline, mucosa normal, no drainage    or sinus tenderness   Throat:   Lips, mucosa, and tongue normal   Neck:   Supple, symmetrical, trachea midline, no adenopathy;     thyroid:  no enlargement/tenderness/nodules; no carotid    bruit or JVD   Back:     Symmetric, no curvature, ROM normal, no CVA tenderness   Lungs:     Decreased breath sounds bilaterally, respirations unlabored   Chest Wall:    No tenderness or deformity    Heart:    Regular rate and rhythm, S1 and S2 normal, no murmur, rub   or gallop   Abdomen:     Soft, nontender, bowel sounds active all four quadrants,     no masses, no hepatomegaly, no splenomegaly   Extremities:   Extremities normal, atraumatic, no cyanosis or edema   Pulses:   2+ and symmetric all extremities   Skin:   Skin color, texture, turgor normal, no rashes or lesions   Lymph nodes:   Cervical, supraclavicular, and axillary nodes normal   Neurologic:   CNII-XII intact, normal strength, sensation intact throughout       Results Review     I reviewed the patient's new clinical results.      Scheduled Medications  calcium acetate, 1,334 mg, Oral, TID With Meals  cholestyramine, 1 packet, Oral, Q12H  escitalopram, 20 mg, Oral, Q24H  levothyroxine, 25 mcg, Oral, Q AM  metroNIDAZOLE, 500 mg, Intravenous, Q8H  midodrine, 5 mg, Oral, TID AC  montelukast, 10 mg, Oral, Daily  sodium bicarbonate, 650 mg, Oral, Q8H  vancomycin, 250 mg, Oral, Q6H  warfarin, 4 mg, Oral, Daily    Infusions  Pharmacy Consult,   Pharmacy Consult,     Diet  Diet Regular      Assessment/Plan     Active Hospital Problems    Diagnosis  POA   • **Clostridium difficile colitis [A04.72]  Yes   • Gastroesophageal reflux disease [K21.9]  Yes   • Cardiomyopathy (CMS/HCC) [I42.9]  Yes   • Chronic systolic heart failure (CMS/HCC) [I50.22]  Yes   • ESRD (end  stage renal disease) on dialysis (CMS/[facility]) [N18.6, Z99.2]  Not Applicable   • HLD (hyperlipidemia) [E78.5]  Yes   • Hypothyroidism [E03.9]  Yes      Resolved Hospital Problems   No resolved problems to display.       51 y.o. female admitted with Clostridium difficile colitis.    Assessment and plan:  1.  C. difficile colitis, continue p.o. vancomycin and add IV Flagyl.  2.  End-stage renal disease on hemodialysis, continue dialysis per nephrology recommendations.  3.  Anemia of chronic disease, hemoglobin noted to be 10, continue to recheck labs.  4.  Hyponatremia, resolved.  5.  Chronic systolic congestive heart failure, she appears euvolemic at this point of time.  6.  Hypothyroidism, continue Synthroid.  7.  Personal history of PE, patient currently on Coumadin.  INR therapeutic.  8.  Hypotension, we will add midodrine therapy today.  9.  CODE STATUS is full code.  Further plans based on hospital course.      Nirmal Wan MD  [facility]  21  13:19 EDT      Electronically signed by Nirmal Wan MD at 21 1321     Nirmal Wan MD at 21 1111              Name: Ambar Lara ADMIT: 2021   : 1970  PCP: Jet Manuel MD    MRN: [id number] LOS: 0 days   AGE/SEX: 51 y.o. female  ROOM: [facility]     Subjective   Subjective   Patient seen at bedside today.      Objective   Objective   Vital Signs  Temp:  [97.5 °F (36.4 °C)-98.4 °F (36.9 °C)] 97.9 °F (36.6 °C)  Heart Rate:  [] 93  Resp:  [16-20] 16  BP: (83-96)/(54-71) 96/62  SpO2:  [92 %-97 %] 94 %  on   ;   Device (Oxygen Therapy): room air  Body mass index is 31.03 kg/m².  Physical Exam      General Appearance:    Alert, cooperative, no distress, appears stated age   Head:    Normocephalic, without obvious abnormality, atraumatic   Eyes:    PERRL, conjunctivae/corneas clear, EOM's intact, both eyes   Ears:    Normal external ear canals, both ears   Nose:   Nares normal, septum midline, mucosa  normal, no drainage    or sinus tenderness   Throat:   Lips, mucosa, and tongue normal   Neck:   Supple, symmetrical, trachea midline, no adenopathy;     thyroid:  no enlargement/tenderness/nodules; no carotid    bruit or JVD   Back:     Symmetric, no curvature, ROM normal, no CVA tenderness   Lungs:     Decreased breath sounds bilaterally, respirations unlabored   Chest Wall:    No tenderness or deformity    Heart:    Regular rate and rhythm, S1 and S2 normal, no murmur, rub   or gallop   Abdomen:     Soft, nontender, bowel sounds active all four quadrants,     no masses, no hepatomegaly, no splenomegaly   Extremities:   Extremities normal, atraumatic, no cyanosis or edema   Pulses:   2+ and symmetric all extremities   Skin:   Skin color, texture, turgor normal, no rashes or lesions   Lymph nodes:   Cervical, supraclavicular, and axillary nodes normal   Neurologic:   CNII-XII intact, normal strength, sensation intact throughout     Scheduled Medications  calcium acetate, 1,334 mg, Oral, TID With Meals  cholestyramine, 1 packet, Oral, Q12H  escitalopram, 20 mg, Oral, Q24H  levothyroxine, 25 mcg, Oral, Q AM  montelukast, 10 mg, Oral, Daily  sodium bicarbonate, 650 mg, Oral, Q8H  vancomycin, 125 mg, Oral, Q6H  warfarin, 4 mg, Oral, Daily    Infusions  Pharmacy Consult,   Pharmacy Consult,   sodium chloride, 75 mL/hr, Last Rate: 75 mL/hr (04/17/21 1008)    Diet  Diet Regular; Cardiac      Assessment/Plan     Active Hospital Problems    Diagnosis  POA   • **Clostridium difficile colitis [A04.72]  Yes   • Gastroesophageal reflux disease [K21.9]  Yes   • Cardiomyopathy (CMS/HCC) [I42.9]  Yes   • Chronic systolic heart failure (CMS/HCC) [I50.22]  Yes   • ESRD (end stage renal disease) on dialysis (CMS/HCC) [N18.6, Z99.2]  Not Applicable   • HLD (hyperlipidemia) [E78.5]  Yes   • Hypothyroidism [E03.9]  Yes      Resolved Hospital Problems   No resolved problems to display.       51 y.o. female admitted with Clostridium  difficile colitis.    Assessment and plan:  1.  C. difficile colitis, patient has been started on p.o. vancomycin.  We will consult gastroenterology.  2.  End-stage renal disease on hemodialysis, continue dialysis per nephrology recommendations.  3.  Anemia of chronic disease, hemoglobin noted to be 9.5, continue to recheck labs.  4.  Hyponatremia, resolved.  5.  Chronic systolic congestive heart failure, we will stop IV fluids.  Watch for volume overload.  6.  Hypothyroidism, continue Synthroid.  7.  Personal history of PE, patient currently on Coumadin.  INR therapeutic.  8.  CODE STATUS is full code.  Further plans based on hospital course.      Nirmal Wan MD  [facility]  04/17/21  11:12 EDT      Electronically signed by Nirmal Wan MD at 04/17/21 1116          Consult Notes (last 7 days) (Notes from 04/14/21 through 04/21/21)      Sixto Sauer MD at 04/17/21 1440      Consult Orders    1. Inpatient Gastroenterology Consult [[id number]] ordered by Nirmal Wan MD at 04/17/21 1116               [facility] [place], operated by [facility]  Initial Inpatient Consult Note    Referring Provider: Dr Wan    Reason for Consultation: C diff    Subjective     History of present illness:    51 y.o. female with history of CHF ESRD on dialysis recent diagnosis of C. difficile treated at this facility and discharged on 4/14.  She was sent back from rehab facility due to weakness and malaise.  The patient reports that she has had worsening of her diarrhea since she left the hospital apparently she was not getting her vancomycin for her C. difficile at the rehab facility.  GI consultation has been requested by the admitting team.  She was seen by ID during last hospitalization and recommended she have treatment with Vancomycin 125mg QID x 10 days, then to complete a prolonged 2 mos taper afterwards.  She reports colonoscopy at [facility] in January of this year.      Home  Meds:  Medications Prior to Admission   Medication Sig Dispense Refill Last Dose   • calcium acetate (PHOS BINDER,) 667 MG capsule capsule Take 2 capsules by mouth 3 (Three) Times a Day With Meals. 180 capsule 3    • cholestyramine (QUESTRAN) 4 g packet Take 1 packet by mouth Every 12 (Twelve) Hours. 60 packet 0    • escitalopram (Lexapro) 20 MG tablet Daily.      • gentamicin (GARAMYCIN) 0.1 % ointment Apply  topically to the appropriate area as directed Daily. 60 g 3    • levothyroxine (SYNTHROID, LEVOTHROID) 25 MCG tablet Take 1 tablet by mouth Daily. 30 tablet 3    • montelukast (Singulair) 10 MG tablet Daily.      • sodium bicarbonate 650 MG tablet Every 8 (Eight) Hours.      • vancomycin 50 MG/ML reconstituted solution oral solution reconstituted Take 2.5 mL by mouth Every 6 (Six) Hours for 21 doses. Indications: Clostridium Difficile Infection 52.5 mL 0    • [START ON 4/20/2021] vancomycin 50 MG/ML reconstituted solution oral solution reconstituted Take 2.5 mL by mouth Every 12 (Twelve) Hours for 14 doses. Indications: Clostridium Difficile Infection 35 mL 0    • [START ON 4/27/2021] vancomycin 50 MG/ML reconstituted solution oral solution reconstituted Take 2.5 mL by mouth Daily for 7 doses. Indications: Clostridium Difficile Infection 17.5 mL 0    • [START ON 5/4/2021] vancomycin 50 MG/ML reconstituted solution oral solution reconstituted Take 2.5 mL by mouth Every Other Day for 14 doses. Indications: Clostridium Difficile Infection 35 mL 0    • warfarin (Coumadin) 4 MG tablet 1 tablet p.o. every day. 30 tablet 3      Current Meds:   calcium acetate, 1,334 mg, Oral, TID With Meals  cholestyramine, 1 packet, Oral, Q12H  escitalopram, 20 mg, Oral, Q24H  levothyroxine, 25 mcg, Oral, Q AM  montelukast, 10 mg, Oral, Daily  sodium bicarbonate, 650 mg, Oral, Q8H  vancomycin, 125 mg, Oral, Q6H  warfarin, 4 mg, Oral, Daily      Allergies:  Allergies   Allergen Reactions   • Penicillins Anaphylaxis   • Nickel Rash      Review of Systems  All systems were reviewed and negative except for:  Constitution:  positive for fatigue  Gastrointestinal: positive for  diarrhea     Objective     Vital Signs  Temp:  [97.5 °F (36.4 °C)-98.4 °F (36.9 °C)] 98.3 °F (36.8 °C)  Heart Rate:  [] 94  Resp:  [16-20] 16  BP: (78-96)/(46-71) 78/46  Physical Exam:  General Appearance:     Alert, cooperative, in no acute distress   Head:    Normocephalic, without obvious abnormality, atraumatic   Eyes:            Lids and lashes normal, conjunctivae and sclerae normal, no icterus   Throat:   No oral lesions, no thrush, oral mucosa moist   Neck:   No adenopathy, supple, trachea midline, no thyromegaly, no carotid bruit, no JVD   Lungs:     Clear to auscultation,respirations regular, even and            unlabored    Heart:    Regular rhythm and normal rate, normal S1 and S2, no        murmur, no gallop, no rub, no click   Chest Wall:    No abnormalities observed   Abdomen:     Normal bowel sounds, no masses, no organomegaly, soft     nontender, nondistended, no guarding, no rebound                 tenderness   Rectal:     Deferred   Extremities:   no edema, no cyanosis, no redness   Skin:   No bleeding, bruising or rash   Lymph nodes:   No palpable adenopathy   Psychiatric:  Judgement and insight: normal   Orientation to person place and time: normal   Mood and affect: normal   Results Review:   I reviewed the patient's new clinical results.      Assessment/Plan   Assessment:   1.  C diff colitis  2.  ESRD on HD  3.  CHF    Plan:   Patient has a recent diagnosis of C. difficile she was seen by our infectious disease doctors during last hospitalization with appropriate recommendations for vancomycin given during that hospitalization.  The patient was discharged to rehab and has not been receiving her vancomycin.  It has now been restarted.  She should continue vancomycin as previously recommended.  No further recommendations from GI at this time.    I  discussed the patients findings and my recommendations with patient.         Sixto Sauer M.D.  [facility]  [facility] [place] [address]  Office: [phone]      Electronically signed by Sixto Sauer MD at 04/17/21 144     Darci Wang MD at 04/18/21 180      Consult Orders    1. Inpatient Nephrology Consult [[id number]] ordered by Binta Patel MD at 04/16/21 2156                 Referring Provider: Binta Patel MD  Reason for Consultation: Management of patient with end-stage renal disease    Subjective     Chief complaint   Chief Complaint   Patient presents with   • Abdominal Pain   • Hypotension       History of present illness:    This 51-year-old  female presented to the emergency department with increasing weakness and having frequent diarrhea she was discharged 4 days prior to admission to rehab facility she did not receive her medications having several loose watery bowel movement patient is feeling very weak having difficulty getting up and having low blood pressure.  The patient has history of end-stage renal disease attributed to interstitial nephritis was on PD for the past 2 years but was recently transitioned to hemodialysis when she became critically ill from Covid led to respiratory failure and multiorgan compromise ended having a tracheostomy also complicated by C. difficile and immobility syndrome the patient having poor oral intake nausea but no vomiting and she has watery diarrhea and she has recurrent abdominal discomfort when she is ready to have a bowel movement.  She is currently undergoing in center hemodialysis at [facility] every Monday, Wednesday and Friday via tunneled dialysis catheter.    She has history of congestive heart failure and hypothyroidism, history of obstructive sleep apnea, and recent tracheostomy currently has dressing over her trach.  Also she has history  "of dyslipidemia and GERD  She denies any chest pain or shortness of air, she had nausea but no vomiting, poor appetite and oral intake, she has watery diarrhea denies melena, hematochezia or hematemesis, she has significant weakness and having difficulty ambulating.  Allergies:  Penicillins and Nickel    Review of Systems  14 points review of system was performed and it was negative other than what noted above in the HPI    Objective     Vital Signs  Temp:  [97 °F (36.1 °C)-98 °F (36.7 °C)] 98 °F (36.7 °C)  Heart Rate:  [85-97] 94  Resp:  [16] 16  BP: ()/(50-80) 112/80    Flowsheet Rows      First Filed Value   Admission Height  154.9 cm (61\") Documented at 04/16/2021 1559   Admission Weight  68.5 kg (151 lb) Documented at 04/16/2021 1559           I/O this shift:  In: 600 [P.O.:600]  Out: -   I/O last 3 completed shifts:  In: 840 [P.O.:840]  Out: -     Intake/Output Summary (Last 24 hours) at 4/18/2021 1804  Last data filed at 4/18/2021 1300  Gross per 24 hour   Intake 600 ml   Output --   Net 600 ml       Physical Exam:  General Appearance: alert, oriented x 3, no acute distress, chronically ill, and frail  Skin: warm and dry  HEENT: pupils round and reactive to light, oral mucosa dry, nonicteric sclera  Neck: supple, no JVD, she had tracheostomy which has a dressing on top of it and the trach is shrinking but not closed, no carotid bruit no cervical or supraclavicular lymphadenopathy, she had tunneled dialysis catheter in the right IJ with exit site below the right clavicle  Lungs: Scattered rhonchi, unlabored breathing effort  Heart: RRR, normal S1 and S2, no S3, no rub  Abdomen: soft, nontender, normoactive bowels, she had PD catheter well dressed with no tenderness over the tunnel  : no palpable bladder,  Extremities: no edema, cyanosis or clubbing  Neuro: normal speech and mental status    calcium acetate, 1,334 mg, Oral, TID With Meals  escitalopram, 20 mg, Oral, Q24H  levothyroxine, 25 mcg, Oral, Q " AM  metroNIDAZOLE, 500 mg, Intravenous, Q8H  midodrine, 5 mg, Oral, TID AC  montelukast, 10 mg, Oral, Daily  sodium bicarbonate, 650 mg, Oral, Q8H  vancomycin, 250 mg, Oral, Q6H  warfarin, 4 mg, Oral, Daily      Pharmacy Consult,   Pharmacy Consult,         Assessment/Plan   1.  End-stage renal disease secondary to interstitial nephritis was on peritoneal dialysis but switched to in center hemodialysis after a catastrophic ailment for the past 6-8 weeks, patient presented with weakness appears to be slightly hypovolemic  2.  C. difficile colitis  3.  Recent Covid pneumonia with prolonged respiratory failure requiring tracheostomy  4.  Immobility syndrome  5.  Anemia of chronic kidney disease, hemoglobin is 10 meeting goal  6.  Hypotension currently stable      Plan:  1.  I agree with the current treatment  2.  I agree with the midodrine  3.  Hemodialysis tomorrow  4.  Surveillance labs      Thank you for asking me to see this patient in consultation        I discussed the patient's findings and my recommendations with the patient    Darci Wang MD  04/18/21  18:04 EDT      Much of this encounter note is an electronic transcription/translation of spoken language to printed text. The electronic translation of spoken language may permit erroneous, or at times, nonsensical words or phrases to be inadvertently transcribed; Although I have reviewed the note for such errors, some may still exist        Electronically signed by Darci Wang MD at 04/18/21 3427

## 2021-04-21 NOTE — PROGRESS NOTES
Pharmacy Consult: Warfarin Dosing/ Monitoring    Ambar Lara is a 51 y.o. female, estimated creatinine clearance is 23.4 mL/min (A) (by C-G formula based on SCr of 2.65 mg/dL (H)). weighing 72.2 kg (159 lb 3.2 oz).     has a past medical history of CHF (congestive heart failure) (CMS/[facility]), Dialysis patient (CMS/[facility]), Disease of thyroid gland, Elevated cholesterol, GERD (gastroesophageal reflux disease), Renal disorder, and Sleep apnea.    Social History     Tobacco Use   • Smoking status: Former Smoker     Packs/day: 1.00     Years: 25.00     Pack years: 25.00     Types: Cigarettes     Quit date: 4/16/2018     Years since quitting: 3.0   • Smokeless tobacco: Never Used   Vaping Use   • Vaping Use: Former   • Quit date: 2/14/2021   • Substances: Nicotine, Flavoring   • Devices: Pre-filled or refillable cartridge   Substance Use Topics   • Alcohol use: Not Currently   • Drug use: Not Currently       Results from last 7 days   Lab Units 04/21/21  0608 04/20/21  0614 04/19/21  1517 04/19/21  0817 04/18/21  0717 04/17/21  0555 04/16/21  1603 04/16/21  1602   INR   --  2.84* 3.38*  --   --   --   --  2.24*   HEMOGLOBIN g/dL 9.6* 9.3*  --  10.0* 10.0* 9.5* 10.7*  --    HEMATOCRIT % 30.2* 28.8*  --  31.8* 30.6* 29.7* 33.4*  --    PLATELETS 10*3/mm3 318 338  --  338 322 272 398  --      Results from last 7 days   Lab Units 04/21/21  0608 04/20/21  0614 04/19/21  0817 04/16/21  1602   SODIUM mmol/L 140 136 135* 133*   POTASSIUM mmol/L 4.1 3.9 4.5 4.1   CHLORIDE mmol/L 104 100 101 97*   CO2 mmol/L 23.4 20.3* 20.2* 23.2   BUN mg/dL 15 34* 30* 9   CREATININE mg/dL 2.65* 4.06* 4.21* 1.63*   CALCIUM mg/dL 8.1* 8.9 9.1 8.0*   BILIRUBIN mg/dL  --   --   --  0.2   ALK PHOS U/L  --   --   --  131*   ALT (SGPT) U/L  --   --   --  14   AST (SGOT) U/L  --   --   --  17   GLUCOSE mg/dL 89 98 96 146*     Anticoagulation history: Newer start of warfarin from last hospital admission on 4/9/21 for PE    Hospital  Anticoagulation:  Consulting provider: Dr. Wan  Start date: 4/19 Rx started dosing (on warfarin since date of admission, 4/16)  Indication: PE history  Target INR: 2-3  Expected duration: ?   Bridge Therapy: No                Date 4/19 4/20 4/21          INR 3.3 2.84 2.14          Warfarin dose HOLD 4 mg 5 MG            Potential drug interactions:  · Metronidazole (Major): may increase serum concentration of warfarin   · Levothyroxine: potentially increased anticoagulant effects  · Escitalopram: potentially increased risk of bleeding     Relevant nutrition status: Regular diet + Boost Breeze w/lunch + dinner (has vitamin K content)    Education complete?/ Date: TBD    Assessment/Plan:  · INR is therpapeutic at 2.14 (Goal 2-3) but has decreased substantially over the last 24 hours. I will increase the dose to warfarin 5 mg today and follow up INR in the AM. Likely to resume home regimen tomorrow if INR trend stabilizes.    Pharmacy will continue to follow until discharge or discontinuation of warfarin.   Cheyanne Ashford, [facility]  4/21/2021

## 2021-04-21 NOTE — PLAN OF CARE
Goal Outcome Evaluation:      Patient is resting well this shift without c/o pain or discomfort at this time. She is showing signs of improvement as evidenced by being able to walk to the bathroom now with her walker with standby assist. Lungs are clear and respirations equal without coughing or congestion noted. She does still have slightly hypotensive B/Ps and nursing is monitoring often as well as administering scheduled medication to support her B/P.

## 2021-04-21 NOTE — PROGRESS NOTES
"Adult Nutrition  Assessment/PES    Patient Name:  Ambar Lara  YOB: 1970  MRN: [id number]  Admit Date:  4/16/2021    Assessment Date:  4/21/2021    Comments:  Nutrition follow up.  HD yesterday.  +Cdiff, diarrhea improving.  Improving overall.  Eating well, 75% x 4 meals per chart PO data.    RD to continue to follow.    RD working remotely due to covid-19 pandemic. Assessment completed based on review of electronic medical record. RD may be reached via secure chat or email.     Reason for Assessment     Row Name 04/21/21 1301          Reason for Assessment    Reason For Assessment  follow-up protocol         Nutrition/Diet History     Row Name 04/21/21 1303          Nutrition/Diet History    Typical Food/Fluid Intake  eating 75% at meals         Anthropometrics     Row Name 04/21/21 1303          Anthropometrics    Height  154.9 cm (60.98\")        Admit Weight    Admit Weight  -- 159# 4/21        Ideal Body Weight (IBW)    Ideal Body Weight (IBW) (kg)  48.11        Body Mass Index (BMI)    BMI Assessment  BMI 30-34.9: obesity grade I 30.01         Labs/Tests/Procedures/Meds     Row Name 04/21/21 1304          Labs/Procedures/Meds    Lab Results Reviewed  reviewed, pertinent     Lab Results Comments  Creat, Ca, GFR, Alb, Phos, Hgb, Hct        Diagnostic Tests/Procedures    Diagnostic Test/Procedure Reviewed  reviewed, pertinent     Diagnostic Test/Procedures Comments  HD yest        Medications    Pertinent Medications Reviewed  reviewed, pertinent     Pertinent Medications Comments  phos binder, synthroid, MVI, florastor, vanc, coumadin         Physical Findings     Row Name 04/21/21 1305          Physical Findings    Overall Physical Appearance  obese     Gastrointestinal  diarrhea     Skin  other (see comments);pressure injury B=19, excoriation, R gluteal st II         Estimated/Assessed Needs     Row Name 04/21/21 1303          Calculation Measurements    Height  154.9 cm (60.98\")     "     Nutrition Prescription Ordered     Row Name 04/21/21 1306          Nutrition Prescription PO    Current PO Diet  Regular     Supplement  Boost Breeze (Ensure)     Supplement Frequency  2 times a day         Evaluation of Received Nutrient/Fluid Intake     Row Name 04/21/21 1306          PO Evaluation    Number of Meals  4     % PO Intake  75               Problem/Interventions:          Intervention Goal     Row Name 04/21/21 1307          Intervention Goal    General  Maintain nutrition;Disease management/therapy;Meet nutritional needs for age/condition     PO  Maintain intake     Weight  No significant weight loss         Nutrition Intervention     Row Name 04/21/21 1307          Nutrition Intervention    RD/Tech Action  Follow Tx progress;Care plan reviewd     Recommended/Ordered  Supplement           Education/Evaluation     Row Name 04/21/21 1307          Education    Education  Will Instruct as appropriate        Monitor/Evaluation    Monitor  Per protocol;PO intake;Supplement intake;Pertinent labs;Weight;Skin status;Symptoms           Electronically signed by:  Shirley Mahoney RD  04/21/21 13:07 EDT

## 2021-04-21 NOTE — PROGRESS NOTES
"   LOS: 4 days    Patient Care Team:  Jet Manuel MD as PCP - General (Internal Medicine)    Chief Complaint:    Chief Complaint   Patient presents with   • Abdominal Pain   • Hypotension     Follow UP ESRD  Subjective     Interval History:     Just completed dialysis.  BP 88 systolic.  Received midodrine today.  Walked in henry.   Stool with a little more form.  Not having as much abdominal pain.  Weight more stable .  Trach closing .  Review of Systems:   As noted above    Objective     Vital Signs  Temp:  [97.8 °F (36.6 °C)-98.6 °F (37 °C)] 98.4 °F (36.9 °C)  Heart Rate:  [78-99] 99  Resp:  [16-18] 16  BP: ()/(53-76) 82/53    Flowsheet Rows      First Filed Value   Admission Height  154.9 cm (61\") Documented at 04/16/2021 1559   Admission Weight  68.5 kg (151 lb) Documented at 04/16/2021 1559          I/O this shift:  In: 480 [P.O.:480]  Out: 1300 [Other:1300]  No intake/output data recorded.    Intake/Output Summary (Last 24 hours) at 4/21/2021 1605  Last data filed at 4/21/2021 1425  Gross per 24 hour   Intake 480 ml   Output 1300 ml   Net -820 ml       Physical Exam:  Lying in bed .  Chronically ill-appearing  Brighter affect than on last visit .   HEENT  No eye discharge; no scleral icterus  No JVD;  tracheostomy covered.   Heart RRR, no rub or s3.  Right chest TDC  Lungs clear to auscultation.   Abd Soft, nontender. + bs, Mask applied .PD catheter right lower quadrant dressing intact.  Tunnel nontender.   Ext no edema .  Poor muscle mass .  Moves all extremities  Speech is fluent      Results Review:    Results from last 7 days   Lab Units 04/21/21  0608 04/20/21  0614 04/19/21  0817 04/16/21  1602   SODIUM mmol/L 140 136 135* 133*   POTASSIUM mmol/L 4.1 3.9 4.5 4.1   CHLORIDE mmol/L 104 100 101 97*   CO2 mmol/L 23.4 20.3* 20.2* 23.2   BUN mg/dL 15 34* 30* 9   CREATININE mg/dL 2.65* 4.06* 4.21* 1.63*   CALCIUM mg/dL 8.1* 8.9 9.1 8.0*   BILIRUBIN mg/dL  --   --   --  0.2   ALK PHOS U/L  --   --   -- "  131*   ALT (SGPT) U/L  --   --   --  14   AST (SGOT) U/L  --   --   --  17   GLUCOSE mg/dL 89 98 96 146*       Estimated Creatinine Clearance: 23.4 mL/min (A) (by C-G formula based on SCr of 2.65 mg/dL (H)).    Results from last 7 days   Lab Units 04/21/21  0608 04/20/21  0614 04/19/21  0817   PHOSPHORUS mg/dL 4.8* 6.8* 6.2*             Results from last 7 days   Lab Units 04/21/21  0608 04/20/21  0614 04/19/21  0817 04/18/21  0717 04/17/21  0555   WBC 10*3/mm3 7.45 7.89 8.09 9.61 9.30   HEMOGLOBIN g/dL 9.6* 9.3* 10.0* 10.0* 9.5*   PLATELETS 10*3/mm3 318 338 338 322 272       Results from last 7 days   Lab Units 04/21/21  1047 04/20/21  0614 04/19/21  1517 04/16/21  1602   INR  2.14* 2.84* 3.38* 2.24*         Imaging Results (Last 24 Hours)     ** No results found for the last 24 hours. **        calcium acetate, 1,334 mg, Oral, TID With Meals  epoetin marco antonio/marco antonio-epbx, 5,000 Units, Subcutaneous, Once per day on Mon Wed Fri  escitalopram, 20 mg, Oral, Q24H  levothyroxine, 100 mcg, Oral, Q AM  metroNIDAZOLE, 500 mg, Intravenous, Q8H  midodrine, 5 mg, Oral, TID AC  montelukast, 10 mg, Oral, Daily  multivitamin, 1 tablet, Oral, Daily  saccharomyces boulardii, 500 mg, Oral, BID  sodium bicarbonate, 650 mg, Oral, Q8H  vancomycin, 250 mg, Oral, Q6H  warfarin, 5 mg, Oral, Once      Pharmacy Consult,   Pharmacy to dose warfarin,         Medication Review:   Current Facility-Administered Medications   Medication Dose Route Frequency Provider Last Rate Last Admin   • acetaminophen (TYLENOL) tablet 650 mg  650 mg Oral Q4H PRN Stingl, Binta Cordero MD   650 mg at 04/18/21 2044   • calcium acetate (PHOS BINDER)) capsule 1,334 mg  1,334 mg Oral TID With Meals Binta Patel MD   1,334 mg at 04/21/21 1145   • epoetin marco antonio-epbx (RETACRIT) injection 5,000 Units  5,000 Units Subcutaneous Once per day on Mon Wed Fri Bebeto Pinto MD   5,000 Units at 04/21/21 1149   • escitalopram (LEXAPRO) tablet 20 mg  20 mg Oral Q24H  Binta Patel MD   20 mg at 04/20/21 2327   • levothyroxine (SYNTHROID, LEVOTHROID) tablet 100 mcg  100 mcg Oral Q AM Nirmal Wan MD   100 mcg at 04/21/21 0640   • melatonin tablet 3 mg  3 mg Oral Nightly PRN Binta Patel MD   3 mg at 04/20/21 2326   • metroNIDAZOLE (FLAGYL) 500 mg/100mL IVPB  500 mg Intravenous Q8H Nirmal Wan MD   500 mg at 04/21/21 1145   • midodrine (PROAMATINE) tablet 5 mg  5 mg Oral TID AC Nirmal Wan MD   5 mg at 04/21/21 0925   • montelukast (SINGULAIR) tablet 10 mg  10 mg Oral Daily Binta Patel MD   10 mg at 04/21/21 1146   • multivitamin (THERAGRAN) tablet 1 tablet  1 tablet Oral Daily Bebeto Pinto MD   1 tablet at 04/21/21 1145   • nitroglycerin (NITROSTAT) SL tablet 0.4 mg  0.4 mg Sublingual Q5 Min PRN Binta Patel MD       • ondansetron (ZOFRAN) tablet 4 mg  4 mg Oral Q6H PRN Binta Patel MD        Or   • ondansetron (ZOFRAN) injection 4 mg  4 mg Intravenous Q6H PRN Binta Patel MD   4 mg at 04/21/21 1149   • Pharmacy Consult   Does not apply Continuous PRN Binta Patel MD       • Pharmacy to dose warfarin   Does not apply Continuous PRN Nirmal Wan MD       • saccharomyces boulardii (FLORASTOR) capsule 500 mg  500 mg Oral BID Nirmal Wan MD   500 mg at 04/21/21 1146   • sodium bicarbonate tablet 650 mg  650 mg Oral Q8H Binta Patel MD   650 mg at 04/21/21 1445   • sodium chloride 0.9 % flush 10 mL  10 mL Intravenous PRN Marcos Blackman MD   10 mL at 04/17/21 2118   • vancomycin (VANCOCIN) capsule 250 mg  250 mg Oral Q6H Nirmal Wan MD   250 mg at 04/21/21 1145   • warfarin (COUMADIN) tablet 5 mg  5 mg Oral Once Nirmal Wan MD           Assessment/Plan   1. ESRD.  Completed dialysis today.  Plan is for return to PD after acute illness and out of rehab.   PD catheter secured with dressing I applied last admission.  Has follow up scheduled with  [facility] home training .  2. C. Dif.colitis.  Maybe a little clinical improvement. IV flagy and po vanc.   3. Chronic systolic heart failure, chronic hypotension.  Increase midodrine .  4. Hypothyroid on replacement .  5. Anemia CKD. JULEE as outpt .  6. Covid PNA earlier this year with prolonged Respiratory failure, trach.  Now closing .  7. PE subsegmental RLL 4/9/21.  on coumadin.  INR 2.14.       Faviola Moura MD  04/21/21  16:05 EDT

## 2021-04-21 NOTE — PROGRESS NOTES
Continued Stay Note  [facility]     Patient Name: Ambar Lara  MRN: [id number]  Today's Date: 4/21/2021    Admit Date: 4/16/2021    Discharge Plan     Row Name 04/21/21 1003       Plan    Plan  Plan skilled care at accepting facility or  Acute Rehab.    RUTH Henning RN    Patient/Family in Agreement with Plan  yes    Plan Comments  [facility] Acute Rehab following and are awaiting updated OT notes.  Plan skilled care at accepting facility or  Acute Rehab.    RUTH Henning RN        Discharge Codes    No documentation.             Liliana Henning, RN

## 2021-04-22 LAB
ANION GAP SERPL CALCULATED.3IONS-SCNC: 12.9 MMOL/L (ref 5–15)
BASOPHILS # BLD AUTO: 0.18 10*3/MM3 (ref 0–0.2)
BASOPHILS NFR BLD AUTO: 2.1 % (ref 0–1.5)
BUN SERPL-MCNC: 10 MG/DL (ref 6–20)
BUN/CREAT SERPL: 4.6 (ref 7–25)
CALCIUM SPEC-SCNC: 8.1 MG/DL (ref 8.6–10.5)
CHLORIDE SERPL-SCNC: 104 MMOL/L (ref 98–107)
CO2 SERPL-SCNC: 21.1 MMOL/L (ref 22–29)
CREAT SERPL-MCNC: 2.19 MG/DL (ref 0.57–1)
DEPRECATED RDW RBC AUTO: 53.1 FL (ref 37–54)
EOSINOPHIL # BLD AUTO: 0.19 10*3/MM3 (ref 0–0.4)
EOSINOPHIL NFR BLD AUTO: 2.2 % (ref 0.3–6.2)
ERYTHROCYTE [DISTWIDTH] IN BLOOD BY AUTOMATED COUNT: 15.4 % (ref 12.3–15.4)
GFR SERPL CREATININE-BSD FRML MDRD: 24 ML/MIN/1.73
GLUCOSE SERPL-MCNC: 99 MG/DL (ref 65–99)
HCT VFR BLD AUTO: 31.3 % (ref 34–46.6)
HGB BLD-MCNC: 10 G/DL (ref 12–15.9)
IMM GRANULOCYTES # BLD AUTO: 0.33 10*3/MM3 (ref 0–0.05)
IMM GRANULOCYTES NFR BLD AUTO: 3.9 % (ref 0–0.5)
INR PPP: 2.08 (ref 0.9–1.1)
LYMPHOCYTES # BLD AUTO: 2.28 10*3/MM3 (ref 0.7–3.1)
LYMPHOCYTES NFR BLD AUTO: 26.7 % (ref 19.6–45.3)
MCH RBC QN AUTO: 30.3 PG (ref 26.6–33)
MCHC RBC AUTO-ENTMCNC: 31.9 G/DL (ref 31.5–35.7)
MCV RBC AUTO: 94.8 FL (ref 79–97)
MONOCYTES # BLD AUTO: 0.5 10*3/MM3 (ref 0.1–0.9)
MONOCYTES NFR BLD AUTO: 5.9 % (ref 5–12)
NEUTROPHILS NFR BLD AUTO: 5.05 10*3/MM3 (ref 1.7–7)
NEUTROPHILS NFR BLD AUTO: 59.2 % (ref 42.7–76)
NRBC BLD AUTO-RTO: 0 /100 WBC (ref 0–0.2)
PLATELET # BLD AUTO: 296 10*3/MM3 (ref 140–450)
PMV BLD AUTO: 9.4 FL (ref 6–12)
POTASSIUM SERPL-SCNC: 4 MMOL/L (ref 3.5–5.2)
PROTHROMBIN TIME: 23.1 SECONDS (ref 11.7–14.2)
RBC # BLD AUTO: 3.3 10*6/MM3 (ref 3.77–5.28)
SODIUM SERPL-SCNC: 138 MMOL/L (ref 136–145)
WBC # BLD AUTO: 8.53 10*3/MM3 (ref 3.4–10.8)

## 2021-04-22 PROCEDURE — 80048 BASIC METABOLIC PNL TOTAL CA: CPT | Performed by: INTERNAL MEDICINE

## 2021-04-22 PROCEDURE — 97535 SELF CARE MNGMENT TRAINING: CPT | Performed by: OCCUPATIONAL THERAPIST

## 2021-04-22 PROCEDURE — 85610 PROTHROMBIN TIME: CPT | Performed by: INTERNAL MEDICINE

## 2021-04-22 PROCEDURE — 97110 THERAPEUTIC EXERCISES: CPT

## 2021-04-22 PROCEDURE — 85025 COMPLETE CBC W/AUTO DIFF WBC: CPT | Performed by: INTERNAL MEDICINE

## 2021-04-22 RX ORDER — ALBUMIN (HUMAN) 12.5 G/50ML
12.5 SOLUTION INTRAVENOUS AS NEEDED
Status: ACTIVE | OUTPATIENT
Start: 2021-04-22 | End: 2021-04-22

## 2021-04-22 RX ORDER — WARFARIN SODIUM 5 MG/1
5 TABLET ORAL
Status: DISCONTINUED | OUTPATIENT
Start: 2021-04-22 | End: 2021-04-24 | Stop reason: HOSPADM

## 2021-04-22 RX ADMIN — SODIUM BICARBONATE 650 MG: 650 TABLET ORAL at 05:21

## 2021-04-22 RX ADMIN — MONTELUKAST SODIUM 10 MG: 10 TABLET, FILM COATED ORAL at 08:00

## 2021-04-22 RX ADMIN — MIDODRINE HYDROCHLORIDE 10 MG: 5 TABLET ORAL at 07:36

## 2021-04-22 RX ADMIN — Medication 3 MG: at 21:51

## 2021-04-22 RX ADMIN — CALCIUM ACETATE 1334 MG: 667 CAPSULE ORAL at 17:58

## 2021-04-22 RX ADMIN — VANCOMYCIN HYDROCHLORIDE 250 MG: 125 CAPSULE ORAL at 11:45

## 2021-04-22 RX ADMIN — Medication 500 MG: at 08:00

## 2021-04-22 RX ADMIN — Medication 3 MG: at 00:08

## 2021-04-22 RX ADMIN — WARFARIN 5 MG: 5 TABLET ORAL at 17:58

## 2021-04-22 RX ADMIN — METRONIDAZOLE 500 MG: 500 INJECTION, SOLUTION INTRAVENOUS at 11:45

## 2021-04-22 RX ADMIN — CALCIUM ACETATE 1334 MG: 667 CAPSULE ORAL at 08:00

## 2021-04-22 RX ADMIN — LEVOTHYROXINE SODIUM 100 MCG: 0.1 TABLET ORAL at 05:21

## 2021-04-22 RX ADMIN — VANCOMYCIN HYDROCHLORIDE 250 MG: 125 CAPSULE ORAL at 05:21

## 2021-04-22 RX ADMIN — VANCOMYCIN HYDROCHLORIDE 250 MG: 125 CAPSULE ORAL at 23:27

## 2021-04-22 RX ADMIN — CALCIUM ACETATE 1334 MG: 667 CAPSULE ORAL at 11:45

## 2021-04-22 RX ADMIN — Medication 1 TABLET: at 08:00

## 2021-04-22 RX ADMIN — METRONIDAZOLE 500 MG: 500 INJECTION, SOLUTION INTRAVENOUS at 21:51

## 2021-04-22 RX ADMIN — MIDODRINE HYDROCHLORIDE 10 MG: 5 TABLET ORAL at 17:59

## 2021-04-22 RX ADMIN — ESCITALOPRAM 20 MG: 20 TABLET, FILM COATED ORAL at 21:51

## 2021-04-22 RX ADMIN — MIDODRINE HYDROCHLORIDE 10 MG: 5 TABLET ORAL at 11:45

## 2021-04-22 RX ADMIN — ACETAMINOPHEN 650 MG: 325 TABLET, FILM COATED ORAL at 21:50

## 2021-04-22 RX ADMIN — METRONIDAZOLE 500 MG: 500 INJECTION, SOLUTION INTRAVENOUS at 05:00

## 2021-04-22 RX ADMIN — VANCOMYCIN HYDROCHLORIDE 250 MG: 125 CAPSULE ORAL at 00:08

## 2021-04-22 RX ADMIN — SODIUM BICARBONATE 650 MG: 650 TABLET ORAL at 14:44

## 2021-04-22 RX ADMIN — VANCOMYCIN HYDROCHLORIDE 250 MG: 125 CAPSULE ORAL at 17:58

## 2021-04-22 RX ADMIN — SODIUM BICARBONATE 650 MG: 650 TABLET ORAL at 23:27

## 2021-04-22 RX ADMIN — Medication 500 MG: at 21:51

## 2021-04-22 NOTE — PLAN OF CARE
Goal Outcome Evaluation:     Progress: improving  Outcome Summary: Pt AOx4. On RA. NSR/ST on the monitor. Worked with PT/OT today and got alot accomplished. Feeling hopeful. She tells me she wants to go home and that she has a huge support system at home. R tunnel cath in place. Dressing C/D/I. Iv abx/oral abx. No acute distress. Will continue to monitor.

## 2021-04-22 NOTE — PLAN OF CARE
Goal Outcome Evaluation:  Plan of Care Reviewed With: patient  Progress: improving  Outcome Summary: Pt does very well with OT this am, able to stand for grooming tasks at the sink for ~10 min with CGA and performed toileting with CGA as well. Pt is very motivated and would like BAR, she has good support system at home. She does continue to show decreased endurance and strength.    Patient was not wearing a face mask during this therapy encounter. Therapist used appropriate personal protective equipment including mask, gloves, and eye protection.  Mask used was standard procedure mask. Appropriate PPE was worn during the entire therapy session. Hand hygiene was completed before and after therapy session. Patient is not in enhanced droplet precautions.

## 2021-04-22 NOTE — PLAN OF CARE
"Goal Outcome Evaluation:  Patient is resting well this shift and showing signs of steady improvement with ambulation and her overall attitude. Lungs are clear bilaterally and VSS without any c/o pain or nausea during this shift. Her appetite has improved and diarrhea episodes have tapered off some. Nursing will continue to encourage this patient with ADLS and independence since her personal goal is to \"go home\".          "

## 2021-04-22 NOTE — THERAPY TREATMENT NOTE
Acute Care - Occupational Therapy Treatment Note  [facility]     Patient Name: Ambar Lara  : 1970  MRN: [id number]  Today's Date: 2021             Admit Date: 2021       ICD-10-CM ICD-9-CM   1. Clostridium difficile colitis  A04.72 008.45   2. ESRD on hemodialysis (CMS/[facility])  N18.6 585.6    Z99.2 V45.11   3. Other pulmonary embolism without acute cor pulmonale, unspecified chronicity (CMS/[facility])  I26.99 415.19   4. Chest pain, unspecified type  R07.9 786.50     Patient Active Problem List   Diagnosis   • Colitis, Clostridium difficile   • Anxiety   • Cardiomyopathy (CMS/[facility])   • Chronic systolic heart failure (CMS/[facility])   • ESRD (end stage renal disease) on dialysis (CMS/[facility])   • Gastroesophageal reflux disease   • Gitelman syndrome   • HLD (hyperlipidemia)   • Hypothyroidism   • Peritoneal dialysis status (CMS/[facility])   • History of tracheostomy   • Acute pulmonary embolism (CMS/HCC)   • Severe malnutrition (CMS/HCC)   • Clostridium difficile colitis     Past Medical History:   Diagnosis Date   • CHF (congestive heart failure) (CMS/[facility])    • Dialysis patient (CMS/HCC)    • Disease of thyroid gland    • Elevated cholesterol    • GERD (gastroesophageal reflux disease)    • Renal disorder    • Sleep apnea      Past Surgical History:   Procedure Laterality Date   • ADRENAL GLAND SURGERY N/A `   •  SECTION Bilateral 2001    Has had x2   • HYSTERECTOMY     • TRACHEOSTOMY              OT ASSESSMENT FLOWSHEET (last 12 hours)      OT Evaluation and Treatment     Row Name 21 1006                   OT Time and Intention    Subjective Information  no complaints  -SG        Document Type  therapy note (daily note)  -SG        Mode of Treatment  individual therapy;occupational therapy  -SG        Patient Effort  excellent  -SG        Symptoms Noted During/After Treatment  fatigue  -SG        Comment  Pt reports she was fatigued yesterday due to dialysis, feeling better today  -SG            General Information    General Observations of Patient  Pt supine in bed, nsg present  -SG           Cognition    Affect/Mental Status (Cognitive)  WNL  -SG        Orientation Status (Cognition)  oriented x 4  -SG        Follows Commands (Cognition)  WNL  -SG           Pain Scale: Numbers Pre/Post-Treatment    Pretreatment Pain Rating  0/10 - no pain  -SG           Bed Mobility    Supine-Sit [place] (Bed Mobility)  supervision  -SG        Sit-Supine [place] (Bed Mobility)  supervision  -SG           Functional Mobility    Functional Mobility- Ind. Level  contact guard assist;verbal cues required  -SG        Functional Mobility- Device  rolling walker  -SG        Functional Mobility- Comment  Walks to sink, bathroom, back to bed  -SG           Transfer Assessment/Treatment    Transfers  sit-stand transfer;stand-sit transfer;toilet transfer  -SG           Transfers    Sit-Stand [place] (Transfers)  standby assist  -SG        Stand-Sit [place] (Transfers)  standby assist  -SG        [place] Level (Toilet Transfer)  contact guard;verbal cues  -SG           Sit-Stand Transfer    Assistive Device (Sit-Stand Transfers)  walker, front-wheeled  -SG           Stand-Sit Transfer    Assistive Device (Stand-Sit Transfers)  walker, front-wheeled  -           Toilet Transfer    Type (Toilet Transfer)  sit-stand;stand-sit  -SG           Activities of Daily Living    BADL Assessment/Intervention  grooming;toileting  -SG           Grooming Assessment/Training    [place] Level (Grooming)  grooming skills;hair care, combing/brushing;oral care regimen;wash face, hands;contact guard assist;verbal cues  -SG        Position (Grooming)  sink side;supported standing  -SG        Comment (Grooming)  Stands for 10 min CGA at the sink  -SG           Toileting Assessment/Training    [place] Level (Toileting)  toileting skills;adjust/manage clothing;change pad/brief;perform perineal hygiene;contact guard  assist;verbal cues  -SG        Assistive Devices (Toileting)  grab bar/safety frame  -SG        Position (Toileting)  supported sitting;supported standing  -SG           Wound 04/16/21 2100 Right medial gluteal Pressure Injury    Wound - Properties Group Placement Date: 04/16/21  -MS Placement Time: 2100 -MS Present on Hospital Admission: Y  -MS Side: Right  -MS Orientation: medial  -MS Location: gluteal  -MS Primary Wound Type: Pressure inj  -MS Stage, Pressure Injury : Stage 2  -MS    Retired Wound - Properties Group Date first assessed: 04/16/21  -MS Time first assessed: 2100 -MS Present on Hospital Admission: Y  -MS Side: Right  -MS Location: gluteal  -MS Primary Wound Type: Pressure inj  -MS       Plan of Care Review    Plan of Care Reviewed With  patient  -SG        Progress  improving  -SG        Outcome Summary  Pt does very well with OT this am, able to stand for grooming tasks at the sink for ~10 min with CGA and performed toileting with CGA as well. Pt is very motivated and would like BAR, she has good support system at home. She does continue to show decreased endurance and strength.  -SG           Positioning and Restraints    Pre-Treatment Position  in bed  -SG        Post Treatment Position  bed  -SG        In Bed  supine;call light within reach;encouraged to call for assist;exit alarm on  -SG          User Key  (r) = Recorded By, (t) = Taken By, (c) = Cosigned By    Initials Name Effective Dates    SG Amisha Suárez OTR 12/26/18 -     Marianna Hendrix RN 10/30/17 -          Occupational Therapy Education                 Title: PT OT SLP Therapies (Done)     Topic: Occupational Therapy (Done)     Point: ADL training (Done)     Description:   Instruct learner(s) on proper safety adaptation and remediation techniques during self care or transfers.   Instruct in proper use of assistive devices.              Learning Progress Summary           Patient Acceptance, E,TB, VU by PT at 4/19/2021 2892     Acceptance, E, VU by BT at 4/19/2021 1251    Comment: purpose of OT, adl retraining, functional transfer training                   Point: Home exercise program (Done)     Description:   Instruct learner(s) on appropriate technique for monitoring, assisting and/or progressing therapeutic exercises/activities.              Learning Progress Summary           Patient Acceptance, E,TB, VU by PT at 4/19/2021 1604    Acceptance, E, VU by BT at 4/19/2021 1251    Comment: purpose of OT, adl retraining, functional transfer training                   Point: Precautions (Done)     Description:   Instruct learner(s) on prescribed precautions during self-care and functional transfers.              Learning Progress Summary           Patient Acceptance, E,TB, VU by PT at 4/19/2021 1604    Acceptance, E, VU by BT at 4/19/2021 1251    Comment: purpose of OT, adl retraining, functional transfer training                   Point: Body mechanics (Done)     Description:   Instruct learner(s) on proper positioning and spine alignment during self-care, functional mobility activities and/or exercises.              Learning Progress Summary           Patient Acceptance, E,TB, VU by PT at 4/19/2021 1604    Acceptance, E, VU by BT at 4/19/2021 1251    Comment: purpose of OT, adl retraining, functional transfer training                               User Key     Initials Effective Dates Name Provider Type Discipline    PT 06/08/20 -  Sydney Watkins RN Registered Nurse Nurse    BT 11/16/20 -  Iris Umanzor OT Occupational Therapist OT                  OT Recommendation and Plan     Plan of Care Review  Plan of Care Reviewed With: patient  Progress: improving  Outcome Summary: Pt does very well with OT this am, able to stand for grooming tasks at the sink for ~10 min with CGA and performed toileting with CGA as well. Pt is very motivated and would like BAR, she has good support system at home. She does continue to show decreased endurance  and strength.  Plan of Care Reviewed With: patient  Outcome Summary: Pt does very well with OT this am, able to stand for grooming tasks at the sink for ~10 min with CGA and performed toileting with CGA as well. Pt is very motivated and would like BAR, she has good support system at home. She does continue to show decreased endurance and strength.    Outcome Measures     Row Name 04/22/21 1000             How much help from another is currently needed...    Putting on and taking off regular lower body clothing?  3  -SG      Bathing (including washing, rinsing, and drying)  3  -SG      Toileting (which includes using toilet bed pan or urinal)  3  -SG      Putting on and taking off regular upper body clothing  3  -SG      Taking care of personal grooming (such as brushing teeth)  3  -SG      Eating meals  4  -SG      AM-PAC 6 Clicks Score (OT)  19  -SG         Functional Assessment    Outcome Measure Options  AM-PAC 6 Clicks Daily Activity (OT)  -SG        User Key  (r) = Recorded By, (t) = Taken By, (c) = Cosigned By    Initials Name Provider Type    Amisha Malone OTR Occupational Therapist          Time Calculation:   Time Calculation- OT     Row Name 04/22/21 1011             Time Calculation- OT    OT Start Time  0937  -SG      OT Stop Time  1001  -SG      OT Time Calculation (min)  24 min  -SG      Total Timed Code Minutes- OT  24 minute(s)  -SG      OT Received On  04/22/21  -SG      OT - Next Appointment  04/23/21  -SG         Timed Charges    15663 - OT Self Care/Mgmt Minutes  24  -SG         Total Minutes    Timed Charges Total Minutes  24  -SG       Total Minutes  24  -SG        User Key  (r) = Recorded By, (t) = Taken By, (c) = Cosigned By    Initials Name Provider Type    Amisha Malone OTR Occupational Therapist        Therapy Charges for Today     Code Description Service Date Service Provider Modifiers Qty    70445776282 HC OT SELF CARE/MGMT/TRAIN EA 15 MIN 4/22/2021 Amisha Suárez OTR  GO 2               Amisha Suárez, OTR  4/22/2021

## 2021-04-22 NOTE — DISCHARGE PLACEMENT REQUEST
"  Ambar Melo (51 y.o. Female)     Date of Birth Social Security Number Address Home Phone MRN    1970  [address] [id number] [phone] [id number]    L[employer] Marital Status          Unknown        Admission Date Admission Type Admitting Provider Attending Provider Department, Room/Bed    4/16/21 Emergency Stingl, MD Savage Garcia Abhishek, MD [address], [address]/1    Discharge Date Discharge Disposition Discharge Destination                       Attending Provider: Nirmal Wan MD    Allergies: Penicillins, Nickel    Isolation: Spore   Infection: C.difficile (04/09/21), COVID (History) (04/18/21)   Code Status: CPR    Ht: 154.9 cm (60.98\")   Wt: 72.3 kg (159 lb 4.8 oz)    Admission Cmt: None   Principal Problem: Clostridium difficile colitis [A04.72]                 Active Insurance as of 4/16/2021     Primary Coverage     Payor Plan Insurance Group Employer/Plan Group    [facility] [id number]     Payor Plan Address Payor Plan Phone Number Payor Plan Fax Number Effective Dates    [address]   1/1/2021 - None Entered    [facility] [id number]       Subscriber Name Subscriber Birth Date Member ID       LORIEGARETTAMBAR J 1970 [id number]           Secondary Coverage     Payor Plan Insurance Group Employer/Plan Group    MEDICARE MEDICARE A & B      Payor Plan Address Payor Plan Phone Number Payor Plan Fax Number Effective Dates    [address] [phone]  4/1/2019 - None Entered    [facility] [id number]       Subscriber Name Subscriber Birth Date Member ID       LORIEAMBAR MOLINA 1970 [id number]                 Emergency Contacts      (Rel.) Home Phone Work Phone Mobile Phone    Roland Melo (Son) [phone] -- --    natalie melo (Power of ) [phone] -- --    loriematy (Daughter) [phone] -- --              "

## 2021-04-22 NOTE — PROGRESS NOTES
[facility]-  Spoke with dgt who states she can provide 24/7 asst after dc.   Precert initiated. CCP informed    Suzette Molina RN  Acute Rehab Admission Nurse

## 2021-04-22 NOTE — PROGRESS NOTES
Continued Stay Note  [facility]     Patient Name: Ambar Lara  MRN: [id number]  Today's Date: 4/22/2021    Admit Date: 4/16/2021    Discharge Plan     Row Name 04/22/21 1541       Plan    Plan  Plan [facility] pending pre cert.   RUTH Henning RN    Patient/Family in Agreement with Plan  yes    Plan Comments  Spoke to pt at [facility].   Yohan Bradford- [facility] has submitted for pre cert for acute rehab.   Pt states if denied [facility] wants to go home with HH.  Pt was provided a verbal list of HH agencies and chose [facility] to follow.   [facility] ( [phone]) called to follow.   Plan [facility] pending pre cert.   RUTH Henning RN        Discharge Codes    No documentation.             Liliana Henning, RN

## 2021-04-22 NOTE — THERAPY TREATMENT NOTE
Patient Name: Ambar Lara  : 1970    MRN: [id number]                              Today's Date: 2021       Admit Date: 2021    Visit Dx:     ICD-10-CM ICD-9-CM   1. Clostridium difficile colitis  A04.72 008.45   2. ESRD on hemodialysis (CMS/HCC)  N18.6 585.6    Z99.2 V45.11   3. Other pulmonary embolism without acute cor pulmonale, unspecified chronicity (CMS/HCC)  I26.99 415.19   4. Chest pain, unspecified type  R07.9 786.50     Patient Active Problem List   Diagnosis   • Colitis, Clostridium difficile   • Anxiety   • Cardiomyopathy (CMS/HCC)   • Chronic systolic heart failure (CMS/HCC)   • ESRD (end stage renal disease) on dialysis (CMS/HCC)   • Gastroesophageal reflux disease   • Gitelman syndrome   • HLD (hyperlipidemia)   • Hypothyroidism   • Peritoneal dialysis status (CMS/HCC)   • History of tracheostomy   • Acute pulmonary embolism (CMS/HCC)   • Severe malnutrition (CMS/HCC)   • Clostridium difficile colitis     Past Medical History:   Diagnosis Date   • CHF (congestive heart failure) (CMS/HCC)    • Dialysis patient (CMS/HCC)    • Disease of thyroid gland    • Elevated cholesterol    • GERD (gastroesophageal reflux disease)    • Renal disorder    • Sleep apnea      Past Surgical History:   Procedure Laterality Date   • ADRENAL GLAND SURGERY N/A `   •  SECTION Bilateral 2001    Has had x2   • HYSTERECTOMY     • TRACHEOSTOMY       General Information     Row Name 21 0908          Physical Therapy Time and Intention    Document Type  therapy note (daily note)  -     Mode of Treatment  physical therapy  -     Row Name 21 0908          General Information    Existing Precautions/Restrictions  fall  -     Row Name 21 0908          Cognition    Orientation Status (Cognition)  oriented x 4  -       User Key  (r) = Recorded By, (t) = Taken By, (c) = Cosigned By    Initials Name Provider Type     Jane Mcintyre PTA Physical Therapy Assistant         Mobility     Row Name 04/22/21 0908          Bed Mobility    Bed Mobility  supine-sit;sit-supine  -     Supine-Sit [place] (Bed Mobility)  supervision  -     Sit-Supine [place] (Bed Mobility)  supervision  -     Assistive Device (Bed Mobility)  bed rails;head of bed elevated  -SM     Row Name 04/22/21 0908          Sit-Stand Transfer    Sit-Stand [place] (Transfers)  standby assist  -     Assistive Device (Sit-Stand Transfers)  walker, front-wheeled  -SM     Row Name 04/22/21 0908          Gait/Stairs (Locomotion)    [place] Level (Gait)  contact guard  -     Assistive Device (Gait)  walker, front-wheeled  -     Distance in Feet (Gait)  90  -SM     Deviations/Abnormal Patterns (Gait)  august decreased;stride length decreased  -     Bilateral Gait Deviations  forward flexed posture  -     Comment (Gait/Stairs)  pt very motivated to ambulate, though very fatigued by end  -       User Key  (r) = Recorded By, (t) = Taken By, (c) = Cosigned By    Initials Name Provider Type    Jane Alvarez PTA Physical Therapy Assistant        Obj/Interventions     Row Name 04/22/21 0910          Motor Skills    Therapeutic Exercise  -- seated AP, LAQ, marches x10 reps  -       User Key  (r) = Recorded By, (t) = Taken By, (c) = Cosigned By    Initials Name Provider Type    Jane Alvarez PTA Physical Therapy Assistant        Goals/Plan    No documentation.       Clinical Impression     Row Name 04/22/21 0911          Pain    Additional Documentation  Pain Scale: Numbers Pre/Post-Treatment (Group)  -SM     Row Name 04/22/21 0911          Pain Scale: Numbers Pre/Post-Treatment    Pretreatment Pain Rating  0/10 - no pain  -     Posttreatment Pain Rating  0/10 - no pain  -SM     Row Name 04/22/21 0911          Positioning and Restraints    Pre-Treatment Position  in bed  -     Post Treatment Position  bed  -SM     In Bed  supine;call light within reach;encouraged to call  for assist  -       User Key  (r) = Recorded By, (t) = Taken By, (c) = Cosigned By    Initials Name Provider Type    Jane Alvarez PTA Physical Therapy Assistant        Outcome Measures     Row Name 04/22/21 0912          How much help from another person do you currently need...    Turning from your back to your side while in flat bed without using bedrails?  3  -SM     Moving from lying on back to sitting on the side of a flat bed without bedrails?  3  -SM     Moving to and from a bed to a chair (including a wheelchair)?  3  -SM     Standing up from a chair using your arms (e.g., wheelchair, bedside chair)?  4  -SM     Climbing 3-5 steps with a railing?  2  -SM     To walk in hospital room?  3  -SM     AM-PAC 6 Clicks Score (PT)  18  -SM     Row Name 04/22/21 0912          Functional Assessment    Outcome Measure Options  AM-PAC 6 Clicks Basic Mobility (PT)  -       User Key  (r) = Recorded By, (t) = Taken By, (c) = Cosigned By    Initials Name Provider Type    Jane Alvarez PTA Physical Therapy Assistant        Physical Therapy Education                 Title: PT OT SLP Therapies (Done)     Topic: Physical Therapy (Done)     Point: Mobility training (Done)     Learning Progress Summary           Patient Acceptance, E,TB,D, VU by  at 4/22/2021 0912    Acceptance, E,TB,D, VU,NR by  at 4/20/2021 1545    Acceptance, E,TB, VU by PT at 4/19/2021 1604    Acceptance, E, VU,NR by AL at 4/17/2021 1106                   Point: Home exercise program (Done)     Learning Progress Summary           Patient Acceptance, E,TB,D, VU by  at 4/22/2021 0912    Acceptance, E,TB,D, VU,NR by  at 4/20/2021 1545    Acceptance, E,TB, VU by PT at 4/19/2021 1604    Acceptance, E, VU,NR by AL at 4/17/2021 1106                   Point: Body mechanics (Done)     Learning Progress Summary           Patient Acceptance, E,TB,D, VU by  at 4/22/2021 0912    Acceptance, E,TB,D, VU,NR by  at 4/20/2021 1545     Acceptance, E,TB, VU by PT at 4/19/2021 1604    Acceptance, E, VU,NR by AL at 4/17/2021 1106                   Point: Precautions (Done)     Learning Progress Summary           Patient Acceptance, E,TB,D, VU by  at 4/22/2021 0912    Acceptance, E,TB,D, VU,NR by  at 4/20/2021 1545    Acceptance, E,TB, VU by PT at 4/19/2021 1604    Acceptance, E, VU,NR by AL at 4/17/2021 1106                               User Key     Initials Effective Dates Name Provider Type Discipline    AL 04/03/18 -  Valeria Jimenez, PT Physical Therapist PT     03/07/18 -  Jane Mcintyre PTA Physical Therapy Assistant PT    PT 06/08/20 -  Sydney Watkins RN Registered Nurse Nurse              PT Recommendation and Plan     Plan of Care Reviewed With: patient  Progress: improving  Outcome Summary: Pt tolerated treatment well this date. Pt very motivated to ambulate further today. Increased gait distance to 90ft w/ Rw and CGA, improved steadiness compared to previous visit. Pt seemed very fatigued towards end of ambulation, though was encouraged to ambulate more in room later w/ nsg. Patient was intermittently wearing a face mask during this therapy encounter. Therapist used appropriate personal protective equipment including eye protection, mask, and gloves.  Mask used was standard procedure mask. Appropriate PPE was worn during the entire therapy session. Hand hygiene was completed before and after therapy session. Patient is not in enhanced droplet precautions.     Time Calculation:   PT Charges     Row Name 04/22/21 0916             Time Calculation    Start Time  0836  -      Stop Time  0900  -      Time Calculation (min)  24 min  -      PT Received On  04/22/21  -      PT - Next Appointment  04/23/21  -        User Key  (r) = Recorded By, (t) = Taken By, (c) = Cosigned By    Initials Name Provider Type     Jane Mcintyre PTA Physical Therapy Assistant        Therapy Charges for Today     Code Description Service  Date Service Provider Modifiers Qty    59107117899 HC PT THER PROC EA 15 MIN 4/22/2021 Jane Mcintyre, PTA GP 2          PT G-Codes  Outcome Measure Options: AM-PAC 6 Clicks Basic Mobility (PT)  AM-PAC 6 Clicks Score (PT): 18  AM-PAC 6 Clicks Score (OT): 16    Jane Mcintyre PTA  4/22/2021

## 2021-04-22 NOTE — PROGRESS NOTES
Continued Stay Note  [facility]     Patient Name: Ambar Lara  MRN: [id number]  Today's Date: 4/22/2021    Admit Date: 4/16/2021    Discharge Plan     Row Name 04/22/21 1226       Plan    Plan  Plan [facility] pending pre cert.   RUTH Henning RN    Patient/Family in Agreement with Plan  yes    Plan Comments  Per Suzette with [facility] pt will be accepted if pre cert obtained.   [facility] to submit for pre cert.  Plan [facility]- pending pre cert.   RUTH Henning RN        Discharge Codes    No documentation.             Liliana Henning, RN

## 2021-04-22 NOTE — PROGRESS NOTES
"   LOS: 5 days    Patient Care Team:  Jet Manuel MD as PCP - General (Internal Medicine)    Chief Complaint:    Chief Complaint   Patient presents with   • Abdominal Pain   • Hypotension     Follow UP ESRD  Subjective     Interval History:     Feels really good today.  Has done PT, OT this am.  Feels ready to go home or acute rehab.  Eating well.  Bowels have slowed down.   Review of Systems:   As noted above    Objective     Vital Signs  Temp:  [97.6 °F (36.4 °C)-98.4 °F (36.9 °C)] 97.6 °F (36.4 °C)  Heart Rate:  [] 89  Resp:  [16] 16  BP: ()/(53-76) 90/54    Flowsheet Rows      First Filed Value   Admission Height  154.9 cm (61\") Documented at 04/16/2021 1559   Admission Weight  68.5 kg (151 lb) Documented at 04/16/2021 1559          No intake/output data recorded.  I/O last 3 completed shifts:  In: 840 [P.O.:840]  Out: 1300 [Other:1300]    Intake/Output Summary (Last 24 hours) at 4/22/2021 1223  Last data filed at 4/21/2021 1727  Gross per 24 hour   Intake 600 ml   Output --   Net 600 ml       Physical Exam:  Lying in bed .  Chronically ill-appearing  Brighter affect than on last visit .   HEENT  No eye discharge; no scleral icterus  No JVD;  tracheostomy covered.   Heart RRR, no rub or s3.  Right chest TDC  Lungs clear to auscultation.   Abd Soft, nontender. + bs, Mask applied .PD catheter right lower quadrant dressing intact.  Tunnel nontender.   Ext no edema .  Poor muscle mass .  Moves all extremities  Speech is fluent      Results Review:    Results from last 7 days   Lab Units 04/22/21  0733 04/21/21  0608 04/20/21  0614 04/16/21  1602   SODIUM mmol/L 138 140 136 133*   POTASSIUM mmol/L 4.0 4.1 3.9 4.1   CHLORIDE mmol/L 104 104 100 97*   CO2 mmol/L 21.1* 23.4 20.3* 23.2   BUN mg/dL 10 15 34* 9   CREATININE mg/dL 2.19* 2.65* 4.06* 1.63*   CALCIUM mg/dL 8.1* 8.1* 8.9 8.0*   BILIRUBIN mg/dL  --   --   --  0.2   ALK PHOS U/L  --   --   --  131*   ALT (SGPT) U/L  --   --   --  14   AST (SGOT) " U/L  --   --   --  17   GLUCOSE mg/dL 99 89 98 146*       Estimated Creatinine Clearance: 28.3 mL/min (A) (by C-G formula based on SCr of 2.19 mg/dL (H)).    Results from last 7 days   Lab Units 04/21/21  0608 04/20/21  0614 04/19/21  0817   PHOSPHORUS mg/dL 4.8* 6.8* 6.2*             Results from last 7 days   Lab Units 04/22/21  0733 04/21/21  0608 04/20/21  0614 04/19/21  0817 04/18/21  0717   WBC 10*3/mm3 8.53 7.45 7.89 8.09 9.61   HEMOGLOBIN g/dL 10.0* 9.6* 9.3* 10.0* 10.0*   PLATELETS 10*3/mm3 296 318 338 338 322       Results from last 7 days   Lab Units 04/22/21  0733 04/21/21  1047 04/20/21  0614 04/19/21  1517 04/16/21  1602   INR  2.08* 2.14* 2.84* 3.38* 2.24*         Imaging Results (Last 24 Hours)     ** No results found for the last 24 hours. **        calcium acetate, 1,334 mg, Oral, TID With Meals  epoetin marco antonio/marco antonio-epbx, 5,000 Units, Subcutaneous, Once per day on Mon Wed Fri  escitalopram, 20 mg, Oral, Q24H  levothyroxine, 100 mcg, Oral, Q AM  metroNIDAZOLE, 500 mg, Intravenous, Q8H  midodrine, 10 mg, Oral, TID AC  montelukast, 10 mg, Oral, Daily  multivitamin, 1 tablet, Oral, Daily  saccharomyces boulardii, 500 mg, Oral, BID  sodium bicarbonate, 650 mg, Oral, Q8H  vancomycin, 250 mg, Oral, Q6H  warfarin, 5 mg, Oral, Daily      Pharmacy Consult,   Pharmacy to dose warfarin,         Medication Review:   Current Facility-Administered Medications   Medication Dose Route Frequency Provider Last Rate Last Admin   • acetaminophen (TYLENOL) tablet 650 mg  650 mg Oral Q4H PRN Binta Patel MD   650 mg at 04/18/21 2044   • albumin human 25 % IV SOLN 12.5 g  12.5 g Intravenous PRN Darci Wang MD       • calcium acetate (PHOS BINDER)) capsule 1,334 mg  1,334 mg Oral TID With Meals Binta Patel MD   1,334 mg at 04/22/21 1145   • epoetin marco antonio-epbx (RETACRIT) injection 5,000 Units  5,000 Units Subcutaneous Once per day on Mon Wed Fri Bebeto Pinto MD   5,000 Units at 04/21/21  1149   • escitalopram (LEXAPRO) tablet 20 mg  20 mg Oral Q24H Binta Patel MD   20 mg at 04/21/21 2006   • levothyroxine (SYNTHROID, LEVOTHROID) tablet 100 mcg  100 mcg Oral Q AM Nirmal Wan MD   100 mcg at 04/22/21 0521   • melatonin tablet 3 mg  3 mg Oral Nightly PRN Binta Patel MD   3 mg at 04/22/21 0008   • metroNIDAZOLE (FLAGYL) 500 mg/100mL IVPB  500 mg Intravenous Q8H Nirmal Wan MD   500 mg at 04/22/21 1145   • midodrine (PROAMATINE) tablet 10 mg  10 mg Oral TID Faviola Fowler MD   10 mg at 04/22/21 1145   • montelukast (SINGULAIR) tablet 10 mg  10 mg Oral Daily Binta Patel MD   10 mg at 04/22/21 0800   • multivitamin (THERAGRAN) tablet 1 tablet  1 tablet Oral Daily Bebeto Pinto MD   1 tablet at 04/22/21 0800   • nitroglycerin (NITROSTAT) SL tablet 0.4 mg  0.4 mg Sublingual Q5 Min PRN Binta Patel MD       • ondansetron (ZOFRAN) tablet 4 mg  4 mg Oral Q6H PRN Binta Patel MD        Or   • ondansetron (ZOFRAN) injection 4 mg  4 mg Intravenous Q6H PRN Binta Patel MD   4 mg at 04/21/21 1801   • Pharmacy Consult   Does not apply Continuous PRN Binta Patel MD       • Pharmacy to dose warfarin   Does not apply Continuous PRN Nirmal Wan MD       • saccharomyces boulardii (FLORASTOR) capsule 500 mg  500 mg Oral BID Nirmal Wan MD   500 mg at 04/22/21 0800   • sodium bicarbonate tablet 650 mg  650 mg Oral Q8H Binta Patel MD   650 mg at 04/22/21 0521   • sodium chloride 0.9 % bolus 1,000 mL  1,000 mL Intravenous PRN Darci Wang MD       • sodium chloride 0.9 % flush 10 mL  10 mL Intravenous PRN Marcos Blackman MD   10 mL at 04/21/21 2007   • vancomycin (VANCOCIN) capsule 250 mg  250 mg Oral Q6H Nirmal Wan MD   250 mg at 04/22/21 1145   • warfarin (COUMADIN) tablet 5 mg  5 mg Oral Daily Nirmal Wan MD           Assessment/Plan   1. ESRD.  Dialysis tomorrow.  Will  do in am just in case she is dc to home or goes to acute rehab here. Plan is for return to PD after acute illness and out of rehab.   PD catheter secured with dressing I applied last admission.  Has follow up scheduled with [facility] home training May 5 at 1 pm.   2. C. Dif.colitis.   Clinical improvement. IV flagy and po vanc.   3. Chronic systolic heart failure, chronic hypotension.  Increase midodrine .  4. Hypothyroid on replacement .  5. Anemia CKD. JULEE as outpt .  6. Covid PNA earlier this year with prolonged Respiratory failure, trach.  Now closing .  7. PE subsegmental RLL 4/9/21.  on coumadin.  INR 2.08.    I am fine with acute rehab or home anytime.  Has outpt spot at [facility] if dc home.      Faviola Moura MD  04/22/21  12:23 EDT

## 2021-04-22 NOTE — PROGRESS NOTES
Pharmacy Consult: Warfarin Dosing/ Monitoring    Ambar Lara is a 51 y.o. female, estimated creatinine clearance is 28.3 mL/min (A) (by C-G formula based on SCr of 2.19 mg/dL (H)). weighing 72.3 kg (159 lb 4.8 oz).     has a past medical history of CHF (congestive heart failure) (CMS/[facility]), Dialysis patient (CMS/[facility]), Disease of thyroid gland, Elevated cholesterol, GERD (gastroesophageal reflux disease), Renal disorder, and Sleep apnea.    Social History     Tobacco Use   • Smoking status: Former Smoker     Packs/day: 1.00     Years: 25.00     Pack years: 25.00     Types: Cigarettes     Quit date: 4/16/2018     Years since quitting: 3.0   • Smokeless tobacco: Never Used   Vaping Use   • Vaping Use: Former   • Quit date: 2/14/2021   • Substances: Nicotine, Flavoring   • Devices: Pre-filled or refillable cartridge   Substance Use Topics   • Alcohol use: Not Currently   • Drug use: Not Currently       Results from last 7 days   Lab Units 04/22/21  0733 04/21/21  1047 04/21/21  0608 04/20/21  0614 04/19/21  1517 04/19/21  0817 04/18/21  0717 04/17/21  0555 04/16/21  1603 04/16/21  1602   INR  2.08* 2.14*  --  2.84* 3.38*  --   --   --   --  2.24*   HEMOGLOBIN g/dL 10.0*  --  9.6* 9.3*  --  10.0* 10.0* 9.5* 10.7*  --    HEMATOCRIT % 31.3*  --  30.2* 28.8*  --  31.8* 30.6* 29.7* 33.4*  --    PLATELETS 10*3/mm3 296  --  318 338  --  338 322 272 398  --      Results from last 7 days   Lab Units 04/22/21  0733 04/21/21  0608 04/20/21  0614 04/16/21  1602   SODIUM mmol/L 138 140 136 133*   POTASSIUM mmol/L 4.0 4.1 3.9 4.1   CHLORIDE mmol/L 104 104 100 97*   CO2 mmol/L 21.1* 23.4 20.3* 23.2   BUN mg/dL 10 15 34* 9   CREATININE mg/dL 2.19* 2.65* 4.06* 1.63*   CALCIUM mg/dL 8.1* 8.1* 8.9 8.0*   BILIRUBIN mg/dL  --   --   --  0.2   ALK PHOS U/L  --   --   --  131*   ALT (SGPT) U/L  --   --   --  14   AST (SGOT) U/L  --   --   --  17   GLUCOSE mg/dL 99 89 98 146*     Anticoagulation history: Newer start of warfarin from last  hospital admission on 4/9/21 for PE    Hospital Anticoagulation:  Consulting provider: Dr. Wan  Start date: 4/19 Rx started dosing (on warfarin since date of admission, 4/16)  Indication: PE history  Target INR: 2-3  Expected duration: ?   Bridge Therapy: No                Date 4/19 4/20 4/21 4/22         INR 3.3 2.84 2.14 2.08         Warfarin dose HOLD 4 mg 5 mg 5 mg           Potential drug interactions:  · Metronidazole (Major): may increase serum concentration of warfarin   · Levothyroxine: potentially increased anticoagulant effects  · Escitalopram: potentially increased risk of bleeding     Relevant nutrition status: Regular diet + Boost Breeze w/lunch + dinner (has vitamin K content)    Education complete?/ Date: TBD    Assessment/Plan:  · INR is therapeutic at 2.08 (Goal 2-3). I will increase the dose to warfarin 5 mg daily (home regimen = 4 mg QD)  and follow up INR in the AM.     Pharmacy will continue to follow until discharge or discontinuation of warfarin.   Cheyanne Ashford, [facility]  4/22/2021

## 2021-04-22 NOTE — PROGRESS NOTES
Name: Ambar Lara ADMIT: 2021   : 1970  PCP: Jet Manuel MD    MRN: [id number] LOS: 5 days   AGE/SEX: 51 y.o. female  ROOM: [facility]     Subjective   Subjective   Patient seen at bedside, feels better.       Objective   Objective   Vital Signs  Temp:  [97.6 °F (36.4 °C)-98.4 °F (36.9 °C)] 97.6 °F (36.4 °C)  Heart Rate:  [] 89  Resp:  [16] 16  BP: ()/(53-76) 90/54  SpO2:  [94 %-100 %] 98 %  on   ;   Device (Oxygen Therapy): room air  Body mass index is 30.12 kg/m².  Physical Exam    General Appearance:    Alert, cooperative, no distress, appears stated age   Head:    Normocephalic, without obvious abnormality, atraumatic   Eyes:    PERRL, conjunctivae/corneas clear, EOM's intact, both eyes   Ears:    Normal external ear canals, both ears   Nose:   Nares normal, septum midline, mucosa normal, no drainage    or sinus tenderness   Throat:   Lips, mucosa, and tongue normal   Neck:   Supple, symmetrical, trachea midline, no adenopathy;     thyroid:  no enlargement/tenderness/nodules; no carotid    bruit or JVD   Back:     Symmetric, no curvature, ROM normal, no CVA tenderness   Lungs:     Decreased breath sounds bilaterally, respirations unlabored   Chest Wall:    No tenderness or deformity    Heart:    Regular rate and rhythm, S1 and S2 normal, no murmur, rub   or gallop   Abdomen:     Soft, nontender, bowel sounds active all four quadrants,     no masses, no hepatomegaly, no splenomegaly   Extremities:   Extremities normal, atraumatic, no cyanosis or edema   Pulses:   2+ and symmetric all extremities   Skin:   Skin color, texture, turgor normal, no rashes or lesions   Lymph nodes:   Cervical, supraclavicular, and axillary nodes normal   Neurologic:   CNII-XII intact, normal strength, sensation intact throughout           Results Review     I reviewed the patient's new clinical results.  Results from last 7 days   Lab Units 21  0733 21  0608 21  0614 21  0817    WBC 10*3/mm3 8.53 7.45 7.89 8.09   HEMOGLOBIN g/dL 10.0* 9.6* 9.3* 10.0*   PLATELETS 10*3/mm3 296 318 338 338     Results from last 7 days   Lab Units 04/22/21  0733 04/21/21  0608 04/20/21  0614 04/19/21  0817   SODIUM mmol/L 138 140 136 135*   POTASSIUM mmol/L 4.0 4.1 3.9 4.5   CHLORIDE mmol/L 104 104 100 101   CO2 mmol/L 21.1* 23.4 20.3* 20.2*   BUN mg/dL 10 15 34* 30*   CREATININE mg/dL 2.19* 2.65* 4.06* 4.21*   GLUCOSE mg/dL 99 89 98 96   Estimated Creatinine Clearance: 28.3 mL/min (A) (by C-G formula based on SCr of 2.19 mg/dL (H)).  Results from last 7 days   Lab Units 04/21/21  0608 04/20/21  0614 04/19/21  0817 04/16/21  1602   ALBUMIN g/dL 2.80* 3.00* 3.10* 3.60   BILIRUBIN mg/dL  --   --   --  0.2   ALK PHOS U/L  --   --   --  131*   AST (SGOT) U/L  --   --   --  17   ALT (SGPT) U/L  --   --   --  14     Results from last 7 days   Lab Units 04/22/21  0733 04/21/21  0608 04/20/21  0614 04/19/21  0817 04/16/21  1602   CALCIUM mg/dL 8.1* 8.1* 8.9 9.1 8.0*   ALBUMIN g/dL  --  2.80* 3.00* 3.10* 3.60   PHOSPHORUS mg/dL  --  4.8* 6.8* 6.2*  --        COVID19   Date Value Ref Range Status   04/16/2021 Not Detected Not Detected - Ref. Range Final   04/09/2021 Not Detected Not Detected - Ref. Range Final     No results found for: HGBA1C, POCGLU    XR Chest 1 View  ONE VIEW PORTABLE CHEST AT 5 PM     HISTORY: Hypotension. End-stage renal disease. Chest pain.      Recent Covid 19 pneumonia.     FINDINGS: The lungs are moderately expanded with some patchy infiltrates  at the lung bases likely related to residual changes of Covid 19  pneumonia and showing some minimal improvement at the left base when  compared to the study of 04/09/2021. The heart size remains normal. A  large gauge vascular catheter ends near the junction of the SVC and  right atrium without change.     This report was finalized on 4/16/2021 8:01 PM by Dr. Kiet Oconnor M.D.       Scheduled Medications  calcium acetate, 1,334 mg, Oral, TID With  Meals  epoetin marco antonio/marco antonio-epbx, 5,000 Units, Subcutaneous, Once per day on Mon Wed Fri  escitalopram, 20 mg, Oral, Q24H  levothyroxine, 100 mcg, Oral, Q AM  metroNIDAZOLE, 500 mg, Intravenous, Q8H  midodrine, 10 mg, Oral, TID AC  montelukast, 10 mg, Oral, Daily  multivitamin, 1 tablet, Oral, Daily  saccharomyces boulardii, 500 mg, Oral, BID  sodium bicarbonate, 650 mg, Oral, Q8H  vancomycin, 250 mg, Oral, Q6H  warfarin, 5 mg, Oral, Daily    Infusions  Pharmacy Consult,   Pharmacy to dose warfarin,     Diet  Diet Regular       Assessment/Plan     Active Hospital Problems    Diagnosis  POA   • **Clostridium difficile colitis [A04.72]  Yes   • History of tracheostomy [Z98.890]  Not Applicable   • Gastroesophageal reflux disease [K21.9]  Yes   • Cardiomyopathy (CMS/HCC) [I42.9]  Yes   • Chronic systolic heart failure (CMS/HCC) [I50.22]  Yes   • ESRD (end stage renal disease) on dialysis (CMS/[facility]) [N18.6, Z99.2]  Not Applicable   • HLD (hyperlipidemia) [E78.5]  Yes   • Hypothyroidism [E03.9]  Yes      Resolved Hospital Problems   No resolved problems to display.       51 y.o. female admitted with Clostridium difficile colitis.    Assessment and plan:  1.  C. difficile colitis, continue p.o. vancomycin and IV Flagyl.  2.  End-stage renal disease on hemodialysis, continue dialysis per nephrology recommendations.  3.  Anemia of chronic disease, hemoglobin noted to be stable, continue to recheck labs.  4.  Hyponatremia, resolved.  5.  Chronic systolic congestive heart failure, she appears euvolemic at this point of time.  6.  Hypothyroidism, TSH is checked, it is almost close to 96.  Continue Synthroid.  She would benefit from endocrinology follow-up on outpatient basis.   7.  Personal history of PE, patient currently on Coumadin.  INR therapeutic.  8.  Hypotension, midodrine dose has been increased.  Monitor BP closely.  9.  CODE STATUS is full code.  Further plans based on hospital course.      Nirmal Wan,  MD [facility]  04/22/21  10:27 EDT

## 2021-04-22 NOTE — PLAN OF CARE
Goal Outcome Evaluation:  Plan of Care Reviewed With: patient  Progress: improving  Outcome Summary: Pt tolerated treatment well this date. Pt very motivated to ambulate further today. Increased gait distance to 90ft w/ Rw and CGA, improved steadiness compared to previous visit. Pt seemed very fatigued towards end of ambulation, though was encouraged to ambulate more in room later w/ nsg. Patient was intermittently wearing a face mask during this therapy encounter. Therapist used appropriate personal protective equipment including eye protection, mask, and gloves.  Mask used was standard procedure mask. Appropriate PPE was worn during the entire therapy session. Hand hygiene was completed before and after therapy session. Patient is not in enhanced droplet precautions.

## 2021-04-23 LAB
ANION GAP SERPL CALCULATED.3IONS-SCNC: 13.2 MMOL/L (ref 5–15)
BASOPHILS # BLD AUTO: 0.17 10*3/MM3 (ref 0–0.2)
BASOPHILS NFR BLD AUTO: 2.1 % (ref 0–1.5)
BUN SERPL-MCNC: 17 MG/DL (ref 6–20)
BUN/CREAT SERPL: 5.5 (ref 7–25)
CALCIUM SPEC-SCNC: 8.5 MG/DL (ref 8.6–10.5)
CHLORIDE SERPL-SCNC: 105 MMOL/L (ref 98–107)
CO2 SERPL-SCNC: 21.8 MMOL/L (ref 22–29)
CREAT SERPL-MCNC: 3.1 MG/DL (ref 0.57–1)
DEPRECATED RDW RBC AUTO: 55.8 FL (ref 37–54)
EOSINOPHIL # BLD AUTO: 0.17 10*3/MM3 (ref 0–0.4)
EOSINOPHIL NFR BLD AUTO: 2.1 % (ref 0.3–6.2)
ERYTHROCYTE [DISTWIDTH] IN BLOOD BY AUTOMATED COUNT: 15.6 % (ref 12.3–15.4)
GFR SERPL CREATININE-BSD FRML MDRD: 16 ML/MIN/1.73
GLUCOSE SERPL-MCNC: 102 MG/DL (ref 65–99)
HCT VFR BLD AUTO: 32.3 % (ref 34–46.6)
HGB BLD-MCNC: 10 G/DL (ref 12–15.9)
IMM GRANULOCYTES # BLD AUTO: 0.33 10*3/MM3 (ref 0–0.05)
IMM GRANULOCYTES NFR BLD AUTO: 4.1 % (ref 0–0.5)
INR PPP: 2.09 (ref 0.9–1.1)
LYMPHOCYTES # BLD AUTO: 2.39 10*3/MM3 (ref 0.7–3.1)
LYMPHOCYTES NFR BLD AUTO: 29.4 % (ref 19.6–45.3)
MCH RBC QN AUTO: 30.2 PG (ref 26.6–33)
MCHC RBC AUTO-ENTMCNC: 31 G/DL (ref 31.5–35.7)
MCV RBC AUTO: 97.6 FL (ref 79–97)
MONOCYTES # BLD AUTO: 0.44 10*3/MM3 (ref 0.1–0.9)
MONOCYTES NFR BLD AUTO: 5.4 % (ref 5–12)
NEUTROPHILS NFR BLD AUTO: 4.63 10*3/MM3 (ref 1.7–7)
NEUTROPHILS NFR BLD AUTO: 56.9 % (ref 42.7–76)
NRBC BLD AUTO-RTO: 0 /100 WBC (ref 0–0.2)
PLATELET # BLD AUTO: 269 10*3/MM3 (ref 140–450)
PMV BLD AUTO: 9.2 FL (ref 6–12)
POTASSIUM SERPL-SCNC: 3.9 MMOL/L (ref 3.5–5.2)
PROTHROMBIN TIME: 23.3 SECONDS (ref 11.7–14.2)
RBC # BLD AUTO: 3.31 10*6/MM3 (ref 3.77–5.28)
SODIUM SERPL-SCNC: 140 MMOL/L (ref 136–145)
WBC # BLD AUTO: 8.13 10*3/MM3 (ref 3.4–10.8)

## 2021-04-23 PROCEDURE — 80048 BASIC METABOLIC PNL TOTAL CA: CPT | Performed by: INTERNAL MEDICINE

## 2021-04-23 PROCEDURE — 85610 PROTHROMBIN TIME: CPT | Performed by: INTERNAL MEDICINE

## 2021-04-23 PROCEDURE — 97110 THERAPEUTIC EXERCISES: CPT

## 2021-04-23 PROCEDURE — 85025 COMPLETE CBC W/AUTO DIFF WBC: CPT | Performed by: INTERNAL MEDICINE

## 2021-04-23 PROCEDURE — 25010000002 ONDANSETRON PER 1 MG: Performed by: INTERNAL MEDICINE

## 2021-04-23 PROCEDURE — 25010000002 EPOETIN ALFA-EPBX 3000 UNIT/ML SOLUTION: Performed by: INTERNAL MEDICINE

## 2021-04-23 RX ADMIN — METRONIDAZOLE 500 MG: 500 INJECTION, SOLUTION INTRAVENOUS at 04:19

## 2021-04-23 RX ADMIN — CALCIUM ACETATE 1334 MG: 667 CAPSULE ORAL at 12:35

## 2021-04-23 RX ADMIN — LEVOTHYROXINE SODIUM 100 MCG: 0.1 TABLET ORAL at 05:36

## 2021-04-23 RX ADMIN — VANCOMYCIN HYDROCHLORIDE 250 MG: 125 CAPSULE ORAL at 05:36

## 2021-04-23 RX ADMIN — WARFARIN 5 MG: 5 TABLET ORAL at 17:39

## 2021-04-23 RX ADMIN — MIDODRINE HYDROCHLORIDE 10 MG: 5 TABLET ORAL at 10:35

## 2021-04-23 RX ADMIN — SODIUM BICARBONATE 650 MG: 650 TABLET ORAL at 21:10

## 2021-04-23 RX ADMIN — Medication 1 TABLET: at 12:35

## 2021-04-23 RX ADMIN — VANCOMYCIN HYDROCHLORIDE 250 MG: 125 CAPSULE ORAL at 17:39

## 2021-04-23 RX ADMIN — Medication 500 MG: at 12:35

## 2021-04-23 RX ADMIN — MIDODRINE HYDROCHLORIDE 10 MG: 5 TABLET ORAL at 17:39

## 2021-04-23 RX ADMIN — CALCIUM ACETATE 1334 MG: 667 CAPSULE ORAL at 17:39

## 2021-04-23 RX ADMIN — SODIUM BICARBONATE 650 MG: 650 TABLET ORAL at 17:38

## 2021-04-23 RX ADMIN — SODIUM BICARBONATE 650 MG: 650 TABLET ORAL at 05:36

## 2021-04-23 RX ADMIN — Medication 500 MG: at 21:10

## 2021-04-23 RX ADMIN — ONDANSETRON 4 MG: 2 INJECTION INTRAMUSCULAR; INTRAVENOUS at 13:49

## 2021-04-23 RX ADMIN — MONTELUKAST SODIUM 10 MG: 10 TABLET, FILM COATED ORAL at 12:36

## 2021-04-23 RX ADMIN — SODIUM CHLORIDE, PRESERVATIVE FREE 10 ML: 5 INJECTION INTRAVENOUS at 21:10

## 2021-04-23 RX ADMIN — EPOETIN ALFA-EPBX 5000 UNITS: 3000 INJECTION, SOLUTION INTRAVENOUS; SUBCUTANEOUS at 12:34

## 2021-04-23 RX ADMIN — VANCOMYCIN HYDROCHLORIDE 250 MG: 125 CAPSULE ORAL at 12:35

## 2021-04-23 RX ADMIN — MIDODRINE HYDROCHLORIDE 10 MG: 5 TABLET ORAL at 05:35

## 2021-04-23 NOTE — CASE MANAGEMENT/SOCIAL WORK
Continued Stay Note  [facility]     Patient Name: Ambar Lara  MRN: [id number]  Today's Date: 4/23/2021    Admit Date: 4/16/2021    Discharge Plan     Row Name 04/23/21 1136       Plan    Plan  precert remains pending for [facility]    Patient/Family in Agreement with Plan  yes    Plan Comments  In nursing huddle MD asked about status of precert for acute rehab. Called and spoke with Fani who spoke with Jaye and as of this time Jaye has not received any updates on [facility] precert for pt. Per Suzette Mohr RN is working this weekend and will contact CCP should they get precert for pt to transfer to [facility] Acute Rehab. Updated staff RN. CCP to follow...........JW        Discharge Codes    No documentation.             Mildred Singh, RN

## 2021-04-23 NOTE — PLAN OF CARE
Problem: Adult Inpatient Plan of Care  Goal: Plan of Care Review  Recent Flowsheet Documentation  Taken 4/23/2021 1626 by Farrah Jorge, PT  Outcome Summary: Pt partcipated well even after HD earlier in the day.  The patient was fatigued but did well with having a seated rest during the session.  The patient would greatly benefit from acute rehab to improve endurance and level of safety at home Patient was intermittently wearing a face mask during this therapy encounter. Therapist used appropriate personal protective equipment including eye protection, mask, and gloves.  Mask used was standard procedure mask. Appropriate PPE was worn during the entire therapy session. Hand hygiene was completed before and after therapy session. Patient is not in enhanced droplet precautions.        Goal Outcome Evaluation:

## 2021-04-23 NOTE — THERAPY TREATMENT NOTE
Patient Name: Ambar Lara  : 1970    MRN: [id number]                              Today's Date: 2021       Admit Date: 2021    Visit Dx:     ICD-10-CM ICD-9-CM   1. Clostridium difficile colitis  A04.72 008.45   2. ESRD on hemodialysis (CMS/HCC)  N18.6 585.6    Z99.2 V45.11   3. Other pulmonary embolism without acute cor pulmonale, unspecified chronicity (CMS/HCC)  I26.99 415.19   4. Chest pain, unspecified type  R07.9 786.50     Patient Active Problem List   Diagnosis   • Colitis, Clostridium difficile   • Anxiety   • Cardiomyopathy (CMS/HCC)   • Chronic systolic heart failure (CMS/HCC)   • ESRD (end stage renal disease) on dialysis (CMS/HCC)   • Gastroesophageal reflux disease   • Gitelman syndrome   • HLD (hyperlipidemia)   • Hypothyroidism   • Peritoneal dialysis status (CMS/HCC)   • History of tracheostomy   • Acute pulmonary embolism (CMS/HCC)   • Severe malnutrition (CMS/HCC)   • Clostridium difficile colitis     Past Medical History:   Diagnosis Date   • CHF (congestive heart failure) (CMS/HCC)    • Dialysis patient (CMS/HCC)    • Disease of thyroid gland    • Elevated cholesterol    • GERD (gastroesophageal reflux disease)    • Renal disorder    • Sleep apnea      Past Surgical History:   Procedure Laterality Date   • ADRENAL GLAND SURGERY N/A `   •  SECTION Bilateral 2001    Has had x2   • HYSTERECTOMY     • TRACHEOSTOMY       General Information     Row Name 21 1623          Physical Therapy Time and Intention    Document Type  therapy note (daily note)  -LB     Mode of Treatment  physical therapy  -LB     Row Name 21 1623          General Information    Existing Precautions/Restrictions  fall  -LB     Row Name 21 1623          Cognition    Orientation Status (Cognition)  oriented x 4  -LB     Row Name 21 1623          Safety Issues, Functional Mobility    Impairments Affecting Function (Mobility)  balance;endurance/activity  tolerance;strength  -LB       User Key  (r) = Recorded By, (t) = Taken By, (c) = Cosigned By    Initials Name Provider Type    Farrah Ray PT Physical Therapist        Mobility     Row Name 04/23/21 1624          Bed Mobility    Supine-Sit [place] (Bed Mobility)  supervision  -LB     Sit-Supine [place] (Bed Mobility)  supervision  -LB     Assistive Device (Bed Mobility)  bed rails;head of bed elevated  -LB     Comment (Bed Mobility)  Discussed needing to progress to no bedrail and bed flat  -LB     Row Name 04/23/21 1624          Sit-Stand Transfer    Sit-Stand [place] (Transfers)  contact guard;standby assist pt uses left LE more during sit to stand with right foot forward  -LB     Assistive Device (Sit-Stand Transfers)  walker, front-wheeled  -LB     Row Name 04/23/21 1624          Gait/Stairs (Locomotion)    [place] Level (Gait)  contact guard  -LB     Assistive Device (Gait)  walker, front-wheeled  -LB     Distance in Feet (Gait)  35; 60 ft times 2 with a seated break  -LB     Deviations/Abnormal Patterns (Gait)  august decreased;stride length decreased  -LB     Bilateral Gait Deviations  forward flexed posture;heel strike decreased  -LB       User Key  (r) = Recorded By, (t) = Taken By, (c) = Cosigned By    Initials Name Provider Type    Farrah Ray PT Physical Therapist        Obj/Interventions    No documentation.       Goals/Plan    No documentation.       Clinical Impression     Row Name 04/23/21 1626          Pain Scale: Numbers Pre/Post-Treatment    Pretreatment Pain Rating  0/10 - no pain  -LB     Posttreatment Pain Rating  0/10 - no pain  -LB     Row Name 04/23/21 1626          Plan of Care Review    Outcome Summary  Pt partcipated well even after HD earlier in the day.  The patient was fatigued but did well with having a seated rest during the session.  The patient would greatly benefit from acute rehab to improve endurance and level of safety at home  -LB     Row Name  04/23/21 1626          Therapy Assessment/Plan (PT)    Patient/Family Therapy Goals Statement (PT)  --  -LB     Rehab Potential (PT)  good, to achieve stated therapy goals  -LB     Row Name 04/23/21 1626          Positioning and Restraints    Pre-Treatment Position  in bed  -LB     Post Treatment Position  bed  -LB     In Bed  supine;call light within reach;encouraged to call for assist;exit alarm on  -LB       User Key  (r) = Recorded By, (t) = Taken By, (c) = Cosigned By    Initials Name Provider Type    Farrah Ray PT Physical Therapist        Outcome Measures     Row Name 04/23/21 1628          How much help from another person do you currently need...    Turning from your back to your side while in flat bed without using bedrails?  3  -LB     Moving from lying on back to sitting on the side of a flat bed without bedrails?  3  -LB     Moving to and from a bed to a chair (including a wheelchair)?  3  -LB     Standing up from a chair using your arms (e.g., wheelchair, bedside chair)?  3  -LB     Climbing 3-5 steps with a railing?  2  -LB     To walk in hospital room?  3  -LB     AM-PAC 6 Clicks Score (PT)  17  -LB       User Key  (r) = Recorded By, (t) = Taken By, (c) = Cosigned By    Initials Name Provider Type    Farrah Ray PT Physical Therapist        Physical Therapy Education                 Title: PT OT SLP Therapies (Done)     Topic: Physical Therapy (Done)     Point: Mobility training (Done)     Learning Progress Summary           Patient Acceptance, E,TB, VU,NR by [employer] at 4/23/2021 1629    Comment: sit to stand transfers    Acceptance, TB,E, VU by PT at 4/22/2021 1617    Acceptance, E,TB,D, VU by SM at 4/22/2021 0912    Acceptance, E,TB,D, VU,NR by SM at 4/20/2021 1545    Acceptance, E,TB, VU by PT at 4/19/2021 1604    Acceptance, E, VU,NR by AL at 4/17/2021 1106                   Point: Home exercise program (Done)     Learning Progress Summary           Patient Acceptance, TB,E, VU by PT at  4/22/2021 1617    Acceptance, E,TB,D, VU by  at 4/22/2021 0912    Acceptance, E,TB,D, VU,NR by SM at 4/20/2021 1545    Acceptance, E,TB, VU by PT at 4/19/2021 1604    Acceptance, E, VU,NR by AL at 4/17/2021 1106                   Point: Body mechanics (Done)     Learning Progress Summary           Patient Acceptance, TB,E, VU by PT at 4/22/2021 1617    Acceptance, E,TB,D, VU by SM at 4/22/2021 0912    Acceptance, E,TB,D, VU,NR by  at 4/20/2021 1545    Acceptance, E,TB, VU by PT at 4/19/2021 1604    Acceptance, E, VU,NR by AL at 4/17/2021 1106                   Point: Precautions (Done)     Learning Progress Summary           Patient Acceptance, TB,E, VU by PT at 4/22/2021 1617    Acceptance, E,TB,D, VU by  at 4/22/2021 0912    Acceptance, E,TB,D, VU,NR by  at 4/20/2021 1545    Acceptance, E,TB, VU by PT at 4/19/2021 1604    Acceptance, E, VU,NR by AL at 4/17/2021 1106                               User Key     Initials Effective Dates Name Provider Type Discipline     04/03/18 -  Farrah Jorge, PT Physical Therapist PT    AL 04/03/18 -  Valeria Jimenez, PT Physical Therapist PT     03/07/18 -  Jane Mcintyre PTA Physical Therapy Assistant PT    PT 06/08/20 -  Sydney Watkins RN Registered Nurse Nurse              PT Recommendation and Plan     Outcome Summary: Pt partcipated well even after HD earlier in the day.  The patient was fatigued but did well with having a seated rest during the session.  The patient would greatly benefit from acute rehab to improve endurance and level of safety at home     Time Calculation:   PT Charges     Row Name 04/23/21 1630             Time Calculation    Start Time  1403  -LB      Stop Time  1423  -LB      Time Calculation (min)  20 min  -LB      PT Received On  04/23/21  -LB      PT - Next Appointment  04/24/21  -LB        User Key  (r) = Recorded By, (t) = Taken By, (c) = Cosigned By    Initials Name Provider Type    Farrah Ray, PT Physical Therapist         Therapy Charges for Today     Code Description Service Date Service Provider Modifiers Qty    [id number] HC PT THER PROC EA 15 MIN 4/23/2021 Farrah Jorge, PT GP 1          PT G-Codes  Outcome Measure Options: AM-PAC 6 Clicks Daily Activity (OT)  AM-PAC 6 Clicks Score (PT): 17  AM-PAC 6 Clicks Score (OT): 19    Farrah Jorge PT  4/23/2021

## 2021-04-23 NOTE — PLAN OF CARE
Problem: Adult Inpatient Plan of Care  Goal: Plan of Care Review  Outcome: Ongoing, Progressing  Flowsheets  Taken 4/23/2021 0256 by Ambar Hernandez, RN  Outcome Summary: VITALS AS DOCUMENTED. PLEASANT AND POSITIVE FOR THE AMOUNT OF GRIEF SHE HAS ENDURED. UP TO BR WITH SBA FOR SAFETY. CONTINUES TO HAVE FREQ SMALL LOOSE STOOLS. DENIES PAIN. RESTING QUIETLY. NO S/SX OF DISTRESS. MONITORING.  Taken 4/22/2021 1006 by Amisha Suárez OTR  Plan of Care Reviewed With: patient  Goal: Patient-Specific Goal (Individualized)  Outcome: Ongoing, Progressing  Goal: Absence of Hospital-Acquired Illness or Injury  Outcome: Ongoing, Progressing  Intervention: Identify and Manage Fall Risk  Description: Perform standard risk assessment with a validated tool or comprehensive approach appropriate to the patient on admission; reassess fall risk frequently, with change in status or transfer to another level of care.  Communicate fall injury risk to interprofessional healthcare team.  Determine need for increased observation, equipment and environmental modification, such as low bed and signage, as well as supportive, nonskid footwear.  Adjust safety measures to individual developmental age, stage and identified risk factors.  Reinforce the importance of safety and physical activity with patient and family.  Perform regular intentional rounding to assess need for position change, pain assessment, personal needs, including assistance with toileting.  Recent Flowsheet Documentation  Taken 4/23/2021 0200 by Ambar Hernandez, RN  Safety Promotion/Fall Prevention:   activity supervised   assistive device/personal items within reach   clutter free environment maintained   fall prevention program maintained   lighting adjusted   nonskid shoes/slippers when out of bed   room organization consistent   safety round/check completed  Taken 4/23/2021 0010 by Ambar Hernandez, RN  Safety Promotion/Fall Prevention:   activity supervised   assistive  device/personal items within reach   clutter free environment maintained   fall prevention program maintained   lighting adjusted   nonskid shoes/slippers when out of bed   room organization consistent   safety round/check completed  Taken 4/22/2021 2150 by Ambar Hernandez RN  Safety Promotion/Fall Prevention:   activity supervised   assistive device/personal items within reach   clutter free environment maintained   fall prevention program maintained   lighting adjusted   nonskid shoes/slippers when out of bed   room organization consistent   safety round/check completed  Taken 4/22/2021 2000 by Ambar Hernandez RN  Safety Promotion/Fall Prevention:   activity supervised   assistive device/personal items within reach   clutter free environment maintained   fall prevention program maintained   lighting adjusted   nonskid shoes/slippers when out of bed   room organization consistent   safety round/check completed  Intervention: Prevent Skin Injury  Description: Assess skin risk on admission and at regular intervals throughout hospital stay.  Keep all areas of skin (especially folds) clean and dry.  Maintain adequate skin hydration.  Relieve and redistribute pressure and protect bony prominences; implement measures based on patient-specific risk factors.  Match turning and repositioning schedule to clinical condition.  Encourage weight shift frequently; assist with reposition if unable to complete independently.  Float heels off bed. Avoid pressure on the Achilles tendon.  Keep skin free from extended contact with medical devices.  Use aids (e.g., slide boards, mechanical lift) during transfer.  Recent Flowsheet Documentation  Taken 4/23/2021 0200 by Ambar Hernandez, RN  Body Position:   position changed independently   neutral body alignment   neutral head position  Taken 4/23/2021 0010 by Ambar Hernandez RN  Body Position:   side-lying, left   position changed independently   neutral body alignment   neutral head  position  Taken 4/22/2021 2150 by Ambar Hernandez RN  Body Position:   position changed independently   neutral body alignment   neutral head position   supine  Skin Protection: adhesive use limited  Taken 4/22/2021 2000 by Ambar Hernandez RN  Body Position:   position changed independently   supine   neutral body alignment   neutral head position  Intervention: Prevent and Manage VTE (venous thromboembolism) Risk  Description: Assess for VTE risk.  Encourage/assist with early ambulation.  Initiate and maintain compression or other therapy, as indicated based on identified risk in accordance with organizational protocol/provider order.  Encourage both active and passive leg exercises while in bed, if unable to ambulate.  Recent Flowsheet Documentation  Taken 4/22/2021 2150 by Ambar Hernandez RN  VTE Prevention/Management:   bilateral   dorsiflexion/plantar flexion performed  Intervention: Prevent Infection  Description: Maintain skin and mucous membrane integrity; promote hand, oral and pulmonary hygiene.  Optimize fluid balance, nutrition, sleep and glycemic control to maximize infection resistance.  Identify potential sources of infection early to prevent or mitigate progression of infection (e.g., wound, lines, devices).  Evaluate ongoing need for invasive devices; remove promptly when no longer indicated.  Recent Flowsheet Documentation  Taken 4/23/2021 0200 by Ambar Hernandez RN  Infection Prevention:   equipment surfaces disinfected   hand hygiene promoted   personal protective equipment utilized   rest/sleep promoted   single patient room provided   visitors restricted/screened  Taken 4/23/2021 0010 by Ambar Hernandez RN  Infection Prevention:   equipment surfaces disinfected   hand hygiene promoted   personal protective equipment utilized   rest/sleep promoted   single patient room provided   visitors restricted/screened  Taken 4/22/2021 2150 by Ambar Hernandez RN  Infection Prevention:   equipment surfaces  disinfected   hand hygiene promoted   personal protective equipment utilized   rest/sleep promoted   single patient room provided   visitors restricted/screened  Taken 4/22/2021 2000 by Ambar Hernandez, RN  Infection Prevention:   equipment surfaces disinfected   hand hygiene promoted   personal protective equipment utilized   rest/sleep promoted   single patient room provided   visitors restricted/screened  Goal: Optimal Comfort and Wellbeing  Outcome: Ongoing, Progressing  Intervention: Provide Person-Centered Care  Description: Use a family-focused approach to care.  Develop trust and rapport by proactively providing information, encouraging questions, addressing concerns and offering reassurance.  Acknowledge emotional response to hospitalization.  Recognize and utilize personal coping strategies.  Honor spiritual and cultural preferences.  Recent Flowsheet Documentation  Taken 4/23/2021 0010 by Ambar Hernandez, RN  Trust Relationship/Rapport:   care explained   choices provided   emotional support provided   empathic listening provided   questions answered   questions encouraged   reassurance provided   thoughts/feelings acknowledged  Taken 4/22/2021 2150 by Ambar Hernandez, RN  Trust Relationship/Rapport:   care explained   choices provided   emotional support provided   empathic listening provided   questions answered   questions encouraged   reassurance provided   thoughts/feelings acknowledged  Goal: Readiness for Transition of Care  Outcome: Ongoing, Progressing     Problem: Skin Injury Risk Increased  Goal: Skin Health and Integrity  Outcome: Ongoing, Progressing  Intervention: Optimize Skin Protection  Description: Reassess skin injury risk and inspect skin frequently (e.g., scheduled interval, with turning, with change in condition) to provide optimal prevention.  Maintain adequate tissue perfusion (e.g., encourage fluid balance, avoid crossing legs, constrictive clothing or devices) to promote tissue  oxygenation.  Maintain head of bed at lowest degree of elevation tolerated, considering medical condition and other restrictions. Limit amount of time head of bed is elevated greater than 30 degrees to prevent friction/shear injury.  Avoid positioning onto an area that remains reddened.  Minimize incontinence and moisture (e.g., toileting schedule; moisture-wicking pad, diaper or incontinence collection device, skin moisture barrier).  Cleanse skin promptly and gently when soiled utilizing a pH-balanced cleanser.  Relieve and redistribute pressure (e.g., schedule position changes; utilize higher specification foam mattress, chair cushion, constant low-pressure or alternating-pressure support surface; medical device repositioning; protective dressing applicatio  Support increased progressive functional activity (e.g., therapeutic exercise) to decrease risk associated with immobility. Balance rest with activity.  Recent Flowsheet Documentation  Taken 4/23/2021 0200 by Ambar Hernandez RN  Head of Bed ([facility]): HOB at 20 degrees  Taken 4/23/2021 0010 by Ambar Hernandez RN  Head of Bed ([facility]): HOB at 20-30 degrees  Taken 4/22/2021 2150 by Ambar Hernandez RN  Pressure Reduction Techniques: frequent weight shift encouraged  Head of Bed ([facility]): [facility] elevated  Pressure Reduction Devices: specialty bed utilized  Skin Protection: adhesive use limited  Taken 4/22/2021 2000 by Ambar Hernandez RN  Head of Bed ([facility]): [facility] elevated   Goal Outcome Evaluation:        Outcome Summary: VITALS AS DOCUMENTED. PLEASANT AND POSITIVE FOR THE AMOUNT OF GRIEF SHE HAS ENDURED. UP TO BR WITH SBA FOR SAFETY. CONTINUES TO HAVE FREQ SMALL LOOSE STOOLS. DENIES PAIN. RESTING QUIETLY. NO S/SX OF DISTRESS. MONITORING.

## 2021-04-23 NOTE — PROGRESS NOTES
Nephrology Daily Progress Note/ Hemodialysis note     Assessment/Plan       Clostridium difficile colitis    Cardiomyopathy (CMS/HCC)    Chronic systolic heart failure (CMS/HCC)    ESRD (end stage renal disease) on dialysis (CMS/HCC)    Gastroesophageal reflux disease    HLD (hyperlipidemia)    Hypothyroidism    History of tracheostomy       LOS: 6 days     Ambar Lara is a 51 y.o. female who was admitted with Clostridium difficile colitis. She reports his symptoms are improving with treatment.    Subjective     Patient currently undergoing hemodialysis    Patient is scheduled to be discharged today to continue acute physical rehabilitation    Patient continued to receive her antibiotics with significant improvement of her GI complaints    She has noticed a significant improvement in her appetite    Patient remains hemodynamically stable and afebrile    Interval History: Ambar Lara  As above   Medication side effects:None     Current Facility-Administered Medications   Medication Dose Route Frequency Provider Last Rate Last Admin   • acetaminophen (TYLENOL) tablet 650 mg  650 mg Oral Q4H PRN Binta Patel MD   650 mg at 04/22/21 2150   • calcium acetate (PHOS BINDER)) capsule 1,334 mg  1,334 mg Oral TID With Meals Binta Patel MD   1,334 mg at 04/22/21 1758   • epoetin marco antonio-epbx (RETACRIT) injection 5,000 Units  5,000 Units Subcutaneous Once per day on Mon Wed Fri Bebeto Pinto MD   5,000 Units at 04/21/21 1149   • escitalopram (LEXAPRO) tablet 20 mg  20 mg Oral Q24H Binta Patel MD   20 mg at 04/22/21 2151   • levothyroxine (SYNTHROID, LEVOTHROID) tablet 100 mcg  100 mcg Oral Q AM Nirmal Wan MD   100 mcg at 04/23/21 0536   • melatonin tablet 3 mg  3 mg Oral Nightly PRN Binta Patel MD   3 mg at 04/22/21 2151   • midodrine (PROAMATINE) tablet 10 mg  10 mg Oral TID Faviola Fowler MD   10 mg at 04/23/21 1035   • montelukast (SINGULAIR) tablet  10 mg  10 mg Oral Daily Binta Patel MD   10 mg at 04/22/21 0800   • multivitamin (THERAGRAN) tablet 1 tablet  1 tablet Oral Daily Bebeto Pinto MD   1 tablet at 04/22/21 0800   • nitroglycerin (NITROSTAT) SL tablet 0.4 mg  0.4 mg Sublingual Q5 Min PRN Binta Patel MD       • ondansetron (ZOFRAN) tablet 4 mg  4 mg Oral Q6H PRN Binta Patel MD        Or   • ondansetron (ZOFRAN) injection 4 mg  4 mg Intravenous Q6H PRN Binta Patel MD   4 mg at 04/21/21 1801   • Pharmacy Consult   Does not apply Continuous PRN Binta Patel MD       • Pharmacy to dose warfarin   Does not apply Continuous PRN Nirmal Wan MD       • saccharomyces boulardii (FLORASTOR) capsule 500 mg  500 mg Oral BID Nirmal Wan MD   500 mg at 04/22/21 2151   • sodium bicarbonate tablet 650 mg  650 mg Oral Q8H Binta Patel MD   650 mg at 04/23/21 0536   • sodium chloride 0.9 % flush 10 mL  10 mL Intravenous PRN Marcos Blackman MD   10 mL at 04/21/21 2007   • vancomycin (VANCOCIN) capsule 250 mg  250 mg Oral Q6H Nirmal Wan MD   250 mg at 04/23/21 0536   • warfarin (COUMADIN) tablet 5 mg  5 mg Oral Daily Nirmal Wan MD   5 mg at 04/22/21 1758       Objective     Vital signs in last 24 hours:  Temp:  [96.5 °F (35.8 °C)-98.4 °F (36.9 °C)] 96.5 °F (35.8 °C)  Heart Rate:  [66-79] 74  Resp:  [16] 16  BP: ()/(66-80) 118/80    Intake/Output last 3 shifts:  No intake/output data recorded.    Labs:  Results from last 7 days   Lab Units 04/23/21  0648 04/22/21  0733 04/21/21  0608 04/20/21  0614 04/19/21  0817 04/18/21  0717   WBC 10*3/mm3 8.13 8.53 7.45 7.89 8.09 9.61   HEMOGLOBIN g/dL 10.0* 10.0* 9.6* 9.3* 10.0* 10.0*   HEMATOCRIT % 32.3* 31.3* 30.2* 28.8* 31.8* 30.6*   PLATELETS 10*3/mm3 269 296 318 338 338 322   MONOCYTES % %  --   --   --  5.1 4.1* 5.1     Results from last 7 days   Lab Units 04/23/21  0648 04/22/21  0733 04/21/21  0608 04/16/21  1602    SODIUM mmol/L 140 138 140 133*   POTASSIUM mmol/L 3.9 4.0 4.1 4.1   CHLORIDE mmol/L 105 104 104 97*   CO2 mmol/L 21.8* 21.1* 23.4 23.2   BUN mg/dL 17 10 15 9   CREATININE mg/dL 3.10* 2.19* 2.65* 1.63*   CALCIUM mg/dL 8.5* 8.1* 8.1* 8.0*   BILIRUBIN mg/dL  --   --   --  0.2   ALK PHOS U/L  --   --   --  131*   ALT (SGPT) U/L  --   --   --  14   AST (SGOT) U/L  --   --   --  17   GLUCOSE mg/dL 102* 99 89 146*       Physical Exam:  General appearance: alert deconditioned  Head: Normocephalic, without obvious abnormality, atraumatic  Eyes: conjunctivae/corneas clear. PERRL, EOM's intact.  Ears: Hearing preserved  Neck: no adenopathy, no carotid bruit, no JVD, supple, symmetrical, trachea midline. RIJPC in place. Trach in place slowly healing   Lungs: clear to auscultation bilaterally  Heart: regular rate and rhythm, S1, S2 normal, KIMMY grade III   Abdomen: soft, non-tender; bowel sounds normal; no masses, PD catheter in place with clean exit site  Extremities: extremities normal, atraumatic, no cyanosis or edema  Pulses: 2+ and symmetric  Neurologic: Alert and oriented X 3, generalized weakness    Impression and plan    Mrs Ambar melo is a pleasant 51-year-old female with a past medical history of chronic systolic congestive heart failure, thyroid disease, gastroesophageal for disease, chronic kidney disease that progressed to end-stage renal disease requiring the initiation hemodialysis through peritoneal dialysis, associated with complications that require transition to in center hemodialysis s/p RIJPC .  Patient has a history of Covid infection.  Patient was admitted for colitis found to have C. Difficile currently on vancomycin oral treatment and metronidazole IV    ESRD on HD ( MWF schedule )   -Secondary to interstitial nephritis  -History of peritoneal  dialysis complicated by require transition to hemodialysis  -s/p PD catheter in place , not used since 3/2021  -s/p RIJPC placement   -Patient was seen and  examined during hemodialysis tolerating procedure without any complications  -Hemodialysis blood flow 400, dialysate 500, sodium 137, potassium four, calcium 2.25, bicarb 37, treatment 3-1/2 hours, UF as tolerated to reach dry weight    Hypotension   - On MIDODRINE 5 mg TID   - Pt requires MIDODRINE during HD TTX   - Will follow closely     Chronic normocytic anemia  Results from last 7 days   Lab Units 04/23/21  0648 04/22/21  0733 04/21/21  0608   HEMOGLOBIN g/dL 10.0* 10.0* 9.6*   -Likely secondary to end-stage renal disease  -on EPOGEN 5.000 SC TIW   - RENAVITE  Daily   -Continue to follow H&H closely  -Hemoglobin gradually improving    Hyperphosphatemia  -We will continue renal diet and phoslo TID with meals   -We will follow Phos trend    C. difficile colitis  -On  Vancomycin oral and  metronidazole IV  -Currently followed closely by primary team and infectious disease    Plan:   - HD, before discharge     Over 25 minutes of time was spent with face-to-face patient care half the time was spent counseling the patient regarding the plan of care.    All question concerns were answered to patient satisfaction.  Patient verbalized understanding    Patient was seen and examined during hemodialysis    Thank you for allowing me to participate in this patient care    Bebeto Pinto MD, MD

## 2021-04-23 NOTE — PAYOR COMM NOTE
"Marco Ambar JESSE (51 y.o. Female)     ATTN: CONTINUED STAY CLINICALS TO REVIEW:  [id number]    PLEASE REPLY TO [facility] DEPT: EMMANUEL MARTINEZ RN,CCP; [phone], [phone]        Date of Birth Social Security Number Address Home Phone MRN    1970  [address] [place] [address] [phone] [id number]    [employer] Marital Status          Unknown        Admission Date Admission Type Admitting Provider Attending Provider Department, Room/Bed    4/16/21 Emergency Stingl, MD Savage Garcia Abhishek, MD [address], E669/1    Discharge Date Discharge Disposition Discharge Destination                       Attending Provider: Nirmal Wan MD    Allergies: Penicillins, Nickel    Isolation: Spore   Infection: C.difficile (04/09/21), COVID (History) (04/18/21)   Code Status: CPR    Ht: 154.9 cm (60.98\")   Wt: 72.7 kg (160 lb 4.8 oz)    Admission Cmt: None   Principal Problem: Clostridium difficile colitis [A04.72]                 Active Insurance as of 4/16/2021     Primary Coverage     Payor Plan Insurance Group Employer/Plan Group    [facility] [id number]     Payor Plan Address Payor Plan Phone Number Payor Plan Fax Number Effective Dates    [address]   1/1/2021 - None Entered    [facility] [id number]       Subscriber Name Subscriber Birth Date Member ID       AMBAR MELO 1970 [id number]           Secondary Coverage     Payor Plan Insurance Group Employer/Plan Group    MEDICARE MEDICARE A & B      Payor Plan Address Payor Plan Phone Number Payor Plan Fax Number Effective Dates    [address] [phone]  4/1/2019 - None Entered    [facility] [id number]       Subscriber Name Subscriber Birth Date Member ID       AMBAR MELO 1970 [id number]                 Emergency Contacts      (Rel.) Home Phone Work Phone Mobile Phone    Roland Melo (Son) [phone] -- --    natalie melo (Power of " ) [phone] -- --    maty melo (Daughter) [phone] -- --            Vital Signs (last 2 days)     Date/Time   Temp   Temp src   Pulse   Resp   BP   Patient Position   SpO2    04/23/21 0742   96.5 (35.8)   Oral   --   16   118/80   Lying   --    04/23/21 0535   --   --   74   --   101/74   --   --    04/22/21 2322   97.4 (36.3)   Oral   66   16   107/67   Lying   98    04/22/21 2000   97.9 (36.6)   Oral   79   16   107/77   Lying   96    04/22/21 1422   98.4 (36.9)   Oral   --   16   94/66   Lying   --    04/22/21 0737   97.6 (36.4)   Oral   --   16   --   Lying   --    04/22/21 0736   --   --   89   --   90/54   --   --    04/22/21 0340   --   --   70   --   --   --   98    04/22/21 0323   --   --   --   --   97/71   Lying   --    04/21/21 2258   98 (36.7)   Oral   103   16   94/65   Lying   94    04/21/21 2137   --   --   84   --   --   --   100    04/21/21 1931   98.3 (36.8)   Oral   111   16   111/76   Lying   97    04/21/21 1727   --   --   108   --   97/72   --   --    04/21/21 1303   98.4 (36.9)   Oral   99   16   (!) 82/53   Lying   95    04/21/21 1100   --   --   --   --   104/70   --   --    04/21/21 0923   --   --   84   --   (!) 83/61   --   --    04/21/21 0715   97.9 (36.6)   Oral   78   18   99/68   Lying   --    04/21/21 0640   --   --   85   --   90/64   --   --            Oxygen Therapy (last 2 days)     Date/Time   SpO2   Device (Oxygen Therapy)   Flow (L/min)   Oxygen Concentration (%)   ETCO2 (mmHg)    04/23/21 0010   --   room air   --   --   --    04/22/21 2322   98   room air   --   --   --    04/22/21 2150   --   room air   --   --   --    04/22/21 2000   96   room air   --   --   --    04/22/21 1445   --   room air   --   --   --    04/22/21 0754   --   room air   --   --   --    04/22/21 0340   98   --   --   --   --    04/21/21 2258   94   room air   --   --   --    04/21/21 2137   100   --   --   --   --    04/21/21 2000   --   room air   --   --   --    04/21/21 1931    97   room air   --   --   --    04/21/21 1303   95   room air   --   --   --            Intake & Output (last 2 days)       04/21 0701 - 04/22 0700 04/22 0701 - 04/23 0700 04/23 0701 - 04/24 0700    P.O. 840      Total Intake(mL/kg) 840 (11.6)      Urine (mL/kg/hr) 0 (0)      Other 1300      Stool 0      Total Output 1300      Net -460             Urine Unmeasured Occurrence 3 x 1 x     Stool Unmeasured Occurrence 9 x 4 x         Lines, Drains & Airways    Active LDAs     Name:   Placement date:   Placement time:   Site:   Days:    Peripheral IV 04/16/21 1557 Left Forearm   04/16/21 1557    Forearm   6    Peritoneal Dialysis Catheter Right lower abdomen   --    --    Right lower abdomen       Surgical Airway   --    --    --       Hemodialysis Cath Double   --    --    Subclavian                   Lab Results (last 48 hours)     Procedure Component Value Units Date/Time    Basic Metabolic Panel [[id number]]  (Abnormal) Collected: 04/23/21 0648    Specimen: Blood Updated: 04/23/21 0740     Glucose 102 mg/dL      BUN 17 mg/dL      Creatinine 3.10 mg/dL      Sodium 140 mmol/L      Potassium 3.9 mmol/L      Chloride 105 mmol/L      CO2 21.8 mmol/L      Calcium 8.5 mg/dL      eGFR Non African Amer 16 mL/min/1.73      BUN/Creatinine Ratio 5.5     Anion Gap 13.2 mmol/L     Narrative:      GFR Normal >60  Chronic Kidney Disease <60  Kidney Failure <15      Protime-INR [[id number]]  (Abnormal) Collected: 04/23/21 0648    Specimen: Blood Updated: 04/23/21 0723     Protime 23.3 Seconds      INR 2.09    CBC & Differential [[id number]]  (Abnormal) Collected: 04/23/21 0648    Specimen: Blood Updated: 04/23/21 0705    Narrative:      The following orders were created for panel order CBC & Differential.  Procedure                               Abnormality         Status                     ---------                               -----------         ------                     CBC Auto Differential[[id number]]        Abnormal             Final result                 Please view results for these tests on the individual orders.    CBC Auto Differential [[id number]]  (Abnormal) Collected: 04/23/21 0648    Specimen: Blood Updated: 04/23/21 0705     WBC 8.13 10*3/mm3      RBC 3.31 10*6/mm3      Hemoglobin 10.0 g/dL      Hematocrit 32.3 %      MCV 97.6 fL      MCH 30.2 pg      MCHC 31.0 g/dL      RDW 15.6 %      RDW-SD 55.8 fl      MPV 9.2 fL      Platelets 269 10*3/mm3      Neutrophil % 56.9 %      Lymphocyte % 29.4 %      Monocyte % 5.4 %      Eosinophil % 2.1 %      Basophil % 2.1 %      Immature Grans % 4.1 %      Neutrophils, Absolute 4.63 10*3/mm3      Lymphocytes, Absolute 2.39 10*3/mm3      Monocytes, Absolute 0.44 10*3/mm3      Eosinophils, Absolute 0.17 10*3/mm3      Basophils, Absolute 0.17 10*3/mm3      Immature Grans, Absolute 0.33 10*3/mm3      nRBC 0.0 /100 WBC     Basic Metabolic Panel [[id number]]  (Abnormal) Collected: 04/22/21 0733    Specimen: Blood Updated: 04/22/21 0847     Glucose 99 mg/dL      BUN 10 mg/dL      Creatinine 2.19 mg/dL      Sodium 138 mmol/L      Potassium 4.0 mmol/L      Chloride 104 mmol/L      CO2 21.1 mmol/L      Calcium 8.1 mg/dL      eGFR Non African Amer 24 mL/min/1.73      BUN/Creatinine Ratio 4.6     Anion Gap 12.9 mmol/L     Narrative:      GFR Normal >60  Chronic Kidney Disease <60  Kidney Failure <15      Protime-INR [[id number]]  (Abnormal) Collected: 04/22/21 0733    Specimen: Blood Updated: 04/22/21 0819     Protime 23.1 Seconds      INR 2.08    CBC & Differential [[id number]]  (Abnormal) Collected: 04/22/21 0733    Specimen: Blood Updated: 04/22/21 0808    Narrative:      The following orders were created for panel order CBC & Differential.  Procedure                               Abnormality         Status                     ---------                               -----------         ------                     CBC Auto Differential[[id number]]        Abnormal            Final result                  Please view results for these tests on the individual orders.    CBC Auto Differential [[id number]]  (Abnormal) Collected: 04/22/21 0733    Specimen: Blood Updated: 04/22/21 0808     WBC 8.53 10*3/mm3      RBC 3.30 10*6/mm3      Hemoglobin 10.0 g/dL      Hematocrit 31.3 %      MCV 94.8 fL      MCH 30.3 pg      MCHC 31.9 g/dL      RDW 15.4 %      RDW-SD 53.1 fl      MPV 9.4 fL      Platelets 296 10*3/mm3      Neutrophil % 59.2 %      Lymphocyte % 26.7 %      Monocyte % 5.9 %      Eosinophil % 2.2 %      Basophil % 2.1 %      Immature Grans % 3.9 %      Neutrophils, Absolute 5.05 10*3/mm3      Lymphocytes, Absolute 2.28 10*3/mm3      Monocytes, Absolute 0.50 10*3/mm3      Eosinophils, Absolute 0.19 10*3/mm3      Basophils, Absolute 0.18 10*3/mm3      Immature Grans, Absolute 0.33 10*3/mm3      nRBC 0.0 /100 WBC         Orders (last 48 hrs)      Start     Ordered    04/23/21 0600  CBC & Differential  Morning Draw      04/22/21 1029    04/23/21 0600  Basic Metabolic Panel  Morning Draw      04/22/21 1029    04/23/21 0600  CBC Auto Differential  PROCEDURE ONCE      04/23/21 0003    04/23/21 0000  Hemodialysis Inpatient  Once     Comments: Please do first in am.  I called [facility] and spoke to Sergei .    04/22/21 1231    04/22/21 1800  warfarin (COUMADIN) tablet 5 mg  Daily Warfarin      04/22/21 0952    04/22/21 0600  Protime-INR  Daily      04/21/21 1015    04/22/21 0600  CBC & Differential  Morning Draw      04/21/21 1617    04/22/21 0600  Basic Metabolic Panel  Morning Draw      04/21/21 1617    04/22/21 0600  CBC Auto Differential  PROCEDURE ONCE      04/22/21 0004    04/22/21 0132  albumin human 25 % IV SOLN 12.5 g  As Needed      04/22/21 0132    04/22/21 0132  sodium chloride 0.9 % bolus 1,000 mL  As Needed      04/22/21 0132    04/21/21 1800  midodrine (PROAMATINE) tablet 10 mg  3 Times Daily Before Meals      04/21/21 1611    04/21/21 1700  warfarin (COUMADIN) tablet 5 mg  Once (Warfarin)      04/21/21 1153     04/21/21 0900  epoetin marco antonio-epbx (RETACRIT) injection 5,000 Units  Once per day on Mon Wed Fri 04/20/21 1249    04/21/21 0900  multivitamin (THERAGRAN) tablet 1 tablet  Daily      04/20/21 1250    04/21/21 0600  levothyroxine (SYNTHROID, LEVOTHROID) tablet 100 mcg  Every Early Morning      04/20/21 1733    04/20/21 1230  saccharomyces boulardii (FLORASTOR) capsule 500 mg  2 Times Daily      04/20/21 1130    04/19/21 1858  Pharmacy to dose warfarin  Continuous PRN      04/19/21 1859 04/18/21 1200  midodrine (PROAMATINE) tablet 5 mg  3 Times Daily Before Meals,   Status:  Discontinued      04/18/21 1112    04/18/21 1200  vancomycin (VANCOCIN) capsule 250 mg  Every 6 Hours Scheduled      04/18/21 1113    04/18/21 1200  metroNIDAZOLE (FLAGYL) 500 mg/100mL IVPB  Every 8 Hours      04/18/21 1113    04/18/21 1038  Daily Weights  Daily      04/18/21 1037    04/17/21 1800  Dietary Nutrition Supplements Boost Breeze (Ensure Clear)  Daily With Lunch & Dinner      04/17/21 1504    04/17/21 0900  montelukast (SINGULAIR) tablet 10 mg  Daily      04/16/21 2157 04/17/21 0800  calcium acetate (PHOS BINDER)) capsule 1,334 mg  3 Times Daily With Meals      04/16/21 2157 04/17/21 0000  Vital Signs  Every 4 Hours     Comments: Per per hospital policy    04/16/21 2019 04/16/21 2300  escitalopram (LEXAPRO) tablet 20 mg  Every 24 Hours      04/16/21 2157 04/16/21 2245  sodium bicarbonate tablet 650 mg  Every 8 Hours Scheduled      04/16/21 2157 04/16/21 2020  Daily Weights  Daily      04/16/21 2019 04/16/21 2018  Pharmacy Consult  Continuous PRN      04/16/21 2019 04/16/21 2018  Strict Intake & Output  Every Shift      04/16/21 2019 04/16/21 2017  melatonin tablet 3 mg  Nightly PRN      04/16/21 2019 04/16/21 2017  nitroglycerin (NITROSTAT) SL tablet 0.4 mg  Every 5 Minutes PRN      04/16/21 2019 04/16/21 2017  acetaminophen (TYLENOL) tablet 650 mg  Every 4 Hours PRN      04/16/21 2019 04/16/21  2017  ondansetron (ZOFRAN) tablet 4 mg  Every 6 Hours PRN      04/16/21 2019 04/16/21 2017  ondansetron (ZOFRAN) injection 4 mg  Every 6 Hours PRN      04/16/21 2019 04/16/21 1601  sodium chloride 0.9 % flush 10 mL  As Needed      04/16/21 1601    Unscheduled  Telemetry - Pulse Oximetry  Continuous PRN     Comments: If Patient Develops Unresponsiveness, Acute Dyspnea, Cyanosis or Suspected Hypoxemia Start Continuous Pulse Ox Monitoring, Apply Oxygen & Notify Provider    04/16/21 2019    Unscheduled  Oxygen Therapy- Nasal Cannula; Titrate for SPO2: 90% - 95%  Continuous PRN     Comments: If Patient Develops Unresponsiveness, Acute Dyspnea, Cyanosis or Suspected Hypoxemia Start Continuous Pulse Ox Monitoring, Apply Oxygen & Notify Provider    04/16/21 2019    Unscheduled  ECG 12 Lead  As Needed     Comments: Nurse to Release if Patient Expericences Acute Chest Pain or Dysrhythmias    04/16/21 2019    Unscheduled  Potassium  As Needed     Comments: For Ventricular Arrhythmias      04/16/21 2019    Unscheduled  Magnesium  As Needed     Comments: For Ventricular Arrhythmias      04/16/21 2019    Unscheduled  Troponin  As Needed     Comments: For Chest Pain      04/16/21 2019    Unscheduled  Blood Gas, Arterial -  As Needed     Comments: Per O2 PolicyNotify Physician      04/16/21 2019    Unscheduled  Up with assistance  As Needed      04/16/21 2019              Physician Progress Notes (last 48 hours) (Notes from 04/21/21 1410 through 04/23/21 1410)      Bebeto Pinto MD at 04/23/21 1125          Nephrology Daily Progress Note/ Hemodialysis note     Assessment/Plan       Clostridium difficile colitis    Cardiomyopathy (CMS/HCC)    Chronic systolic heart failure (CMS/HCC)    ESRD (end stage renal disease) on dialysis (CMS/[facility])    Gastroesophageal reflux disease    HLD (hyperlipidemia)    Hypothyroidism    History of tracheostomy       LOS: 6 days     Ambar Lara is a 51 y.o. female who was admitted with  Clostridium difficile colitis. She reports his symptoms are improving with treatment.    Subjective     Patient currently undergoing hemodialysis    Patient is scheduled to be discharged today to continue acute physical rehabilitation    Patient continued to receive her antibiotics with significant improvement of her GI complaints    She has noticed a significant improvement in her appetite    Patient remains hemodynamically stable and afebrile    Interval History: Ambar Lara  As above   Medication side effects:None     Current Facility-Administered Medications   Medication Dose Route Frequency Provider Last Rate Last Admin   • acetaminophen (TYLENOL) tablet 650 mg  650 mg Oral Q4H PRN Binta Patel MD   650 mg at 04/22/21 2150   • calcium acetate (PHOS BINDER)) capsule 1,334 mg  1,334 mg Oral TID With Meals Binta Patel MD   1,334 mg at 04/22/21 1758   • epoetin marco antonio-epbx (RETACRIT) injection 5,000 Units  5,000 Units Subcutaneous Once per day on Mon Wed Fri Bebeto iPnto MD   5,000 Units at 04/21/21 1149   • escitalopram (LEXAPRO) tablet 20 mg  20 mg Oral Q24H Binta Patel MD   20 mg at 04/22/21 2151   • levothyroxine (SYNTHROID, LEVOTHROID) tablet 100 mcg  100 mcg Oral Q AM Nirmal Wan MD   100 mcg at 04/23/21 0536   • melatonin tablet 3 mg  3 mg Oral Nightly PRN Binta Patel MD   3 mg at 04/22/21 2151   • midodrine (PROAMATINE) tablet 10 mg  10 mg Oral TID Faviola Fowler MD   10 mg at 04/23/21 1035   • montelukast (SINGULAIR) tablet 10 mg  10 mg Oral Daily Binta Patel MD   10 mg at 04/22/21 0800   • multivitamin (THERAGRAN) tablet 1 tablet  1 tablet Oral Daily Bebeto Pinto MD   1 tablet at 04/22/21 0800   • nitroglycerin (NITROSTAT) SL tablet 0.4 mg  0.4 mg Sublingual Q5 Min PRN Binta Patel MD       • ondansetron (ZOFRAN) tablet 4 mg  4 mg Oral Q6H PRN Binta Patel MD        Or   • ondansetron (ZOFRAN)  injection 4 mg  4 mg Intravenous Q6H PRN Binta Patel MD   4 mg at 04/21/21 1801   • Pharmacy Consult   Does not apply Continuous PRN Binta Patel MD       • Pharmacy to dose warfarin   Does not apply Continuous PRN Nirmal Wan MD       • saccharomyces boulardii (FLORASTOR) capsule 500 mg  500 mg Oral BID Nirmal Wan MD   500 mg at 04/22/21 2151   • sodium bicarbonate tablet 650 mg  650 mg Oral Q8H Binta Patel MD   650 mg at 04/23/21 0536   • sodium chloride 0.9 % flush 10 mL  10 mL Intravenous PRN Marcos Blackman MD   10 mL at 04/21/21 2007   • vancomycin (VANCOCIN) capsule 250 mg  250 mg Oral Q6H Nirmal Wan MD   250 mg at 04/23/21 0536   • warfarin (COUMADIN) tablet 5 mg  5 mg Oral Daily Nirmal Wan MD   5 mg at 04/22/21 1758       Objective     Vital signs in last 24 hours:  Temp:  [96.5 °F (35.8 °C)-98.4 °F (36.9 °C)] 96.5 °F (35.8 °C)  Heart Rate:  [66-79] 74  Resp:  [16] 16  BP: ()/(66-80) 118/80    Intake/Output last 3 shifts:  No intake/output data recorded.    Labs:  Results from last 7 days   Lab Units 04/23/21  0648 04/22/21  0733 04/21/21  0608 04/20/21  0614 04/19/21  0817 04/18/21  0717   WBC 10*3/mm3 8.13 8.53 7.45 7.89 8.09 9.61   HEMOGLOBIN g/dL 10.0* 10.0* 9.6* 9.3* 10.0* 10.0*   HEMATOCRIT % 32.3* 31.3* 30.2* 28.8* 31.8* 30.6*   PLATELETS 10*3/mm3 269 296 318 338 338 322   MONOCYTES % %  --   --   --  5.1 4.1* 5.1     Results from last 7 days   Lab Units 04/23/21  0648 04/22/21  0733 04/21/21  0608 04/16/21  1602   SODIUM mmol/L 140 138 140 133*   POTASSIUM mmol/L 3.9 4.0 4.1 4.1   CHLORIDE mmol/L 105 104 104 97*   CO2 mmol/L 21.8* 21.1* 23.4 23.2   BUN mg/dL 17 10 15 9   CREATININE mg/dL 3.10* 2.19* 2.65* 1.63*   CALCIUM mg/dL 8.5* 8.1* 8.1* 8.0*   BILIRUBIN mg/dL  --   --   --  0.2   ALK PHOS U/L  --   --   --  131*   ALT (SGPT) U/L  --   --   --  14   AST (SGOT) U/L  --   --   --  17   GLUCOSE mg/dL 102* 99 89 146*        Physical Exam:  General appearance: alert deconditioned  Head: Normocephalic, without obvious abnormality, atraumatic  Eyes: conjunctivae/corneas clear. PERRL, EOM's intact.  Ears: Hearing preserved  Neck: no adenopathy, no carotid bruit, no JVD, supple, symmetrical, trachea midline. RIJPC in place. Trach in place slowly healing   Lungs: clear to auscultation bilaterally  Heart: regular rate and rhythm, S1, S2 normal, KIMMY grade III   Abdomen: soft, non-tender; bowel sounds normal; no masses, PD catheter in place with clean exit site  Extremities: extremities normal, atraumatic, no cyanosis or edema  Pulses: 2+ and symmetric  Neurologic: Alert and oriented X 3, generalized weakness    Impression and plan    Mrs Ambar melo is a pleasant 51-year-old female with a past medical history of chronic systolic congestive heart failure, thyroid disease, gastroesophageal for disease, chronic kidney disease that progressed to end-stage renal disease requiring the initiation hemodialysis through peritoneal dialysis, associated with complications that require transition to in center hemodialysis s/p RIJPC .  Patient has a history of Covid infection.  Patient was admitted for colitis found to have C. Difficile currently on vancomycin oral treatment and metronidazole IV    ESRD on HD ( MWF schedule )   -Secondary to interstitial nephritis  -History of peritoneal  dialysis complicated by require transition to hemodialysis  -s/p PD catheter in place , not used since 3/2021  -s/p RIJPC placement   -Patient was seen and examined during hemodialysis tolerating procedure without any complications  -Hemodialysis blood flow 400, dialysate 500, sodium 137, potassium four, calcium 2.25, bicarb 37, treatment 3-1/2 hours, UF as tolerated to reach dry weight    Hypotension   - On MIDODRINE 5 mg TID   - Pt requires MIDODRINE during HD TTX   - Will follow closely     Chronic normocytic anemia  Results from last 7 days   Lab Units  "04/23/21  0648 04/22/21  0733 04/21/21  0608   HEMOGLOBIN g/dL 10.0* 10.0* 9.6*   -Likely secondary to end-stage renal disease  -on EPOGEN 5.000 SC TIW   - RENAVITE  Daily   -Continue to follow H&H closely  -Hemoglobin gradually improving    Hyperphosphatemia  -We will continue renal diet and phoslo TID with meals   -We will follow Phos trend    C. difficile colitis  -On  Vancomycin oral and  metronidazole IV  -Currently followed closely by primary team and infectious disease    Plan:   - HD, before discharge     Over 25 minutes of time was spent with face-to-face patient care half the time was spent counseling the patient regarding the plan of care.    All question concerns were answered to patient satisfaction.  Patient verbalized understanding    Patient was seen and examined during hemodialysis    Thank you for allowing me to participate in this patient care    Bebeto Pinto MD, MD    Electronically signed by Bebeto Pinto MD at 04/23/21 1128     Faviola Moura MD at 04/22/21 1223             LOS: 5 days    Patient Care Team:  Jet Manuel MD as PCP - General (Internal Medicine)    Chief Complaint:    Chief Complaint   Patient presents with   • Abdominal Pain   • Hypotension     Follow UP ESRD  Subjective     Interval History:     Feels really good today.  Has done PT, OT this am.  Feels ready to go home or acute rehab.  Eating well.  Bowels have slowed down.   Review of Systems:   As noted above    Objective     Vital Signs  Temp:  [97.6 °F (36.4 °C)-98.4 °F (36.9 °C)] 97.6 °F (36.4 °C)  Heart Rate:  [] 89  Resp:  [16] 16  BP: ()/(53-76) 90/54    Flowsheet Rows      First Filed Value   Admission Height  154.9 cm (61\") Documented at 04/16/2021 1559   Admission Weight  68.5 kg (151 lb) Documented at 04/16/2021 1559          No intake/output data recorded.  I/O last 3 completed shifts:  In: 840 [P.O.:840]  Out: 1300 [Other:1300]    Intake/Output Summary (Last 24 hours) at " 4/22/2021 1223  Last data filed at 4/21/2021 1727  Gross per 24 hour   Intake 600 ml   Output --   Net 600 ml       Physical Exam:  Lying in bed .  Chronically ill-appearing  Brighter affect than on last visit .   HEENT  No eye discharge; no scleral icterus  No JVD;  tracheostomy covered.   Heart RRR, no rub or s3.  Right chest TDC  Lungs clear to auscultation.   Abd Soft, nontender. + bs, Mask applied .PD catheter right lower quadrant dressing intact.  Tunnel nontender.   Ext no edema .  Poor muscle mass .  Moves all extremities  Speech is fluent      Results Review:    Results from last 7 days   Lab Units 04/22/21  0733 04/21/21  0608 04/20/21  0614 04/16/21  1602   SODIUM mmol/L 138 140 136 133*   POTASSIUM mmol/L 4.0 4.1 3.9 4.1   CHLORIDE mmol/L 104 104 100 97*   CO2 mmol/L 21.1* 23.4 20.3* 23.2   BUN mg/dL 10 15 34* 9   CREATININE mg/dL 2.19* 2.65* 4.06* 1.63*   CALCIUM mg/dL 8.1* 8.1* 8.9 8.0*   BILIRUBIN mg/dL  --   --   --  0.2   ALK PHOS U/L  --   --   --  131*   ALT (SGPT) U/L  --   --   --  14   AST (SGOT) U/L  --   --   --  17   GLUCOSE mg/dL 99 89 98 146*       Estimated Creatinine Clearance: 28.3 mL/min (A) (by C-G formula based on SCr of 2.19 mg/dL (H)).    Results from last 7 days   Lab Units 04/21/21  0608 04/20/21  0614 04/19/21  0817   PHOSPHORUS mg/dL 4.8* 6.8* 6.2*             Results from last 7 days   Lab Units 04/22/21  0733 04/21/21  0608 04/20/21  0614 04/19/21  0817 04/18/21  0717   WBC 10*3/mm3 8.53 7.45 7.89 8.09 9.61   HEMOGLOBIN g/dL 10.0* 9.6* 9.3* 10.0* 10.0*   PLATELETS 10*3/mm3 296 318 338 338 322       Results from last 7 days   Lab Units 04/22/21  0733 04/21/21  1047 04/20/21  0614 04/19/21  1517 04/16/21  1602   INR  2.08* 2.14* 2.84* 3.38* 2.24*       calcium acetate, 1,334 mg, Oral, TID With Meals  epoetin marco antonio/marco antonio-epbx, 5,000 Units, Subcutaneous, Once per day on Mon Wed Fri  escitalopram, 20 mg, Oral, Q24H  levothyroxine, 100 mcg, Oral, Q AM  metroNIDAZOLE, 500 mg,  Intravenous, Q8H  midodrine, 10 mg, Oral, TID AC  montelukast, 10 mg, Oral, Daily  multivitamin, 1 tablet, Oral, Daily  saccharomyces boulardii, 500 mg, Oral, BID  sodium bicarbonate, 650 mg, Oral, Q8H  vancomycin, 250 mg, Oral, Q6H  warfarin, 5 mg, Oral, Daily      Pharmacy Consult,   Pharmacy to dose warfarin,     Medication Review:   Current Facility-Administered Medications   Medication Dose Route Frequency Provider Last Rate Last Admin   • acetaminophen (TYLENOL) tablet 650 mg  650 mg Oral Q4H PRN Binta Patel MD   650 mg at 04/18/21 2044   • albumin human 25 % IV SOLN 12.5 g  12.5 g Intravenous PRN Darci Wang MD       • calcium acetate (PHOS BINDER)) capsule 1,334 mg  1,334 mg Oral TID With Meals Binta Patel MD   1,334 mg at 04/22/21 1145   • epoetin marco antonio-epbx (RETACRIT) injection 5,000 Units  5,000 Units Subcutaneous Once per day on Mon Wed Fri Bebeto Pinto MD   5,000 Units at 04/21/21 1149   • escitalopram (LEXAPRO) tablet 20 mg  20 mg Oral Q24H Binta Patel MD   20 mg at 04/21/21 2006   • levothyroxine (SYNTHROID, LEVOTHROID) tablet 100 mcg  100 mcg Oral Q AM Nirmal Wan MD   100 mcg at 04/22/21 0521   • melatonin tablet 3 mg  3 mg Oral Nightly PRN Binta Patel MD   3 mg at 04/22/21 0008   • metroNIDAZOLE (FLAGYL) 500 mg/100mL IVPB  500 mg Intravenous Q8H Nirmal Wan MD   500 mg at 04/22/21 1145   • midodrine (PROAMATINE) tablet 10 mg  10 mg Oral TID Faviola Fowler MD   10 mg at 04/22/21 1145   • montelukast (SINGULAIR) tablet 10 mg  10 mg Oral Daily Binta Patel MD   10 mg at 04/22/21 0800   • multivitamin (THERAGRAN) tablet 1 tablet  1 tablet Oral Daily Bebeto Pinto MD   1 tablet at 04/22/21 0800   • nitroglycerin (NITROSTAT) SL tablet 0.4 mg  0.4 mg Sublingual Q5 Min PRN Binta Patel MD       • ondansetron (ZOFRAN) tablet 4 mg  4 mg Oral Q6H PRN Binta Patel MD        Or   • ondansetron  (ZOFRAN) injection 4 mg  4 mg Intravenous Q6H PRN Binta Patel MD   4 mg at 21 1801   • Pharmacy Consult   Does not apply Continuous PRN Binta Patel MD       • Pharmacy to dose warfarin   Does not apply Continuous PRN Nirmal Wan MD       • saccharomyces boulardii (FLORASTOR) capsule 500 mg  500 mg Oral BID Nirmal Wan MD   500 mg at 21 0800   • sodium bicarbonate tablet 650 mg  650 mg Oral Q8H Binta Patel MD   650 mg at 21 0521   • sodium chloride 0.9 % bolus 1,000 mL  1,000 mL Intravenous PRN Darci Wang MD       • sodium chloride 0.9 % flush 10 mL  10 mL Intravenous PRN Marcos Blackman MD   10 mL at 21   • vancomycin (VANCOCIN) capsule 250 mg  250 mg Oral Q6H Nirmal Wan MD   250 mg at 21 1145   • warfarin (COUMADIN) tablet 5 mg  5 mg Oral Daily Nirmal Wan MD           Assessment/Plan   1. ESRD.  Dialysis tomorrow.  Will do in am just in case she is dc to home or goes to acute rehab here. Plan is for return to PD after acute illness and out of rehab.   PD catheter secured with dressing I applied last admission.  Has follow up scheduled with [facility] home training May 5 at 1 pm.   2. C. Dif.colitis.   Clinical improvement. IV flagy and po vanc.   3. Chronic systolic heart failure, chronic hypotension.  Increase midodrine .  4. Hypothyroid on replacement .  5. Anemia CKD. JULEE as outpt .  6. Covid PNA earlier this year with prolonged Respiratory failure, trach.  Now closing .  7. PE subsegmental RLL 21.  on coumadin.  INR 2.08.    I am fine with acute rehab or home anytime.  Has outpt spot at [facility] if dc home.      Faviola Moura MD  21  12:23 EDT      Electronically signed by Faviola Moura MD at 21 1233     Nirmal Wan MD at 21 1027              Name: Ambar MOLINA Marco ADMIT: 2021   : 1970  PCP: Jet Manuel MD     MRN: [id number] LOS: 5 days   AGE/SEX: 51 y.o. female  ROOM: [facility]     Subjective   Subjective   Patient seen at bedside, feels better.      Objective   Objective   Vital Signs  Temp:  [97.6 °F (36.4 °C)-98.4 °F (36.9 °C)] 97.6 °F (36.4 °C)  Heart Rate:  [] 89  Resp:  [16] 16  BP: ()/(53-76) 90/54  SpO2:  [94 %-100 %] 98 %  on   ;   Device (Oxygen Therapy): room air  Body mass index is 30.12 kg/m².  Physical Exam    General Appearance:    Alert, cooperative, no distress, appears stated age   Head:    Normocephalic, without obvious abnormality, atraumatic   Eyes:    PERRL, conjunctivae/corneas clear, EOM's intact, both eyes   Ears:    Normal external ear canals, both ears   Nose:   Nares normal, septum midline, mucosa normal, no drainage    or sinus tenderness   Throat:   Lips, mucosa, and tongue normal   Neck:   Supple, symmetrical, trachea midline, no adenopathy;     thyroid:  no enlargement/tenderness/nodules; no carotid    bruit or JVD   Back:     Symmetric, no curvature, ROM normal, no CVA tenderness   Lungs:     Decreased breath sounds bilaterally, respirations unlabored   Chest Wall:    No tenderness or deformity    Heart:    Regular rate and rhythm, S1 and S2 normal, no murmur, rub   or gallop   Abdomen:     Soft, nontender, bowel sounds active all four quadrants,     no masses, no hepatomegaly, no splenomegaly   Extremities:   Extremities normal, atraumatic, no cyanosis or edema   Pulses:   2+ and symmetric all extremities   Skin:   Skin color, texture, turgor normal, no rashes or lesions   Lymph nodes:   Cervical, supraclavicular, and axillary nodes normal   Neurologic:   CNII-XII intact, normal strength, sensation intact throughout           Results Review     I reviewed the patient's new clinical results.  Results from last 7 days   Lab Units 04/22/21  0733 04/21/21  0608 04/20/21  0614 04/19/21  0817   WBC 10*3/mm3 8.53 7.45 7.89 8.09   HEMOGLOBIN g/dL 10.0* 9.6* 9.3* 10.0*   PLATELETS  10*3/mm3 296 318 338 338     Results from last 7 days   Lab Units 04/22/21  0733 04/21/21  0608 04/20/21  0614 04/19/21  0817   SODIUM mmol/L 138 140 136 135*   POTASSIUM mmol/L 4.0 4.1 3.9 4.5   CHLORIDE mmol/L 104 104 100 101   CO2 mmol/L 21.1* 23.4 20.3* 20.2*   BUN mg/dL 10 15 34* 30*   CREATININE mg/dL 2.19* 2.65* 4.06* 4.21*   GLUCOSE mg/dL 99 89 98 96   Estimated Creatinine Clearance: 28.3 mL/min (A) (by C-G formula based on SCr of 2.19 mg/dL (H)).  Results from last 7 days   Lab Units 04/21/21  0608 04/20/21  0614 04/19/21  0817 04/16/21  1602   ALBUMIN g/dL 2.80* 3.00* 3.10* 3.60   BILIRUBIN mg/dL  --   --   --  0.2   ALK PHOS U/L  --   --   --  131*   AST (SGOT) U/L  --   --   --  17   ALT (SGPT) U/L  --   --   --  14     Results from last 7 days   Lab Units 04/22/21  0733 04/21/21  0608 04/20/21  0614 04/19/21  0817 04/16/21  1602   CALCIUM mg/dL 8.1* 8.1* 8.9 9.1 8.0*   ALBUMIN g/dL  --  2.80* 3.00* 3.10* 3.60   PHOSPHORUS mg/dL  --  4.8* 6.8* 6.2*  --        COVID19   Date Value Ref Range Status   04/16/2021 Not Detected Not Detected - Ref. Range Final   04/09/2021 Not Detected Not Detected - Ref. Range Final     No results found for: HGBA1C, POCGLU    XR Chest 1 View  ONE VIEW PORTABLE CHEST AT 5 PM     HISTORY: Hypotension. End-stage renal disease. Chest pain.      Recent Covid 19 pneumonia.     FINDINGS: The lungs are moderately expanded with some patchy infiltrates  at the lung bases likely related to residual changes of Covid 19  pneumonia and showing some minimal improvement at the left base when  compared to the study of 04/09/2021. The heart size remains normal. A  large gauge vascular catheter ends near the junction of the SVC and  right atrium without change.     This report was finalized on 4/16/2021 8:01 PM by Dr. Kiet Oconnor M.D.       Scheduled Medications  calcium acetate, 1,334 mg, Oral, TID With Meals  epoetin marco antonio/marco antonio-epbx, 5,000 Units, Subcutaneous, Once per day on Mon Wed  Fri  escitalopram, 20 mg, Oral, Q24H  levothyroxine, 100 mcg, Oral, Q AM  metroNIDAZOLE, 500 mg, Intravenous, Q8H  midodrine, 10 mg, Oral, TID AC  montelukast, 10 mg, Oral, Daily  multivitamin, 1 tablet, Oral, Daily  saccharomyces boulardii, 500 mg, Oral, BID  sodium bicarbonate, 650 mg, Oral, Q8H  vancomycin, 250 mg, Oral, Q6H  warfarin, 5 mg, Oral, Daily    Infusions  Pharmacy Consult,   Pharmacy to dose warfarin,     Diet  Diet Regular      Assessment/Plan     Active Hospital Problems    Diagnosis  POA   • **Clostridium difficile colitis [A04.72]  Yes   • History of tracheostomy [Z98.890]  Not Applicable   • Gastroesophageal reflux disease [K21.9]  Yes   • Cardiomyopathy (CMS/[facility]) [I42.9]  Yes   • Chronic systolic heart failure (CMS/[facility]) [I50.22]  Yes   • ESRD (end stage renal disease) on dialysis (CMS/[facility]) [N18.6, Z99.2]  Not Applicable   • HLD (hyperlipidemia) [E78.5]  Yes   • Hypothyroidism [E03.9]  Yes      Resolved Hospital Problems   No resolved problems to display.       51 y.o. female admitted with Clostridium difficile colitis.    Assessment and plan:  1.  C. difficile colitis, continue p.o. vancomycin and IV Flagyl.  2.  End-stage renal disease on hemodialysis, continue dialysis per nephrology recommendations.  3.  Anemia of chronic disease, hemoglobin noted to be stable, continue to recheck labs.  4.  Hyponatremia, resolved.  5.  Chronic systolic congestive heart failure, she appears euvolemic at this point of time.  6.  Hypothyroidism, TSH is checked, it is almost close to 96.  Continue Synthroid.  She would benefit from endocrinology follow-up on outpatient basis.   7.  Personal history of PE, patient currently on Coumadin.  INR therapeutic.  8.  Hypotension, midodrine dose has been increased.  Monitor BP closely.  9.  CODE STATUS is full code.  Further plans based on hospital course.      Nirmal Wan MD  [facility]  04/22/21  10:27 EDT      Electronically signed by  Nirmal Wan MD at 21 1029     Nirmal Wan MD at 21 1614              Name: Ambar Lara ADMIT: 2021   : 1970  PCP: Jet Manuel MD    MRN: [id number] LOS: 4 days   AGE/SEX: 51 y.o. female  ROOM: [facility]     Subjective   Subjective   Patient seen at bedside, continues to feel weak.      Objective   Objective   Vital Signs  Temp:  [97.8 °F (36.6 °C)-98.6 °F (37 °C)] 98.4 °F (36.9 °C)  Heart Rate:  [78-99] 99  Resp:  [16-18] 16  BP: ()/(53-76) 82/53  SpO2:  [95 %-96 %] 95 %  on   ;   Device (Oxygen Therapy): room air  Body mass index is 30.1 kg/m².  Physical Exam  General Appearance:    Alert, cooperative, no distress, appears stated age   Head:    Normocephalic, without obvious abnormality, atraumatic   Eyes:    PERRL, conjunctivae/corneas clear, EOM's intact, both eyes   Ears:    Normal external ear canals, both ears   Nose:   Nares normal, septum midline, mucosa normal, no drainage    or sinus tenderness   Throat:   Lips, mucosa, and tongue normal   Neck:   Supple, symmetrical, trachea midline, no adenopathy;     thyroid:  no enlargement/tenderness/nodules; no carotid    bruit or JVD   Back:     Symmetric, no curvature, ROM normal, no CVA tenderness   Lungs:     Decreased breath sounds bilaterally, respirations unlabored   Chest Wall:    No tenderness or deformity    Heart:    Regular rate and rhythm, S1 and S2 normal, no murmur, rub   or gallop   Abdomen:     Soft, nontender, bowel sounds active all four quadrants,     no masses, no hepatomegaly, no splenomegaly   Extremities:   Extremities normal, atraumatic, no cyanosis or edema   Pulses:   2+ and symmetric all extremities   Skin:   Skin color, texture, turgor normal, no rashes or lesions   Lymph nodes:   Cervical, supraclavicular, and axillary nodes normal   Neurologic:   CNII-XII intact, normal strength, sensation intact throughout       Results Review     I reviewed the patient's new clinical results.  Results  from last 7 days   Lab Units 04/21/21  0608 04/20/21  0614 04/19/21  0817 04/18/21  0717   WBC 10*3/mm3 7.45 7.89 8.09 9.61   HEMOGLOBIN g/dL 9.6* 9.3* 10.0* 10.0*   PLATELETS 10*3/mm3 318 338 338 322     Results from last 7 days   Lab Units 04/21/21  0608 04/20/21  0614 04/19/21  0817 04/18/21  0717   SODIUM mmol/L 140 136 135* 132*   POTASSIUM mmol/L 4.1 3.9 4.5 4.6   CHLORIDE mmol/L 104 100 101 100   CO2 mmol/L 23.4 20.3* 20.2* 20.3*   BUN mg/dL 15 34* 30* 23*   CREATININE mg/dL 2.65* 4.06* 4.21* 3.03*   GLUCOSE mg/dL 89 98 96 99   Estimated Creatinine Clearance: 23.4 mL/min (A) (by C-G formula based on SCr of 2.65 mg/dL (H)).  Results from last 7 days   Lab Units 04/21/21  0608 04/20/21  0614 04/19/21  0817 04/16/21  1602   ALBUMIN g/dL 2.80* 3.00* 3.10* 3.60   BILIRUBIN mg/dL  --   --   --  0.2   ALK PHOS U/L  --   --   --  131*   AST (SGOT) U/L  --   --   --  17   ALT (SGPT) U/L  --   --   --  14     Results from last 7 days   Lab Units 04/21/21  0608 04/20/21  0614 04/19/21  0817 04/18/21  0717 04/16/21  1602   CALCIUM mg/dL 8.1* 8.9 9.1 8.6 8.0*   ALBUMIN g/dL 2.80* 3.00* 3.10*  --  3.60   PHOSPHORUS mg/dL 4.8* 6.8* 6.2*  --   --        COVID19   Date Value Ref Range Status   04/16/2021 Not Detected Not Detected - Ref. Range Final   04/09/2021 Not Detected Not Detected - Ref. Range Final     No results found for: HGBA1C, POCGLU    XR Chest 1 View  ONE VIEW PORTABLE CHEST AT 5 PM     HISTORY: Hypotension. End-stage renal disease. Chest pain.      Recent Covid 19 pneumonia.     FINDINGS: The lungs are moderately expanded with some patchy infiltrates  at the lung bases likely related to residual changes of Covid 19  pneumonia and showing some minimal improvement at the left base when  compared to the study of 04/09/2021. The heart size remains normal. A  large gauge vascular catheter ends near the junction of the SVC and  right atrium without change.     This report was finalized on 4/16/2021 8:01 PM by Dr. Santa  LISA Oconnor.       Scheduled Medications  calcium acetate, 1,334 mg, Oral, TID With Meals  epoetin marco antonio/marco antonio-epbx, 5,000 Units, Subcutaneous, Once per day on Mon Wed Fri  escitalopram, 20 mg, Oral, Q24H  levothyroxine, 100 mcg, Oral, Q AM  metroNIDAZOLE, 500 mg, Intravenous, Q8H  midodrine, 10 mg, Oral, TID AC  montelukast, 10 mg, Oral, Daily  multivitamin, 1 tablet, Oral, Daily  saccharomyces boulardii, 500 mg, Oral, BID  sodium bicarbonate, 650 mg, Oral, Q8H  vancomycin, 250 mg, Oral, Q6H  warfarin, 5 mg, Oral, Once    Infusions  Pharmacy Consult,   Pharmacy to dose warfarin,     Diet  Diet Regular      Assessment/Plan     Active Hospital Problems    Diagnosis  POA   • **Clostridium difficile colitis [A04.72]  Yes   • History of tracheostomy [Z98.890]  Not Applicable   • Gastroesophageal reflux disease [K21.9]  Yes   • Cardiomyopathy (CMS/[facility]) [I42.9]  Yes   • Chronic systolic heart failure (CMS/[facility]) [I50.22]  Yes   • ESRD (end stage renal disease) on dialysis (CMS/[facility]) [N18.6, Z99.2]  Not Applicable   • HLD (hyperlipidemia) [E78.5]  Yes   • Hypothyroidism [E03.9]  Yes      Resolved Hospital Problems   No resolved problems to display.       51 y.o. female admitted with Clostridium difficile colitis.    Assessment and plan:  1.  C. difficile colitis, continue p.o. vancomycin and IV Flagyl.  2.  End-stage renal disease on hemodialysis, continue dialysis per nephrology recommendations.  3.  Anemia of chronic disease, hemoglobin noted to be stable, continue to recheck labs.  4.  Hyponatremia, resolved.  5.  Chronic systolic congestive heart failure, she appears euvolemic at this point of time.  6.  Hypothyroidism, TSH is checked, it is almost close to 96.  Continue Synthroid.  She would benefit from endocrinology follow-up on outpatient basis.   7.  Personal history of PE, patient currently on Coumadin.  INR therapeutic.  8.  Hypotension, midodrine dose has been increased.  9.  CODE STATUS is full code.  Further  "plans based on hospital course.         Nirmal Wan MD  [facility]  04/21/21  16:15 EDT      Electronically signed by Nirmal Wan MD at 04/21/21 1617     Faviola Moura MD at 04/21/21 1605             LOS: 4 days    Patient Care Team:  Jet Manuel MD as PCP - General (Internal Medicine)    Chief Complaint:    Chief Complaint   Patient presents with   • Abdominal Pain   • Hypotension     Follow UP ESRD  Subjective     Interval History:     Just completed dialysis.  BP 88 systolic.  Received midodrine today.  Walked in [place].   Stool with a little more form.  Not having as much abdominal pain.  Weight more stable .  Trach closing .  Review of Systems:   As noted above    Objective     Vital Signs  Temp:  [97.8 °F (36.6 °C)-98.6 °F (37 °C)] 98.4 °F (36.9 °C)  Heart Rate:  [78-99] 99  Resp:  [16-18] 16  BP: ()/(53-76) 82/53    Flowsheet Rows      First Filed Value   Admission Height  154.9 cm (61\") Documented at 04/16/2021 1559   Admission Weight  68.5 kg (151 lb) Documented at 04/16/2021 1559          I/O this shift:  In: 480 [P.O.:480]  Out: 1300 [Other:1300]  No intake/output data recorded.    Intake/Output Summary (Last 24 hours) at 4/21/2021 1605  Last data filed at 4/21/2021 1425  Gross per 24 hour   Intake 480 ml   Output 1300 ml   Net -820 ml       Physical Exam:  Lying in bed .  Chronically ill-appearing  Brighter affect than on last visit .   HEENT  No eye discharge; no scleral icterus  No JVD;  tracheostomy covered.   Heart RRR, no rub or s3.  Right chest TDC  Lungs clear to auscultation.   Abd Soft, nontender. + bs, Mask applied .PD catheter right lower quadrant dressing intact.  Tunnel nontender.   Ext no edema .  Poor muscle mass .  Moves all extremities  Speech is fluent      Results Review:    Results from last 7 days   Lab Units 04/21/21  0608 04/20/21  0614 04/19/21  0817 04/16/21  1602   SODIUM mmol/L 140 136 135* 133*   POTASSIUM mmol/L 4.1 3.9 4.5 " 4.1   CHLORIDE mmol/L 104 100 101 97*   CO2 mmol/L 23.4 20.3* 20.2* 23.2   BUN mg/dL 15 34* 30* 9   CREATININE mg/dL 2.65* 4.06* 4.21* 1.63*   CALCIUM mg/dL 8.1* 8.9 9.1 8.0*   BILIRUBIN mg/dL  --   --   --  0.2   ALK PHOS U/L  --   --   --  131*   ALT (SGPT) U/L  --   --   --  14   AST (SGOT) U/L  --   --   --  17   GLUCOSE mg/dL 89 98 96 146*       Estimated Creatinine Clearance: 23.4 mL/min (A) (by C-G formula based on SCr of 2.65 mg/dL (H)).    Results from last 7 days   Lab Units 04/21/21  0608 04/20/21  0614 04/19/21  0817   PHOSPHORUS mg/dL 4.8* 6.8* 6.2*             Results from last 7 days   Lab Units 04/21/21  0608 04/20/21  0614 04/19/21  0817 04/18/21  0717 04/17/21  0555   WBC 10*3/mm3 7.45 7.89 8.09 9.61 9.30   HEMOGLOBIN g/dL 9.6* 9.3* 10.0* 10.0* 9.5*   PLATELETS 10*3/mm3 318 338 338 322 272       Results from last 7 days   Lab Units 04/21/21  1047 04/20/21  0614 04/19/21  1517 04/16/21  1602   INR  2.14* 2.84* 3.38* 2.24*         calcium acetate, 1,334 mg, Oral, TID With Meals  epoetin marco antonio/marco antonio-epbx, 5,000 Units, Subcutaneous, Once per day on Mon Wed Fri  escitalopram, 20 mg, Oral, Q24H  levothyroxine, 100 mcg, Oral, Q AM  metroNIDAZOLE, 500 mg, Intravenous, Q8H  midodrine, 5 mg, Oral, TID AC  montelukast, 10 mg, Oral, Daily  multivitamin, 1 tablet, Oral, Daily  saccharomyces boulardii, 500 mg, Oral, BID  sodium bicarbonate, 650 mg, Oral, Q8H  vancomycin, 250 mg, Oral, Q6H  warfarin, 5 mg, Oral, Once      Pharmacy Consult,   Pharmacy to dose warfarin,         Medication Review:   Current Facility-Administered Medications   Medication Dose Route Frequency Provider Last Rate Last Admin   • acetaminophen (TYLENOL) tablet 650 mg  650 mg Oral Q4H PRN Binta Patel MD   650 mg at 04/18/21 2044   • calcium acetate (PHOS BINDER)) capsule 1,334 mg  1,334 mg Oral TID With Meals Binta Patel MD   1,334 mg at 04/21/21 1145   • epoetin marco antonio-epbx (RETACRIT) injection 5,000 Units  5,000 Units  Subcutaneous Once per day on Mon Wed Fri Bebeto Pinto MD   5,000 Units at 04/21/21 1149   • escitalopram (LEXAPRO) tablet 20 mg  20 mg Oral Q24H Binta Patel MD   20 mg at 04/20/21 2327   • levothyroxine (SYNTHROID, LEVOTHROID) tablet 100 mcg  100 mcg Oral Q AM Nirmal Wan MD   100 mcg at 04/21/21 0640   • melatonin tablet 3 mg  3 mg Oral Nightly PRN Binta Patel MD   3 mg at 04/20/21 2326   • metroNIDAZOLE (FLAGYL) 500 mg/100mL IVPB  500 mg Intravenous Q8H Nirmal Wan MD   500 mg at 04/21/21 1145   • midodrine (PROAMATINE) tablet 5 mg  5 mg Oral TID AC Nirmal Wan MD   5 mg at 04/21/21 0925   • montelukast (SINGULAIR) tablet 10 mg  10 mg Oral Daily Binta Patel MD   10 mg at 04/21/21 1146   • multivitamin (THERAGRAN) tablet 1 tablet  1 tablet Oral Daily Bebeto Pinto MD   1 tablet at 04/21/21 1145   • nitroglycerin (NITROSTAT) SL tablet 0.4 mg  0.4 mg Sublingual Q5 Min PRN Binta Patel MD       • ondansetron (ZOFRAN) tablet 4 mg  4 mg Oral Q6H PRN Binta Patel MD        Or   • ondansetron (ZOFRAN) injection 4 mg  4 mg Intravenous Q6H PRN Binta Patel MD   4 mg at 04/21/21 1149   • Pharmacy Consult   Does not apply Continuous PRN Binta Patel MD       • Pharmacy to dose warfarin   Does not apply Continuous PRN Nirmal Wan MD       • saccharomyces boulardii (FLORASTOR) capsule 500 mg  500 mg Oral BID Nirmal Wan MD   500 mg at 04/21/21 1146   • sodium bicarbonate tablet 650 mg  650 mg Oral Q8H Binta Patel MD   650 mg at 04/21/21 1445   • sodium chloride 0.9 % flush 10 mL  10 mL Intravenous PRN Marcos Blackman MD   10 mL at 04/17/21 2118   • vancomycin (VANCOCIN) capsule 250 mg  250 mg Oral Q6H Nirmal Wan MD   250 mg at 04/21/21 1145   • warfarin (COUMADIN) tablet 5 mg  5 mg Oral Once Nirmal aWn MD           Assessment/Plan   1. ESRD.  Completed dialysis today.  Plan is for  return to PD after acute illness and out of rehab.   PD catheter secured with dressing I applied last admission.  Has follow up scheduled with [facility] home training .  2. C. Dif.colitis.  Maybe a little clinical improvement. IV flagy and po vanc.   3. Chronic systolic heart failure, chronic hypotension.  Increase midodrine .  4. Hypothyroid on replacement .  5. Anemia CKD. JULEE as outpt .  6. Covid PNA earlier this year with prolonged Respiratory failure, trach.  Now closing .  7. PE subsegmental RLL 4/9/21.  on coumadin.  INR 2.14.       Faviola Moura MD  04/21/21  16:05 EDT      Electronically signed by Faviola Moura MD at 04/21/21 4239

## 2021-04-23 NOTE — PROGRESS NOTES
Pharmacy Consult: Warfarin Dosing/ Monitoring    Ambar Lara is a 51 y.o. female, estimated creatinine clearance is 20 mL/min (A) (by C-G formula based on SCr of 3.1 mg/dL (H)). weighing 72.7 kg (160 lb 4.8 oz).     has a past medical history of CHF (congestive heart failure) (CMS/[facility]), Dialysis patient (CMS/[facility]), Disease of thyroid gland, Elevated cholesterol, GERD (gastroesophageal reflux disease), Renal disorder, and Sleep apnea.    Social History     Tobacco Use   • Smoking status: Former Smoker     Packs/day: 1.00     Years: 25.00     Pack years: 25.00     Types: Cigarettes     Quit date: 4/16/2018     Years since quitting: 3.0   • Smokeless tobacco: Never Used   Vaping Use   • Vaping Use: Former   • Quit date: 2/14/2021   • Substances: Nicotine, Flavoring   • Devices: Pre-filled or refillable cartridge   Substance Use Topics   • Alcohol use: Not Currently   • Drug use: Not Currently       Results from last 7 days   Lab Units 04/23/21  0648 04/22/21  0733 04/21/21  1047 04/21/21  0608 04/20/21  0614 04/19/21  1517 04/19/21  0817 04/18/21  0717 04/17/21  0555 04/16/21  1602   INR  2.09* 2.08* 2.14*  --  2.84* 3.38*  --   --   --  2.24*   HEMOGLOBIN g/dL 10.0* 10.0*  --  9.6* 9.3*  --  10.0* 10.0* 9.5*  --    HEMATOCRIT % 32.3* 31.3*  --  30.2* 28.8*  --  31.8* 30.6* 29.7*  --    PLATELETS 10*3/mm3 269 296  --  318 338  --  338 322 272  --      Results from last 7 days   Lab Units 04/23/21  0648 04/22/21  0733 04/21/21  0608 04/16/21  1602   SODIUM mmol/L 140 138 140 133*   POTASSIUM mmol/L 3.9 4.0 4.1 4.1   CHLORIDE mmol/L 105 104 104 97*   CO2 mmol/L 21.8* 21.1* 23.4 23.2   BUN mg/dL 17 10 15 9   CREATININE mg/dL 3.10* 2.19* 2.65* 1.63*   CALCIUM mg/dL 8.5* 8.1* 8.1* 8.0*   BILIRUBIN mg/dL  --   --   --  0.2   ALK PHOS U/L  --   --   --  131*   ALT (SGPT) U/L  --   --   --  14   AST (SGOT) U/L  --   --   --  17   GLUCOSE mg/dL 102* 99 89 146*     Anticoagulation history: Newer start of warfarin from last  hospital admission on 4/9/21 for PE    Hospital Anticoagulation:  Consulting provider: Dr. Wan  Start date: 4/19 Rx started dosing (on warfarin since date of admission, 4/16)  Indication: PE history  Target INR: 2-3  Expected duration: ?   Bridge Therapy: No                Date 4/19 4/20 4/21 4/22 4/23        INR 3.3 2.84 2.14 2.08 2.09        Warfarin dose HOLD 4 mg 5 mg 5 mg 5 mg          Potential drug interactions:  · Metronidazole (Major): may increase serum concentration of warfarin   · Levothyroxine: potentially increased anticoagulant effects  · Escitalopram: potentially increased risk of bleeding     Relevant nutrition status: Regular diet + Boost Breeze w/lunch + dinner (has vitamin K content)    Education complete?/ Date: TBD    Assessment/Plan:  · INR is therapeutic at 2.09 (Goal 2-3). I will continue at the increased  dose to warfarin 5 mg daily (home regimen = 4 mg QD)  and follow up INR in the AM.     Pharmacy will continue to follow until discharge or discontinuation of warfarin.   Cheyanne Ashford [facility]  4/23/2021

## 2021-04-23 NOTE — PROGRESS NOTES
Name: Ambar Lara ADMIT: 2021   : 1970  PCP: Jet Manuel MD    MRN: [id number] LOS: 6 days   AGE/SEX: 51 y.o. female  ROOM: [facility]     Subjective   Subjective   Patient seen at bedside.       Objective   Objective   Vital Signs  Temp:  [96.5 °F (35.8 °C)-97.9 °F (36.6 °C)] 97.8 °F (36.6 °C)  Heart Rate:  [66-79] 74  Resp:  [16] 16  BP: ()/(67-80) 93/67  SpO2:  [96 %-98 %] 98 %  on   ;   Device (Oxygen Therapy): room air  Body mass index is 30.3 kg/m².  Physical Exam  General Appearance:    Alert, cooperative, no distress, appears stated age   Head:    Normocephalic, without obvious abnormality, atraumatic   Eyes:    PERRL, conjunctivae/corneas clear, EOM's intact, both eyes   Ears:    Normal external ear canals, both ears   Nose:   Nares normal, septum midline, mucosa normal, no drainage    or sinus tenderness   Throat:   Lips, mucosa, and tongue normal   Neck:   Supple, symmetrical, trachea midline, no adenopathy;     thyroid:  no enlargement/tenderness/nodules; no carotid    bruit or JVD   Back:     Symmetric, no curvature, ROM normal, no CVA tenderness   Lungs:     Decreased breath sounds bilaterally, respirations unlabored   Chest Wall:    No tenderness or deformity    Heart:    Regular rate and rhythm, S1 and S2 normal, no murmur, rub   or gallop   Abdomen:     Soft, nontender, bowel sounds active all four quadrants,     no masses, no hepatomegaly, no splenomegaly   Extremities:   Extremities normal, atraumatic, no cyanosis or edema   Pulses:   2+ and symmetric all extremities   Skin:   Skin color, texture, turgor normal, no rashes or lesions   Lymph nodes:   Cervical, supraclavicular, and axillary nodes normal   Neurologic:   CNII-XII intact, normal strength, sensation intact throughout          Results Review     I reviewed the patient's new clinical results.  Results from last 7 days   Lab Units 21  0648 21  0733 21  0608 21  0614   WBC 10*3/mm3 8.13  8.53 7.45 7.89   HEMOGLOBIN g/dL 10.0* 10.0* 9.6* 9.3*   PLATELETS 10*3/mm3 269 296 318 338     Results from last 7 days   Lab Units 04/23/21  0648 04/22/21  0733 04/21/21  0608 04/20/21  0614   SODIUM mmol/L 140 138 140 136   POTASSIUM mmol/L 3.9 4.0 4.1 3.9   CHLORIDE mmol/L 105 104 104 100   CO2 mmol/L 21.8* 21.1* 23.4 20.3*   BUN mg/dL 17 10 15 34*   CREATININE mg/dL 3.10* 2.19* 2.65* 4.06*   GLUCOSE mg/dL 102* 99 89 98   Estimated Creatinine Clearance: 20 mL/min (A) (by C-G formula based on SCr of 3.1 mg/dL (H)).  Results from last 7 days   Lab Units 04/21/21  0608 04/20/21  0614 04/19/21  0817   ALBUMIN g/dL 2.80* 3.00* 3.10*     Results from last 7 days   Lab Units 04/23/21  0648 04/22/21  0733 04/21/21  0608 04/20/21  0614 04/19/21  0817   CALCIUM mg/dL 8.5* 8.1* 8.1* 8.9 9.1   ALBUMIN g/dL  --   --  2.80* 3.00* 3.10*   PHOSPHORUS mg/dL  --   --  4.8* 6.8* 6.2*       COVID19   Date Value Ref Range Status   04/16/2021 Not Detected Not Detected - Ref. Range Final   04/09/2021 Not Detected Not Detected - Ref. Range Final     No results found for: HGBA1C, POCGLU    XR Chest 1 View  ONE VIEW PORTABLE CHEST AT 5 PM     HISTORY: Hypotension. End-stage renal disease. Chest pain.      Recent Covid 19 pneumonia.     FINDINGS: The lungs are moderately expanded with some patchy infiltrates  at the lung bases likely related to residual changes of Covid 19  pneumonia and showing some minimal improvement at the left base when  compared to the study of 04/09/2021. The heart size remains normal. A  large gauge vascular catheter ends near the junction of the SVC and  right atrium without change.     This report was finalized on 4/16/2021 8:01 PM by Dr. Kiet Oconnor M.D.       Scheduled Medications  calcium acetate, 1,334 mg, Oral, TID With Meals  epoetin marco antonio/marco antonio-epbx, 5,000 Units, Subcutaneous, Once per day on Mon Wed Fri  escitalopram, 20 mg, Oral, Q24H  levothyroxine, 100 mcg, Oral, Q AM  midodrine, 10 mg, Oral, TID  AC  montelukast, 10 mg, Oral, Daily  multivitamin, 1 tablet, Oral, Daily  saccharomyces boulardii, 500 mg, Oral, BID  sodium bicarbonate, 650 mg, Oral, Q8H  vancomycin, 250 mg, Oral, Q6H  warfarin, 5 mg, Oral, Daily    Infusions  Pharmacy Consult,   Pharmacy to dose warfarin,     Diet  Diet Regular       Assessment/Plan     Active Hospital Problems    Diagnosis  POA   • **Clostridium difficile colitis [A04.72]  Yes   • History of tracheostomy [Z98.890]  Not Applicable   • Gastroesophageal reflux disease [K21.9]  Yes   • Cardiomyopathy (CMS/HCC) [I42.9]  Yes   • Chronic systolic heart failure (CMS/[facility]) [I50.22]  Yes   • ESRD (end stage renal disease) on dialysis (CMS/[facility]) [N18.6, Z99.2]  Not Applicable   • HLD (hyperlipidemia) [E78.5]  Yes   • Hypothyroidism [E03.9]  Yes      Resolved Hospital Problems   No resolved problems to display.       51 y.o. female admitted with Clostridium difficile colitis.    Assessment and plan:  1.  C. difficile colitis, continue p.o. vancomycin and IV Flagyl.  ID evaluation for final antibiotic recommendations for discharge.  2.  End-stage renal disease on hemodialysis, continue dialysis per nephrology recommendations.  3.  Anemia of chronic disease, hemoglobin noted to be stable, continue to recheck labs.  4.  Hyponatremia, resolved.  5.  Chronic systolic congestive heart failure, she appears euvolemic at this point of time.  6.  Hypothyroidism, TSH is checked, it is almost close to 96.  Continue Synthroid.  She would benefit from endocrinology follow-up on outpatient basis.   7.  Personal history of PE, patient currently on Coumadin.  INR therapeutic.  8.  Hypotension, midodrine dose has been increased.  Monitor BP closely.  9.  CODE STATUS is full code.  Further plans based on hospital course.      Nirmal Wan MD  [facility]  04/23/21  16:32 EDT

## 2021-04-23 NOTE — PLAN OF CARE
Goal Outcome Evaluation:  Plan of Care Reviewed With: patient     Outcome Summary: Patient alert and oriented. Denied pain. Dialysis this shift. Patient has been fatigued and nauseas today. Waiting for pr cert for rehab. VSS. No s/s of acute distress. Nursing will continue to monitor.

## 2021-04-23 NOTE — PROGRESS NOTES
Received late notification from patient insurance that she has been approved for acute rehab. We can admit 4/24 pending medical stability. Patient will need a negative covid within 24 hours of admission. Please complete the discharge-readmit option in Epic when discharging to [facility].    Marti Garduno RN  Rehab Admissions Nurse  [phone]

## 2021-04-24 ENCOUNTER — HOSPITAL ENCOUNTER (INPATIENT)
Facility: HOSPITAL | Age: 51
LOS: 5 days | Discharge: HOME HEALTH | End: 2021-04-29
Attending: PHYSICAL MEDICINE & REHABILITATION | Admitting: PHYSICAL MEDICINE & REHABILITATION

## 2021-04-24 VITALS
WEIGHT: 156.9 LBS | HEART RATE: 105 BPM | DIASTOLIC BLOOD PRESSURE: 76 MMHG | BODY MASS INDEX: 29.62 KG/M2 | SYSTOLIC BLOOD PRESSURE: 110 MMHG | HEIGHT: 61 IN | RESPIRATION RATE: 16 BRPM | OXYGEN SATURATION: 97 % | TEMPERATURE: 97.8 F

## 2021-04-24 DIAGNOSIS — M21.861 GENU RECURVATUM OF RIGHT KNEE: ICD-10-CM

## 2021-04-24 DIAGNOSIS — Z74.09 IMPAIRED FUNCTIONAL MOBILITY, BALANCE, GAIT, AND ENDURANCE: Primary | ICD-10-CM

## 2021-04-24 PROBLEM — M62.3 IMMOBILITY SYNDROME: Status: ACTIVE | Noted: 2021-04-24

## 2021-04-24 LAB
ANION GAP SERPL CALCULATED.3IONS-SCNC: 14.1 MMOL/L (ref 5–15)
BASOPHILS # BLD AUTO: 0.18 10*3/MM3 (ref 0–0.2)
BASOPHILS NFR BLD AUTO: 2 % (ref 0–1.5)
BUN SERPL-MCNC: 11 MG/DL (ref 6–20)
BUN/CREAT SERPL: 4.2 (ref 7–25)
CALCIUM SPEC-SCNC: 8.8 MG/DL (ref 8.6–10.5)
CHLORIDE SERPL-SCNC: 101 MMOL/L (ref 98–107)
CO2 SERPL-SCNC: 24.9 MMOL/L (ref 22–29)
CREAT SERPL-MCNC: 2.65 MG/DL (ref 0.57–1)
DEPRECATED RDW RBC AUTO: 54.9 FL (ref 37–54)
EOSINOPHIL # BLD AUTO: 0.15 10*3/MM3 (ref 0–0.4)
EOSINOPHIL NFR BLD AUTO: 1.6 % (ref 0.3–6.2)
ERYTHROCYTE [DISTWIDTH] IN BLOOD BY AUTOMATED COUNT: 15.7 % (ref 12.3–15.4)
GFR SERPL CREATININE-BSD FRML MDRD: 19 ML/MIN/1.73
GLUCOSE SERPL-MCNC: 104 MG/DL (ref 65–99)
HCT VFR BLD AUTO: 33.5 % (ref 34–46.6)
HGB BLD-MCNC: 10.4 G/DL (ref 12–15.9)
IMM GRANULOCYTES # BLD AUTO: 0.24 10*3/MM3 (ref 0–0.05)
IMM GRANULOCYTES NFR BLD AUTO: 2.6 % (ref 0–0.5)
INR PPP: 2.04 (ref 0.9–1.1)
LYMPHOCYTES # BLD AUTO: 3.37 10*3/MM3 (ref 0.7–3.1)
LYMPHOCYTES NFR BLD AUTO: 36.6 % (ref 19.6–45.3)
MCH RBC QN AUTO: 29.6 PG (ref 26.6–33)
MCHC RBC AUTO-ENTMCNC: 31 G/DL (ref 31.5–35.7)
MCV RBC AUTO: 95.4 FL (ref 79–97)
MONOCYTES # BLD AUTO: 0.47 10*3/MM3 (ref 0.1–0.9)
MONOCYTES NFR BLD AUTO: 5.1 % (ref 5–12)
NEUTROPHILS NFR BLD AUTO: 4.81 10*3/MM3 (ref 1.7–7)
NEUTROPHILS NFR BLD AUTO: 52.1 % (ref 42.7–76)
NRBC BLD AUTO-RTO: 0 /100 WBC (ref 0–0.2)
PLATELET # BLD AUTO: 291 10*3/MM3 (ref 140–450)
PMV BLD AUTO: 9.4 FL (ref 6–12)
POTASSIUM SERPL-SCNC: 3.8 MMOL/L (ref 3.5–5.2)
PROTHROMBIN TIME: 22.8 SECONDS (ref 11.7–14.2)
RBC # BLD AUTO: 3.51 10*6/MM3 (ref 3.77–5.28)
SARS-COV-2 ORF1AB RESP QL NAA+PROBE: NOT DETECTED
SODIUM SERPL-SCNC: 140 MMOL/L (ref 136–145)
WBC # BLD AUTO: 9.22 10*3/MM3 (ref 3.4–10.8)

## 2021-04-24 PROCEDURE — 97110 THERAPEUTIC EXERCISES: CPT

## 2021-04-24 PROCEDURE — 99223 1ST HOSP IP/OBS HIGH 75: CPT | Performed by: INTERNAL MEDICINE

## 2021-04-24 PROCEDURE — U0004 COV-19 TEST NON-CDC HGH THRU: HCPCS | Performed by: INTERNAL MEDICINE

## 2021-04-24 PROCEDURE — 85610 PROTHROMBIN TIME: CPT | Performed by: INTERNAL MEDICINE

## 2021-04-24 PROCEDURE — 80048 BASIC METABOLIC PNL TOTAL CA: CPT | Performed by: INTERNAL MEDICINE

## 2021-04-24 PROCEDURE — 85025 COMPLETE CBC W/AUTO DIFF WBC: CPT | Performed by: INTERNAL MEDICINE

## 2021-04-24 RX ORDER — VANCOMYCIN HYDROCHLORIDE 125 MG/1
125 CAPSULE ORAL EVERY 6 HOURS SCHEDULED
Status: DISCONTINUED | OUTPATIENT
Start: 2021-04-24 | End: 2021-04-24 | Stop reason: HOSPADM

## 2021-04-24 RX ORDER — VANCOMYCIN HYDROCHLORIDE 125 MG/1
125 CAPSULE ORAL EVERY 12 HOURS
Status: DISCONTINUED | OUTPATIENT
Start: 2021-04-25 | End: 2021-04-24

## 2021-04-24 RX ORDER — WARFARIN SODIUM 5 MG/1
5 TABLET ORAL
Status: DISCONTINUED | OUTPATIENT
Start: 2021-04-24 | End: 2021-04-27

## 2021-04-24 RX ORDER — ACETAMINOPHEN 325 MG/1
650 TABLET ORAL EVERY 4 HOURS PRN
Status: DISCONTINUED | OUTPATIENT
Start: 2021-04-24 | End: 2021-04-29

## 2021-04-24 RX ORDER — SACCHAROMYCES BOULARDII 250 MG
500 CAPSULE ORAL 2 TIMES DAILY
Status: CANCELLED | OUTPATIENT
Start: 2021-04-24

## 2021-04-24 RX ORDER — VANCOMYCIN HYDROCHLORIDE 125 MG/1
125 CAPSULE ORAL EVERY OTHER DAY
Status: DISCONTINUED | OUTPATIENT
Start: 2021-05-10 | End: 2021-04-29

## 2021-04-24 RX ORDER — DIPHENOXYLATE HYDROCHLORIDE AND ATROPINE SULFATE 2.5; .025 MG/1; MG/1
1 TABLET ORAL DAILY
Status: DISCONTINUED | OUTPATIENT
Start: 2021-04-25 | End: 2021-04-29 | Stop reason: HOSPADM

## 2021-04-24 RX ORDER — ONDANSETRON 2 MG/ML
4 INJECTION INTRAMUSCULAR; INTRAVENOUS EVERY 6 HOURS PRN
Status: CANCELLED | OUTPATIENT
Start: 2021-04-24

## 2021-04-24 RX ORDER — UREA 10 %
3 LOTION (ML) TOPICAL NIGHTLY PRN
Status: DISCONTINUED | OUTPATIENT
Start: 2021-04-24 | End: 2021-04-25

## 2021-04-24 RX ORDER — SODIUM CHLORIDE 0.9 % (FLUSH) 0.9 %
10 SYRINGE (ML) INJECTION AS NEEDED
Status: CANCELLED | OUTPATIENT
Start: 2021-04-24

## 2021-04-24 RX ORDER — CALCIUM ACETATE 667 MG/1
1334 CAPSULE ORAL
Status: DISCONTINUED | OUTPATIENT
Start: 2021-04-24 | End: 2021-04-29 | Stop reason: HOSPADM

## 2021-04-24 RX ORDER — MIDODRINE HYDROCHLORIDE 5 MG/1
10 TABLET ORAL
Status: DISCONTINUED | OUTPATIENT
Start: 2021-04-24 | End: 2021-04-29 | Stop reason: HOSPADM

## 2021-04-24 RX ORDER — VANCOMYCIN HYDROCHLORIDE 125 MG/1
125 CAPSULE ORAL EVERY 24 HOURS
Status: DISCONTINUED | OUTPATIENT
Start: 2021-05-02 | End: 2021-04-24

## 2021-04-24 RX ORDER — MIDODRINE HYDROCHLORIDE 5 MG/1
10 TABLET ORAL
Status: CANCELLED | OUTPATIENT
Start: 2021-04-24

## 2021-04-24 RX ORDER — VANCOMYCIN HYDROCHLORIDE 125 MG/1
125 CAPSULE ORAL EVERY 24 HOURS
Status: DISCONTINUED | OUTPATIENT
Start: 2021-05-03 | End: 2021-04-29

## 2021-04-24 RX ORDER — SODIUM BICARBONATE 650 MG/1
650 TABLET ORAL EVERY 8 HOURS SCHEDULED
Status: DISCONTINUED | OUTPATIENT
Start: 2021-04-24 | End: 2021-04-29 | Stop reason: HOSPADM

## 2021-04-24 RX ORDER — VANCOMYCIN HYDROCHLORIDE 125 MG/1
125 CAPSULE ORAL EVERY 12 HOURS
Status: DISCONTINUED | OUTPATIENT
Start: 2021-04-25 | End: 2021-04-24 | Stop reason: HOSPADM

## 2021-04-24 RX ORDER — ESCITALOPRAM OXALATE 20 MG/1
20 TABLET ORAL EVERY 24 HOURS
Status: DISCONTINUED | OUTPATIENT
Start: 2021-04-24 | End: 2021-04-29 | Stop reason: HOSPADM

## 2021-04-24 RX ORDER — VANCOMYCIN HYDROCHLORIDE 125 MG/1
125 CAPSULE ORAL EVERY OTHER DAY
Status: DISCONTINUED | OUTPATIENT
Start: 2021-05-09 | End: 2021-04-24

## 2021-04-24 RX ORDER — DIPHENOXYLATE HYDROCHLORIDE AND ATROPINE SULFATE 2.5; .025 MG/1; MG/1
1 TABLET ORAL DAILY
Status: CANCELLED | OUTPATIENT
Start: 2021-04-25

## 2021-04-24 RX ORDER — VANCOMYCIN HYDROCHLORIDE 125 MG/1
125 CAPSULE ORAL EVERY 24 HOURS
Status: DISCONTINUED | OUTPATIENT
Start: 2021-05-02 | End: 2021-04-24 | Stop reason: HOSPADM

## 2021-04-24 RX ORDER — MONTELUKAST SODIUM 10 MG/1
10 TABLET ORAL DAILY
Status: DISCONTINUED | OUTPATIENT
Start: 2021-04-25 | End: 2021-04-29 | Stop reason: HOSPADM

## 2021-04-24 RX ORDER — NITROGLYCERIN 0.4 MG/1
0.4 TABLET SUBLINGUAL
Status: CANCELLED | OUTPATIENT
Start: 2021-04-24

## 2021-04-24 RX ORDER — VANCOMYCIN HYDROCHLORIDE 125 MG/1
125 CAPSULE ORAL EVERY 12 HOURS
Status: DISCONTINUED | OUTPATIENT
Start: 2021-04-26 | End: 2021-04-29

## 2021-04-24 RX ORDER — ESCITALOPRAM OXALATE 20 MG/1
20 TABLET ORAL EVERY 24 HOURS
Status: CANCELLED | OUTPATIENT
Start: 2021-04-24

## 2021-04-24 RX ORDER — MONTELUKAST SODIUM 10 MG/1
10 TABLET ORAL DAILY
Status: CANCELLED | OUTPATIENT
Start: 2021-04-25

## 2021-04-24 RX ORDER — VANCOMYCIN HYDROCHLORIDE 125 MG/1
125 CAPSULE ORAL EVERY 24 HOURS
Status: CANCELLED | OUTPATIENT
Start: 2021-05-02 | End: 2021-05-08

## 2021-04-24 RX ORDER — SODIUM BICARBONATE 650 MG/1
650 TABLET ORAL EVERY 8 HOURS SCHEDULED
Status: CANCELLED | OUTPATIENT
Start: 2021-04-24

## 2021-04-24 RX ORDER — VANCOMYCIN HYDROCHLORIDE 125 MG/1
125 CAPSULE ORAL EVERY OTHER DAY
Status: DISCONTINUED | OUTPATIENT
Start: 2021-05-09 | End: 2021-04-24 | Stop reason: HOSPADM

## 2021-04-24 RX ORDER — UREA 10 %
3 LOTION (ML) TOPICAL NIGHTLY PRN
Status: CANCELLED | OUTPATIENT
Start: 2021-04-24

## 2021-04-24 RX ORDER — ACETAMINOPHEN 325 MG/1
650 TABLET ORAL EVERY 4 HOURS PRN
Status: CANCELLED | OUTPATIENT
Start: 2021-04-24

## 2021-04-24 RX ORDER — LEVOTHYROXINE SODIUM 0.1 MG/1
100 TABLET ORAL
Status: CANCELLED | OUTPATIENT
Start: 2021-04-25

## 2021-04-24 RX ORDER — NITROGLYCERIN 0.4 MG/1
0.4 TABLET SUBLINGUAL
Status: DISCONTINUED | OUTPATIENT
Start: 2021-04-24 | End: 2021-04-29 | Stop reason: HOSPADM

## 2021-04-24 RX ORDER — VANCOMYCIN HYDROCHLORIDE 125 MG/1
125 CAPSULE ORAL EVERY OTHER DAY
Status: CANCELLED | OUTPATIENT
Start: 2021-05-09 | End: 2021-06-06

## 2021-04-24 RX ORDER — ONDANSETRON 4 MG/1
4 TABLET, FILM COATED ORAL EVERY 6 HOURS PRN
Status: CANCELLED | OUTPATIENT
Start: 2021-04-24

## 2021-04-24 RX ORDER — VANCOMYCIN HYDROCHLORIDE 125 MG/1
125 CAPSULE ORAL EVERY 12 HOURS
Status: CANCELLED | OUTPATIENT
Start: 2021-04-25 | End: 2021-05-01

## 2021-04-24 RX ORDER — SACCHAROMYCES BOULARDII 250 MG
500 CAPSULE ORAL 2 TIMES DAILY
Status: DISCONTINUED | OUTPATIENT
Start: 2021-04-24 | End: 2021-04-29 | Stop reason: HOSPADM

## 2021-04-24 RX ORDER — LEVOTHYROXINE SODIUM 0.1 MG/1
100 TABLET ORAL
Status: DISCONTINUED | OUTPATIENT
Start: 2021-04-25 | End: 2021-04-29 | Stop reason: HOSPADM

## 2021-04-24 RX ORDER — WARFARIN SODIUM 5 MG/1
5 TABLET ORAL
Status: CANCELLED | OUTPATIENT
Start: 2021-04-24

## 2021-04-24 RX ORDER — CALCIUM ACETATE 667 MG/1
1334 CAPSULE ORAL
Status: CANCELLED | OUTPATIENT
Start: 2021-04-24

## 2021-04-24 RX ORDER — VANCOMYCIN HYDROCHLORIDE 125 MG/1
125 CAPSULE ORAL EVERY 6 HOURS SCHEDULED
Status: CANCELLED | OUTPATIENT
Start: 2021-04-24 | End: 2021-04-25

## 2021-04-24 RX ORDER — VANCOMYCIN HYDROCHLORIDE 125 MG/1
125 CAPSULE ORAL EVERY 6 HOURS SCHEDULED
Status: COMPLETED | OUTPATIENT
Start: 2021-04-24 | End: 2021-04-25

## 2021-04-24 RX ORDER — ONDANSETRON 4 MG/1
4 TABLET, FILM COATED ORAL EVERY 6 HOURS PRN
Status: DISCONTINUED | OUTPATIENT
Start: 2021-04-24 | End: 2021-04-29 | Stop reason: HOSPADM

## 2021-04-24 RX ORDER — ONDANSETRON 2 MG/ML
4 INJECTION INTRAMUSCULAR; INTRAVENOUS EVERY 6 HOURS PRN
Status: DISCONTINUED | OUTPATIENT
Start: 2021-04-24 | End: 2021-04-29

## 2021-04-24 RX ADMIN — MIDODRINE HYDROCHLORIDE 10 MG: 5 TABLET ORAL at 18:05

## 2021-04-24 RX ADMIN — SODIUM BICARBONATE 650 MG: 650 TABLET ORAL at 21:49

## 2021-04-24 RX ADMIN — CALCIUM ACETATE 1334 MG: 667 CAPSULE ORAL at 18:05

## 2021-04-24 RX ADMIN — LEVOTHYROXINE SODIUM 100 MCG: 0.1 TABLET ORAL at 05:42

## 2021-04-24 RX ADMIN — VANCOMYCIN HYDROCHLORIDE 250 MG: 125 CAPSULE ORAL at 00:09

## 2021-04-24 RX ADMIN — Medication 500 MG: at 21:49

## 2021-04-24 RX ADMIN — VANCOMYCIN HYDROCHLORIDE 125 MG: 125 CAPSULE ORAL at 18:04

## 2021-04-24 RX ADMIN — SODIUM BICARBONATE 650 MG: 650 TABLET ORAL at 05:43

## 2021-04-24 RX ADMIN — CALCIUM ACETATE 1334 MG: 667 CAPSULE ORAL at 08:50

## 2021-04-24 RX ADMIN — VANCOMYCIN HYDROCHLORIDE 250 MG: 125 CAPSULE ORAL at 05:43

## 2021-04-24 RX ADMIN — MIDODRINE HYDROCHLORIDE 10 MG: 5 TABLET ORAL at 05:42

## 2021-04-24 RX ADMIN — VANCOMYCIN HYDROCHLORIDE 125 MG: 125 CAPSULE ORAL at 11:43

## 2021-04-24 RX ADMIN — MONTELUKAST SODIUM 10 MG: 10 TABLET, FILM COATED ORAL at 08:50

## 2021-04-24 RX ADMIN — Medication 3 MG: at 21:49

## 2021-04-24 RX ADMIN — Medication 1 TABLET: at 08:50

## 2021-04-24 RX ADMIN — Medication 3 MG: at 00:09

## 2021-04-24 RX ADMIN — SODIUM BICARBONATE 650 MG: 650 TABLET ORAL at 13:50

## 2021-04-24 RX ADMIN — CALCIUM ACETATE 1334 MG: 667 CAPSULE ORAL at 11:44

## 2021-04-24 RX ADMIN — MIDODRINE HYDROCHLORIDE 10 MG: 5 TABLET ORAL at 11:44

## 2021-04-24 RX ADMIN — ESCITALOPRAM 20 MG: 20 TABLET, FILM COATED ORAL at 21:49

## 2021-04-24 RX ADMIN — WARFARIN 5 MG: 5 TABLET ORAL at 18:06

## 2021-04-24 RX ADMIN — ESCITALOPRAM 20 MG: 20 TABLET, FILM COATED ORAL at 00:09

## 2021-04-24 RX ADMIN — Medication 500 MG: at 08:50

## 2021-04-24 NOTE — CONSULTS
Referring Provider: Binta Patel MD  [address]  [address]  [place] [address]  Reason for Consultation: C. difficile colitis    Subjective   History of present illness: This is a 51-year-old female with end-stage renal disease on peritoneal dialysis, CHF, and GERD who was admitted on  with  The patient is well-known to the infectious disease service we saw her at the beginning of April with recurrent C. difficile colitis.  She was discharged on  with recommendations from the infectious disease service for a prolonged vancomycin taper.  Apparently after discharge she was not receiving her medication at the nursing facility and came back to the hospital on  with weakness and diarrhea.  She was restarted on oral vancomycin as well as Flagyl.  Admission white blood cell count showed a leukocytosis of 18,000.  Since admission the patient has been afebrile.  Her white blood cell count has returned to normal.  Her diarrhea has decreased.  We are asked to provide final antibiotic recommendations.  Currently the patient states she feels a lot better.  Diarrhea has improved significantly.  Appetite has returned and the patient is tolerating a diet without nausea or vomiting.  She is tolerating antibiotics without a rash    Past Medical History:   Diagnosis Date   • CHF (congestive heart failure) (CMS/[facility])    • Dialysis patient (CMS/[facility])    •  Hypothyroidism    • Elevated cholesterol    • GERD (gastroesophageal reflux disease)    •  End-stage renal disease on peritoneal peritoneal dialysis    • Sleep apnea        Past Surgical History:   Procedure Laterality Date   • ADRENAL GLAND SURGERY N/A `   •  SECTION Bilateral 2001    Has had x2   • HYSTERECTOMY     • TRACHEOSTOMY          reports that she quit smoking about 3 years ago. Her smoking use included cigarettes. She has a 25.00 pack-year smoking history. She has never used smokeless tobacco. She reports previous alcohol  use. She reports previous drug use.    family history is not on file.    Allergies   Allergen Reactions   • Penicillins Anaphylaxis   • Nickel Rash       Medication:  Antibiotics:  Vancomycin to 50 mg p.o. every 6 hours  Please refer to the medical record for a full medication list    Review of Systems  Pertinent items are noted in HPI, all other systems reviewed and negative    Objective   Vital Signs   Temp:  [97.3 °F (36.3 °C)-98.1 °F (36.7 °C)] 97.8 °F (36.6 °C)  Heart Rate:  [80-87] 87  Resp:  [16] 16  BP: ()/(60-70) 101/70    Physical Exam:   General: In no acute distress  HEENT: Normocephalic, atraumatic, PERRL, no scleral icterus. Oropharynx is clear and moist  Neck: Supple, trachea is midline  Cardiovascular: Normal rate, regular rhythm, normal S1 and S2, no murmurs  Respiratory: Lungs are clear to auscultation bilaterally, no wheezing  GI: Abdomen is soft, nontender, nondistended, positive bowel sounds bilaterally, peritoneal dialysis catheter in place without erythema or drainage  Musculoskeletal:  no edema, tenderness or deformity  Skin: No rashes   Extremities: No E/C/C  Neurological: Alert and oriented, moving all 4 extremities  Psychiatric: Normal mood and affect     Results Review:   I reviewed the patient's new clinical results.  I reviewed the patient's new imaging results and agree with the interpretation.    Lab Results   Component Value Date    WBC 9.22 04/24/2021    HGB 10.4 (L) 04/24/2021    HCT 33.5 (L) 04/24/2021    MCV 95.4 04/24/2021     04/24/2021       Lab Results   Component Value Date    GLUCOSE 104 (H) 04/24/2021    BUN 11 04/24/2021    CREATININE 2.65 (H) 04/24/2021    EGFRIFNONA 19 (L) 04/24/2021    EGFRIFAFRI  04/20/2021      Comment:      <15 Indicative of kidney failure.    BCR 4.2 (L) 04/24/2021    CO2 24.9 04/24/2021    CALCIUM 8.8 04/24/2021    ALBUMIN 2.80 (L) 04/21/2021    LABIL2 1.1 02/28/2020    AST 17 04/16/2021    ALT 14 04/16/2021       Microbiology:  4/16  COVID neg  4/9 C diff positive   4/9 BCx neg x 2    Radiology:  4/9 CAT scan of the abdomen pelvis personally reviewed by me shows diffuse colitis most prominent in the sigmoid colon and rectum.    Patient chest x-ray personally reviewed by me shows patchy infiltrates at the bases bilaterally with improvement as compared to April 9    Assessment/Plan   C. difficile colitis  Leukocytosis resolved  End-stage renal disease on hemodialysis complicating above    Patient is on day 9 of oral vancomycin.  Will decrease vancomycin to 125 mg p.o. 4 times daily for 1 more day and then start a prolonged vancomycin taper with vancomycin 125 mg p.o. twice daily x7 days followed by vancomycin 125 mg p.o. daily x7 days followed by 125 mg p.o. every other day for 14 days.  Orders have been placed in epic    Thank you for this consult ID will sign off.  Please do not hesitate to call us with further questions or concerns    I discussed the patient's findings and my recommendations with patient and family

## 2021-04-24 NOTE — PLAN OF CARE
Goal Outcome Evaluation:   Patient is resting well in bed this shift with c/o tiredness r/t dialysis treatment earlier. Lungs are clear with normal respirations and VSS. Abdomen is soft with + BS in all quadrants, with less trips to the bathroom for diarrhea episodes this shift. She is eating and drinking fluids readily. Covid swab completed early this am. in anticipation of transfer to [facility] today 4-24-21.

## 2021-04-24 NOTE — PROGRESS NOTES
[facility]-  Precert obtained. A bed is available today if patient is medically ready. Covid sent- awaiting results.  Please complete the DC READMIT along with the DC Summary prior to move to rehab.     Suzette Molina RN  Acute Rehab Admission Nurse  [phone] Covid results (negative) in chart. Spoke with SRIDEVI Storm to request to contact admitting MD for DC Summary and DC Readmit.

## 2021-04-24 NOTE — PROGRESS NOTES
Nephrology Daily Progress Note       LOS: 7 days     Ambar Lara is a 51 y.o. female who was admitted with Clostridium difficile colitis. She reports his symptoms are improving with treatment.    Subjective       Patient continued to receive her antibiotics with significant improvement of her GI complaints    She has noticed a significant improvement in her appetite    Patient remains hemodynamically stable and afebrile    Interval History: Ambar Lara  As above   Medication side effects:None     Current Facility-Administered Medications   Medication Dose Route Frequency Provider Last Rate Last Admin   • acetaminophen (TYLENOL) tablet 650 mg  650 mg Oral Q4H PRN Binta Patel MD   650 mg at 04/22/21 2150   • calcium acetate (PHOS BINDER)) capsule 1,334 mg  1,334 mg Oral TID With Meals Binta Patel MD   1,334 mg at 04/23/21 1739   • epoetin marco antonio-epbx (RETACRIT) injection 5,000 Units  5,000 Units Subcutaneous Once per day on Mon Wed Fri Bebeto Pinto MD   5,000 Units at 04/23/21 1234   • escitalopram (LEXAPRO) tablet 20 mg  20 mg Oral Q24H Binta Patel MD   20 mg at 04/24/21 0009   • levothyroxine (SYNTHROID, LEVOTHROID) tablet 100 mcg  100 mcg Oral Q AM Nirmal Wan MD   100 mcg at 04/24/21 0542   • melatonin tablet 3 mg  3 mg Oral Nightly PRN Binta Patel MD   3 mg at 04/24/21 0009   • midodrine (PROAMATINE) tablet 10 mg  10 mg Oral TID Faviola Fowler MD   10 mg at 04/24/21 0542   • montelukast (SINGULAIR) tablet 10 mg  10 mg Oral Daily Binta Patel MD   10 mg at 04/23/21 1236   • multivitamin (THERAGRAN) tablet 1 tablet  1 tablet Oral Daily Bebeto Pinto MD   1 tablet at 04/23/21 1235   • nitroglycerin (NITROSTAT) SL tablet 0.4 mg  0.4 mg Sublingual Q5 Min PRN Binta Patel MD       • ondansetron (ZOFRAN) tablet 4 mg  4 mg Oral Q6H PRN Binta Patel, MD        Or   • ondansetron (ZOFRAN) injection 4 mg  4 mg  Intravenous Q6H PRN Binta Patel MD   4 mg at 04/23/21 1349   • Pharmacy Consult   Does not apply Continuous PRN Binta Patel MD       • Pharmacy to dose warfarin   Does not apply Continuous PRN Nirmal Wan MD       • saccharomyces boulardii (FLORASTOR) capsule 500 mg  500 mg Oral BID Nirmal Wan MD   500 mg at 04/23/21 2110   • sodium bicarbonate tablet 650 mg  650 mg Oral Q8H Binta Patel MD   650 mg at 04/24/21 0543   • sodium chloride 0.9 % flush 10 mL  10 mL Intravenous PRN Marcos Blackman MD   10 mL at 04/23/21 2110   • vancomycin (VANCOCIN) capsule 250 mg  250 mg Oral Q6H Nirmal Wan MD   250 mg at 04/24/21 0543   • warfarin (COUMADIN) tablet 5 mg  5 mg Oral Daily Nirmal Wan MD   5 mg at 04/23/21 1739       Objective     Vital signs in last 24 hours:  Temp:  [97.3 °F (36.3 °C)-98.1 °F (36.7 °C)] 97.3 °F (36.3 °C)  Heart Rate:  [80-84] 80  Resp:  [16] 16  BP: (89-93)/(60-67) 92/60    Intake/Output last 3 shifts:  I/O last 3 completed shifts:  In: 360 [P.O.:360]  Out: -     Labs:  Results from last 7 days   Lab Units 04/23/21  0648 04/22/21  0733 04/21/21  0608 04/20/21  0614 04/19/21  0817 04/18/21  0717   WBC 10*3/mm3 8.13 8.53 7.45 7.89 8.09 9.61   HEMOGLOBIN g/dL 10.0* 10.0* 9.6* 9.3* 10.0* 10.0*   HEMATOCRIT % 32.3* 31.3* 30.2* 28.8* 31.8* 30.6*   PLATELETS 10*3/mm3 269 296 318 338 338 322   MONOCYTES % %  --   --   --  5.1 4.1* 5.1     Results from last 7 days   Lab Units 04/23/21  0648 04/22/21  0733 04/21/21  0608   SODIUM mmol/L 140 138 140   POTASSIUM mmol/L 3.9 4.0 4.1   CHLORIDE mmol/L 105 104 104   CO2 mmol/L 21.8* 21.1* 23.4   BUN mg/dL 17 10 15   CREATININE mg/dL 3.10* 2.19* 2.65*   CALCIUM mg/dL 8.5* 8.1* 8.1*   GLUCOSE mg/dL 102* 99 89       Physical Exam:  General appearance: alert deconditioned  Head: Normocephalic, without obvious abnormality, atraumatic  Eyes: conjunctivae/corneas clear. PERRL, EOM's intact.  Ears: Hearing  preserved  Neck: no adenopathy, no carotid bruit, no JVD, supple, symmetrical, trachea midline. RIJPC in place. Trach in place slowly healing   Lungs: clear to auscultation bilaterally  Heart: regular rate and rhythm, S1, S2 normal, KIMMY grade III   Abdomen: soft, non-tender; bowel sounds normal; no masses, PD catheter in place with clean exit site  Extremities: extremities normal, atraumatic, no cyanosis or edema  Pulses: 2+ and symmetric  Neurologic: Alert and oriented X 3, generalized weakness    Impression and plan    Mrs Ambar melo is a pleasant 51-year-old female with a past medical history of chronic systolic congestive heart failure, thyroid disease, gastroesophageal for disease, chronic kidney disease that progressed to end-stage renal disease requiring the initiation hemodialysis through peritoneal dialysis, associated with complications that require transition to in center hemodialysis s/p RIJPC .  Patient has a history of Covid infection.  Patient was admitted for colitis found to have C. Difficile currently on vancomycin oral treatment and metronidazole IV    ESRD on HD ( MWF schedule )   -Secondary to interstitial nephritis  -History of peritoneal  dialysis complicated by require transition to hemodialysis  -s/p PD catheter in place , not used since 3/2021  -s/p RIJPC placement       Hypotension   - On MIDODRINE 5 mg TID   - Pt requires MIDODRINE during HD TTX   - better    Chronic normocytic anemia  Results from last 7 days   Lab Units 04/23/21  0648 04/22/21  0733 04/21/21  0608   HEMOGLOBIN g/dL 10.0* 10.0* 9.6*   -Likely secondary to end-stage renal disease  -on EPOGEN 5.000 SC TIW   - RENAVITE  Daily   -Continue to follow H&H closely  -Hemoglobin gradually improving    Hyperphosphatemia  -We will continue renal diet and phoslo TID with meals   -We will follow Phos trend    C. difficile colitis  -On  Vancomycin oral and  metronidazole IV  -Currently followed closely by primary team and  infectious disease    Plan:   - HD next on Monday unless needs it sooner      Thank you for allowing me to participate in this patient care    Juan Carlos Dubose MD, MD

## 2021-04-24 NOTE — THERAPY TREATMENT NOTE
Patient Name: Ambar Lara  : 1970    MRN: [id number]                              Today's Date: 2021       Admit Date: 2021    Visit Dx:     ICD-10-CM ICD-9-CM   1. Clostridium difficile colitis  A04.72 008.45   2. ESRD on hemodialysis (CMS/HCC)  N18.6 585.6    Z99.2 V45.11   3. Other pulmonary embolism without acute cor pulmonale, unspecified chronicity (CMS/HCC)  I26.99 415.19   4. Chest pain, unspecified type  R07.9 786.50     Patient Active Problem List   Diagnosis   • Colitis, Clostridium difficile   • Anxiety   • Cardiomyopathy (CMS/HCC)   • Chronic systolic heart failure (CMS/HCC)   • ESRD (end stage renal disease) on dialysis (CMS/HCC)   • Gastroesophageal reflux disease   • Gitelman syndrome   • HLD (hyperlipidemia)   • Hypothyroidism   • Peritoneal dialysis status (CMS/HCC)   • History of tracheostomy   • Acute pulmonary embolism (CMS/HCC)   • Severe malnutrition (CMS/HCC)   • Clostridium difficile colitis     Past Medical History:   Diagnosis Date   • CHF (congestive heart failure) (CMS/HCC)    • Dialysis patient (CMS/HCC)    • Disease of thyroid gland    • Elevated cholesterol    • GERD (gastroesophageal reflux disease)    • Renal disorder    • Sleep apnea      Past Surgical History:   Procedure Laterality Date   • ADRENAL GLAND SURGERY N/A `   •  SECTION Bilateral 2001    Has had x2   • HYSTERECTOMY     • TRACHEOSTOMY       General Information     Row Name 21 0933          Physical Therapy Time and Intention    Document Type  therapy note (daily note)  -     Mode of Treatment  physical therapy;individual therapy  -     Row Name 2133          General Information    Existing Precautions/Restrictions  fall  -     Barriers to Rehab  medically complex;previous functional deficit;physical barrier  -RH     Row Name 21 0933          Cognition    Orientation Status (Cognition)  oriented x 4  -     Row Name 2123          Safety  Issues, Functional Mobility    Impairments Affecting Function (Mobility)  balance;endurance/activity tolerance;strength  -       User Key  (r) = Recorded By, (t) = Taken By, (c) = Cosigned By    Initials Name Provider Type     Huan Lozoya PTA Physical Therapy Assistant        Mobility     Row Name 04/24/21 0935          Bed Mobility    Supine-Sit Lamar (Bed Mobility)  not tested  -RH     Sit-Supine Lamar (Bed Mobility)  not tested  -     Row Name 04/24/21 0935          Bed-Chair Transfer    Bed-Chair Lamar (Transfers)  contact guard;verbal cues  -     Assistive Device (Bed-Chair Transfers)  walker, front-wheeled  -RH     Row Name 04/24/21 0935          Sit-Stand Transfer    Sit-Stand Lamar (Transfers)  standby assist;verbal cues  -     Assistive Device (Sit-Stand Transfers)  walker, front-wheeled  -RH     Row Name 04/24/21 0935          Gait/Stairs (Locomotion)    Lamar Level (Gait)  contact guard  -     Assistive Device (Gait)  walker, front-wheeled  -     Distance in Feet (Gait)  80'x2  -RH     Deviations/Abnormal Patterns (Gait)  august decreased;stride length decreased  -RH     Bilateral Gait Deviations  forward flexed posture;heel strike decreased  -       User Key  (r) = Recorded By, (t) = Taken By, (c) = Cosigned By    Initials Name Provider Type    Huan Garcia PTA Physical Therapy Assistant        Obj/Interventions     Row Name 04/24/21 0935          Balance    Balance Assessment  sitting static balance;standing static balance  -     Static Sitting Balance  WFL;supported;sitting, edge of bed  -     Static Standing Balance  standing;supported;mild impairment  -       User Key  (r) = Recorded By, (t) = Taken By, (c) = Cosigned By    Initials Name Provider Type    Huan Garcia PTA Physical Therapy Assistant        Goals/Plan    No documentation.       Clinical Impression     Row Name 04/24/21 0936          Pain Scale: Numbers  Pre/Post-Treatment    Pretreatment Pain Rating  0/10 - no pain  -RH     Posttreatment Pain Rating  0/10 - no pain  -RH     Row Name 04/24/21 0936          Vital Signs    Posttreatment Heart Rate (beats/min)  104  -RH     O2 Delivery Intra Treatment  room air  -RH     Row Name 04/24/21 0936          Positioning and Restraints    Pre-Treatment Position  in bed  -RH     Post Treatment Position  chair  -RH     In Chair  sitting;call light within reach;with family/caregiver  -RH       User Key  (r) = Recorded By, (t) = Taken By, (c) = Cosigned By    Initials Name Provider Type     Huan Lozoya PTA Physical Therapy Assistant        Outcome Measures     Row Name 04/24/21 0937          How much help from another person do you currently need...    Turning from your back to your side while in flat bed without using bedrails?  3  -RH     Moving from lying on back to sitting on the side of a flat bed without bedrails?  3  -RH     Moving to and from a bed to a chair (including a wheelchair)?  3  -RH     Standing up from a chair using your arms (e.g., wheelchair, bedside chair)?  3  -RH     Climbing 3-5 steps with a railing?  2  -RH     To walk in hospital room?  3  -RH     AM-PAC 6 Clicks Score (PT)  17  -RH       User Key  (r) = Recorded By, (t) = Taken By, (c) = Cosigned By    Initials Name Provider Type     Huan Lozoya PTA Physical Therapy Assistant        Physical Therapy Education                 Title: PT OT SLP Therapies (Done)     Topic: Physical Therapy (Done)     Point: Mobility training (Done)     Learning Progress Summary           Patient Acceptance, E, VU by MS at 4/24/2021 0441    Acceptance, E, VU by AA at 4/23/2021 1854    Acceptance, TB, VU by AA at 4/23/2021 1853    Acceptance, E,TB, VU,NR by LB at 4/23/2021 1629    Comment: sit to stand transfers    Acceptance, TB,E, VU by PT at 4/22/2021 1617    Acceptance, E,TB,D, VU by SM at 4/22/2021 0912    Acceptance, E,TB,D, VU,NR by SM at 4/20/2021  1545    Acceptance, E,TB, VU by PT at 4/19/2021 1604    Acceptance, E, VU,NR by AL at 4/17/2021 1106                   Point: Home exercise program (Done)     Learning Progress Summary           Patient Acceptance, E, VU by MS at 4/24/2021 0441    Acceptance, E, VU by AA at 4/23/2021 1854    Acceptance, TB, VU by AA at 4/23/2021 1853    Acceptance, TB,E, VU by PT at 4/22/2021 1617    Acceptance, E,TB,D, VU by SM at 4/22/2021 0912    Acceptance, E,TB,D, VU,NR by SM at 4/20/2021 1545    Acceptance, E,TB, VU by PT at 4/19/2021 1604    Acceptance, E, VU,NR by AL at 4/17/2021 1106                   Point: Body mechanics (Done)     Learning Progress Summary           Patient Acceptance, E, VU by MS at 4/24/2021 0441    Acceptance, E, VU by AA at 4/23/2021 1854    Acceptance, TB, VU by AA at 4/23/2021 1853    Acceptance, TB,E, VU by PT at 4/22/2021 1617    Acceptance, E,TB,D, VU by SM at 4/22/2021 0912    Acceptance, E,TB,D, VU,NR by SM at 4/20/2021 1545    Acceptance, E,TB, VU by PT at 4/19/2021 1604    Acceptance, E, VU,NR by AL at 4/17/2021 1106                   Point: Precautions (Done)     Learning Progress Summary           Patient Acceptance, E, VU by MS at 4/24/2021 0441    Acceptance, E, VU by AA at 4/23/2021 1854    Acceptance, TB, VU by AA at 4/23/2021 1853    Acceptance, TB,E, VU by PT at 4/22/2021 1617    Acceptance, E,TB,D, VU by SM at 4/22/2021 0912    Acceptance, E,TB,D, VU,NR by SM at 4/20/2021 1545    Acceptance, E,TB, VU by PT at 4/19/2021 1604    Acceptance, E, VU,NR by AL at 4/17/2021 1106                               User Key     Initials Effective Dates Name Provider Type Discipline    LB 04/03/18 -  Farrah Jorge, PT Physical Therapist PT    AL 04/03/18 -  Valeria Jimenez, PT Physical Therapist PT     03/07/18 -  Jane Mcintyre, VICKIE Physical Therapy Assistant PT    PT 06/08/20 -  Sydney Watkins RN Registered Nurse Nurse    MS 10/30/17 -  Marianna Khoury RN Registered Nurse Nurse    AA  05/21/20 -  Molly Houston, RN Registered Nurse Nurse              PT Recommendation and Plan           Time Calculation:   PT Charges     Row Name 04/24/21 0937             Time Calculation    Start Time  0913  -      Stop Time  0938  -      Time Calculation (min)  25 min  -      PT - Next Appointment  04/24/21  -      PT Goal Re-Cert Due Date  04/25/21  -        User Key  (r) = Recorded By, (t) = Taken By, (c) = Cosigned By    Initials Name Provider Type     Huan Lozoya PTA Physical Therapy Assistant        Therapy Charges for Today     Code Description Service Date Service Provider Modifiers Qty    17711971601 HC PT THER PROC EA 15 MIN 4/24/2021 Huan Lozoya PTA GP 2          PT G-Codes  Outcome Measure Options: AM-PAC 6 Clicks Daily Activity (OT)  AM-PAC 6 Clicks Score (PT): 17  AM-PAC 6 Clicks Score (OT): 19    Huan Lozoya PTA  4/24/2021

## 2021-04-24 NOTE — PROGRESS NOTES
Case Management Discharge Note      Final Note: Pt discharged to [facility].   RUTH Henning RN         Selected Continued Care - Discharged on 4/24/2021 Admission date: 4/16/2021 - Discharge disposition: Skilled Nursing Facility ([facility])    Destination Coordination complete    Service Provider Selected Services Address Phone Fax Patient Preferred    [facility]  Inpatient Rehabilitation [address] [facility] [address] [phone] -- --          Durable Medical Equipment    No services have been selected for the patient.              Dialysis/Infusion    No services have been selected for the patient.              Home Medical Care    No services have been selected for the patient.              Therapy    No services have been selected for the patient.              Community Resources    No services have been selected for the patient.                Selected Continued Care - Prior Encounters Includes selections from prior encounters from 1/16/2021 to 4/24/2021    Discharged on 4/14/2021 Admission date: 4/9/2021 - Discharge disposition: Skilled Nursing Facility (DC - External)    Destination     Service Provider Selected Services Address Phone Fax Patient Preferred    [facility]  Skilled Nursing [address] [facility] [address] [phone] --                         Final Discharge Disposition Code: 62 - inpatient rehab facility   Yes

## 2021-04-24 NOTE — PROGRESS NOTES
Pharmacy Consult: Warfarin Dosing/ Monitoring    Ambar Lara is a 51 y.o. female, estimated creatinine clearance is 23.2 mL/min (A) (by C-G formula based on SCr of 2.65 mg/dL (H)). weighing 71.2 kg (156 lb 14.4 oz).     has a past medical history of CHF (congestive heart failure) (CMS/[facility]), Dialysis patient (CMS/[facility]), Disease of thyroid gland, Elevated cholesterol, GERD (gastroesophageal reflux disease), Renal disorder, and Sleep apnea.    Social History     Tobacco Use    Smoking status: Former Smoker     Packs/day: 1.00     Years: 25.00     Pack years: 25.00     Types: Cigarettes     Quit date: 4/16/2018     Years since quitting: 3.0    Smokeless tobacco: Never Used   Vaping Use    Vaping Use: Former    Quit date: 2/14/2021    Substances: Nicotine, Flavoring    Devices: Pre-filled or refillable cartridge   Substance Use Topics    Alcohol use: Not Currently    Drug use: Not Currently       Results from last 7 days   Lab Units 04/24/21  0756 04/23/21  0648 04/22/21  0733 04/21/21  1047 04/21/21  0608 04/20/21  0614 04/19/21  1517 04/19/21  0817 04/18/21  0717   INR  2.04* 2.09* 2.08* 2.14*  --  2.84* 3.38*  --   --    HEMOGLOBIN g/dL 10.4* 10.0* 10.0*  --  9.6* 9.3*  --  10.0* 10.0*   HEMATOCRIT % 33.5* 32.3* 31.3*  --  30.2* 28.8*  --  31.8* 30.6*   PLATELETS 10*3/mm3 291 269 296  --  318 338  --  338 322     Results from last 7 days   Lab Units 04/24/21  0756 04/23/21  0648 04/22/21  0733   SODIUM mmol/L 140 140 138   POTASSIUM mmol/L 3.8 3.9 4.0   CHLORIDE mmol/L 101 105 104   CO2 mmol/L 24.9 21.8* 21.1*   BUN mg/dL 11 17 10   CREATININE mg/dL 2.65* 3.10* 2.19*   CALCIUM mg/dL 8.8 8.5* 8.1*   GLUCOSE mg/dL 104* 102* 99     Anticoagulation history: Newer start of warfarin from last hospital admission on 4/9/21 for PE    Hospital Anticoagulation:  Consulting provider: Dr. Wan  Start date: 4/19 Rx started dosing (on warfarin since date of admission, 4/16)  Indication: PE history  Target INR: 2-3  Expected  duration: ?   Bridge Therapy: No                Date 4/19 4/20 4/21 4/22 4/23 4/23       INR 3.3 2.84 2.14 2.08 2.09 2.04       Warfarin dose HOLD 4 mg 5 mg 5 mg 5 mg 5 mg         Potential drug interactions:  discontinuedLevothyroxine: potentially increased anticoagulant effects  Escitalopram: potentially increased risk of bleeding     Relevant nutrition status: Regular diet + Boost Breeze w/lunch + dinner (has vitamin K content)    Education complete?/ Date: TBD    Assessment/Plan:  INR is therapeutic at 2.04 (Goal 2-3). I will continue at the increased  dose to warfarin 5 mg daily (home regimen = 4 mg QD)  and follow up INR in the AM.     Pharmacy will continue to follow until discharge or discontinuation of warfarin.   Shayna Fernandez [facility]  4/24/2021

## 2021-04-24 NOTE — DISCHARGE SUMMARY
[facility]    Date of Discharge:  4/24/2021    PCP: Jet Manuel MD    Discharge Diagnosis:   Active Hospital Problems    Diagnosis  POA   • **Clostridium difficile colitis [A04.72]  Yes   • History of tracheostomy [Z98.890]  Not Applicable   • Gastroesophageal reflux disease [K21.9]  Yes   • Cardiomyopathy (CMS/HCC) [I42.9]  Yes   • Chronic systolic heart failure (CMS/HCC) [I50.22]  Yes   • ESRD (end stage renal disease) on dialysis (CMS/[facility]) [N18.6, Z99.2]  Not Applicable   • HLD (hyperlipidemia) [E78.5]  Yes   • Hypothyroidism [E03.9]  Yes      Resolved Hospital Problems   No resolved problems to display.          Consults     Date and Time Order Name Status Description    4/23/2021  9:34 PM Inpatient Infectious Diseases Consult      4/17/2021 11:16 AM Inpatient Gastroenterology Consult Completed     4/16/2021  9:55 PM Inpatient Nephrology Consult Completed     4/16/2021  5:35 PM [facility] (on-call MD unless specified) Details Completed     4/10/2021 12:42 AM Inpatient Infectious Diseases Consult Completed     4/9/2021  7:56 PM [facility] (on-call MD unless specified) Details Completed         Hospital Course  51-year-old female, with history of end-stage renal disease, anemia of chronic disease, hyponatremia, chronic systolic CHF, hypothyroidism, presented to the hospital, she was diagnosed to have C. difficile colitis, this is a recurrent episode for her.  She was started on p.o. vancomycin and IV Flagyl.  Infectious disease evaluation was requested for final recommendations for antibiotic plan.    Patient is on day 9 of oral vancomycin.  Will decrease vancomycin to 125 mg p.o. 4 times daily for 1 more day and then start a prolonged vancomycin taper with vancomycin 125 mg p.o. twice daily x7 days followed by vancomycin 125 mg p.o. daily x7 days followed by 125 mg p.o. every other day for 14 days.  Orders have been placed in epic.    She also has end-stage renal  disease, we did continue dialysis per nephrology recommendations.  She also has history of anemia of chronic disease, hemoglobin remained stable.  Patient does have chronic systolic CHF, she appears euvolemic during the hospital stay.  She also has history of hypothyroidism, TSH was checked, we did increase Synthroid dose during the hospital stay.  She would benefit from endocrinology follow-up on outpatient basis after discharge from rehab.  She does have history of PE, INR in the therapeutic range on Coumadin.  I have seen and examined patient at bedside, total time spent on discharge day management for this patient is more than 30 minutes.  Plan of care was discussed with her and she verbalized understanding.    Condition on Discharge: Improved.     Temp:  [97.3 °F (36.3 °C)-98.1 °F (36.7 °C)] 97.8 °F (36.6 °C)  Heart Rate:  [80-87] 87  Resp:  [16] 16  BP: ()/(60-70) 101/70  Body mass index is 29.66 kg/m².    Physical Exam     General Appearance:    Alert, cooperative, no distress, appears stated age   Head:    Normocephalic, without obvious abnormality, atraumatic   Eyes:    PERRL, conjunctivae/corneas clear, EOM's intact, both eyes   Ears:    Normal external ear canals, both ears   Nose:   Nares normal, septum midline, mucosa normal, no drainage    or sinus tenderness   Throat:   Lips, mucosa, and tongue normal   Neck:   Supple, symmetrical, trachea midline, no adenopathy;     thyroid:  no enlargement/tenderness/nodules; no carotid    bruit or JVD   Back:     Symmetric, no curvature, ROM normal, no CVA tenderness   Lungs:     Decreased breath sounds bilaterally, respirations unlabored   Chest Wall:    No tenderness or deformity    Heart:    Regular rate and rhythm, S1 and S2 normal, no murmur, rub   or gallop   Abdomen:     Soft, nontender, bowel sounds active all four quadrants,     no masses, no hepatomegaly, no splenomegaly   Extremities:   Extremities normal, atraumatic, no cyanosis or edema   Pulses:    2+ and symmetric all extremities   Skin:   Skin color, texture, turgor normal, no rashes or lesions   Lymph nodes:   Cervical, supraclavicular, and axillary nodes normal   Neurologic:   CNII-XII intact, normal strength, sensation intact throughout          Disposition: Skilled Nursing Facility ([facility]       Discharge Medications      Continue These Medications      Instructions Start Date   calcium acetate 667 MG capsule capsule  Commonly known as: PHOS BINDER)   1,334 mg, Oral, 3 Times Daily With Meals      cholestyramine 4 g packet  Commonly known as: QUESTRAN   1 packet, Oral, Every 12 Hours Scheduled      gentamicin 0.1 % ointment  Commonly known as: GARAMYCIN   Topical, Daily      levothyroxine 25 MCG tablet  Commonly known as: SYNTHROID, LEVOTHROID   25 mcg, Oral, Daily      Lexapro 20 MG tablet  Generic drug: escitalopram   Every 24 Hours      Singulair 10 MG tablet  Generic drug: montelukast   Every 24 Hours      sodium bicarbonate 650 MG tablet   Every 8 Hours Scheduled      vancomycin 50 MG/ML reconstituted solution oral solution reconstituted   125 mg, Oral, Every 12 Hours      vancomycin 50 MG/ML reconstituted solution oral solution reconstituted   125 mg, Oral, Every 24 Hours   Start Date: April 27, 2021     vancomycin 50 MG/ML reconstituted solution oral solution reconstituted   125 mg, Oral, Every Other Day   Start Date: May 4, 2021     warfarin 4 MG tablet  Commonly known as: Coumadin   1 tablet p.o. every day.         ASK your doctor about these medications      Instructions Start Date   vancomycin 50 MG/ML reconstituted solution oral solution reconstituted  Ask about: Should I take this medication?   125 mg, Oral, Every 6 Hours Scheduled              Follow-up Information     [facility]. Go in 13 day(s).    Specialty: Dialysis  Why: Has appointment with home training SRIDEVI Castellon at [address] [place] [address] location May 5 at 1 pm for PD catheter flush.   Contact  information:  [address] [address] [phone]           Jet Manuel MD .    Specialty: Internal Medicine  Contact information:  [address]  [facility] [address] [phone]                     Nirmal Wan MD  04/24/21  11:09 EDT    Discharge time spent greater than 30 minutes.

## 2021-04-25 LAB
INR PPP: 2.1 (ref 0.9–1.1)
PROTHROMBIN TIME: 23.3 SECONDS (ref 11.7–14.2)

## 2021-04-25 PROCEDURE — 97162 PT EVAL MOD COMPLEX 30 MIN: CPT

## 2021-04-25 PROCEDURE — 85610 PROTHROMBIN TIME: CPT | Performed by: PHYSICAL MEDICINE & REHABILITATION

## 2021-04-25 RX ORDER — UREA 10 %
3 LOTION (ML) TOPICAL NIGHTLY
Status: DISCONTINUED | OUTPATIENT
Start: 2021-04-25 | End: 2021-04-29 | Stop reason: HOSPADM

## 2021-04-25 RX ORDER — LANTHANUM CARBONATE 750 MG/1
750 TABLET, CHEWABLE ORAL
COMMUNITY
End: 2021-04-29 | Stop reason: HOSPADM

## 2021-04-25 RX ADMIN — MONTELUKAST SODIUM 10 MG: 10 TABLET, FILM COATED ORAL at 08:23

## 2021-04-25 RX ADMIN — LEVOTHYROXINE SODIUM 100 MCG: 0.1 TABLET ORAL at 06:13

## 2021-04-25 RX ADMIN — CALCIUM ACETATE 1334 MG: 667 CAPSULE ORAL at 17:34

## 2021-04-25 RX ADMIN — Medication 500 MG: at 22:06

## 2021-04-25 RX ADMIN — VANCOMYCIN HYDROCHLORIDE 125 MG: 125 CAPSULE ORAL at 00:13

## 2021-04-25 RX ADMIN — MIDODRINE HYDROCHLORIDE 10 MG: 5 TABLET ORAL at 17:34

## 2021-04-25 RX ADMIN — ESCITALOPRAM 20 MG: 20 TABLET, FILM COATED ORAL at 22:06

## 2021-04-25 RX ADMIN — SODIUM BICARBONATE 650 MG: 650 TABLET ORAL at 14:16

## 2021-04-25 RX ADMIN — VANCOMYCIN HYDROCHLORIDE 125 MG: 125 CAPSULE ORAL at 17:33

## 2021-04-25 RX ADMIN — Medication 1 TABLET: at 08:23

## 2021-04-25 RX ADMIN — WARFARIN 5 MG: 5 TABLET ORAL at 17:33

## 2021-04-25 RX ADMIN — CALCIUM ACETATE 1334 MG: 667 CAPSULE ORAL at 08:23

## 2021-04-25 RX ADMIN — VANCOMYCIN HYDROCHLORIDE 125 MG: 125 CAPSULE ORAL at 12:24

## 2021-04-25 RX ADMIN — Medication 500 MG: at 08:23

## 2021-04-25 RX ADMIN — CALCIUM ACETATE 1334 MG: 667 CAPSULE ORAL at 12:24

## 2021-04-25 RX ADMIN — Medication 3 MG: at 22:06

## 2021-04-25 RX ADMIN — SODIUM BICARBONATE 650 MG: 650 TABLET ORAL at 22:06

## 2021-04-25 RX ADMIN — MIDODRINE HYDROCHLORIDE 10 MG: 5 TABLET ORAL at 06:12

## 2021-04-25 RX ADMIN — SODIUM BICARBONATE 650 MG: 650 TABLET ORAL at 06:13

## 2021-04-25 RX ADMIN — VANCOMYCIN HYDROCHLORIDE 125 MG: 125 CAPSULE ORAL at 06:13

## 2021-04-25 RX ADMIN — MIDODRINE HYDROCHLORIDE 10 MG: 5 TABLET ORAL at 12:23

## 2021-04-26 LAB
INR PPP: 1.9 (ref 0.9–1.1)
PROTHROMBIN TIME: 21.5 SECONDS (ref 11.7–14.2)

## 2021-04-26 PROCEDURE — 97166 OT EVAL MOD COMPLEX 45 MIN: CPT

## 2021-04-26 PROCEDURE — 97110 THERAPEUTIC EXERCISES: CPT

## 2021-04-26 PROCEDURE — 25010000002 EPOETIN ALFA-EPBX 3000 UNIT/ML SOLUTION: Performed by: PHYSICAL MEDICINE & REHABILITATION

## 2021-04-26 PROCEDURE — 85610 PROTHROMBIN TIME: CPT | Performed by: PHYSICAL MEDICINE & REHABILITATION

## 2021-04-26 RX ORDER — ESCITALOPRAM OXALATE 20 MG/1
20 TABLET ORAL DAILY
Status: ON HOLD | COMMUNITY
End: 2021-04-29 | Stop reason: SDUPTHER

## 2021-04-26 RX ORDER — MANNITOL 250 MG/ML
50 INJECTION, SOLUTION INTRAVENOUS ONCE
Status: COMPLETED | OUTPATIENT
Start: 2021-04-27 | End: 2021-04-27

## 2021-04-26 RX ADMIN — Medication 1 TABLET: at 08:26

## 2021-04-26 RX ADMIN — VANCOMYCIN HYDROCHLORIDE 125 MG: 125 CAPSULE ORAL at 21:06

## 2021-04-26 RX ADMIN — SODIUM BICARBONATE 650 MG: 650 TABLET ORAL at 05:30

## 2021-04-26 RX ADMIN — CALCIUM ACETATE 1334 MG: 667 CAPSULE ORAL at 08:26

## 2021-04-26 RX ADMIN — Medication 500 MG: at 08:25

## 2021-04-26 RX ADMIN — WARFARIN 5 MG: 5 TABLET ORAL at 16:31

## 2021-04-26 RX ADMIN — CALCIUM ACETATE 1334 MG: 667 CAPSULE ORAL at 16:31

## 2021-04-26 RX ADMIN — Medication 3 MG: at 21:04

## 2021-04-26 RX ADMIN — CALCIUM ACETATE 1334 MG: 667 CAPSULE ORAL at 11:50

## 2021-04-26 RX ADMIN — MIDODRINE HYDROCHLORIDE 10 MG: 5 TABLET ORAL at 08:26

## 2021-04-26 RX ADMIN — MONTELUKAST SODIUM 10 MG: 10 TABLET, FILM COATED ORAL at 08:26

## 2021-04-26 RX ADMIN — EPOETIN ALFA-EPBX 5000 UNITS: 3000 INJECTION, SOLUTION INTRAVENOUS; SUBCUTANEOUS at 08:24

## 2021-04-26 RX ADMIN — ESCITALOPRAM 20 MG: 20 TABLET, FILM COATED ORAL at 23:02

## 2021-04-26 RX ADMIN — SODIUM BICARBONATE 650 MG: 650 TABLET ORAL at 21:06

## 2021-04-26 RX ADMIN — LEVOTHYROXINE SODIUM 100 MCG: 0.1 TABLET ORAL at 05:30

## 2021-04-26 RX ADMIN — MIDODRINE HYDROCHLORIDE 10 MG: 5 TABLET ORAL at 16:31

## 2021-04-26 RX ADMIN — Medication 500 MG: at 21:04

## 2021-04-26 RX ADMIN — MIDODRINE HYDROCHLORIDE 10 MG: 5 TABLET ORAL at 11:50

## 2021-04-26 RX ADMIN — VANCOMYCIN HYDROCHLORIDE 125 MG: 125 CAPSULE ORAL at 08:26

## 2021-04-26 RX ADMIN — SODIUM BICARBONATE 650 MG: 650 TABLET ORAL at 15:09

## 2021-04-26 NOTE — PAYOR COMM NOTE
"Zandra Doansalomón MOLINA (51 y.o. Female)     ATTN: DISCHARGE SUMMARY FOR REVIEW: [id number]    PLEASE REPLY WITH APPROVED DAYS TO [facility] DEPT: EMMANUEL MARTINEZ RN,CCP;    [phone],  [phone]         Date of Birth Social Security Number Address Home Phone MRN    1970  [address] [place] [address] [phone] [id number]    Faith Marital Status          Unknown        Admission Date Admission Type Admitting Provider Attending Provider Department, Room/Bed    4/16/21 Emergency Stingl, Binta Cordero MD  [address], E669/1    Discharge Date Discharge Disposition Discharge Destination        4/24/2021 Skilled Nursing Facility ([facility])              Attending Provider: (none)   Allergies: Penicillins, Nickel    Isolation: None   Infection: C.difficile (04/09/21), COVID (History) (04/18/21)   Code Status: CPR    Ht: 154.9 cm (60.98\")   Wt: 71.2 kg (156 lb 14.4 oz)    Admission Cmt: None   Principal Problem: Clostridium difficile colitis [A04.72]                 Active Insurance as of 4/16/2021     Primary Coverage     Payor Plan Insurance Group Employer/Plan Group    [facility] [id number]     Payor Plan Address Payor Plan Phone Number Payor Plan Fax Number Effective Dates    [phone]   1/1/2021 - None Entered    [facility] [address]       Subscriber Name Subscriber Birth Date Member ID       ZANDRA DOANSALOMÓN MOLINA 1970 [id number]           Secondary Coverage     Payor Plan Insurance Group Employer/Plan Group    MEDICARE MEDICARE A & B      Payor Plan Address Payor Plan Phone Number Payor Plan Fax Number Effective Dates    [address] [phone]  4/1/2019 - None Entered    [facility] [id number]       Subscriber Name Subscriber Birth Date Member ID       AMBAR DOAN JESSE 1970 [id number]                 Emergency Contacts      (Rel.) Home Phone Work Phone Mobile Phone    Roland Doan (Son) " [phone] -- --    lorienatalie (Power of ) [phone] -- --    maty melo (Daughter) [phone] -- --             Discharge Summary      Nirmal Wan MD at 04/24/21 1109                              [facility]    Date of Discharge:  4/24/2021    PCP: Jet Manuel MD    Discharge Diagnosis:   Active Hospital Problems    Diagnosis  POA   • **Clostridium difficile colitis [A04.72]  Yes   • History of tracheostomy [Z98.890]  Not Applicable   • Gastroesophageal reflux disease [K21.9]  Yes   • Cardiomyopathy (CMS/HCC) [I42.9]  Yes   • Chronic systolic heart failure (CMS/HCC) [I50.22]  Yes   • ESRD (end stage renal disease) on dialysis (CMS/HCC) [N18.6, Z99.2]  Not Applicable   • HLD (hyperlipidemia) [E78.5]  Yes   • Hypothyroidism [E03.9]  Yes      Resolved Hospital Problems   No resolved problems to display.          Consults     Date and Time Order Name Status Description    4/23/2021  9:34 PM Inpatient Infectious Diseases Consult      4/17/2021 11:16 AM Inpatient Gastroenterology Consult Completed     4/16/2021  9:55 PM Inpatient Nephrology Consult Completed     4/16/2021  5:35 PM [facility] (on-call MD unless specified) Details Completed     4/10/2021 12:42 AM Inpatient Infectious Diseases Consult Completed     4/9/2021  7:56 PM [facility] (on-call MD unless specified) Details Completed         Hospital Course  51-year-old female, with history of end-stage renal disease, anemia of chronic disease, hyponatremia, chronic systolic CHF, hypothyroidism, presented to the hospital, she was diagnosed to have C. difficile colitis, this is a recurrent episode for her.  She was started on p.o. vancomycin and IV Flagyl.  Infectious disease evaluation was requested for final recommendations for antibiotic plan.    Patient is on day 9 of oral vancomycin.  Will decrease vancomycin to 125 mg p.o. 4 times daily for 1 more day and then start a prolonged vancomycin taper with  vancomycin 125 mg p.o. twice daily x7 days followed by vancomycin 125 mg p.o. daily x7 days followed by 125 mg p.o. every other day for 14 days.  Orders have been placed in epic.    She also has end-stage renal disease, we did continue dialysis per nephrology recommendations.  She also has history of anemia of chronic disease, hemoglobin remained stable.  Patient does have chronic systolic CHF, she appears euvolemic during the hospital stay.  She also has history of hypothyroidism, TSH was checked, we did increase Synthroid dose during the hospital stay.  She would benefit from endocrinology follow-up on outpatient basis after discharge from rehab.  She does have history of PE, INR in the therapeutic range on Coumadin.  I have seen and examined patient at bedside, total time spent on discharge day management for this patient is more than 30 minutes.  Plan of care was discussed with her and she verbalized understanding.    Condition on Discharge: Improved.     Temp:  [97.3 °F (36.3 °C)-98.1 °F (36.7 °C)] 97.8 °F (36.6 °C)  Heart Rate:  [80-87] 87  Resp:  [16] 16  BP: ()/(60-70) 101/70  Body mass index is 29.66 kg/m².    Physical Exam     General Appearance:    Alert, cooperative, no distress, appears stated age   Head:    Normocephalic, without obvious abnormality, atraumatic   Eyes:    PERRL, conjunctivae/corneas clear, EOM's intact, both eyes   Ears:    Normal external ear canals, both ears   Nose:   Nares normal, septum midline, mucosa normal, no drainage    or sinus tenderness   Throat:   Lips, mucosa, and tongue normal   Neck:   Supple, symmetrical, trachea midline, no adenopathy;     thyroid:  no enlargement/tenderness/nodules; no carotid    bruit or JVD   Back:     Symmetric, no curvature, ROM normal, no CVA tenderness   Lungs:     Decreased breath sounds bilaterally, respirations unlabored   Chest Wall:    No tenderness or deformity    Heart:    Regular rate and rhythm, S1 and S2 normal, no murmur, rub    or gallop   Abdomen:     Soft, nontender, bowel sounds active all four quadrants,     no masses, no hepatomegaly, no splenomegaly   Extremities:   Extremities normal, atraumatic, no cyanosis or edema   Pulses:   2+ and symmetric all extremities   Skin:   Skin color, texture, turgor normal, no rashes or lesions   Lymph nodes:   Cervical, supraclavicular, and axillary nodes normal   Neurologic:   CNII-XII intact, normal strength, sensation intact throughout          Disposition: Skilled Nursing Facility ([facility])       Discharge Medications      Continue These Medications      Instructions Start Date   calcium acetate 667 MG capsule capsule  Commonly known as: PHOS BINDER)   1,334 mg, Oral, 3 Times Daily With Meals      cholestyramine 4 g packet  Commonly known as: QUESTRAN   1 packet, Oral, Every 12 Hours Scheduled      gentamicin 0.1 % ointment  Commonly known as: GARAMYCIN   Topical, Daily      levothyroxine 25 MCG tablet  Commonly known as: SYNTHROID, LEVOTHROID   25 mcg, Oral, Daily      Lexapro 20 MG tablet  Generic drug: escitalopram   Every 24 Hours      Singulair 10 MG tablet  Generic drug: montelukast   Every 24 Hours      sodium bicarbonate 650 MG tablet   Every 8 Hours Scheduled      vancomycin 50 MG/ML reconstituted solution oral solution reconstituted   125 mg, Oral, Every 12 Hours      vancomycin 50 MG/ML reconstituted solution oral solution reconstituted   125 mg, Oral, Every 24 Hours   Start Date: April 27, 2021     vancomycin 50 MG/ML reconstituted solution oral solution reconstituted   125 mg, Oral, Every Other Day   Start Date: May 4, 2021     warfarin 4 MG tablet  Commonly known as: Coumadin   1 tablet p.o. every day.         ASK your doctor about these medications      Instructions Start Date   vancomycin 50 MG/ML reconstituted solution oral solution reconstituted  Ask about: Should I take this medication?   125 mg, Oral, Every 6 Hours Scheduled              Follow-up Information      [facility]. Go in 13 day(s).    Specialty: Dialysis  Why: Has appointment with home training RN Cande at [address] location May 5 at 1 pm for PD catheter flush.   Contact information:  [address] [address] [phone]           Jet Manuel MD .    Specialty: Internal Medicine  Contact information:  [address]  [facility] [address]  [phone]                     Nirmal Wan MD  04/24/21  11:09 EDT    Discharge time spent greater than 30 minutes.    Electronically signed by Nirmal Wan MD at 04/24/21 1111                     Liliana Henning, RN      Case Management   Progress Notes       Signed   Date of Service:  04/24/21 1422   Creation Time:  04/24/21 1620            Signed             Show:Clear all  []Manual[x]Template[]Copied    Added by:  [x]Liliana Henning RN    []ver for details  Case Management Discharge Note        Final Note: Pt discharged to [facility].   RUTH Henning RN            Selected Continued Care - Discharged on 4/24/2021 Admission date: 4/16/2021 - Discharge disposition: Skilled Nursing Facility ([facility])               Destination Coordination complete               Service Provider Selected Services Address Phone Fax Patient Preferred      [facility]  Inpatient Rehabilitation [address] [facility] [address] [phone] -- --             Durable Medical Equipment    No services have been selected for the patient.                 Dialysis/Infusion    No services have been selected for the patient.                 Home Medical Care    No services have been selected for the patient.                 Therapy    No services have been selected for the patient.                 Community Resources    No services have been selected for the patient.                       Selected Continued Care - Prior Encounters Includes selections from prior  encounters from 1/16/2021 to 4/24/2021          Discharged on 4/14/2021 Admission date: 4/9/2021 - Discharge disposition: Skilled Nursing Facility (DC - External)                    Destination                Service Provider Selected Services Address Phone Fax Patient Preferred      [facility]  Skilled Nursing [address] [place] [address] [phone] --                             Final Discharge Disposition Code: 62 - inpatient rehab facility

## 2021-04-27 LAB
GLUCOSE BLDC GLUCOMTR-MCNC: 135 MG/DL (ref 70–130)
INR PPP: 1.88 (ref 0.9–1.1)
PROTHROMBIN TIME: 21.4 SECONDS (ref 11.7–14.2)

## 2021-04-27 PROCEDURE — 97110 THERAPEUTIC EXERCISES: CPT

## 2021-04-27 PROCEDURE — 82962 GLUCOSE BLOOD TEST: CPT

## 2021-04-27 PROCEDURE — 85610 PROTHROMBIN TIME: CPT | Performed by: PHYSICAL MEDICINE & REHABILITATION

## 2021-04-27 PROCEDURE — 25010000002 MANNITOL PER 50 ML: Performed by: INTERNAL MEDICINE

## 2021-04-27 PROCEDURE — P9047 ALBUMIN (HUMAN), 25%, 50ML: HCPCS | Performed by: INTERNAL MEDICINE

## 2021-04-27 PROCEDURE — 63710000001 ONDANSETRON PER 8 MG: Performed by: PHYSICAL MEDICINE & REHABILITATION

## 2021-04-27 PROCEDURE — 97535 SELF CARE MNGMENT TRAINING: CPT

## 2021-04-27 PROCEDURE — 25010000002 ALBUMIN HUMAN 25% PER 50 ML: Performed by: INTERNAL MEDICINE

## 2021-04-27 PROCEDURE — 5A1D70Z PERFORMANCE OF URINARY FILTRATION, INTERMITTENT, LESS THAN 6 HOURS PER DAY: ICD-10-PCS | Performed by: INTERNAL MEDICINE

## 2021-04-27 RX ORDER — ACETAMINOPHEN 500 MG
500 TABLET ORAL EVERY 6 HOURS PRN
Status: ON HOLD | COMMUNITY
End: 2022-01-01

## 2021-04-27 RX ORDER — QUETIAPINE FUMARATE 100 MG/1
100 TABLET, FILM COATED ORAL EVERY 12 HOURS
COMMUNITY
End: 2021-04-29 | Stop reason: HOSPADM

## 2021-04-27 RX ORDER — NITROGLYCERIN 0.4 MG/1
0.4 TABLET SUBLINGUAL
Status: ON HOLD | COMMUNITY
End: 2022-01-01

## 2021-04-27 RX ORDER — WARFARIN SODIUM 6 MG/1
6 TABLET ORAL
Status: DISCONTINUED | OUTPATIENT
Start: 2021-04-27 | End: 2021-04-29 | Stop reason: HOSPADM

## 2021-04-27 RX ORDER — SPIRONOLACTONE 25 MG/1
50 TABLET ORAL DAILY
COMMUNITY
End: 2021-04-29 | Stop reason: HOSPADM

## 2021-04-27 RX ORDER — CLONAZEPAM 0.5 MG/1
0.5 TABLET ORAL EVERY 8 HOURS PRN
COMMUNITY
End: 2021-04-29 | Stop reason: HOSPADM

## 2021-04-27 RX ORDER — LEVOTHYROXINE SODIUM 0.2 MG/1
200 TABLET ORAL
COMMUNITY
End: 2021-04-29 | Stop reason: HOSPADM

## 2021-04-27 RX ORDER — ALBUMIN (HUMAN) 12.5 G/50ML
12.5 SOLUTION INTRAVENOUS ONCE
Status: COMPLETED | OUTPATIENT
Start: 2021-04-27 | End: 2021-04-27

## 2021-04-27 RX ORDER — ONDANSETRON 4 MG/1
4 TABLET, FILM COATED ORAL EVERY 4 HOURS PRN
Status: ON HOLD | COMMUNITY
End: 2021-04-29 | Stop reason: SDUPTHER

## 2021-04-27 RX ORDER — MIDODRINE HYDROCHLORIDE 10 MG/1
10 TABLET ORAL 3 TIMES DAILY
Status: ON HOLD | COMMUNITY
End: 2021-04-29 | Stop reason: SDUPTHER

## 2021-04-27 RX ORDER — FAMOTIDINE 20 MG/1
20 TABLET, FILM COATED ORAL 2 TIMES DAILY
COMMUNITY

## 2021-04-27 RX ORDER — POTASSIUM CHLORIDE 1500 MG/1
40 TABLET, FILM COATED, EXTENDED RELEASE ORAL DAILY
COMMUNITY
End: 2021-04-29 | Stop reason: HOSPADM

## 2021-04-27 RX ORDER — INSULIN GLARGINE 100 [IU]/ML
15 INJECTION, SOLUTION SUBCUTANEOUS 2 TIMES DAILY
COMMUNITY
End: 2021-04-29 | Stop reason: HOSPADM

## 2021-04-27 RX ORDER — BUDESONIDE 0.5 MG/2ML
0.5 INHALANT ORAL EVERY 12 HOURS
COMMUNITY
End: 2021-04-29 | Stop reason: HOSPADM

## 2021-04-27 RX ADMIN — WARFARIN 6 MG: 6 TABLET ORAL at 16:22

## 2021-04-27 RX ADMIN — Medication 1 TABLET: at 07:25

## 2021-04-27 RX ADMIN — ONDANSETRON HYDROCHLORIDE 4 MG: 4 TABLET, FILM COATED ORAL at 08:42

## 2021-04-27 RX ADMIN — VANCOMYCIN HYDROCHLORIDE 125 MG: 125 CAPSULE ORAL at 21:15

## 2021-04-27 RX ADMIN — SODIUM BICARBONATE 650 MG: 650 TABLET ORAL at 05:59

## 2021-04-27 RX ADMIN — VANCOMYCIN HYDROCHLORIDE 125 MG: 125 CAPSULE ORAL at 07:25

## 2021-04-27 RX ADMIN — LEVOTHYROXINE SODIUM 100 MCG: 0.1 TABLET ORAL at 05:59

## 2021-04-27 RX ADMIN — SODIUM BICARBONATE 650 MG: 650 TABLET ORAL at 21:14

## 2021-04-27 RX ADMIN — ALBUMIN HUMAN 12.5 G: 0.25 SOLUTION INTRAVENOUS at 02:02

## 2021-04-27 RX ADMIN — CALCIUM ACETATE 1334 MG: 667 CAPSULE ORAL at 07:25

## 2021-04-27 RX ADMIN — MIDODRINE HYDROCHLORIDE 10 MG: 5 TABLET ORAL at 07:25

## 2021-04-27 RX ADMIN — MONTELUKAST SODIUM 10 MG: 10 TABLET, FILM COATED ORAL at 07:25

## 2021-04-27 RX ADMIN — CALCIUM ACETATE 1334 MG: 667 CAPSULE ORAL at 16:21

## 2021-04-27 RX ADMIN — Medication 3 MG: at 21:14

## 2021-04-27 RX ADMIN — MANNITOL 12.5 G: 250 INJECTION, SOLUTION INTRAVENOUS at 00:42

## 2021-04-27 RX ADMIN — SODIUM BICARBONATE 650 MG: 650 TABLET ORAL at 15:13

## 2021-04-27 RX ADMIN — MIDODRINE HYDROCHLORIDE 10 MG: 5 TABLET ORAL at 05:59

## 2021-04-27 RX ADMIN — CALCIUM ACETATE 1334 MG: 667 CAPSULE ORAL at 11:36

## 2021-04-27 RX ADMIN — Medication 500 MG: at 21:14

## 2021-04-27 RX ADMIN — Medication 500 MG: at 07:25

## 2021-04-27 RX ADMIN — MIDODRINE HYDROCHLORIDE 10 MG: 5 TABLET ORAL at 16:22

## 2021-04-27 RX ADMIN — MIDODRINE HYDROCHLORIDE 10 MG: 5 TABLET ORAL at 11:36

## 2021-04-27 RX ADMIN — ESCITALOPRAM 20 MG: 20 TABLET, FILM COATED ORAL at 21:16

## 2021-04-27 RX ADMIN — ONDANSETRON HYDROCHLORIDE 4 MG: 4 TABLET, FILM COATED ORAL at 14:29

## 2021-04-28 PROBLEM — M21.861 GENU RECURVATUM OF RIGHT KNEE: Status: ACTIVE | Noted: 2021-04-28

## 2021-04-28 LAB
ALBUMIN SERPL-MCNC: 4 G/DL (ref 3.5–5.2)
ANION GAP SERPL CALCULATED.3IONS-SCNC: 15.3 MMOL/L (ref 5–15)
BASOPHILS # BLD AUTO: 0.1 10*3/MM3 (ref 0–0.2)
BASOPHILS NFR BLD AUTO: 1.2 % (ref 0–1.5)
BUN SERPL-MCNC: 18 MG/DL (ref 6–20)
BUN/CREAT SERPL: 5.7 (ref 7–25)
CALCIUM SPEC-SCNC: 8.7 MG/DL (ref 8.6–10.5)
CHLORIDE SERPL-SCNC: 101 MMOL/L (ref 98–107)
CO2 SERPL-SCNC: 24.7 MMOL/L (ref 22–29)
CREAT SERPL-MCNC: 3.15 MG/DL (ref 0.57–1)
DEPRECATED RDW RBC AUTO: 57.1 FL (ref 37–54)
EOSINOPHIL # BLD AUTO: 0.17 10*3/MM3 (ref 0–0.4)
EOSINOPHIL NFR BLD AUTO: 2.1 % (ref 0.3–6.2)
ERYTHROCYTE [DISTWIDTH] IN BLOOD BY AUTOMATED COUNT: 16.4 % (ref 12.3–15.4)
GFR SERPL CREATININE-BSD FRML MDRD: 16 ML/MIN/1.73
GLUCOSE SERPL-MCNC: 173 MG/DL (ref 65–99)
HCT VFR BLD AUTO: 33.1 % (ref 34–46.6)
HGB BLD-MCNC: 10.6 G/DL (ref 12–15.9)
IMM GRANULOCYTES # BLD AUTO: 0.14 10*3/MM3 (ref 0–0.05)
IMM GRANULOCYTES NFR BLD AUTO: 1.7 % (ref 0–0.5)
INR PPP: 1.73 (ref 0.9–1.1)
LYMPHOCYTES # BLD AUTO: 2.64 10*3/MM3 (ref 0.7–3.1)
LYMPHOCYTES NFR BLD AUTO: 32.2 % (ref 19.6–45.3)
MCH RBC QN AUTO: 31 PG (ref 26.6–33)
MCHC RBC AUTO-ENTMCNC: 32 G/DL (ref 31.5–35.7)
MCV RBC AUTO: 96.8 FL (ref 79–97)
MONOCYTES # BLD AUTO: 0.48 10*3/MM3 (ref 0.1–0.9)
MONOCYTES NFR BLD AUTO: 5.9 % (ref 5–12)
NEUTROPHILS NFR BLD AUTO: 4.67 10*3/MM3 (ref 1.7–7)
NEUTROPHILS NFR BLD AUTO: 56.9 % (ref 42.7–76)
NRBC BLD AUTO-RTO: 0.1 /100 WBC (ref 0–0.2)
PHOSPHATE SERPL-MCNC: 5.4 MG/DL (ref 2.5–4.5)
PLATELET # BLD AUTO: 265 10*3/MM3 (ref 140–450)
PMV BLD AUTO: 9.6 FL (ref 6–12)
POTASSIUM SERPL-SCNC: 4.7 MMOL/L (ref 3.5–5.2)
PROTHROMBIN TIME: 20 SECONDS (ref 11.7–14.2)
RBC # BLD AUTO: 3.42 10*6/MM3 (ref 3.77–5.28)
SODIUM SERPL-SCNC: 141 MMOL/L (ref 136–145)
WBC # BLD AUTO: 8.2 10*3/MM3 (ref 3.4–10.8)

## 2021-04-28 PROCEDURE — 25010000002 EPOETIN ALFA-EPBX 3000 UNIT/ML SOLUTION: Performed by: PHYSICAL MEDICINE & REHABILITATION

## 2021-04-28 PROCEDURE — 85610 PROTHROMBIN TIME: CPT | Performed by: PHYSICAL MEDICINE & REHABILITATION

## 2021-04-28 PROCEDURE — 97110 THERAPEUTIC EXERCISES: CPT

## 2021-04-28 PROCEDURE — 97535 SELF CARE MNGMENT TRAINING: CPT

## 2021-04-28 PROCEDURE — 97530 THERAPEUTIC ACTIVITIES: CPT

## 2021-04-28 PROCEDURE — 85025 COMPLETE CBC W/AUTO DIFF WBC: CPT | Performed by: PHYSICAL MEDICINE & REHABILITATION

## 2021-04-28 PROCEDURE — 80069 RENAL FUNCTION PANEL: CPT | Performed by: PHYSICAL MEDICINE & REHABILITATION

## 2021-04-28 RX ORDER — LORAZEPAM 0.5 MG/1
0.5 TABLET ORAL EVERY 6 HOURS PRN
Status: DISCONTINUED | OUTPATIENT
Start: 2021-04-28 | End: 2021-04-29 | Stop reason: HOSPADM

## 2021-04-28 RX ORDER — PRAZOSIN HYDROCHLORIDE 1 MG/1
1 CAPSULE ORAL NIGHTLY
Status: DISCONTINUED | OUTPATIENT
Start: 2021-04-28 | End: 2021-04-29

## 2021-04-28 RX ADMIN — VANCOMYCIN HYDROCHLORIDE 125 MG: 125 CAPSULE ORAL at 08:00

## 2021-04-28 RX ADMIN — MIDODRINE HYDROCHLORIDE 10 MG: 5 TABLET ORAL at 08:00

## 2021-04-28 RX ADMIN — Medication 1 TABLET: at 08:00

## 2021-04-28 RX ADMIN — Medication 500 MG: at 23:49

## 2021-04-28 RX ADMIN — MIDODRINE HYDROCHLORIDE 10 MG: 5 TABLET ORAL at 11:33

## 2021-04-28 RX ADMIN — VANCOMYCIN HYDROCHLORIDE 125 MG: 125 CAPSULE ORAL at 23:49

## 2021-04-28 RX ADMIN — ESCITALOPRAM 20 MG: 20 TABLET, FILM COATED ORAL at 23:50

## 2021-04-28 RX ADMIN — CALCIUM ACETATE 1334 MG: 667 CAPSULE ORAL at 11:33

## 2021-04-28 RX ADMIN — SODIUM BICARBONATE 650 MG: 650 TABLET ORAL at 05:24

## 2021-04-28 RX ADMIN — CALCIUM ACETATE 1334 MG: 667 CAPSULE ORAL at 08:00

## 2021-04-28 RX ADMIN — EPOETIN ALFA-EPBX 5000 UNITS: 3000 INJECTION, SOLUTION INTRAVENOUS; SUBCUTANEOUS at 07:59

## 2021-04-28 RX ADMIN — SODIUM BICARBONATE 650 MG: 650 TABLET ORAL at 13:30

## 2021-04-28 RX ADMIN — Medication 500 MG: at 08:00

## 2021-04-28 RX ADMIN — SODIUM BICARBONATE 650 MG: 650 TABLET ORAL at 23:50

## 2021-04-28 RX ADMIN — WARFARIN 6 MG: 6 TABLET ORAL at 17:01

## 2021-04-28 RX ADMIN — CALCIUM ACETATE 1334 MG: 667 CAPSULE ORAL at 17:01

## 2021-04-28 RX ADMIN — Medication 3 MG: at 23:50

## 2021-04-28 RX ADMIN — MIDODRINE HYDROCHLORIDE 10 MG: 5 TABLET ORAL at 17:01

## 2021-04-28 RX ADMIN — PHENYTOIN 1 MG: 125 SUSPENSION ORAL at 23:50

## 2021-04-28 RX ADMIN — LEVOTHYROXINE SODIUM 100 MCG: 0.1 TABLET ORAL at 05:24

## 2021-04-28 RX ADMIN — MONTELUKAST SODIUM 10 MG: 10 TABLET, FILM COATED ORAL at 08:00

## 2021-04-29 ENCOUNTER — APPOINTMENT (OUTPATIENT)
Dept: MRI IMAGING | Facility: HOSPITAL | Age: 51
End: 2021-04-29

## 2021-04-29 VITALS
TEMPERATURE: 98.6 F | OXYGEN SATURATION: 96 % | RESPIRATION RATE: 18 BRPM | DIASTOLIC BLOOD PRESSURE: 66 MMHG | HEART RATE: 90 BPM | SYSTOLIC BLOOD PRESSURE: 98 MMHG | BODY MASS INDEX: 31.22 KG/M2 | WEIGHT: 165.34 LBS | HEIGHT: 61 IN

## 2021-04-29 LAB
GLUCOSE BLDC GLUCOMTR-MCNC: 102 MG/DL (ref 70–130)
INR PPP: 1.81 (ref 0.9–1.1)
PROTHROMBIN TIME: 20.8 SECONDS (ref 11.7–14.2)

## 2021-04-29 PROCEDURE — 94799 UNLISTED PULMONARY SVC/PX: CPT

## 2021-04-29 PROCEDURE — 97110 THERAPEUTIC EXERCISES: CPT

## 2021-04-29 PROCEDURE — 72148 MRI LUMBAR SPINE W/O DYE: CPT

## 2021-04-29 PROCEDURE — 97535 SELF CARE MNGMENT TRAINING: CPT

## 2021-04-29 PROCEDURE — 82962 GLUCOSE BLOOD TEST: CPT

## 2021-04-29 PROCEDURE — 85610 PROTHROMBIN TIME: CPT | Performed by: PHYSICAL MEDICINE & REHABILITATION

## 2021-04-29 RX ORDER — DIPHENOXYLATE HYDROCHLORIDE AND ATROPINE SULFATE 2.5; .025 MG/1; MG/1
1 TABLET ORAL DAILY
Qty: 90 TABLET | Refills: 0 | Status: ON HOLD | OUTPATIENT
Start: 2021-04-29 | End: 2022-01-01 | Stop reason: SDUPTHER

## 2021-04-29 RX ORDER — VANCOMYCIN HYDROCHLORIDE 125 MG/1
125 CAPSULE ORAL EVERY 24 HOURS
Status: DISCONTINUED | OUTPATIENT
Start: 2021-05-03 | End: 2021-04-29 | Stop reason: HOSPADM

## 2021-04-29 RX ORDER — SACCHAROMYCES BOULARDII 250 MG
500 CAPSULE ORAL 2 TIMES DAILY
Qty: 120 CAPSULE | Refills: 1 | Status: ON HOLD | OUTPATIENT
Start: 2021-04-29 | End: 2022-01-01

## 2021-04-29 RX ORDER — VANCOMYCIN HYDROCHLORIDE 125 MG/1
125 CAPSULE ORAL EVERY 24 HOURS
Qty: 7 CAPSULE | Refills: 0 | Status: SHIPPED | OUTPATIENT
Start: 2021-05-03 | End: 2021-05-10

## 2021-04-29 RX ORDER — ONDANSETRON 4 MG/1
4 TABLET, FILM COATED ORAL EVERY 6 HOURS PRN
Qty: 15 TABLET | Refills: 0 | Status: SHIPPED | OUTPATIENT
Start: 2021-04-29 | End: 2022-01-01 | Stop reason: HOSPADM

## 2021-04-29 RX ORDER — CALCIUM ACETATE 667 MG/1
1334 CAPSULE ORAL
Qty: 180 CAPSULE | Refills: 3 | Status: ON HOLD | OUTPATIENT
Start: 2021-04-29 | End: 2021-09-21

## 2021-04-29 RX ORDER — VANCOMYCIN HYDROCHLORIDE 125 MG/1
125 CAPSULE ORAL EVERY 12 HOURS
Status: DISCONTINUED | OUTPATIENT
Start: 2021-04-29 | End: 2021-04-29

## 2021-04-29 RX ORDER — MONTELUKAST SODIUM 10 MG/1
10 TABLET ORAL NIGHTLY
Qty: 30 TABLET | Refills: 1 | Status: SHIPPED | OUTPATIENT
Start: 2021-04-29 | End: 2023-01-01 | Stop reason: HOSPADM

## 2021-04-29 RX ORDER — VANCOMYCIN HYDROCHLORIDE 125 MG/1
125 CAPSULE ORAL EVERY OTHER DAY
Status: DISCONTINUED | OUTPATIENT
Start: 2021-05-10 | End: 2021-04-29 | Stop reason: HOSPADM

## 2021-04-29 RX ORDER — VANCOMYCIN HYDROCHLORIDE 125 MG/1
125 CAPSULE ORAL EVERY OTHER DAY
Qty: 14 CAPSULE | Refills: 0 | Status: SHIPPED | OUTPATIENT
Start: 2021-05-10 | End: 2021-06-06

## 2021-04-29 RX ORDER — VANCOMYCIN HYDROCHLORIDE 125 MG/1
125 CAPSULE ORAL EVERY 12 HOURS
Status: DISCONTINUED | OUTPATIENT
Start: 2021-04-29 | End: 2021-04-29 | Stop reason: HOSPADM

## 2021-04-29 RX ORDER — LEVOTHYROXINE SODIUM 0.1 MG/1
100 TABLET ORAL DAILY
Qty: 30 TABLET | Refills: 1 | Status: ON HOLD | OUTPATIENT
Start: 2021-04-29 | End: 2022-01-01 | Stop reason: SDUPTHER

## 2021-04-29 RX ORDER — WARFARIN SODIUM 2 MG/1
6 TABLET ORAL EVERY EVENING
Qty: 90 TABLET | Refills: 1 | Status: ON HOLD | OUTPATIENT
Start: 2021-04-29 | End: 2021-10-11

## 2021-04-29 RX ORDER — VANCOMYCIN HYDROCHLORIDE 125 MG/1
CAPSULE ORAL
Qty: 28 CAPSULE | Refills: 0 | Status: SHIPPED | OUTPATIENT
Start: 2021-04-29 | End: 2021-05-24

## 2021-04-29 RX ORDER — MIDODRINE HYDROCHLORIDE 10 MG/1
10 TABLET ORAL 3 TIMES DAILY
Qty: 90 TABLET | Refills: 1 | Status: SHIPPED | OUTPATIENT
Start: 2021-04-29

## 2021-04-29 RX ORDER — LANOLIN ALCOHOL/MO/W.PET/CERES
3 CREAM (GRAM) TOPICAL NIGHTLY
Qty: 30 TABLET | Refills: 1 | Status: ON HOLD | OUTPATIENT
Start: 2021-04-29 | End: 2022-01-01

## 2021-04-29 RX ORDER — VANCOMYCIN HYDROCHLORIDE 125 MG/1
CAPSULE ORAL
Qty: 7 CAPSULE | Refills: 0 | Status: CANCELLED | OUTPATIENT
Start: 2021-04-29

## 2021-04-29 RX ORDER — CALCIUM ACETATE 667 MG/1
1334 CAPSULE ORAL
Qty: 180 CAPSULE | Refills: 0 | Status: ON HOLD | OUTPATIENT
Start: 2021-04-29 | End: 2021-09-21

## 2021-04-29 RX ORDER — SODIUM BICARBONATE 650 MG/1
650 TABLET ORAL EVERY 8 HOURS SCHEDULED
Qty: 90 TABLET | Refills: 1 | Status: ON HOLD | OUTPATIENT
Start: 2021-04-29 | End: 2022-01-01 | Stop reason: SDUPTHER

## 2021-04-29 RX ORDER — VANCOMYCIN HYDROCHLORIDE 125 MG/1
CAPSULE ORAL
Qty: 21 CAPSULE | Refills: 0 | Status: CANCELLED | OUTPATIENT
Start: 2021-04-29

## 2021-04-29 RX ORDER — ESCITALOPRAM OXALATE 20 MG/1
20 TABLET ORAL DAILY
Qty: 30 TABLET | Refills: 1 | Status: SHIPPED | OUTPATIENT
Start: 2021-04-29

## 2021-04-29 RX ADMIN — CALCIUM ACETATE 1334 MG: 667 CAPSULE ORAL at 11:30

## 2021-04-29 RX ADMIN — LEVOTHYROXINE SODIUM 100 MCG: 0.1 TABLET ORAL at 05:36

## 2021-04-29 RX ADMIN — SODIUM CHLORIDE 250 ML: 9 INJECTION, SOLUTION INTRAVENOUS at 04:07

## 2021-04-29 RX ADMIN — Medication 1 TABLET: at 07:46

## 2021-04-29 RX ADMIN — MIDODRINE HYDROCHLORIDE 10 MG: 5 TABLET ORAL at 11:30

## 2021-04-29 RX ADMIN — MONTELUKAST SODIUM 10 MG: 10 TABLET, FILM COATED ORAL at 07:46

## 2021-04-29 RX ADMIN — Medication 500 MG: at 07:50

## 2021-04-29 RX ADMIN — SODIUM BICARBONATE 650 MG: 650 TABLET ORAL at 05:36

## 2021-04-29 RX ADMIN — CALCIUM ACETATE 1334 MG: 667 CAPSULE ORAL at 07:46

## 2021-04-29 RX ADMIN — SODIUM BICARBONATE 650 MG: 650 TABLET ORAL at 15:07

## 2021-04-29 RX ADMIN — LORAZEPAM 0.5 MG: 0.5 TABLET ORAL at 00:30

## 2021-04-29 RX ADMIN — VANCOMYCIN HYDROCHLORIDE 125 MG: 125 CAPSULE ORAL at 07:46

## 2021-04-29 RX ADMIN — SODIUM CHLORIDE 250 ML: 9 INJECTION, SOLUTION INTRAVENOUS at 03:21

## 2021-04-29 RX ADMIN — MIDODRINE HYDROCHLORIDE 10 MG: 5 TABLET ORAL at 05:36

## 2021-05-21 ENCOUNTER — TRANSCRIBE ORDERS (OUTPATIENT)
Dept: ADMINISTRATIVE | Facility: HOSPITAL | Age: 51
End: 2021-05-21

## 2021-05-21 DIAGNOSIS — N18.6 ESRD (END STAGE RENAL DISEASE) (HCC): Primary | ICD-10-CM

## 2021-05-24 ENCOUNTER — LAB (OUTPATIENT)
Dept: LAB | Facility: HOSPITAL | Age: 51
End: 2021-05-24

## 2021-05-24 ENCOUNTER — TRANSCRIBE ORDERS (OUTPATIENT)
Dept: SLEEP MEDICINE | Facility: HOSPITAL | Age: 51
End: 2021-05-24

## 2021-05-24 DIAGNOSIS — Z01.818 OTHER SPECIFIED PRE-OPERATIVE EXAMINATION: Primary | ICD-10-CM

## 2021-05-24 DIAGNOSIS — Z01.818 OTHER SPECIFIED PRE-OPERATIVE EXAMINATION: ICD-10-CM

## 2021-05-24 LAB — SARS-COV-2 ORF1AB RESP QL NAA+PROBE: NOT DETECTED

## 2021-05-24 PROCEDURE — U0005 INFEC AGEN DETEC AMPLI PROBE: HCPCS

## 2021-05-24 PROCEDURE — C9803 HOPD COVID-19 SPEC COLLECT: HCPCS

## 2021-05-24 PROCEDURE — U0004 COV-19 TEST NON-CDC HGH THRU: HCPCS

## 2021-05-26 ENCOUNTER — HOSPITAL ENCOUNTER (OUTPATIENT)
Dept: INTERVENTIONAL RADIOLOGY/VASCULAR | Facility: HOSPITAL | Age: 51
Discharge: HOME | End: 2021-05-26
Admitting: INTERNAL MEDICINE

## 2021-05-26 VITALS
DIASTOLIC BLOOD PRESSURE: 77 MMHG | HEART RATE: 80 BPM | TEMPERATURE: 98.2 F | SYSTOLIC BLOOD PRESSURE: 115 MMHG | OXYGEN SATURATION: 96 % | RESPIRATION RATE: 16 BRPM

## 2021-05-26 DIAGNOSIS — N18.6 ESRD (END STAGE RENAL DISEASE) (HCC): ICD-10-CM

## 2021-05-26 LAB
INR PPP: 2.5 (ref 0.8–1.2)
PROTHROMBIN TIME: 28.8 SECONDS (ref 12.8–15.2)

## 2021-05-26 PROCEDURE — 85610 PROTHROMBIN TIME: CPT

## 2021-05-26 PROCEDURE — 25010000003 LIDOCAINE 1 % SOLUTION: Performed by: INTERNAL MEDICINE

## 2021-05-26 RX ORDER — LIDOCAINE HYDROCHLORIDE 10 MG/ML
20 INJECTION, SOLUTION INFILTRATION; PERINEURAL ONCE
Status: COMPLETED | OUTPATIENT
Start: 2021-05-26 | End: 2021-05-26

## 2021-05-26 RX ADMIN — LIDOCAINE HYDROCHLORIDE 7 ML: 10 INJECTION, SOLUTION INFILTRATION; PERINEURAL at 11:16

## 2021-05-26 NOTE — DISCHARGE INSTRUCTIONS
Tunneled Catheter Removal, Adult, Care After  This sheet gives you information about how to care for yourself after your procedure. Your health care provider may also give you more specific instructions. If you have problems or questions, contact your health care provider.  What can I expect after the procedure?  After your procedure, it is common to have:  · Tenderness or soreness.  · Redness, swelling, or a scab where the line was removed (exit site).  Follow these instructions at home:  For the first 24 hours after the procedure    · Keep the bandage (dressing) on the exit site clean and dry. Do not remove the dressing until your health care provider tells you to do so.  · Check your chest/shoulder often for signs and symptoms of an infection. Check for:  ? A red streak that spreads away from the dressing.  ? Blood or fluid that you can see on the dressing.  ? More redness or swelling.  · Do not lift anything heavy or do activities that require great effort until your health care provider says it is okay. You should avoid:  ? Lifting weights.  ? Yard work.  ? Any physical activity with repetitive arm movement.  · Watch closely for any signs of an air bubble in the vein (air embolism). This is a rare but serious complication. If you have signs of air embolism, call 911 immediately and lie down on your left side to keep the air from moving into the lungs. Signs of an air embolism include:  ? Difficulty breathing.  ? Chest pain.  ? Coughing or wheezing.  ? Skin that is pale, blue, cold, or clammy.  ? Rapid pulse.  ? Rapid breathing.  ? Fainting.  After 24 Hours have passed:  · Remove your dressing as told by your health care provider. Make sure you wash your hands with soap and water before and after you change the dressing. If soap and water are not available, use hand .  · Return to your normal activities as told by your health care provider.  · A small scab may develop over the exit site. Do not pick at  the scab.  · When bathing or showering, gently wash the exit site with soap and water. Pat it dry.  · Watch for signs of infection, such as:  ? Fever or chills.  ? Swollen glands under the arm.  ? More redness, swelling, or soreness in the arm.  ? Blood, fluid, or pus coming from the exit site.  ? Warmth or a bad smell at the exit site.  ? A red streak spreading away from the exit site.  General instructions  · Take over-the-counter and prescription medicines only as told by your health care provider. Do not take any new medicines without checking with your health care provider first.  · If you were prescribed an antibiotic medicine, apply or take it as told by your health care provider. Do not stop using the antibiotic even if your condition improves.  · Keep all follow-up visits as told by your health care provider. This is important.  Contact a health care provider if:  · You have a fever or chills.  · You have soreness, redness, or swelling on your exit site, and it gets worse.  · You have swollen glands under your arm.  · You have any of the following symptoms at your exit site:  ? Blood, fluid, or pus.  ? Unusual warmth.  ? A bad smell.  ? A red streak spreading away from the exit site.  Get help right away if:    · You have signs of an air embolism, such as:  ? Difficulty breathing.  ? Chest pain.  ? Coughing or wheezing.  ? Skin that is pale, blue, cold, or clammy.  ? Rapid pulse.  ? Rapid breathing.  ? Fainting.  These symptoms may represent a serious problem that is an emergency. Do not wait to see if the symptoms will go away. Get medical help right away. Call your local emergency services (911 in the [place] Do not drive yourself to the hospital.  Summary  · After your procedure, it is common to have tenderness or soreness, redness, swelling, or a scab at the exit site.  · Keep the dressing over the exit site clean and dry. Do not remove the dressing until your health care provider tells you to do  so.  · Do not lift anything heavy or do activities that require great effort until your health care provider says it is okay.  · Watch closely for any signs of an air embolism. If you have signs of air embolism, call 911 immediately and lie down on your left side.  This information is not intended to replace advice given to you by your health care provider. Make sure you discuss any questions you have with your health care provider.  Document Revised: 11/30/2018 Document Reviewed: 02/13/2018  Elsevier Patient Education © 2021 [employer] Inc[employer]

## 2021-09-21 ENCOUNTER — HOSPITAL ENCOUNTER (INPATIENT)
Facility: HOSPITAL | Age: 51
LOS: 3 days | Discharge: HOME | End: 2021-09-24
Attending: EMERGENCY MEDICINE | Admitting: INTERNAL MEDICINE

## 2021-09-21 ENCOUNTER — APPOINTMENT (OUTPATIENT)
Dept: CT IMAGING | Facility: HOSPITAL | Age: 51
End: 2021-09-21

## 2021-09-21 ENCOUNTER — APPOINTMENT (OUTPATIENT)
Dept: GENERAL RADIOLOGY | Facility: HOSPITAL | Age: 51
End: 2021-09-21

## 2021-09-21 DIAGNOSIS — D72.829 LEUKOCYTOSIS, UNSPECIFIED TYPE: ICD-10-CM

## 2021-09-21 DIAGNOSIS — K52.9 COLITIS: Primary | ICD-10-CM

## 2021-09-21 DIAGNOSIS — Z99.2 END-STAGE RENAL DISEASE ON PERITONEAL DIALYSIS (HCC): ICD-10-CM

## 2021-09-21 DIAGNOSIS — N18.6 END-STAGE RENAL DISEASE ON PERITONEAL DIALYSIS (HCC): ICD-10-CM

## 2021-09-21 DIAGNOSIS — I95.1 ORTHOSTATIC HYPOTENSION: ICD-10-CM

## 2021-09-21 DIAGNOSIS — E87.6 HYPOKALEMIA: ICD-10-CM

## 2021-09-21 LAB
ALBUMIN SERPL-MCNC: 3.8 G/DL (ref 3.5–5.2)
ALBUMIN/GLOB SERPL: 1.2 G/DL
ALP SERPL-CCNC: 85 U/L (ref 39–117)
ALT SERPL W P-5'-P-CCNC: 15 U/L (ref 1–33)
ANION GAP SERPL CALCULATED.3IONS-SCNC: 18.4 MMOL/L (ref 5–15)
AST SERPL-CCNC: 12 U/L (ref 1–32)
BACTERIA UR QL AUTO: ABNORMAL /HPF
BASOPHILS # BLD AUTO: 0.17 10*3/MM3 (ref 0–0.2)
BASOPHILS NFR BLD AUTO: 0.6 % (ref 0–1.5)
BILIRUB SERPL-MCNC: <0.2 MG/DL (ref 0–1.2)
BILIRUB UR QL STRIP: NEGATIVE
BUN SERPL-MCNC: 32 MG/DL (ref 6–20)
BUN/CREAT SERPL: 7 (ref 7–25)
CALCIUM SPEC-SCNC: 8 MG/DL (ref 8.6–10.5)
CHLORIDE SERPL-SCNC: 105 MMOL/L (ref 98–107)
CLARITY UR: CLEAR
CO2 SERPL-SCNC: 16.6 MMOL/L (ref 22–29)
COLOR UR: YELLOW
CREAT SERPL-MCNC: 4.54 MG/DL (ref 0.57–1)
D-LACTATE SERPL-SCNC: 1.5 MMOL/L (ref 0.5–2)
DEPRECATED RDW RBC AUTO: 43.5 FL (ref 37–54)
EOSINOPHIL # BLD AUTO: 0.18 10*3/MM3 (ref 0–0.4)
EOSINOPHIL NFR BLD AUTO: 0.7 % (ref 0.3–6.2)
ERYTHROCYTE [DISTWIDTH] IN BLOOD BY AUTOMATED COUNT: 12.9 % (ref 12.3–15.4)
GFR SERPL CREATININE-BSD FRML MDRD: 10 ML/MIN/1.73
GFR SERPL CREATININE-BSD FRML MDRD: ABNORMAL ML/MIN/{1.73_M2}
GLOBULIN UR ELPH-MCNC: 3.1 GM/DL
GLUCOSE SERPL-MCNC: 116 MG/DL (ref 65–99)
GLUCOSE UR STRIP-MCNC: NEGATIVE MG/DL
HCT VFR BLD AUTO: 33 % (ref 34–46.6)
HGB BLD-MCNC: 10.9 G/DL (ref 12–15.9)
HGB UR QL STRIP.AUTO: ABNORMAL
HOLD SPECIMEN: NORMAL
HOLD SPECIMEN: NORMAL
HYALINE CASTS UR QL AUTO: ABNORMAL /LPF
IMM GRANULOCYTES # BLD AUTO: 1.25 10*3/MM3 (ref 0–0.05)
IMM GRANULOCYTES NFR BLD AUTO: 4.5 % (ref 0–0.5)
INR PPP: 3.23 (ref 0.9–1.1)
KETONES UR QL STRIP: NEGATIVE
LEUKOCYTE ESTERASE UR QL STRIP.AUTO: ABNORMAL
LYMPHOCYTES # BLD AUTO: 1.53 10*3/MM3 (ref 0.7–3.1)
LYMPHOCYTES NFR BLD AUTO: 5.6 % (ref 19.6–45.3)
MAGNESIUM SERPL-MCNC: 1.3 MG/DL (ref 1.6–2.6)
MCH RBC QN AUTO: 30.5 PG (ref 26.6–33)
MCHC RBC AUTO-ENTMCNC: 33 G/DL (ref 31.5–35.7)
MCV RBC AUTO: 92.4 FL (ref 79–97)
MONOCYTES # BLD AUTO: 1.01 10*3/MM3 (ref 0.1–0.9)
MONOCYTES NFR BLD AUTO: 3.7 % (ref 5–12)
NEUTROPHILS NFR BLD AUTO: 23.38 10*3/MM3 (ref 1.7–7)
NEUTROPHILS NFR BLD AUTO: 84.9 % (ref 42.7–76)
NITRITE UR QL STRIP: NEGATIVE
NRBC BLD AUTO-RTO: 0 /100 WBC (ref 0–0.2)
PH UR STRIP.AUTO: 6 [PH] (ref 5–8)
PLATELET # BLD AUTO: 374 10*3/MM3 (ref 140–450)
PMV BLD AUTO: 9.6 FL (ref 6–12)
POTASSIUM SERPL-SCNC: 2.4 MMOL/L (ref 3.5–5.2)
PROT SERPL-MCNC: 6.9 G/DL (ref 6–8.5)
PROT UR QL STRIP: ABNORMAL
PROTHROMBIN TIME: 32.8 SECONDS (ref 11.7–14.2)
QT INTERVAL: 428 MS
RBC # BLD AUTO: 3.57 10*6/MM3 (ref 3.77–5.28)
RBC # UR: ABNORMAL /HPF
REF LAB TEST METHOD: ABNORMAL
SARS-COV-2 ORF1AB RESP QL NAA+PROBE: NOT DETECTED
SODIUM SERPL-SCNC: 140 MMOL/L (ref 136–145)
SP GR UR STRIP: 1.01 (ref 1–1.03)
SQUAMOUS #/AREA URNS HPF: ABNORMAL /HPF
TROPONIN T SERPL-MCNC: 0.04 NG/ML (ref 0–0.03)
UROBILINOGEN UR QL STRIP: ABNORMAL
WBC # BLD AUTO: 27.52 10*3/MM3 (ref 3.4–10.8)
WBC UR QL AUTO: ABNORMAL /HPF
WHOLE BLOOD HOLD SPECIMEN: NORMAL
WHOLE BLOOD HOLD SPECIMEN: NORMAL

## 2021-09-21 PROCEDURE — 93010 ELECTROCARDIOGRAM REPORT: CPT | Performed by: INTERNAL MEDICINE

## 2021-09-21 PROCEDURE — 71045 X-RAY EXAM CHEST 1 VIEW: CPT

## 2021-09-21 PROCEDURE — 99284 EMERGENCY DEPT VISIT MOD MDM: CPT

## 2021-09-21 PROCEDURE — 83735 ASSAY OF MAGNESIUM: CPT

## 2021-09-21 PROCEDURE — 87040 BLOOD CULTURE FOR BACTERIA: CPT | Performed by: EMERGENCY MEDICINE

## 2021-09-21 PROCEDURE — 83605 ASSAY OF LACTIC ACID: CPT | Performed by: EMERGENCY MEDICINE

## 2021-09-21 PROCEDURE — 80053 COMPREHEN METABOLIC PANEL: CPT

## 2021-09-21 PROCEDURE — 74176 CT ABD & PELVIS W/O CONTRAST: CPT

## 2021-09-21 PROCEDURE — U0004 COV-19 TEST NON-CDC HGH THRU: HCPCS | Performed by: EMERGENCY MEDICINE

## 2021-09-21 PROCEDURE — 84484 ASSAY OF TROPONIN QUANT: CPT

## 2021-09-21 PROCEDURE — 85610 PROTHROMBIN TIME: CPT

## 2021-09-21 PROCEDURE — 25010000002 MAGNESIUM SULFATE 2 GM/50ML SOLUTION: Performed by: INTERNAL MEDICINE

## 2021-09-21 PROCEDURE — 93005 ELECTROCARDIOGRAM TRACING: CPT

## 2021-09-21 PROCEDURE — 81001 URINALYSIS AUTO W/SCOPE: CPT

## 2021-09-21 PROCEDURE — 85025 COMPLETE CBC W/AUTO DIFF WBC: CPT

## 2021-09-21 RX ORDER — MAGNESIUM SULFATE HEPTAHYDRATE 40 MG/ML
2 INJECTION, SOLUTION INTRAVENOUS ONCE
Status: COMPLETED | OUTPATIENT
Start: 2021-09-21 | End: 2021-09-21

## 2021-09-21 RX ORDER — POTASSIUM CHLORIDE 750 MG/1
20 TABLET, FILM COATED, EXTENDED RELEASE ORAL ONCE
Status: COMPLETED | OUTPATIENT
Start: 2021-09-21 | End: 2021-09-21

## 2021-09-21 RX ORDER — LANTHANUM CARBONATE 750 MG/1
2250 TABLET, CHEWABLE ORAL
COMMUNITY
End: 2022-01-01 | Stop reason: ALTCHOICE

## 2021-09-21 RX ORDER — TRAZODONE HYDROCHLORIDE 100 MG/1
50 TABLET ORAL NIGHTLY
COMMUNITY
End: 2023-01-01 | Stop reason: HOSPADM

## 2021-09-21 RX ORDER — FOLIC ACID/VIT B COMPLEX AND C 0.8 MG
1 TABLET ORAL DAILY
Status: DISCONTINUED | OUTPATIENT
Start: 2021-09-21 | End: 2021-09-24 | Stop reason: HOSPADM

## 2021-09-21 RX ORDER — LEVOTHYROXINE SODIUM 0.1 MG/1
100 TABLET ORAL
Status: DISCONTINUED | OUTPATIENT
Start: 2021-09-22 | End: 2021-09-24 | Stop reason: HOSPADM

## 2021-09-21 RX ORDER — ESCITALOPRAM OXALATE 20 MG/1
20 TABLET ORAL DAILY
Status: DISCONTINUED | OUTPATIENT
Start: 2021-09-21 | End: 2021-09-24 | Stop reason: HOSPADM

## 2021-09-21 RX ORDER — GENTAMICIN SULFATE 1 MG/G
1 CREAM TOPICAL DAILY
Status: ON HOLD | COMMUNITY
End: 2022-01-01

## 2021-09-21 RX ORDER — UREA 10 %
3 LOTION (ML) TOPICAL NIGHTLY
Status: DISCONTINUED | OUTPATIENT
Start: 2021-09-21 | End: 2021-09-24 | Stop reason: HOSPADM

## 2021-09-21 RX ORDER — NITROGLYCERIN 0.4 MG/1
0.4 TABLET SUBLINGUAL
Status: DISCONTINUED | OUTPATIENT
Start: 2021-09-21 | End: 2021-09-24 | Stop reason: HOSPADM

## 2021-09-21 RX ORDER — SODIUM CHLORIDE 0.9 % (FLUSH) 0.9 %
10 SYRINGE (ML) INJECTION AS NEEDED
Status: DISCONTINUED | OUTPATIENT
Start: 2021-09-21 | End: 2021-09-23

## 2021-09-21 RX ORDER — POTASSIUM CHLORIDE 750 MG/1
40 TABLET, FILM COATED, EXTENDED RELEASE ORAL AS NEEDED
Status: DISCONTINUED | OUTPATIENT
Start: 2021-09-21 | End: 2021-09-22

## 2021-09-21 RX ORDER — SACCHAROMYCES BOULARDII 250 MG
500 CAPSULE ORAL 2 TIMES DAILY
Status: DISCONTINUED | OUTPATIENT
Start: 2021-09-21 | End: 2021-09-24 | Stop reason: HOSPADM

## 2021-09-21 RX ORDER — MIDODRINE HYDROCHLORIDE 5 MG/1
10 TABLET ORAL EVERY 8 HOURS
Status: DISCONTINUED | OUTPATIENT
Start: 2021-09-21 | End: 2021-09-24 | Stop reason: HOSPADM

## 2021-09-21 RX ORDER — ONDANSETRON 2 MG/ML
4 INJECTION INTRAMUSCULAR; INTRAVENOUS EVERY 6 HOURS PRN
Status: DISCONTINUED | OUTPATIENT
Start: 2021-09-21 | End: 2021-09-24 | Stop reason: HOSPADM

## 2021-09-21 RX ORDER — SODIUM CHLORIDE 0.9 % (FLUSH) 0.9 %
10 SYRINGE (ML) INJECTION EVERY 12 HOURS SCHEDULED
Status: DISCONTINUED | OUTPATIENT
Start: 2021-09-21 | End: 2021-09-24 | Stop reason: HOSPADM

## 2021-09-21 RX ORDER — CALCIUM ACETATE 667 MG/1
1334 CAPSULE ORAL
Status: DISCONTINUED | OUTPATIENT
Start: 2021-09-22 | End: 2021-09-24 | Stop reason: HOSPADM

## 2021-09-21 RX ORDER — SODIUM CHLORIDE 0.9 % (FLUSH) 0.9 %
10 SYRINGE (ML) INJECTION AS NEEDED
Status: DISCONTINUED | OUTPATIENT
Start: 2021-09-21 | End: 2021-09-24 | Stop reason: HOSPADM

## 2021-09-21 RX ORDER — MONTELUKAST SODIUM 10 MG/1
10 TABLET ORAL NIGHTLY
Status: DISCONTINUED | OUTPATIENT
Start: 2021-09-21 | End: 2021-09-24 | Stop reason: HOSPADM

## 2021-09-21 RX ORDER — FAMOTIDINE 20 MG/1
20 TABLET, FILM COATED ORAL DAILY
Status: DISCONTINUED | OUTPATIENT
Start: 2021-09-21 | End: 2021-09-24 | Stop reason: HOSPADM

## 2021-09-21 RX ORDER — SODIUM BICARBONATE 650 MG/1
650 TABLET ORAL EVERY 8 HOURS SCHEDULED
Status: DISCONTINUED | OUTPATIENT
Start: 2021-09-21 | End: 2021-09-24 | Stop reason: HOSPADM

## 2021-09-21 RX ORDER — DIPHENOXYLATE HYDROCHLORIDE AND ATROPINE SULFATE 2.5; .025 MG/1; MG/1
1 TABLET ORAL DAILY
Status: DISCONTINUED | OUTPATIENT
Start: 2021-09-21 | End: 2021-09-24 | Stop reason: HOSPADM

## 2021-09-21 RX ADMIN — Medication 500 MG: at 21:33

## 2021-09-21 RX ADMIN — MAGNESIUM SULFATE HEPTAHYDRATE 2 G: 2 INJECTION, SOLUTION INTRAVENOUS at 16:05

## 2021-09-21 RX ADMIN — METRONIDAZOLE 500 MG: 500 INJECTION, SOLUTION INTRAVENOUS at 15:00

## 2021-09-21 RX ADMIN — SODIUM CHLORIDE 500 ML: 9 INJECTION, SOLUTION INTRAVENOUS at 13:00

## 2021-09-21 RX ADMIN — POTASSIUM CHLORIDE 20 MEQ: 750 TABLET, EXTENDED RELEASE ORAL at 16:07

## 2021-09-21 RX ADMIN — MONTELUKAST SODIUM 10 MG: 10 TABLET, FILM COATED ORAL at 21:33

## 2021-09-21 RX ADMIN — Medication 1 TABLET: at 21:39

## 2021-09-21 RX ADMIN — FAMOTIDINE 20 MG: 20 TABLET, FILM COATED ORAL at 21:34

## 2021-09-21 RX ADMIN — SODIUM CHLORIDE, PRESERVATIVE FREE 10 ML: 5 INJECTION INTRAVENOUS at 21:32

## 2021-09-21 RX ADMIN — MIDODRINE HYDROCHLORIDE 10 MG: 5 TABLET ORAL at 21:33

## 2021-09-21 RX ADMIN — SODIUM CHLORIDE 2 G: 900 INJECTION INTRAVENOUS at 14:20

## 2021-09-21 RX ADMIN — SODIUM BICARBONATE 650 MG: 650 TABLET ORAL at 21:33

## 2021-09-21 RX ADMIN — METRONIDAZOLE 500 MG: 500 INJECTION, SOLUTION INTRAVENOUS at 21:32

## 2021-09-21 RX ADMIN — Medication 3 MG: at 21:34

## 2021-09-21 RX ADMIN — Medication 1 TABLET: at 21:34

## 2021-09-21 RX ADMIN — POTASSIUM CHLORIDE 40 MEQ: 750 TABLET, EXTENDED RELEASE ORAL at 14:20

## 2021-09-22 LAB
ADV 40+41 DNA STL QL NAA+NON-PROBE: NOT DETECTED
ALBUMIN SERPL-MCNC: 3.1 G/DL (ref 3.5–5.2)
ALBUMIN SERPL-MCNC: 3.8 G/DL (ref 3.5–5.2)
ALBUMIN/GLOB SERPL: 1.1 G/DL
ALBUMIN/GLOB SERPL: 1.1 G/DL
ALP SERPL-CCNC: 73 U/L (ref 39–117)
ALP SERPL-CCNC: 90 U/L (ref 39–117)
ALT SERPL W P-5'-P-CCNC: 14 U/L (ref 1–33)
ALT SERPL W P-5'-P-CCNC: 20 U/L (ref 1–33)
ANION GAP SERPL CALCULATED.3IONS-SCNC: 15.9 MMOL/L (ref 5–15)
ANION GAP SERPL CALCULATED.3IONS-SCNC: 17.4 MMOL/L (ref 5–15)
AST SERPL-CCNC: 11 U/L (ref 1–32)
AST SERPL-CCNC: 12 U/L (ref 1–32)
ASTRO TYP 1-8 RNA STL QL NAA+NON-PROBE: NOT DETECTED
BASOPHILS # BLD AUTO: 0.11 10*3/MM3 (ref 0–0.2)
BASOPHILS NFR BLD AUTO: 0.6 % (ref 0–1.5)
BILIRUB SERPL-MCNC: <0.2 MG/DL (ref 0–1.2)
BILIRUB SERPL-MCNC: <0.2 MG/DL (ref 0–1.2)
BUN SERPL-MCNC: 35 MG/DL (ref 6–20)
BUN SERPL-MCNC: 37 MG/DL (ref 6–20)
BUN/CREAT SERPL: 7.1 (ref 7–25)
BUN/CREAT SERPL: 7.8 (ref 7–25)
C CAYETANENSIS DNA STL QL NAA+NON-PROBE: NOT DETECTED
C COLI+JEJ+UPSA DNA STL QL NAA+NON-PROBE: NOT DETECTED
C DIFF TOX GENS STL QL NAA+PROBE: NEGATIVE
CALCIUM SPEC-SCNC: 7.9 MG/DL (ref 8.6–10.5)
CALCIUM SPEC-SCNC: 8.5 MG/DL (ref 8.6–10.5)
CHLORIDE SERPL-SCNC: 103 MMOL/L (ref 98–107)
CHLORIDE SERPL-SCNC: 103 MMOL/L (ref 98–107)
CO2 SERPL-SCNC: 14.6 MMOL/L (ref 22–29)
CO2 SERPL-SCNC: 17.1 MMOL/L (ref 22–29)
CREAT SERPL-MCNC: 4.77 MG/DL (ref 0.57–1)
CREAT SERPL-MCNC: 4.96 MG/DL (ref 0.57–1)
CRYPTOSP DNA STL QL NAA+NON-PROBE: NOT DETECTED
D-LACTATE SERPL-SCNC: 1.2 MMOL/L (ref 0.5–2)
DEPRECATED RDW RBC AUTO: 45 FL (ref 37–54)
DEPRECATED RDW RBC AUTO: 45.2 FL (ref 37–54)
E HISTOLYT DNA STL QL NAA+NON-PROBE: NOT DETECTED
EAEC PAA PLAS AGGR+AATA ST NAA+NON-PRB: NOT DETECTED
EC STX1+STX2 GENES STL QL NAA+NON-PROBE: NOT DETECTED
EOSINOPHIL # BLD AUTO: 0.33 10*3/MM3 (ref 0–0.4)
EOSINOPHIL NFR BLD AUTO: 1.7 % (ref 0.3–6.2)
EPEC EAE GENE STL QL NAA+NON-PROBE: NOT DETECTED
ERYTHROCYTE [DISTWIDTH] IN BLOOD BY AUTOMATED COUNT: 12.8 % (ref 12.3–15.4)
ERYTHROCYTE [DISTWIDTH] IN BLOOD BY AUTOMATED COUNT: 13 % (ref 12.3–15.4)
ETEC LTA+ST1A+ST1B TOX ST NAA+NON-PROBE: NOT DETECTED
G LAMBLIA DNA STL QL NAA+NON-PROBE: NOT DETECTED
GFR SERPL CREATININE-BSD FRML MDRD: 10 ML/MIN/1.73
GFR SERPL CREATININE-BSD FRML MDRD: 9 ML/MIN/1.73
GFR SERPL CREATININE-BSD FRML MDRD: ABNORMAL ML/MIN/{1.73_M2}
GFR SERPL CREATININE-BSD FRML MDRD: ABNORMAL ML/MIN/{1.73_M2}
GLOBULIN UR ELPH-MCNC: 2.9 GM/DL
GLOBULIN UR ELPH-MCNC: 3.4 GM/DL
GLUCOSE SERPL-MCNC: 101 MG/DL (ref 65–99)
GLUCOSE SERPL-MCNC: 123 MG/DL (ref 65–99)
HCT VFR BLD AUTO: 29.5 % (ref 34–46.6)
HCT VFR BLD AUTO: 33.7 % (ref 34–46.6)
HGB BLD-MCNC: 10.9 G/DL (ref 12–15.9)
HGB BLD-MCNC: 9.3 G/DL (ref 12–15.9)
IMM GRANULOCYTES # BLD AUTO: 0.85 10*3/MM3 (ref 0–0.05)
IMM GRANULOCYTES NFR BLD AUTO: 4.5 % (ref 0–0.5)
INR PPP: 2.1 (ref 0.9–1.1)
INR PPP: 2.74 (ref 0.9–1.1)
INR PPP: 3.15 (ref 0.9–1.1)
LYMPHOCYTES # BLD AUTO: 2.93 10*3/MM3 (ref 0.7–3.1)
LYMPHOCYTES NFR BLD AUTO: 15.4 % (ref 19.6–45.3)
MAGNESIUM SERPL-MCNC: 3.4 MG/DL (ref 1.6–2.6)
MCH RBC QN AUTO: 30.2 PG (ref 26.6–33)
MCH RBC QN AUTO: 30.5 PG (ref 26.6–33)
MCHC RBC AUTO-ENTMCNC: 31.5 G/DL (ref 31.5–35.7)
MCHC RBC AUTO-ENTMCNC: 32.3 G/DL (ref 31.5–35.7)
MCV RBC AUTO: 94.4 FL (ref 79–97)
MCV RBC AUTO: 95.8 FL (ref 79–97)
MONOCYTES # BLD AUTO: 0.67 10*3/MM3 (ref 0.1–0.9)
MONOCYTES NFR BLD AUTO: 3.5 % (ref 5–12)
NEUTROPHILS NFR BLD AUTO: 14.15 10*3/MM3 (ref 1.7–7)
NEUTROPHILS NFR BLD AUTO: 74.3 % (ref 42.7–76)
NOROVIRUS GI+II RNA STL QL NAA+NON-PROBE: NOT DETECTED
NRBC BLD AUTO-RTO: 0 /100 WBC (ref 0–0.2)
P SHIGELLOIDES DNA STL QL NAA+NON-PROBE: NOT DETECTED
PLATELET # BLD AUTO: 319 10*3/MM3 (ref 140–450)
PLATELET # BLD AUTO: 413 10*3/MM3 (ref 140–450)
PMV BLD AUTO: 9.4 FL (ref 6–12)
PMV BLD AUTO: 9.8 FL (ref 6–12)
POTASSIUM SERPL-SCNC: 2.4 MMOL/L (ref 3.5–5.2)
POTASSIUM SERPL-SCNC: 2.9 MMOL/L (ref 3.5–5.2)
POTASSIUM SERPL-SCNC: 2.9 MMOL/L (ref 3.5–5.2)
PROT SERPL-MCNC: 6 G/DL (ref 6–8.5)
PROT SERPL-MCNC: 7.2 G/DL (ref 6–8.5)
PROTHROMBIN TIME: 23.4 SECONDS (ref 11.7–14.2)
PROTHROMBIN TIME: 28.8 SECONDS (ref 11.7–14.2)
PROTHROMBIN TIME: 32.1 SECONDS (ref 11.7–14.2)
RBC # BLD AUTO: 3.08 10*6/MM3 (ref 3.77–5.28)
RBC # BLD AUTO: 3.57 10*6/MM3 (ref 3.77–5.28)
RVA RNA STL QL NAA+NON-PROBE: NOT DETECTED
S ENT+BONG DNA STL QL NAA+NON-PROBE: NOT DETECTED
SAPO I+II+IV+V RNA STL QL NAA+NON-PROBE: NOT DETECTED
SHIGELLA SP+EIEC IPAH ST NAA+NON-PROBE: NOT DETECTED
SODIUM SERPL-SCNC: 135 MMOL/L (ref 136–145)
SODIUM SERPL-SCNC: 136 MMOL/L (ref 136–145)
V CHOL+PARA+VUL DNA STL QL NAA+NON-PROBE: NOT DETECTED
V CHOLERAE DNA STL QL NAA+NON-PROBE: NOT DETECTED
WBC # BLD AUTO: 19.04 10*3/MM3 (ref 3.4–10.8)
WBC # BLD AUTO: 19.45 10*3/MM3 (ref 3.4–10.8)
Y ENTEROCOL DNA STL QL NAA+NON-PROBE: NOT DETECTED

## 2021-09-22 PROCEDURE — 25010000003 MAGNESIUM SULFATE 4 GM/100ML SOLUTION: Performed by: HOSPITALIST

## 2021-09-22 PROCEDURE — 83735 ASSAY OF MAGNESIUM: CPT | Performed by: HOSPITALIST

## 2021-09-22 PROCEDURE — 80053 COMPREHEN METABOLIC PANEL: CPT | Performed by: INTERNAL MEDICINE

## 2021-09-22 PROCEDURE — 85025 COMPLETE CBC W/AUTO DIFF WBC: CPT | Performed by: INTERNAL MEDICINE

## 2021-09-22 PROCEDURE — 83605 ASSAY OF LACTIC ACID: CPT | Performed by: INTERNAL MEDICINE

## 2021-09-22 PROCEDURE — 0097U HC BIOFIRE FILMARRAY GI PANEL: CPT | Performed by: HOSPITALIST

## 2021-09-22 PROCEDURE — 84132 ASSAY OF SERUM POTASSIUM: CPT | Performed by: INTERNAL MEDICINE

## 2021-09-22 PROCEDURE — 80053 COMPREHEN METABOLIC PANEL: CPT | Performed by: HOSPITALIST

## 2021-09-22 PROCEDURE — 85610 PROTHROMBIN TIME: CPT | Performed by: INTERNAL MEDICINE

## 2021-09-22 PROCEDURE — 85027 COMPLETE CBC AUTOMATED: CPT | Performed by: HOSPITALIST

## 2021-09-22 PROCEDURE — 85610 PROTHROMBIN TIME: CPT | Performed by: HOSPITALIST

## 2021-09-22 PROCEDURE — 99221 1ST HOSP IP/OBS SF/LOW 40: CPT | Performed by: INTERNAL MEDICINE

## 2021-09-22 PROCEDURE — 87493 C DIFF AMPLIFIED PROBE: CPT | Performed by: INTERNAL MEDICINE

## 2021-09-22 RX ORDER — POTASSIUM CHLORIDE 750 MG/1
40 TABLET, FILM COATED, EXTENDED RELEASE ORAL AS NEEDED
Status: DISCONTINUED | OUTPATIENT
Start: 2021-09-22 | End: 2021-09-22

## 2021-09-22 RX ORDER — TRAZODONE HYDROCHLORIDE 100 MG/1
100 TABLET ORAL NIGHTLY
Status: DISCONTINUED | OUTPATIENT
Start: 2021-09-22 | End: 2021-09-24 | Stop reason: HOSPADM

## 2021-09-22 RX ORDER — ACETAMINOPHEN 325 MG/1
650 TABLET ORAL EVERY 6 HOURS PRN
Status: DISCONTINUED | OUTPATIENT
Start: 2021-09-22 | End: 2021-09-24 | Stop reason: HOSPADM

## 2021-09-22 RX ORDER — POTASSIUM CHLORIDE 7.45 MG/ML
10 INJECTION INTRAVENOUS
Status: DISCONTINUED | OUTPATIENT
Start: 2021-09-22 | End: 2021-09-22

## 2021-09-22 RX ORDER — MAGNESIUM SULFATE HEPTAHYDRATE 40 MG/ML
4 INJECTION, SOLUTION INTRAVENOUS ONCE
Status: COMPLETED | OUTPATIENT
Start: 2021-09-22 | End: 2021-09-22

## 2021-09-22 RX ORDER — MAGNESIUM SULFATE HEPTAHYDRATE 40 MG/ML
4 INJECTION, SOLUTION INTRAVENOUS AS NEEDED
Status: DISCONTINUED | OUTPATIENT
Start: 2021-09-22 | End: 2021-09-22

## 2021-09-22 RX ORDER — MAGNESIUM SULFATE HEPTAHYDRATE 40 MG/ML
2 INJECTION, SOLUTION INTRAVENOUS AS NEEDED
Status: DISCONTINUED | OUTPATIENT
Start: 2021-09-22 | End: 2021-09-22

## 2021-09-22 RX ORDER — MAGNESIUM SULFATE 1 G/100ML
1 INJECTION INTRAVENOUS AS NEEDED
Status: DISCONTINUED | OUTPATIENT
Start: 2021-09-22 | End: 2021-09-22

## 2021-09-22 RX ORDER — POTASSIUM CHLORIDE 750 MG/1
40 TABLET, FILM COATED, EXTENDED RELEASE ORAL ONCE
Status: COMPLETED | OUTPATIENT
Start: 2021-09-22 | End: 2021-09-22

## 2021-09-22 RX ORDER — MAGNESIUM SULFATE HEPTAHYDRATE 40 MG/ML
4 INJECTION, SOLUTION INTRAVENOUS ONCE
Status: DISCONTINUED | OUTPATIENT
Start: 2021-09-22 | End: 2021-09-22

## 2021-09-22 RX ORDER — POTASSIUM CHLORIDE 1.5 G/1.77G
40 POWDER, FOR SOLUTION ORAL AS NEEDED
Status: DISCONTINUED | OUTPATIENT
Start: 2021-09-22 | End: 2021-09-22

## 2021-09-22 RX ORDER — UREA 10 %
3 LOTION (ML) TOPICAL NIGHTLY PRN
Status: DISCONTINUED | OUTPATIENT
Start: 2021-09-22 | End: 2021-09-24 | Stop reason: HOSPADM

## 2021-09-22 RX ADMIN — CALCIUM ACETATE 1334 MG: 667 CAPSULE ORAL at 11:52

## 2021-09-22 RX ADMIN — MIDODRINE HYDROCHLORIDE 10 MG: 5 TABLET ORAL at 21:30

## 2021-09-22 RX ADMIN — MIDODRINE HYDROCHLORIDE 10 MG: 5 TABLET ORAL at 15:02

## 2021-09-22 RX ADMIN — CALCIUM ACETATE 1334 MG: 667 CAPSULE ORAL at 17:28

## 2021-09-22 RX ADMIN — METRONIDAZOLE 500 MG: 500 INJECTION, SOLUTION INTRAVENOUS at 15:02

## 2021-09-22 RX ADMIN — Medication 500 MG: at 21:29

## 2021-09-22 RX ADMIN — SODIUM BICARBONATE 650 MG: 650 TABLET ORAL at 15:02

## 2021-09-22 RX ADMIN — METRONIDAZOLE 500 MG: 500 INJECTION, SOLUTION INTRAVENOUS at 21:31

## 2021-09-22 RX ADMIN — SODIUM BICARBONATE 650 MG: 650 TABLET ORAL at 21:29

## 2021-09-22 RX ADMIN — FAMOTIDINE 20 MG: 20 TABLET, FILM COATED ORAL at 09:13

## 2021-09-22 RX ADMIN — Medication 3 MG: at 21:29

## 2021-09-22 RX ADMIN — LEVOTHYROXINE SODIUM 100 MCG: 0.1 TABLET ORAL at 06:18

## 2021-09-22 RX ADMIN — MIDODRINE HYDROCHLORIDE 10 MG: 5 TABLET ORAL at 06:18

## 2021-09-22 RX ADMIN — SODIUM CHLORIDE, PRESERVATIVE FREE 10 ML: 5 INJECTION INTRAVENOUS at 21:40

## 2021-09-22 RX ADMIN — MONTELUKAST SODIUM 10 MG: 10 TABLET, FILM COATED ORAL at 21:29

## 2021-09-22 RX ADMIN — SODIUM CHLORIDE, PRESERVATIVE FREE 10 ML: 5 INJECTION INTRAVENOUS at 09:14

## 2021-09-22 RX ADMIN — POTASSIUM CHLORIDE 40 MEQ: 750 TABLET, EXTENDED RELEASE ORAL at 11:15

## 2021-09-22 RX ADMIN — POTASSIUM CHLORIDE 40 MEQ: 750 TABLET, EXTENDED RELEASE ORAL at 09:13

## 2021-09-22 RX ADMIN — CALCIUM ACETATE 1334 MG: 667 CAPSULE ORAL at 09:13

## 2021-09-22 RX ADMIN — Medication 500 MG: at 09:12

## 2021-09-22 RX ADMIN — MAGNESIUM SULFATE HEPTAHYDRATE 4 G: 4 INJECTION, SOLUTION INTRAVENOUS at 11:51

## 2021-09-22 RX ADMIN — METRONIDAZOLE 500 MG: 500 INJECTION, SOLUTION INTRAVENOUS at 09:13

## 2021-09-22 RX ADMIN — SODIUM BICARBONATE 650 MG: 650 TABLET ORAL at 06:18

## 2021-09-22 RX ADMIN — TRAZODONE HYDROCHLORIDE 100 MG: 100 TABLET ORAL at 21:29

## 2021-09-23 ENCOUNTER — APPOINTMENT (OUTPATIENT)
Dept: CARDIOLOGY | Facility: HOSPITAL | Age: 51
End: 2021-09-23

## 2021-09-23 LAB
BH CV VAS MEAS BASILIC ANTECUBITAL FOSSA LEFT: 0.34 CM
BH CV VAS MEAS BASILIC ANTECUBITAL FOSSA RIGHT: 0.37 CM
BH CV VAS MEAS BASILIC FOREARM LEFT - DIST: 0.17 CM
BH CV VAS MEAS BASILIC FOREARM LEFT - MID: 0.17 CM
BH CV VAS MEAS BASILIC FOREARM LEFT - PROX: 0.26 CM
BH CV VAS MEAS BASILIC FOREARM RIGHT - DIST: 0.12 CM
BH CV VAS MEAS BASILIC FOREARM RIGHT - MID: 0.15 CM
BH CV VAS MEAS BASILIC FOREARM RIGHT - PROX: 0.2 CM
BH CV VAS MEAS BASILIC UPPER ARM LEFT - DIST: 0.46 CM
BH CV VAS MEAS BASILIC UPPER ARM LEFT - MID: 0.63 CM
BH CV VAS MEAS BASILIC UPPER ARM RIGHT - DIST: 0.43 CM
BH CV VAS MEAS BASILIC UPPER ARM RIGHT - MID: 0.46 CM
BH CV VAS MEAS CEPHALIC ANTECUBITAL FOSSA LEFT: 0.1 CM
BH CV VAS MEAS CEPHALIC ANTECUBITAL FOSSA RIGHT: 0.05 CM
BH CV VAS MEAS CEPHALIC FOREARM LEFT - DIST: 0.38 CM
BH CV VAS MEAS CEPHALIC FOREARM LEFT - MID: 0.36 CM
BH CV VAS MEAS CEPHALIC FOREARM LEFT - PROX: 0.47 CM
BH CV VAS MEAS CEPHALIC FOREARM RIGHT - DIST: 0.3 CM
BH CV VAS MEAS CEPHALIC FOREARM RIGHT - MID: 0.47 CM
BH CV VAS MEAS CEPHALIC FOREARM RIGHT - PROX: 0.56 CM
BH CV VAS MEAS CEPHALIC UPPER ARM RIGHT - DIST: 0.06 CM
BH CV VAS MEAS CEPHALIC UPPER ARM RIGHT - MID: 0.08 CM
BH CV VAS MEAS CEPHALIC UPPER ARM RIGHT - PROX: 0.19 CM
BH CV VAS MEAS RADIAL UPPER ARM LEFT - DIST: 0.24 CM
BH CV VAS MEAS RADIAL UPPER ARM LEFT - MID: 0.25 CM
BH CV VAS MEAS RADIAL UPPER ARM LEFT - PROX: 0.25 CM
BH CV VAS MEAS RADIAL UPPER ARM RIGHT - DIST: 0.23 CM
BH CV VAS MEAS RADIAL UPPER ARM RIGHT - MID: 0.26 CM
BH CV VAS MEAS RADIAL UPPER ARM RIGHT - PROX: 0.26 CM
POTASSIUM SERPL-SCNC: 2.8 MMOL/L (ref 3.5–5.2)
POTASSIUM SERPL-SCNC: 3.5 MMOL/L (ref 3.5–5.2)
UPPER ARTERIAL LEFT ARM BRACHIAL LENGTH: 0.34 CM
UPPER ARTERIAL RIGHT ARM BRACHIAL LENGTH: 0.43 CM

## 2021-09-23 PROCEDURE — 99232 SBSQ HOSP IP/OBS MODERATE 35: CPT | Performed by: NURSE PRACTITIONER

## 2021-09-23 PROCEDURE — 84132 ASSAY OF SERUM POTASSIUM: CPT | Performed by: INTERNAL MEDICINE

## 2021-09-23 PROCEDURE — 84132 ASSAY OF SERUM POTASSIUM: CPT | Performed by: HOSPITALIST

## 2021-09-23 PROCEDURE — 93985 DUP-SCAN HEMO COMPL BI STD: CPT

## 2021-09-23 RX ORDER — WARFARIN SODIUM 4 MG/1
4 TABLET ORAL
Status: COMPLETED | OUTPATIENT
Start: 2021-09-23 | End: 2021-09-23

## 2021-09-23 RX ORDER — SODIUM CHLORIDE, SODIUM LACTATE, CALCIUM CHLORIDE, MAGNESIUM CHLORIDE AND DEXTROSE 1.5; 538; 448; 18.3; 5.08 G/100ML; MG/100ML; MG/100ML; MG/100ML; MG/100ML
2000 INJECTION, SOLUTION INTRAPERITONEAL AS NEEDED
Status: DISCONTINUED | OUTPATIENT
Start: 2021-09-23 | End: 2021-09-24 | Stop reason: HOSPADM

## 2021-09-23 RX ORDER — POTASSIUM CHLORIDE 750 MG/1
120 TABLET, FILM COATED, EXTENDED RELEASE ORAL ONCE
Status: COMPLETED | OUTPATIENT
Start: 2021-09-23 | End: 2021-09-23

## 2021-09-23 RX ADMIN — Medication 1 TABLET: at 08:34

## 2021-09-23 RX ADMIN — MIDODRINE HYDROCHLORIDE 10 MG: 5 TABLET ORAL at 13:44

## 2021-09-23 RX ADMIN — Medication 500 MG: at 22:05

## 2021-09-23 RX ADMIN — MIDODRINE HYDROCHLORIDE 10 MG: 5 TABLET ORAL at 05:29

## 2021-09-23 RX ADMIN — CALCIUM ACETATE 1334 MG: 667 CAPSULE ORAL at 19:06

## 2021-09-23 RX ADMIN — MONTELUKAST SODIUM 10 MG: 10 TABLET, FILM COATED ORAL at 22:05

## 2021-09-23 RX ADMIN — CALCIUM ACETATE 1334 MG: 667 CAPSULE ORAL at 08:34

## 2021-09-23 RX ADMIN — LEVOTHYROXINE SODIUM 100 MCG: 0.1 TABLET ORAL at 05:29

## 2021-09-23 RX ADMIN — Medication 3 MG: at 22:05

## 2021-09-23 RX ADMIN — MIDODRINE HYDROCHLORIDE 10 MG: 5 TABLET ORAL at 22:08

## 2021-09-23 RX ADMIN — ESCITALOPRAM 20 MG: 20 TABLET, FILM COATED ORAL at 08:34

## 2021-09-23 RX ADMIN — WARFARIN 4 MG: 4 TABLET ORAL at 19:07

## 2021-09-23 RX ADMIN — FAMOTIDINE 20 MG: 20 TABLET, FILM COATED ORAL at 08:34

## 2021-09-23 RX ADMIN — METRONIDAZOLE 500 MG: 500 INJECTION, SOLUTION INTRAVENOUS at 13:44

## 2021-09-23 RX ADMIN — CALCIUM ACETATE 1334 MG: 667 CAPSULE ORAL at 12:31

## 2021-09-23 RX ADMIN — SODIUM CHLORIDE, PRESERVATIVE FREE 10 ML: 5 INJECTION INTRAVENOUS at 22:07

## 2021-09-23 RX ADMIN — ACETAMINOPHEN 650 MG: 325 TABLET, FILM COATED ORAL at 22:25

## 2021-09-23 RX ADMIN — SODIUM BICARBONATE 650 MG: 650 TABLET ORAL at 23:30

## 2021-09-23 RX ADMIN — POTASSIUM CHLORIDE 120 MEQ: 750 TABLET, EXTENDED RELEASE ORAL at 10:30

## 2021-09-23 RX ADMIN — Medication 500 MG: at 08:34

## 2021-09-23 RX ADMIN — SODIUM BICARBONATE 650 MG: 650 TABLET ORAL at 05:29

## 2021-09-23 RX ADMIN — SODIUM BICARBONATE 650 MG: 650 TABLET ORAL at 13:44

## 2021-09-23 RX ADMIN — METRONIDAZOLE 500 MG: 500 INJECTION, SOLUTION INTRAVENOUS at 22:07

## 2021-09-23 RX ADMIN — METRONIDAZOLE 500 MG: 500 INJECTION, SOLUTION INTRAVENOUS at 08:35

## 2021-09-23 RX ADMIN — SODIUM CHLORIDE, PRESERVATIVE FREE 10 ML: 5 INJECTION INTRAVENOUS at 08:35

## 2021-09-23 RX ADMIN — TRAZODONE HYDROCHLORIDE 100 MG: 100 TABLET ORAL at 22:05

## 2021-09-24 ENCOUNTER — READMISSION MANAGEMENT (OUTPATIENT)
Dept: CALL CENTER | Facility: HOSPITAL | Age: 51
End: 2021-09-24

## 2021-09-24 VITALS
HEIGHT: 61 IN | HEART RATE: 82 BPM | BODY MASS INDEX: 32.12 KG/M2 | DIASTOLIC BLOOD PRESSURE: 55 MMHG | OXYGEN SATURATION: 98 % | SYSTOLIC BLOOD PRESSURE: 91 MMHG | TEMPERATURE: 97.8 F | WEIGHT: 170.1 LBS | RESPIRATION RATE: 18 BRPM

## 2021-09-24 LAB
ALBUMIN SERPL-MCNC: 3.6 G/DL (ref 3.5–5.2)
ALBUMIN/GLOB SERPL: 1.2 G/DL
ALP SERPL-CCNC: 75 U/L (ref 39–117)
ALT SERPL W P-5'-P-CCNC: 16 U/L (ref 1–33)
ANION GAP SERPL CALCULATED.3IONS-SCNC: 18.3 MMOL/L (ref 5–15)
AST SERPL-CCNC: 10 U/L (ref 1–32)
BILIRUB SERPL-MCNC: <0.2 MG/DL (ref 0–1.2)
BUN SERPL-MCNC: 30 MG/DL (ref 6–20)
BUN/CREAT SERPL: 6.8 (ref 7–25)
CALCIUM SPEC-SCNC: 8.7 MG/DL (ref 8.6–10.5)
CHLORIDE SERPL-SCNC: 104 MMOL/L (ref 98–107)
CO2 SERPL-SCNC: 15.7 MMOL/L (ref 22–29)
CREAT SERPL-MCNC: 4.42 MG/DL (ref 0.57–1)
DEPRECATED RDW RBC AUTO: 42.8 FL (ref 37–54)
EOSINOPHIL # BLD MANUAL: 0.41 10*3/MM3 (ref 0–0.4)
EOSINOPHIL NFR BLD MANUAL: 4 % (ref 0.3–6.2)
ERYTHROCYTE [DISTWIDTH] IN BLOOD BY AUTOMATED COUNT: 12.5 % (ref 12.3–15.4)
GFR SERPL CREATININE-BSD FRML MDRD: 11 ML/MIN/1.73
GFR SERPL CREATININE-BSD FRML MDRD: ABNORMAL ML/MIN/{1.73_M2}
GLOBULIN UR ELPH-MCNC: 3.1 GM/DL
GLUCOSE SERPL-MCNC: 231 MG/DL (ref 65–99)
HCT VFR BLD AUTO: 30.4 % (ref 34–46.6)
HGB BLD-MCNC: 9.8 G/DL (ref 12–15.9)
INR PPP: 1.7 (ref 0.9–1.1)
LYMPHOCYTES # BLD MANUAL: 1.43 10*3/MM3 (ref 0.7–3.1)
LYMPHOCYTES NFR BLD MANUAL: 14 % (ref 19.6–45.3)
LYMPHOCYTES NFR BLD MANUAL: 2 % (ref 5–12)
MCH RBC QN AUTO: 30.1 PG (ref 26.6–33)
MCHC RBC AUTO-ENTMCNC: 32.2 G/DL (ref 31.5–35.7)
MCV RBC AUTO: 93.3 FL (ref 79–97)
MONOCYTES # BLD AUTO: 0.2 10*3/MM3 (ref 0.1–0.9)
NEUTROPHILS # BLD AUTO: 8.17 10*3/MM3 (ref 1.7–7)
NEUTROPHILS NFR BLD MANUAL: 80 % (ref 42.7–76)
PLAT MORPH BLD: NORMAL
PLATELET # BLD AUTO: 368 10*3/MM3 (ref 140–450)
PMV BLD AUTO: 9.7 FL (ref 6–12)
POTASSIUM SERPL-SCNC: 3.2 MMOL/L (ref 3.5–5.2)
PROT SERPL-MCNC: 6.7 G/DL (ref 6–8.5)
PROTHROMBIN TIME: 19.7 SECONDS (ref 11.7–14.2)
RBC # BLD AUTO: 3.26 10*6/MM3 (ref 3.77–5.28)
RBC MORPH BLD: NORMAL
SODIUM SERPL-SCNC: 138 MMOL/L (ref 136–145)
WBC # BLD AUTO: 10.21 10*3/MM3 (ref 3.4–10.8)
WBC MORPH BLD: NORMAL

## 2021-09-24 PROCEDURE — 85007 BL SMEAR W/DIFF WBC COUNT: CPT | Performed by: INTERNAL MEDICINE

## 2021-09-24 PROCEDURE — 85025 COMPLETE CBC W/AUTO DIFF WBC: CPT | Performed by: INTERNAL MEDICINE

## 2021-09-24 PROCEDURE — 85610 PROTHROMBIN TIME: CPT | Performed by: HOSPITALIST

## 2021-09-24 PROCEDURE — 80053 COMPREHEN METABOLIC PANEL: CPT | Performed by: INTERNAL MEDICINE

## 2021-09-24 RX ORDER — WARFARIN SODIUM 6 MG/1
6 TABLET ORAL
Status: DISCONTINUED | OUTPATIENT
Start: 2021-09-24 | End: 2021-09-24 | Stop reason: HOSPADM

## 2021-09-24 RX ORDER — METRONIDAZOLE 500 MG/1
500 TABLET ORAL EVERY 8 HOURS SCHEDULED
Status: DISCONTINUED | OUTPATIENT
Start: 2021-09-24 | End: 2021-09-24 | Stop reason: HOSPADM

## 2021-09-24 RX ORDER — METRONIDAZOLE 500 MG/1
500 TABLET ORAL EVERY 8 HOURS SCHEDULED
Qty: 9 TABLET | Refills: 0 | Status: SHIPPED | OUTPATIENT
Start: 2021-09-24 | End: 2021-09-27

## 2021-09-24 RX ORDER — POTASSIUM CHLORIDE 750 MG/1
40 TABLET, FILM COATED, EXTENDED RELEASE ORAL ONCE
Status: COMPLETED | OUTPATIENT
Start: 2021-09-24 | End: 2021-09-24

## 2021-09-24 RX ADMIN — FAMOTIDINE 20 MG: 20 TABLET, FILM COATED ORAL at 08:51

## 2021-09-24 RX ADMIN — ESCITALOPRAM 20 MG: 20 TABLET, FILM COATED ORAL at 08:51

## 2021-09-24 RX ADMIN — CALCIUM ACETATE 1334 MG: 667 CAPSULE ORAL at 08:51

## 2021-09-24 RX ADMIN — LEVOTHYROXINE SODIUM 100 MCG: 0.1 TABLET ORAL at 06:47

## 2021-09-24 RX ADMIN — CALCIUM ACETATE 1334 MG: 667 CAPSULE ORAL at 11:11

## 2021-09-24 RX ADMIN — Medication 500 MG: at 08:51

## 2021-09-24 RX ADMIN — POTASSIUM CHLORIDE 40 MEQ: 750 TABLET, EXTENDED RELEASE ORAL at 12:36

## 2021-09-24 RX ADMIN — SODIUM CHLORIDE, PRESERVATIVE FREE 10 ML: 5 INJECTION INTRAVENOUS at 08:51

## 2021-09-24 RX ADMIN — Medication 1 TABLET: at 08:51

## 2021-09-24 RX ADMIN — MIDODRINE HYDROCHLORIDE 10 MG: 5 TABLET ORAL at 06:47

## 2021-09-24 RX ADMIN — METRONIDAZOLE 500 MG: 500 INJECTION, SOLUTION INTRAVENOUS at 06:47

## 2021-09-24 RX ADMIN — SODIUM BICARBONATE 650 MG: 650 TABLET ORAL at 06:47

## 2021-09-24 NOTE — OUTREACH NOTE
Prep Survey      Responses   [facility] facility patient discharged from?  [place]   Is LACE score < 7 ?  No   Emergency Room discharge w/ pulse ox?  No   Eligibility  Readm Mgmt   Discharge diagnosis  Colitis    Does the patient have one of the following disease processes/diagnoses(primary or secondary)?  Other   Does the patient have Home health ordered?  No   Is there a DME ordered?  No   Prep survey completed?  Yes          TULIO Mensah RN

## 2021-09-26 LAB
BACTERIA SPEC AEROBE CULT: NORMAL
BACTERIA SPEC AEROBE CULT: NORMAL

## 2021-09-28 ENCOUNTER — READMISSION MANAGEMENT (OUTPATIENT)
Dept: CALL CENTER | Facility: HOSPITAL | Age: 51
End: 2021-09-28

## 2021-09-28 NOTE — OUTREACH NOTE
Medical Week 1 Survey      Responses   [facility] patient discharged from?  [place]   Does the patient have one of the following disease processes/diagnoses(primary or secondary)?  Other   Week 1 attempt successful?  Yes   Call start time  1416   Call end time  1422   Discharge diagnosis  Colitis    Is patient permission given to speak with other caregiver?  Yes   List who call center can speak with  DTR-Carol   Meds reviewed with patient/caregiver?  Yes   Is the patient having any side effects they believe may be caused by any medication additions or changes?  No   Does the patient have all medications ordered at discharge?  Yes   Is the patient taking all medications as directed (includes completed medication regime)?  Yes   Medication comments  States she is totally exhausted, having incontinence of stool since Sunday.    Does the patient have a primary care provider?   Yes   Does the patient have an appointment with their PCP within 7 days of discharge?  Yes   Comments regarding PCP  10/07/21 with PCP   Has home health visited the patient within 72 hours of discharge?  N/A   Psychosocial issues?  No   Did the patient receive a copy of their discharge instructions?  Yes   Nursing interventions  -- [No questions]   What is the patient's perception of their health status since discharge?  Improving [States she is exhausted/ ]   Is the patient/caregiver able to teach back signs and symptoms related to disease process for when to call PCP?  Yes   Is the patient/caregiver able to teach back signs and symptoms related to disease process for when to call 911?  Yes   Is the patient/caregiver able to teach back the hierarchy of who to call/visit for symptoms/problems? PCP, Specialist, Home health nurse, Urgent Care, ED, 911  Yes   If the patient is a current smoker, are they able to teach back resources for cessation?  Not a smoker   Additional teach back comments  Vapes.   Week 1 call completed?  Yes   Wrap up  additional comments  States she is exhausted. Has had fecal incontinence since Sunday, advised to call GI and ask for their advice about this symptom.           Mela Maria RN

## 2021-10-05 ENCOUNTER — TRANSCRIBE ORDERS (OUTPATIENT)
Dept: ADMINISTRATIVE | Facility: HOSPITAL | Age: 51
End: 2021-10-05

## 2021-10-05 DIAGNOSIS — Z86.718 HISTORY OF DVT (DEEP VEIN THROMBOSIS): Primary | ICD-10-CM

## 2021-10-06 ENCOUNTER — READMISSION MANAGEMENT (OUTPATIENT)
Dept: CALL CENTER | Facility: HOSPITAL | Age: 51
End: 2021-10-06

## 2021-10-06 NOTE — OUTREACH NOTE
Medical Week 2 Survey      Responses   [facility] patient discharged from?  [place]   Does the patient have one of the following disease processes/diagnoses(primary or secondary)?  Other   Week 2 attempt successful?  Yes   Call start time  1628   Discharge diagnosis  Colitis    Call end time  1632   Meds reviewed with patient/caregiver?  Yes   Is the patient having any side effects they believe may be caused by any medication additions or changes?  No   Does the patient have all medications ordered at discharge?  Yes   Is the patient taking all medications as directed (includes completed medication regime)?  Yes   Does the patient have a primary care provider?   Yes   Does the patient have an appointment with their PCP within 7 days of discharge?  Yes   Has the patient kept scheduled appointments due by today?  N/A   Has home health visited the patient within 72 hours of discharge?  N/A   Psychosocial issues?  No   Comments  frequent loose stools decreasing, eating yoghurt daily   Did the patient receive a copy of their discharge instructions?  Yes   Nursing interventions  Reviewed instructions with patient   What is the patient's perception of their health status since discharge?  Improving   Is the patient/caregiver able to teach back signs and symptoms related to disease process for when to call PCP?  Yes   Is the patient/caregiver able to teach back signs and symptoms related to disease process for when to call 911?  Yes   Is the patient/caregiver able to teach back the hierarchy of who to call/visit for symptoms/problems? PCP, Specialist, Home health nurse, Urgent Care, ED, 911  Yes   Additional teach back comments  pt states overall, feeling better   Week 2 Call Completed?  Yes          Sarah Brown RN

## 2021-10-11 ENCOUNTER — HOSPITAL ENCOUNTER (INPATIENT)
Facility: HOSPITAL | Age: 51
LOS: 2 days | Discharge: HOME | End: 2021-10-14
Attending: EMERGENCY MEDICINE | Admitting: SURGERY

## 2021-10-11 ENCOUNTER — APPOINTMENT (OUTPATIENT)
Dept: CT IMAGING | Facility: HOSPITAL | Age: 51
End: 2021-10-11

## 2021-10-11 DIAGNOSIS — K80.20 GALLSTONES: ICD-10-CM

## 2021-10-11 DIAGNOSIS — T85.611A PERITONEAL DIALYSIS CATHETER DYSFUNCTION, INITIAL ENCOUNTER (HCC): Primary | ICD-10-CM

## 2021-10-11 LAB
ABO GROUP BLD: NORMAL
ALBUMIN SERPL-MCNC: 3.6 G/DL (ref 3.5–5.2)
ALBUMIN/GLOB SERPL: 1 G/DL
ALP SERPL-CCNC: 67 U/L (ref 39–117)
ALT SERPL W P-5'-P-CCNC: 11 U/L (ref 1–33)
ANION GAP SERPL CALCULATED.3IONS-SCNC: 13.7 MMOL/L (ref 5–15)
AST SERPL-CCNC: 9 U/L (ref 1–32)
BACTERIA UR QL AUTO: ABNORMAL /HPF
BASOPHILS # BLD AUTO: 0.05 10*3/MM3 (ref 0–0.2)
BASOPHILS NFR BLD AUTO: 0.6 % (ref 0–1.5)
BILIRUB SERPL-MCNC: <0.2 MG/DL (ref 0–1.2)
BILIRUB UR QL STRIP: NEGATIVE
BLD GP AB SCN SERPL QL: NEGATIVE
BUN SERPL-MCNC: 34 MG/DL (ref 6–20)
BUN/CREAT SERPL: 7 (ref 7–25)
CALCIUM SPEC-SCNC: 8.4 MG/DL (ref 8.6–10.5)
CHLORIDE SERPL-SCNC: 113 MMOL/L (ref 98–107)
CLARITY UR: CLEAR
CO2 SERPL-SCNC: 15.3 MMOL/L (ref 22–29)
COLOR UR: YELLOW
CREAT SERPL-MCNC: 4.84 MG/DL (ref 0.57–1)
DEPRECATED RDW RBC AUTO: 47 FL (ref 37–54)
EOSINOPHIL # BLD AUTO: 0.24 10*3/MM3 (ref 0–0.4)
EOSINOPHIL NFR BLD AUTO: 3.1 % (ref 0.3–6.2)
ERYTHROCYTE [DISTWIDTH] IN BLOOD BY AUTOMATED COUNT: 13.2 % (ref 12.3–15.4)
GFR SERPL CREATININE-BSD FRML MDRD: 10 ML/MIN/1.73
GFR SERPL CREATININE-BSD FRML MDRD: ABNORMAL ML/MIN/{1.73_M2}
GLOBULIN UR ELPH-MCNC: 3.5 GM/DL
GLUCOSE SERPL-MCNC: 128 MG/DL (ref 65–99)
GLUCOSE UR STRIP-MCNC: NEGATIVE MG/DL
HCT VFR BLD AUTO: 29.6 % (ref 34–46.6)
HGB BLD-MCNC: 9.4 G/DL (ref 12–15.9)
HGB UR QL STRIP.AUTO: NEGATIVE
HOLD SPECIMEN: NORMAL
HOLD SPECIMEN: NORMAL
HYALINE CASTS UR QL AUTO: ABNORMAL /LPF
IMM GRANULOCYTES # BLD AUTO: 0.2 10*3/MM3 (ref 0–0.05)
IMM GRANULOCYTES NFR BLD AUTO: 2.5 % (ref 0–0.5)
INR PPP: 1.16 (ref 0.9–1.1)
KETONES UR QL STRIP: NEGATIVE
LEUKOCYTE ESTERASE UR QL STRIP.AUTO: ABNORMAL
LIPASE SERPL-CCNC: 26 U/L (ref 13–60)
LYMPHOCYTES # BLD AUTO: 1.62 10*3/MM3 (ref 0.7–3.1)
LYMPHOCYTES NFR BLD AUTO: 20.6 % (ref 19.6–45.3)
MCH RBC QN AUTO: 30.6 PG (ref 26.6–33)
MCHC RBC AUTO-ENTMCNC: 31.8 G/DL (ref 31.5–35.7)
MCV RBC AUTO: 96.4 FL (ref 79–97)
MONOCYTES # BLD AUTO: 0.4 10*3/MM3 (ref 0.1–0.9)
MONOCYTES NFR BLD AUTO: 5.1 % (ref 5–12)
NEUTROPHILS NFR BLD AUTO: 5.34 10*3/MM3 (ref 1.7–7)
NEUTROPHILS NFR BLD AUTO: 68.1 % (ref 42.7–76)
NITRITE UR QL STRIP: NEGATIVE
NRBC BLD AUTO-RTO: 0 /100 WBC (ref 0–0.2)
PH UR STRIP.AUTO: 5.5 [PH] (ref 5–8)
PLATELET # BLD AUTO: 359 10*3/MM3 (ref 140–450)
PMV BLD AUTO: 9.7 FL (ref 6–12)
POTASSIUM SERPL-SCNC: 4.1 MMOL/L (ref 3.5–5.2)
PROT SERPL-MCNC: 7.1 G/DL (ref 6–8.5)
PROT UR QL STRIP: ABNORMAL
PROTHROMBIN TIME: 14.6 SECONDS (ref 11.7–14.2)
RBC # BLD AUTO: 3.07 10*6/MM3 (ref 3.77–5.28)
RBC # UR: ABNORMAL /HPF
REF LAB TEST METHOD: ABNORMAL
RH BLD: NEGATIVE
SARS-COV-2 RNA PNL SPEC NAA+PROBE: NOT DETECTED
SODIUM SERPL-SCNC: 142 MMOL/L (ref 136–145)
SP GR UR STRIP: 1.01 (ref 1–1.03)
SQUAMOUS #/AREA URNS HPF: ABNORMAL /HPF
T&S EXPIRATION DATE: NORMAL
UROBILINOGEN UR QL STRIP: ABNORMAL
WBC # BLD AUTO: 7.85 10*3/MM3 (ref 3.4–10.8)
WBC UR QL AUTO: ABNORMAL /HPF
WHOLE BLOOD HOLD SPECIMEN: NORMAL
WHOLE BLOOD HOLD SPECIMEN: NORMAL

## 2021-10-11 PROCEDURE — 99284 EMERGENCY DEPT VISIT MOD MDM: CPT

## 2021-10-11 PROCEDURE — 81001 URINALYSIS AUTO W/SCOPE: CPT | Performed by: PHYSICIAN ASSISTANT

## 2021-10-11 PROCEDURE — 86850 RBC ANTIBODY SCREEN: CPT | Performed by: EMERGENCY MEDICINE

## 2021-10-11 PROCEDURE — G0378 HOSPITAL OBSERVATION PER HR: HCPCS

## 2021-10-11 PROCEDURE — 87635 SARS-COV-2 COVID-19 AMP PRB: CPT | Performed by: PHYSICIAN ASSISTANT

## 2021-10-11 PROCEDURE — 80053 COMPREHEN METABOLIC PANEL: CPT | Performed by: EMERGENCY MEDICINE

## 2021-10-11 PROCEDURE — 85025 COMPLETE CBC W/AUTO DIFF WBC: CPT | Performed by: EMERGENCY MEDICINE

## 2021-10-11 PROCEDURE — 74176 CT ABD & PELVIS W/O CONTRAST: CPT

## 2021-10-11 PROCEDURE — 86900 BLOOD TYPING SEROLOGIC ABO: CPT | Performed by: EMERGENCY MEDICINE

## 2021-10-11 PROCEDURE — 83690 ASSAY OF LIPASE: CPT | Performed by: PHYSICIAN ASSISTANT

## 2021-10-11 PROCEDURE — 86901 BLOOD TYPING SEROLOGIC RH(D): CPT | Performed by: EMERGENCY MEDICINE

## 2021-10-11 PROCEDURE — 85610 PROTHROMBIN TIME: CPT | Performed by: EMERGENCY MEDICINE

## 2021-10-11 RX ORDER — TRAZODONE HYDROCHLORIDE 100 MG/1
100 TABLET ORAL NIGHTLY
Status: DISCONTINUED | OUTPATIENT
Start: 2021-10-11 | End: 2021-10-14 | Stop reason: HOSPADM

## 2021-10-11 RX ORDER — FAMOTIDINE 20 MG/1
20 TABLET, FILM COATED ORAL 2 TIMES DAILY
Status: DISCONTINUED | OUTPATIENT
Start: 2021-10-11 | End: 2021-10-12

## 2021-10-11 RX ORDER — MONTELUKAST SODIUM 10 MG/1
10 TABLET ORAL NIGHTLY
Status: DISCONTINUED | OUTPATIENT
Start: 2021-10-11 | End: 2021-10-14 | Stop reason: HOSPADM

## 2021-10-11 RX ORDER — SODIUM CHLORIDE 0.9 % (FLUSH) 0.9 %
10 SYRINGE (ML) INJECTION AS NEEDED
Status: DISCONTINUED | OUTPATIENT
Start: 2021-10-11 | End: 2021-10-14 | Stop reason: HOSPADM

## 2021-10-11 RX ORDER — ESCITALOPRAM OXALATE 20 MG/1
20 TABLET ORAL DAILY
Status: DISCONTINUED | OUTPATIENT
Start: 2021-10-12 | End: 2021-10-14 | Stop reason: HOSPADM

## 2021-10-11 RX ORDER — LEVOTHYROXINE SODIUM 137 UG/1
137 TABLET ORAL
Status: DISCONTINUED | OUTPATIENT
Start: 2021-10-12 | End: 2021-10-14 | Stop reason: HOSPADM

## 2021-10-11 RX ORDER — ASPIRIN 81 MG/1
81 TABLET, CHEWABLE ORAL DAILY
Status: ON HOLD | COMMUNITY
End: 2022-01-01

## 2021-10-11 RX ORDER — ACETAMINOPHEN 325 MG/1
650 TABLET ORAL EVERY 4 HOURS PRN
Status: DISCONTINUED | OUTPATIENT
Start: 2021-10-11 | End: 2021-10-14 | Stop reason: HOSPADM

## 2021-10-11 RX ORDER — ONDANSETRON 2 MG/ML
4 INJECTION INTRAMUSCULAR; INTRAVENOUS EVERY 6 HOURS PRN
Status: DISCONTINUED | OUTPATIENT
Start: 2021-10-11 | End: 2021-10-14 | Stop reason: HOSPADM

## 2021-10-11 RX ORDER — MIDODRINE HYDROCHLORIDE 5 MG/1
10 TABLET ORAL
Status: DISCONTINUED | OUTPATIENT
Start: 2021-10-12 | End: 2021-10-11

## 2021-10-11 RX ORDER — ACETAMINOPHEN 160 MG/5ML
650 SOLUTION ORAL EVERY 4 HOURS PRN
Status: DISCONTINUED | OUTPATIENT
Start: 2021-10-11 | End: 2021-10-14 | Stop reason: HOSPADM

## 2021-10-11 RX ORDER — MIDODRINE HYDROCHLORIDE 5 MG/1
10 TABLET ORAL
Status: DISCONTINUED | OUTPATIENT
Start: 2021-10-11 | End: 2021-10-14 | Stop reason: HOSPADM

## 2021-10-11 RX ORDER — ACETAMINOPHEN 650 MG/1
650 SUPPOSITORY RECTAL EVERY 4 HOURS PRN
Status: DISCONTINUED | OUTPATIENT
Start: 2021-10-11 | End: 2021-10-14 | Stop reason: HOSPADM

## 2021-10-11 RX ORDER — UREA 10 %
3 LOTION (ML) TOPICAL NIGHTLY
Status: DISCONTINUED | OUTPATIENT
Start: 2021-10-11 | End: 2021-10-14 | Stop reason: HOSPADM

## 2021-10-11 RX ORDER — LANTHANUM CARBONATE 500 MG/1
750 TABLET, CHEWABLE ORAL
Status: DISCONTINUED | OUTPATIENT
Start: 2021-10-12 | End: 2021-10-14 | Stop reason: HOSPADM

## 2021-10-11 RX ORDER — SODIUM BICARBONATE 650 MG/1
650 TABLET ORAL 4 TIMES DAILY
Status: DISCONTINUED | OUTPATIENT
Start: 2021-10-11 | End: 2021-10-14 | Stop reason: HOSPADM

## 2021-10-11 RX ORDER — FOLIC ACID/VIT B COMPLEX AND C 0.8 MG
1 TABLET ORAL DAILY
Status: DISCONTINUED | OUTPATIENT
Start: 2021-10-12 | End: 2021-10-14 | Stop reason: HOSPADM

## 2021-10-11 RX ORDER — ASPIRIN 81 MG/1
81 TABLET, CHEWABLE ORAL DAILY
Status: DISCONTINUED | OUTPATIENT
Start: 2021-10-12 | End: 2021-10-14 | Stop reason: HOSPADM

## 2021-10-11 RX ORDER — DIPHENOXYLATE HYDROCHLORIDE AND ATROPINE SULFATE 2.5; .025 MG/1; MG/1
1 TABLET ORAL DAILY
Status: DISCONTINUED | OUTPATIENT
Start: 2021-10-12 | End: 2021-10-14 | Stop reason: HOSPADM

## 2021-10-11 RX ORDER — SACCHAROMYCES BOULARDII 250 MG
500 CAPSULE ORAL 2 TIMES DAILY
Status: DISCONTINUED | OUTPATIENT
Start: 2021-10-11 | End: 2021-10-14 | Stop reason: HOSPADM

## 2021-10-11 RX ORDER — POTASSIUM CHLORIDE 750 MG/1
40 TABLET, FILM COATED, EXTENDED RELEASE ORAL 2 TIMES DAILY WITH MEALS
Status: DISCONTINUED | OUTPATIENT
Start: 2021-10-11 | End: 2021-10-13

## 2021-10-11 RX ADMIN — MONTELUKAST SODIUM 10 MG: 10 TABLET, FILM COATED ORAL at 22:08

## 2021-10-11 RX ADMIN — MIDODRINE HYDROCHLORIDE 10 MG: 5 TABLET ORAL at 22:07

## 2021-10-11 RX ADMIN — Medication 3 MG: at 22:07

## 2021-10-11 RX ADMIN — POTASSIUM CHLORIDE 40 MEQ: 750 TABLET, EXTENDED RELEASE ORAL at 22:06

## 2021-10-11 RX ADMIN — TRAZODONE HYDROCHLORIDE 100 MG: 100 TABLET ORAL at 22:08

## 2021-10-11 RX ADMIN — Medication 500 MG: at 22:07

## 2021-10-11 RX ADMIN — SODIUM BICARBONATE 650 MG: 650 TABLET ORAL at 22:07

## 2021-10-11 RX ADMIN — FAMOTIDINE 20 MG: 20 TABLET, FILM COATED ORAL at 22:07

## 2021-10-11 NOTE — ED NOTES
Pt dialyzes at home via peritoneal dialysis Qday. Pt states she has not been able to get a full treatment since Wednesday night.  Umbilical pain that radiates to RLQ intermittently x1 week worse over the last few days. Denies fevers, soa, cp.  Pt also reports one episode of blood in her stool yesterday evening. Pt stopped blood thinners 09/29. A&OX4 at this time.      Shantell Ghotra, RN  10/11/21 1583

## 2021-10-11 NOTE — H&P
HISTORY AND PHYSICAL   [facility]        Date of Admission: 10/11/2021  Patient Identification:  Name: Ambar Lara  Age: 51 y.o.  Sex: female  :  1970  MRN: [id number]                     Primary Care Physician: Jet Manuel MD    Chief Complaint:  51 year old female who presented to the emergency room with a malfunctioning peritoneal dialysis catheter; she has been on dialysis over two years; she has had right lower quadrant pain and was unable to complete her dialysis for the last four days; she denies fever or chills; no nausea or vomiting    History of Present Illness:   As above    Past Medical History:  Past Medical History:   Diagnosis Date   • CHF (congestive heart failure) (CMS/[facility])    • Dialysis patient (CMS/[facility])    • Disease of thyroid gland    • Elevated cholesterol    • GERD (gastroesophageal reflux disease)    • Renal disorder    • Sleep apnea      Past Surgical History:  Past Surgical History:   Procedure Laterality Date   • ADRENAL GLAND SURGERY N/A `   •  SECTION Bilateral 2001    Has had x2   • HYSTERECTOMY     • TRACHEOSTOMY        Home Meds:  Medications Prior to Admission   Medication Sig Dispense Refill Last Dose   • aspirin 81 MG chewable tablet Chew 81 mg Daily.   10/11/2021 at Unknown time   • B Complex-C-Folic Acid (REN-JHON PO) Take 1 tablet by mouth Daily.   10/11/2021 at Unknown time   • escitalopram (LEXAPRO) 20 MG tablet Take 1 tablet by mouth Daily. 30 tablet 1 10/11/2021 at Unknown time   • famotidine (PEPCID) 20 MG tablet Take 20 mg by mouth 2 (Two) Times a Day.   10/11/2021 at Unknown time   • gentamicin (GARAMYCIN) 0.1 % cream Apply 1 application topically to the appropriate area as directed Daily. As directed   10/11/2021 at Unknown time   • lanthanum (FOSRENOL) 500 MG chewable tablet Chew 750 mg 3 (Three) Times a Day With Meals.   10/11/2021 at Unknown time   • levothyroxine (SYNTHROID, LEVOTHROID) 100 MCG tablet Take 1 tablet  by mouth Daily. (Patient taking differently: Take 137 mcg by mouth Daily.) 30 tablet 1 10/11/2021 at Unknown time   • melatonin 3 MG tablet Take 1 tablet by mouth Every Night. 30 tablet 1 10/10/2021 at Unknown time   • midodrine (PROAMATINE) 10 MG tablet Take 1 tablet by mouth 3 (Three) Times a Day. 90 tablet 1 10/11/2021 at Unknown time   • montelukast (Singulair) 10 MG tablet Take 1 tablet by mouth Every Night. 30 tablet 1 10/10/2021 at Unknown time   • multivitamin (THERAGRAN) tablet tablet Take 1 tablet by mouth Daily. 90 tablet 0 10/11/2021 at Unknown time   • potassium bicarbonate (K-LYTE) 25 MEQ disintegrating tablet Take 20 mEq by mouth 2 (Two) Times a Day. Take 2 tablets by mouth 2 times daily.   10/11/2021 at Unknown time   • saccharomyces boulardii (FLORASTOR) 250 MG capsule Take 2 capsules by mouth 2 (Two) Times a Day. 120 capsule 1 10/11/2021 at Unknown time   • sodium bicarbonate 650 MG tablet Take 1 tablet by mouth Every 8 (Eight) Hours. (Patient taking differently: Take 650 mg by mouth 4 (Four) Times a Day.) 90 tablet 1 10/11/2021 at Unknown time   • traZODone (DESYREL) 100 MG tablet Take 100 mg by mouth Every Night. Take 0.5-1 tablet by mouth nightly   10/10/2021 at Unknown time   • acetaminophen (TYLENOL) 500 MG tablet Take 500 mg by mouth Every 6 (Six) Hours As Needed for Mild Pain .   Unknown at Unknown time   • diphenhydrAMINE HCl, Sleep, 25 MG capsule Take 25 mg by mouth At Night As Needed (sleep). For sleep 28 capsule  More than a month at Unknown time   • nitroglycerin (NITROSTAT) 0.4 MG SL tablet Place 0.4 mg under the tongue Every 5 (Five) Minutes As Needed for Chest Pain. Take no more than 3 doses in 15 minutes.   Unknown at Unknown time   • ondansetron (ZOFRAN) 4 MG tablet Take 1 tablet by mouth Every 6 (Six) Hours As Needed for Nausea or Vomiting. (Patient taking differently: Take 4 mg by mouth Every 8 (Eight) Hours As Needed for Nausea or Vomiting.) 15 tablet 0 Unknown at Unknown time    • PHARMACY CONSULT Continuous As Needed (-). Consolidate if  possible instructions on to 1 Rx bottle on vancomycin taper as [facility] will not allow free texting to fit in all the info          Allergies:  Allergies   Allergen Reactions   • Penicillins Anaphylaxis, Hives, Rash, Shortness Of Breath and Swelling   • Nickel Rash     Immunizations:  Immunization History   Administered Date(s) Administered   • COVID-19 (PFIZER) 06/08/2021, 06/29/2021     Social History:   Social History     Social History Narrative   • Not on file     Social History     Socioeconomic History   • Marital status:    Tobacco Use   • Smoking status: Former Smoker     Packs/day: 1.00     Years: 25.00     Pack years: 25.00     Types: Cigarettes     Quit date: 4/16/2018     Years since quitting: 3.4   • Smokeless tobacco: Never Used   Vaping Use   • Vaping Use: Former   • Quit date: 2/14/2021   • Substances: Nicotine, Flavoring   • Devices: Pre-filled or refillable cartridge   Substance and Sexual Activity   • Alcohol use: Not Currently   • Drug use: Not Currently   • Sexual activity: Defer       Family History:  No family history on file.     Review of Systems  See history of present illness and past medical history.  Patient denies headache, dizziness, syncope, falls, trauma, change in vision, change in hearing, change in taste, changes in weight, changes in appetite, focal weakness, numbness, or paresthesia.  Patient denies chest pain, palpitations, dyspnea, orthopnea, PND, cough, sinus pressure, rhinorrhea, epistaxis, hemoptysis, nausea, vomiting,hematemesis, diarrhea, constipation or hematchezia.  Denies cold or heat intolerance, polydipsia, polyuria, polyphagia. Denies hematuria, pyuria, dysuria, hesitancy, frequency or urgency. Denies consumption of raw and under cooked meats foods or change in water source.  Denies fever, chills, sweats, night sweats.  Denies missing any routine medications. Remainder of ROS is  negative.    Objective:  T Max 24 hrs: Temp (24hrs), Av °F (36.7 °C), Min:97.9 °F (36.6 °C), Max:98 °F (36.7 °C)    Vitals Ranges:   Temp:  [97.9 °F (36.6 °C)-98 °F (36.7 °C)] 98 °F (36.7 °C)  Heart Rate:  [] 100  Resp:  [20] 20  BP: (101-112)/(63-74) 112/71      Exam:  /71 (BP Location: Right arm, Patient Position: Sitting)   Pulse 100   Temp 98 °F (36.7 °C) (Oral)   Resp 20   Wt 78.1 kg (172 lb 1.6 oz)   LMP 2004 (Approximate)   SpO2 97%   BMI 32.52 kg/m²     General Appearance:    Alert, cooperative, no distress, appears stated age   Head:    Normocephalic, without obvious abnormality, atraumatic   Eyes:    PERRL, conjunctivae/corneas clear, EOM's intact, both eyes   Ears:    Normal external ear canals, both ears   Nose:   Nares normal, septum midline, mucosa normal, no drainage    or sinus tenderness   Throat:   Lips, mucosa, and tongue normal   Neck:   Supple, symmetrical, trachea midline, no adenopathy;     thyroid:  no enlargement/tenderness/nodules; no carotid    bruit or JVD   Back:     Symmetric, no curvature, ROM normal, no CVA tenderness   Lungs:     Decreased breath sounds bilaterally, respirations unlabored   Chest Wall:    No tenderness or deformity    Heart:    Regular rate and rhythm, S1 and S2 normal, no murmur, rub   or gallop   Abdomen:     Soft, nontender, bowel sounds active all four quadrants,     no masses, no hepatomegaly, no splenomegaly; dialysis catheter in place   Extremities:   Extremities normal, atraumatic, no cyanosis or edema   Pulses:   2+ and symmetric all extremities   Skin:   Skin color, texture, turgor normal, no rashes or lesions   Lymph nodes:   Cervical, supraclavicular, and axillary nodes normal   Neurologic:   CNII-XII intact, normal strength, sensation intact throughout      .    Data Review:  Labs in chart were reviewed.  WBC   Date Value Ref Range Status   10/11/2021 7.85 3.40 - 10.80 10*3/mm3 Final     Hemoglobin   Date Value Ref Range  Status   10/11/2021 9.4 (L) 12.0 - 15.9 g/dL Final     Hematocrit   Date Value Ref Range Status   10/11/2021 29.6 (L) 34.0 - 46.6 % Final     Platelets   Date Value Ref Range Status   10/11/2021 359 140 - 450 10*3/mm3 Final     Sodium   Date Value Ref Range Status   10/11/2021 142 136 - 145 mmol/L Final     Potassium   Date Value Ref Range Status   10/11/2021 4.1 3.5 - 5.2 mmol/L Final     Chloride   Date Value Ref Range Status   10/11/2021 113 (H) 98 - 107 mmol/L Final     CO2   Date Value Ref Range Status   10/11/2021 15.3 (L) 22.0 - 29.0 mmol/L Final     BUN   Date Value Ref Range Status   10/11/2021 34 (H) 6 - 20 mg/dL Final     Creatinine   Date Value Ref Range Status   10/11/2021 4.84 (H) 0.57 - 1.00 mg/dL Final     Glucose   Date Value Ref Range Status   10/11/2021 128 (H) 65 - 99 mg/dL Final     Calcium   Date Value Ref Range Status   10/11/2021 8.4 (L) 8.6 - 10.5 mg/dL Final     AST (SGOT)   Date Value Ref Range Status   10/11/2021 9 1 - 32 U/L Final     ALT (SGPT)   Date Value Ref Range Status   10/11/2021 11 1 - 33 U/L Final     Alkaline Phosphatase   Date Value Ref Range Status   10/11/2021 67 39 - 117 U/L Final                Imaging Results (All)     Procedure Component Value Units Date/Time    CT Abdomen Pelvis Without Contrast [[id number]] Collected: 10/11/21 1347     Updated: 10/11/21 1609    Narrative:      CT OF THE ABDOMEN AND PELVIS WITHOUT CONTRAST 10/11/2021      HISTORY: Abdominal pain. Difficulty flushing peritoneal dialysis  catheter.     TECHNIQUE: Axial images were obtained from the lung bases to the  symphysis pubis. No intravenous contrast was given.     FINDINGS: The liver appears within normal limits. Gallbladder appears  slightly contracted but no gallstones are seen. Spleen appears mildly  enlarged. No focal splenic lesions are seen. Pancreas appears within  normal limits. There is fullness of the left adrenal gland. Right  adrenal gland does not appear enlarged.     Bilateral  perinephric stranding. There is a tiny nonobstructing right  renal calcification. There are several areas of low-attenuation in the  left kidney which could represent simple cysts. At least one low-density  right renal lesion is seen. No hydronephrosis is seen.     There is some aortoiliac calcification.     The peritoneal dialysis catheter is seen entering the skin in the right  lower quadrant and is curled in the right hemipelvis. The catheter does  not appear kinked and does appear intact. No abnormal fluid collections  or masses are seen near the catheter tip. There is a tiny amount of free  fluid adjacent to the right hepatic lobe. Small amount of free fluid is  seen in the left paracolic gutter region. There is fluid in the  rectosigmoid colon as well.     Uterus has been removed.       Impression:      1. The peritoneal dialysis catheter appears intact and terminates in the  right hemipelvis.  2. Small amount of free fluid is seen.  3. There is mild bilateral perinephric stranding with small low-density  lesions in the left kidney and perhaps one in the right kidney,  difficult to assess in the absence of IV contrast, but may represent  small cysts. There is a tiny nonobstructing right renal stone.  4. Status post hysterectomy.                 Radiation dose reduction techniques were utilized, including automated  exposure control and exposure modulation based on body size.     This report was finalized on 10/11/2021 4:05 PM by Dr. Lawson Beckford M.D.           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   • HLD (hyperlipidemia) E78.5   • Hypothyroidism E03.9   • Peritoneal dialysis status ([facility]) Z99.2   • History of  tracheostomy Z98.890   • Acute pulmonary embolism ([facility]) I26.99   • Severe malnutrition ([facility]) E43   • Clostridium difficile colitis A04.72   • Immobility syndrome M62.3   • 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

## 2021-10-11 NOTE — ED TRIAGE NOTES
Patient to er from dialysis clinic with c/o increasing in ABD pain with bloating and pressure. Patient reported bright red blood in stool yesterday. Patient reported her last good dialysis was Thursday. Patient reported she does peritoneal dialysis. Patient reported she stopped her blood thinners two weeks ago. Patient has mask on in triage along with staff.

## 2021-10-11 NOTE — ED PROVIDER NOTES
EMERGENCY DEPARTMENT ENCOUNTER    Room Number:  05/05  Date of encounter:  10/11/2021  PCP: Jet Manuel MD  Historian: Patient, daughter      I used full protective equipment while examining this patient.  This includes face mask, gloves and protective eyewear.  I washed my hands before entering the room and immediately upon leaving the room      HPI:  Chief Complaint: Abdominal pain, peritoneal dialysis issues  A complete HPI/ROS/PMH/PSH/SH/FH are unobtainable due to: Nothing    Context: Ambar Lara is a 51 y.o. female who presents to the ED c/o several day history of diffuse, mild, achy right lower quadrant pain.  In addition patient has had difficulty with her peritoneal dialysis.  She states she has been unable to drain fluid off of it for several days.  She states she was last fully dialyzed on 10/7/2021.  She has been in contact with her dialysis nurse who has seen her in the office.  They did flush it at that time however unable to put any further fluid in or pull any out.  Patient states she has been mildly weak over the past several days.  She denies any fevers, chills, shortness of breath.    Patient follows with Dr. Romero in nephrology.    Patient notes that she was constipated several days ago.  She did take a laxative which helped her symptoms.  She did notice bright red blood after a bowel movement yesterday.  She states she has had a bowel movement since without any difficulty.    Review of Medical Records  I reviewed admission from 9/21/2021.  Patient admitted for colitis.    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   • HLD (hyperlipidemia) 06/07/2018   •  Hypothyroidism 2018   • Peritoneal dialysis status ([facility]) 02/15/2021   • History of tracheostomy 04/10/2021   • Acute pulmonary embolism ([facility]) 04/10/2021   • Severe malnutrition ([facility]) 2021   • Clostridium difficile colitis 2021   • Immobility syndrome 2021   • Genu recurvatum of right knee 2021   • Colitis 2021     Resolved Ambulatory Problems     Diagnosis Date Noted   • Hypotension 04/10/2021     Past Medical History:   Diagnosis Date   • CHF (congestive heart failure) (CMS/[facility])    • Dialysis patient (CMS/[facility])    • Disease of thyroid gland    • Elevated cholesterol    • GERD (gastroesophageal reflux disease)    • Renal disorder    • Sleep apnea          PAST SURGICAL HISTORY  Past Surgical History:   Procedure Laterality Date   • ADRENAL GLAND SURGERY N/A `   •  SECTION Bilateral 2001    Has had x2   • HYSTERECTOMY     • TRACHEOSTOMY           FAMILY HISTORY  No family history on file.      SOCIAL HISTORY  Social History     Socioeconomic History   • Marital status:    Tobacco Use   • Smoking status: Former Smoker     Packs/day: 1.00     Years: 25.00     Pack years: 25.00     Types: Cigarettes     Quit date: 2018     Years since quitting: 3.4   • Smokeless tobacco: Never Used   Vaping Use   • Vaping Use: Former   • Quit date: 2021   • Substances: Nicotine, Flavoring   • Devices: Pre-filled or refillable cartridge   Substance and Sexual Activity   • Alcohol use: Not Currently   • Drug use: Not Currently   • Sexual activity: Defer         ALLERGIES  Penicillins and Nickel        REVIEW OF SYSTEMS  All systems reviewed and negative except for those discussed in HPI.       PHYSICAL EXAM    I have reviewed the triage vital signs and nursing notes.    ED Triage Vitals   Temp Heart Rate Resp BP SpO2   10/11/21 1024 10/11/21 1024 10/11/21 1024 10/11/21 1126 10/11/21 1024   97.9 °F (36.6 °C) 113 20 107/63 96 %      Temp src Heart Rate Source Patient  Position BP Location FiO2 (%)   10/11/21 1024 -- -- -- --   Temporal           Physical Exam  GENERAL: Alert, oriented, nontoxic-appearing, not distressed  HENT: head atraumatic, no nuchal rigidity  EYES: no scleral icterus, EOMI  CV: regular rhythm, regular rate, no murmur  RESPIRATORY: normal effort, CTA  ABDOMEN: soft, peritoneal catheter in right lower quadrant.  No surrounding erythema.  Mild surrounding tenderness.  MUSCULOSKELETAL: no deformity, FROM, no calf swelling or tenderness  NEURO: alert, moves all extremities, follows commands  SKIN: warm, dry        LAB RESULTS  Recent Results (from the past 24 hour(s))   Comprehensive Metabolic Panel    Collection Time: 10/11/21 11:27 AM    Specimen: Blood   Result Value Ref Range    Glucose 128 (H) 65 - 99 mg/dL    BUN 34 (H) 6 - 20 mg/dL    Creatinine 4.84 (H) 0.57 - 1.00 mg/dL    Sodium 142 136 - 145 mmol/L    Potassium 4.1 3.5 - 5.2 mmol/L    Chloride 113 (H) 98 - 107 mmol/L    CO2 15.3 (L) 22.0 - 29.0 mmol/L    Calcium 8.4 (L) 8.6 - 10.5 mg/dL    Total Protein 7.1 6.0 - 8.5 g/dL    Albumin 3.60 3.50 - 5.20 g/dL    ALT (SGPT) 11 1 - 33 U/L    AST (SGOT) 9 1 - 32 U/L    Alkaline Phosphatase 67 39 - 117 U/L    Total Bilirubin <0.2 0.0 - 1.2 mg/dL    eGFR Non African Amer 10 (L) >60 mL/min/1.73    eGFR  African Amer      Globulin 3.5 gm/dL    A/G Ratio 1.0 g/dL    BUN/Creatinine Ratio 7.0 7.0 - 25.0    Anion Gap 13.7 5.0 - 15.0 mmol/L   Type & Screen    Collection Time: 10/11/21 11:27 AM    Specimen: Blood   Result Value Ref Range    ABO Type O     RH type Negative     Antibody Screen Negative     T&S Expiration Date 10/14/2021 11:59:59 PM    Protime-INR    Collection Time: 10/11/21 11:27 AM    Specimen: Blood   Result Value Ref Range    Protime 14.6 (H) 11.7 - 14.2 Seconds    INR 1.16 (H) 0.90 - 1.10   Green Top (Gel)    Collection Time: 10/11/21 11:27 AM   Result Value Ref Range    Extra Tube Hold for add-ons.    Lavender Top    Collection Time: 10/11/21 11:27  AM   Result Value Ref Range    Extra Tube hold for add-on    Gold Top - SST    Collection Time: 10/11/21 11:27 AM   Result Value Ref Range    Extra Tube Hold for add-ons.    Light Blue Top    Collection Time: 10/11/21 11:27 AM   Result Value Ref Range    Extra Tube hold for add-on    CBC Auto Differential    Collection Time: 10/11/21 11:27 AM    Specimen: Blood   Result Value Ref Range    WBC 7.85 3.40 - 10.80 10*3/mm3    RBC 3.07 (L) 3.77 - 5.28 10*6/mm3    Hemoglobin 9.4 (L) 12.0 - 15.9 g/dL    Hematocrit 29.6 (L) 34.0 - 46.6 %    MCV 96.4 79.0 - 97.0 fL    MCH 30.6 26.6 - 33.0 pg    MCHC 31.8 31.5 - 35.7 g/dL    RDW 13.2 12.3 - 15.4 %    RDW-SD 47.0 37.0 - 54.0 fl    MPV 9.7 6.0 - 12.0 fL    Platelets 359 140 - 450 10*3/mm3    Neutrophil % 68.1 42.7 - 76.0 %    Lymphocyte % 20.6 19.6 - 45.3 %    Monocyte % 5.1 5.0 - 12.0 %    Eosinophil % 3.1 0.3 - 6.2 %    Basophil % 0.6 0.0 - 1.5 %    Immature Grans % 2.5 (H) 0.0 - 0.5 %    Neutrophils, Absolute 5.34 1.70 - 7.00 10*3/mm3    Lymphocytes, Absolute 1.62 0.70 - 3.10 10*3/mm3    Monocytes, Absolute 0.40 0.10 - 0.90 10*3/mm3    Eosinophils, Absolute 0.24 0.00 - 0.40 10*3/mm3    Basophils, Absolute 0.05 0.00 - 0.20 10*3/mm3    Immature Grans, Absolute 0.20 (H) 0.00 - 0.05 10*3/mm3    nRBC 0.0 0.0 - 0.2 /100 WBC   Lipase    Collection Time: 10/11/21 11:27 AM    Specimen: Blood   Result Value Ref Range    Lipase 26 13 - 60 U/L   Urinalysis With Microscopic If Indicated (No Culture) - Urine, Clean Catch    Collection Time: 10/11/21 11:29 AM    Specimen: Urine, Clean Catch   Result Value Ref Range    Color, UA Yellow Yellow, Straw    Appearance, UA Clear Clear    pH, UA 5.5 5.0 - 8.0    Specific Gravity, UA 1.010 1.005 - 1.030    Glucose, UA Negative Negative    Ketones, UA Negative Negative    Bilirubin, UA Negative Negative    Blood, UA Negative Negative    Protein,  mg/dL (2+) (A) Negative    Leuk Esterase, UA Trace (A) Negative    Nitrite, UA Negative Negative     Urobilinogen, UA 0.2 E.U./dL 0.2 - 1.0 E.U./dL   Urinalysis, Microscopic Only - Urine, Clean Catch    Collection Time: 10/11/21 11:29 AM    Specimen: Urine, Clean Catch   Result Value Ref Range    RBC, UA 0-2 None Seen, 0-2 /HPF    WBC, UA 3-5 (A) None Seen, 0-2 /HPF    Bacteria, UA None Seen None Seen /HPF    Squamous Epithelial Cells, UA 3-6 (A) None Seen, 0-2 /HPF    Hyaline Casts, UA 0-2 None Seen /LPF    Methodology Automated Microscopy        Ordered the above labs and independently reviewed the results.        RADIOLOGY  CT Abdomen Pelvis Without Contrast    Result Date: 10/11/2021  CT OF THE ABDOMEN AND PELVIS WITHOUT CONTRAST 10/11/2021  HISTORY: Abdominal pain. Difficulty flushing peritoneal dialysis catheter.  TECHNIQUE: Axial images were obtained from the lung bases to the symphysis pubis. No intravenous contrast was given.  FINDINGS: The liver appears within normal limits. Gallbladder appears slightly contracted but no gallstones are seen. Spleen appears mildly enlarged. No focal splenic lesions are seen. Pancreas appears within normal limits. There is fullness of the left adrenal gland. Right adrenal gland does not appear enlarged.  Bilateral perinephric stranding. There is a tiny nonobstructing right renal calcification. There are several areas of low-attenuation in the left kidney which could represent simple cysts. At least one low-density right renal lesion is seen. No hydronephrosis is seen.  There is some aortoiliac calcification.  The peritoneal dialysis catheter is seen entering the skin in the right lower quadrant and is curled in the right hemipelvis. The catheter does not appear kinked and does appear intact. No abnormal fluid collections or masses are seen near the catheter tip. There is a tiny amount of free fluid adjacent to the right hepatic lobe. Small amount of free fluid is seen in the left paracolic gutter region. There is fluid in the rectosigmoid colon as well.  Uterus has been  removed.      1. The peritoneal dialysis catheter appears intact and terminates in the right hemipelvis. 2. Small amount of free fluid is seen. 3. There is mild bilateral perinephric stranding with small low-density lesions in the left kidney and perhaps one in the right kidney, difficult to assess in the absence of IV contrast, but may represent small cysts. There is a tiny nonobstructing right renal stone. 4. Status post hysterectomy.      Radiation dose reduction techniques were utilized, including automated exposure control and exposure modulation based on body size.         I ordered the above noted radiological studies. Reviewed by me and discussed with radiologist.  See dictation for official radiology interpretation.      MEDICATIONS GIVEN IN ER    Medications   sodium chloride 0.9 % flush 10 mL (has no administration in time range)   sodium chloride 0.9 % flush 10 mL (has no administration in time range)         PROGRESS, DATA ANALYSIS, CONSULTS, AND MEDICAL DECISION MAKING    All labs have been independently reviewed by me.  All radiology studies have been reviewed by me and discussed with radiologist dictating the report.   EKG's independently viewed and interpreted by me.  Discussion below represents my analysis of pertinent findings related to patient's condition, differential diagnosis, treatment plan and final disposition.    I have discussed case with Dr. Blackman, emergency room physician.  He has performed his own bedside examination and agrees with treatment plan.    ED Course as of 10/11/21 1457   Mon Oct 11, 2021   1109 Patient presents with right lower quadrant pain, as well as difficulty with peritoneal dialysis.  Patient has not been fully dialyzed in 4 days.  Differential diagnoses include but not limited to dialysis malfunction, appendicitis, peritonitis. [EE]   1340 I discussed CT findings with Dr. Beckford.  Patient has no evidence of intra-abdominal abnormality.  Peritoneal dialysis tip  appears to be in good position. [EE]   1349 Potassium: 4.1 [EE]   1349 Creatinine(!): 4.84 [EE]   1349 Hemoglobin(!): 9.4 [EE]   1427 Dr. Dominguez has seen and evaluated patient.  He recommends admission with general surgery evaluating peritoneal catheter. [EE]   1451 I discussed case with Dr. Patel [facility].  She agrees admit the patient. [EE]      ED Course User Index  [EE] Reggie Porter PA       AS OF 14:57 EDT VITALS:    BP - 107/63  HR - 113  TEMP - 97.9 °F (36.6 °C) (Temporal)  O2 SATS - 96%        DIAGNOSIS  Final diagnoses:   Peritoneal dialysis catheter dysfunction, initial encounter (HCC)         DISPOSITION  Admitted           Reggie Porter PA  10/11/21 8299

## 2021-10-11 NOTE — ED PROVIDER NOTES
Pt presents to the ED c/o  malfunctioning peritoneal dialysis catheter.  She reports that she has been able to instill fluid that it is not coming out.  She denies fever chills.  She denies abdominal pain.     On exam,   Awake and alert, no acute distress.  There is a peritoneal dialysis catheter present.  The abdomen is soft, nondistended, nontender throughout.     Plan: Labs appear reassuring.  She does not have any urgent indication for hemodialysis.  She was evaluated by nephrology who requested admission and they were going to attempt to flush her peritoneal dialysis catheter.  She may require surgical intervention to have it replaced.      I wore an N95 mask, face shield, and gloves during this patient encounter.  Patient also wearing a surgical mask.  Hand hygeine performed before and after seeing the patient.     Attestation:  The GIBSON and I have discussed this patient's history, physical exam, and treatment plan.  I have reviewed the documentation and personally had a face to face interaction with the patient. I affirm the documentation and agree with the treatment and plan.  The attached note describes my personal findings.            Marcos Blackman MD  10/11/21 9866

## 2021-10-11 NOTE — CONSULTS
Referring Provider:Reggie Porter PA   Reason for Consultation: ESRD on peritoneal dialysis.    Subjective     Chief complaint   Chief Complaint   Patient presents with   • Abdominal Pain   • Rectal Bleeding       History of present illness:     51 years old white female with past medical history of end-stage renal disease currently on peritoneal dialysis who was discharged from [facility] 2021.  Patient had long hospitalization for proctocolitis and Covid pneumonia.  Patient required tracheostomy during her hospitalization she was switched to hemodialysis during her stay and back to PD.  Patient came in to the hospital today because of malfunctioning PD catheter since Thursday.  She denies any fever any chills.  Reported having some blood in her rectum yesterday evening.  Reported some abdominal pain and mild tenderness in her abdomen.  For consult help with management of her dialysis need and volume management    Past Medical History:   Diagnosis Date   • CHF (congestive heart failure) (CMS/[facility])    • Dialysis patient (CMS/[facility])    • Disease of thyroid gland    • Elevated cholesterol    • GERD (gastroesophageal reflux disease)    • Renal disorder    • Sleep apnea      Past Surgical History:   Procedure Laterality Date   • ADRENAL GLAND SURGERY N/A `   •  SECTION Bilateral 2001    Has had x2   • HYSTERECTOMY     • TRACHEOSTOMY       No family history on file.  Social History     Tobacco Use   • Smoking status: Former Smoker     Packs/day: 1.00     Years: 25.00     Pack years: 25.00     Types: Cigarettes     Quit date: 2018     Years since quitting: 3.4   • Smokeless tobacco: Never Used   Vaping Use   • Vaping Use: Former   • Quit date: 2021   • Substances: Nicotine, Flavoring   • Devices: Pre-filled or refillable cartridge   Substance Use Topics   • Alcohol use: Not Currently   • Drug use: Not Currently     (Not in a hospital admission)    Allergies:  Penicillins and  Nickel    Review of Systems  Pertinent items are noted in HPI.    Objective     Vital Signs  Temp:  [97.9 °F (36.6 °C)] 97.9 °F (36.6 °C)  Heart Rate:  [113] 113  Resp:  [20] 20  BP: (107)/(63) 107/63         No intake/output data recorded.  No intake/output data recorded.  No intake or output data in the 24 hours ending 10/11/21 1513    Physical Exam:     General Appearance:    Alert, cooperative, in no acute distress   Head:    Normocephalic, without obvious abnormality, atraumatic   Eyes:            Lids and lashes normal, conjunctivae and sclerae normal, no   icterus, no pallor, corneas clear, PERRLA   Ears:    Ears appear intact with no abnormalities noted   Throat:   No oral lesions, no thrush, oral mucosa moist   Neck:   No adenopathy, supple, trachea midline, no thyromegaly, no     carotid bruit, no JVD   Back:     No kyphosis present, no scoliosis present, no skin lesions,       erythema or scars, no tenderness to percussion or                   palpation,   range of motion normal   Lungs:     Clear to auscultation,respirations regular, even and                   unlabored    Heart:    Regular rhythm and normal rate, normal S1 and S2, no            murmur, no gallop, no rub, no click   Breast Exam:    Deferred   Abdomen:     Normal bowel sounds, no masses, no organomegaly, soft        Mildly tender, non-distended, no guarding, no rebound                 tenderness   Genitalia:    Deferred   Extremities:   Moves all extremities well, no edema, no cyanosis, no              redness   Pulses:   Pulses palpable and equal bilaterally   Skin:   No bleeding, bruising or rash               Results Review:  Results from last 7 days   Lab Units 10/11/21  1127   SODIUM mmol/L 142   POTASSIUM mmol/L 4.1   CHLORIDE mmol/L 113*   CO2 mmol/L 15.3*   BUN mg/dL 34*   CREATININE mg/dL 4.84*   CALCIUM mg/dL 8.4*   BILIRUBIN mg/dL <0.2   ALK PHOS U/L 67   ALT (SGPT) U/L 11   AST (SGOT) U/L 9   GLUCOSE mg/dL 128*       Estimated  Creatinine Clearance: 13.2 mL/min (A) (by C-G formula based on SCr of 4.84 mg/dL (H)).          Results from last 7 days   Lab Units 10/11/21  1127   WBC 10*3/mm3 7.85   HEMOGLOBIN g/dL 9.4*   PLATELETS 10*3/mm3 359       Results from last 7 days   Lab Units 10/11/21  1127   INR  1.16*       Active Medications          Assessment/Plan   Assessment      PD catheter dysfunction (HCC)    -End-stage renal disease on peritoneal dialysis with malfunctioning PD catheter: CT abdomen was obtained and PD catheter appears to be intact but cannot rule out omentum wrap.  Awaiting surgery evaluation.  Will obtain PD fluid and send it to analysis.    -Mild metabolic acidosis: Will start patient on gentle IV hydration with bicarb drip  -Anemia of end-stage renal disease  -Secondary hyperparathyroidism      Camila Salas MD  10/11/21  15:13 EDT

## 2021-10-11 NOTE — ED NOTES
This tech wore appropriate PPE during patient encounter. Hand hygiene was performed before and after each patient encounter.     Viry Verde, PCT  10/11/21 123

## 2021-10-12 ENCOUNTER — ANESTHESIA (OUTPATIENT)
Dept: PERIOP | Facility: HOSPITAL | Age: 51
End: 2021-10-12

## 2021-10-12 ENCOUNTER — READMISSION MANAGEMENT (OUTPATIENT)
Dept: CALL CENTER | Facility: HOSPITAL | Age: 51
End: 2021-10-12

## 2021-10-12 ENCOUNTER — ANESTHESIA EVENT (OUTPATIENT)
Dept: PERIOP | Facility: HOSPITAL | Age: 51
End: 2021-10-12

## 2021-10-12 LAB
ANION GAP SERPL CALCULATED.3IONS-SCNC: 17.6 MMOL/L (ref 5–15)
BASOPHILS # BLD AUTO: 0.07 10*3/MM3 (ref 0–0.2)
BASOPHILS NFR BLD AUTO: 0.8 % (ref 0–1.5)
BUN SERPL-MCNC: 33 MG/DL (ref 6–20)
BUN/CREAT SERPL: 6.6 (ref 7–25)
CALCIUM SPEC-SCNC: 8.6 MG/DL (ref 8.6–10.5)
CHLORIDE SERPL-SCNC: 114 MMOL/L (ref 98–107)
CO2 SERPL-SCNC: 12.4 MMOL/L (ref 22–29)
CREAT SERPL-MCNC: 4.97 MG/DL (ref 0.57–1)
DEPRECATED RDW RBC AUTO: 47.1 FL (ref 37–54)
EOSINOPHIL # BLD AUTO: 0.3 10*3/MM3 (ref 0–0.4)
EOSINOPHIL NFR BLD AUTO: 3.6 % (ref 0.3–6.2)
ERYTHROCYTE [DISTWIDTH] IN BLOOD BY AUTOMATED COUNT: 12.9 % (ref 12.3–15.4)
GFR SERPL CREATININE-BSD FRML MDRD: 9 ML/MIN/1.73
GFR SERPL CREATININE-BSD FRML MDRD: ABNORMAL ML/MIN/{1.73_M2}
GLUCOSE SERPL-MCNC: 98 MG/DL (ref 65–99)
HCT VFR BLD AUTO: 30.3 % (ref 34–46.6)
HGB BLD-MCNC: 9.1 G/DL (ref 12–15.9)
IMM GRANULOCYTES # BLD AUTO: 0.18 10*3/MM3 (ref 0–0.05)
IMM GRANULOCYTES NFR BLD AUTO: 2.2 % (ref 0–0.5)
LYMPHOCYTES # BLD AUTO: 1.75 10*3/MM3 (ref 0.7–3.1)
LYMPHOCYTES NFR BLD AUTO: 21.1 % (ref 19.6–45.3)
MCH RBC QN AUTO: 29.8 PG (ref 26.6–33)
MCHC RBC AUTO-ENTMCNC: 30 G/DL (ref 31.5–35.7)
MCV RBC AUTO: 99.3 FL (ref 79–97)
MONOCYTES # BLD AUTO: 0.52 10*3/MM3 (ref 0.1–0.9)
MONOCYTES NFR BLD AUTO: 6.3 % (ref 5–12)
NEUTROPHILS NFR BLD AUTO: 5.47 10*3/MM3 (ref 1.7–7)
NEUTROPHILS NFR BLD AUTO: 66 % (ref 42.7–76)
NRBC BLD AUTO-RTO: 0.1 /100 WBC (ref 0–0.2)
PLATELET # BLD AUTO: 399 10*3/MM3 (ref 140–450)
PMV BLD AUTO: 10.4 FL (ref 6–12)
POTASSIUM SERPL-SCNC: 4.6 MMOL/L (ref 3.5–5.2)
RBC # BLD AUTO: 3.05 10*6/MM3 (ref 3.77–5.28)
SODIUM SERPL-SCNC: 144 MMOL/L (ref 136–145)
WBC # BLD AUTO: 8.29 10*3/MM3 (ref 3.4–10.8)

## 2021-10-12 PROCEDURE — 25010000002 FENTANYL CITRATE (PF) 50 MCG/ML SOLUTION: Performed by: ANESTHESIOLOGY

## 2021-10-12 PROCEDURE — 0DNW4ZZ RELEASE PERITONEUM, PERCUTANEOUS ENDOSCOPIC APPROACH: ICD-10-PCS | Performed by: SURGERY

## 2021-10-12 PROCEDURE — 87205 SMEAR GRAM STAIN: CPT | Performed by: HOSPITALIST

## 2021-10-12 PROCEDURE — 0DN84ZZ RELEASE SMALL INTESTINE, PERCUTANEOUS ENDOSCOPIC APPROACH: ICD-10-PCS | Performed by: SURGERY

## 2021-10-12 PROCEDURE — 25010000002 ONDANSETRON PER 1 MG: Performed by: ANESTHESIOLOGY

## 2021-10-12 PROCEDURE — 87070 CULTURE OTHR SPECIMN AEROBIC: CPT | Performed by: HOSPITALIST

## 2021-10-12 PROCEDURE — 25010000002 PHENYLEPHRINE 10 MG/ML SOLUTION: Performed by: ANESTHESIOLOGY

## 2021-10-12 PROCEDURE — 0FT44ZZ RESECTION OF GALLBLADDER, PERCUTANEOUS ENDOSCOPIC APPROACH: ICD-10-PCS | Performed by: SURGERY

## 2021-10-12 PROCEDURE — 99222 1ST HOSP IP/OBS MODERATE 55: CPT | Performed by: SURGERY

## 2021-10-12 PROCEDURE — 85025 COMPLETE CBC W/AUTO DIFF WBC: CPT | Performed by: INTERNAL MEDICINE

## 2021-10-12 PROCEDURE — 25010000002 HYDROMORPHONE PER 4 MG: Performed by: SURGERY

## 2021-10-12 PROCEDURE — 0 IOTHALAMATE 60 % SOLUTION: Performed by: SURGERY

## 2021-10-12 PROCEDURE — 87205 SMEAR GRAM STAIN: CPT | Performed by: SURGERY

## 2021-10-12 PROCEDURE — C1889 IMPLANT/INSERT DEVICE, NOC: HCPCS | Performed by: SURGERY

## 2021-10-12 PROCEDURE — 87070 CULTURE OTHR SPECIMN AEROBIC: CPT | Performed by: SURGERY

## 2021-10-12 PROCEDURE — 25010000002 LEVOFLOXACIN PER 250 MG: Performed by: SURGERY

## 2021-10-12 PROCEDURE — 25010000002 HYDROMORPHONE PER 4 MG: Performed by: ANESTHESIOLOGY

## 2021-10-12 PROCEDURE — 25010000002 MIDAZOLAM PER 1 MG: Performed by: ANESTHESIOLOGY

## 2021-10-12 PROCEDURE — 25010000002 DEXAMETHASONE PER 1 MG: Performed by: ANESTHESIOLOGY

## 2021-10-12 PROCEDURE — 0DNN4ZZ RELEASE SIGMOID COLON, PERCUTANEOUS ENDOSCOPIC APPROACH: ICD-10-PCS | Performed by: SURGERY

## 2021-10-12 PROCEDURE — 80048 BASIC METABOLIC PNL TOTAL CA: CPT | Performed by: INTERNAL MEDICINE

## 2021-10-12 PROCEDURE — 0WWG43Z REVISION OF INFUSION DEVICE IN PERITONEAL CAVITY, PERCUTANEOUS ENDOSCOPIC APPROACH: ICD-10-PCS | Performed by: SURGERY

## 2021-10-12 PROCEDURE — 87015 SPECIMEN INFECT AGNT CONCNTJ: CPT | Performed by: SURGERY

## 2021-10-12 PROCEDURE — 87075 CULTR BACTERIA EXCEPT BLOOD: CPT | Performed by: SURGERY

## 2021-10-12 PROCEDURE — 25010000002 NEOSTIGMINE 10 MG/10ML SOLUTION: Performed by: ANESTHESIOLOGY

## 2021-10-12 PROCEDURE — 88304 TISSUE EXAM BY PATHOLOGIST: CPT | Performed by: SURGERY

## 2021-10-12 PROCEDURE — 87176 TISSUE HOMOGENIZATION CULTR: CPT | Performed by: SURGERY

## 2021-10-12 PROCEDURE — 49325 LAP REVISION PERM IP CATH: CPT | Performed by: SURGERY

## 2021-10-12 PROCEDURE — 25010000002 PROPOFOL 10 MG/ML EMULSION: Performed by: ANESTHESIOLOGY

## 2021-10-12 PROCEDURE — 47562 LAPAROSCOPIC CHOLECYSTECTOMY: CPT | Performed by: SURGERY

## 2021-10-12 PROCEDURE — 25010000002 HEPARIN (PORCINE) PER 1000 UNITS: Performed by: SURGERY

## 2021-10-12 DEVICE — LIGAMAX 5 MM ENDOSCOPIC MULTIPLE CLIP APPLIER
Type: IMPLANTABLE DEVICE | Site: ABDOMEN | Status: FUNCTIONAL
Brand: LIGAMAX

## 2021-10-12 RX ORDER — HYDROMORPHONE HCL 110MG/55ML
PATIENT CONTROLLED ANALGESIA SYRINGE INTRAVENOUS AS NEEDED
Status: DISCONTINUED | OUTPATIENT
Start: 2021-10-12 | End: 2021-10-12 | Stop reason: SURG

## 2021-10-12 RX ORDER — DIPHENHYDRAMINE HCL 25 MG
25 CAPSULE ORAL
Status: DISCONTINUED | OUTPATIENT
Start: 2021-10-12 | End: 2021-10-12 | Stop reason: HOSPADM

## 2021-10-12 RX ORDER — LEVOFLOXACIN 5 MG/ML
500 INJECTION, SOLUTION INTRAVENOUS
Status: COMPLETED | OUTPATIENT
Start: 2021-10-12 | End: 2021-10-12

## 2021-10-12 RX ORDER — HYDROMORPHONE HYDROCHLORIDE 1 MG/ML
0.5 INJECTION, SOLUTION INTRAMUSCULAR; INTRAVENOUS; SUBCUTANEOUS
Status: DISCONTINUED | OUTPATIENT
Start: 2021-10-12 | End: 2021-10-14 | Stop reason: HOSPADM

## 2021-10-12 RX ORDER — SODIUM CHLORIDE 0.9 % (FLUSH) 0.9 %
3 SYRINGE (ML) INJECTION EVERY 12 HOURS SCHEDULED
Status: DISCONTINUED | OUTPATIENT
Start: 2021-10-12 | End: 2021-10-12 | Stop reason: HOSPADM

## 2021-10-12 RX ORDER — FENTANYL CITRATE 50 UG/ML
INJECTION, SOLUTION INTRAMUSCULAR; INTRAVENOUS AS NEEDED
Status: DISCONTINUED | OUTPATIENT
Start: 2021-10-12 | End: 2021-10-12 | Stop reason: SURG

## 2021-10-12 RX ORDER — MAGNESIUM HYDROXIDE 1200 MG/15ML
LIQUID ORAL AS NEEDED
Status: DISCONTINUED | OUTPATIENT
Start: 2021-10-12 | End: 2021-10-12 | Stop reason: HOSPADM

## 2021-10-12 RX ORDER — DEXAMETHASONE SODIUM PHOSPHATE 10 MG/ML
INJECTION INTRAMUSCULAR; INTRAVENOUS AS NEEDED
Status: DISCONTINUED | OUTPATIENT
Start: 2021-10-12 | End: 2021-10-12 | Stop reason: SURG

## 2021-10-12 RX ORDER — FLUMAZENIL 0.1 MG/ML
0.2 INJECTION INTRAVENOUS AS NEEDED
Status: DISCONTINUED | OUTPATIENT
Start: 2021-10-12 | End: 2021-10-12 | Stop reason: HOSPADM

## 2021-10-12 RX ORDER — CLINDAMYCIN PHOSPHATE 900 MG/50ML
900 INJECTION INTRAVENOUS
Status: COMPLETED | OUTPATIENT
Start: 2021-10-12 | End: 2021-10-12

## 2021-10-12 RX ORDER — DIPHENHYDRAMINE HYDROCHLORIDE 50 MG/ML
12.5 INJECTION INTRAMUSCULAR; INTRAVENOUS
Status: DISCONTINUED | OUTPATIENT
Start: 2021-10-12 | End: 2021-10-12 | Stop reason: HOSPADM

## 2021-10-12 RX ORDER — SODIUM CHLORIDE 0.9 % (FLUSH) 0.9 %
3-10 SYRINGE (ML) INJECTION AS NEEDED
Status: DISCONTINUED | OUTPATIENT
Start: 2021-10-12 | End: 2021-10-12 | Stop reason: HOSPADM

## 2021-10-12 RX ORDER — PROPOFOL 10 MG/ML
VIAL (ML) INTRAVENOUS AS NEEDED
Status: DISCONTINUED | OUTPATIENT
Start: 2021-10-12 | End: 2021-10-12 | Stop reason: SURG

## 2021-10-12 RX ORDER — HYDROCODONE BITARTRATE AND ACETAMINOPHEN 5; 325 MG/1; MG/1
1 TABLET ORAL EVERY 4 HOURS PRN
Status: DISCONTINUED | OUTPATIENT
Start: 2021-10-12 | End: 2021-10-14 | Stop reason: HOSPADM

## 2021-10-12 RX ORDER — LIDOCAINE HYDROCHLORIDE 10 MG/ML
0.5 INJECTION, SOLUTION EPIDURAL; INFILTRATION; INTRACAUDAL; PERINEURAL ONCE AS NEEDED
Status: DISCONTINUED | OUTPATIENT
Start: 2021-10-12 | End: 2021-10-12 | Stop reason: HOSPADM

## 2021-10-12 RX ORDER — NALOXONE HCL 0.4 MG/ML
0.2 VIAL (ML) INJECTION AS NEEDED
Status: DISCONTINUED | OUTPATIENT
Start: 2021-10-12 | End: 2021-10-12 | Stop reason: HOSPADM

## 2021-10-12 RX ORDER — ONDANSETRON 2 MG/ML
4 INJECTION INTRAMUSCULAR; INTRAVENOUS ONCE AS NEEDED
Status: DISCONTINUED | OUTPATIENT
Start: 2021-10-12 | End: 2021-10-12 | Stop reason: HOSPADM

## 2021-10-12 RX ORDER — FAMOTIDINE 10 MG/ML
20 INJECTION, SOLUTION INTRAVENOUS ONCE
Status: COMPLETED | OUTPATIENT
Start: 2021-10-12 | End: 2021-10-12

## 2021-10-12 RX ORDER — PHENYLEPHRINE HYDROCHLORIDE 10 MG/ML
INJECTION INTRAVENOUS AS NEEDED
Status: DISCONTINUED | OUTPATIENT
Start: 2021-10-12 | End: 2021-10-12 | Stop reason: SURG

## 2021-10-12 RX ORDER — HYDROCODONE BITARTRATE AND ACETAMINOPHEN 7.5; 325 MG/1; MG/1
1 TABLET ORAL ONCE AS NEEDED
Status: DISCONTINUED | OUTPATIENT
Start: 2021-10-12 | End: 2021-10-12 | Stop reason: HOSPADM

## 2021-10-12 RX ORDER — BUPIVACAINE HYDROCHLORIDE AND EPINEPHRINE 5; 5 MG/ML; UG/ML
INJECTION, SOLUTION EPIDURAL; INTRACAUDAL; PERINEURAL AS NEEDED
Status: DISCONTINUED | OUTPATIENT
Start: 2021-10-12 | End: 2021-10-12 | Stop reason: HOSPADM

## 2021-10-12 RX ORDER — FENTANYL CITRATE 50 UG/ML
50 INJECTION, SOLUTION INTRAMUSCULAR; INTRAVENOUS
Status: DISCONTINUED | OUTPATIENT
Start: 2021-10-12 | End: 2021-10-12 | Stop reason: HOSPADM

## 2021-10-12 RX ORDER — NEOSTIGMINE METHYLSULFATE 1 MG/ML
INJECTION, SOLUTION INTRAVENOUS AS NEEDED
Status: DISCONTINUED | OUTPATIENT
Start: 2021-10-12 | End: 2021-10-12 | Stop reason: SURG

## 2021-10-12 RX ORDER — OXYCODONE AND ACETAMINOPHEN 10; 325 MG/1; MG/1
1 TABLET ORAL EVERY 4 HOURS PRN
Status: DISCONTINUED | OUTPATIENT
Start: 2021-10-12 | End: 2021-10-12 | Stop reason: HOSPADM

## 2021-10-12 RX ORDER — SODIUM CHLORIDE 9 MG/ML
INJECTION, SOLUTION INTRAVENOUS AS NEEDED
Status: DISCONTINUED | OUTPATIENT
Start: 2021-10-12 | End: 2021-10-12 | Stop reason: HOSPADM

## 2021-10-12 RX ORDER — ONDANSETRON 2 MG/ML
INJECTION INTRAMUSCULAR; INTRAVENOUS AS NEEDED
Status: DISCONTINUED | OUTPATIENT
Start: 2021-10-12 | End: 2021-10-12 | Stop reason: SURG

## 2021-10-12 RX ORDER — PROMETHAZINE HYDROCHLORIDE 25 MG/1
25 SUPPOSITORY RECTAL ONCE AS NEEDED
Status: DISCONTINUED | OUTPATIENT
Start: 2021-10-12 | End: 2021-10-12 | Stop reason: HOSPADM

## 2021-10-12 RX ORDER — LIDOCAINE HYDROCHLORIDE 20 MG/ML
INJECTION, SOLUTION INFILTRATION; PERINEURAL AS NEEDED
Status: DISCONTINUED | OUTPATIENT
Start: 2021-10-12 | End: 2021-10-12 | Stop reason: SURG

## 2021-10-12 RX ORDER — HYDROMORPHONE HYDROCHLORIDE 1 MG/ML
0.5 INJECTION, SOLUTION INTRAMUSCULAR; INTRAVENOUS; SUBCUTANEOUS
Status: DISCONTINUED | OUTPATIENT
Start: 2021-10-12 | End: 2021-10-12 | Stop reason: HOSPADM

## 2021-10-12 RX ORDER — EPHEDRINE SULFATE 50 MG/ML
5 INJECTION, SOLUTION INTRAVENOUS ONCE AS NEEDED
Status: DISCONTINUED | OUTPATIENT
Start: 2021-10-12 | End: 2021-10-12 | Stop reason: HOSPADM

## 2021-10-12 RX ORDER — MIDAZOLAM HYDROCHLORIDE 1 MG/ML
1 INJECTION INTRAMUSCULAR; INTRAVENOUS
Status: DISCONTINUED | OUTPATIENT
Start: 2021-10-12 | End: 2021-10-12 | Stop reason: HOSPADM

## 2021-10-12 RX ORDER — ROCURONIUM BROMIDE 10 MG/ML
INJECTION, SOLUTION INTRAVENOUS AS NEEDED
Status: DISCONTINUED | OUTPATIENT
Start: 2021-10-12 | End: 2021-10-12 | Stop reason: SURG

## 2021-10-12 RX ORDER — PROMETHAZINE HYDROCHLORIDE 25 MG/1
25 TABLET ORAL ONCE AS NEEDED
Status: DISCONTINUED | OUTPATIENT
Start: 2021-10-12 | End: 2021-10-12 | Stop reason: HOSPADM

## 2021-10-12 RX ORDER — FAMOTIDINE 20 MG/1
20 TABLET, FILM COATED ORAL DAILY
Status: DISCONTINUED | OUTPATIENT
Start: 2021-10-13 | End: 2021-10-14 | Stop reason: HOSPADM

## 2021-10-12 RX ORDER — SODIUM CHLORIDE, SODIUM LACTATE, POTASSIUM CHLORIDE, CALCIUM CHLORIDE 600; 310; 30; 20 MG/100ML; MG/100ML; MG/100ML; MG/100ML
9 INJECTION, SOLUTION INTRAVENOUS CONTINUOUS
Status: DISCONTINUED | OUTPATIENT
Start: 2021-10-12 | End: 2021-10-13

## 2021-10-12 RX ORDER — MIDODRINE HYDROCHLORIDE 5 MG/1
10 TABLET ORAL ONCE
Status: DISCONTINUED | OUTPATIENT
Start: 2021-10-12 | End: 2021-10-12 | Stop reason: HOSPADM

## 2021-10-12 RX ORDER — HYDROCODONE BITARTRATE AND ACETAMINOPHEN 10; 325 MG/1; MG/1
1 TABLET ORAL EVERY 4 HOURS PRN
Status: DISCONTINUED | OUTPATIENT
Start: 2021-10-12 | End: 2021-10-14 | Stop reason: HOSPADM

## 2021-10-12 RX ORDER — SODIUM CHLORIDE 9 MG/ML
INJECTION, SOLUTION INTRAVENOUS CONTINUOUS PRN
Status: DISCONTINUED | OUTPATIENT
Start: 2021-10-12 | End: 2021-10-12 | Stop reason: SURG

## 2021-10-12 RX ADMIN — NEOSTIGMINE METHYLSULFATE 3 MG: 1 INJECTION INTRAVENOUS at 18:09

## 2021-10-12 RX ADMIN — PHENYLEPHRINE HYDROCHLORIDE 100 MCG: 10 INJECTION, SOLUTION INTRAVENOUS at 16:30

## 2021-10-12 RX ADMIN — SODIUM CHLORIDE: 9 INJECTION, SOLUTION INTRAVENOUS at 16:14

## 2021-10-12 RX ADMIN — PHENYLEPHRINE HYDROCHLORIDE 100 MCG: 10 INJECTION, SOLUTION INTRAVENOUS at 16:38

## 2021-10-12 RX ADMIN — LEVOTHYROXINE SODIUM 137 MCG: 0.14 TABLET ORAL at 06:16

## 2021-10-12 RX ADMIN — FAMOTIDINE 20 MG: 10 INJECTION INTRAVENOUS at 15:52

## 2021-10-12 RX ADMIN — FENTANYL CITRATE 25 MCG: 0.05 INJECTION, SOLUTION INTRAMUSCULAR; INTRAVENOUS at 18:06

## 2021-10-12 RX ADMIN — POTASSIUM CHLORIDE 40 MEQ: 750 TABLET, EXTENDED RELEASE ORAL at 20:38

## 2021-10-12 RX ADMIN — LEVOFLOXACIN 500 MG: 500 INJECTION, SOLUTION INTRAVENOUS at 15:45

## 2021-10-12 RX ADMIN — PHENYLEPHRINE HYDROCHLORIDE 100 MCG: 10 INJECTION, SOLUTION INTRAVENOUS at 16:20

## 2021-10-12 RX ADMIN — FENTANYL CITRATE 25 MCG: 0.05 INJECTION, SOLUTION INTRAMUSCULAR; INTRAVENOUS at 17:05

## 2021-10-12 RX ADMIN — ROCURONIUM BROMIDE 40 MG: 50 INJECTION INTRAVENOUS at 16:17

## 2021-10-12 RX ADMIN — DEXAMETHASONE SODIUM PHOSPHATE 8 MG: 10 INJECTION INTRAMUSCULAR; INTRAVENOUS at 16:21

## 2021-10-12 RX ADMIN — HYDROMORPHONE HYDROCHLORIDE 0.5 MG: 2 INJECTION, SOLUTION INTRAMUSCULAR; INTRAVENOUS; SUBCUTANEOUS at 18:21

## 2021-10-12 RX ADMIN — LIDOCAINE HYDROCHLORIDE 60 MG: 20 INJECTION, SOLUTION INFILTRATION; PERINEURAL at 16:16

## 2021-10-12 RX ADMIN — FENTANYL CITRATE 50 MCG: 50 INJECTION INTRAMUSCULAR; INTRAVENOUS at 19:06

## 2021-10-12 RX ADMIN — SODIUM CHLORIDE, POTASSIUM CHLORIDE, SODIUM LACTATE AND CALCIUM CHLORIDE 9 ML/HR: 600; 310; 30; 20 INJECTION, SOLUTION INTRAVENOUS at 15:45

## 2021-10-12 RX ADMIN — CLINDAMYCIN IN 5 PERCENT DEXTROSE 900 MG: 18 INJECTION, SOLUTION INTRAVENOUS at 15:45

## 2021-10-12 RX ADMIN — Medication 500 MG: at 10:15

## 2021-10-12 RX ADMIN — GLYCOPYRROLATE 0.6 MG: 0.2 INJECTION, SOLUTION INTRAMUSCULAR; INTRAVITREAL at 18:09

## 2021-10-12 RX ADMIN — PHENYLEPHRINE HYDROCHLORIDE 100 MCG: 10 INJECTION, SOLUTION INTRAVENOUS at 16:35

## 2021-10-12 RX ADMIN — ESCITALOPRAM 20 MG: 20 TABLET, FILM COATED ORAL at 10:13

## 2021-10-12 RX ADMIN — SODIUM CHLORIDE: 9 INJECTION, SOLUTION INTRAVENOUS at 18:23

## 2021-10-12 RX ADMIN — POTASSIUM CHLORIDE 40 MEQ: 750 TABLET, EXTENDED RELEASE ORAL at 10:13

## 2021-10-12 RX ADMIN — FENTANYL CITRATE 25 MCG: 0.05 INJECTION, SOLUTION INTRAMUSCULAR; INTRAVENOUS at 17:39

## 2021-10-12 RX ADMIN — MIDODRINE HYDROCHLORIDE 10 MG: 5 TABLET ORAL at 20:41

## 2021-10-12 RX ADMIN — HYDROCODONE BITARTRATE AND ACETAMINOPHEN 1 TABLET: 10; 325 TABLET ORAL at 23:29

## 2021-10-12 RX ADMIN — PROPOFOL 160 MG: 10 INJECTION, EMULSION INTRAVENOUS at 16:16

## 2021-10-12 RX ADMIN — MIDODRINE HYDROCHLORIDE 10 MG: 5 TABLET ORAL at 06:16

## 2021-10-12 RX ADMIN — HYDROMORPHONE HYDROCHLORIDE 0.5 MG: 1 INJECTION, SOLUTION INTRAMUSCULAR; INTRAVENOUS; SUBCUTANEOUS at 20:20

## 2021-10-12 RX ADMIN — FENTANYL CITRATE 25 MCG: 0.05 INJECTION, SOLUTION INTRAMUSCULAR; INTRAVENOUS at 16:15

## 2021-10-12 RX ADMIN — MIDODRINE HYDROCHLORIDE 10 MG: 5 TABLET ORAL at 20:38

## 2021-10-12 RX ADMIN — FAMOTIDINE 20 MG: 20 TABLET, FILM COATED ORAL at 10:14

## 2021-10-12 RX ADMIN — MIDAZOLAM 1 MG: 1 INJECTION INTRAMUSCULAR; INTRAVENOUS at 16:07

## 2021-10-12 RX ADMIN — ONDANSETRON 4 MG: 2 INJECTION INTRAMUSCULAR; INTRAVENOUS at 18:09

## 2021-10-12 NOTE — PLAN OF CARE
Goal Outcome Evaluation:   Patient down in surgery to have peritoneal cath repaired and to also have gallbladder out.  Call light in reach.  Meds given PRN.  Hourly rounding completed.

## 2021-10-12 NOTE — PROGRESS NOTES
NEPHROLOGY PROGRESS NOTE    PATIENT IDENTIFICATION:   Name:  Ambar Lara      MRN:  [id number]     51 y.o.  female             Reason for visit: ESRD    SUBJECTIVE:   Seen and examined.  No shortness of air or chest pain.  Complaining of abdominal pain.  OBJECTIVE:  Vitals:    10/11/21 1701 10/11/21 1838 10/12/21 0530 10/12/21 0616   BP: 101/74 112/71  95/61   BP Location:  Right arm  Right arm   Patient Position:  Sitting  Sitting   Pulse: 91 100 95    Resp:   20    Temp:  98 °F (36.7 °C) 97 °F (36.1 °C)    TempSrc:  Oral Oral    SpO2: 99% 97% 95%    Weight:  78.1 kg (172 lb 1.6 oz)             Body mass index is 32.52 kg/m².  No intake or output data in the 24 hours ending 10/12/21 0830      Exam:  GEN:  No distress, appears stated age  EYES:   Anicteric sclera  ENT:    External ears/nose normal, MM are   NECK:  No adenopathy, JVP   LUNGS: Normal chest on inspection; not labored  CV:  Normal S1S2, without murmur  ABD:  Non-tender, non-distended, no hepatosplenomegaly, +BS  EXT:  No edema; no cyanosis; clubbing    Scheduled meds:  aspirin, 81 mg, Oral, Daily  b complex-vitamin c-folic acid, 1 tablet, Oral, Daily  escitalopram, 20 mg, Oral, Daily  famotidine, 20 mg, Oral, BID  lanthanum, 750 mg, Oral, TID With Meals  levothyroxine, 137 mcg, Oral, Q AM  melatonin, 3 mg, Oral, Nightly  midodrine, 10 mg, Oral, TID AC  montelukast, 10 mg, Oral, Nightly  multivitamin, 1 tablet, Oral, Daily  potassium chloride, 40 mEq, Oral, BID With Meals  saccharomyces boulardii, 500 mg, Oral, BID  sodium bicarbonate, 650 mg, Oral, 4x Daily  traZODone, 100 mg, Oral, Nightly      IV meds:                           Data Review:    Results from last 7 days   Lab Units 10/11/21  1127   SODIUM mmol/L 142   POTASSIUM mmol/L 4.1   CHLORIDE mmol/L 113*   CO2 mmol/L 15.3*   BUN mg/dL 34*   CREATININE mg/dL 4.84*   CALCIUM mg/dL 8.4*   BILIRUBIN mg/dL <0.2   ALK PHOS U/L 67   ALT (SGPT) U/L 11   AST (SGOT) U/L 9   GLUCOSE mg/dL 128*        Estimated Creatinine Clearance: 13.3 mL/min (A) (by C-G formula based on SCr of 4.84 mg/dL (H)).          Results from last 7 days   Lab Units 10/11/21  1127   WBC 10*3/mm3 7.85   HEMOGLOBIN g/dL 9.4*   PLATELETS 10*3/mm3 359       Results from last 7 days   Lab Units 10/11/21  1127   INR  1.16*             ASSESSMENT:     PD catheter dysfunction (HCC)        -End-stage renal disease on peritoneal dialysis with malfunctioning PD catheter: CT abdomen was obtained and PD catheter appears to be intact but cannot rule out omentum wrap.  Awaiting surgery evaluation.     -Mild metabolic acidosis: Will start patient on gentle IV hydration with bicarb drip  -Anemia of end-stage renal disease  -Secondary hyperparathyroidism    PLAN:    Awaiting for surgical evaluation.  I suspect that patient might need to be switched permanently to hemodialysis.  IV fluid with bicarb drip  Surveillance labs        Camila Salas MD  10/12/2021    08:30 EDT

## 2021-10-12 NOTE — PROGRESS NOTES
DAILY PROGRESS NOTE  [facility]    Patient Identification:  Name: Ambar Lara  Age: 51 y.o.  Sex: female  :  1970  MRN: [id number]         Primary Care Physician: Jet Manuel MD    Subjective:  Interval History:She complains of pain.    Objective:    Scheduled Meds:aspirin, 81 mg, Oral, Daily  b complex-vitamin c-folic acid, 1 tablet, Oral, Daily  escitalopram, 20 mg, Oral, Daily  famotidine, 20 mg, Oral, BID  lanthanum, 750 mg, Oral, TID With Meals  levothyroxine, 137 mcg, Oral, Q AM  melatonin, 3 mg, Oral, Nightly  midodrine, 10 mg, Oral, TID AC  montelukast, 10 mg, Oral, Nightly  multivitamin, 1 tablet, Oral, Daily  potassium chloride, 40 mEq, Oral, BID With Meals  saccharomyces boulardii, 500 mg, Oral, BID  sodium bicarbonate, 650 mg, Oral, 4x Daily  IV Fluids 1000 mL + additives, 75 mL/hr, Intravenous, Once  traZODone, 100 mg, Oral, Nightly      Continuous Infusions:     Vital signs in last 24 hours:  Temp:  [97 °F (36.1 °C)-98 °F (36.7 °C)] 97 °F (36.1 °C)  Heart Rate:  [] 86  Resp:  [20] 20  BP: ()/(61-74) 99/68    Intake/Output:  No intake or output data in the 24 hours ending 10/12/21 1338    Exam:  BP 99/68 (BP Location: Right arm, Patient Position: Lying)   Pulse 86   Temp 97 °F (36.1 °C) (Oral)   Resp 20   Wt 78.1 kg (172 lb 1.6 oz)   LMP 2004 (Approximate)   SpO2 97%   BMI 32.52 kg/m²     General Appearance:    Alert, cooperative, no distress   Head:    Normocephalic, without obvious abnormality, atraumatic   Eyes:       Throat:   Lips, tongue, gums normal   Neck:   Supple, symmetrical, trachea midline, no JVD   Lungs:     Clear to auscultation bilaterally, respirations unlabored   Chest Wall:    No tenderness or deformity    Heart:    Regular rate and rhythm, S1 and S2 normal, no murmur,no  Rub or gallop   Abdomen:     Soft, mildl tender, bowel sounds active, no masses, no organomegaly    Extremities:   Extremities normal, atraumatic, no  cyanosis or edema   Pulses:      Skin:   Skin is warm and dry,  no rashes or palpable lesions   Neurologic:   no focal deficits noted      Lab Results (last 72 hours)     Procedure Component Value Units Date/Time    Basic Metabolic Panel [[id number]]  (Abnormal) Collected: 10/12/21 0750    Specimen: Blood Updated: 10/12/21 1042     Glucose 98 mg/dL      BUN 33 mg/dL      Creatinine 4.97 mg/dL      Sodium 144 mmol/L      Potassium 4.6 mmol/L      Chloride 114 mmol/L      CO2 12.4 mmol/L      Calcium 8.6 mg/dL      eGFR   Amer --     Comment: <15 Indicative of kidney failure.        eGFR Non African Amer 9 mL/min/1.73      Comment: <15 Indicative of kidney failure.        BUN/Creatinine Ratio 6.6     Anion Gap 17.6 mmol/L     Narrative:      GFR Normal >60  Chronic Kidney Disease <60  Kidney Failure <15      CBC Auto Differential [[id number]]  (Abnormal) Collected: 10/12/21 0750    Specimen: Blood Updated: 10/12/21 1021     WBC 8.29 10*3/mm3      RBC 3.05 10*6/mm3      Hemoglobin 9.1 g/dL      Hematocrit 30.3 %      MCV 99.3 fL      MCH 29.8 pg      MCHC 30.0 g/dL      RDW 12.9 %      RDW-SD 47.1 fl      MPV 10.4 fL      Platelets 399 10*3/mm3      Neutrophil % 66.0 %      Lymphocyte % 21.1 %      Monocyte % 6.3 %      Eosinophil % 3.6 %      Basophil % 0.8 %      Immature Grans % 2.2 %      Neutrophils, Absolute 5.47 10*3/mm3      Lymphocytes, Absolute 1.75 10*3/mm3      Monocytes, Absolute 0.52 10*3/mm3      Eosinophils, Absolute 0.30 10*3/mm3      Basophils, Absolute 0.07 10*3/mm3      Immature Grans, Absolute 0.18 10*3/mm3      nRBC 0.1 /100 WBC     COVID PRE-OP / PRE-PROCEDURE SCREENING ORDER (NO ISOLATION) - Swab, Nasopharynx [[id number]]  (Normal) Collected: 10/11/21 1501    Specimen: Swab from Nasopharynx Updated: 10/11/21 1629    Narrative:      The following orders were created for panel order COVID PRE-OP / PRE-PROCEDURE SCREENING ORDER (NO ISOLATION) - Swab, Nasopharynx.  Procedure                                Abnormality         Status                     ---------                               -----------         ------                     COVID-19,[facility]...[[id number]]  Normal              Final result                 Please view results for these tests on the individual orders.    COVID-19,[facility] CEPHEID/ANTONELLA NP SWAB IN TRANSPORT MEDIA 8-12 HR TAT - Swab, Nasopharynx [[id number]]  (Normal) Collected: 10/11/21 1501    Specimen: Swab from Nasopharynx Updated: 10/11/21 1629     COVID19 Not Detected    Narrative:      Fact sheet for providers: https://www.fda.gov/media/895847/download    Fact sheet for patients: https://www.fda.gov/media/587350/download    Test performed by PCR.    [place] Draw [[id number]] Collected: 10/11/21 1127    Specimen: Blood Updated: 10/11/21 1230    Narrative:      The following orders were created for panel order [place] Draw.  Procedure                               Abnormality         Status                     ---------                               -----------         ------                     Green Top (Gel)[[id number]]                                  Final result               Lavender Top[[id number]]                                     Final result               Gold Top - SST[[id number]]                                   Final result               Light Blue Top[[id number]]                                   Final result                 Please view results for these tests on the individual orders.    Gold Top - SST [[id number]] Collected: 10/11/21 1127    Specimen: Blood Updated: 10/11/21 1230     Extra Tube Hold for add-ons.     Comment: Auto resulted.       Light Blue Top [[id number]] Collected: 10/11/21 1127    Specimen: Blood Updated: 10/11/21 1230     Extra Tube hold for add-on     Comment: Auto resulted       Lavender Top [[id number]] Collected: 10/11/21 1127    Specimen: Blood Updated: 10/11/21 1230     Extra Tube hold for add-on     Comment: Auto resulted        Green Top (Gel) [[id number]] Collected: 10/11/21 1127    Specimen: Blood Updated: 10/11/21 1230     Extra Tube Hold for add-ons.     Comment: Auto resulted.       Lipase [[id number]]  (Normal) Collected: 10/11/21 1127    Specimen: Blood Updated: 10/11/21 1214     Lipase 26 U/L     Comprehensive Metabolic Panel [[id number]]  (Abnormal) Collected: 10/11/21 1127    Specimen: Blood Updated: 10/11/21 1200     Glucose 128 mg/dL      BUN 34 mg/dL      Creatinine 4.84 mg/dL      Sodium 142 mmol/L      Potassium 4.1 mmol/L      Chloride 113 mmol/L      CO2 15.3 mmol/L      Calcium 8.4 mg/dL      Total Protein 7.1 g/dL      Albumin 3.60 g/dL      ALT (SGPT) 11 U/L      AST (SGOT) 9 U/L      Alkaline Phosphatase 67 U/L      Total Bilirubin <0.2 mg/dL      eGFR Non African Amer 10 mL/min/1.73      Comment: <15 Indicative of kidney failure.        eGFR   Amer --     Comment: <15 Indicative of kidney failure.        Globulin 3.5 gm/dL      A/G Ratio 1.0 g/dL      BUN/Creatinine Ratio 7.0     Anion Gap 13.7 mmol/L     Narrative:      GFR Normal >60  Chronic Kidney Disease <60  Kidney Failure <15      Protime-INR [[id number]]  (Abnormal) Collected: 10/11/21 1127    Specimen: Blood Updated: 10/11/21 1150     Protime 14.6 Seconds      INR 1.16    Urinalysis, Microscopic Only - Urine, Clean Catch [[id number]]  (Abnormal) Collected: 10/11/21 1129    Specimen: Urine, Clean Catch Updated: 10/11/21 1149     RBC, UA 0-2 /HPF      WBC, UA 3-5 /HPF      Bacteria, UA None Seen /HPF      Squamous Epithelial Cells, UA 3-6 /HPF      Hyaline Casts, UA 0-2 /LPF      Methodology Automated Microscopy    Urinalysis With Microscopic If Indicated (No Culture) - Urine, Clean Catch [[id number]]  (Abnormal) Collected: 10/11/21 1129    Specimen: Urine, Clean Catch Updated: 10/11/21 1147     Color, UA Yellow     Appearance, UA Clear     pH, UA 5.5     Specific Gravity, UA 1.010     Glucose, UA Negative     Ketones, UA Negative     Bilirubin, UA Negative      Blood, UA Negative     Protein,  mg/dL (2+)     Leuk Esterase, UA Trace     Nitrite, UA Negative     Urobilinogen, UA 0.2 E.U./dL    CBC & Differential [[id number]]  (Abnormal) Collected: 10/11/21 1127    Specimen: Blood Updated: 10/11/21 1144    Narrative:      The following orders were created for panel order CBC & Differential.  Procedure                               Abnormality         Status                     ---------                               -----------         ------                     CBC Auto Differential[[id number]]        Abnormal            Final result                 Please view results for these tests on the individual orders.    CBC Auto Differential [[id number]]  (Abnormal) Collected: 10/11/21 1127    Specimen: Blood Updated: 10/11/21 1144     WBC 7.85 10*3/mm3      RBC 3.07 10*6/mm3      Hemoglobin 9.4 g/dL      Hematocrit 29.6 %      MCV 96.4 fL      MCH 30.6 pg      MCHC 31.8 g/dL      RDW 13.2 %      RDW-SD 47.0 fl      MPV 9.7 fL      Platelets 359 10*3/mm3      Neutrophil % 68.1 %      Lymphocyte % 20.6 %      Monocyte % 5.1 %      Eosinophil % 3.1 %      Basophil % 0.6 %      Immature Grans % 2.5 %      Neutrophils, Absolute 5.34 10*3/mm3      Lymphocytes, Absolute 1.62 10*3/mm3      Monocytes, Absolute 0.40 10*3/mm3      Eosinophils, Absolute 0.24 10*3/mm3      Basophils, Absolute 0.05 10*3/mm3      Immature Grans, Absolute 0.20 10*3/mm3      nRBC 0.0 /100 WBC         Data Review:  Results from last 7 days   Lab Units 10/12/21  0750 10/11/21  1127 10/11/21  1127   SODIUM mmol/L 144  --  142   POTASSIUM mmol/L 4.6  --  4.1   CHLORIDE mmol/L 114*  --  113*   CO2 mmol/L 12.4*  --  15.3*   BUN mg/dL 33*  --  34*   CREATININE mg/dL 4.97*  --  4.84*   GLUCOSE mg/dL 98   < > 128*   CALCIUM mg/dL 8.6  --  8.4*    < > = values in this interval not displayed.     Results from last 7 days   Lab Units 10/12/21  0750 10/11/21  1127   WBC 10*3/mm3 8.29 7.85   HEMOGLOBIN g/dL 9.1* 9.4*    HEMATOCRIT % 30.3* 29.6*   PLATELETS 10*3/mm3 399 359             Lab Results   Lab Value Date/Time    TROPONINT 0.035 (C) 09/21/2021 1135    TROPONINT 0.040 (C) 04/16/2021 1759    TROPONINT 0.037 (C) 04/16/2021 1602    TROPONINT 0.030 04/09/2021 1631         Results from last 7 days   Lab Units 10/11/21  1127   ALK PHOS U/L 67   BILIRUBIN mg/dL <0.2   ALT (SGPT) U/L 11   AST (SGOT) U/L 9             No results found for: POCGLU  Results from last 7 days   Lab Units 10/11/21  1127   INR  1.16*       Past Medical History:   Diagnosis Date   • CHF (congestive heart failure) (CMS/[facility])    • Dialysis patient (CMS/[facility])    • Disease of thyroid gland    • Elevated cholesterol    • GERD (gastroesophageal reflux disease)    • Renal disorder    • Sleep apnea        Assessment:  Active Hospital Problems    Diagnosis  POA   • **PD catheter dysfunction ([facility]) [T85.611A]  Yes   • Gastroesophageal reflux disease [K21.9]  Yes   • Peritoneal dialysis status ([facility]) [Z99.2]  Not Applicable   • ESRD (end stage renal disease) on dialysis ([facility]) [N18.6, Z99.2]  Not Applicable   • Anxiety [F41.9]  Yes   • Hypothyroidism [E03.9]  Yes      Resolved Hospital Problems   No resolved problems to display.       Plan:  Surgery and renal consults noted. Follow lab.  Surgery today.    Elfego Martins MD  10/12/2021  13:38 EDT

## 2021-10-12 NOTE — ANESTHESIA POSTPROCEDURE EVALUATION
Patient: Ambar Lara    Procedure Summary     Date: 10/12/21 Room / Location: [facility] OR 03 / [facility] MAIN OR    Anesthesia Start: 1610 Anesthesia Stop: 1856    Procedure: LAPAROSCOPIC REVISION OF PERITONEAL DIALYSIS CATHETER AND LAPAROSCOPIC CHOLECYSTECTOMY (Right Abdomen) Diagnosis:       Peritoneal dialysis catheter dysfunction, initial encounter ([facility])      (Peritoneal dialysis catheter dysfunction, initial encounter ([facility]) [T85.614C])    Surgeons: Jamie Figueroa MD Provider: Madeleine Beckwith MD    Anesthesia Type: general ASA Status: 4          Anesthesia Type: general    Vitals  Vitals Value Taken Time   /64 10/12/21 1851   Temp 36.7 °C (98.1 °F) 10/12/21 1836   Pulse 103 10/12/21 1856   Resp 16 10/12/21 1845   SpO2 97 % 10/12/21 1856   Vitals shown include unvalidated device data.        Post Anesthesia Care and Evaluation    Patient location during evaluation: bedside  Patient participation: complete - patient participated  Level of consciousness: awake and alert  Pain management: adequate  Airway patency: patent  Anesthetic complications: No anesthetic complications    Cardiovascular status: acceptable  Respiratory status: acceptable  Hydration status: acceptable    Comments: /60   Pulse 106   Temp 36.7 °C (98.1 °F) (Oral)   Resp 16   Wt 78.1 kg (172 lb 1.6 oz)   LMP 04/16/2004 (Approximate)   SpO2 97%   BMI 32.52 kg/m²     Patient is stable postoperatively and has adequately recovered from anesthesia as described above unless otherwise noted

## 2021-10-12 NOTE — ANESTHESIA PROCEDURE NOTES
Airway  Urgency: elective    Date/Time: 10/12/2021 4:19 PM  Airway not difficult    General Information and Staff    Patient location during procedure: OR  Anesthesiologist: Chuy Washington MD    Indications and Patient Condition  Indications for airway management: airway protection    Preoxygenated: yes  Mask difficulty assessment: 1 - vent by mask    Final Airway Details  Final airway type: endotracheal airway      Successful airway: ETT  Cuffed: yes   Successful intubation technique: direct laryngoscopy  Endotracheal tube insertion site: oral  Blade: Fior  Blade size: 3  ETT size (mm): 7.0  Cormack-Lehane Classification: grade IIa - partial view of glottis  Placement verified by: chest auscultation and capnometry   Measured from: lips  ETT/EBT  to lips (cm): 21  Number of attempts at approach: 1  Assessment: lips, teeth, and gum same as pre-op and atraumatic intubation

## 2021-10-12 NOTE — OP NOTE
PREOPERATIVE DIAGNOSIS:   Dysfunctional peritoneal dialysis catheter  Cholelithiasis    POSTOPERATIVE DIAGNOSIS:   Peritoneal dialysis catheter occluded with proteinaceous plug  Mild to moderate diffuse peritonitis  Cholelithiasis  Chronic cholecystitis    PROCEDURE:   Laparoscopic revision peritoneal dialysis catheter  Laparoscopic cholecystectomy    SURGEON: Jamie Figueroa M.D.    ASSISTANT:  Rafa Celis CSA was responsible for performing the following activities: Retraction, Suction, Irrigation, Suturing, Closing, Placing Dressing and Held/Positioned Camera and their skilled assistance was necessary for the success of this case.     SPECIMENS:   Gallbladder  Peritoneal fluid for culture  Catheter clot for culture    INTRAOPERATIVE COMPLICATIONS: None.  ANESTHESIA: General.  BLOOD LOSS:  10 mL.  COUNTS: Needle and sponge counts correct.     INDICATIONS: This is a pleasant patient who presented to the hospital with a nonfunctioning peritoneal dialysis catheter.  She also has symptomatic cholelithiasis.  She is taken to the operating room today for laparoscopic revision of the PD catheter and cholecystectomy.    DESCRIPTION OF PROCEDURE: The patient was brought to the operating room in stable condition. Perioperative antibiotics were given and sequential compression devices were in place. She was then positioned supine on the operating room table. General anesthesia was induced without difficulty.     Access to the peritoneum was achieved in the supraumbilical position by a cutdown technique. A blunt 12-mm trocar was secured to the fascia with 0 Vicryl stay sutures. The abdomen was then insufflated to 15 mmHg pressure with carbon dioxide. A brief survey of the abdominal cavity revealed no evidence of injury from insertion of the trocar.  The peritoneal surfaces were mild to moderately hyperemic.  There were extensive adhesions in the upper abdomen between omentum and anterior abdominal wall.  There  were small bowel loops and the sigmoid colon adherent to the abdominal wall around the peritoneal dialysis catheter entry point.      The left upper quadrant was relatively free of adhesions and allowed for placement of two 5 mm trochars to begin adhesiolysis.  I used the harmonic scalpel to divide the adhesions between omentum and abdominal wall.  This allowed placement of two subcostal trochars on the right.  These were both 5 mm ports.  There was a scant amount of cloudy peritoneal fluid that I evacuated and submitted for culture.    There were adhesions between the gallbladder and omentum, and the gallbladder wall was thickened, consistent with chronic cholecystitis. The peritoneum at the base of the gallbladder was scored from laterally to medially, terminating at the level of the cystic artery. The peritoneum was gently stripped down, exposing the cystic duct. After completely exposing the cystic duct, a retro-cystic window was created. A firm critical view was obtained, visualizing the cystic duct and cystic artery. There was no evidence of tenting of the common bile duct.     The cystic duct and artery were clipped and divided. The gallbladder was elevated off the liver bed with electrocautery. An effort to careful and meticulous hemostasis was undertaken. The gallbladder was placed in an endoscopic retrieval bag. It was removed through the umbilical trocar site without difficulty.    I then inspected the lower abdomen.  There were small bowel and sigmoid colon adhesions surrounding the peritoneal dialysis catheter insertion point.  These were carefully divided with laparoscopic harmonic scalpel and laparoscopic scissors.  The dissection was tedious, but when complete there was a open space in the pelvis and free unencumbered bowel loops that would allow for continued use of the peritoneal dialysis catheter.     There was a proteinaceous plug occluding the catheter along its entire length.  The catheter was  irrigated to evacuate this.  The tissue plug was submitted for culture.  I instilled 1000 L of heparinized saline into the abdominal cavity.  The flows easily through the PD catheter.    I used the EndoClose device to close all of the 5 mm trocar sites.  I placed a 3-0 Vicryl stitch at each of the 5 mm trocar sites to close the subcutaneous tissues.  The 5 mm trocar sites were closed with 4-0 Monocryl subcuticular stitches.    I closed the umbilical trocar site in 4 layers with 0 Vicryl figure-of-eight stitches for the fascia, two layers of subcutaneous tissue closure with 2-0 and 3-0 Vicryl, and a 4-0 Monocryl running subcuticular stitch. All of the incision sites were painted with waterproof adhesive.      A final survey of the operative site was undertaken, and there was no evidence of bleeding or intrabdominal injury. The insufflation was evacuated as the trocars were removed under laparoscopic vision. The umbilical fascia was closed with interrupted 0 Vicryl suture. The skin incisions were closed with 4-0 Monocryl and adhesive strips.    At the end of the case, all needle and sponge counts were correct, and she was extubated.

## 2021-10-12 NOTE — ANESTHESIA PREPROCEDURE EVALUATION
Anesthesia Evaluation     Patient summary reviewed and Nursing notes reviewed                Airway   Mallampati: II  TM distance: >3 FB  Neck ROM: full  No difficulty expected  Dental      Pulmonary    (+) a smoker Former, sleep apnea,   Cardiovascular     ECG reviewed  Rhythm: regular  Rate: normal    (+) CHF , hyperlipidemia,       Neuro/Psych  (+) psychiatric history Anxiety and Depression,     GI/Hepatic/Renal/Endo    (+)  GERD,  renal disease dialysis and ESRD,     Musculoskeletal (-) negative ROS    Abdominal    Substance History - negative use     OB/GYN negative ob/gyn ROS         Other                        Anesthesia Plan    ASA 4     general   (I have reviewed the patient's history with the patient and the chart, including all pertinent laboratory results and imaging. I have explained the risks of anesthesia including but not limited to dental damage, corneal abrasion, nerve injury, MI, stroke, and death. Questions asked and answered. Anesthetic plan discussed with patient and team as indicated. Patient expressed understanding of the above.   -previous trach but airway described as grade 1  Last dialysis on 10/7 according to our patient)  intravenous induction     Anesthetic plan, all risks, benefits, and alternatives have been provided, discussed and informed consent has been obtained with: patient.

## 2021-10-12 NOTE — CONSULTS
REASON FOR CONSULT:    Nonfunctioning peritoneal dialysis catheter    REQUESTING PHYSICIAN:    Camila Salas M.D.    HISTORY OF PRESENT ILLNESS:    Ambar Lara is a 51 y.o. female who has end-stage renal disease.  She was on the kidney transplant list and underwent placement of the peritoneal dialysis catheter 2 years ago at [facility].  She is no longer a candidate for kidney transplant and depends on peritoneal dialysis.  Since last week she has had difficulty with the tube.  For 2 days she was able to instill fluid, but unable to obtain any effluent.  Since then, however, she has not been able to instill fluid at all.  There is sharp that begins in the right lower quadrant and right-sided abdominal pain radiates up the right flank.  She has not noticed any fever or chills.  She has never experienced an episode of peritoneal dialysis related peritonitis.    She has history of gallstones, and has been advised to proceed with cholecystectomy.    She has history of pulmonary embolism, and is anticoagulated with warfarin.  She has been off warfarin for about a week.    Past Medical History:   Diagnosis Date   • CHF (congestive heart failure) (CMS/[facility])    • Dialysis patient (CMS/[facility])    • Disease of thyroid gland    • Elevated cholesterol    • GERD (gastroesophageal reflux disease)    • Renal disorder    • Sleep apnea        Past Surgical History:   Procedure Laterality Date   • ADRENAL GLAND SURGERY N/A `   •  SECTION Bilateral 2001    Has had x2   • HYSTERECTOMY     • TRACHEOSTOMY           Current Facility-Administered Medications:   •  acetaminophen (TYLENOL) tablet 650 mg, 650 mg, Oral, Q4H PRN **OR** acetaminophen (TYLENOL) 160 MG/5ML solution 650 mg, 650 mg, Oral, Q4H PRN **OR** acetaminophen (TYLENOL) suppository 650 mg, 650 mg, Rectal, Q4H PRN, Binta Patel MD  •  aspirin chewable tablet 81 mg, 81 mg, Oral, Daily, Binta Patel MD  •  b complex-vitamin  c-folic acid (NEPHRO-JHON) tablet 1 tablet, 1 tablet, Oral, Daily, Binta Patel MD  •  escitalopram (LEXAPRO) tablet 20 mg, 20 mg, Oral, Daily, Binta Patel MD  •  famotidine (PEPCID) tablet 20 mg, 20 mg, Oral, BID, Binta Patel MD, 20 mg at 10/11/21 2207  •  lanthanum (FOSRENOL) chewable tablet 750 mg, 750 mg, Oral, TID With Meals, Binta Patel MD  •  levothyroxine (SYNTHROID, LEVOTHROID) tablet 137 mcg, 137 mcg, Oral, Q AM, Binta Patel MD, 137 mcg at 10/12/21 0616  •  melatonin tablet 3 mg, 3 mg, Oral, Nightly, Binta Patel MD, 3 mg at 10/11/21 2207  •  midodrine (PROAMATINE) tablet 10 mg, 10 mg, Oral, TID MARANDA, Mauricio Allen APRN, 10 mg at 10/12/21 0616  •  montelukast (SINGULAIR) tablet 10 mg, 10 mg, Oral, Nightly, Binta Patel MD, 10 mg at 10/11/21 2208  •  multivitamin (THERAGRAN) tablet 1 tablet, 1 tablet, Oral, Daily, Binta Patel MD  •  ondansetron (ZOFRAN) injection 4 mg, 4 mg, Intravenous, Q6H PRN, Binta Patel MD  •  potassium chloride (K-DUR,KLOR-CON) ER tablet 40 mEq, 40 mEq, Oral, BID With Meals, Binta Patel MD, 40 mEq at 10/11/21 2206  •  saccharomyces boulardii (FLORASTOR) capsule 500 mg, 500 mg, Oral, BID, Binta Patel MD, 500 mg at 10/11/21 2207  •  sodium bicarbonate 8.4 % 150 mEq in dextrose (D5W) 5 % 1,000 mL infusion, 75 mL/hr, Intravenous, Once, Camila Salas MD  •  sodium bicarbonate tablet 650 mg, 650 mg, Oral, 4x Daily, Binta Patel MD, 650 mg at 10/11/21 2207  •  sodium chloride 0.9 % flush 10 mL, 10 mL, Intravenous, PRN, Marcos Blackman MD  •  [COMPLETED] Insert peripheral IV, , , Once **AND** sodium chloride 0.9 % flush 10 mL, 10 mL, Intravenous, PRN, Reggie Porter PA  •  traZODone (DESYREL) tablet 100 mg, 100 mg, Oral, Nightly, Binta Patel MD, 100 mg at 10/11/21 2208    Allergies   Allergen Reactions   • Penicillins Anaphylaxis, Hives,  Rash, Shortness Of Breath and Swelling   • Nickel Rash       No family history on file.    Social History     Socioeconomic History   • Marital status:    Tobacco Use   • Smoking status: Former Smoker     Packs/day: 1.00     Years: 25.00     Pack years: 25.00     Types: Cigarettes     Quit date: 4/16/2018     Years since quitting: 3.4   • Smokeless tobacco: Never Used   Vaping Use   • Vaping Use: Former   • Quit date: 2/14/2021   • Substances: Nicotine, Flavoring   • Devices: Pre-filled or refillable cartridge   Substance and Sexual Activity   • Alcohol use: Not Currently   • Drug use: Not Currently   • Sexual activity: Defer       Review of Systems   Constitutional: Negative for fever.   HENT: Negative for trouble swallowing.    Respiratory: Negative for shortness of breath.    Cardiovascular: Negative for chest pain.   Gastrointestinal: Positive for abdominal pain.   Hematological: Bruises/bleeds easily.       Objective     BP 95/61 (BP Location: Right arm, Patient Position: Sitting)   Pulse 95   Temp 97 °F (36.1 °C) (Oral)   Resp 20   Wt 78.1 kg (172 lb 1.6 oz)   LMP 04/16/2004 (Approximate)   SpO2 95%   BMI 32.52 kg/m²     Physical Exam  Vitals and nursing note reviewed.   Constitutional:       General: She is not in acute distress.     Appearance: She is well-developed. She is not diaphoretic.   HENT:      Head: Normocephalic and atraumatic.   Eyes:      General: No scleral icterus.     Conjunctiva/sclera: Conjunctivae normal.   Neck:      Trachea: No tracheal deviation.   Cardiovascular:      Rate and Rhythm: Normal rate and regular rhythm.      Heart sounds: Normal heart sounds. No murmur heard.      Pulmonary:      Effort: Pulmonary effort is normal. No respiratory distress.      Breath sounds: Normal breath sounds. No wheezing or rales.   Abdominal:      General: Bowel sounds are normal. There is no distension.      Palpations: Abdomen is soft.      Tenderness: There is no abdominal  tenderness.      Hernia: No hernia is present.      Comments: There is a peritoneal dialysis catheter that exits the abdominal wall in the right lower quadrant.  The site is nontender.   Musculoskeletal:         General: No deformity.      Cervical back: Neck supple.   Skin:     General: Skin is warm and dry.   Neurological:      Mental Status: She is alert and oriented to person, place, and time.   Psychiatric:         Behavior: Behavior normal.         DIAGNOSTIC DATA:    WBC 7.85, Hgb 9.4, platelets 359.  PT 14.6, INR 1.16.  Creatinine 4.84, potassium 4.1.  AlkP 67, ALT 11, AST 9, T bili <0.2.  COVID-19 nasopharyngeal swab negative.    I reviewed the images and report of CT scan of the abdomen and pelvis performed yesterday.  Hysterectomy has been performed.  There is a peritoneal dialysis catheter that is enters the abdomen in the right lower quadrant.  The catheter loops into the right hemipelvis.  There is a small amount of free fluid.  There is no bowel wall thickening or sequestered fluid.     I reviewed the report of a right upper quadrant ultrasound performed on 6/7/2021 at [facility].  It shows cholelithiasis.  The common bile duct measured 3 mm.  There was hepatic steatosis.    ASSESSMENT:    Nonfunctioning PD catheter  Cholelithiasis  BMI 32.52  Sleep apnea    PLAN:    I have recommended diagnostic laparoscopy and lysis of adhesions to free up the catheter and hopefully be able to preserve her ability to perform peritoneal dialysis.  She has cholelithiasis and has been recommended for laparoscopic cholecystectomy, but has been unable to schedule.  Will plan to also remove the gallbladder today.

## 2021-10-12 NOTE — CASE MANAGEMENT/SOCIAL WORK
Discharge Planning Assessment  [facility]     Patient Name: Ambar Lara  MRN: [id number]  Today's Date: 10/12/2021    Admit Date: 10/11/2021     Discharge Needs Assessment     Row Name 10/12/21 1449       Living Environment    Lives With alone    Unique Family Situation Several family members live close by    Current Living Arrangements home/apartment/condo    Primary Care Provided by self    Provides Primary Care For no one    Family Caregiver if Needed child(richar), adult    Family Caregiver Names Farhan Lara [phone] Carol daughter    Quality of Family Relationships unable to assess    Able to Return to Prior Arrangements yes       Resource/Environmental Concerns    Resource/Environmental Concerns none    Transportation Concerns car, none       Transition Planning    Patient/Family Anticipates Transition to home with family; home    Patient/Family Anticipated Services at Transition none    Transportation Anticipated family or friend will provide       Discharge Needs Assessment    Readmission Within the Last 30 Days no previous admission in last 30 days    Equipment Currently Used at Home wheelchair; walker, rolling; other (see comments)  Peritoneal dialysis equipment    Concerns to be Addressed discharge planning    Anticipated Changes Related to Illness none    Provided Post Acute Provider List? N/A    N/A Provider List Comment No needs identified    Provided Post Acute Provider Quality & Resource List? N/A    N/A Quality & Resource List Comment No needs identified    Current Discharge Risk lives alone               Discharge Plan     Row Name 10/12/21 9291       Plan    Plan Home with family support    Patient/Family in Agreement with Plan yes    Plan Comments Met with patient at bedside. Face sheet verified. Prior to admission patient was living alone in her own home. She was independent with all aspects of her ADL’s. Family members live close and offer assistance as needed. She has walker  and wheelchair at home, but does not use either at this time. Patient uses the [facility] in [place], denies any issues affording or taking her medications. She is agreeable to using [facility]. Farhan Lara [phone] is her power of . Discharge plans are to return home with family support. No additional needs noted at this time. Wife will be able to transport. Will continue to monitor for new or changing needs.              Continued Care and Services - Admitted Since 10/11/2021    Coordination has not been started for this encounter.       Expected Discharge Date and Time     Expected Discharge Date Expected Discharge Time    Oct 14, 2021          Demographic Summary     Row Name 10/12/21 1448       General Information    Admission Type inpatient    Arrived From home    Referral Source admission list    Reason for Consult discharge planning    Preferred Language English     Used During This Interaction no       Contact Information    Permission Granted to Share Info With family/designee  Farhan Lara [phone]               Functional Status     Row Name 10/12/21 1448       Functional Status    Usual Activity Tolerance good    Current Activity Tolerance good       Functional Status, IADL    Medications independent    Meal Preparation independent    Housekeeping independent    Laundry independent    Shopping independent       Mental Status    General Appearance WDL WDL       Mental Status Summary    Recent Changes in Mental Status/Cognitive Functioning no changes       Employment/    Employment Status employed part-time               Psychosocial    No documentation.                Abuse/Neglect    No documentation.                Legal    No documentation.                Substance Abuse    No documentation.                Patient Forms    No documentation.                   Nancy Posada RN

## 2021-10-13 LAB
ALBUMIN SERPL-MCNC: 3.7 G/DL (ref 3.5–5.2)
ALBUMIN/GLOB SERPL: 1.2 G/DL
ALP SERPL-CCNC: 65 U/L (ref 39–117)
ALT SERPL W P-5'-P-CCNC: 14 U/L (ref 1–33)
ANION GAP SERPL CALCULATED.3IONS-SCNC: 13.4 MMOL/L (ref 5–15)
AST SERPL-CCNC: 14 U/L (ref 1–32)
BASOPHILS # BLD AUTO: 0.12 10*3/MM3 (ref 0–0.2)
BASOPHILS NFR BLD AUTO: 1 % (ref 0–1.5)
BILIRUB SERPL-MCNC: <0.2 MG/DL (ref 0–1.2)
BUN SERPL-MCNC: 31 MG/DL (ref 6–20)
BUN/CREAT SERPL: 6.8 (ref 7–25)
CALCIUM SPEC-SCNC: 8.6 MG/DL (ref 8.6–10.5)
CHLORIDE SERPL-SCNC: 110 MMOL/L (ref 98–107)
CO2 SERPL-SCNC: 13.6 MMOL/L (ref 22–29)
CREAT SERPL-MCNC: 4.53 MG/DL (ref 0.57–1)
DEPRECATED RDW RBC AUTO: 45.5 FL (ref 37–54)
EOSINOPHIL # BLD AUTO: 0.06 10*3/MM3 (ref 0–0.4)
EOSINOPHIL NFR BLD AUTO: 0.5 % (ref 0.3–6.2)
ERYTHROCYTE [DISTWIDTH] IN BLOOD BY AUTOMATED COUNT: 13 % (ref 12.3–15.4)
GFR SERPL CREATININE-BSD FRML MDRD: 10 ML/MIN/1.73
GFR SERPL CREATININE-BSD FRML MDRD: ABNORMAL ML/MIN/{1.73_M2}
GLOBULIN UR ELPH-MCNC: 3.2 GM/DL
GLUCOSE SERPL-MCNC: 93 MG/DL (ref 65–99)
HCT VFR BLD AUTO: 30.2 % (ref 34–46.6)
HGB BLD-MCNC: 9.5 G/DL (ref 12–15.9)
IMM GRANULOCYTES # BLD AUTO: 0.5 10*3/MM3 (ref 0–0.05)
IMM GRANULOCYTES NFR BLD AUTO: 4 % (ref 0–0.5)
LYMPHOCYTES # BLD AUTO: 1.34 10*3/MM3 (ref 0.7–3.1)
LYMPHOCYTES NFR BLD AUTO: 10.8 % (ref 19.6–45.3)
MCH RBC QN AUTO: 30.4 PG (ref 26.6–33)
MCHC RBC AUTO-ENTMCNC: 31.5 G/DL (ref 31.5–35.7)
MCV RBC AUTO: 96.8 FL (ref 79–97)
MONOCYTES # BLD AUTO: 0.52 10*3/MM3 (ref 0.1–0.9)
MONOCYTES NFR BLD AUTO: 4.2 % (ref 5–12)
NEUTROPHILS NFR BLD AUTO: 79.5 % (ref 42.7–76)
NEUTROPHILS NFR BLD AUTO: 9.89 10*3/MM3 (ref 1.7–7)
NRBC BLD AUTO-RTO: 0 /100 WBC (ref 0–0.2)
PLATELET # BLD AUTO: 421 10*3/MM3 (ref 140–450)
PMV BLD AUTO: 9.6 FL (ref 6–12)
POTASSIUM SERPL-SCNC: 5.8 MMOL/L (ref 3.5–5.2)
PROT SERPL-MCNC: 6.9 G/DL (ref 6–8.5)
RBC # BLD AUTO: 3.12 10*6/MM3 (ref 3.77–5.28)
SODIUM SERPL-SCNC: 137 MMOL/L (ref 136–145)
WBC # BLD AUTO: 12.43 10*3/MM3 (ref 3.4–10.8)

## 2021-10-13 PROCEDURE — 99024 POSTOP FOLLOW-UP VISIT: CPT | Performed by: SURGERY

## 2021-10-13 PROCEDURE — 25010000002 HYDROMORPHONE PER 4 MG: Performed by: SURGERY

## 2021-10-13 PROCEDURE — 85025 COMPLETE CBC W/AUTO DIFF WBC: CPT | Performed by: SURGERY

## 2021-10-13 PROCEDURE — 80053 COMPREHEN METABOLIC PANEL: CPT | Performed by: SURGERY

## 2021-10-13 RX ORDER — SODIUM CHLORIDE, SODIUM LACTATE, CALCIUM CHLORIDE, MAGNESIUM CHLORIDE AND DEXTROSE 2.5; 538; 448; 18.3; 5.08 G/100ML; MG/100ML; MG/100ML; MG/100ML; MG/100ML
2000 INJECTION, SOLUTION INTRAPERITONEAL AS NEEDED
Status: CANCELLED | OUTPATIENT
Start: 2021-10-13

## 2021-10-13 RX ADMIN — MIDODRINE HYDROCHLORIDE 10 MG: 5 TABLET ORAL at 22:11

## 2021-10-13 RX ADMIN — ESCITALOPRAM 20 MG: 20 TABLET, FILM COATED ORAL at 08:56

## 2021-10-13 RX ADMIN — ASPIRIN 81 MG: 81 TABLET, CHEWABLE ORAL at 08:57

## 2021-10-13 RX ADMIN — HYDROCODONE BITARTRATE AND ACETAMINOPHEN 1 TABLET: 10; 325 TABLET ORAL at 04:47

## 2021-10-13 RX ADMIN — LANTHANUM CARBONATE 750 MG: 500 TABLET, CHEWABLE ORAL at 08:56

## 2021-10-13 RX ADMIN — MIDODRINE HYDROCHLORIDE 10 MG: 5 TABLET ORAL at 13:41

## 2021-10-13 RX ADMIN — HYDROCODONE BITARTRATE AND ACETAMINOPHEN 1 TABLET: 10; 325 TABLET ORAL at 12:11

## 2021-10-13 RX ADMIN — POTASSIUM CHLORIDE 40 MEQ: 750 TABLET, EXTENDED RELEASE ORAL at 08:56

## 2021-10-13 RX ADMIN — LEVOTHYROXINE SODIUM 137 MCG: 0.14 TABLET ORAL at 07:33

## 2021-10-13 RX ADMIN — HYDROMORPHONE HYDROCHLORIDE 0.5 MG: 1 INJECTION, SOLUTION INTRAMUSCULAR; INTRAVENOUS; SUBCUTANEOUS at 01:16

## 2021-10-13 RX ADMIN — HYDROCODONE BITARTRATE AND ACETAMINOPHEN 1 TABLET: 5; 325 TABLET ORAL at 15:22

## 2021-10-13 RX ADMIN — FAMOTIDINE 20 MG: 20 TABLET, FILM COATED ORAL at 08:57

## 2021-10-13 RX ADMIN — Medication 1 TABLET: at 08:57

## 2021-10-13 RX ADMIN — Medication 1 TABLET: at 08:56

## 2021-10-13 RX ADMIN — HYDROCODONE BITARTRATE AND ACETAMINOPHEN 1 TABLET: 5; 325 TABLET ORAL at 22:11

## 2021-10-13 RX ADMIN — MIDODRINE HYDROCHLORIDE 10 MG: 5 TABLET ORAL at 07:33

## 2021-10-13 RX ADMIN — ACETAMINOPHEN 650 MG: 325 TABLET, FILM COATED ORAL at 18:47

## 2021-10-13 RX ADMIN — Medication 3 MG: at 22:11

## 2021-10-13 RX ADMIN — Medication 500 MG: at 08:57

## 2021-10-13 NOTE — PLAN OF CARE
"Goal Outcome Evaluation:  Plan of Care Reviewed With: patient        Progress: improving  Outcome Summary: Patient returned after PD catheter revision and lap crista. 4 lap sites and PD site with skin glue and occlusive dressing, respectively. Dilaudid x1, Norco x2. Vitals stable. Patient refused renal diet, states \"I need the potassium in the regular diet, I cannot do renal, my doctors are aware.\" Ambulated in room after surgery. Will continue current plan of care.  "

## 2021-10-13 NOTE — PAYOR COMM NOTE
"INITIAL CLINICAL PER REQUEST  REF #[id number]  P:  [phone]  F:  [phone]          Ambar Melo (51 y.o. Female)             Date of Birth Social Security Number Address Home Phone MRN    1970  [address] [facility] [phone] [id number]    Church Marital Status             Unknown        Admission Date Admission Type Admitting Provider Attending Provider Department, Room/Bed    10/11/21 Emergency StinglBinta MD Beard, Lyle E, MD [address], P399/1    Discharge Date Discharge Disposition Discharge Destination                         Attending Provider: Elfego Martins MD    Allergies: Penicillins, Nickel    Isolation: None   Infection: None   Code Status: CPR   Advance Care Planning Activity    Ht: 154.9 cm (61\")   Wt: 78 kg (172 lb)    Admission Cmt: None   Principal Problem: PD catheter dysfunction (HCC) [T85.611A]                 Active Insurance as of 10/11/2021     Primary Coverage     Payor Plan Insurance Group Employer/Plan Group    MEDICARE MEDICARE A & B      Payor Plan Address Payor Plan Phone Number Payor Plan Fax Number Effective Dates    [address] [phone]  4/1/2019 - None Entered    [facility] [id number]       Subscriber Name Subscriber Birth Date Member ID       AMBAR MELO 1970 [id number]           Secondary Coverage     Payor Plan Insurance Group Employer/Plan Group    [facility] [id number]     Payor Plan Address Payor Plan Phone Number Payor Plan Fax Number Effective Dates    [address]   5/1/2020 - None Entered    [facility] [id number]       Subscriber Name Subscriber Birth Date Member ID       AMBAR MELO 1970 [id number]                 Emergency Contacts      (Rel.) Home Phone Work Phone Mobile Phone    natalie melo (Power of ) [phone] -- --    MarcoRoland (Son) [phone] -- --    maty melo (Daughter) [phone] -- " --               History & Physical      Binta Patel MD at 10/11/21 1944          HISTORY AND PHYSICAL   [facility]        Date of Admission: 10/11/2021  Patient Identification:  Name: Ambar Lara  Age: 51 y.o.  Sex: female  :  1970  MRN: [id number]                     Primary Care Physician: Jet Manuel MD    Chief Complaint:  51 year old female who presented to the emergency room with a malfunctioning peritoneal dialysis catheter; she has been on dialysis over two years; she has had right lower quadrant pain and was unable to complete her dialysis for the last four days; she denies fever or chills; no nausea or vomiting    History of Present Illness:   As above    Past Medical History:  Past Medical History:   Diagnosis Date   • CHF (congestive heart failure) (CMS/[facility])    • Dialysis patient (CMS/[facility])    • Disease of thyroid gland    • Elevated cholesterol    • GERD (gastroesophageal reflux disease)    • Renal disorder    • Sleep apnea      Past Surgical History:  Past Surgical History:   Procedure Laterality Date   • ADRENAL GLAND SURGERY N/A `   •  SECTION Bilateral 2001    Has had x2   • HYSTERECTOMY     • TRACHEOSTOMY        Home Meds:  Medications Prior to Admission   Medication Sig Dispense Refill Last Dose   • aspirin 81 MG chewable tablet Chew 81 mg Daily.   10/11/2021 at Unknown time   • B Complex-C-Folic Acid (REN-JHON PO) Take 1 tablet by mouth Daily.   10/11/2021 at Unknown time   • escitalopram (LEXAPRO) 20 MG tablet Take 1 tablet by mouth Daily. 30 tablet 1 10/11/2021 at Unknown time   • famotidine (PEPCID) 20 MG tablet Take 20 mg by mouth 2 (Two) Times a Day.   10/11/2021 at Unknown time   • gentamicin (GARAMYCIN) 0.1 % cream Apply 1 application topically to the appropriate area as directed Daily. As directed   10/11/2021 at Unknown time   • lanthanum (FOSRENOL) 500 MG chewable tablet Chew 750 mg 3 (Three) Times a Day With Meals.    10/11/2021 at Unknown time   • levothyroxine (SYNTHROID, LEVOTHROID) 100 MCG tablet Take 1 tablet by mouth Daily. (Patient taking differently: Take 137 mcg by mouth Daily.) 30 tablet 1 10/11/2021 at Unknown time   • melatonin 3 MG tablet Take 1 tablet by mouth Every Night. 30 tablet 1 10/10/2021 at Unknown time   • midodrine (PROAMATINE) 10 MG tablet Take 1 tablet by mouth 3 (Three) Times a Day. 90 tablet 1 10/11/2021 at Unknown time   • montelukast (Singulair) 10 MG tablet Take 1 tablet by mouth Every Night. 30 tablet 1 10/10/2021 at Unknown time   • multivitamin (THERAGRAN) tablet tablet Take 1 tablet by mouth Daily. 90 tablet 0 10/11/2021 at Unknown time   • potassium bicarbonate (K-LYTE) 25 MEQ disintegrating tablet Take 20 mEq by mouth 2 (Two) Times a Day. Take 2 tablets by mouth 2 times daily.   10/11/2021 at Unknown time   • saccharomyces boulardii (FLORASTOR) 250 MG capsule Take 2 capsules by mouth 2 (Two) Times a Day. 120 capsule 1 10/11/2021 at Unknown time   • sodium bicarbonate 650 MG tablet Take 1 tablet by mouth Every 8 (Eight) Hours. (Patient taking differently: Take 650 mg by mouth 4 (Four) Times a Day.) 90 tablet 1 10/11/2021 at Unknown time   • traZODone (DESYREL) 100 MG tablet Take 100 mg by mouth Every Night. Take 0.5-1 tablet by mouth nightly   10/10/2021 at Unknown time   • acetaminophen (TYLENOL) 500 MG tablet Take 500 mg by mouth Every 6 (Six) Hours As Needed for Mild Pain .   Unknown at Unknown time   • diphenhydrAMINE HCl, Sleep, 25 MG capsule Take 25 mg by mouth At Night As Needed (sleep). For sleep 28 capsule  More than a month at Unknown time   • nitroglycerin (NITROSTAT) 0.4 MG SL tablet Place 0.4 mg under the tongue Every 5 (Five) Minutes As Needed for Chest Pain. Take no more than 3 doses in 15 minutes.   Unknown at Unknown time   • ondansetron (ZOFRAN) 4 MG tablet Take 1 tablet by mouth Every 6 (Six) Hours As Needed for Nausea or Vomiting. (Patient taking differently: Take 4 mg by  mouth Every 8 (Eight) Hours As Needed for Nausea or Vomiting.) 15 tablet 0 Unknown at Unknown time   • PHARMACY CONSULT Continuous As Needed (-). Consolidate if  possible instructions on to 1 Rx bottle on vancomycin taper as [facility] will not allow free texting to fit in all the info          Allergies:  Allergies   Allergen Reactions   • Penicillins Anaphylaxis, Hives, Rash, Shortness Of Breath and Swelling   • Nickel Rash     Immunizations:  Immunization History   Administered Date(s) Administered   • COVID-19 (PFIZER) 06/08/2021, 06/29/2021     Social History:   Social History     Social History Narrative   • Not on file     Social History     Socioeconomic History   • Marital status:    Tobacco Use   • Smoking status: Former Smoker     Packs/day: 1.00     Years: 25.00     Pack years: 25.00     Types: Cigarettes     Quit date: 4/16/2018     Years since quitting: 3.4   • Smokeless tobacco: Never Used   Vaping Use   • Vaping Use: Former   • Quit date: 2/14/2021   • Substances: Nicotine, Flavoring   • Devices: Pre-filled or refillable cartridge   Substance and Sexual Activity   • Alcohol use: Not Currently   • Drug use: Not Currently   • Sexual activity: Defer       Family History:  No family history on file.     Review of Systems  See history of present illness and past medical history.  Patient denies headache, dizziness, syncope, falls, trauma, change in vision, change in hearing, change in taste, changes in weight, changes in appetite, focal weakness, numbness, or paresthesia.  Patient denies chest pain, palpitations, dyspnea, orthopnea, PND, cough, sinus pressure, rhinorrhea, epistaxis, hemoptysis, nausea, vomiting,hematemesis, diarrhea, constipation or hematchezia.  Denies cold or heat intolerance, polydipsia, polyuria, polyphagia. Denies hematuria, pyuria, dysuria, hesitancy, frequency or urgency. Denies consumption of raw and under cooked meats foods or change in water source.  Denies fever, chills,  sweats, night sweats.  Denies missing any routine medications. Remainder of ROS is negative.    Objective:  T Max 24 hrs: Temp (24hrs), Av °F (36.7 °C), Min:97.9 °F (36.6 °C), Max:98 °F (36.7 °C)    Vitals Ranges:   Temp:  [97.9 °F (36.6 °C)-98 °F (36.7 °C)] 98 °F (36.7 °C)  Heart Rate:  [] 100  Resp:  [20] 20  BP: (101-112)/(63-74) 112/71      Exam:  /71 (BP Location: Right arm, Patient Position: Sitting)   Pulse 100   Temp 98 °F (36.7 °C) (Oral)   Resp 20   Wt 78.1 kg (172 lb 1.6 oz)   LMP 2004 (Approximate)   SpO2 97%   BMI 32.52 kg/m²     General Appearance:    Alert, cooperative, no distress, appears stated age   Head:    Normocephalic, without obvious abnormality, atraumatic   Eyes:    PERRL, conjunctivae/corneas clear, EOM's intact, both eyes   Ears:    Normal external ear canals, both ears   Nose:   Nares normal, septum midline, mucosa normal, no drainage    or sinus tenderness   Throat:   Lips, mucosa, and tongue normal   Neck:   Supple, symmetrical, trachea midline, no adenopathy;     thyroid:  no enlargement/tenderness/nodules; no carotid    bruit or JVD   Back:     Symmetric, no curvature, ROM normal, no CVA tenderness   Lungs:     Decreased breath sounds bilaterally, respirations unlabored   Chest Wall:    No tenderness or deformity    Heart:    Regular rate and rhythm, S1 and S2 normal, no murmur, rub   or gallop   Abdomen:     Soft, nontender, bowel sounds active all four quadrants,     no masses, no hepatomegaly, no splenomegaly; dialysis catheter in place   Extremities:   Extremities normal, atraumatic, no cyanosis or edema   Pulses:   2+ and symmetric all extremities   Skin:   Skin color, texture, turgor normal, no rashes or lesions   Lymph nodes:   Cervical, supraclavicular, and axillary nodes normal   Neurologic:   CNII-XII intact, normal strength, sensation intact throughout      .    Data Review:  Labs in chart were reviewed.  WBC   Date Value Ref Range Status    10/11/2021 7.85 3.40 - 10.80 10*3/mm3 Final     Hemoglobin   Date Value Ref Range Status   10/11/2021 9.4 (L) 12.0 - 15.9 g/dL Final     Hematocrit   Date Value Ref Range Status   10/11/2021 29.6 (L) 34.0 - 46.6 % Final     Platelets   Date Value Ref Range Status   10/11/2021 359 140 - 450 10*3/mm3 Final     Sodium   Date Value Ref Range Status   10/11/2021 142 136 - 145 mmol/L Final     Potassium   Date Value Ref Range Status   10/11/2021 4.1 3.5 - 5.2 mmol/L Final     Chloride   Date Value Ref Range Status   10/11/2021 113 (H) 98 - 107 mmol/L Final     CO2   Date Value Ref Range Status   10/11/2021 15.3 (L) 22.0 - 29.0 mmol/L Final     BUN   Date Value Ref Range Status   10/11/2021 34 (H) 6 - 20 mg/dL Final     Creatinine   Date Value Ref Range Status   10/11/2021 4.84 (H) 0.57 - 1.00 mg/dL Final     Glucose   Date Value Ref Range Status   10/11/2021 128 (H) 65 - 99 mg/dL Final     Calcium   Date Value Ref Range Status   10/11/2021 8.4 (L) 8.6 - 10.5 mg/dL Final     AST (SGOT)   Date Value Ref Range Status   10/11/2021 9 1 - 32 U/L Final     ALT (SGPT)   Date Value Ref Range Status   10/11/2021 11 1 - 33 U/L Final     Alkaline Phosphatase   Date Value Ref Range Status   10/11/2021 67 39 - 117 U/L Final                Imaging Results (All)     Procedure Component Value Units Date/Time    CT Abdomen Pelvis Without Contrast [[id number]] Collected: 10/11/21 1347     Updated: 10/11/21 1609    Narrative:      CT OF THE ABDOMEN AND PELVIS WITHOUT CONTRAST 10/11/2021      HISTORY: Abdominal pain. Difficulty flushing peritoneal dialysis  catheter.     TECHNIQUE: Axial images were obtained from the lung bases to the  symphysis pubis. No intravenous contrast was given.     FINDINGS: The liver appears within normal limits. Gallbladder appears  slightly contracted but no gallstones are seen. Spleen appears mildly  enlarged. No focal splenic lesions are seen. Pancreas appears within  normal limits. There is fullness of the  left adrenal gland. Right  adrenal gland does not appear enlarged.     Bilateral perinephric stranding. There is a tiny nonobstructing right  renal calcification. There are several areas of low-attenuation in the  left kidney which could represent simple cysts. At least one low-density  right renal lesion is seen. No hydronephrosis is seen.     There is some aortoiliac calcification.     The peritoneal dialysis catheter is seen entering the skin in the right  lower quadrant and is curled in the right hemipelvis. The catheter does  not appear kinked and does appear intact. No abnormal fluid collections  or masses are seen near the catheter tip. There is a tiny amount of free  fluid adjacent to the right hepatic lobe. Small amount of free fluid is  seen in the left paracolic gutter region. There is fluid in the  rectosigmoid colon as well.     Uterus has been removed.       Impression:      1. The peritoneal dialysis catheter appears intact and terminates in the  right hemipelvis.  2. Small amount of free fluid is seen.  3. There is mild bilateral perinephric stranding with small low-density  lesions in the left kidney and perhaps one in the right kidney,  difficult to assess in the absence of IV contrast, but may represent  small cysts. There is a tiny nonobstructing right renal stone.  4. Status post hysterectomy.                 Radiation dose reduction techniques were utilized, including automated  exposure control and exposure modulation based on body size.     This report was finalized on 10/11/2021 4:05 PM by Dr. Lawson Beckford M.D.           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   • HLD (hyperlipidemia) E78.5   •  Hypothyroidism E03.9   • Peritoneal dialysis status (HCC) Z99.2   • History of tracheostomy Z98.890   • Acute pulmonary embolism (HCC) I26.99   • Severe malnutrition (HCC) E43   • Clostridium difficile colitis A04.72   • Immobility syndrome M62.3   • 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   • Colitis K52.9   • PD catheter dysfunction (HCC) T85.611A       Assessment:    PD catheter dysfunction (HCC)  esrd  Anemia  Hypothyroidism  Obesity  Sleep apnea  Chronic systolic chf    Plan:  Will await surgical input  Dialysis per dr tidwell  Monitor electrolytes and hgb  Bicarb drip per nephrology  D;w patient, nurse and ED provider    Binta Patel MD  10/11/2021  19:44 EDT      Electronically signed by Binta Patel MD at 10/11/21 1948          Emergency Department Notes      Lavelle Isaacs RN at 10/11/21 1024        Patient to er from dialysis clinic with c/o increasing in ABD pain with bloating and pressure. Patient reported bright red blood in stool yesterday. Patient reported her last good dialysis was Thursday. Patient reported she does peritoneal dialysis. Patient reported she stopped her blood thinners two weeks ago. Patient has mask on in triage along with staff.      Electronically signed by Lavelle Isaacs RN at 10/11/21 1026     Shantell Ghotra RN at 10/11/21 1113        Pt dialyzes at home via peritoneal dialysis Qday. Pt states she has not been able to get a full treatment since Wednesday night.  Umbilical pain that radiates to RLQ intermittently x1 week worse over the last few days. Denies fevers, soa, cp.  Pt also reports one episode of blood in her stool yesterday evening. Pt stopped blood thinners 09/29. A&OX4 at this time.      Shantell Ghotra RN  10/11/21 1117      Electronically signed by Shantell Ghotra RN at 10/11/21 1117     Reggie Porter PA at 10/11/21 1141     Attestation signed by Marcos Blackman MD at 10/11/21 1532          For this patient encounter, I reviewed the NP or PA  documentation, treatment plan, and medical decision making. Marcos Blackman MD 10/11/2021 15:32 EDT                   EMERGENCY DEPARTMENT ENCOUNTER    Room Number:  05/05  Date of encounter:  10/11/2021  PCP: Jet Manuel MD  Historian: Patient, daughter      I used full protective equipment while examining this patient.  This includes face mask, gloves and protective eyewear.  I washed my hands before entering the room and immediately upon leaving the room      HPI:  Chief Complaint: Abdominal pain, peritoneal dialysis issues  A complete HPI/ROS/PMH/PSH/SH/FH are unobtainable due to: Nothing    Context: Ambar Lara is a 51 y.o. female who presents to the ED c/o several day history of diffuse, mild, achy right lower quadrant pain.  In addition patient has had difficulty with her peritoneal dialysis.  She states she has been unable to drain fluid off of it for several days.  She states she was last fully dialyzed on 10/7/2021.  She has been in contact with her dialysis nurse who has seen her in the office.  They did flush it at that time however unable to put any further fluid in or pull any out.  Patient states she has been mildly weak over the past several days.  She denies any fevers, chills, shortness of breath.    Patient follows with Dr. Romero in nephrology.    Patient notes that she was constipated several days ago.  She did take a laxative which helped her symptoms.  She did notice bright red blood after a bowel movement yesterday.  She states she has had a bowel movement since without any difficulty.    Review of Medical Records  I reviewed admission from 9/21/2021.  Patient admitted for colitis.    PAST MEDICAL HISTORY  Active Ambulatory Problems     Diagnosis Date Noted   • Colitis, Clostridium difficile 04/09/2021   • Anxiety 06/07/2018   • Cardiomyopathy (HCC) 06/03/2020   • Chronic systolic heart failure (HCC) 06/03/2020   • ESRD (end stage renal disease) on dialysis ([facility])  06/15/2018   • Gastroesophageal reflux disease 2021   • Gitelman syndrome 2019   • HLD (hyperlipidemia) 2018   • Hypothyroidism 2018   • Peritoneal dialysis status ([facility]) 02/15/2021   • History of tracheostomy 04/10/2021   • Acute pulmonary embolism (HCC) 04/10/2021   • Severe malnutrition ([facility]) 2021   • Clostridium difficile colitis 2021   • Immobility syndrome 2021   • Genu recurvatum of right knee 2021   • Colitis 2021     Resolved Ambulatory Problems     Diagnosis Date Noted   • Hypotension 04/10/2021     Past Medical History:   Diagnosis Date   • CHF (congestive heart failure) (CMS/[facility])    • Dialysis patient (CMS/[facility])    • Disease of thyroid gland    • Elevated cholesterol    • GERD (gastroesophageal reflux disease)    • Renal disorder    • Sleep apnea          PAST SURGICAL HISTORY  Past Surgical History:   Procedure Laterality Date   • ADRENAL GLAND SURGERY N/A `   •  SECTION Bilateral 2001    Has had x2   • HYSTERECTOMY     • TRACHEOSTOMY           FAMILY HISTORY  No family history on file.      SOCIAL HISTORY  Social History     Socioeconomic History   • Marital status:    Tobacco Use   • Smoking status: Former Smoker     Packs/day: 1.00     Years: 25.00     Pack years: 25.00     Types: Cigarettes     Quit date: 2018     Years since quitting: 3.4   • Smokeless tobacco: Never Used   Vaping Use   • Vaping Use: Former   • Quit date: 2021   • Substances: Nicotine, Flavoring   • Devices: Pre-filled or refillable cartridge   Substance and Sexual Activity   • Alcohol use: Not Currently   • Drug use: Not Currently   • Sexual activity: Defer         ALLERGIES  Penicillins and Nickel        REVIEW OF SYSTEMS  All systems reviewed and negative except for those discussed in HPI.       PHYSICAL EXAM    I have reviewed the triage vital signs and nursing notes.    ED Triage Vitals   Temp Heart Rate Resp BP SpO2   10/11/21 1024 10/11/21  1024 10/11/21 1024 10/11/21 1126 10/11/21 1024   97.9 °F (36.6 °C) 113 20 107/63 96 %      Temp src Heart Rate Source Patient Position BP Location FiO2 (%)   10/11/21 1024 -- -- -- --   Temporal           Physical Exam  GENERAL: Alert, oriented, nontoxic-appearing, not distressed  HENT: head atraumatic, no nuchal rigidity  EYES: no scleral icterus, EOMI  CV: regular rhythm, regular rate, no murmur  RESPIRATORY: normal effort, CTA  ABDOMEN: soft, peritoneal catheter in right lower quadrant.  No surrounding erythema.  Mild surrounding tenderness.  MUSCULOSKELETAL: no deformity, FROM, no calf swelling or tenderness  NEURO: alert, moves all extremities, follows commands  SKIN: warm, dry        LAB RESULTS  Recent Results (from the past 24 hour(s))   Comprehensive Metabolic Panel    Collection Time: 10/11/21 11:27 AM    Specimen: Blood   Result Value Ref Range    Glucose 128 (H) 65 - 99 mg/dL    BUN 34 (H) 6 - 20 mg/dL    Creatinine 4.84 (H) 0.57 - 1.00 mg/dL    Sodium 142 136 - 145 mmol/L    Potassium 4.1 3.5 - 5.2 mmol/L    Chloride 113 (H) 98 - 107 mmol/L    CO2 15.3 (L) 22.0 - 29.0 mmol/L    Calcium 8.4 (L) 8.6 - 10.5 mg/dL    Total Protein 7.1 6.0 - 8.5 g/dL    Albumin 3.60 3.50 - 5.20 g/dL    ALT (SGPT) 11 1 - 33 U/L    AST (SGOT) 9 1 - 32 U/L    Alkaline Phosphatase 67 39 - 117 U/L    Total Bilirubin <0.2 0.0 - 1.2 mg/dL    eGFR Non African Amer 10 (L) >60 mL/min/1.73    eGFR  African Amer      Globulin 3.5 gm/dL    A/G Ratio 1.0 g/dL    BUN/Creatinine Ratio 7.0 7.0 - 25.0    Anion Gap 13.7 5.0 - 15.0 mmol/L   Type & Screen    Collection Time: 10/11/21 11:27 AM    Specimen: Blood   Result Value Ref Range    ABO Type O     RH type Negative     Antibody Screen Negative     T&S Expiration Date 10/14/2021 11:59:59 PM    Protime-INR    Collection Time: 10/11/21 11:27 AM    Specimen: Blood   Result Value Ref Range    Protime 14.6 (H) 11.7 - 14.2 Seconds    INR 1.16 (H) 0.90 - 1.10   Green Top (Gel)    Collection Time:  10/11/21 11:27 AM   Result Value Ref Range    Extra Tube Hold for add-ons.    Lavender Top    Collection Time: 10/11/21 11:27 AM   Result Value Ref Range    Extra Tube hold for add-on    Gold Top - SST    Collection Time: 10/11/21 11:27 AM   Result Value Ref Range    Extra Tube Hold for add-ons.    Light Blue Top    Collection Time: 10/11/21 11:27 AM   Result Value Ref Range    Extra Tube hold for add-on    CBC Auto Differential    Collection Time: 10/11/21 11:27 AM    Specimen: Blood   Result Value Ref Range    WBC 7.85 3.40 - 10.80 10*3/mm3    RBC 3.07 (L) 3.77 - 5.28 10*6/mm3    Hemoglobin 9.4 (L) 12.0 - 15.9 g/dL    Hematocrit 29.6 (L) 34.0 - 46.6 %    MCV 96.4 79.0 - 97.0 fL    MCH 30.6 26.6 - 33.0 pg    MCHC 31.8 31.5 - 35.7 g/dL    RDW 13.2 12.3 - 15.4 %    RDW-SD 47.0 37.0 - 54.0 fl    MPV 9.7 6.0 - 12.0 fL    Platelets 359 140 - 450 10*3/mm3    Neutrophil % 68.1 42.7 - 76.0 %    Lymphocyte % 20.6 19.6 - 45.3 %    Monocyte % 5.1 5.0 - 12.0 %    Eosinophil % 3.1 0.3 - 6.2 %    Basophil % 0.6 0.0 - 1.5 %    Immature Grans % 2.5 (H) 0.0 - 0.5 %    Neutrophils, Absolute 5.34 1.70 - 7.00 10*3/mm3    Lymphocytes, Absolute 1.62 0.70 - 3.10 10*3/mm3    Monocytes, Absolute 0.40 0.10 - 0.90 10*3/mm3    Eosinophils, Absolute 0.24 0.00 - 0.40 10*3/mm3    Basophils, Absolute 0.05 0.00 - 0.20 10*3/mm3    Immature Grans, Absolute 0.20 (H) 0.00 - 0.05 10*3/mm3    nRBC 0.0 0.0 - 0.2 /100 WBC   Lipase    Collection Time: 10/11/21 11:27 AM    Specimen: Blood   Result Value Ref Range    Lipase 26 13 - 60 U/L   Urinalysis With Microscopic If Indicated (No Culture) - Urine, Clean Catch    Collection Time: 10/11/21 11:29 AM    Specimen: Urine, Clean Catch   Result Value Ref Range    Color, UA Yellow Yellow, Straw    Appearance, UA Clear Clear    pH, UA 5.5 5.0 - 8.0    Specific Gravity, UA 1.010 1.005 - 1.030    Glucose, UA Negative Negative    Ketones, UA Negative Negative    Bilirubin, UA Negative Negative    Blood, UA Negative  Negative    Protein,  mg/dL (2+) (A) Negative    Leuk Esterase, UA Trace (A) Negative    Nitrite, UA Negative Negative    Urobilinogen, UA 0.2 E.U./dL 0.2 - 1.0 E.U./dL   Urinalysis, Microscopic Only - Urine, Clean Catch    Collection Time: 10/11/21 11:29 AM    Specimen: Urine, Clean Catch   Result Value Ref Range    RBC, UA 0-2 None Seen, 0-2 /HPF    WBC, UA 3-5 (A) None Seen, 0-2 /HPF    Bacteria, UA None Seen None Seen /HPF    Squamous Epithelial Cells, UA 3-6 (A) None Seen, 0-2 /HPF    Hyaline Casts, UA 0-2 None Seen /LPF    Methodology Automated Microscopy        Ordered the above labs and independently reviewed the results.        RADIOLOGY  CT Abdomen Pelvis Without Contrast    Result Date: 10/11/2021  CT OF THE ABDOMEN AND PELVIS WITHOUT CONTRAST 10/11/2021  HISTORY: Abdominal pain. Difficulty flushing peritoneal dialysis catheter.  TECHNIQUE: Axial images were obtained from the lung bases to the symphysis pubis. No intravenous contrast was given.  FINDINGS: The liver appears within normal limits. Gallbladder appears slightly contracted but no gallstones are seen. Spleen appears mildly enlarged. No focal splenic lesions are seen. Pancreas appears within normal limits. There is fullness of the left adrenal gland. Right adrenal gland does not appear enlarged.  Bilateral perinephric stranding. There is a tiny nonobstructing right renal calcification. There are several areas of low-attenuation in the left kidney which could represent simple cysts. At least one low-density right renal lesion is seen. No hydronephrosis is seen.  There is some aortoiliac calcification.  The peritoneal dialysis catheter is seen entering the skin in the right lower quadrant and is curled in the right hemipelvis. The catheter does not appear kinked and does appear intact. No abnormal fluid collections or masses are seen near the catheter tip. There is a tiny amount of free fluid adjacent to the right hepatic lobe. Small amount  of free fluid is seen in the left paracolic gutter region. There is fluid in the rectosigmoid colon as well.  Uterus has been removed.      1. The peritoneal dialysis catheter appears intact and terminates in the right hemipelvis. 2. Small amount of free fluid is seen. 3. There is mild bilateral perinephric stranding with small low-density lesions in the left kidney and perhaps one in the right kidney, difficult to assess in the absence of IV contrast, but may represent small cysts. There is a tiny nonobstructing right renal stone. 4. Status post hysterectomy.      Radiation dose reduction techniques were utilized, including automated exposure control and exposure modulation based on body size.         I ordered the above noted radiological studies. Reviewed by me and discussed with radiologist.  See dictation for official radiology interpretation.      MEDICATIONS GIVEN IN ER    Medications   sodium chloride 0.9 % flush 10 mL (has no administration in time range)   sodium chloride 0.9 % flush 10 mL (has no administration in time range)         PROGRESS, DATA ANALYSIS, CONSULTS, AND MEDICAL DECISION MAKING    All labs have been independently reviewed by me.  All radiology studies have been reviewed by me and discussed with radiologist dictating the report.   EKG's independently viewed and interpreted by me.  Discussion below represents my analysis of pertinent findings related to patient's condition, differential diagnosis, treatment plan and final disposition.    I have discussed case with Dr. Blackman, emergency room physician.  He has performed his own bedside examination and agrees with treatment plan.    ED Course as of 10/11/21 1457   Mon Oct 11, 2021   1109 Patient presents with right lower quadrant pain, as well as difficulty with peritoneal dialysis.  Patient has not been fully dialyzed in 4 days.  Differential diagnoses include but not limited to dialysis malfunction, appendicitis, peritonitis. [EE]   1341  I discussed CT findings with Dr. Beckford.  Patient has no evidence of intra-abdominal abnormality.  Peritoneal dialysis tip appears to be in good position. [EE]   1349 Potassium: 4.1 [EE]   1349 Creatinine(!): 4.84 [EE]   1349 Hemoglobin(!): 9.4 [EE]   1427 Dr. Dominguez has seen and evaluated patient.  He recommends admission with general surgery evaluating peritoneal catheter. [EE]   1451 I discussed case with RACHEL Jones.  She agrees admit the patient. [EE]      ED Course User Index  [EE] Reggie Porter PA       AS OF 14:57 EDT VITALS:    BP - 107/63  HR - 113  TEMP - 97.9 °F (36.6 °C) (Temporal)  O2 SATS - 96%        DIAGNOSIS  Final diagnoses:   Peritoneal dialysis catheter dysfunction, initial encounter (HCC)         DISPOSITION  Admitted           Reggie Porter PA  10/11/21 1458      Electronically signed by Marcos Blackman MD at 10/11/21 1532     Viry Verde PCT at 10/11/21 1233        This tech wore appropriate PPE during patient encounter. Hand hygiene was performed before and after each patient encounter.     Viry Verde PCT  10/11/21 1233      Electronically signed by Viry Verde PCT at 10/11/21 1233     Marcos Blackman MD at 10/11/21 1531        Pt presents to the ED c/o  malfunctioning peritoneal dialysis catheter.  She reports that she has been able to instill fluid that it is not coming out.  She denies fever chills.  She denies abdominal pain.     On exam,   Awake and alert, no acute distress.  There is a peritoneal dialysis catheter present.  The abdomen is soft, nondistended, nontender throughout.     Plan: Labs appear reassuring.  She does not have any urgent indication for hemodialysis.  She was evaluated by nephrology who requested admission and they were going to attempt to flush her peritoneal dialysis catheter.  She may require surgical intervention to have it replaced.      I wore an N95 mask, face shield, and gloves during this patient encounter.   Patient also wearing a surgical mask.  Hand hygeine performed before and after seeing the patient.     Attestation:  The GIBSON and I have discussed this patient's history, physical exam, and treatment plan.  I have reviewed the documentation and personally had a face to face interaction with the patient. I affirm the documentation and agree with the treatment and plan.  The attached note describes my personal findings.            Marcos Blackman MD  10/11/21 1531      Electronically signed by Marcos Blackman MD at 10/11/21 1531       {Outbreak/Travel/Exposure Documentation......;  Question Available Choices Patient Response   COVID-19 Outbreak Screen:  Do you currently have a new onset of the following symptoms?        Fever/Chills, Cough, Shortness of air, Loss of taste or smell, No, Unknown  No (10/11/21 1023)   COVID-19 Outbreak Screen: In the last 14 days, have you had contact with anyone who is ill, has show any of the symptoms listed above and/or has been diagnosis with the 2019 Novel Coronavirus? This includes any immediate household members but excludes any patients with whom you have been in contact within your normal work duties wearing proper PPE, if you are a healthcare worker.  Yes, No, Unknown              No (10/11/21 1023)   COVID-19 Outbreak Screen: Who was notified? Free text (not recorded)   Ebola Screening Outbreak Screen: Have you traveled to the [place] or [place] within the past 21 days?  Yes, No, Unknown (not recorded)   Ebola Screening Outbreak Screen: Do you have ANY of the following symptoms: Fever/Chills, Vomiting, Diarrhea, Fatigue, Headache, Muscle pain, Unexplained bleeding, Abdominal (stomach) pain, No, Unknown (not recorded)   Ebola Screening Outbreak Screen: Name of Person notified Free text (not recorded)   Travel Screen: Have you traveled in the last month? If so, to what country have you traveled? If [place] what state? Yes, No, Unknown  List of all  countries  List of all States No (10/11/21 1023)  (not recorded)  (not recorded)   Infection Risk: Do you currently have the following symptoms?  (If cough is selected, the Tuberculosis Screen is performed.) Cough, Fever, Rash, No No (10/11/21 1023)   Tuberculosis Screen: Do you have any of the following Tuberculosis Risks?  · Have you lived or spent time with anyone who had or may have TB?  · Have you lived in or visited any of the following areas for more than one month: [place], [place], [place], [place] or [place], the [place] or [place]?  · Do you have HIV/AIDS?  · Have you lived in or worked in a nursing home, homeless shelter, correctional facility, or substance abuse treatment facility?   · No    If Yes do you have any of the following symptoms? Yes responses display to the right    If Yes, symptoms listed are:  Cough greater than or equal to 3 weeks, Loss of appetite, Unexplained weight loss, Night sweats, Bloody sputum or hemoptysis, Hoarseness, Fever, Fatigue, Chest pain, No (not recorded)  (not recorded)   Exposure Screen: Have you been exposed to any of these contagious diseases in the last month? Measles, Chickenpox, Meningitis, Pertussis, Whooping Cough, No No (10/11/21 1023)       Vital Signs (last day)     Date/Time Temp Temp src Pulse Resp BP Patient Position SpO2    10/13/21 0700 97.2 (36.2) Oral 86 16 101/66 Lying 97    10/13/21 0435 97.1 (36.2) Oral 99 16 97/67 Lying 99    10/12/21 2340 96.7 (35.9) Oral 79 16 104/59 Lying 100    10/12/21 1958 97.2 (36.2) Oral 80 16 91/59 Lying 95    10/12/21 1930 -- -- 89 16 93/55 -- 96    10/12/21 1915 -- -- 100 16 97/60 -- 97    10/12/21 1900 -- -- 97 16 97/68 -- 97    10/12/21 1855 -- -- 104 -- 101/68 -- 97    10/12/21 1850 -- -- 104 16 101/64 -- 97    10/12/21 1845 -- -- 106 16 101/60 -- 97    10/12/21 1840 -- -- 108 16 101/66 -- 98    10/12/21 1836 98.1 (36.7) Oral 104 16 117/108 Lying 97    10/12/21 1502 98.3 (36.8) -- 84 24 106/72 -- 97     10/12/21 1218 97 (36.1) Oral 86 20 99/68 Lying 97    10/12/21 0929 97.1 (36.2) Oral 87 20 85/64 Lying 97    10/12/21 0616 -- -- -- -- 95/61 Sitting --    10/12/21 0530 97 (36.1) Oral 95 20 -- Lying 95          Oxygen Therapy (last day)     Date/Time SpO2 Device (Oxygen Therapy) Flow (L/min) Oxygen Concentration (%) ETCO2 (mmHg)    10/13/21 0700 97 -- -- -- --    10/13/21 0435 99 nasal cannula 2 -- --    10/12/21 2340 100 nasal cannula 2 -- --    10/12/21 2020 -- nasal cannula 2 -- --    10/12/21 1958 95 nasal cannula 2 -- --    10/12/21 1930 96 -- -- -- --    10/12/21 1915 97 -- -- -- --    10/12/21 1900 97 -- -- -- --    10/12/21 1855 97 -- -- -- --    10/12/21 1850 97 -- -- -- --    10/12/21 1845 97 -- -- -- --    10/12/21 1840 98 -- -- -- --    10/12/21 1836 97 nasal cannula 3 -- --    10/12/21 1502 97 -- -- -- --    10/12/21 1218 97 room air -- -- --    10/12/21 0929 97 room air -- -- --    10/12/21 0800 -- room air -- -- --    10/12/21 0530 95 room air -- -- --          Lines, Drains & Airways     Active LDAs     Name Placement date Placement time Site Days    Peripheral IV 10/11/21 1126 Left; Upper Forearm 10/11/21  1126  Forearm  1    Peritoneal Dialysis Catheter Intermittent on hold Right lower abdomen --  2.5 years agoe  --  Right lower abdomen                    Medication Administration Report for Ambar Lara as of 10/13/21 0989   Legend:    Given Hold Not Given Due Canceled Entry Other Actions    Time Time (Time) Time  Time-Action       Discontinued     Completed     Future     MAR Hold     Linked           Medications 10/11/21 10/12/21 10/13/21    aspirin chewable tablet 81 mg  Dose: 81 mg  Freq: Daily Route: PO  Start: 10/12/21 0900   Admin Instructions:   Herbal/drug interaction: Avoid use with ginkgo biloba.  Do not exceed 4 grams of aspirin in a 24 hr period.    If given for pain, use the following pain scale:   Mild Pain = Pain Score of 1-3, CPOT 1-2  Moderate Pain = Pain Score of 4-6, CPOT  3-4  Severe Pain = Pain Score of 7-10, CPOT 5-8        (1015)-Not Given [C]     1452-MAR Hold     2001-MAR Unhold           0857-Given             b complex-vitamin c-folic acid (NEPHRO-JHON) tablet 1 tablet  Dose: 1 tablet  Freq: Daily Route: PO  Start: 10/12/21 0900   Admin Instructions:   Waste only.  Black container.        (1013)-Not Given     1452-MAR Hold     2001-MAR Unhold           0857-Given             escitalopram (LEXAPRO) tablet 20 mg  Dose: 20 mg  Freq: Daily Route: PO  Start: 10/12/21 0900   Admin Instructions:   Caution: Look alike/sound alike drug alert.        1013-Given     1452-MAR Hold     2001-MAR Unhold           0856-Given             famotidine (PEPCID) tablet 20 mg  Dose: 20 mg  Freq: Daily Route: PO  Start: 10/13/21 0900   Admin Instructions:   Dose renally adjusted per P&T Policy         0857-Given             lanthanum (FOSRENOL) chewable tablet 750 mg  Dose: 750 mg  Freq: 3 Times Daily With Meals Route: PO  Start: 10/12/21 0800   Admin Instructions:   Do NOT swallow tablet whole. Chew or crush completely before swallowing. Take with meals.        (1013)-Not Given     1452-MAR Hold     (1621)-Not Given       1800-MAR Hold     2001-MAR Unhold            0856-Given     1200     1800           levothyroxine (SYNTHROID, LEVOTHROID) tablet 137 mcg  Dose: 137 mcg  Freq: Every Early Morning Route: PO  Start: 10/12/21 0600   Admin Instructions:   Take on empty stomach.        0616-Given     1452-MAR Hold     2001-MAR Unhold           0733-Given             melatonin tablet 3 mg  Dose: 3 mg  Freq: Nightly Route: PO  Start: 10/11/21 2100       2207-Given             1452-MAR Hold     2001-MAR Unhold     (2041)-Not Given           2100             midodrine (PROAMATINE) tablet 10 mg  Dose: 10 mg  Freq: 3 Times Daily Before Meals Route: PO  Start: 10/11/21 2145       2207-Given             0616-Given     (1122)-Not Given     1452-MAR Hold       1730-MAR Hold     2001-MAR Unhold     2038-Given            0733-Given     1130     1730           montelukast (SINGULAIR) tablet 10 mg  Dose: 10 mg  Freq: Nightly Route: PO  Start: 10/11/21 2100       2208-Given             1452-MAR Hold     2001-MAR Unhold     (2041)-Not Given           2100             multivitamin (THERAGRAN) tablet 1 tablet  Dose: 1 tablet  Freq: Daily Route: PO  Start: 10/12/21 0900   Admin Instructions:           (1013)-Not Given     1452-MAR Hold     2001-MAR Unhold           0856-Given             potassium chloride (K-DUR,KLOR-CON) ER tablet 40 mEq  Dose: 40 mEq  Freq: 2 Times Daily With Meals Route: PO  Start: 10/11/21 2030   Admin Instructions:   Swallow whole; do not crush, split, or chew.       2206-Given             1013-Given     2038-Given            0856-Given     1800            saccharomyces boulardii (FLORASTOR) capsule 500 mg  Dose: 500 mg  Freq: 2 Times Daily Route: PO  Start: 10/11/21 2100       2207-Given             1015-Given     1452-MAR Hold     2001-MAR Unhold       (2041)-Not Given             0857-Given     2100            sodium bicarbonate tablet 650 mg  Dose: 650 mg  Freq: 4 Times Daily Route: PO  Start: 10/11/21 2100       2207-Given             (1013)-Not Given     (1221)-Not Given     1452-MAR Hold       1800-MAR Hold     2001-MAR Unhold     (2040)-Not Given           (0900)-Not Given     1200     1800       2100             Sodium Bicarbonate-Dextrose 150-5 MEQ/L-% 150 mEq infusion  Dose: 75 mL/hr  Freq: Once Route: IV  Start: 10/12/21 1100        1100     1452-MAR Hold     2001-MAR Unhold            traZODone (DESYREL) tablet 100 mg  Dose: 100 mg  Freq: Nightly Route: PO  Start: 10/11/21 2100   Admin Instructions:   Take with food.  Caution: Look alike/sound alike drug alert       2208-Given             1452-MAR Hold     2001-MAR Unhold     (2040)-Not Given           2100            Completed Medications  Medications 10/11/21 10/12/21 10/13/21       levoFLOXacin (LEVAQUIN) 500 mg/100 mL D5W (premix) (LEVAQUIN)  500 mg  Dose: 500 mg  Freq: 30 min pre-op Route: IV  Indications of Use: PERIOPERATIVE PHARMACOPROPHYLAXIS  Start: 10/12/21 1527   End: 10/12/21 1638   Admin Instructions:   Lap Irasema Patients  Caution: Look alike/sound alike drug alert. Protect from light. Do NOT refrigerate.        1545-New Bag     1638-Stopped            And  clindamycin (CLEOCIN) 900 mg in dextrose 5% 50 mL IVPB (premix)  Dose: 900 mg  Freq: 30 min pre-op Route: IV  Indications of Use: PERIOPERATIVE PHARMACOPROPHYLAXIS  Start: 10/12/21 1527   End: 10/12/21 1638   Admin Instructions:   Lap Irasema Patients  Do Not refrigerate.        1545-New Bag     1638-Stopped             famotidine (PEPCID) injection 20 mg  Dose: 20 mg  Freq: Once Route: IV  Start: 10/12/21 1539   End: 10/12/21 1552   Admin Instructions:   Dilute to 10 mL total volume and give IV push over 2 minutes.        1552-Given              midodrine (PROAMATINE) tablet 10 mg  Dose: 10 mg  Freq: Once Route: PO  Start: 10/12/21 1831   End: 10/12/21 1957        2041-Given             Discontinued Medications  Medications 10/11/21 10/12/21 10/13/21       famotidine (PEPCID) tablet 20 mg  Dose: 20 mg  Freq: 2 Times Daily Route: PO  Start: 10/11/21 2100   End: 10/12/21 1409       2207-Given             1014-Given              midodrine (PROAMATINE) tablet 10 mg  Dose: 10 mg  Freq: 3 Times Daily Before Meals Route: PO  Start: 10/12/21 0730   End: 10/11/21 2054          sodium bicarbonate 8.4 % 150 mEq in dextrose (D5W) 5 % 1,000 mL infusion  Dose: 75 mL/hr  Freq: Once Route: IV  Start: 10/12/21 0930   End: 10/12/21 1005        (1725)-Not Given [C]              sodium chloride 0.9 % flush 3 mL  Dose: 3 mL  Freq: Every 12 Hours Scheduled Route: IV  Start: 10/12/21 2100   End: 10/12/21 1847               ,   Medication Administration Report for Ambar Lara as of 10/13/21 0955   Legend:    Given Hold Not Given Due Canceled Entry Other Actions    Time Time (Time) Time  Time-Action        Discontinued     Completed     Future     MAR Hold     Linked           Medications 10/11/21 10/12/21 10/13/21    lactated ringers infusion  Rate: 9 mL/hr Dose: 9 mL/hr  Freq: Continuous Route: IV  Start: 10/12/21 1539        1545-New Bag     1609-Paused [C]     1610-Restarted       1614-Stopped                    and   Medication Administration Report for Ambar Lara as of 10/13/21 0955   Legend:    Given Hold Not Given Due Canceled Entry Other Actions    Time Time (Time) Time  Time-Action       Discontinued     Completed     Future     MAR Hold     Linked           Medications 10/11/21 10/12/21 10/13/21    acetaminophen (TYLENOL) tablet 650 mg  Dose: 650 mg  Freq: Every 4 Hours PRN Route: PO  PRN Reason: Mild Pain   Start: 10/11/21 1850   Admin Instructions:   Do not exceed 4 grams of acetaminophen in a 24 hr period. Max dose of 2gm for AST/ALT greater than 120 units/L      If given for pain, use the following pain scale:   Mild Pain = Pain Score of 1-3, CPOT 1-2  Moderate Pain = Pain Score of 4-6, CPOT 3-4  Severe Pain = Pain Score of 7-10, CPOT 5-8        1452-MAR Hold     2000-MAR Unhold            Or  acetaminophen (TYLENOL) 160 MG/5ML solution 650 mg  Dose: 650 mg  Freq: Every 4 Hours PRN Route: PO  PRN Reason: Mild Pain   Start: 10/11/21 1850   Admin Instructions:   Do not exceed 4 grams of acetaminophen in a 24 hr period. Max dose of 2gm for AST/ALT greater than 120 units/L      If given for pain, use the following pain scale:   Mild Pain = Pain Score of 1-3, CPOT 1-2  Moderate Pain = Pain Score of 4-6, CPOT 3-4  Severe Pain = Pain Score of 7-10, CPOT 5-8        1452-MAR Hold     2000-MAR Unhold            Or  acetaminophen (TYLENOL) suppository 650 mg  Dose: 650 mg  Freq: Every 4 Hours PRN Route: RE  PRN Reason: Mild Pain   Start: 10/11/21 1850   Admin Instructions:   Do not exceed 4 grams of acetaminophen in a 24 hr period. Max dose of 2gm for AST/ALT greater than 120 units/L      If given for  pain, use the following pain scale:   Mild Pain = Pain Score of 1-3, CPOT 1-2  Moderate Pain = Pain Score of 4-6, CPOT 3-4  Severe Pain = Pain Score of 7-10, CPOT 5-8        1452-MAR Hold     2000-MAR Unhold             HYDROcodone-acetaminophen (NORCO)  MG per tablet 1 tablet  Dose: 1 tablet  Freq: Every 4 Hours PRN Route: PO  PRN Comment: Pain score 6-8  Start: 10/12/21 2001   End: 10/19/21 2000   Admin Instructions:   [ROBEL]    Do not exceed 4 grams of acetaminophen in a 24 hr period. Max dose of 2gm for AST/ALT greater than 120 units/L        If given for pain, use the following pain scale:   Mild Pain = Pain Score of 1-3, CPOT 1-2  Moderate Pain = Pain Score of 4-6, CPOT 3-4  Severe Pain = Pain Score of 7-10, CPOT 5-8        2329-Given             0447-Given             HYDROcodone-acetaminophen (NORCO) 5-325 MG per tablet 1 tablet  Dose: 1 tablet  Freq: Every 4 Hours PRN Route: PO  PRN Comment: Pain score 3-5  Start: 10/12/21 2001   End: 10/22/21 2000   Admin Instructions:   [ROBEL]    Do not exceed 4 grams of acetaminophen in a 24 hr period.    If given for pain, use the following pain scale:   Mild Pain = Pain Score of 1-3, CPOT 1-2  Moderate Pain = Pain Score of 4-6, CPOT 3-4  Severe Pain = Pain Score of 7-10, CPOT 5-8  [ROBEL]    Do not exceed 4 grams of acetaminophen in a 24 hr period. Max dose of 2gm for AST/ALT greater than 120 units/L        If given for pain, use the following pain scale:   Mild Pain = Pain Score of 1-3, CPOT 1-2  Moderate Pain = Pain Score of 4-6, CPOT 3-4  Severe Pain = Pain Score of 7-10, CPOT 5-8          HYDROmorphone (DILAUDID) injection 0.5 mg  Dose: 0.5 mg  Freq: Every 2 Hours PRN Route: IV  PRN Comment: Pain score 9-10  Start: 10/12/21 2001   End: 10/19/21 2000   Admin Instructions:   If given for pain, use the following pain scale:  Mild Pain = Pain Score of 1-3, CPOT 1-2  Moderate Pain = Pain Score of 4-6, CPOT 3-4  Severe Pain = Pain Score of 7-10, CPOT 5-8         2020-Given             0116-Given             ondansetron (ZOFRAN) injection 4 mg  Dose: 4 mg  Freq: Every 6 Hours PRN Route: IV  PRN Reasons: Nausea,Vomiting  Start: 10/11/21 1850   Admin Instructions:   If BOTH ondansetron (ZOFRAN) and promethazine (PHENERGAN) are ordered use ondansetron first and THEN promethazine IF ondansetron is ineffective.        1452-MAR Hold     2000-MAR Unhold             sodium chloride 0.9 % flush 10 mL  Dose: 10 mL  Freq: As Needed Route: IV  PRN Reason: Line Care  Start: 10/11/21 1109        1452-MAR Hold     2001-MAR Unhold             sodium chloride 0.9 % flush 10 mL  Dose: 10 mL  Freq: As Needed Route: IV  PRN Reason: Line Care  Start: 10/11/21 1053        1452-MAR Hold     2000-MAR Unhold            Discontinued Medications  Medications 10/11/21 10/12/21 10/13/21       bupivacaine-EPINEPHrine PF (MARCAINE w/EPI) 0.5% -1:200000 injection  Freq: As Needed  Start: 10/12/21 1636   End: 10/12/21 1834        1636-Given              diphenhydrAMINE (BENADRYL) capsule 25 mg  Dose: 25 mg  Freq: Every 30 Minutes PRN Route: PO  PRN Reason: Itching  PRN Comment: May repeat x 1  Indications of Use: EXTRAPYRAMIDAL REACTION,PRURITUS  Start: 10/12/21 1829   End: 10/12/21 1957   Admin Instructions:   Caution: Look alike/sound alike drug alert. This med may be ordered in other forms and routes. Before giving verify the last time the drug was given by any route/form.            diphenhydrAMINE (BENADRYL) injection 12.5 mg  Dose: 12.5 mg  Freq: Every 15 Minutes PRN Route: IV  PRN Reason: Itching  PRN Comment: May repeat x 1  Start: 10/12/21 1829   End: 10/12/21 1957   Admin Instructions:   Caution: Look alike/sound alike drug alert. This med may be ordered in other forms and routes. Before giving verify the last time the drug was given by any route/form.            ePHEDrine injection 5 mg  Dose: 5 mg  Freq: Once As Needed Route: IV  PRN Comment: symptomatic hypotension - Notify attending  anesthesiologist if this needs to be given  Start: 10/12/21 1829   End: 10/12/21 1957   Admin Instructions:   Caution: Look alike/sound alike drug alert   Dilute with NS to 5-10 mg/mL.  Central line preferred, if unavailable use large bore IV access with frequent nurse monitoring of IV site.          fentaNYL citrate (PF) (SUBLIMAZE) injection 50 mcg  Dose: 50 mcg  Freq: Every 5 Minutes PRN Route: IV  PRN Reasons: Moderate Pain ,Severe Pain   Start: 10/12/21 1829   End: 10/12/21 1957   Admin Instructions:   May alternate fentanyl with hydromorphone using fentanyl first.    Maximum total dose of fentanyl is 200 mcg.  If given for pain, use the following pain scale:  Mild Pain = Pain Score of 1-3, CPOT 1-2  Moderate Pain = Pain Score of 4-6, CPOT 3-4  Severe Pain = Pain Score of 7-10, CPOT 5-8        1906-Given              fentaNYL citrate (PF) (SUBLIMAZE) injection 50 mcg  Dose: 50 mcg  Freq: Every 10 Minutes PRN Route: IV  PRN Reason: Severe Pain   Start: 10/12/21 1537   End: 10/12/21 1847   Admin Instructions:   Maximum total dose of fentanyl is 100 mcg.  If given for pain, use the following pain scale:  Mild Pain = Pain Score of 1-3, CPOT 1-2  Moderate Pain = Pain Score of 4-6, CPOT 3-4  Severe Pain = Pain Score of 7-10, CPOT 5-8          flumazenil (ROMAZICON) injection 0.2 mg  Dose: 0.2 mg  Freq: As Needed Route: IV  PRN Comment: for benzodiazepine induced unresponsiveness or sedation  Indications of Use: BENZODIAZEPINE-INDUCED SEDATION  Start: 10/12/21 1829   End: 10/12/21 1957   Admin Instructions:   Notify Anesthesia if given  ** give IV over 15-30 seconds **          HYDROcodone-acetaminophen (NORCO) 7.5-325 MG per tablet 1 tablet  Dose: 1 tablet  Freq: Once As Needed Route: PO  PRN Reason: Moderate Pain   Start: 10/12/21 1829   End: 10/12/21 1957   Admin Instructions:   [ROBEL]    Do not exceed 4 grams of acetaminophen in a 24 hr period. Max dose of 2gm for AST/ALT greater than 120 units/L        If given  for pain, use the following pain scale:   Mild Pain = Pain Score of 1-3, CPOT 1-2  Moderate Pain = Pain Score of 4-6, CPOT 3-4  Severe Pain = Pain Score of 7-10, CPOT 5-8          HYDROmorphone (DILAUDID) injection 0.5 mg  Dose: 0.5 mg  Freq: Every 5 Minutes PRN Route: IV  PRN Reasons: Moderate Pain ,Severe Pain   Start: 10/12/21 1829   End: 10/12/21 1957   Admin Instructions:   May alternate fentanyl with hydromorphone using fentanyl first.    Maximum total dose of hydromorphone is 2 mg.  If given for pain, use the following pain scale:  Mild Pain = Pain Score of 1-3, CPOT 1-2  Moderate Pain = Pain Score of 4-6, CPOT 3-4  Severe Pain = Pain Score of 7-10, CPOT 5-8          iothalamate (CONRAY) injection  Freq: As Needed  Start: 10/12/21 1701   End: 10/12/21 1834        1701-Given              lidocaine PF 1% (XYLOCAINE) injection 0.5 mL  Dose: 0.5 mL  Freq: Once As Needed Route: IJ  PRN Comment: IV Start  Start: 10/12/21 1537   End: 10/12/21 1847          midazolam (VERSED) injection 1 mg  Dose: 1 mg  Freq: Every 10 Minutes PRN Route: IV  PRN Comment: Anxiety prophylaxis, Pre-op comfort  Start: 10/12/21 1537   End: 10/12/21 1847   Admin Instructions:   May repeat dose in 10 minutes one time then contact provider for additional orders.          1607-Given              naloxone (NARCAN) injection 0.2 mg  Dose: 0.2 mg  Freq: As Needed Route: IV  PRN Reasons: Opioid Reversal,Respiratory Depression  PRN Comment: unresponsiveness, decrease oxygen saturation  Indications of Use: ACUTE RESPIRATORY FAILURE,OPIOID-INDUCED RESPIRATORY DEPRESSION  Start: 10/12/21 1829   End: 10/12/21 1957   Admin Instructions:   Notify Anesthesia if given          ondansetron (ZOFRAN) injection 4 mg  Dose: 4 mg  Freq: Once As Needed Route: IV  PRN Reasons: Nausea,Vomiting  Indications of Use: POSTOPERATIVE NAUSEA AND VOMITING  Start: 10/12/21 1829   End: 10/12/21 1957   Admin Instructions:   If BOTH ondansetron (ZOFRAN) and promethazine  (PHENERGAN) are ordered use ondansetron first and THEN promethazine IF ondansetron is ineffective.          oxyCODONE-acetaminophen (PERCOCET)  MG per tablet 1 tablet  Dose: 1 tablet  Freq: Every 4 Hours PRN Route: PO  PRN Reason: Severe Pain   Start: 10/12/21 1829   End: 10/12/21 1957   Admin Instructions:   [ROBEL]    Do not exceed 4 grams of acetaminophen in a 24 hr period. Max dose of 2gm for AST/ALT greater than 120 units/L        If given for pain, use the following pain scale:   Mild Pain = Pain Score of 1-3, CPOT 1-2  Moderate Pain = Pain Score of 4-6, CPOT 3-4  Severe Pain = Pain Score of 7-10, CPOT 5-8          promethazine (PHENERGAN) suppository 25 mg  Dose: 25 mg  Freq: Once As Needed Route: RE  PRN Reasons: Nausea,Vomiting  Start: 10/12/21 1829   End: 10/12/21 1957   Admin Instructions:   If BOTH ondansetron (ZOFRAN) and promethazine (PHENERGAN) are ordered use ondansetron first and THEN promethazine IF ondansetron is ineffective.         Or  promethazine (PHENERGAN) tablet 25 mg  Dose: 25 mg  Freq: Once As Needed Route: PO  PRN Reasons: Nausea,Vomiting  Start: 10/12/21 1829   End: 10/12/21 1957   Admin Instructions:   If BOTH ondansetron (ZOFRAN) and promethazine (PHENERGAN) are ordered use ondansetron first and THEN promethazine IF ondansetron is ineffective.            sodium chloride (NS) irrigation solution  Freq: As Needed  Start: 10/12/21 1636   End: 10/12/21 1834        1636-Given              sodium chloride 0.9 % flush 3-10 mL  Dose: 3-10 mL  Freq: As Needed Route: IV  PRN Reason: Line Care  Start: 10/12/21 1537   End: 10/12/21 1847          sodium chloride 0.9 % solution  Freq: As Needed  Start: 10/12/21 1636   End: 10/12/21 1834        1636-Given     1723-Given     1753-Given            sodium chloride 1,000 mL with heparin (porcine) 1,000 Units mixture  Freq: As Needed  Start: 10/12/21 1636   End: 10/12/21 1834        1636-Given                       Orders (active)      Start      Ordered    10/13/21 0900  famotidine (PEPCID) tablet 20 mg  Daily         10/12/21 1409    10/13/21 0312  Diet Regular  Diet Effective Now         10/13/21 0312    10/12/21 2001  HYDROcodone-acetaminophen (NORCO) 5-325 MG per tablet 1 tablet  Every 4 Hours PRN         10/12/21 2001    10/12/21 2001  HYDROcodone-acetaminophen (NORCO)  MG per tablet 1 tablet  Every 4 Hours PRN         10/12/21 2001    10/12/21 2001  HYDROmorphone (DILAUDID) injection 0.5 mg  Every 2 Hours PRN         10/12/21 2001    10/12/21 1831  Pulse Oximetry, Continuous  Continuous         10/12/21 1830    10/12/21 1723  Tissue Pathology Exam  RELEASE UPON ORDERING         10/12/21 1723    10/12/21 1539  lactated ringers infusion  Continuous         10/12/21 1537    10/12/21 1100  Sodium Bicarbonate-Dextrose 150-5 MEQ/L-% 150 mEq infusion  Once         10/12/21 1003    10/12/21 0900  aspirin chewable tablet 81 mg  Daily         10/11/21 1943    10/12/21 0900  b complex-vitamin c-folic acid (NEPHRO-JHON) tablet 1 tablet  Daily         10/11/21 1943    10/12/21 0900  escitalopram (LEXAPRO) tablet 20 mg  Daily         10/11/21 1943    10/12/21 0900  multivitamin (THERAGRAN) tablet 1 tablet  Daily         10/11/21 1943    10/12/21 0843  Follow Anesthesia Guidelines / Protocol  Continuous         10/12/21 0846    10/12/21 0843  Perform Chlorhexidine Skin Prep Night Before and Morning of Procedure  Until Discontinued        Comments: Chlorhexidine Skin Prep and Instructions For All Patients Having A Procedure Requiring an Outward Incision if Not Allergic. If Allergic, Give Antibacterial Skin Wipes and Instructions. Do Not Use For Facial Cases or on Any Mucus Membranes.    10/12/21 0846    10/12/21 0800  lanthanum (FOSRENOL) chewable tablet 750 mg  3 Times Daily With Meals         10/11/21 1943    10/12/21 0600  levothyroxine (SYNTHROID, LEVOTHROID) tablet 137 mcg  Every Early Morning         10/11/21 1943    10/11/21 2145  midodrine (PROAMATINE)  "tablet 10 mg  3 Times Daily Before Meals         10/11/21 2054    10/11/21 2100  melatonin tablet 3 mg  Nightly         10/11/21 1943    10/11/21 2100  montelukast (SINGULAIR) tablet 10 mg  Nightly         10/11/21 1943    10/11/21 2100  saccharomyces boulardii (FLORASTOR) capsule 500 mg  2 Times Daily         10/11/21 1943    10/11/21 2100  sodium bicarbonate tablet 650 mg  4 Times Daily         10/11/21 1943    10/11/21 2100  traZODone (DESYREL) tablet 100 mg  Nightly         10/11/21 1943    10/11/21 2030  potassium chloride (K-DUR,KLOR-CON) ER tablet 40 mEq  2 Times Daily With Meals         10/11/21 1943    10/11/21 2000  Vital Signs  Every 4 Hours       10/11/21 1850    10/11/21 1851  Code Status and Medical Interventions:  Continuous         10/11/21 1850    10/11/21 1851  Intake & Output  Every Shift       10/11/21 1850    10/11/21 1851  Oxygen Therapy- Nasal Cannula; Titrate for SPO2: 90% - 95%  Continuous         10/11/21 1850    10/11/21 1851  Maintain Sequential Compression Device  Continuous         10/11/21 1850    10/11/21 1850  acetaminophen (TYLENOL) tablet 650 mg  Every 4 Hours PRN        \"Or\" Linked Group Details    10/11/21 1850    10/11/21 1850  acetaminophen (TYLENOL) 160 MG/5ML solution 650 mg  Every 4 Hours PRN        \"Or\" Linked Group Details    10/11/21 1850    10/11/21 1850  acetaminophen (TYLENOL) suppository 650 mg  Every 4 Hours PRN        \"Or\" Linked Group Details    10/11/21 1850    10/11/21 1850  ondansetron (ZOFRAN) injection 4 mg  Every 6 Hours PRN         10/11/21 1850    10/11/21 1427  [facility] (on-call MD unless specified) Details  Once        Specialty:  Hospitalist  Provider:  Elfego Martins MD    10/11/21 1426    10/11/21 1142  Body Fluid Culture - Body Fluid, Peritoneal Catheter  Once         10/11/21 1141    10/11/21 1110  Insert peripheral IV  Once        \"And\" Linked Group Details    10/11/21 1109    10/11/21 1109  sodium chloride 0.9 % flush 10 mL  As Needed        \"And\" " Linked Group Details    10/11/21 1109    10/11/21 1054  Cardiac Monitoring  Per Hospital Policy         10/11/21 1054    10/11/21 1054  Insert Peripheral IV  Once         10/11/21 1054    10/11/21 1053  sodium chloride 0.9 % flush 10 mL  As Needed         10/11/21 1054    Unscheduled  Up With Assistance  As Needed       10/11/21 1850    Unscheduled  Oxygen Therapy- Nasal Cannula; Titrate for SPO2: Per Policy  Continuous PRN       10/12/21 1537    Unscheduled  Oxygen Therapy- Nasal Cannula; Titrate for SPO2: per policy  Continuous PRN       10/12/21 1830                   Operative/Procedure Notes       Jamie Figueroa MD at 10/12/21 1636  Version 2 of 2       PREOPERATIVE DIAGNOSIS:   Dysfunctional peritoneal dialysis catheter  Cholelithiasis    POSTOPERATIVE DIAGNOSIS:   Peritoneal dialysis catheter occluded with proteinaceous plug  Mild to moderate diffuse peritonitis  Cholelithiasis  Chronic cholecystitis    PROCEDURE:   Laparoscopic revision peritoneal dialysis catheter  Laparoscopic cholecystectomy    SURGEON: Jamie Figueroa M.D.    ASSISTANT:  Rafa Celis CSA was responsible for performing the following activities: Retraction, Suction, Irrigation, Suturing, Closing, Placing Dressing and Held/Positioned Camera and their skilled assistance was necessary for the success of this case.     SPECIMENS:   Gallbladder  Peritoneal fluid for culture  Catheter clot for culture    INTRAOPERATIVE COMPLICATIONS: None.  ANESTHESIA: General.  BLOOD LOSS:  10 mL.  COUNTS: Needle and sponge counts correct.     INDICATIONS: This is a pleasant patient who presented to the hospital with a nonfunctioning peritoneal dialysis catheter.  She also has symptomatic cholelithiasis.  She is taken to the operating room today for laparoscopic revision of the PD catheter and cholecystectomy.    DESCRIPTION OF PROCEDURE: The patient was brought to the operating room in stable condition. Perioperative antibiotics were given  and sequential compression devices were in place. She was then positioned supine on the operating room table. General anesthesia was induced without difficulty.     Access to the peritoneum was achieved in the supraumbilical position by a cutdown technique. A blunt 12-mm trocar was secured to the fascia with 0 Vicryl stay sutures. The abdomen was then insufflated to 15 mmHg pressure with carbon dioxide. A brief survey of the abdominal cavity revealed no evidence of injury from insertion of the trocar.  The peritoneal surfaces were mild to moderately hyperemic.  There were extensive adhesions in the upper abdomen between omentum and anterior abdominal wall.  There were small bowel loops and the sigmoid colon adherent to the abdominal wall around the peritoneal dialysis catheter entry point.      The left upper quadrant was relatively free of adhesions and allowed for placement of two 5 mm trochars to begin adhesiolysis.  I used the harmonic scalpel to divide the adhesions between omentum and abdominal wall.  This allowed placement of two subcostal trochars on the right.  These were both 5 mm ports.  There was a scant amount of cloudy peritoneal fluid that I evacuated and submitted for culture.    There were adhesions between the gallbladder and omentum, and the gallbladder wall was thickened, consistent with chronic cholecystitis. The peritoneum at the base of the gallbladder was scored from laterally to medially, terminating at the level of the cystic artery. The peritoneum was gently stripped down, exposing the cystic duct. After completely exposing the cystic duct, a retro-cystic window was created. A firm critical view was obtained, visualizing the cystic duct and cystic artery. There was no evidence of tenting of the common bile duct.     The cystic duct and artery were clipped and divided. The gallbladder was elevated off the liver bed with electrocautery. An effort to careful and meticulous hemostasis was  undertaken. The gallbladder was placed in an endoscopic retrieval bag. It was removed through the umbilical trocar site without difficulty.    I then inspected the lower abdomen.  There were small bowel and sigmoid colon adhesions surrounding the peritoneal dialysis catheter insertion point.  These were carefully divided with laparoscopic harmonic scalpel and laparoscopic scissors.  The dissection was tedious, but when complete there was a open space in the pelvis and free unencumbered bowel loops that would allow for continued use of the peritoneal dialysis catheter.     There was a proteinaceous plug occluding the catheter along its entire length.  The catheter was irrigated to evacuate this.  The tissue plug was submitted for culture.  I instilled 1000 L of heparinized saline into the abdominal cavity.  The flows easily through the PD catheter.    I used the EndoClose device to close all of the 5 mm trocar sites.  I placed a 3-0 Vicryl stitch at each of the 5 mm trocar sites to close the subcutaneous tissues.  The 5 mm trocar sites were closed with 4-0 Monocryl subcuticular stitches.    I closed the umbilical trocar site in 4 layers with 0 Vicryl figure-of-eight stitches for the fascia, two layers of subcutaneous tissue closure with 2-0 and 3-0 Vicryl, and a 4-0 Monocryl running subcuticular stitch. All of the incision sites were painted with waterproof adhesive.      A final survey of the operative site was undertaken, and there was no evidence of bleeding or intrabdominal injury. The insufflation was evacuated as the trocars were removed under laparoscopic vision. The umbilical fascia was closed with interrupted 0 Vicryl suture. The skin incisions were closed with 4-0 Monocryl and adhesive strips.    At the end of the case, all needle and sponge counts were correct, and she was extubated.    Electronically signed by Jamie Figueroa MD at 10/12/21 3178     Jamie Figueroa MD at 10/12/21 2647  Version 1  of 2       PREOPERATIVE DIAGNOSIS:   Dysfunctional peritoneal dialysis catheter  Cholelithiasis    POSTOPERATIVE DIAGNOSIS:   Dysfunctional peritoneal dialysis catheter  Cholelithiasis  Chronic cholecystitis    PROCEDURE:   Laparoscopic revision peritoneal dialysis catheter  Laparoscopic cholecystectomy    SURGEON: Jamie Figueroa M.D.    ASSISTANT:  Rafa Celis CSA was responsible for performing the following activities: Retraction, Suction, Irrigation, Suturing, Closing, Placing Dressing and Held/Positioned Camera and their skilled assistance was necessary for the success of this case.     SPECIMENS:   Gallbladder  Peritoneal fluid for culture  Catheter clot for culture    INTRAOPERATIVE COMPLICATIONS: None.  ANESTHESIA: General.  BLOOD LOSS:  10 mL.  COUNTS: Needle and sponge counts correct.     INDICATIONS: This is a pleasant patient who presented to the hospital with a nonfunctioning peritoneal dialysis catheter.  She also has symptomatic cholelithiasis.  She is taken to the operating room today for laparoscopic revision of the PD catheter and cholecystectomy.    DESCRIPTION OF PROCEDURE: The patient was brought to the operating room in stable condition. Perioperative antibiotics were given and sequential compression devices were in place. She was then positioned supine on the operating room table. General anesthesia was induced without difficulty.     Access to the peritoneum was achieved in the supraumbilical position by a cutdown technique. A blunt 12-mm trocar was secured to the fascia with 0 Vicryl stay sutures. The abdomen was then insufflated to 15 mmHg pressure with carbon dioxide. A brief survey of the abdominal cavity revealed no evidence of injury from insertion of the trocar.  There were adhesions in the upper abdomen between omentum and anterior abdominal wall.  Two 5 mm trochars were placed in the left abdomen to begin adhesiolysis.  I used the harmonic scalpel to divide the  adhesions between omentum and abdominal wall.  This allowed placement of 2 subcostal trochars on the right.  These were both 5 mm ports.  There was a scant amount of cloudy peritoneal fluid that I evacuated and submitted for culture.    There were adhesions between the gallbladder and omentum, and the gallbladder wall was thickened, consistent with chronic cholecystitis. The peritoneum at the base of the gallbladder was scored from laterally to medially, terminating at the level of the cystic artery. The peritoneum was gently stripped down, exposing the cystic duct. After completely exposing the cystic duct, a retro-cystic window was created. A firm critical view was obtained, visualizing the cystic duct and cystic artery. There was no evidence of tenting of the common bile duct.     The cystic duct and artery were clipped and divided. The gallbladder was elevated off the liver bed with electrocautery. An effort to careful and meticulous hemostasis was undertaken. The gallbladder was placed in an endoscopic retrieval bag. It was removed through the umbilical trocar site without difficulty.    I then inspected the lower abdomen.  There were adhesions surrounding the peritoneal dialysis catheter insertion point.  These were carefully divided with laparoscopic harmonic scalpel, laparoscopic scissors, and blunt dissection.  The catheter was encased in adhesions involving omentum, small bowel, and sigmoid colon.  The dissection was tedious, but when complete there was a open space in the pelvis and free unencumbered bowel loops that would allow for continued use of the peritoneal dialysis catheter.     There was a proteinaceous plug occluding the catheter along its entire length.  The catheter was irrigated to evacuate this.  The tissue plug was submitted for culture.  I instilled 1000 L of heparinized saline into the abdominal cavity.  The flows easily through the PD catheter.    I used the EndoClose device to close all  of the 5 mm trocar sites.  I placed a 3-0 Vicryl stitch at each of the 5 mm trocar sites to close the subcutaneous tissues.  The 5 mm trocar sites were closed with 4-0 Monocryl subcuticular stitches.    I closed the umbilical trocar site in 4 layers with 0 Vicryl figure-of-eight stitches for the fascia, two layers of subcutaneous tissue closure with 2-0 and 3-0 Vicryl, and a 4-0 Monocryl running subcuticular stitch. All of the incision sites were painted with waterproof adhesive.      A final survey of the operative site was undertaken, and there was no evidence of bleeding or intrabdominal injury. The insufflation was evacuated as the trocars were removed under laparoscopic vision. The umbilical fascia was closed with interrupted 0 Vicryl suture. The skin incisions were closed with 4-0 Monocryl and adhesive strips.    At the end of the case, all needle and sponge counts were correct, and she was extubated.    Electronically signed by Jamie Figueroa MD at 10/12/21 1848          Physician Progress Notes       Elfego Martins MD at 10/12/21 1338          DAILY PROGRESS NOTE  [facility]    Patient Identification:  Name: Ambar Lara  Age: 51 y.o.  Sex: female  :  1970  MRN: [id number]         Primary Care Physician: Jet Manuel MD    Subjective:  Interval History:She complains of pain.    Objective:    Vital signs in last 24 hours:  Temp:  [97 °F (36.1 °C)-98 °F (36.7 °C)] 97 °F (36.1 °C)  Heart Rate:  [] 86  Resp:  [20] 20  BP: ()/(61-74) 99/68    Intake/Output:  No intake or output data in the 24 hours ending 10/12/21 1338    Exam:  BP 99/68 (BP Location: Right arm, Patient Position: Lying)   Pulse 86   Temp 97 °F (36.1 °C) (Oral)   Resp 20   Wt 78.1 kg (172 lb 1.6 oz)   LMP 2004 (Approximate)   SpO2 97%   BMI 32.52 kg/m²     General Appearance:    Alert, cooperative, no distress   Head:    Normocephalic, without obvious abnormality, atraumatic   Eyes:        Throat:   Lips, tongue, gums normal   Neck:   Supple, symmetrical, trachea midline, no JVD   Lungs:     Clear to auscultation bilaterally, respirations unlabored   Chest Wall:    No tenderness or deformity    Heart:    Regular rate and rhythm, S1 and S2 normal, no murmur,no  Rub or gallop   Abdomen:     Soft, mildl tender, bowel sounds active, no masses, no organomegaly    Extremities:   Extremities normal, atraumatic, no cyanosis or edema   Pulses:      Skin:   Skin is warm and dry,  no rashes or palpable lesions   Neurologic:   no focal deficits noted      Lab Results (last 72 hours)     Procedure Component Value Units Date/Time    Basic Metabolic Panel [[id number]]  (Abnormal) Collected: 10/12/21 0750    Specimen: Blood Updated: 10/12/21 1042     Glucose 98 mg/dL      BUN 33 mg/dL      Creatinine 4.97 mg/dL      Sodium 144 mmol/L      Potassium 4.6 mmol/L      Chloride 114 mmol/L      CO2 12.4 mmol/L      Calcium 8.6 mg/dL      eGFR   Amer --     Comment: <15 Indicative of kidney failure.        eGFR Non African Amer 9 mL/min/1.73      Comment: <15 Indicative of kidney failure.        BUN/Creatinine Ratio 6.6     Anion Gap 17.6 mmol/L     Narrative:      GFR Normal >60  Chronic Kidney Disease <60  Kidney Failure <15      CBC Auto Differential [[id number]]  (Abnormal) Collected: 10/12/21 0750    Specimen: Blood Updated: 10/12/21 1021     WBC 8.29 10*3/mm3      RBC 3.05 10*6/mm3      Hemoglobin 9.1 g/dL      Hematocrit 30.3 %      MCV 99.3 fL      MCH 29.8 pg      MCHC 30.0 g/dL      RDW 12.9 %      RDW-SD 47.1 fl      MPV 10.4 fL      Platelets 399 10*3/mm3      Neutrophil % 66.0 %      Lymphocyte % 21.1 %      Monocyte % 6.3 %      Eosinophil % 3.6 %      Basophil % 0.8 %      Immature Grans % 2.2 %      Neutrophils, Absolute 5.47 10*3/mm3      Lymphocytes, Absolute 1.75 10*3/mm3      Monocytes, Absolute 0.52 10*3/mm3      Eosinophils, Absolute 0.30 10*3/mm3      Basophils, Absolute 0.07 10*3/mm3       Immature Grans, Absolute 0.18 10*3/mm3      nRBC 0.1 /100 WBC     COVID PRE-OP / PRE-PROCEDURE SCREENING ORDER (NO ISOLATION) - Swab, Nasopharynx [[id number]]  (Normal) Collected: 10/11/21 1501    Specimen: Swab from Nasopharynx Updated: 10/11/21 1629    Narrative:      The following orders were created for panel order COVID PRE-OP / PRE-PROCEDURE SCREENING ORDER (NO ISOLATION) - Swab, Nasopharynx.  Procedure                               Abnormality         Status                     ---------                               -----------         ------                     COVID-19,[facility]HOU[facility]...[[id number]]  Normal              Final result                 Please view results for these tests on the individual orders.    COVID-19,[facility]-HOU[facility] CEPHEID/ANTONELLA NP SWAB IN TRANSPORT MEDIA 8-12 HR TAT - Swab, Nasopharynx [[id number]]  (Normal) Collected: 10/11/21 1501    Specimen: Swab from Nasopharynx Updated: 10/11/21 1629     COVID19 Not Detected    Narrative:      Fact sheet for providers: https://www.fda.gov/media/062514/download    Fact sheet for patients: https://www.fda.gov/media/794624/download    Test performed by PCR.    [facility] Draw [[id number]] Collected: 10/11/21 1127    Specimen: Blood Updated: 10/11/21 1230    Narrative:      The following orders were created for panel order [facility] Draw.  Procedure                               Abnormality         Status                     ---------                               -----------         ------                     Green Top (Gel)[[id number]]                                  Final result               Lavender Top[[id number]]                                     Final result               Gold Top - SST[[id number]]                                   Final result               Light Blue Top[[id number]]                                   Final result                 Please view results for these tests on the individual orders.    Gold Top - SST [[id number]] Collected:  10/11/21 1127    Specimen: Blood Updated: 10/11/21 1230     Extra Tube Hold for add-ons.     Comment: Auto resulted.       Light Blue Top [[id number]] Collected: 10/11/21 1127    Specimen: Blood Updated: 10/11/21 1230     Extra Tube hold for add-on     Comment: Auto resulted       Lavender Top [[id number]] Collected: 10/11/21 1127    Specimen: Blood Updated: 10/11/21 1230     Extra Tube hold for add-on     Comment: Auto resulted       Green Top (Gel) [[id number]] Collected: 10/11/21 1127    Specimen: Blood Updated: 10/11/21 1230     Extra Tube Hold for add-ons.     Comment: Auto resulted.       Lipase [[id number]]  (Normal) Collected: 10/11/21 1127    Specimen: Blood Updated: 10/11/21 1214     Lipase 26 U/L     Comprehensive Metabolic Panel [[id number]]  (Abnormal) Collected: 10/11/21 1127    Specimen: Blood Updated: 10/11/21 1200     Glucose 128 mg/dL      BUN 34 mg/dL      Creatinine 4.84 mg/dL      Sodium 142 mmol/L      Potassium 4.1 mmol/L      Chloride 113 mmol/L      CO2 15.3 mmol/L      Calcium 8.4 mg/dL      Total Protein 7.1 g/dL      Albumin 3.60 g/dL      ALT (SGPT) 11 U/L      AST (SGOT) 9 U/L      Alkaline Phosphatase 67 U/L      Total Bilirubin <0.2 mg/dL      eGFR Non African Amer 10 mL/min/1.73      Comment: <15 Indicative of kidney failure.        eGFR   Amer --     Comment: <15 Indicative of kidney failure.        Globulin 3.5 gm/dL      A/G Ratio 1.0 g/dL      BUN/Creatinine Ratio 7.0     Anion Gap 13.7 mmol/L     Narrative:      GFR Normal >60  Chronic Kidney Disease <60  Kidney Failure <15      Protime-INR [[id number]]  (Abnormal) Collected: 10/11/21 1127    Specimen: Blood Updated: 10/11/21 1150     Protime 14.6 Seconds      INR 1.16    Urinalysis, Microscopic Only - Urine, Clean Catch [[id number]]  (Abnormal) Collected: 10/11/21 1129    Specimen: Urine, Clean Catch Updated: 10/11/21 1149     RBC, UA 0-2 /HPF      WBC, UA 3-5 /HPF      Bacteria, UA None Seen /HPF      Squamous Epithelial  Cells, UA 3-6 /HPF      Hyaline Casts, UA 0-2 /LPF      Methodology Automated Microscopy    Urinalysis With Microscopic If Indicated (No Culture) - Urine, Clean Catch [[id number]]  (Abnormal) Collected: 10/11/21 1129    Specimen: Urine, Clean Catch Updated: 10/11/21 1147     Color, UA Yellow     Appearance, UA Clear     pH, UA 5.5     Specific Gravity, UA 1.010     Glucose, UA Negative     Ketones, UA Negative     Bilirubin, UA Negative     Blood, UA Negative     Protein,  mg/dL (2+)     Leuk Esterase, UA Trace     Nitrite, UA Negative     Urobilinogen, UA 0.2 E.U./dL    CBC & Differential [[id number]]  (Abnormal) Collected: 10/11/21 1127    Specimen: Blood Updated: 10/11/21 1144    Narrative:      The following orders were created for panel order CBC & Differential.  Procedure                               Abnormality         Status                     ---------                               -----------         ------                     CBC Auto Differential[[id number]]        Abnormal            Final result                 Please view results for these tests on the individual orders.    CBC Auto Differential [[id number]]  (Abnormal) Collected: 10/11/21 1127    Specimen: Blood Updated: 10/11/21 1144     WBC 7.85 10*3/mm3      RBC 3.07 10*6/mm3      Hemoglobin 9.4 g/dL      Hematocrit 29.6 %      MCV 96.4 fL      MCH 30.6 pg      MCHC 31.8 g/dL      RDW 13.2 %      RDW-SD 47.0 fl      MPV 9.7 fL      Platelets 359 10*3/mm3      Neutrophil % 68.1 %      Lymphocyte % 20.6 %      Monocyte % 5.1 %      Eosinophil % 3.1 %      Basophil % 0.6 %      Immature Grans % 2.5 %      Neutrophils, Absolute 5.34 10*3/mm3      Lymphocytes, Absolute 1.62 10*3/mm3      Monocytes, Absolute 0.40 10*3/mm3      Eosinophils, Absolute 0.24 10*3/mm3      Basophils, Absolute 0.05 10*3/mm3      Immature Grans, Absolute 0.20 10*3/mm3      nRBC 0.0 /100 WBC         Data Review:  Results from last 7 days   Lab Units 10/12/21  2738  10/11/21  1127 10/11/21  1127   SODIUM mmol/L 144  --  142   POTASSIUM mmol/L 4.6  --  4.1   CHLORIDE mmol/L 114*  --  113*   CO2 mmol/L 12.4*  --  15.3*   BUN mg/dL 33*  --  34*   CREATININE mg/dL 4.97*  --  4.84*   GLUCOSE mg/dL 98   < > 128*   CALCIUM mg/dL 8.6  --  8.4*    < > = values in this interval not displayed.     Results from last 7 days   Lab Units 10/12/21  0750 10/11/21  1127   WBC 10*3/mm3 8.29 7.85   HEMOGLOBIN g/dL 9.1* 9.4*   HEMATOCRIT % 30.3* 29.6*   PLATELETS 10*3/mm3 399 359             Lab Results   Lab Value Date/Time    TROPONINT 0.035 (C) 09/21/2021 1135    TROPONINT 0.040 (C) 04/16/2021 1759    TROPONINT 0.037 (C) 04/16/2021 1602    TROPONINT 0.030 04/09/2021 1631         Results from last 7 days   Lab Units 10/11/21  1127   ALK PHOS U/L 67   BILIRUBIN mg/dL <0.2   ALT (SGPT) U/L 11   AST (SGOT) U/L 9             No results found for: POCGLU  Results from last 7 days   Lab Units 10/11/21  1127   INR  1.16*       Past Medical History:   Diagnosis Date   • CHF (congestive heart failure) (CMS/[facility])    • Dialysis patient (CMS/[facility])    • Disease of thyroid gland    • Elevated cholesterol    • GERD (gastroesophageal reflux disease)    • Renal disorder    • Sleep apnea        Assessment:  Active Hospital Problems    Diagnosis  POA   • **PD catheter dysfunction ([facility]) [T85.611A]  Yes   • Gastroesophageal reflux disease [K21.9]  Yes   • Peritoneal dialysis status ([facility]) [Z99.2]  Not Applicable   • ESRD (end stage renal disease) on dialysis ([facility]) [N18.6, Z99.2]  Not Applicable   • Anxiety [F41.9]  Yes   • Hypothyroidism [E03.9]  Yes      Resolved Hospital Problems   No resolved problems to display.       Plan:  Surgery and renal consults noted. Follow lab.  Surgery today.    Elfego Martins MD  10/12/2021  13:38 EDT      Electronically signed by Elfego Martins MD at 10/12/21 1340     Camila Salas MD at 10/12/21 0830              NEPHROLOGY PROGRESS NOTE    PATIENT IDENTIFICATION:   Name:  Ambar  JESSE Lara      MRN:  [id number]     51 y.o.  female             Reason for visit: ESRD    SUBJECTIVE:   Seen and examined.  No shortness of air or chest pain.  Complaining of abdominal pain.  OBJECTIVE:  Vitals:    10/11/21 1701 10/11/21 1838 10/12/21 0530 10/12/21 0616   BP: 101/74 112/71  95/61   BP Location:  Right arm  Right arm   Patient Position:  Sitting  Sitting   Pulse: 91 100 95    Resp:   20    Temp:  98 °F (36.7 °C) 97 °F (36.1 °C)    TempSrc:  Oral Oral    SpO2: 99% 97% 95%    Weight:  78.1 kg (172 lb 1.6 oz)             Body mass index is 32.52 kg/m².  No intake or output data in the 24 hours ending 10/12/21 0830      Exam:  GEN:  No distress, appears stated age  EYES:   Anicteric sclera  ENT:    External ears/nose normal, MM are   NECK:  No adenopathy, JVP   LUNGS: Normal chest on inspection; not labored  CV:  Normal S1S2, without murmur  ABD:  Non-tender, non-distended, no hepatosplenomegaly, +BS  EXT:  No edema; no cyanosis; clubbing    Scheduled meds:  aspirin, 81 mg, Oral, Daily  b complex-vitamin c-folic acid, 1 tablet, Oral, Daily  escitalopram, 20 mg, Oral, Daily  famotidine, 20 mg, Oral, BID  lanthanum, 750 mg, Oral, TID With Meals  levothyroxine, 137 mcg, Oral, Q AM  melatonin, 3 mg, Oral, Nightly  midodrine, 10 mg, Oral, TID AC  montelukast, 10 mg, Oral, Nightly  multivitamin, 1 tablet, Oral, Daily  potassium chloride, 40 mEq, Oral, BID With Meals  saccharomyces boulardii, 500 mg, Oral, BID  sodium bicarbonate, 650 mg, Oral, 4x Daily  traZODone, 100 mg, Oral, Nightly      IV meds:                           Data Review:    Results from last 7 days   Lab Units 10/11/21  1127   SODIUM mmol/L 142   POTASSIUM mmol/L 4.1   CHLORIDE mmol/L 113*   CO2 mmol/L 15.3*   BUN mg/dL 34*   CREATININE mg/dL 4.84*   CALCIUM mg/dL 8.4*   BILIRUBIN mg/dL <0.2   ALK PHOS U/L 67   ALT (SGPT) U/L 11   AST (SGOT) U/L 9   GLUCOSE mg/dL 128*       Estimated Creatinine Clearance: 13.3 mL/min (A) (by C-G formula based  on SCr of 4.84 mg/dL (H)).          Results from last 7 days   Lab Units 10/11/21  1127   WBC 10*3/mm3 7.85   HEMOGLOBIN g/dL 9.4*   PLATELETS 10*3/mm3 359       Results from last 7 days   Lab Units 10/11/21  1127   INR  1.16*             ASSESSMENT:     PD catheter dysfunction (HCC)        -End-stage renal disease on peritoneal dialysis with malfunctioning PD catheter: CT abdomen was obtained and PD catheter appears to be intact but cannot rule out omentum wrap.  Awaiting surgery evaluation.     -Mild metabolic acidosis: Will start patient on gentle IV hydration with bicarb drip  -Anemia of end-stage renal disease  -Secondary hyperparathyroidism    PLAN:    Awaiting for surgical evaluation.  I suspect that patient might need to be switched permanently to hemodialysis.  IV fluid with bicarb drip  Surveillance labs        Camila Salas MD  10/12/2021    08:30 EDT       Electronically signed by Camila Salas MD at 10/12/21 0847          Consult Notes       Jamie Figueroa MD at 10/12/21 0846      Consult Orders    1. Inpatient General Surgery Consult [[id number]] ordered by Camila Salas MD at 10/11/21 1417               REASON FOR CONSULT:    Nonfunctioning peritoneal dialysis catheter    REQUESTING PHYSICIAN:    Camila Salas M.D.    HISTORY OF PRESENT ILLNESS:    Ambar Lara is a 51 y.o. female who has end-stage renal disease.  She was on the kidney transplant list and underwent placement of the peritoneal dialysis catheter 2 years ago at [facility].  She is no longer a candidate for kidney transplant and depends on peritoneal dialysis.  Since last week she has had difficulty with the tube.  For 2 days she was able to instill fluid, but unable to obtain any effluent.  Since then, however, she has not been able to instill fluid at all.  There is sharp that begins in the right lower quadrant and right-sided abdominal pain radiates up the right flank.  She has not noticed any fever  or chills.  She has never experienced an episode of peritoneal dialysis related peritonitis.    She has history of gallstones, and has been advised to proceed with cholecystectomy.    She has history of pulmonary embolism, and is anticoagulated with warfarin.  She has been off warfarin for about a week.      Allergies   Allergen Reactions   • Penicillins Anaphylaxis, Hives, Rash, Shortness Of Breath and Swelling   • Nickel Rash       Review of Systems   Constitutional: Negative for fever.   HENT: Negative for trouble swallowing.    Respiratory: Negative for shortness of breath.    Cardiovascular: Negative for chest pain.   Gastrointestinal: Positive for abdominal pain.   Hematological: Bruises/bleeds easily.       Objective     BP 95/61 (BP Location: Right arm, Patient Position: Sitting)   Pulse 95   Temp 97 °F (36.1 °C) (Oral)   Resp 20   Wt 78.1 kg (172 lb 1.6 oz)   LMP 04/16/2004 (Approximate)   SpO2 95%   BMI 32.52 kg/m²     Physical Exam  Vitals and nursing note reviewed.   Constitutional:       General: She is not in acute distress.     Appearance: She is well-developed. She is not diaphoretic.   HENT:      Head: Normocephalic and atraumatic.   Eyes:      General: No scleral icterus.     Conjunctiva/sclera: Conjunctivae normal.   Neck:      Trachea: No tracheal deviation.   Cardiovascular:      Rate and Rhythm: Normal rate and regular rhythm.      Heart sounds: Normal heart sounds. No murmur heard.      Pulmonary:      Effort: Pulmonary effort is normal. No respiratory distress.      Breath sounds: Normal breath sounds. No wheezing or rales.   Abdominal:      General: Bowel sounds are normal. There is no distension.      Palpations: Abdomen is soft.      Tenderness: There is no abdominal tenderness.      Hernia: No hernia is present.      Comments: There is a peritoneal dialysis catheter that exits the abdominal wall in the right lower quadrant.  The site is nontender.   Musculoskeletal:          General: No deformity.      Cervical back: Neck supple.   Skin:     General: Skin is warm and dry.   Neurological:      Mental Status: She is alert and oriented to person, place, and time.   Psychiatric:         Behavior: Behavior normal.         DIAGNOSTIC DATA:    WBC 7.85, Hgb 9.4, platelets 359.  PT 14.6, INR 1.16.  Creatinine 4.84, potassium 4.1.  AlkP 67, ALT 11, AST 9, T bili <0.2.  COVID-19 nasopharyngeal swab negative.    I reviewed the images and report of CT scan of the abdomen and pelvis performed yesterday.  Hysterectomy has been performed.  There is a peritoneal dialysis catheter that is enters the abdomen in the right lower quadrant.  The catheter loops into the right hemipelvis.  There is a small amount of free fluid.  There is no bowel wall thickening or sequestered fluid.     I reviewed the report of a right upper quadrant ultrasound performed on 6/7/2021 at [facility].  It shows cholelithiasis.  The common bile duct measured 3 mm.  There was hepatic steatosis.    ASSESSMENT:    Nonfunctioning PD catheter  Cholelithiasis  BMI 32.52  Sleep apnea    PLAN:    I have recommended diagnostic laparoscopy and lysis of adhesions to free up the catheter and hopefully be able to preserve her ability to perform peritoneal dialysis.  She has cholelithiasis and has been recommended for laparoscopic cholecystectomy, but has been unable to schedule.  Will plan to also remove the gallbladder today.    Electronically signed by Jamie Figueroa MD at 10/12/21 3856     Camila Salas MD at 10/11/21 8760      Consult Orders    1. Nephrology (on -call MD unless specified) [[id number]] ordered by Reggie Porter PA at 10/11/21 1207                 Referring Provider:Reggie Porter PA   Reason for Consultation: ESRD on peritoneal dialysis.    Subjective     Chief complaint   Chief Complaint   Patient presents with   • Abdominal Pain   • Rectal Bleeding       History of present illness:     51 years old white  female with past medical history of end-stage renal disease currently on peritoneal dialysis who was discharged from [facility] 09/24/2021.  Patient had long hospitalization for proctocolitis and Covid pneumonia.  Patient required tracheostomy during her hospitalization she was switched to hemodialysis during her stay and back to PD.  Patient came in to the hospital today because of malfunctioning PD catheter since Thursday.  She denies any fever any chills.  Reported having some blood in her rectum yesterday evening.  Reported some abdominal pain and mild tenderness in her abdomen.  For consult help with management of her dialysis need and volume management    Allergies:  Penicillins and Nickel    Review of Systems  Pertinent items are noted in HPI.    Objective     Vital Signs  Temp:  [97.9 °F (36.6 °C)] 97.9 °F (36.6 °C)  Heart Rate:  [113] 113  Resp:  [20] 20  BP: (107)/(63) 107/63         No intake/output data recorded.  No intake/output data recorded.  No intake or output data in the 24 hours ending 10/11/21 1513    Physical Exam:     General Appearance:    Alert, cooperative, in no acute distress   Head:    Normocephalic, without obvious abnormality, atraumatic   Eyes:            Lids and lashes normal, conjunctivae and sclerae normal, no   icterus, no pallor, corneas clear, PERRLA   Ears:    Ears appear intact with no abnormalities noted   Throat:   No oral lesions, no thrush, oral mucosa moist   Neck:   No adenopathy, supple, trachea midline, no thyromegaly, no     carotid bruit, no JVD   Back:     No kyphosis present, no scoliosis present, no skin lesions,       erythema or scars, no tenderness to percussion or                   palpation,   range of motion normal   Lungs:     Clear to auscultation,respirations regular, even and                   unlabored    Heart:    Regular rhythm and normal rate, normal S1 and S2, no            murmur, no gallop, no rub, no click   Breast Exam:    Deferred    Abdomen:     Normal bowel sounds, no masses, no organomegaly, soft        Mildly tender, non-distended, no guarding, no rebound                 tenderness   Genitalia:    Deferred   Extremities:   Moves all extremities well, no edema, no cyanosis, no              redness   Pulses:   Pulses palpable and equal bilaterally   Skin:   No bleeding, bruising or rash               Results Review:  Results from last 7 days   Lab Units 10/11/21  1127   SODIUM mmol/L 142   POTASSIUM mmol/L 4.1   CHLORIDE mmol/L 113*   CO2 mmol/L 15.3*   BUN mg/dL 34*   CREATININE mg/dL 4.84*   CALCIUM mg/dL 8.4*   BILIRUBIN mg/dL <0.2   ALK PHOS U/L 67   ALT (SGPT) U/L 11   AST (SGOT) U/L 9   GLUCOSE mg/dL 128*       Estimated Creatinine Clearance: 13.2 mL/min (A) (by C-G formula based on SCr of 4.84 mg/dL (H)).          Results from last 7 days   Lab Units 10/11/21  1127   WBC 10*3/mm3 7.85   HEMOGLOBIN g/dL 9.4*   PLATELETS 10*3/mm3 359       Results from last 7 days   Lab Units 10/11/21  1127   INR  1.16*       Active Medications          Assessment/Plan   Assessment      PD catheter dysfunction (HCC)    -End-stage renal disease on peritoneal dialysis with malfunctioning PD catheter: CT abdomen was obtained and PD catheter appears to be intact but cannot rule out omentum wrap.  Awaiting surgery evaluation.  Will obtain PD fluid and send it to analysis.    -Mild metabolic acidosis: Will start patient on gentle IV hydration with bicarb drip  -Anemia of end-stage renal disease  -Secondary hyperparathyroidism      Camila Salas MD  10/11/21  15:13 EDT        Electronically signed by Camila Salas MD at 10/11/21 9106

## 2021-10-13 NOTE — PROGRESS NOTES
"    NEPHROLOGY PROGRESS NOTE    PATIENT IDENTIFICATION:   Name:  Ambar Lara      MRN:  [id number]     51 y.o.  female             Reason for visit: ESRD    SUBJECTIVE:   Seen and examined.  No shortness of air or chest pain.  Planing of abdominal pain.  Status post laparoscopic revision of peritoneal dialysis catheter and laparoscopic cholecystectomy  OBJECTIVE:  Vitals:    10/12/21 2340 10/13/21 0435 10/13/21 0515 10/13/21 0700   BP: 104/59 97/67  101/66   BP Location: Right arm Left arm  Left arm   Patient Position: Lying Lying  Lying   Pulse: 79 99  86   Resp: 16 16  16   Temp: 96.7 °F (35.9 °C) 97.1 °F (36.2 °C)  97.2 °F (36.2 °C)   TempSrc: Oral Oral  Oral   SpO2: 100% 99%  97%   Weight:   78 kg (172 lb)    Height:   154.9 cm (61\")            Body mass index is 32.5 kg/m².    Intake/Output Summary (Last 24 hours) at 10/13/2021 1329  Last data filed at 10/12/2021 1821  Gross per 24 hour   Intake 550 ml   Output --   Net 550 ml         Exam:  GEN:  No distress, appears stated age  EYES:   Anicteric sclera  ENT:    External ears/nose normal, MM are   NECK:  No adenopathy, JVP   LUNGS: Normal chest on inspection; not labored  CV:  Normal S1S2, without murmur  ABD:  + tender, non-distended, no hepatosplenomegaly, +BS  EXT:  No edema; no cyanosis; clubbing    Scheduled meds:  aspirin, 81 mg, Oral, Daily  b complex-vitamin c-folic acid, 1 tablet, Oral, Daily  escitalopram, 20 mg, Oral, Daily  famotidine, 20 mg, Oral, Daily  lanthanum, 750 mg, Oral, TID With Meals  levothyroxine, 137 mcg, Oral, Q AM  melatonin, 3 mg, Oral, Nightly  midodrine, 10 mg, Oral, TID AC  montelukast, 10 mg, Oral, Nightly  multivitamin, 1 tablet, Oral, Daily  potassium chloride, 40 mEq, Oral, BID With Meals  saccharomyces boulardii, 500 mg, Oral, BID  sodium bicarbonate, 650 mg, Oral, 4x Daily  IV Fluids 1000 mL + additives, 75 mL/hr, Intravenous, Once  traZODone, 100 mg, Oral, Nightly      IV meds:                      lactated ringers, " 9 mL/hr, Last Rate: Stopped (10/12/21 1614)        Data Review:    Results from last 7 days   Lab Units 10/13/21  0835 10/12/21  0750 10/11/21  1127   SODIUM mmol/L 137 144 142   POTASSIUM mmol/L 5.8* 4.6 4.1   CHLORIDE mmol/L 110* 114* 113*   CO2 mmol/L 13.6* 12.4* 15.3*   BUN mg/dL 31* 33* 34*   CREATININE mg/dL 4.53* 4.97* 4.84*   CALCIUM mg/dL 8.6 8.6 8.4*   BILIRUBIN mg/dL <0.2  --  <0.2   ALK PHOS U/L 65  --  67   ALT (SGPT) U/L 14  --  11   AST (SGOT) U/L 14  --  9   GLUCOSE mg/dL 93 98 128*       Estimated Creatinine Clearance: 14.2 mL/min (A) (by C-G formula based on SCr of 4.53 mg/dL (H)).          Results from last 7 days   Lab Units 10/13/21  0835 10/12/21  0750 10/11/21  1127   WBC 10*3/mm3 12.43* 8.29 7.85   HEMOGLOBIN g/dL 9.5* 9.1* 9.4*   PLATELETS 10*3/mm3 421 399 359       Results from last 7 days   Lab Units 10/11/21  1127   INR  1.16*             ASSESSMENT:     PD catheter dysfunction (HCC)    Anxiety    ESRD (end stage renal disease) on dialysis (HCC)    Gastroesophageal reflux disease    Hypothyroidism    Peritoneal dialysis status (HCC)        -End-stage renal disease on peritoneal dialysis with malfunctioning PD catheter: CT abdomen was obtained and PD catheter appears to be intact but cannot rule out omentum wrap. S/P revision      -Mild metabolic acidosis  -Anemia of end-stage renal disease  -Secondary hyperparathyroidism  -Cholecystitis S/P cholecystectomy 10/12/2021    PLAN:    Status post laparoscopic revision of her peritoneal catheter and cholecystectomy.    We will start PD today due to high K   I think patient will be better off on hemodialysis for the long run.  Plan to switch to hemodialysis as an outpatient  Surveillance        Camila Salas MD  10/13/2021    13:29 EDT

## 2021-10-13 NOTE — PROGRESS NOTES
"Chief Complaint:    POD 1, S/P laparoscopic revision of PD catheter and cholecystectomy    Subjective:    Uncomfortable from surgical site.  Starting peritoneal dialysis today.  Doing okay with small amounts of regular diet.    Objective:    Vitals:    10/12/21 2340 10/13/21 0435 10/13/21 0515 10/13/21 0700   BP: 104/59 97/67  101/66   BP Location: Right arm Left arm  Left arm   Patient Position: Lying Lying  Lying   Pulse: 79 99  86   Resp: 16 16  16   Temp: 96.7 °F (35.9 °C) 97.1 °F (36.2 °C)  97.2 °F (36.2 °C)   TempSrc: Oral Oral  Oral   SpO2: 100% 99%  97%   Weight:   78 kg (172 lb)    Height:   154.9 cm (61\")        Lungs: Clear  Heart: Regular  Abdomen: Incisions look okay. BS hypoactive.   Extremities: Warm    Labs reviewed.  WBC 12.43, Hgb 9.5, platelets 421.  Creat 4.53, CO2 13.6.  Potassium 5.8  AlkP 65, ALT 14, AST 14, T bili <0.2.    OR cultures pending.  Pathology pending.    Assessment:    POD 1, S/P laparoscopic revision of PD catheter and cholecystectomy    Plan:    Resuming peritoneal dialysis today because of elevated potassium.  Follow-up on OR cultures and path.    "

## 2021-10-13 NOTE — PROGRESS NOTES
"DAILY PROGRESS NOTE  [facility]    Patient Identification:  Name: Ambar Lara  Age: 51 y.o.  Sex: female  :  1970  MRN: [id number]         Primary Care Physician: Jet Manuel MD    Subjective:  Interval History:She complains of pain.    Objective:    Scheduled Meds:aspirin, 81 mg, Oral, Daily  b complex-vitamin c-folic acid, 1 tablet, Oral, Daily  escitalopram, 20 mg, Oral, Daily  famotidine, 20 mg, Oral, Daily  lanthanum, 750 mg, Oral, TID With Meals  levothyroxine, 137 mcg, Oral, Q AM  melatonin, 3 mg, Oral, Nightly  midodrine, 10 mg, Oral, TID AC  montelukast, 10 mg, Oral, Nightly  multivitamin, 1 tablet, Oral, Daily  saccharomyces boulardii, 500 mg, Oral, BID  sodium bicarbonate, 650 mg, Oral, 4x Daily  traZODone, 100 mg, Oral, Nightly      Continuous Infusions:lactated ringers, 9 mL/hr, Last Rate: Stopped (10/12/21 1614)        Vital signs in last 24 hours:  Temp:  [96.7 °F (35.9 °C)-98.3 °F (36.8 °C)] 97.2 °F (36.2 °C)  Heart Rate:  [] 86  Resp:  [16-24] 16  BP: ()/() 101/66    Intake/Output:    Intake/Output Summary (Last 24 hours) at 10/13/2021 1455  Last data filed at 10/12/2021 1821  Gross per 24 hour   Intake 550 ml   Output --   Net 550 ml       Exam:  /66 (BP Location: Left arm, Patient Position: Lying)   Pulse 86   Temp 97.2 °F (36.2 °C) (Oral)   Resp 16   Ht 154.9 cm (61\")   Wt 78 kg (172 lb)   LMP 2004 (Approximate)   SpO2 97%   BMI 32.50 kg/m²     General Appearance:    Alert, cooperative, no distress   Head:    Normocephalic, without obvious abnormality, atraumatic   Eyes:       Throat:   Lips, tongue, gums normal   Neck:   Supple, symmetrical, trachea midline, no JVD   Lungs:     Clear to auscultation bilaterally, respirations unlabored   Chest Wall:    No tenderness or deformity    Heart:    Regular rate and rhythm, S1 and S2 normal, no murmur,no  Rub or gallop   Abdomen:     Soft, mildl tender, bowel sounds active, no " masses, no organomegaly    Extremities:   Extremities normal, atraumatic, no cyanosis or edema   Pulses:      Skin:   Skin is warm and dry,  no rashes or palpable lesions   Neurologic:   no focal deficits noted      Lab Results (last 72 hours)     Procedure Component Value Units Date/Time    Basic Metabolic Panel [[id number]]  (Abnormal) Collected: 10/12/21 0750    Specimen: Blood Updated: 10/12/21 1042     Glucose 98 mg/dL      BUN 33 mg/dL      Creatinine 4.97 mg/dL      Sodium 144 mmol/L      Potassium 4.6 mmol/L      Chloride 114 mmol/L      CO2 12.4 mmol/L      Calcium 8.6 mg/dL      eGFR   Amer --     Comment: <15 Indicative of kidney failure.        eGFR Non African Amer 9 mL/min/1.73      Comment: <15 Indicative of kidney failure.        BUN/Creatinine Ratio 6.6     Anion Gap 17.6 mmol/L     Narrative:      GFR Normal >60  Chronic Kidney Disease <60  Kidney Failure <15      CBC Auto Differential [[id number]]  (Abnormal) Collected: 10/12/21 0750    Specimen: Blood Updated: 10/12/21 1021     WBC 8.29 10*3/mm3      RBC 3.05 10*6/mm3      Hemoglobin 9.1 g/dL      Hematocrit 30.3 %      MCV 99.3 fL      MCH 29.8 pg      MCHC 30.0 g/dL      RDW 12.9 %      RDW-SD 47.1 fl      MPV 10.4 fL      Platelets 399 10*3/mm3      Neutrophil % 66.0 %      Lymphocyte % 21.1 %      Monocyte % 6.3 %      Eosinophil % 3.6 %      Basophil % 0.8 %      Immature Grans % 2.2 %      Neutrophils, Absolute 5.47 10*3/mm3      Lymphocytes, Absolute 1.75 10*3/mm3      Monocytes, Absolute 0.52 10*3/mm3      Eosinophils, Absolute 0.30 10*3/mm3      Basophils, Absolute 0.07 10*3/mm3      Immature Grans, Absolute 0.18 10*3/mm3      nRBC 0.1 /100 WBC     COVID PRE-OP / PRE-PROCEDURE SCREENING ORDER (NO ISOLATION) - Swab, Nasopharynx [[id number]]  (Normal) Collected: 10/11/21 1501    Specimen: Swab from Nasopharynx Updated: 10/11/21 1629    Narrative:      The following orders were created for panel order COVID PRE-OP / PRE-PROCEDURE  SCREENING ORDER (NO ISOLATION) - Swab, Nasopharynx.  Procedure                               Abnormality         Status                     ---------                               -----------         ------                     COVID-19,[facility]...[[id number]]  Normal              Final result                 Please view results for these tests on the individual orders.    COVID-19,[facility] CEPHEID/ANTONELLA NP SWAB IN TRANSPORT MEDIA 8-12 HR TAT - Swab, Nasopharynx [[id number]]  (Normal) Collected: 10/11/21 1501    Specimen: Swab from Nasopharynx Updated: 10/11/21 1629     COVID19 Not Detected    Narrative:      Fact sheet for providers: https://www.fda.gov/media/022539/download    Fact sheet for patients: https://www.fda.gov/media/448622/download    Test performed by PCR.    [facility] Draw [[id number]] Collected: 10/11/21 1127    Specimen: Blood Updated: 10/11/21 1230    Narrative:      The following orders were created for panel order [facility] Draw.  Procedure                               Abnormality         Status                     ---------                               -----------         ------                     Green Top (Gel)[[id number]]                                  Final result               Lavender Top[[id number]]                                     Final result               Gold Top - SST[[id number]]                                   Final result               Light Blue Top[[id number]]                                   Final result                 Please view results for these tests on the individual orders.    Gold Top - SST [[id number]] Collected: 10/11/21 1127    Specimen: Blood Updated: 10/11/21 1230     Extra Tube Hold for add-ons.     Comment: Auto resulted.       Light Blue Top [[id number]] Collected: 10/11/21 1127    Specimen: Blood Updated: 10/11/21 1230     Extra Tube hold for add-on     Comment: Auto resulted       Lavender Top [[id number]] Collected: 10/11/21 1127    Specimen: Blood  Updated: 10/11/21 1230     Extra Tube hold for add-on     Comment: Auto resulted       Green Top (Gel) [[id number]] Collected: 10/11/21 1127    Specimen: Blood Updated: 10/11/21 1230     Extra Tube Hold for add-ons.     Comment: Auto resulted.       Lipase [[id number]]  (Normal) Collected: 10/11/21 1127    Specimen: Blood Updated: 10/11/21 1214     Lipase 26 U/L     Comprehensive Metabolic Panel [[id number]]  (Abnormal) Collected: 10/11/21 1127    Specimen: Blood Updated: 10/11/21 1200     Glucose 128 mg/dL      BUN 34 mg/dL      Creatinine 4.84 mg/dL      Sodium 142 mmol/L      Potassium 4.1 mmol/L      Chloride 113 mmol/L      CO2 15.3 mmol/L      Calcium 8.4 mg/dL      Total Protein 7.1 g/dL      Albumin 3.60 g/dL      ALT (SGPT) 11 U/L      AST (SGOT) 9 U/L      Alkaline Phosphatase 67 U/L      Total Bilirubin <0.2 mg/dL      eGFR Non African Amer 10 mL/min/1.73      Comment: <15 Indicative of kidney failure.        eGFR   Amer --     Comment: <15 Indicative of kidney failure.        Globulin 3.5 gm/dL      A/G Ratio 1.0 g/dL      BUN/Creatinine Ratio 7.0     Anion Gap 13.7 mmol/L     Narrative:      GFR Normal >60  Chronic Kidney Disease <60  Kidney Failure <15      Protime-INR [[id number]]  (Abnormal) Collected: 10/11/21 1127    Specimen: Blood Updated: 10/11/21 1150     Protime 14.6 Seconds      INR 1.16    Urinalysis, Microscopic Only - Urine, Clean Catch [[id number]]  (Abnormal) Collected: 10/11/21 1129    Specimen: Urine, Clean Catch Updated: 10/11/21 1149     RBC, UA 0-2 /HPF      WBC, UA 3-5 /HPF      Bacteria, UA None Seen /HPF      Squamous Epithelial Cells, UA 3-6 /HPF      Hyaline Casts, UA 0-2 /LPF      Methodology Automated Microscopy    Urinalysis With Microscopic If Indicated (No Culture) - Urine, Clean Catch [[id number]]  (Abnormal) Collected: 10/11/21 1129    Specimen: Urine, Clean Catch Updated: 10/11/21 1147     Color, UA Yellow     Appearance, UA Clear     pH, UA 5.5     Specific Gravity,  UA 1.010     Glucose, UA Negative     Ketones, UA Negative     Bilirubin, UA Negative     Blood, UA Negative     Protein,  mg/dL (2+)     Leuk Esterase, UA Trace     Nitrite, UA Negative     Urobilinogen, UA 0.2 E.U./dL    CBC & Differential [[id number]]  (Abnormal) Collected: 10/11/21 1127    Specimen: Blood Updated: 10/11/21 1144    Narrative:      The following orders were created for panel order CBC & Differential.  Procedure                               Abnormality         Status                     ---------                               -----------         ------                     CBC Auto Differential[[id number]]        Abnormal            Final result                 Please view results for these tests on the individual orders.    CBC Auto Differential [[id number]]  (Abnormal) Collected: 10/11/21 1127    Specimen: Blood Updated: 10/11/21 1144     WBC 7.85 10*3/mm3      RBC 3.07 10*6/mm3      Hemoglobin 9.4 g/dL      Hematocrit 29.6 %      MCV 96.4 fL      MCH 30.6 pg      MCHC 31.8 g/dL      RDW 13.2 %      RDW-SD 47.0 fl      MPV 9.7 fL      Platelets 359 10*3/mm3      Neutrophil % 68.1 %      Lymphocyte % 20.6 %      Monocyte % 5.1 %      Eosinophil % 3.1 %      Basophil % 0.6 %      Immature Grans % 2.5 %      Neutrophils, Absolute 5.34 10*3/mm3      Lymphocytes, Absolute 1.62 10*3/mm3      Monocytes, Absolute 0.40 10*3/mm3      Eosinophils, Absolute 0.24 10*3/mm3      Basophils, Absolute 0.05 10*3/mm3      Immature Grans, Absolute 0.20 10*3/mm3      nRBC 0.0 /100 WBC         Data Review:  Results from last 7 days   Lab Units 10/13/21  0835 10/12/21  0750 10/12/21  0750 10/11/21  1127 10/11/21  1127   SODIUM mmol/L 137  --  144  --  142   POTASSIUM mmol/L 5.8*  --  4.6  --  4.1   CHLORIDE mmol/L 110*  --  114*  --  113*   CO2 mmol/L 13.6*  --  12.4*  --  15.3*   BUN mg/dL 31*  --  33*  --  34*   CREATININE mg/dL 4.53*  --  4.97*  --  4.84*   GLUCOSE mg/dL 93   < > 98   < > 128*   CALCIUM mg/dL 8.6   --  8.6  --  8.4*    < > = values in this interval not displayed.     Results from last 7 days   Lab Units 10/13/21  0835 10/12/21  0750 10/11/21  1127   WBC 10*3/mm3 12.43* 8.29 7.85   HEMOGLOBIN g/dL 9.5* 9.1* 9.4*   HEMATOCRIT % 30.2* 30.3* 29.6*   PLATELETS 10*3/mm3 421 399 359             Lab Results   Lab Value Date/Time    TROPONINT 0.035 (C) 09/21/2021 1135    TROPONINT 0.040 (C) 04/16/2021 1759    TROPONINT 0.037 (C) 04/16/2021 1602    TROPONINT 0.030 04/09/2021 1631         Results from last 7 days   Lab Units 10/13/21  0835 10/11/21  1127   ALK PHOS U/L 65 67   BILIRUBIN mg/dL <0.2 <0.2   ALT (SGPT) U/L 14 11   AST (SGOT) U/L 14 9             No results found for: POCGLU  Results from last 7 days   Lab Units 10/11/21  1127   INR  1.16*       Past Medical History:   Diagnosis Date   • CHF (congestive heart failure) ([facility])    • Clostridioides difficile infection 03/2021    finished oral vanc 07/2021   • Dialysis patient ([facility])    • Disease of thyroid gland    • Elevated cholesterol    • GERD (gastroesophageal reflux disease)    • Pulmonary emboli ([facility]) 03/2021    coumadin last taken 9/29/2021   • Renal disorder    • Sleep apnea        Assessment:  Active Hospital Problems    Diagnosis  POA   • **PD catheter dysfunction ([facility]) [T85.611A]  Yes   • Gastroesophageal reflux disease [K21.9]  Yes   • Peritoneal dialysis status ([facility]) [Z99.2]  Not Applicable   • ESRD (end stage renal disease) on dialysis ([facility]) [N18.6, Z99.2]  Not Applicable   • Anxiety [F41.9]  Yes   • Hypothyroidism [E03.9]  Yes      Resolved Hospital Problems   No resolved problems to display.       Plan:Try PD today  Surgery and renal consults noted. Follow lab.  Post op care.. Try PD today.    Elfego Martins MD  10/13/2021  14:55 EDT

## 2021-10-14 VITALS
BODY MASS INDEX: 33.38 KG/M2 | OXYGEN SATURATION: 97 % | SYSTOLIC BLOOD PRESSURE: 117 MMHG | DIASTOLIC BLOOD PRESSURE: 89 MMHG | HEART RATE: 112 BPM | RESPIRATION RATE: 16 BRPM | WEIGHT: 176.8 LBS | TEMPERATURE: 97.7 F | HEIGHT: 61 IN

## 2021-10-14 PROBLEM — K80.20 GALLSTONES: Status: ACTIVE | Noted: 2021-10-14

## 2021-10-14 LAB
ANION GAP SERPL CALCULATED.3IONS-SCNC: 14.7 MMOL/L (ref 5–15)
BASOPHILS # BLD AUTO: 0.07 10*3/MM3 (ref 0–0.2)
BASOPHILS NFR BLD AUTO: 0.6 % (ref 0–1.5)
BUN SERPL-MCNC: 28 MG/DL (ref 6–20)
BUN/CREAT SERPL: 6.8 (ref 7–25)
CALCIUM SPEC-SCNC: 8.5 MG/DL (ref 8.6–10.5)
CHLORIDE SERPL-SCNC: 108 MMOL/L (ref 98–107)
CO2 SERPL-SCNC: 12.3 MMOL/L (ref 22–29)
CREAT SERPL-MCNC: 4.11 MG/DL (ref 0.57–1)
DEPRECATED RDW RBC AUTO: 44.2 FL (ref 37–54)
EOSINOPHIL # BLD AUTO: 0.76 10*3/MM3 (ref 0–0.4)
EOSINOPHIL NFR BLD AUTO: 6.4 % (ref 0.3–6.2)
ERYTHROCYTE [DISTWIDTH] IN BLOOD BY AUTOMATED COUNT: 12.8 % (ref 12.3–15.4)
GFR SERPL CREATININE-BSD FRML MDRD: 12 ML/MIN/1.73
GFR SERPL CREATININE-BSD FRML MDRD: ABNORMAL ML/MIN/{1.73_M2}
GLUCOSE SERPL-MCNC: 83 MG/DL (ref 65–99)
HCT VFR BLD AUTO: 30.5 % (ref 34–46.6)
HGB BLD-MCNC: 9.8 G/DL (ref 12–15.9)
IMM GRANULOCYTES # BLD AUTO: 0.48 10*3/MM3 (ref 0–0.05)
IMM GRANULOCYTES NFR BLD AUTO: 4 % (ref 0–0.5)
LAB AP CASE REPORT: NORMAL
LYMPHOCYTES # BLD AUTO: 1.98 10*3/MM3 (ref 0.7–3.1)
LYMPHOCYTES NFR BLD AUTO: 16.7 % (ref 19.6–45.3)
MCH RBC QN AUTO: 30.3 PG (ref 26.6–33)
MCHC RBC AUTO-ENTMCNC: 32.1 G/DL (ref 31.5–35.7)
MCV RBC AUTO: 94.4 FL (ref 79–97)
MONOCYTES # BLD AUTO: 0.72 10*3/MM3 (ref 0.1–0.9)
MONOCYTES NFR BLD AUTO: 6.1 % (ref 5–12)
NEUTROPHILS NFR BLD AUTO: 66.2 % (ref 42.7–76)
NEUTROPHILS NFR BLD AUTO: 7.88 10*3/MM3 (ref 1.7–7)
NRBC BLD AUTO-RTO: 0 /100 WBC (ref 0–0.2)
PATH REPORT.FINAL DX SPEC: NORMAL
PATH REPORT.GROSS SPEC: NORMAL
PLATELET # BLD AUTO: 458 10*3/MM3 (ref 140–450)
PMV BLD AUTO: 9.8 FL (ref 6–12)
POTASSIUM SERPL-SCNC: 4.5 MMOL/L (ref 3.5–5.2)
RBC # BLD AUTO: 3.23 10*6/MM3 (ref 3.77–5.28)
SODIUM SERPL-SCNC: 135 MMOL/L (ref 136–145)
WBC # BLD AUTO: 11.89 10*3/MM3 (ref 3.4–10.8)

## 2021-10-14 PROCEDURE — 80048 BASIC METABOLIC PNL TOTAL CA: CPT | Performed by: HOSPITALIST

## 2021-10-14 PROCEDURE — 87070 CULTURE OTHR SPECIMN AEROBIC: CPT | Performed by: SURGERY

## 2021-10-14 PROCEDURE — 99024 POSTOP FOLLOW-UP VISIT: CPT | Performed by: SURGERY

## 2021-10-14 PROCEDURE — 85025 COMPLETE CBC W/AUTO DIFF WBC: CPT | Performed by: HOSPITALIST

## 2021-10-14 PROCEDURE — 87205 SMEAR GRAM STAIN: CPT | Performed by: SURGERY

## 2021-10-14 PROCEDURE — 25010000002 HYDROMORPHONE PER 4 MG: Performed by: SURGERY

## 2021-10-14 RX ORDER — HYDROCODONE BITARTRATE AND ACETAMINOPHEN 5; 325 MG/1; MG/1
1 TABLET ORAL EVERY 4 HOURS PRN
Qty: 20 TABLET | Refills: 0 | Status: SHIPPED | OUTPATIENT
Start: 2021-10-14 | End: 2021-10-22

## 2021-10-14 RX ADMIN — Medication 1 TABLET: at 09:05

## 2021-10-14 RX ADMIN — HYDROCODONE BITARTRATE AND ACETAMINOPHEN 1 TABLET: 10; 325 TABLET ORAL at 07:04

## 2021-10-14 RX ADMIN — LANTHANUM CARBONATE 750 MG: 500 TABLET, CHEWABLE ORAL at 12:33

## 2021-10-14 RX ADMIN — HYDROMORPHONE HYDROCHLORIDE 0.5 MG: 1 INJECTION, SOLUTION INTRAMUSCULAR; INTRAVENOUS; SUBCUTANEOUS at 02:57

## 2021-10-14 RX ADMIN — Medication 500 MG: at 09:04

## 2021-10-14 RX ADMIN — FAMOTIDINE 20 MG: 20 TABLET, FILM COATED ORAL at 09:04

## 2021-10-14 RX ADMIN — ESCITALOPRAM 20 MG: 20 TABLET, FILM COATED ORAL at 09:05

## 2021-10-14 RX ADMIN — HYDROMORPHONE HYDROCHLORIDE 0.5 MG: 1 INJECTION, SOLUTION INTRAMUSCULAR; INTRAVENOUS; SUBCUTANEOUS at 00:44

## 2021-10-14 RX ADMIN — ASPIRIN 81 MG: 81 TABLET, CHEWABLE ORAL at 09:04

## 2021-10-14 RX ADMIN — HYDROCODONE BITARTRATE AND ACETAMINOPHEN 1 TABLET: 5; 325 TABLET ORAL at 12:37

## 2021-10-14 RX ADMIN — MIDODRINE HYDROCHLORIDE 10 MG: 5 TABLET ORAL at 07:04

## 2021-10-14 RX ADMIN — LEVOTHYROXINE SODIUM 137 MCG: 0.14 TABLET ORAL at 07:04

## 2021-10-14 RX ADMIN — ACETAMINOPHEN 650 MG: 325 TABLET, FILM COATED ORAL at 11:57

## 2021-10-14 RX ADMIN — MIDODRINE HYDROCHLORIDE 10 MG: 5 TABLET ORAL at 12:33

## 2021-10-14 NOTE — PROGRESS NOTES
NEPHROLOGY PROGRESS NOTE    PATIENT IDENTIFICATION:   Name:  Ambar Lara      MRN:  [id number]     51 y.o.  female             Reason for visit: ESRD    SUBJECTIVE:   Seen and examined.  No shortness of air or chest pain.      OBJECTIVE:  Vitals:    10/14/21 0249 10/14/21 0535 10/14/21 0610 10/14/21 0704   BP: 117/81 123/83  103/71   BP Location: Right arm Right arm  Right arm   Patient Position: Lying Sitting  Lying   Pulse:  107  91   Resp: 16   16   Temp:    97.8 °F (36.6 °C)   TempSrc:    Oral   SpO2: 95% 95%  97%   Weight:   80.2 kg (176 lb 12.8 oz)    Height:               Body mass index is 33.41 kg/m².    Intake/Output Summary (Last 24 hours) at 10/14/2021 1149  Last data filed at 10/14/2021 1115  Gross per 24 hour   Intake 4330 ml   Output 6500 ml   Net -2170 ml         Exam:  GEN:  No distress, appears stated age  EYES:   Anicteric sclera  ENT:    External ears/nose normal, MM are   NECK:  No adenopathy, JVP   LUNGS: Normal chest on inspection; not labored  CV:  Normal S1S2, without murmur  ABD:  + tender, non-distended, no hepatosplenomegaly, +BS  EXT:  No edema; no cyanosis; clubbing    Scheduled meds:  aspirin, 81 mg, Oral, Daily  b complex-vitamin c-folic acid, 1 tablet, Oral, Daily  escitalopram, 20 mg, Oral, Daily  famotidine, 20 mg, Oral, Daily  lanthanum, 750 mg, Oral, TID With Meals  levothyroxine, 137 mcg, Oral, Q AM  melatonin, 3 mg, Oral, Nightly  midodrine, 10 mg, Oral, TID AC  montelukast, 10 mg, Oral, Nightly  multivitamin, 1 tablet, Oral, Daily  saccharomyces boulardii, 500 mg, Oral, BID  sodium bicarbonate, 650 mg, Oral, 4x Daily  traZODone, 100 mg, Oral, Nightly      IV meds:                           Data Review:    Results from last 7 days   Lab Units 10/14/21  0344 10/13/21  0835 10/12/21  0750 10/11/21  1127 10/11/21  1127   SODIUM mmol/L 135* 137 144   < > 142   POTASSIUM mmol/L 4.5 5.8* 4.6   < > 4.1   CHLORIDE mmol/L 108* 110* 114*   < > 113*   CO2 mmol/L 12.3* 13.6*  12.4*   < > 15.3*   BUN mg/dL 28* 31* 33*   < > 34*   CREATININE mg/dL 4.11* 4.53* 4.97*   < > 4.84*   CALCIUM mg/dL 8.5* 8.6 8.6   < > 8.4*   BILIRUBIN mg/dL  --  <0.2  --   --  <0.2   ALK PHOS U/L  --  65  --   --  67   ALT (SGPT) U/L  --  14  --   --  11   AST (SGOT) U/L  --  14  --   --  9   GLUCOSE mg/dL 83 93 98   < > 128*    < > = values in this interval not displayed.       Estimated Creatinine Clearance: 15.9 mL/min (A) (by C-G formula based on SCr of 4.11 mg/dL (H)).          Results from last 7 days   Lab Units 10/14/21  0344 10/13/21  0835 10/12/21  0750 10/11/21  1127   WBC 10*3/mm3 11.89* 12.43* 8.29 7.85   HEMOGLOBIN g/dL 9.8* 9.5* 9.1* 9.4*   PLATELETS 10*3/mm3 458* 421 399 359       Results from last 7 days   Lab Units 10/11/21  1127   INR  1.16*             ASSESSMENT:     PD catheter dysfunction (HCC)    Anxiety    ESRD (end stage renal disease) on dialysis (HCC)    Gastroesophageal reflux disease    Hypothyroidism    Peritoneal dialysis status (HCC)    Gallstones        -End-stage renal disease on peritoneal dialysis with malfunctioning PD catheter: CT abdomen was obtained and PD catheter appears to be intact but cannot rule out omentum wrap. S/P revision      -Mild metabolic acidosis  -Anemia of end-stage renal disease  -Secondary hyperparathyroidism  -Cholecystitis S/P cholecystectomy 10/12/2021    PLAN:    Status post laparoscopic revision of her peritoneal catheter and cholecystectomy.    Continue PD  Ok to D/C home   I think patient will be better off on hemodialysis for the long run.  Plan to switch to hemodialysis as an outpatient  Surveillance        Camila Salas MD  10/14/2021    11:49 EDT

## 2021-10-14 NOTE — DISCHARGE SUMMARY
PHYSICIAN DISCHARGE SUMMARY                                                                        [facility]    Patient Identification:  Name: Ambar Lara  Age: 51 y.o.  Sex: female  :  1970  MRN: [id number]  Primary Care Physician: Jet Manuel MD    Admit date: 10/11/2021  Discharge date and time:10/14/2021  Discharged Condition: good    Discharge Diagnoses:  Active Hospital Problems    Diagnosis  POA   • **PD catheter dysfunction ([facility]) [T85.611A]  Yes   • Gallstones [K80.20]  Yes   • Gastroesophageal reflux disease [K21.9]  Yes   • Peritoneal dialysis status (HCC) [Z99.2]  Not Applicable   • ESRD (end stage renal disease) on dialysis ([facility]) [N18.6, Z99.2]  Not Applicable   • Anxiety [F41.9]  Yes   • Hypothyroidism [E03.9]  Yes      Resolved Hospital Problems   No resolved problems to display.          PMHX:   Past Medical History:   Diagnosis Date   • CHF (congestive heart failure) ([facility])    • Clostridioides difficile infection 2021    finished oral vanc 2021   • Dialysis patient ([facility])    • Disease of thyroid gland    • Elevated cholesterol    • GERD (gastroesophageal reflux disease)    • Pulmonary emboli ([facility]) 2021    coumadin last taken 2021   • Renal disorder    • Sleep apnea      PSHX:   Past Surgical History:   Procedure Laterality Date   • ADRENAL GLAND SURGERY N/A `   •  SECTION Bilateral 2001    Has had x2   • HYSTERECTOMY     • INSERTION PERITONEAL DIALYSIS CATHETER Right 10/12/2021    Procedure: LAPAROSCOPIC REVISION OF PERITONEAL DIALYSIS CATHETER AND LAPAROSCOPIC CHOLECYSTECTOMY;  Surgeon: Jamie Figueroa MD;  Location: [facility];  Service: General;  Laterality: Right;   • PERITONEAL CATHETER INSERTION  2019   • TRACHEOSTOMY         Hospital Course: Ambar Lara is a  51 year old female who presented to the emergency room with a malfunctioning peritoneal  dialysis catheter; she has been on dialysis over two years; she has had right lower quadrant pain and was unable to complete her dialysis for the last four days; she denies fever or chills; no nausea or vomiting       The patient was admitted to the hospital and seen by general surgery and nephrology.  The patient underwent laparoscopic cholecystectomy and also had lysis of adhesions and the dialysis catheter for peritoneal dialysis was declotted catered.  The patient subsequently tried peritoneal dialysis again was able to instill fluid and remove it without problems.  The patient was feeling better after being in the hospital for couple days looked well enough to go home and continue with peritoneal dialysis.  She will follow-up with her primary care in a week for ongoing care and also follow-up with surgery in 2 weeks.    Consults:     Consults     Date and Time Order Name Status Description    10/11/2021  2:26 PM [facility] (on-call MD unless specified) Details      10/11/2021  2:18 PM Inpatient General Surgery Consult Completed     10/11/2021 12:07 PM Nephrology (on -call MD unless specified) Completed     9/22/2021 11:08 AM Inpatient Gastroenterology Consult Completed     9/22/2021  7:37 AM Inpatient Vascular Surgery Consult Completed     9/21/2021  1:44 PM Nephrology (on -call MD unless specified) Completed     9/21/2021  1:43 PM [facility] (on-call MD unless specified) Details Completed         Results from last 7 days   Lab Units 10/14/21  0344   WBC 10*3/mm3 11.89*   HEMOGLOBIN g/dL 9.8*   HEMATOCRIT % 30.5*   PLATELETS 10*3/mm3 458*     Results from last 7 days   Lab Units 10/14/21  0344   SODIUM mmol/L 135*   POTASSIUM mmol/L 4.5   CHLORIDE mmol/L 108*   CO2 mmol/L 12.3*   BUN mg/dL 28*   CREATININE mg/dL 4.11*   GLUCOSE mg/dL 83   CALCIUM mg/dL 8.5*     Significant Diagnostic Studies:   WBC   Date Value Ref Range Status   10/14/2021 11.89 (H) 3.40 - 10.80 10*3/mm3 Final     Hemoglobin   Date Value Ref Range Status    10/14/2021 9.8 (L) 12.0 - 15.9 g/dL Final     Hematocrit   Date Value Ref Range Status   10/14/2021 30.5 (L) 34.0 - 46.6 % Final     Platelets   Date Value Ref Range Status   10/14/2021 458 (H) 140 - 450 10*3/mm3 Final     Sodium   Date Value Ref Range Status   10/14/2021 135 (L) 136 - 145 mmol/L Final     Potassium   Date Value Ref Range Status   10/14/2021 4.5 3.5 - 5.2 mmol/L Final     Chloride   Date Value Ref Range Status   10/14/2021 108 (H) 98 - 107 mmol/L Final     CO2   Date Value Ref Range Status   10/14/2021 12.3 (L) 22.0 - 29.0 mmol/L Final     BUN   Date Value Ref Range Status   10/14/2021 28 (H) 6 - 20 mg/dL Final     Creatinine   Date Value Ref Range Status   10/14/2021 4.11 (H) 0.57 - 1.00 mg/dL Final     Glucose   Date Value Ref Range Status   10/14/2021 83 65 - 99 mg/dL Final     Calcium   Date Value Ref Range Status   10/14/2021 8.5 (L) 8.6 - 10.5 mg/dL Final     AST (SGOT)   Date Value Ref Range Status   10/13/2021 14 1 - 32 U/L Final     ALT (SGPT)   Date Value Ref Range Status   10/13/2021 14 1 - 33 U/L Final     Alkaline Phosphatase   Date Value Ref Range Status   10/13/2021 65 39 - 117 U/L Final     No results found for: APTT, INR  No results found for: COLORU, CLARITYU, SPECGRAV, PHUR, PROTEINUR, GLUCOSEU, KETONESU, BLOODU, NITRITE, LEUKOCYTESUR, BILIRUBINUR, UROBILINOGEN, RBCUA, WBCUA, BACTERIA, UACOMMENT  No results found for: TROPONINT, TROPONINI, BNP  No components found for: HGBA1C;2  No components found for: TSH;2  Imaging Results (All)     Procedure Component Value Units Date/Time    CT Abdomen Pelvis Without Contrast [[id number]] Collected: 10/11/21 1347     Updated: 10/11/21 1609    Narrative:      CT OF THE ABDOMEN AND PELVIS WITHOUT CONTRAST 10/11/2021      HISTORY: Abdominal pain. Difficulty flushing peritoneal dialysis  catheter.     TECHNIQUE: Axial images were obtained from the lung bases to the  symphysis pubis. No intravenous contrast was given.     FINDINGS: The liver  appears within normal limits. Gallbladder appears  slightly contracted but no gallstones are seen. Spleen appears mildly  enlarged. No focal splenic lesions are seen. Pancreas appears within  normal limits. There is fullness of the left adrenal gland. Right  adrenal gland does not appear enlarged.     Bilateral perinephric stranding. There is a tiny nonobstructing right  renal calcification. There are several areas of low-attenuation in the  left kidney which could represent simple cysts. At least one low-density  right renal lesion is seen. No hydronephrosis is seen.     There is some aortoiliac calcification.     The peritoneal dialysis catheter is seen entering the skin in the right  lower quadrant and is curled in the right hemipelvis. The catheter does  not appear kinked and does appear intact. No abnormal fluid collections  or masses are seen near the catheter tip. There is a tiny amount of free  fluid adjacent to the right hepatic lobe. Small amount of free fluid is  seen in the left paracolic gutter region. There is fluid in the  rectosigmoid colon as well.     Uterus has been removed.       Impression:      1. The peritoneal dialysis catheter appears intact and terminates in the  right hemipelvis.  2. Small amount of free fluid is seen.  3. There is mild bilateral perinephric stranding with small low-density  lesions in the left kidney and perhaps one in the right kidney,  difficult to assess in the absence of IV contrast, but may represent  small cysts. There is a tiny nonobstructing right renal stone.  4. Status post hysterectomy.                 Radiation dose reduction techniques were utilized, including automated  exposure control and exposure modulation based on body size.     This report was finalized on 10/11/2021 4:05 PM by Dr. Lawson Beckford M.D.           Lab Results (last 7 days)     Procedure Component Value Units Date/Time    Body Fluid Culture - Body Fluid, Peritoneal Catheter [[id number]]  "Collected: 10/14/21 0557    Specimen: Body Fluid from Peritoneal Catheter Updated: 10/14/21 1522     Gram Stain Rare (1+) WBCs per low power field      No organisms seen    Tissue Pathology Exam [[id number]] Collected: 10/12/21 1712    Specimen: Tissue from Gallbladder Updated: 10/14/21 1135     Case Report --     Surgical Pathology Report                         Case: [id number]                                  Authorizing Provider:  Jamie Figueroa MD    Collected:           10/12/2021 05:12 PM          Ordering Location:     [facility]  Received:            10/12/2021 08:46 PM                                 MAIN OR                                                                      Pathologist:           Annamarie Clark MD                                                    Specimen:    Gallbladder, GALLBLADDER                                                                    Final Diagnosis --     1. Gallbladder, Cholecystectomy:   A. Mild chronic acalculous cholecystitis.   B. Negative for dysplasia.    swm/mitra        Gross Description --     1. Received in formalin labeled \"gallbladder\".  The specimen consists of a focally disrupted purple-tan gallbladder measuring 9.2 x 2.7 x 2.0 cm.  The serosal surface is smooth and glistening.  The hepatic surface is moderately granular.  The cystic duct is patent. A 0.4 cm area of disruption is noted on the serosal surface.  Opening the specimen longitudinally reveals approximately 10 cc of watery gold bilious liquid without obvious calculus fragments identified within the container nor within the lumen.  The gallbladder wall is variably thickened measuring up to 0.4 cm in maximum thickness. The wall is lined by tan-brown velvety mucosa without mass or polyp formation grossly identified.              Representative sections of the cystic duct body and fundus are submitted in a single cassette.  Total blocks:  1    Hbt/uso/ashley/tyesha      Tissue / " Bone Culture - Tissue, Abdomen, Right [[id number]] Collected: 10/12/21 1742    Specimen: Tissue from Abdomen, Right Updated: 10/14/21 0941     Tissue Culture No growth at 2 days     Gram Stain No WBCs or organisms seen    Wound Culture - Wound, Peritoneum [[id number]] Collected: 10/12/21 1725    Specimen: Wound from Peritoneum Updated: 10/14/21 0922     Wound Culture No growth at 2 days     Gram Stain No WBCs or organisms seen    Basic Metabolic Panel [[id number]]  (Abnormal) Collected: 10/14/21 0344    Specimen: Blood Updated: 10/14/21 0512     Glucose 83 mg/dL      BUN 28 mg/dL      Creatinine 4.11 mg/dL      Sodium 135 mmol/L      Potassium 4.5 mmol/L      Chloride 108 mmol/L      CO2 12.3 mmol/L      Calcium 8.5 mg/dL      eGFR   Amer --     Comment: <15 Indicative of kidney failure.        eGFR Non African Amer 12 mL/min/1.73      Comment: <15 Indicative of kidney failure.        BUN/Creatinine Ratio 6.8     Anion Gap 14.7 mmol/L     Narrative:      GFR Normal >60  Chronic Kidney Disease <60  Kidney Failure <15      CBC & Differential [[id number]]  (Abnormal) Collected: 10/14/21 0344    Specimen: Blood Updated: 10/14/21 0432    Narrative:      The following orders were created for panel order CBC & Differential.  Procedure                               Abnormality         Status                     ---------                               -----------         ------                     CBC Auto Differential[[id number]]        Abnormal            Final result                 Please view results for these tests on the individual orders.    CBC Auto Differential [[id number]]  (Abnormal) Collected: 10/14/21 0344    Specimen: Blood Updated: 10/14/21 0432     WBC 11.89 10*3/mm3      RBC 3.23 10*6/mm3      Hemoglobin 9.8 g/dL      Hematocrit 30.5 %      MCV 94.4 fL      MCH 30.3 pg      MCHC 32.1 g/dL      RDW 12.8 %      RDW-SD 44.2 fl      MPV 9.8 fL      Platelets 458 10*3/mm3      Neutrophil % 66.2 %       Lymphocyte % 16.7 %      Monocyte % 6.1 %      Eosinophil % 6.4 %      Basophil % 0.6 %      Immature Grans % 4.0 %      Neutrophils, Absolute 7.88 10*3/mm3      Lymphocytes, Absolute 1.98 10*3/mm3      Monocytes, Absolute 0.72 10*3/mm3      Eosinophils, Absolute 0.76 10*3/mm3      Basophils, Absolute 0.07 10*3/mm3      Immature Grans, Absolute 0.48 10*3/mm3      nRBC 0.0 /100 WBC     Comprehensive Metabolic Panel [[id number]]  (Abnormal) Collected: 10/13/21 0835    Specimen: Blood Updated: 10/13/21 0934     Glucose 93 mg/dL      BUN 31 mg/dL      Creatinine 4.53 mg/dL      Sodium 137 mmol/L      Potassium 5.8 mmol/L      Chloride 110 mmol/L      CO2 13.6 mmol/L      Calcium 8.6 mg/dL      Total Protein 6.9 g/dL      Albumin 3.70 g/dL      ALT (SGPT) 14 U/L      AST (SGOT) 14 U/L      Alkaline Phosphatase 65 U/L      Total Bilirubin <0.2 mg/dL      eGFR Non African Amer 10 mL/min/1.73      Comment: <15 Indicative of kidney failure.        eGFR   Amer --     Comment: <15 Indicative of kidney failure.        Globulin 3.2 gm/dL      A/G Ratio 1.2 g/dL      BUN/Creatinine Ratio 6.8     Anion Gap 13.4 mmol/L     Narrative:      GFR Normal >60  Chronic Kidney Disease <60  Kidney Failure <15      CBC & Differential [[id number]]  (Abnormal) Collected: 10/13/21 0835    Specimen: Blood Updated: 10/13/21 0921    Narrative:      The following orders were created for panel order CBC & Differential.  Procedure                               Abnormality         Status                     ---------                               -----------         ------                     CBC Auto Differential[[id number]]        Abnormal            Final result                 Please view results for these tests on the individual orders.    CBC Auto Differential [[id number]]  (Abnormal) Collected: 10/13/21 0835    Specimen: Blood Updated: 10/13/21 0921     WBC 12.43 10*3/mm3      RBC 3.12 10*6/mm3      Hemoglobin 9.5 g/dL      Hematocrit 30.2 %       MCV 96.8 fL      MCH 30.4 pg      MCHC 31.5 g/dL      RDW 13.0 %      RDW-SD 45.5 fl      MPV 9.6 fL      Platelets 421 10*3/mm3      Neutrophil % 79.5 %      Lymphocyte % 10.8 %      Monocyte % 4.2 %      Eosinophil % 0.5 %      Basophil % 1.0 %      Immature Grans % 4.0 %      Neutrophils, Absolute 9.89 10*3/mm3      Lymphocytes, Absolute 1.34 10*3/mm3      Monocytes, Absolute 0.52 10*3/mm3      Eosinophils, Absolute 0.06 10*3/mm3      Basophils, Absolute 0.12 10*3/mm3      Immature Grans, Absolute 0.50 10*3/mm3      nRBC 0.0 /100 WBC     Anaerobic Culture - Tissue, Abdomen, Right [[id number]] Collected: 10/12/21 1742    Specimen: Tissue from Abdomen, Right Updated: 10/12/21 1751    Anaerobic Culture - Peritoneal Fluid, Peritoneum [[id number]] Collected: 10/12/21 1725    Specimen: Peritoneal Fluid from Peritoneum Updated: 10/12/21 1735    Basic Metabolic Panel [[id number]]  (Abnormal) Collected: 10/12/21 0750    Specimen: Blood Updated: 10/12/21 1042     Glucose 98 mg/dL      BUN 33 mg/dL      Creatinine 4.97 mg/dL      Sodium 144 mmol/L      Potassium 4.6 mmol/L      Chloride 114 mmol/L      CO2 12.4 mmol/L      Calcium 8.6 mg/dL      eGFR   Amer --     Comment: <15 Indicative of kidney failure.        eGFR Non African Amer 9 mL/min/1.73      Comment: <15 Indicative of kidney failure.        BUN/Creatinine Ratio 6.6     Anion Gap 17.6 mmol/L     Narrative:      GFR Normal >60  Chronic Kidney Disease <60  Kidney Failure <15      CBC Auto Differential [[id number]]  (Abnormal) Collected: 10/12/21 0750    Specimen: Blood Updated: 10/12/21 1021     WBC 8.29 10*3/mm3      RBC 3.05 10*6/mm3      Hemoglobin 9.1 g/dL      Hematocrit 30.3 %      MCV 99.3 fL      MCH 29.8 pg      MCHC 30.0 g/dL      RDW 12.9 %      RDW-SD 47.1 fl      MPV 10.4 fL      Platelets 399 10*3/mm3      Neutrophil % 66.0 %      Lymphocyte % 21.1 %      Monocyte % 6.3 %      Eosinophil % 3.6 %      Basophil % 0.8 %      Immature Grans % 2.2  %      Neutrophils, Absolute 5.47 10*3/mm3      Lymphocytes, Absolute 1.75 10*3/mm3      Monocytes, Absolute 0.52 10*3/mm3      Eosinophils, Absolute 0.30 10*3/mm3      Basophils, Absolute 0.07 10*3/mm3      Immature Grans, Absolute 0.18 10*3/mm3      nRBC 0.1 /100 WBC     COVID PRE-OP / PRE-PROCEDURE SCREENING ORDER (NO ISOLATION) - Swab, Nasopharynx [[id number]]  (Normal) Collected: 10/11/21 1501    Specimen: Swab from Nasopharynx Updated: 10/11/21 1629    Narrative:      The following orders were created for panel order COVID PRE-OP / PRE-PROCEDURE SCREENING ORDER (NO ISOLATION) - Swab, Nasopharynx.  Procedure                               Abnormality         Status                     ---------                               -----------         ------                     COVID-19,[facility]...[[id number]]  Normal              Final result                 Please view results for these tests on the individual orders.    COVID-19,[facility] CEPHEID/ANTONELLA NP SWAB IN TRANSPORT MEDIA 8-12 HR TAT - Swab, Nasopharynx [[id number]]  (Normal) Collected: 10/11/21 1501    Specimen: Swab from Nasopharynx Updated: 10/11/21 1629     COVID19 Not Detected    Narrative:      Fact sheet for providers: https://www.fda.gov/media/614378/download    Fact sheet for patients: https://www.fda.gov/media/050492/download    Test performed by PCR.    [place] Draw [[id number]] Collected: 10/11/21 1127    Specimen: Blood Updated: 10/11/21 1230    Narrative:      The following orders were created for panel order [place] Draw.  Procedure                               Abnormality         Status                     ---------                               -----------         ------                     Green Top (Gel)[[id number]]                                  Final result               Lavender Top[[id number]]                                     Final result               Gold Top - SST[[id number]]                                   Final result                Light Blue Top[[id number]]                                   Final result                 Please view results for these tests on the individual orders.    Gold Top - SST [[id number]] Collected: 10/11/21 1127    Specimen: Blood Updated: 10/11/21 1230     Extra Tube Hold for add-ons.     Comment: Auto resulted.       Light Blue Top [[id number]] Collected: 10/11/21 1127    Specimen: Blood Updated: 10/11/21 1230     Extra Tube hold for add-on     Comment: Auto resulted       Lavender Top [[id number]] Collected: 10/11/21 1127    Specimen: Blood Updated: 10/11/21 1230     Extra Tube hold for add-on     Comment: Auto resulted       Green Top (Gel) [[id number]] Collected: 10/11/21 1127    Specimen: Blood Updated: 10/11/21 1230     Extra Tube Hold for add-ons.     Comment: Auto resulted.       Lipase [[id number]]  (Normal) Collected: 10/11/21 1127    Specimen: Blood Updated: 10/11/21 1214     Lipase 26 U/L     Comprehensive Metabolic Panel [[id number]]  (Abnormal) Collected: 10/11/21 1127    Specimen: Blood Updated: 10/11/21 1200     Glucose 128 mg/dL      BUN 34 mg/dL      Creatinine 4.84 mg/dL      Sodium 142 mmol/L      Potassium 4.1 mmol/L      Chloride 113 mmol/L      CO2 15.3 mmol/L      Calcium 8.4 mg/dL      Total Protein 7.1 g/dL      Albumin 3.60 g/dL      ALT (SGPT) 11 U/L      AST (SGOT) 9 U/L      Alkaline Phosphatase 67 U/L      Total Bilirubin <0.2 mg/dL      eGFR Non African Amer 10 mL/min/1.73      Comment: <15 Indicative of kidney failure.        eGFR   Amer --     Comment: <15 Indicative of kidney failure.        Globulin 3.5 gm/dL      A/G Ratio 1.0 g/dL      BUN/Creatinine Ratio 7.0     Anion Gap 13.7 mmol/L     Narrative:      GFR Normal >60  Chronic Kidney Disease <60  Kidney Failure <15      Protime-INR [[id number]]  (Abnormal) Collected: 10/11/21 1127    Specimen: Blood Updated: 10/11/21 1150     Protime 14.6 Seconds      INR 1.16    Urinalysis, Microscopic Only - Urine, Clean Catch  [[id number]]  (Abnormal) Collected: 10/11/21 1129    Specimen: Urine, Clean Catch Updated: 10/11/21 1149     RBC, UA 0-2 /HPF      WBC, UA 3-5 /HPF      Bacteria, UA None Seen /HPF      Squamous Epithelial Cells, UA 3-6 /HPF      Hyaline Casts, UA 0-2 /LPF      Methodology Automated Microscopy    Urinalysis With Microscopic If Indicated (No Culture) - Urine, Clean Catch [[id number]]  (Abnormal) Collected: 10/11/21 1129    Specimen: Urine, Clean Catch Updated: 10/11/21 1147     Color, UA Yellow     Appearance, UA Clear     pH, UA 5.5     Specific Gravity, UA 1.010     Glucose, UA Negative     Ketones, UA Negative     Bilirubin, UA Negative     Blood, UA Negative     Protein,  mg/dL (2+)     Leuk Esterase, UA Trace     Nitrite, UA Negative     Urobilinogen, UA 0.2 E.U./dL    CBC & Differential [[id number]]  (Abnormal) Collected: 10/11/21 1127    Specimen: Blood Updated: 10/11/21 1144    Narrative:      The following orders were created for panel order CBC & Differential.  Procedure                               Abnormality         Status                     ---------                               -----------         ------                     CBC Auto Differential[[id number]]        Abnormal            Final result                 Please view results for these tests on the individual orders.    CBC Auto Differential [[id number]]  (Abnormal) Collected: 10/11/21 1127    Specimen: Blood Updated: 10/11/21 1144     WBC 7.85 10*3/mm3      RBC 3.07 10*6/mm3      Hemoglobin 9.4 g/dL      Hematocrit 29.6 %      MCV 96.4 fL      MCH 30.6 pg      MCHC 31.8 g/dL      RDW 13.2 %      RDW-SD 47.0 fl      MPV 9.7 fL      Platelets 359 10*3/mm3      Neutrophil % 68.1 %      Lymphocyte % 20.6 %      Monocyte % 5.1 %      Eosinophil % 3.1 %      Basophil % 0.6 %      Immature Grans % 2.5 %      Neutrophils, Absolute 5.34 10*3/mm3      Lymphocytes, Absolute 1.62 10*3/mm3      Monocytes, Absolute 0.40 10*3/mm3      Eosinophils, Absolute  "0.24 10*3/mm3      Basophils, Absolute 0.05 10*3/mm3      Immature Grans, Absolute 0.20 10*3/mm3      nRBC 0.0 /100 WBC         /89 (BP Location: Right arm, Patient Position: Standing)   Pulse 112   Temp 97.7 °F (36.5 °C) (Oral)   Resp 16   Ht 154.9 cm (61\")   Wt 80.2 kg (176 lb 12.8 oz)   LMP 04/16/2004 (Approximate)   SpO2 97%   BMI 33.41 kg/m²     Discharge Exam:  General Appearance:    Alert, cooperative, no distress                          Head:    Normocephalic, without obvious abnormality, atraumatic                          Eyes:                            Throat:   Lips, tongue, gums normal                          Neck:   Supple, symmetrical, trachea midline, no JVD                        Lungs:     Clear to auscultation bilaterally, respirations unlabored                Chest Wall:    No tenderness or deformity                        Heart:    Regular rate and rhythm, S1 and S2 normal, no murmur,no  Rub or gallop                  Abdomen:     Soft, non-tender, bowel sounds active, no masses, no organomegaly                  Extremities:   Extremities normal, atraumatic, no cyanosis or edema                             Skin:   Skin is warm and dry,  no rashes or palpable lesions                  Neurologic:   no focal deficits noted     Disposition:  Home    Patient Instructions:      Discharge Medications      New Medications      Instructions Start Date   HYDROcodone-acetaminophen 5-325 MG per tablet  Commonly known as: NORCO   Take 1 tablet by mouth Every 4 (Four) Hours As Needed for pain         Changes to Medications      Instructions Start Date   levothyroxine 100 MCG tablet  Commonly known as: SYNTHROID, LEVOTHROID  What changed: how much to take   100 mcg, Oral, Daily      ondansetron 4 MG tablet  Commonly known as: ZOFRAN  What changed: when to take this   4 mg, Oral, Every 6 Hours PRN      sodium bicarbonate 650 MG tablet  What changed: when to take this   650 mg, Oral, Every 8 " Hours Scheduled         Continue These Medications      Instructions Start Date   acetaminophen 500 MG tablet  Commonly known as: TYLENOL   500 mg, Oral, Every 6 Hours PRN      aspirin 81 MG chewable tablet   81 mg, Oral, Daily      diphenhydrAMINE HCl (Sleep) 25 MG capsule   25 mg, Oral, Nightly PRN, For sleep       escitalopram 20 MG tablet  Commonly known as: LEXAPRO   20 mg, Oral, Daily      famotidine 20 MG tablet  Commonly known as: PEPCID   20 mg, Oral, 2 Times Daily      gentamicin 0.1 % cream  Commonly known as: GARAMYCIN   1 application, Topical, Daily, As directed       lanthanum 500 MG chewable tablet  Commonly known as: FOSRENOL   750 mg, Oral, 3 Times Daily With Meals      melatonin 3 MG tablet   3 mg, Oral, Nightly      midodrine 10 MG tablet  Commonly known as: PROAMATINE   10 mg, Oral, 3 Times Daily      montelukast 10 MG tablet  Commonly known as: Singulair   10 mg, Oral, Nightly      multivitamin tablet tablet  Generic drug: multivitamin   1 tablet, Oral, Daily      nitroglycerin 0.4 MG SL tablet  Commonly known as: NITROSTAT   0.4 mg, Sublingual, Every 5 Minutes PRN, Take no more than 3 doses in 15 minutes.       potassium bicarbonate 25 MEQ disintegrating tablet  Commonly known as: K-LYTE   20 mEq, Oral, 2 Times Daily, Take 2 tablets by mouth 2 times daily.       REN-JHON PO   1 tablet, Oral, Daily      saccharomyces boulardii 250 MG capsule  Commonly known as: FLORASTOR   500 mg, Oral, 2 Times Daily      traZODone 100 MG tablet  Commonly known as: DESYREL   100 mg, Oral, Nightly, Take 0.5-1 tablet by mouth nightly          Stop These Medications    PHARMACY CONSULT          Future Appointments   Date Time Provider Department Center   10/28/2021 10:15 AM [facility]  [facility]     Additional Instructions for the Follow-ups that You Need to Schedule     Discharge Follow-up with Specified Provider: Dr Figueroa; 2 Weeks   As directed      To: Dr Figueroa    Follow Up: 2  Weeks    Follow Up Details: Call the office [phone] to schedule an appointment.  If desired, it may be scheduled as a telephone visit.            Follow-up Information     Jamie Figueroa MD Follow up in 2 week(s).    Specialty: General Surgery  Why: Please call the office for an appointment in 2 weeks.  If desired, it may be scheduled as a telephone visit.  Contact information:  [address] [address] [phone]             Jet Manuel MD Follow up in 1 week(s).    Specialty: Internal Medicine  Contact information:  [address]  [facility] [id number] [phone]                       Discharge Order (From admission, onward)     Start     Ordered    10/14/21 1542  Discharge patient  Once        Expected Discharge Date: 10/14/21    Discharge Disposition: Home or Self Care    Physician of Record for Attribution - Please select from Treatment Team: EVERARDO MARTINS [[id number]]    Review needed by CMO to determine Physician of Record: No       Question Answer Comment   Physician of Record for Attribution - Please select from Treatment Team EVERADRO MARTINS    Review needed by CMO to determine Physician of Record No        10/14/21 1544                Total time spent discharging patient including evaluation,post hospitalization follow up,  medication and post hospitalization instructions and education total time exceeds 30 minutes.    Signed:  Everardo Martins MD  10/14/2021  16:03 EDT

## 2021-10-14 NOTE — PLAN OF CARE
Goal Outcome Evaluation:  Plan of Care Reviewed With: patient           Outcome Summary: Pt to be discharged this afternoon.  Pt upset at being on a unit with covid pts, [place] and house supervisor talked with pt and her son.  All needs met this shift.  will continue to monitor

## 2021-10-14 NOTE — PLAN OF CARE
Goal Outcome Evaluation:           Progress: no change   Pt given PRN norco x3, dilaudid x2. Unable to sleep despite melatonin. PD fluid drained at beginning of shift and QID PD scheduled started at midnight. Pt received PD twice this shift. The PD fluid from the first dwell was red w/ clots. MD aware, no new orders. The second bag had pinkish fluid w/ no clots. Pt expressed concern about dwelling for 6 hours throughout day. Independent to bathroom. VSS, will continue to monitor closely.

## 2021-10-14 NOTE — CASE MANAGEMENT/SOCIAL WORK
Case Management Discharge Note      Final Note: DC'd home    Provided Post Acute Provider List?: N/A  N/A Provider List Comment: No needs identified  Provided Post Acute Provider Quality & Resource List?: N/A  N/A Quality & Resource List Comment: No needs identified         Transportation Services  Private: Car    Final Discharge Disposition Code: 01 - home or self-care

## 2021-10-14 NOTE — NURSING NOTE
Pt refusing PD.  Pt educated on the importance and need for PD. Pt states she will continue her PD when she gets home

## 2021-10-14 NOTE — PROGRESS NOTES
Chief Complaint:    POD 2, S/P laparoscopic revision of PD catheter and cholecystectomy    Subjective:    Feels more comfortable today.  She is very disappointed with moving to a different room during the middle of the night, and her breakfast was cold and inedible this morning.  She does not like the procedures for peritoneal dialysis in the hospital.    Objective:    Vitals:    10/14/21 0249 10/14/21 0535 10/14/21 0610 10/14/21 0704   BP: 117/81 123/83  103/71   BP Location: Right arm Right arm  Right arm   Patient Position: Lying Sitting  Lying   Pulse:  107  91   Resp: 16   16   Temp:    97.8 °F (36.6 °C)   TempSrc:    Oral   SpO2: 95% 95%  97%   Weight:   80.2 kg (176 lb 12.8 oz)    Height:           Lungs: Clear  Heart: Regular  Abdomen: Incisions look okay. BS present.   Extremities: Warm    Labs reviewed.  WBC 11.89, Hgb 9.8, platelets 458.  Creat 4.11, CO2 12.3.  Potassium 4.5.    OR cultures pending.  Pathology pending.    Assessment:    POD 2, S/P laparoscopic revision of PD catheter and cholecystectomy    Plan:      Follow-up on OR cultures and path.  From my standpoint she may be discharged from the hospital if okay with other treatment services.  I will see her in followup in 2 weeks.

## 2021-10-14 NOTE — NURSING NOTE
Pt request to have peritoneal dialyisate drained and and to be left dry for 2 hours.  Pt educated on proper PD procedure and policy.  Pt acknowledge teaching but insists on draining early and being left dry d/t abdominal discomfort.

## 2021-10-15 ENCOUNTER — READMISSION MANAGEMENT (OUTPATIENT)
Dept: CALL CENTER | Facility: HOSPITAL | Age: 51
End: 2021-10-15

## 2021-10-15 LAB
BACTERIA SPEC AEROBE CULT: NORMAL
GRAM STN SPEC: NORMAL

## 2021-10-15 NOTE — OUTREACH NOTE
Prep Survey      Responses   [facility] facility patient discharged from? [facility]   Is LACE score < 7 ? No   Emergency Room discharge w/ pulse ox? No   Eligibility Readm Mgmt   Discharge diagnosis PD catheter dysfunction   Does the patient have one of the following disease processes/diagnoses(primary or secondary)? Other   Does the patient have Home health ordered? No   Is there a DME ordered? No   Prep survey completed? Yes          Annita Briceño RN

## 2021-10-17 LAB
BACTERIA FLD CULT: NORMAL
BACTERIA SPEC AEROBE CULT: NORMAL
BACTERIA SPEC ANAEROBE CULT: NORMAL
BACTERIA SPEC ANAEROBE CULT: NORMAL
GRAM STN SPEC: NORMAL

## 2021-10-18 NOTE — PAYOR COMM NOTE
"Marco Ambar MOLINA (51 y.o. Female)     PLEASE SEE ATTACHED DC SUMMARY    REF#[id number]    THANK YOU    ANJELICA SPEAR LPN CC[P            Date of Birth Social Security Number Address Home Phone MRN    1970  [address] [facility] [address] [phone] [id number]    Faith Marital Status             Unknown        Admission Date Admission Type Admitting Provider Attending Provider Department, Room/Bed    10/11/21 Emergency Stingl, Binta Cordero MD  [address], [address]/1    Discharge Date Discharge Disposition Discharge Destination          10/14/2021 Home or Self Care              Attending Provider: (none)   Allergies: Penicillins, Nickel    Isolation: None   Infection: None   Code Status: Prior   Advance Care Planning Activity    Ht: 154.9 cm (61\")   Wt: 80.2 kg (176 lb 12.8 oz)    Admission Cmt: None   Principal Problem: PD catheter dysfunction (HCC) [T85.611A]                 Active Insurance as of 10/11/2021     Primary Coverage     Payor Plan Insurance Group Employer/Plan Group    MEDICARE MEDICARE A & B      Payor Plan Address Payor Plan Phone Number Payor Plan Fax Number Effective Dates    [address] [phone]  4/1/2019 - None Entered    [facility] [id number]       Subscriber Name Subscriber Birth Date Member ID       AMBAR MELO 1970 [id number]           Secondary Coverage     Payor Plan Insurance Group Employer/Plan Group    [facility] [id number]     Payor Plan Address Payor Plan Phone Number Payor Plan Fax Number Effective Dates    [address]   5/1/2020 - None Entered    [facility] [id number]       Subscriber Name Subscriber Birth Date Member ID       AMBAR MELO 1970 [id number]                 Emergency Contacts      (Rel.) Home Phone Work Phone Mobile Phone    natalie melo (Power of ) [phone] -- --    Roland Melo (Son) [phone] -- --    maty melo (Daughter) " [phone] -- --               Discharge Summary      Elfego Martins MD at 10/14/21 1602                                                                             PHYSICIAN DISCHARGE SUMMARY                                                                        [facility]    Patient Identification:  Name: Ambar Lara  Age: 51 y.o.  Sex: female  :  1970  MRN: [id number]  Primary Care Physician: Jet Manuel MD    Admit date: 10/11/2021  Discharge date and time:10/14/2021  Discharged Condition: good    Discharge Diagnoses:  Active Hospital Problems    Diagnosis  POA   • **PD catheter dysfunction ([facility]) [T85.611A]  Yes   • Gallstones [K80.20]  Yes   • Gastroesophageal reflux disease [K21.9]  Yes   • Peritoneal dialysis status ([facility]) [Z99.2]  Not Applicable   • ESRD (end stage renal disease) on dialysis ([facility]) [N18.6, Z99.2]  Not Applicable   • Anxiety [F41.9]  Yes   • Hypothyroidism [E03.9]  Yes      Resolved Hospital Problems   No resolved problems to display.          PMHX:   Past Medical History:   Diagnosis Date   • CHF (congestive heart failure) ([facility])    • Clostridioides difficile infection 2021    finished oral vanc 2021   • Dialysis patient ([facility])    • Disease of thyroid gland    • Elevated cholesterol    • GERD (gastroesophageal reflux disease)    • Pulmonary emboli ([facility]) 2021    coumadin last taken 2021   • Renal disorder    • Sleep apnea      PSHX:   Past Surgical History:   Procedure Laterality Date   • ADRENAL GLAND SURGERY N/A `   •  SECTION Bilateral 2001    Has had x2   • HYSTERECTOMY     • INSERTION PERITONEAL DIALYSIS CATHETER Right 10/12/2021    Procedure: LAPAROSCOPIC REVISION OF PERITONEAL DIALYSIS CATHETER AND LAPAROSCOPIC CHOLECYSTECTOMY;  Surgeon: Jamie Figueroa MD;  Location: [facility];  Service: General;  Laterality: Right;   • PERITONEAL CATHETER INSERTION  2019   • TRACHEOSTOMY         Hospital Course: Ambar MOLINA  Marco is a  51 year old female who presented to the emergency room with a malfunctioning peritoneal dialysis catheter; she has been on dialysis over two years; she has had right lower quadrant pain and was unable to complete her dialysis for the last four days; she denies fever or chills; no nausea or vomiting       The patient was admitted to the hospital and seen by general surgery and nephrology.  The patient underwent laparoscopic cholecystectomy and also had lysis of adhesions and the dialysis catheter for peritoneal dialysis was declotted catered.  The patient subsequently tried peritoneal dialysis again was able to instill fluid and remove it without problems.  The patient was feeling better after being in the hospital for couple days looked well enough to go home and continue with peritoneal dialysis.  She will follow-up with her primary care in a week for ongoing care and also follow-up with surgery in 2 weeks.    Consults:     Consults     Date and Time Order Name Status Description    10/11/2021  2:26 PM [facility] (on-call MD unless specified) Details      10/11/2021  2:18 PM Inpatient General Surgery Consult Completed     10/11/2021 12:07 PM Nephrology (on -call MD unless specified) Completed     9/22/2021 11:08 AM Inpatient Gastroenterology Consult Completed     9/22/2021  7:37 AM Inpatient Vascular Surgery Consult Completed     9/21/2021  1:44 PM Nephrology (on -call MD unless specified) Completed     9/21/2021  1:43 PM [facility] (on-call MD unless specified) Details Completed         Results from last 7 days   Lab Units 10/14/21  0344   WBC 10*3/mm3 11.89*   HEMOGLOBIN g/dL 9.8*   HEMATOCRIT % 30.5*   PLATELETS 10*3/mm3 458*     Results from last 7 days   Lab Units 10/14/21  0344   SODIUM mmol/L 135*   POTASSIUM mmol/L 4.5   CHLORIDE mmol/L 108*   CO2 mmol/L 12.3*   BUN mg/dL 28*   CREATININE mg/dL 4.11*   GLUCOSE mg/dL 83   CALCIUM mg/dL 8.5*     Significant Diagnostic Studies:   WBC   Date Value Ref Range  Status   10/14/2021 11.89 (H) 3.40 - 10.80 10*3/mm3 Final     Hemoglobin   Date Value Ref Range Status   10/14/2021 9.8 (L) 12.0 - 15.9 g/dL Final     Hematocrit   Date Value Ref Range Status   10/14/2021 30.5 (L) 34.0 - 46.6 % Final     Platelets   Date Value Ref Range Status   10/14/2021 458 (H) 140 - 450 10*3/mm3 Final     Sodium   Date Value Ref Range Status   10/14/2021 135 (L) 136 - 145 mmol/L Final     Potassium   Date Value Ref Range Status   10/14/2021 4.5 3.5 - 5.2 mmol/L Final     Chloride   Date Value Ref Range Status   10/14/2021 108 (H) 98 - 107 mmol/L Final     CO2   Date Value Ref Range Status   10/14/2021 12.3 (L) 22.0 - 29.0 mmol/L Final     BUN   Date Value Ref Range Status   10/14/2021 28 (H) 6 - 20 mg/dL Final     Creatinine   Date Value Ref Range Status   10/14/2021 4.11 (H) 0.57 - 1.00 mg/dL Final     Glucose   Date Value Ref Range Status   10/14/2021 83 65 - 99 mg/dL Final     Calcium   Date Value Ref Range Status   10/14/2021 8.5 (L) 8.6 - 10.5 mg/dL Final     AST (SGOT)   Date Value Ref Range Status   10/13/2021 14 1 - 32 U/L Final     ALT (SGPT)   Date Value Ref Range Status   10/13/2021 14 1 - 33 U/L Final     Alkaline Phosphatase   Date Value Ref Range Status   10/13/2021 65 39 - 117 U/L Final     No results found for: APTT, INR  No results found for: COLORU, CLARITYU, SPECGRAV, PHUR, PROTEINUR, GLUCOSEU, KETONESU, BLOODU, NITRITE, LEUKOCYTESUR, BILIRUBINUR, UROBILINOGEN, RBCUA, WBCUA, BACTERIA, UACOMMENT  No results found for: TROPONINT, TROPONINI, BNP  No components found for: HGBA1C;2  No components found for: TSH;2  Imaging Results (All)     Procedure Component Value Units Date/Time    CT Abdomen Pelvis Without Contrast [[id number]] Collected: 10/11/21 1347     Updated: 10/11/21 1609    Narrative:      CT OF THE ABDOMEN AND PELVIS WITHOUT CONTRAST 10/11/2021      HISTORY: Abdominal pain. Difficulty flushing peritoneal dialysis  catheter.     TECHNIQUE: Axial images were obtained from  the lung bases to the  symphysis pubis. No intravenous contrast was given.     FINDINGS: The liver appears within normal limits. Gallbladder appears  slightly contracted but no gallstones are seen. Spleen appears mildly  enlarged. No focal splenic lesions are seen. Pancreas appears within  normal limits. There is fullness of the left adrenal gland. Right  adrenal gland does not appear enlarged.     Bilateral perinephric stranding. There is a tiny nonobstructing right  renal calcification. There are several areas of low-attenuation in the  left kidney which could represent simple cysts. At least one low-density  right renal lesion is seen. No hydronephrosis is seen.     There is some aortoiliac calcification.     The peritoneal dialysis catheter is seen entering the skin in the right  lower quadrant and is curled in the right hemipelvis. The catheter does  not appear kinked and does appear intact. No abnormal fluid collections  or masses are seen near the catheter tip. There is a tiny amount of free  fluid adjacent to the right hepatic lobe. Small amount of free fluid is  seen in the left paracolic gutter region. There is fluid in the  rectosigmoid colon as well.     Uterus has been removed.       Impression:      1. The peritoneal dialysis catheter appears intact and terminates in the  right hemipelvis.  2. Small amount of free fluid is seen.  3. There is mild bilateral perinephric stranding with small low-density  lesions in the left kidney and perhaps one in the right kidney,  difficult to assess in the absence of IV contrast, but may represent  small cysts. There is a tiny nonobstructing right renal stone.  4. Status post hysterectomy.                 Radiation dose reduction techniques were utilized, including automated  exposure control and exposure modulation based on body size.     This report was finalized on 10/11/2021 4:05 PM by Dr. Lawson Beckford M.D.           Lab Results (last 7 days)     Procedure  "Component Value Units Date/Time    Body Fluid Culture - Body Fluid, Peritoneal Catheter [[id number]] Collected: 10/14/21 0557    Specimen: Body Fluid from Peritoneal Catheter Updated: 10/14/21 1522     Gram Stain Rare (1+) WBCs per low power field      No organisms seen    Tissue Pathology Exam [[id number]] Collected: 10/12/21 1712    Specimen: Tissue from Gallbladder Updated: 10/14/21 1135     Case Report --     Surgical Pathology Report                         Case: [id number]                                  Authorizing Provider:  Jamie Figueroa MD    Collected:           10/12/2021 05:12 PM          Ordering Location:     [facility]  Received:            10/12/2021 08:46 PM                                 MAIN OR                                                                      Pathologist:           Annamarie Clark MD                                                    Specimen:    Gallbladder, GALLBLADDER                                                                    Final Diagnosis --     1. Gallbladder, Cholecystectomy:   A. Mild chronic acalculous cholecystitis.   B. Negative for dysplasia.    swm/pkm        Gross Description --     1. Received in formalin labeled \"gallbladder\".  The specimen consists of a focally disrupted purple-tan gallbladder measuring 9.2 x 2.7 x 2.0 cm.  The serosal surface is smooth and glistening.  The hepatic surface is moderately granular.  The cystic duct is patent. A 0.4 cm area of disruption is noted on the serosal surface.  Opening the specimen longitudinally reveals approximately 10 cc of watery gold bilious liquid without obvious calculus fragments identified within the container nor within the lumen.  The gallbladder wall is variably thickened measuring up to 0.4 cm in maximum thickness. The wall is lined by tan-brown velvety mucosa without mass or polyp formation grossly identified.              Representative sections of the cystic duct " body and fundus are submitted in a single cassette.  Total blocks:  1    Hbt/uso/swm/kds      Tissue / Bone Culture - Tissue, Abdomen, Right [[id number]] Collected: 10/12/21 1742    Specimen: Tissue from Abdomen, Right Updated: 10/14/21 0941     Tissue Culture No growth at 2 days     Gram Stain No WBCs or organisms seen    Wound Culture - Wound, Peritoneum [[id number]] Collected: 10/12/21 1725    Specimen: Wound from Peritoneum Updated: 10/14/21 0922     Wound Culture No growth at 2 days     Gram Stain No WBCs or organisms seen    Basic Metabolic Panel [[id number]]  (Abnormal) Collected: 10/14/21 0344    Specimen: Blood Updated: 10/14/21 0512     Glucose 83 mg/dL      BUN 28 mg/dL      Creatinine 4.11 mg/dL      Sodium 135 mmol/L      Potassium 4.5 mmol/L      Chloride 108 mmol/L      CO2 12.3 mmol/L      Calcium 8.5 mg/dL      eGFR   Amer --     Comment: <15 Indicative of kidney failure.        eGFR Non African Amer 12 mL/min/1.73      Comment: <15 Indicative of kidney failure.        BUN/Creatinine Ratio 6.8     Anion Gap 14.7 mmol/L     Narrative:      GFR Normal >60  Chronic Kidney Disease <60  Kidney Failure <15      CBC & Differential [[id number]]  (Abnormal) Collected: 10/14/21 0344    Specimen: Blood Updated: 10/14/21 0432    Narrative:      The following orders were created for panel order CBC & Differential.  Procedure                               Abnormality         Status                     ---------                               -----------         ------                     CBC Auto Differential[[id number]]        Abnormal            Final result                 Please view results for these tests on the individual orders.    CBC Auto Differential [[id number]]  (Abnormal) Collected: 10/14/21 0344    Specimen: Blood Updated: 10/14/21 0432     WBC 11.89 10*3/mm3      RBC 3.23 10*6/mm3      Hemoglobin 9.8 g/dL      Hematocrit 30.5 %      MCV 94.4 fL      MCH 30.3 pg      MCHC 32.1 g/dL      RDW 12.8 %       RDW-SD 44.2 fl      MPV 9.8 fL      Platelets 458 10*3/mm3      Neutrophil % 66.2 %      Lymphocyte % 16.7 %      Monocyte % 6.1 %      Eosinophil % 6.4 %      Basophil % 0.6 %      Immature Grans % 4.0 %      Neutrophils, Absolute 7.88 10*3/mm3      Lymphocytes, Absolute 1.98 10*3/mm3      Monocytes, Absolute 0.72 10*3/mm3      Eosinophils, Absolute 0.76 10*3/mm3      Basophils, Absolute 0.07 10*3/mm3      Immature Grans, Absolute 0.48 10*3/mm3      nRBC 0.0 /100 WBC     Comprehensive Metabolic Panel [[id number]]  (Abnormal) Collected: 10/13/21 0835    Specimen: Blood Updated: 10/13/21 0934     Glucose 93 mg/dL      BUN 31 mg/dL      Creatinine 4.53 mg/dL      Sodium 137 mmol/L      Potassium 5.8 mmol/L      Chloride 110 mmol/L      CO2 13.6 mmol/L      Calcium 8.6 mg/dL      Total Protein 6.9 g/dL      Albumin 3.70 g/dL      ALT (SGPT) 14 U/L      AST (SGOT) 14 U/L      Alkaline Phosphatase 65 U/L      Total Bilirubin <0.2 mg/dL      eGFR Non African Amer 10 mL/min/1.73      Comment: <15 Indicative of kidney failure.        eGFR   Amer --     Comment: <15 Indicative of kidney failure.        Globulin 3.2 gm/dL      A/G Ratio 1.2 g/dL      BUN/Creatinine Ratio 6.8     Anion Gap 13.4 mmol/L     Narrative:      GFR Normal >60  Chronic Kidney Disease <60  Kidney Failure <15      CBC & Differential [[id number]]  (Abnormal) Collected: 10/13/21 0835    Specimen: Blood Updated: 10/13/21 0921    Narrative:      The following orders were created for panel order CBC & Differential.  Procedure                               Abnormality         Status                     ---------                               -----------         ------                     CBC Auto Differential[[id number]]        Abnormal            Final result                 Please view results for these tests on the individual orders.    CBC Auto Differential [[id number]]  (Abnormal) Collected: 10/13/21 0835    Specimen: Blood Updated: 10/13/21 0921      WBC 12.43 10*3/mm3      RBC 3.12 10*6/mm3      Hemoglobin 9.5 g/dL      Hematocrit 30.2 %      MCV 96.8 fL      MCH 30.4 pg      MCHC 31.5 g/dL      RDW 13.0 %      RDW-SD 45.5 fl      MPV 9.6 fL      Platelets 421 10*3/mm3      Neutrophil % 79.5 %      Lymphocyte % 10.8 %      Monocyte % 4.2 %      Eosinophil % 0.5 %      Basophil % 1.0 %      Immature Grans % 4.0 %      Neutrophils, Absolute 9.89 10*3/mm3      Lymphocytes, Absolute 1.34 10*3/mm3      Monocytes, Absolute 0.52 10*3/mm3      Eosinophils, Absolute 0.06 10*3/mm3      Basophils, Absolute 0.12 10*3/mm3      Immature Grans, Absolute 0.50 10*3/mm3      nRBC 0.0 /100 WBC     Anaerobic Culture - Tissue, Abdomen, Right [[id number]] Collected: 10/12/21 1742    Specimen: Tissue from Abdomen, Right Updated: 10/12/21 1751    Anaerobic Culture - Peritoneal Fluid, Peritoneum [[id number]] Collected: 10/12/21 1725    Specimen: Peritoneal Fluid from Peritoneum Updated: 10/12/21 1735    Basic Metabolic Panel [[id number]]  (Abnormal) Collected: 10/12/21 0750    Specimen: Blood Updated: 10/12/21 1042     Glucose 98 mg/dL      BUN 33 mg/dL      Creatinine 4.97 mg/dL      Sodium 144 mmol/L      Potassium 4.6 mmol/L      Chloride 114 mmol/L      CO2 12.4 mmol/L      Calcium 8.6 mg/dL      eGFR   Amer --     Comment: <15 Indicative of kidney failure.        eGFR Non African Amer 9 mL/min/1.73      Comment: <15 Indicative of kidney failure.        BUN/Creatinine Ratio 6.6     Anion Gap 17.6 mmol/L     Narrative:      GFR Normal >60  Chronic Kidney Disease <60  Kidney Failure <15      CBC Auto Differential [[id number]]  (Abnormal) Collected: 10/12/21 0750    Specimen: Blood Updated: 10/12/21 1021     WBC 8.29 10*3/mm3      RBC 3.05 10*6/mm3      Hemoglobin 9.1 g/dL      Hematocrit 30.3 %      MCV 99.3 fL      MCH 29.8 pg      MCHC 30.0 g/dL      RDW 12.9 %      RDW-SD 47.1 fl      MPV 10.4 fL      Platelets 399 10*3/mm3      Neutrophil % 66.0 %      Lymphocyte % 21.1 %       Monocyte % 6.3 %      Eosinophil % 3.6 %      Basophil % 0.8 %      Immature Grans % 2.2 %      Neutrophils, Absolute 5.47 10*3/mm3      Lymphocytes, Absolute 1.75 10*3/mm3      Monocytes, Absolute 0.52 10*3/mm3      Eosinophils, Absolute 0.30 10*3/mm3      Basophils, Absolute 0.07 10*3/mm3      Immature Grans, Absolute 0.18 10*3/mm3      nRBC 0.1 /100 WBC     COVID PRE-OP / PRE-PROCEDURE SCREENING ORDER (NO ISOLATION) - Swab, Nasopharynx [[id number]]  (Normal) Collected: 10/11/21 1501    Specimen: Swab from Nasopharynx Updated: 10/11/21 1629    Narrative:      The following orders were created for panel order COVID PRE-OP / PRE-PROCEDURE SCREENING ORDER (NO ISOLATION) - Swab, Nasopharynx.  Procedure                               Abnormality         Status                     ---------                               -----------         ------                     COVID-19,[facility]...[[id number]]  Normal              Final result                 Please view results for these tests on the individual orders.    COVID-19,[facility] CEPHEID/ANTONELLA NP SWAB IN TRANSPORT MEDIA 8-12 HR TAT - Swab, Nasopharynx [[id number]]  (Normal) Collected: 10/11/21 1501    Specimen: Swab from Nasopharynx Updated: 10/11/21 1629     COVID19 Not Detected    Narrative:      Fact sheet for providers: https://www.fda.gov/media/708258/download    Fact sheet for patients: https://www.fda.gov/media/081198/download    Test performed by PCR.    [facility] Draw [[id number]] Collected: 10/11/21 1127    Specimen: Blood Updated: 10/11/21 1230    Narrative:      The following orders were created for panel order [facility] Draw.  Procedure                               Abnormality         Status                     ---------                               -----------         ------                     Green Top (Gel)[[id number]]                                  Final result               Lavender Top[[id number]]                                     Final  result               Gold Top - SST[[id number]]                                   Final result               Light Blue Top[[id number]]                                   Final result                 Please view results for these tests on the individual orders.    Gold Top - SST [[id number]] Collected: 10/11/21 1127    Specimen: Blood Updated: 10/11/21 1230     Extra Tube Hold for add-ons.     Comment: Auto resulted.       Light Blue Top [[id number]] Collected: 10/11/21 1127    Specimen: Blood Updated: 10/11/21 1230     Extra Tube hold for add-on     Comment: Auto resulted       Lavender Top [[id number]] Collected: 10/11/21 1127    Specimen: Blood Updated: 10/11/21 1230     Extra Tube hold for add-on     Comment: Auto resulted       Green Top (Gel) [[id number]] Collected: 10/11/21 1127    Specimen: Blood Updated: 10/11/21 1230     Extra Tube Hold for add-ons.     Comment: Auto resulted.       Lipase [[id number]]  (Normal) Collected: 10/11/21 1127    Specimen: Blood Updated: 10/11/21 1214     Lipase 26 U/L     Comprehensive Metabolic Panel [[id number]]  (Abnormal) Collected: 10/11/21 1127    Specimen: Blood Updated: 10/11/21 1200     Glucose 128 mg/dL      BUN 34 mg/dL      Creatinine 4.84 mg/dL      Sodium 142 mmol/L      Potassium 4.1 mmol/L      Chloride 113 mmol/L      CO2 15.3 mmol/L      Calcium 8.4 mg/dL      Total Protein 7.1 g/dL      Albumin 3.60 g/dL      ALT (SGPT) 11 U/L      AST (SGOT) 9 U/L      Alkaline Phosphatase 67 U/L      Total Bilirubin <0.2 mg/dL      eGFR Non African Amer 10 mL/min/1.73      Comment: <15 Indicative of kidney failure.        eGFR   Amer --     Comment: <15 Indicative of kidney failure.        Globulin 3.5 gm/dL      A/G Ratio 1.0 g/dL      BUN/Creatinine Ratio 7.0     Anion Gap 13.7 mmol/L     Narrative:      GFR Normal >60  Chronic Kidney Disease <60  Kidney Failure <15      Protime-INR [[id number]]  (Abnormal) Collected: 10/11/21 1127    Specimen: Blood Updated: 10/11/21 1150      Protime 14.6 Seconds      INR 1.16    Urinalysis, Microscopic Only - Urine, Clean Catch [[id number]]  (Abnormal) Collected: 10/11/21 1129    Specimen: Urine, Clean Catch Updated: 10/11/21 1149     RBC, UA 0-2 /HPF      WBC, UA 3-5 /HPF      Bacteria, UA None Seen /HPF      Squamous Epithelial Cells, UA 3-6 /HPF      Hyaline Casts, UA 0-2 /LPF      Methodology Automated Microscopy    Urinalysis With Microscopic If Indicated (No Culture) - Urine, Clean Catch [[id number]]  (Abnormal) Collected: 10/11/21 1129    Specimen: Urine, Clean Catch Updated: 10/11/21 1147     Color, UA Yellow     Appearance, UA Clear     pH, UA 5.5     Specific Gravity, UA 1.010     Glucose, UA Negative     Ketones, UA Negative     Bilirubin, UA Negative     Blood, UA Negative     Protein,  mg/dL (2+)     Leuk Esterase, UA Trace     Nitrite, UA Negative     Urobilinogen, UA 0.2 E.U./dL    CBC & Differential [[id number]]  (Abnormal) Collected: 10/11/21 1127    Specimen: Blood Updated: 10/11/21 1144    Narrative:      The following orders were created for panel order CBC & Differential.  Procedure                               Abnormality         Status                     ---------                               -----------         ------                     CBC Auto Differential[[id number]]        Abnormal            Final result                 Please view results for these tests on the individual orders.    CBC Auto Differential [[id number]]  (Abnormal) Collected: 10/11/21 1127    Specimen: Blood Updated: 10/11/21 1144     WBC 7.85 10*3/mm3      RBC 3.07 10*6/mm3      Hemoglobin 9.4 g/dL      Hematocrit 29.6 %      MCV 96.4 fL      MCH 30.6 pg      MCHC 31.8 g/dL      RDW 13.2 %      RDW-SD 47.0 fl      MPV 9.7 fL      Platelets 359 10*3/mm3      Neutrophil % 68.1 %      Lymphocyte % 20.6 %      Monocyte % 5.1 %      Eosinophil % 3.1 %      Basophil % 0.6 %      Immature Grans % 2.5 %      Neutrophils, Absolute 5.34 10*3/mm3      Lymphocytes,  "Absolute 1.62 10*3/mm3      Monocytes, Absolute 0.40 10*3/mm3      Eosinophils, Absolute 0.24 10*3/mm3      Basophils, Absolute 0.05 10*3/mm3      Immature Grans, Absolute 0.20 10*3/mm3      nRBC 0.0 /100 WBC         /89 (BP Location: Right arm, Patient Position: Standing)   Pulse 112   Temp 97.7 °F (36.5 °C) (Oral)   Resp 16   Ht 154.9 cm (61\")   Wt 80.2 kg (176 lb 12.8 oz)   LMP 04/16/2004 (Approximate)   SpO2 97%   BMI 33.41 kg/m²     Discharge Exam:  General Appearance:    Alert, cooperative, no distress                          Head:    Normocephalic, without obvious abnormality, atraumatic                          Eyes:                            Throat:   Lips, tongue, gums normal                          Neck:   Supple, symmetrical, trachea midline, no JVD                        Lungs:     Clear to auscultation bilaterally, respirations unlabored                Chest Wall:    No tenderness or deformity                        Heart:    Regular rate and rhythm, S1 and S2 normal, no murmur,no  Rub or gallop                  Abdomen:     Soft, non-tender, bowel sounds active, no masses, no organomegaly                  Extremities:   Extremities normal, atraumatic, no cyanosis or edema                             Skin:   Skin is warm and dry,  no rashes or palpable lesions                  Neurologic:   no focal deficits noted     Disposition:  Home    Patient Instructions:      Discharge Medications      New Medications      Instructions Start Date   HYDROcodone-acetaminophen 5-325 MG per tablet  Commonly known as: NORCO   Take 1 tablet by mouth Every 4 (Four) Hours As Needed for pain         Changes to Medications      Instructions Start Date   levothyroxine 100 MCG tablet  Commonly known as: SYNTHROID, LEVOTHROID  What changed: how much to take   100 mcg, Oral, Daily      ondansetron 4 MG tablet  Commonly known as: ZOFRAN  What changed: when to take this   4 mg, Oral, Every 6 Hours PRN    "   sodium bicarbonate 650 MG tablet  What changed: when to take this   650 mg, Oral, Every 8 Hours Scheduled         Continue These Medications      Instructions Start Date   acetaminophen 500 MG tablet  Commonly known as: TYLENOL   500 mg, Oral, Every 6 Hours PRN      aspirin 81 MG chewable tablet   81 mg, Oral, Daily      diphenhydrAMINE HCl (Sleep) 25 MG capsule   25 mg, Oral, Nightly PRN, For sleep       escitalopram 20 MG tablet  Commonly known as: LEXAPRO   20 mg, Oral, Daily      famotidine 20 MG tablet  Commonly known as: PEPCID   20 mg, Oral, 2 Times Daily      gentamicin 0.1 % cream  Commonly known as: GARAMYCIN   1 application, Topical, Daily, As directed       lanthanum 500 MG chewable tablet  Commonly known as: FOSRENOL   750 mg, Oral, 3 Times Daily With Meals      melatonin 3 MG tablet   3 mg, Oral, Nightly      midodrine 10 MG tablet  Commonly known as: PROAMATINE   10 mg, Oral, 3 Times Daily      montelukast 10 MG tablet  Commonly known as: Singulair   10 mg, Oral, Nightly      multivitamin tablet tablet  Generic drug: multivitamin   1 tablet, Oral, Daily      nitroglycerin 0.4 MG SL tablet  Commonly known as: NITROSTAT   0.4 mg, Sublingual, Every 5 Minutes PRN, Take no more than 3 doses in 15 minutes.       potassium bicarbonate 25 MEQ disintegrating tablet  Commonly known as: K-LYTE   20 mEq, Oral, 2 Times Daily, Take 2 tablets by mouth 2 times daily.       REN-JHON PO   1 tablet, Oral, Daily      saccharomyces boulardii 250 MG capsule  Commonly known as: FLORASTOR   500 mg, Oral, 2 Times Daily      traZODone 100 MG tablet  Commonly known as: DESYREL   100 mg, Oral, Nightly, Take 0.5-1 tablet by mouth nightly          Stop These Medications    PHARMACY CONSULT          Future Appointments   Date Time Provider Department Center   10/28/2021 10:15 AM [facility] 1  [facility]     Additional Instructions for the Follow-ups that You Need to Schedule     Discharge Follow-up with  Specified Provider: Dr Figueroa; 2 Weeks   As directed      To: Dr Figueroa    Follow Up: 2 Weeks    Follow Up Details: Call the office [phone] to schedule an appointment.  If desired, it may be scheduled as a telephone visit.            Follow-up Information     Jamie Figueroa MD Follow up in 2 week(s).    Specialty: General Surgery  Why: Please call the office for an appointment in 2 weeks.  If desired, it may be scheduled as a telephone visit.  Contact information:  [address] [address] [phone]             Jet Manuel MD Follow up in 1 week(s).    Specialty: Internal Medicine  Contact information:  [address]  [facility] [id number] [phone]                       Discharge Order (From admission, onward)     Start     Ordered    10/14/21 1542  Discharge patient  Once        Expected Discharge Date: 10/14/21    Discharge Disposition: Home or Self Care    Physician of Record for Attribution - Please select from Treatment Team: ELFEGO MARTINS [[id number]]    Review needed by CMO to determine Physician of Record: No       Question Answer Comment   Physician of Record for Attribution - Please select from Treatment Team ELFEGO MARTINS    Review needed by CMO to determine Physician of Record No        10/14/21 1544                Total time spent discharging patient including evaluation,post hospitalization follow up,  medication and post hospitalization instructions and education total time exceeds 30 minutes.    Signed:  Elfego Martins MD  10/14/2021  16:03 EDT    Electronically signed by Elfego Martins MD at 10/14/21 3864

## 2021-10-20 ENCOUNTER — READMISSION MANAGEMENT (OUTPATIENT)
Dept: CALL CENTER | Facility: HOSPITAL | Age: 51
End: 2021-10-20

## 2021-10-20 NOTE — OUTREACH NOTE
Medical Week 1 Survey      Responses   [facility] patient discharged from? [facility]   Does the patient have one of the following disease processes/diagnoses(primary or secondary)? Other   Week 1 attempt successful? Yes   Call start time 1440   Call end time 1442   Is patient permission given to speak with other caregiver? Yes   List who call center can speak with Carol daughter    Person spoke with today (if not patient) and relationship Carol daughter    Meds reviewed with patient/caregiver? Yes   Is the patient taking all medications as directed (includes completed medication regime)? Yes   Comments regarding appointments had checkup at dialysis clinic    Psychosocial issues? No   What is the patient's perception of their health status since discharge? Improving  [had a slight temp (100.2) but dialysis clinic told her it was ok but if it worsened to go to the hospital ,  slight HA today ]   Week 1 call completed? Yes          Brianda Guevara RN

## 2021-10-28 ENCOUNTER — APPOINTMENT (OUTPATIENT)
Dept: CARDIOLOGY | Facility: HOSPITAL | Age: 51
End: 2021-10-28

## 2022-01-01 ENCOUNTER — READMISSION MANAGEMENT (OUTPATIENT)
Dept: CALL CENTER | Facility: HOSPITAL | Age: 52
End: 2022-01-01

## 2022-01-01 ENCOUNTER — HOME CARE VISIT (OUTPATIENT)
Dept: HOME HEALTH SERVICES | Facility: HOME HEALTHCARE | Age: 52
End: 2022-01-01

## 2022-01-01 ENCOUNTER — PATIENT ROUNDING (BHMG ONLY) (OUTPATIENT)
Dept: CARDIOLOGY | Facility: CLINIC | Age: 52
End: 2022-01-01

## 2022-01-01 ENCOUNTER — APPOINTMENT (OUTPATIENT)
Dept: OTHER | Facility: HOSPITAL | Age: 52
End: 2022-01-01

## 2022-01-01 ENCOUNTER — HOSPITAL ENCOUNTER (INPATIENT)
Facility: HOSPITAL | Age: 52
LOS: 5 days | Discharge: HOME | End: 2022-03-16
Attending: EMERGENCY MEDICINE | Admitting: INTERNAL MEDICINE

## 2022-01-01 ENCOUNTER — HOME CARE VISIT (OUTPATIENT)
Dept: HOME HEALTH SERVICES | Facility: HOME HEALTHCARE | Age: 52
End: 2022-01-01
Payer: MEDICARE

## 2022-01-01 ENCOUNTER — APPOINTMENT (OUTPATIENT)
Dept: GENERAL RADIOLOGY | Facility: HOSPITAL | Age: 52
End: 2022-01-01

## 2022-01-01 ENCOUNTER — ANESTHESIA EVENT (OUTPATIENT)
Dept: PERIOP | Facility: HOSPITAL | Age: 52
End: 2022-01-01

## 2022-01-01 ENCOUNTER — ANESTHESIA (OUTPATIENT)
Dept: PERIOP | Facility: HOSPITAL | Age: 52
End: 2022-01-01

## 2022-01-01 ENCOUNTER — HOSPITAL ENCOUNTER (INPATIENT)
Facility: HOSPITAL | Age: 52
LOS: 3 days | Discharge: HOME | End: 2022-03-02
Attending: EMERGENCY MEDICINE | Admitting: INTERNAL MEDICINE

## 2022-01-01 ENCOUNTER — TRANSCRIBE ORDERS (OUTPATIENT)
Dept: HOME HEALTH SERVICES | Facility: HOME HEALTHCARE | Age: 52
End: 2022-01-01

## 2022-01-01 ENCOUNTER — APPOINTMENT (OUTPATIENT)
Dept: MRI IMAGING | Facility: HOSPITAL | Age: 52
End: 2022-01-01

## 2022-01-01 ENCOUNTER — HOSPITAL ENCOUNTER (OUTPATIENT)
Facility: HOSPITAL | Age: 52
LOS: 2 days | Discharge: HOME | End: 2022-09-02
Attending: EMERGENCY MEDICINE | Admitting: EMERGENCY MEDICINE

## 2022-01-01 ENCOUNTER — APPOINTMENT (OUTPATIENT)
Dept: CARDIOLOGY | Facility: HOSPITAL | Age: 52
End: 2022-01-01

## 2022-01-01 ENCOUNTER — APPOINTMENT (OUTPATIENT)
Dept: CT IMAGING | Facility: HOSPITAL | Age: 52
End: 2022-01-01

## 2022-01-01 ENCOUNTER — OFFICE VISIT (OUTPATIENT)
Dept: CARDIOLOGY | Facility: CLINIC | Age: 52
End: 2022-01-01

## 2022-01-01 ENCOUNTER — NURSE TRIAGE (OUTPATIENT)
Dept: CALL CENTER | Facility: HOSPITAL | Age: 52
End: 2022-01-01

## 2022-01-01 ENCOUNTER — HOSPITAL ENCOUNTER (INPATIENT)
Facility: HOSPITAL | Age: 52
LOS: 3 days | Discharge: HOME | End: 2022-04-24
Attending: EMERGENCY MEDICINE | Admitting: INTERNAL MEDICINE

## 2022-01-01 ENCOUNTER — HOSPITAL ENCOUNTER (EMERGENCY)
Facility: HOSPITAL | Age: 52
Discharge: HOME | End: 2022-05-28
Attending: EMERGENCY MEDICINE | Admitting: EMERGENCY MEDICINE

## 2022-01-01 ENCOUNTER — TELEPHONE (OUTPATIENT)
Dept: CARDIOLOGY | Facility: CLINIC | Age: 52
End: 2022-01-01

## 2022-01-01 ENCOUNTER — HOME HEALTH ADMISSION (OUTPATIENT)
Dept: HOME HEALTH SERVICES | Facility: HOME HEALTHCARE | Age: 52
End: 2022-01-01
Payer: MEDICARE

## 2022-01-01 ENCOUNTER — HOSPITAL ENCOUNTER (INPATIENT)
Facility: HOSPITAL | Age: 52
LOS: 3 days | Discharge: HOME | End: 2022-06-20
Attending: EMERGENCY MEDICINE | Admitting: UROLOGY

## 2022-01-01 VITALS — OXYGEN SATURATION: 99 % | DIASTOLIC BLOOD PRESSURE: 74 MMHG | HEART RATE: 102 BPM | SYSTOLIC BLOOD PRESSURE: 94 MMHG

## 2022-01-01 VITALS — HEART RATE: 102 BPM | OXYGEN SATURATION: 99 % | SYSTOLIC BLOOD PRESSURE: 97 MMHG | DIASTOLIC BLOOD PRESSURE: 74 MMHG

## 2022-01-01 VITALS
DIASTOLIC BLOOD PRESSURE: 71 MMHG | SYSTOLIC BLOOD PRESSURE: 82 MMHG | WEIGHT: 173.1 LBS | TEMPERATURE: 97.4 F | OXYGEN SATURATION: 93 % | HEIGHT: 61 IN | RESPIRATION RATE: 16 BRPM | BODY MASS INDEX: 32.68 KG/M2 | HEART RATE: 104 BPM

## 2022-01-01 VITALS
TEMPERATURE: 98 F | HEIGHT: 61 IN | DIASTOLIC BLOOD PRESSURE: 79 MMHG | BODY MASS INDEX: 31.96 KG/M2 | HEART RATE: 105 BPM | SYSTOLIC BLOOD PRESSURE: 108 MMHG | RESPIRATION RATE: 18 BRPM | OXYGEN SATURATION: 99 % | WEIGHT: 169.3 LBS

## 2022-01-01 VITALS
BODY MASS INDEX: 34.59 KG/M2 | HEART RATE: 93 BPM | TEMPERATURE: 97.5 F | WEIGHT: 183.2 LBS | HEIGHT: 61 IN | DIASTOLIC BLOOD PRESSURE: 79 MMHG | RESPIRATION RATE: 18 BRPM | SYSTOLIC BLOOD PRESSURE: 111 MMHG | OXYGEN SATURATION: 91 %

## 2022-01-01 VITALS
DIASTOLIC BLOOD PRESSURE: 76 MMHG | SYSTOLIC BLOOD PRESSURE: 90 MMHG | TEMPERATURE: 97.9 F | RESPIRATION RATE: 18 BRPM | OXYGEN SATURATION: 96 % | HEART RATE: 102 BPM

## 2022-01-01 VITALS
BODY MASS INDEX: 32.5 KG/M2 | TEMPERATURE: 97.3 F | SYSTOLIC BLOOD PRESSURE: 122 MMHG | WEIGHT: 172 LBS | TEMPERATURE: 98.6 F | RESPIRATION RATE: 18 BRPM | HEART RATE: 67 BPM | OXYGEN SATURATION: 98 % | DIASTOLIC BLOOD PRESSURE: 72 MMHG

## 2022-01-01 VITALS
DIASTOLIC BLOOD PRESSURE: 64 MMHG | TEMPERATURE: 97.5 F | OXYGEN SATURATION: 99 % | RESPIRATION RATE: 18 BRPM | BODY MASS INDEX: 32.5 KG/M2 | HEART RATE: 99 BPM | WEIGHT: 172 LBS | SYSTOLIC BLOOD PRESSURE: 92 MMHG

## 2022-01-01 VITALS
HEART RATE: 80 BPM | OXYGEN SATURATION: 99 % | TEMPERATURE: 97.9 F | RESPIRATION RATE: 18 BRPM | SYSTOLIC BLOOD PRESSURE: 112 MMHG | DIASTOLIC BLOOD PRESSURE: 70 MMHG

## 2022-01-01 VITALS
DIASTOLIC BLOOD PRESSURE: 54 MMHG | SYSTOLIC BLOOD PRESSURE: 98 MMHG | RESPIRATION RATE: 20 BRPM | OXYGEN SATURATION: 99 % | TEMPERATURE: 98 F | HEART RATE: 65 BPM

## 2022-01-01 VITALS
DIASTOLIC BLOOD PRESSURE: 60 MMHG | RESPIRATION RATE: 18 BRPM | SYSTOLIC BLOOD PRESSURE: 92 MMHG | OXYGEN SATURATION: 96 % | TEMPERATURE: 98.4 F | HEART RATE: 96 BPM

## 2022-01-01 VITALS
TEMPERATURE: 97.9 F | RESPIRATION RATE: 18 BRPM | OXYGEN SATURATION: 96 % | HEART RATE: 98 BPM | DIASTOLIC BLOOD PRESSURE: 62 MMHG | SYSTOLIC BLOOD PRESSURE: 90 MMHG

## 2022-01-01 VITALS
RESPIRATION RATE: 18 BRPM | DIASTOLIC BLOOD PRESSURE: 50 MMHG | OXYGEN SATURATION: 97 % | HEART RATE: 97 BPM | TEMPERATURE: 98.4 F | SYSTOLIC BLOOD PRESSURE: 90 MMHG

## 2022-01-01 VITALS
TEMPERATURE: 97.9 F | HEART RATE: 100 BPM | RESPIRATION RATE: 18 BRPM | SYSTOLIC BLOOD PRESSURE: 88 MMHG | DIASTOLIC BLOOD PRESSURE: 68 MMHG | OXYGEN SATURATION: 99 %

## 2022-01-01 VITALS
TEMPERATURE: 97.8 F | RESPIRATION RATE: 18 BRPM | HEART RATE: 90 BPM | SYSTOLIC BLOOD PRESSURE: 100 MMHG | OXYGEN SATURATION: 97 % | DIASTOLIC BLOOD PRESSURE: 62 MMHG

## 2022-01-01 VITALS
HEART RATE: 104 BPM | TEMPERATURE: 97.8 F | DIASTOLIC BLOOD PRESSURE: 62 MMHG | RESPIRATION RATE: 18 BRPM | OXYGEN SATURATION: 96 % | SYSTOLIC BLOOD PRESSURE: 92 MMHG

## 2022-01-01 VITALS
OXYGEN SATURATION: 99 % | DIASTOLIC BLOOD PRESSURE: 78 MMHG | TEMPERATURE: 97 F | HEART RATE: 101 BPM | SYSTOLIC BLOOD PRESSURE: 107 MMHG

## 2022-01-01 VITALS
HEART RATE: 82 BPM | RESPIRATION RATE: 16 BRPM | BODY MASS INDEX: 34.02 KG/M2 | WEIGHT: 180.2 LBS | SYSTOLIC BLOOD PRESSURE: 89 MMHG | HEIGHT: 61 IN | TEMPERATURE: 97.6 F | OXYGEN SATURATION: 97 % | DIASTOLIC BLOOD PRESSURE: 64 MMHG

## 2022-01-01 VITALS
WEIGHT: 166.1 LBS | HEIGHT: 61 IN | BODY MASS INDEX: 31.36 KG/M2 | HEART RATE: 97 BPM | DIASTOLIC BLOOD PRESSURE: 85 MMHG | RESPIRATION RATE: 16 BRPM | SYSTOLIC BLOOD PRESSURE: 124 MMHG | OXYGEN SATURATION: 95 % | TEMPERATURE: 98.4 F

## 2022-01-01 VITALS
OXYGEN SATURATION: 100 % | RESPIRATION RATE: 18 BRPM | SYSTOLIC BLOOD PRESSURE: 90 MMHG | TEMPERATURE: 98.7 F | HEART RATE: 102 BPM | DIASTOLIC BLOOD PRESSURE: 64 MMHG

## 2022-01-01 VITALS
SYSTOLIC BLOOD PRESSURE: 102 MMHG | OXYGEN SATURATION: 97 % | RESPIRATION RATE: 18 BRPM | HEART RATE: 102 BPM | TEMPERATURE: 97.3 F | DIASTOLIC BLOOD PRESSURE: 78 MMHG

## 2022-01-01 VITALS
HEART RATE: 60 BPM | TEMPERATURE: 98.1 F | DIASTOLIC BLOOD PRESSURE: 48 MMHG | SYSTOLIC BLOOD PRESSURE: 88 MMHG | OXYGEN SATURATION: 98 % | RESPIRATION RATE: 18 BRPM

## 2022-01-01 VITALS
SYSTOLIC BLOOD PRESSURE: 118 MMHG | OXYGEN SATURATION: 99 % | RESPIRATION RATE: 18 BRPM | HEART RATE: 80 BPM | DIASTOLIC BLOOD PRESSURE: 76 MMHG | TEMPERATURE: 98.5 F

## 2022-01-01 VITALS
BODY MASS INDEX: 34.55 KG/M2 | HEART RATE: 108 BPM | WEIGHT: 176 LBS | HEIGHT: 60 IN | DIASTOLIC BLOOD PRESSURE: 80 MMHG | SYSTOLIC BLOOD PRESSURE: 112 MMHG

## 2022-01-01 VITALS
OXYGEN SATURATION: 98 % | SYSTOLIC BLOOD PRESSURE: 102 MMHG | DIASTOLIC BLOOD PRESSURE: 71 MMHG | TEMPERATURE: 96.9 F | HEART RATE: 103 BPM

## 2022-01-01 VITALS
OXYGEN SATURATION: 99 % | HEART RATE: 67 BPM | SYSTOLIC BLOOD PRESSURE: 96 MMHG | TEMPERATURE: 97.8 F | RESPIRATION RATE: 18 BRPM | DIASTOLIC BLOOD PRESSURE: 58 MMHG

## 2022-01-01 VITALS
DIASTOLIC BLOOD PRESSURE: 62 MMHG | RESPIRATION RATE: 18 BRPM | OXYGEN SATURATION: 97 % | SYSTOLIC BLOOD PRESSURE: 100 MMHG

## 2022-01-01 VITALS
TEMPERATURE: 97.8 F | DIASTOLIC BLOOD PRESSURE: 62 MMHG | OXYGEN SATURATION: 96 % | SYSTOLIC BLOOD PRESSURE: 102 MMHG | HEART RATE: 102 BPM | RESPIRATION RATE: 18 BRPM

## 2022-01-01 VITALS
DIASTOLIC BLOOD PRESSURE: 79 MMHG | SYSTOLIC BLOOD PRESSURE: 106 MMHG | OXYGEN SATURATION: 99 % | HEART RATE: 112 BPM | TEMPERATURE: 97.3 F

## 2022-01-01 VITALS
HEART RATE: 99 BPM | OXYGEN SATURATION: 96 % | BODY MASS INDEX: 34.8 KG/M2 | SYSTOLIC BLOOD PRESSURE: 100 MMHG | WEIGHT: 184.3 LBS | RESPIRATION RATE: 16 BRPM | HEIGHT: 61 IN | DIASTOLIC BLOOD PRESSURE: 65 MMHG | TEMPERATURE: 98.2 F

## 2022-01-01 VITALS
OXYGEN SATURATION: 100 % | DIASTOLIC BLOOD PRESSURE: 68 MMHG | RESPIRATION RATE: 18 BRPM | TEMPERATURE: 97.1 F | HEART RATE: 101 BPM | SYSTOLIC BLOOD PRESSURE: 104 MMHG

## 2022-01-01 VITALS
HEIGHT: 61 IN | HEART RATE: 100 BPM | TEMPERATURE: 97.2 F | SYSTOLIC BLOOD PRESSURE: 90 MMHG | RESPIRATION RATE: 16 BRPM | BODY MASS INDEX: 32.47 KG/M2 | WEIGHT: 172 LBS | OXYGEN SATURATION: 98 % | DIASTOLIC BLOOD PRESSURE: 54 MMHG

## 2022-01-01 VITALS
DIASTOLIC BLOOD PRESSURE: 54 MMHG | SYSTOLIC BLOOD PRESSURE: 96 MMHG | RESPIRATION RATE: 18 BRPM | TEMPERATURE: 98.2 F | HEART RATE: 105 BPM | OXYGEN SATURATION: 98 %

## 2022-01-01 DIAGNOSIS — Z99.2 ESRD ON PERITONEAL DIALYSIS: ICD-10-CM

## 2022-01-01 DIAGNOSIS — N18.9 ACUTE RENAL FAILURE SUPERIMPOSED ON CHRONIC KIDNEY DISEASE, ON CHRONIC DIALYSIS, UNSPECIFIED ACUTE RENAL FAILURE TYPE: ICD-10-CM

## 2022-01-01 DIAGNOSIS — N39.0 UTI (URINARY TRACT INFECTION), BACTERIAL: ICD-10-CM

## 2022-01-01 DIAGNOSIS — N18.6 ESRD (END STAGE RENAL DISEASE): ICD-10-CM

## 2022-01-01 DIAGNOSIS — Z99.2 ACUTE RENAL FAILURE SUPERIMPOSED ON CHRONIC KIDNEY DISEASE, ON CHRONIC DIALYSIS, UNSPECIFIED ACUTE RENAL FAILURE TYPE: ICD-10-CM

## 2022-01-01 DIAGNOSIS — N17.9 ACUTE RENAL FAILURE SUPERIMPOSED ON CHRONIC KIDNEY DISEASE, ON CHRONIC DIALYSIS, UNSPECIFIED ACUTE RENAL FAILURE TYPE: ICD-10-CM

## 2022-01-01 DIAGNOSIS — R53.1 GENERALIZED WEAKNESS: ICD-10-CM

## 2022-01-01 DIAGNOSIS — Z99.2 PERITONEAL DIALYSIS CATHETER IN PLACE: ICD-10-CM

## 2022-01-01 DIAGNOSIS — N18.6 ESRD ON PERITONEAL DIALYSIS: ICD-10-CM

## 2022-01-01 DIAGNOSIS — A04.72 C. DIFFICILE COLITIS: ICD-10-CM

## 2022-01-01 DIAGNOSIS — I95.89 OTHER SPECIFIED HYPOTENSION: Primary | ICD-10-CM

## 2022-01-01 DIAGNOSIS — N20.1 URETERAL CALCULI: Primary | ICD-10-CM

## 2022-01-01 DIAGNOSIS — A41.9 SEPSIS WITHOUT ACUTE ORGAN DYSFUNCTION, DUE TO UNSPECIFIED ORGANISM: ICD-10-CM

## 2022-01-01 DIAGNOSIS — N20.1 RIGHT URETERAL STONE: ICD-10-CM

## 2022-01-01 DIAGNOSIS — Z09 FOLLOW UP: ICD-10-CM

## 2022-01-01 DIAGNOSIS — R07.9 CHEST PAIN, UNSPECIFIED TYPE: ICD-10-CM

## 2022-01-01 DIAGNOSIS — N20.1 LEFT URETERAL STONE: ICD-10-CM

## 2022-01-01 DIAGNOSIS — A41.9 SEPSIS WITHOUT ACUTE ORGAN DYSFUNCTION, DUE TO UNSPECIFIED ORGANISM: Primary | ICD-10-CM

## 2022-01-01 DIAGNOSIS — T85.611A PERITONEAL DIALYSIS CATHETER DYSFUNCTION, INITIAL ENCOUNTER: ICD-10-CM

## 2022-01-01 DIAGNOSIS — T85.611A PERITONEAL DIALYSIS CATHETER DYSFUNCTION, INITIAL ENCOUNTER: Primary | ICD-10-CM

## 2022-01-01 DIAGNOSIS — A49.9 UTI (URINARY TRACT INFECTION), BACTERIAL: ICD-10-CM

## 2022-01-01 DIAGNOSIS — I95.9 HYPOTENSION, UNSPECIFIED HYPOTENSION TYPE: ICD-10-CM

## 2022-01-01 DIAGNOSIS — R79.89 ELEVATED BRAIN NATRIURETIC PEPTIDE (BNP) LEVEL: ICD-10-CM

## 2022-01-01 DIAGNOSIS — I50.22 CHRONIC SYSTOLIC HEART FAILURE: Primary | ICD-10-CM

## 2022-01-01 DIAGNOSIS — N13.30 HYDRONEPHROSIS OF RIGHT KIDNEY: ICD-10-CM

## 2022-01-01 DIAGNOSIS — K51.00 PANCOLITIS: ICD-10-CM

## 2022-01-01 DIAGNOSIS — M62.3 IMMOBILITY SYNDROME: ICD-10-CM

## 2022-01-01 DIAGNOSIS — N39.0 URINARY TRACT INFECTION WITHOUT HEMATURIA, SITE UNSPECIFIED: ICD-10-CM

## 2022-01-01 DIAGNOSIS — R19.7 DIARRHEA, UNSPECIFIED TYPE: Primary | ICD-10-CM

## 2022-01-01 DIAGNOSIS — D72.829 LEUKOCYTOSIS, UNSPECIFIED TYPE: ICD-10-CM

## 2022-01-01 DIAGNOSIS — N20.0 KIDNEY STONE ON RIGHT SIDE: Primary | ICD-10-CM

## 2022-01-01 DIAGNOSIS — Z99.2 PERITONEAL DIALYSIS STATUS: ICD-10-CM

## 2022-01-01 DIAGNOSIS — N39.0 ACUTE UTI: ICD-10-CM

## 2022-01-01 DIAGNOSIS — L97.519 ULCER OF RIGHT FOOT, UNSPECIFIED ULCER STAGE: ICD-10-CM

## 2022-01-01 DIAGNOSIS — L03.115 CELLULITIS OF RIGHT FOOT: ICD-10-CM

## 2022-01-01 LAB
ADV 40+41 DNA STL QL NAA+NON-PROBE: NOT DETECTED
ALBUMIN SERPL-MCNC: 3.1 G/DL (ref 3.5–5.2)
ALBUMIN SERPL-MCNC: 3.3 G/DL (ref 3.5–5.2)
ALBUMIN SERPL-MCNC: 3.4 G/DL (ref 3.5–5.2)
ALBUMIN SERPL-MCNC: 3.4 G/DL (ref 3.5–5.2)
ALBUMIN SERPL-MCNC: 3.5 G/DL (ref 3.5–5.2)
ALBUMIN SERPL-MCNC: 3.5 G/DL (ref 3.5–5.2)
ALBUMIN SERPL-MCNC: 3.6 G/DL (ref 3.5–5.2)
ALBUMIN SERPL-MCNC: 3.7 G/DL (ref 3.5–5.2)
ALBUMIN SERPL-MCNC: 3.9 G/DL (ref 3.5–5.2)
ALBUMIN SERPL-MCNC: 4 G/DL (ref 3.5–5.2)
ALBUMIN SERPL-MCNC: 4 G/DL (ref 3.5–5.2)
ALBUMIN SERPL-MCNC: 4.1 G/DL (ref 3.5–5.2)
ALBUMIN SERPL-MCNC: 4.2 G/DL (ref 3.5–5.2)
ALBUMIN SERPL-MCNC: 4.6 G/DL (ref 3.5–5.2)
ALBUMIN SERPL-MCNC: 4.6 G/DL (ref 3.5–5.2)
ALBUMIN/GLOB SERPL: 0.9 G/DL
ALBUMIN/GLOB SERPL: 1.2 G/DL
ALBUMIN/GLOB SERPL: 1.3 G/DL
ALBUMIN/GLOB SERPL: 1.4 G/DL
ALBUMIN/GLOB SERPL: 1.4 G/DL
ALBUMIN/GLOB SERPL: 1.5 G/DL
ALBUMIN/GLOB SERPL: 1.5 G/DL
ALBUMIN/GLOB SERPL: 1.6 G/DL
ALP SERPL-CCNC: 116 U/L (ref 39–117)
ALP SERPL-CCNC: 71 U/L (ref 39–117)
ALP SERPL-CCNC: 72 U/L (ref 39–117)
ALP SERPL-CCNC: 78 U/L (ref 39–117)
ALP SERPL-CCNC: 79 U/L (ref 39–117)
ALP SERPL-CCNC: 87 U/L (ref 39–117)
ALP SERPL-CCNC: 91 U/L (ref 39–117)
ALP SERPL-CCNC: 92 U/L (ref 39–117)
ALP SERPL-CCNC: 93 U/L (ref 39–117)
ALT SERPL W P-5'-P-CCNC: 10 U/L (ref 1–33)
ALT SERPL W P-5'-P-CCNC: 12 U/L (ref 1–33)
ALT SERPL W P-5'-P-CCNC: 13 U/L (ref 1–33)
ALT SERPL W P-5'-P-CCNC: 18 U/L (ref 1–33)
ALT SERPL W P-5'-P-CCNC: 18 U/L (ref 1–33)
ALT SERPL W P-5'-P-CCNC: 19 U/L (ref 1–33)
ALT SERPL W P-5'-P-CCNC: 6 U/L (ref 1–33)
AMPHET+METHAMPHET UR QL: NEGATIVE
ANION GAP SERPL CALCULATED.3IONS-SCNC: 12.2 MMOL/L (ref 5–15)
ANION GAP SERPL CALCULATED.3IONS-SCNC: 14 MMOL/L (ref 5–15)
ANION GAP SERPL CALCULATED.3IONS-SCNC: 14.4 MMOL/L (ref 5–15)
ANION GAP SERPL CALCULATED.3IONS-SCNC: 14.9 MMOL/L (ref 5–15)
ANION GAP SERPL CALCULATED.3IONS-SCNC: 15 MMOL/L (ref 5–15)
ANION GAP SERPL CALCULATED.3IONS-SCNC: 15.7 MMOL/L (ref 5–15)
ANION GAP SERPL CALCULATED.3IONS-SCNC: 16 MMOL/L (ref 5–15)
ANION GAP SERPL CALCULATED.3IONS-SCNC: 16.2 MMOL/L (ref 5–15)
ANION GAP SERPL CALCULATED.3IONS-SCNC: 17 MMOL/L (ref 5–15)
ANION GAP SERPL CALCULATED.3IONS-SCNC: 17.2 MMOL/L (ref 5–15)
ANION GAP SERPL CALCULATED.3IONS-SCNC: 18 MMOL/L (ref 5–15)
ANION GAP SERPL CALCULATED.3IONS-SCNC: 18.6 MMOL/L (ref 5–15)
ANION GAP SERPL CALCULATED.3IONS-SCNC: 19.3 MMOL/L (ref 5–15)
ANION GAP SERPL CALCULATED.3IONS-SCNC: 20.1 MMOL/L (ref 5–15)
ANION GAP SERPL CALCULATED.3IONS-SCNC: 21 MMOL/L (ref 5–15)
ANION GAP SERPL CALCULATED.3IONS-SCNC: 21.4 MMOL/L (ref 5–15)
ANION GAP SERPL CALCULATED.3IONS-SCNC: 22 MMOL/L (ref 5–15)
ANION GAP SERPL CALCULATED.3IONS-SCNC: 22.4 MMOL/L (ref 5–15)
ANION GAP SERPL CALCULATED.3IONS-SCNC: 23.2 MMOL/L (ref 5–15)
ANION GAP SERPL CALCULATED.3IONS-SCNC: 23.8 MMOL/L (ref 5–15)
ANION GAP SERPL CALCULATED.3IONS-SCNC: 24.3 MMOL/L (ref 5–15)
ANION GAP SERPL CALCULATED.3IONS-SCNC: 25.1 MMOL/L (ref 5–15)
ANION GAP SERPL CALCULATED.3IONS-SCNC: 25.9 MMOL/L (ref 5–15)
ANION GAP SERPL CALCULATED.3IONS-SCNC: 26 MMOL/L (ref 5–15)
ANION GAP SERPL CALCULATED.3IONS-SCNC: 27 MMOL/L (ref 5–15)
ANISOCYTOSIS BLD QL: ABNORMAL
AORTIC ARCH: 2.1 CM
AORTIC DIMENSIONLESS INDEX: 0.6 (DI)
APTT SCREEN TO CONFIRM RATIO: 0.89 RATIO (ref 0–1.34)
ARTERIAL PATENCY WRIST A: ABNORMAL
ASCENDING AORTA: 2.9 CM
AST SERPL-CCNC: 10 U/L (ref 1–32)
AST SERPL-CCNC: 11 U/L (ref 1–32)
AST SERPL-CCNC: 11 U/L (ref 1–32)
AST SERPL-CCNC: 12 U/L (ref 1–32)
AST SERPL-CCNC: 14 U/L (ref 1–32)
AST SERPL-CCNC: 14 U/L (ref 1–32)
AST SERPL-CCNC: 26 U/L (ref 1–32)
AST SERPL-CCNC: 8 U/L (ref 1–32)
AST SERPL-CCNC: 9 U/L (ref 1–32)
ASTRO TYP 1-8 RNA STL QL NAA+NON-PROBE: NOT DETECTED
AT III PPP CHRO-ACNC: 102 % (ref 90–134)
ATMOSPHERIC PRESS: 751.6 MMHG
B PARAPERT DNA SPEC QL NAA+PROBE: NOT DETECTED
B PERT DNA SPEC QL NAA+PROBE: NOT DETECTED
B2 GLYCOPROT1 IGA SER-ACNC: <9 GPI IGA UNITS (ref 0–25)
B2 GLYCOPROT1 IGG SER-ACNC: <9 GPI IGG UNITS (ref 0–20)
B2 GLYCOPROT1 IGM SER-ACNC: <9 GPI IGM UNITS (ref 0–32)
BACTERIA SPEC AEROBE CULT: ABNORMAL
BACTERIA SPEC AEROBE CULT: NO GROWTH
BACTERIA SPEC AEROBE CULT: NORMAL
BACTERIA UR QL AUTO: ABNORMAL /HPF
BARBITURATES UR QL SCN: NEGATIVE
BASE EXCESS BLDA CALC-SCNC: -11.3 MMOL/L (ref 0–2)
BASOPHILS # BLD AUTO: 0.04 10*3/MM3 (ref 0–0.2)
BASOPHILS # BLD AUTO: 0.05 10*3/MM3 (ref 0–0.2)
BASOPHILS # BLD AUTO: 0.06 10*3/MM3 (ref 0–0.2)
BASOPHILS # BLD AUTO: 0.06 10*3/MM3 (ref 0–0.2)
BASOPHILS # BLD AUTO: 0.07 10*3/MM3 (ref 0–0.2)
BASOPHILS # BLD AUTO: 0.1 10*3/MM3 (ref 0–0.2)
BASOPHILS # BLD AUTO: 0.11 10*3/MM3 (ref 0–0.2)
BASOPHILS # BLD AUTO: 0.12 10*3/MM3 (ref 0–0.2)
BASOPHILS # BLD AUTO: 0.13 10*3/MM3 (ref 0–0.2)
BASOPHILS # BLD AUTO: 0.15 10*3/MM3 (ref 0–0.2)
BASOPHILS # BLD AUTO: 0.16 10*3/MM3 (ref 0–0.2)
BASOPHILS # BLD AUTO: 0.16 10*3/MM3 (ref 0–0.2)
BASOPHILS # BLD AUTO: 0.17 10*3/MM3 (ref 0–0.2)
BASOPHILS # BLD AUTO: 0.17 10*3/MM3 (ref 0–0.2)
BASOPHILS # BLD MANUAL: 0.32 10*3/MM3 (ref 0–0.2)
BASOPHILS NFR BLD AUTO: 0.4 % (ref 0–1.5)
BASOPHILS NFR BLD AUTO: 0.5 % (ref 0–1.5)
BASOPHILS NFR BLD AUTO: 0.6 % (ref 0–1.5)
BASOPHILS NFR BLD AUTO: 0.8 % (ref 0–1.5)
BASOPHILS NFR BLD AUTO: 0.9 % (ref 0–1.5)
BASOPHILS NFR BLD AUTO: 1 % (ref 0–1.5)
BASOPHILS NFR BLD AUTO: 1.1 % (ref 0–1.5)
BASOPHILS NFR BLD AUTO: 1.2 % (ref 0–1.5)
BASOPHILS NFR BLD AUTO: 1.3 % (ref 0–1.5)
BASOPHILS NFR BLD MANUAL: 1 % (ref 0–1.5)
BDY SITE: ABNORMAL
BENZODIAZ UR QL SCN: NEGATIVE
BH CV ECHO MEAS - AO MAX PG: 12.9 MMHG
BH CV ECHO MEAS - AO MEAN PG: 8 MMHG
BH CV ECHO MEAS - AO V2 MAX: 179.8 CM/SEC
BH CV ECHO MEAS - AO V2 VTI: 24.1 CM
BH CV ECHO MEAS - AVA(I,D): 1.52 CM2
BH CV ECHO MEAS - EDV(CUBED): 349.7 ML
BH CV ECHO MEAS - EDV(MOD-SP2): 254 ML
BH CV ECHO MEAS - EDV(MOD-SP4): 215 ML
BH CV ECHO MEAS - EF(MOD-BP): 19.9 %
BH CV ECHO MEAS - EF(MOD-SP2): 21.3 %
BH CV ECHO MEAS - EF(MOD-SP4): 19.1 %
BH CV ECHO MEAS - ESV(CUBED): 246.1 ML
BH CV ECHO MEAS - ESV(MOD-SP2): 200 ML
BH CV ECHO MEAS - ESV(MOD-SP4): 174 ML
BH CV ECHO MEAS - FS: 11.1 %
BH CV ECHO MEAS - IVS/LVPW: 0.88 CM
BH CV ECHO MEAS - IVSD: 0.78 CM
BH CV ECHO MEAS - LAT PEAK E' VEL: 17.6 CM/SEC
BH CV ECHO MEAS - LV MASS(C)D: 258 GRAMS
BH CV ECHO MEAS - LV MAX PG: 4.6 MMHG
BH CV ECHO MEAS - LV MEAN PG: 2.6 MMHG
BH CV ECHO MEAS - LV V1 MAX: 106.7 CM/SEC
BH CV ECHO MEAS - LV V1 VTI: 14.5 CM
BH CV ECHO MEAS - LVIDD: 7 CM
BH CV ECHO MEAS - LVIDS: 6.3 CM
BH CV ECHO MEAS - LVOT AREA: 2.5 CM2
BH CV ECHO MEAS - LVOT DIAM: 1.79 CM
BH CV ECHO MEAS - LVPWD: 0.88 CM
BH CV ECHO MEAS - MED PEAK E' VEL: 11 CM/SEC
BH CV ECHO MEAS - MR MAX PG: 70.4 MMHG
BH CV ECHO MEAS - MR MAX VEL: 419.5 CM/SEC
BH CV ECHO MEAS - MV DEC SLOPE: 1135 CM/SEC2
BH CV ECHO MEAS - MV DEC TIME: 0.15 MSEC
BH CV ECHO MEAS - MV E MAX VEL: 149 CM/SEC
BH CV ECHO MEAS - MV MAX PG: 9.4 MMHG
BH CV ECHO MEAS - MV MEAN PG: 4.5 MMHG
BH CV ECHO MEAS - MV V2 VTI: 18.7 CM
BH CV ECHO MEAS - MVA(VTI): 1.96 CM2
BH CV ECHO MEAS - PA ACC TIME: 0.08 SEC
BH CV ECHO MEAS - PA PR(ACCEL): 42.2 MMHG
BH CV ECHO MEAS - PA V2 MAX: 118.1 CM/SEC
BH CV ECHO MEAS - RV MAX PG: 1.83 MMHG
BH CV ECHO MEAS - RV V1 MAX: 67.7 CM/SEC
BH CV ECHO MEAS - RV V1 VTI: 12 CM
BH CV ECHO MEAS - RVOT DIAM: 2.7 CM
BH CV ECHO MEAS - SV(LVOT): 36.6 ML
BH CV ECHO MEAS - SV(MOD-SP2): 54 ML
BH CV ECHO MEAS - SV(MOD-SP4): 41 ML
BH CV ECHO MEAS - SV(RVOT): 70.1 ML
BH CV ECHO MEAS - TAPSE (>1.6): 1.6 CM
BH CV ECHO MEASUREMENTS AVERAGE E/E' RATIO: 10.42
BH CV LOWER ARTERIAL LEFT DORSALIS PEDIS SYS MAX: 75 MMHG
BH CV LOWER ARTERIAL LEFT GREAT TOE SYS MAX: 65 MMHG
BH CV LOWER ARTERIAL LEFT HIGH THIGH SYS MAX: 115 MMHG
BH CV LOWER ARTERIAL LEFT LOW THIGH SYS MAX: 178 MMHG
BH CV LOWER ARTERIAL LEFT POPLITEAL SYS MAX: 137 MMHG
BH CV LOWER ARTERIAL LEFT TBI RATIO: 0.55
BH CV LOWER ARTERIAL RIGHT ABI RATIO: 0.63
BH CV LOWER ARTERIAL RIGHT DORSALIS PEDIS SYS MAX: 61 MMHG
BH CV LOWER ARTERIAL RIGHT GREAT TOE SYS MAX: 89 MMHG
BH CV LOWER ARTERIAL RIGHT HIGH THIGH SYS MAX: 127 MMHG
BH CV LOWER ARTERIAL RIGHT LOW THIGH SYS MAX: 138 MMHG
BH CV LOWER ARTERIAL RIGHT POPLITEAL SYS MAX: 161 MMHG
BH CV LOWER ARTERIAL RIGHT POST TIBIAL SYS MAX: 75 MMHG
BH CV LOWER ARTERIAL RIGHT TBI RATIO: 0.75
BH CV VAS SMA 1ST PP TIME: 15 MIN
BH CV VAS SMA 2ND PP TIME: 30 MIN
BH CV VAS SMA 3RD PP TIME: 45 MIN
BH CV VAS SMA AORTA EDV: 17.2 CM/S
BH CV VAS SMA AORTA PSV: 88.6 CM/S
BH CV VAS SMA CELIAC DIST EDV: 37.5 CM/S
BH CV VAS SMA CELIAC DIST PSV: 120 CM/S
BH CV VAS SMA CELIAC ORIGIN EDV: 25.3 CM/S
BH CV VAS SMA CELIAC ORIGIN PSV: 114 CM/S
BH CV VAS SMA CELIAC PROX EDV: 31.1 CM/S
BH CV VAS SMA CELIAC PROX PSV: 106 CM/S
BH CV VAS SMA HEPATIC EDV: 31.1 CM/S
BH CV VAS SMA HEPATIC PSV: 141 CM/S
BH CV VAS SMA IMA PSV: 120 CM/S
BH CV VAS SMA ORIGIN EDV: 33.6 CM/S
BH CV VAS SMA ORIGIN PSV: 154 CM/S
BH CV VAS SMA SMA DIST EDV: 5.27 CM/S
BH CV VAS SMA SMA DIST PSV: 34.1 CM/S
BH CV VAS SMA SMA MID EDV: 18.6 CM/S
BH CV VAS SMA SMA MID PSV: 156 CM/S
BH CV VAS SMA SMA PROX EDV: 32.2 CM/S
BH CV VAS SMA SMA PROX PSV: 167 CM/S
BH CV VAS SMA SPLENIC EDV: 27.7 CM/S
BH CV VAS SMA SPLENIC PSV: 99.5 CM/S
BH CV XLRA - RV BASE: 2.8 CM
BH CV XLRA - RV LENGTH: 5.4 CM
BH CV XLRA - RV MID: 2.21 CM
BH CV XLRA - TDI S': 18 CM/SEC
BILIRUB CONJ SERPL-MCNC: <0.2 MG/DL (ref 0–0.3)
BILIRUB INDIRECT SERPL-MCNC: ABNORMAL MG/DL
BILIRUB SERPL-MCNC: 0.2 MG/DL (ref 0–1.2)
BILIRUB SERPL-MCNC: 0.3 MG/DL (ref 0–1.2)
BILIRUB SERPL-MCNC: <0.2 MG/DL (ref 0–1.2)
BILIRUB UR QL STRIP: NEGATIVE
BUN SERPL-MCNC: 10 MG/DL (ref 6–20)
BUN SERPL-MCNC: 21 MG/DL (ref 6–20)
BUN SERPL-MCNC: 21 MG/DL (ref 6–20)
BUN SERPL-MCNC: 22 MG/DL (ref 6–20)
BUN SERPL-MCNC: 22 MG/DL (ref 6–20)
BUN SERPL-MCNC: 25 MG/DL (ref 6–20)
BUN SERPL-MCNC: 26 MG/DL (ref 6–20)
BUN SERPL-MCNC: 27 MG/DL (ref 6–20)
BUN SERPL-MCNC: 27 MG/DL (ref 6–20)
BUN SERPL-MCNC: 28 MG/DL (ref 6–20)
BUN SERPL-MCNC: 28 MG/DL (ref 6–20)
BUN SERPL-MCNC: 29 MG/DL (ref 6–20)
BUN SERPL-MCNC: 31 MG/DL (ref 6–20)
BUN SERPL-MCNC: 34 MG/DL (ref 6–20)
BUN SERPL-MCNC: 35 MG/DL (ref 6–20)
BUN SERPL-MCNC: 36 MG/DL (ref 6–20)
BUN SERPL-MCNC: 36 MG/DL (ref 6–20)
BUN SERPL-MCNC: 39 MG/DL (ref 6–20)
BUN SERPL-MCNC: 40 MG/DL (ref 6–20)
BUN SERPL-MCNC: 40 MG/DL (ref 6–20)
BUN SERPL-MCNC: 42 MG/DL (ref 6–20)
BUN SERPL-MCNC: 43 MG/DL (ref 6–20)
BUN SERPL-MCNC: 43 MG/DL (ref 6–20)
BUN/CREAT SERPL: 3.6 (ref 7–25)
BUN/CREAT SERPL: 4.5 (ref 7–25)
BUN/CREAT SERPL: 4.7 (ref 7–25)
BUN/CREAT SERPL: 4.8 (ref 7–25)
BUN/CREAT SERPL: 5.1 (ref 7–25)
BUN/CREAT SERPL: 5.2 (ref 7–25)
BUN/CREAT SERPL: 5.2 (ref 7–25)
BUN/CREAT SERPL: 5.3 (ref 7–25)
BUN/CREAT SERPL: 5.3 (ref 7–25)
BUN/CREAT SERPL: 5.4 (ref 7–25)
BUN/CREAT SERPL: 5.4 (ref 7–25)
BUN/CREAT SERPL: 5.5 (ref 7–25)
BUN/CREAT SERPL: 5.7 (ref 7–25)
BUN/CREAT SERPL: 5.7 (ref 7–25)
BUN/CREAT SERPL: 5.8 (ref 7–25)
BUN/CREAT SERPL: 5.8 (ref 7–25)
BUN/CREAT SERPL: 5.9 (ref 7–25)
BUN/CREAT SERPL: 6 (ref 7–25)
BUN/CREAT SERPL: 6.3 (ref 7–25)
BUN/CREAT SERPL: 6.3 (ref 7–25)
BUN/CREAT SERPL: 6.5 (ref 7–25)
BUN/CREAT SERPL: 6.6 (ref 7–25)
C CAYETANENSIS DNA STL QL NAA+NON-PROBE: NOT DETECTED
C COLI+JEJ+UPSA DNA STL QL NAA+NON-PROBE: NOT DETECTED
C DIFF TOX GENS STL QL NAA+PROBE: NEGATIVE
C DIFF TOX GENS STL QL NAA+PROBE: NEGATIVE
C DIFF TOX GENS STL QL NAA+PROBE: POSITIVE
C PNEUM DNA NPH QL NAA+NON-PROBE: NOT DETECTED
CALCIUM SPEC-SCNC: 7.8 MG/DL (ref 8.6–10.5)
CALCIUM SPEC-SCNC: 7.8 MG/DL (ref 8.6–10.5)
CALCIUM SPEC-SCNC: 7.9 MG/DL (ref 8.6–10.5)
CALCIUM SPEC-SCNC: 8 MG/DL (ref 8.6–10.5)
CALCIUM SPEC-SCNC: 8.1 MG/DL (ref 8.6–10.5)
CALCIUM SPEC-SCNC: 8.1 MG/DL (ref 8.6–10.5)
CALCIUM SPEC-SCNC: 8.2 MG/DL (ref 8.6–10.5)
CALCIUM SPEC-SCNC: 8.2 MG/DL (ref 8.6–10.5)
CALCIUM SPEC-SCNC: 8.3 MG/DL (ref 8.6–10.5)
CALCIUM SPEC-SCNC: 8.3 MG/DL (ref 8.6–10.5)
CALCIUM SPEC-SCNC: 8.4 MG/DL (ref 8.6–10.5)
CALCIUM SPEC-SCNC: 8.5 MG/DL (ref 8.6–10.5)
CALCIUM SPEC-SCNC: 8.7 MG/DL (ref 8.6–10.5)
CALCIUM SPEC-SCNC: 8.9 MG/DL (ref 8.6–10.5)
CALCIUM SPEC-SCNC: 9 MG/DL (ref 8.6–10.5)
CALCIUM SPEC-SCNC: 9.1 MG/DL (ref 8.6–10.5)
CALCIUM SPEC-SCNC: 9.4 MG/DL (ref 8.6–10.5)
CALCIUM SPEC-SCNC: 9.5 MG/DL (ref 8.6–10.5)
CALCIUM SPEC-SCNC: 9.5 MG/DL (ref 8.6–10.5)
CANNABINOIDS SERPL QL: NEGATIVE
CARDIOLIPIN IGG SER IA-ACNC: <9 GPL U/ML (ref 0–14)
CARDIOLIPIN IGM SER IA-ACNC: 13 MPL U/ML (ref 0–12)
CHLORIDE SERPL-SCNC: 101 MMOL/L (ref 98–107)
CHLORIDE SERPL-SCNC: 102 MMOL/L (ref 98–107)
CHLORIDE SERPL-SCNC: 103 MMOL/L (ref 98–107)
CHLORIDE SERPL-SCNC: 104 MMOL/L (ref 98–107)
CHLORIDE SERPL-SCNC: 105 MMOL/L (ref 98–107)
CHLORIDE SERPL-SCNC: 105 MMOL/L (ref 98–107)
CHLORIDE SERPL-SCNC: 106 MMOL/L (ref 98–107)
CHLORIDE SERPL-SCNC: 107 MMOL/L (ref 98–107)
CHLORIDE SERPL-SCNC: 110 MMOL/L (ref 98–107)
CHLORIDE SERPL-SCNC: 95 MMOL/L (ref 98–107)
CHLORIDE SERPL-SCNC: 96 MMOL/L (ref 98–107)
CHLORIDE SERPL-SCNC: 97 MMOL/L (ref 98–107)
CHLORIDE SERPL-SCNC: 98 MMOL/L (ref 98–107)
CHLORIDE SERPL-SCNC: 99 MMOL/L (ref 98–107)
CLARITY UR: ABNORMAL
CLARITY UR: CLEAR
CLARITY UR: CLEAR
CO2 SERPL-SCNC: 12.7 MMOL/L (ref 22–29)
CO2 SERPL-SCNC: 13 MMOL/L (ref 22–29)
CO2 SERPL-SCNC: 13.3 MMOL/L (ref 22–29)
CO2 SERPL-SCNC: 13.8 MMOL/L (ref 22–29)
CO2 SERPL-SCNC: 14 MMOL/L (ref 22–29)
CO2 SERPL-SCNC: 14 MMOL/L (ref 22–29)
CO2 SERPL-SCNC: 14.9 MMOL/L (ref 22–29)
CO2 SERPL-SCNC: 15 MMOL/L (ref 22–29)
CO2 SERPL-SCNC: 16.4 MMOL/L (ref 22–29)
CO2 SERPL-SCNC: 16.6 MMOL/L (ref 22–29)
CO2 SERPL-SCNC: 17.2 MMOL/L (ref 22–29)
CO2 SERPL-SCNC: 17.6 MMOL/L (ref 22–29)
CO2 SERPL-SCNC: 17.9 MMOL/L (ref 22–29)
CO2 SERPL-SCNC: 18 MMOL/L (ref 22–29)
CO2 SERPL-SCNC: 18.7 MMOL/L (ref 22–29)
CO2 SERPL-SCNC: 19.1 MMOL/L (ref 22–29)
CO2 SERPL-SCNC: 19.8 MMOL/L (ref 22–29)
CO2 SERPL-SCNC: 20 MMOL/L (ref 22–29)
CO2 SERPL-SCNC: 21 MMOL/L (ref 22–29)
CO2 SERPL-SCNC: 21.8 MMOL/L (ref 22–29)
CO2 SERPL-SCNC: 21.8 MMOL/L (ref 22–29)
CO2 SERPL-SCNC: 9.1 MMOL/L (ref 22–29)
CO2 SERPL-SCNC: 9.6 MMOL/L (ref 22–29)
COCAINE UR QL: NEGATIVE
COLOR UR: ABNORMAL
COLOR UR: ABNORMAL
COLOR UR: YELLOW
CONFIRM APTT/NORMAL: 33.8 SEC (ref 0–47.6)
CORTIS SERPL-MCNC: 18.5 MCG/DL
CREAT SERPL-MCNC: 2.77 MG/DL (ref 0.57–1)
CREAT SERPL-MCNC: 3.98 MG/DL (ref 0.57–1)
CREAT SERPL-MCNC: 4.05 MG/DL (ref 0.57–1)
CREAT SERPL-MCNC: 4.12 MG/DL (ref 0.57–1)
CREAT SERPL-MCNC: 4.31 MG/DL (ref 0.57–1)
CREAT SERPL-MCNC: 4.54 MG/DL (ref 0.57–1)
CREAT SERPL-MCNC: 4.7 MG/DL (ref 0.57–1)
CREAT SERPL-MCNC: 4.87 MG/DL (ref 0.57–1)
CREAT SERPL-MCNC: 5.18 MG/DL (ref 0.57–1)
CREAT SERPL-MCNC: 5.38 MG/DL (ref 0.57–1)
CREAT SERPL-MCNC: 5.51 MG/DL (ref 0.57–1)
CREAT SERPL-MCNC: 6.04 MG/DL (ref 0.57–1)
CREAT SERPL-MCNC: 6.12 MG/DL (ref 0.57–1)
CREAT SERPL-MCNC: 6.21 MG/DL (ref 0.57–1)
CREAT SERPL-MCNC: 6.46 MG/DL (ref 0.57–1)
CREAT SERPL-MCNC: 6.5 MG/DL (ref 0.57–1)
CREAT SERPL-MCNC: 6.51 MG/DL (ref 0.57–1)
CREAT SERPL-MCNC: 6.53 MG/DL (ref 0.57–1)
CREAT SERPL-MCNC: 6.6 MG/DL (ref 0.57–1)
CREAT SERPL-MCNC: 6.61 MG/DL (ref 0.57–1)
CREAT SERPL-MCNC: 6.73 MG/DL (ref 0.57–1)
CREAT SERPL-MCNC: 6.76 MG/DL (ref 0.57–1)
CREAT SERPL-MCNC: 6.78 MG/DL (ref 0.57–1)
CREAT SERPL-MCNC: 6.84 MG/DL (ref 0.57–1)
CREAT SERPL-MCNC: 6.9 MG/DL (ref 0.57–1)
CRP SERPL-MCNC: 2.5 MG/DL (ref 0–0.5)
CRYPTOSP DNA STL QL NAA+NON-PROBE: NOT DETECTED
D DIMER PPP FEU-MCNC: 0.96 MCGFEU/ML (ref 0–0.49)
D-LACTATE SERPL-SCNC: 0.9 MMOL/L (ref 0.5–2)
D-LACTATE SERPL-SCNC: 1.2 MMOL/L (ref 0.5–2)
D-LACTATE SERPL-SCNC: 1.3 MMOL/L (ref 0.5–2)
D-LACTATE SERPL-SCNC: 1.5 MMOL/L (ref 0.5–2)
D-LACTATE SERPL-SCNC: 2.2 MMOL/L (ref 0.5–2)
D-LACTATE SERPL-SCNC: 2.2 MMOL/L (ref 0.5–2)
D-LACTATE SERPL-SCNC: 2.4 MMOL/L (ref 0.5–2)
D-LACTATE SERPL-SCNC: 2.9 MMOL/L (ref 0.5–2)
DEPRECATED RDW RBC AUTO: 41.8 FL (ref 37–54)
DEPRECATED RDW RBC AUTO: 42.3 FL (ref 37–54)
DEPRECATED RDW RBC AUTO: 43.8 FL (ref 37–54)
DEPRECATED RDW RBC AUTO: 44 FL (ref 37–54)
DEPRECATED RDW RBC AUTO: 45 FL (ref 37–54)
DEPRECATED RDW RBC AUTO: 45 FL (ref 37–54)
DEPRECATED RDW RBC AUTO: 45.1 FL (ref 37–54)
DEPRECATED RDW RBC AUTO: 45.4 FL (ref 37–54)
DEPRECATED RDW RBC AUTO: 45.4 FL (ref 37–54)
DEPRECATED RDW RBC AUTO: 45.9 FL (ref 37–54)
DEPRECATED RDW RBC AUTO: 46.1 FL (ref 37–54)
DEPRECATED RDW RBC AUTO: 46.4 FL (ref 37–54)
DEPRECATED RDW RBC AUTO: 46.6 FL (ref 37–54)
DEPRECATED RDW RBC AUTO: 46.9 FL (ref 37–54)
DEPRECATED RDW RBC AUTO: 47.5 FL (ref 37–54)
DEPRECATED RDW RBC AUTO: 47.7 FL (ref 37–54)
DEPRECATED RDW RBC AUTO: 47.8 FL (ref 37–54)
DEPRECATED RDW RBC AUTO: 47.8 FL (ref 37–54)
DEPRECATED RDW RBC AUTO: 48 FL (ref 37–54)
DEPRECATED RDW RBC AUTO: 48.1 FL (ref 37–54)
DEPRECATED RDW RBC AUTO: 48.4 FL (ref 37–54)
DEPRECATED RDW RBC AUTO: 48.5 FL (ref 37–54)
DEPRECATED RDW RBC AUTO: 48.5 FL (ref 37–54)
DEPRECATED RDW RBC AUTO: 48.6 FL (ref 37–54)
DEPRECATED RDW RBC AUTO: 48.9 FL (ref 37–54)
E HISTOLYT DNA STL QL NAA+NON-PROBE: NOT DETECTED
EAEC PAA PLAS AGGR+AATA ST NAA+NON-PRB: NOT DETECTED
EC STX1+STX2 GENES STL QL NAA+NON-PROBE: NOT DETECTED
EGFRCR SERPLBLD CKD-EPI 2021: 10.3 ML/MIN/1.73
EGFRCR SERPLBLD CKD-EPI 2021: 10.8 ML/MIN/1.73
EGFRCR SERPLBLD CKD-EPI 2021: 11.3 ML/MIN/1.73
EGFRCR SERPLBLD CKD-EPI 2021: 12 ML/MIN/1.73
EGFRCR SERPLBLD CKD-EPI 2021: 12.7 ML/MIN/1.73
EGFRCR SERPLBLD CKD-EPI 2021: 12.9 ML/MIN/1.73
EGFRCR SERPLBLD CKD-EPI 2021: 13.2 ML/MIN/1.73
EGFRCR SERPLBLD CKD-EPI 2021: 20.4 ML/MIN/1.73
EGFRCR SERPLBLD CKD-EPI 2021: 6.8 ML/MIN/1.73
EGFRCR SERPLBLD CKD-EPI 2021: 6.8 ML/MIN/1.73
EGFRCR SERPLBLD CKD-EPI 2021: 6.9 ML/MIN/1.73
EGFRCR SERPLBLD CKD-EPI 2021: 6.9 ML/MIN/1.73
EGFRCR SERPLBLD CKD-EPI 2021: 7 ML/MIN/1.73
EGFRCR SERPLBLD CKD-EPI 2021: 7.1 ML/MIN/1.73
EGFRCR SERPLBLD CKD-EPI 2021: 7.2 ML/MIN/1.73
EGFRCR SERPLBLD CKD-EPI 2021: 7.3 ML/MIN/1.73
EGFRCR SERPLBLD CKD-EPI 2021: 7.7 ML/MIN/1.73
EGFRCR SERPLBLD CKD-EPI 2021: 7.8 ML/MIN/1.73
EGFRCR SERPLBLD CKD-EPI 2021: 8.9 ML/MIN/1.73
EGFRCR SERPLBLD CKD-EPI 2021: 9.1 ML/MIN/1.73
EGFRCR SERPLBLD CKD-EPI 2021: 9.6 ML/MIN/1.73
EOSINOPHIL # BLD AUTO: 0.12 10*3/MM3 (ref 0–0.4)
EOSINOPHIL # BLD AUTO: 0.18 10*3/MM3 (ref 0–0.4)
EOSINOPHIL # BLD AUTO: 0.2 10*3/MM3 (ref 0–0.4)
EOSINOPHIL # BLD AUTO: 0.22 10*3/MM3 (ref 0–0.4)
EOSINOPHIL # BLD AUTO: 0.24 10*3/MM3 (ref 0–0.4)
EOSINOPHIL # BLD AUTO: 0.27 10*3/MM3 (ref 0–0.4)
EOSINOPHIL # BLD AUTO: 0.29 10*3/MM3 (ref 0–0.4)
EOSINOPHIL # BLD AUTO: 0.29 10*3/MM3 (ref 0–0.4)
EOSINOPHIL # BLD AUTO: 0.32 10*3/MM3 (ref 0–0.4)
EOSINOPHIL # BLD AUTO: 0.37 10*3/MM3 (ref 0–0.4)
EOSINOPHIL # BLD AUTO: 0.5 10*3/MM3 (ref 0–0.4)
EOSINOPHIL # BLD AUTO: 0.51 10*3/MM3 (ref 0–0.4)
EOSINOPHIL # BLD AUTO: 0.58 10*3/MM3 (ref 0–0.4)
EOSINOPHIL # BLD AUTO: 0.6 10*3/MM3 (ref 0–0.4)
EOSINOPHIL # BLD AUTO: 0.61 10*3/MM3 (ref 0–0.4)
EOSINOPHIL # BLD AUTO: 0.7 10*3/MM3 (ref 0–0.4)
EOSINOPHIL # BLD AUTO: 0.75 10*3/MM3 (ref 0–0.4)
EOSINOPHIL # BLD AUTO: 0.77 10*3/MM3 (ref 0–0.4)
EOSINOPHIL # BLD AUTO: 0.83 10*3/MM3 (ref 0–0.4)
EOSINOPHIL # BLD AUTO: 1.03 10*3/MM3 (ref 0–0.4)
EOSINOPHIL # BLD MANUAL: 0.32 10*3/MM3 (ref 0–0.4)
EOSINOPHIL NFR BLD AUTO: 0.7 % (ref 0.3–6.2)
EOSINOPHIL NFR BLD AUTO: 1 % (ref 0.3–6.2)
EOSINOPHIL NFR BLD AUTO: 1.7 % (ref 0.3–6.2)
EOSINOPHIL NFR BLD AUTO: 2.2 % (ref 0.3–6.2)
EOSINOPHIL NFR BLD AUTO: 2.6 % (ref 0.3–6.2)
EOSINOPHIL NFR BLD AUTO: 2.8 % (ref 0.3–6.2)
EOSINOPHIL NFR BLD AUTO: 2.9 % (ref 0.3–6.2)
EOSINOPHIL NFR BLD AUTO: 3 % (ref 0.3–6.2)
EOSINOPHIL NFR BLD AUTO: 3 % (ref 0.3–6.2)
EOSINOPHIL NFR BLD AUTO: 3.4 % (ref 0.3–6.2)
EOSINOPHIL NFR BLD AUTO: 3.6 % (ref 0.3–6.2)
EOSINOPHIL NFR BLD AUTO: 3.8 % (ref 0.3–6.2)
EOSINOPHIL NFR BLD AUTO: 3.9 % (ref 0.3–6.2)
EOSINOPHIL NFR BLD AUTO: 4 % (ref 0.3–6.2)
EOSINOPHIL NFR BLD AUTO: 5 % (ref 0.3–6.2)
EOSINOPHIL NFR BLD AUTO: 5.5 % (ref 0.3–6.2)
EOSINOPHIL NFR BLD AUTO: 5.7 % (ref 0.3–6.2)
EOSINOPHIL NFR BLD AUTO: 5.9 % (ref 0.3–6.2)
EOSINOPHIL NFR BLD AUTO: 7.5 % (ref 0.3–6.2)
EOSINOPHIL NFR BLD AUTO: 7.7 % (ref 0.3–6.2)
EOSINOPHIL NFR BLD MANUAL: 1 % (ref 0.3–6.2)
EPEC EAE GENE STL QL NAA+NON-PROBE: NOT DETECTED
ERYTHROCYTE [DISTWIDTH] IN BLOOD BY AUTOMATED COUNT: 12.9 % (ref 12.3–15.4)
ERYTHROCYTE [DISTWIDTH] IN BLOOD BY AUTOMATED COUNT: 13 % (ref 12.3–15.4)
ERYTHROCYTE [DISTWIDTH] IN BLOOD BY AUTOMATED COUNT: 13 % (ref 12.3–15.4)
ERYTHROCYTE [DISTWIDTH] IN BLOOD BY AUTOMATED COUNT: 13.1 % (ref 12.3–15.4)
ERYTHROCYTE [DISTWIDTH] IN BLOOD BY AUTOMATED COUNT: 13.2 % (ref 12.3–15.4)
ERYTHROCYTE [DISTWIDTH] IN BLOOD BY AUTOMATED COUNT: 13.4 % (ref 12.3–15.4)
ERYTHROCYTE [DISTWIDTH] IN BLOOD BY AUTOMATED COUNT: 13.5 % (ref 12.3–15.4)
ERYTHROCYTE [DISTWIDTH] IN BLOOD BY AUTOMATED COUNT: 13.6 % (ref 12.3–15.4)
ERYTHROCYTE [DISTWIDTH] IN BLOOD BY AUTOMATED COUNT: 13.7 % (ref 12.3–15.4)
ERYTHROCYTE [DISTWIDTH] IN BLOOD BY AUTOMATED COUNT: 13.7 % (ref 12.3–15.4)
ERYTHROCYTE [DISTWIDTH] IN BLOOD BY AUTOMATED COUNT: 13.8 % (ref 12.3–15.4)
ERYTHROCYTE [DISTWIDTH] IN BLOOD BY AUTOMATED COUNT: 13.9 % (ref 12.3–15.4)
ERYTHROCYTE [DISTWIDTH] IN BLOOD BY AUTOMATED COUNT: 13.9 % (ref 12.3–15.4)
ERYTHROCYTE [DISTWIDTH] IN BLOOD BY AUTOMATED COUNT: 14 % (ref 12.3–15.4)
ERYTHROCYTE [DISTWIDTH] IN BLOOD BY AUTOMATED COUNT: 14.1 % (ref 12.3–15.4)
ERYTHROCYTE [DISTWIDTH] IN BLOOD BY AUTOMATED COUNT: 14.1 % (ref 12.3–15.4)
ERYTHROCYTE [SEDIMENTATION RATE] IN BLOOD: 104 MM/HR (ref 0–20)
ETEC LTA+ST1A+ST1B TOX ST NAA+NON-PROBE: NOT DETECTED
F5 GENE MUT ANL BLD/T: NORMAL
FACTOR II, DNA ANALYSIS: NORMAL
FERRITIN SERPL-MCNC: 453 NG/ML (ref 13–150)
FLUAV SUBTYP SPEC NAA+PROBE: NOT DETECTED
FLUBV RNA ISLT QL NAA+PROBE: NOT DETECTED
FOLATE SERPL-MCNC: >20 NG/ML (ref 4.78–24.2)
G LAMBLIA DNA STL QL NAA+NON-PROBE: NOT DETECTED
GFR SERPL CREATININE-BSD FRML MDRD: 7 ML/MIN/1.73
GFR SERPL CREATININE-BSD FRML MDRD: 7 ML/MIN/1.73
GLOBULIN UR ELPH-MCNC: 2.6 GM/DL
GLOBULIN UR ELPH-MCNC: 2.7 GM/DL
GLOBULIN UR ELPH-MCNC: 2.7 GM/DL
GLOBULIN UR ELPH-MCNC: 2.9 GM/DL
GLOBULIN UR ELPH-MCNC: 2.9 GM/DL
GLOBULIN UR ELPH-MCNC: 3.1 GM/DL
GLOBULIN UR ELPH-MCNC: 3.5 GM/DL
GLOBULIN UR ELPH-MCNC: 3.7 GM/DL
GLUCOSE BLDC GLUCOMTR-MCNC: 100 MG/DL (ref 70–130)
GLUCOSE BLDC GLUCOMTR-MCNC: 100 MG/DL (ref 70–130)
GLUCOSE BLDC GLUCOMTR-MCNC: 102 MG/DL (ref 70–130)
GLUCOSE BLDC GLUCOMTR-MCNC: 103 MG/DL (ref 70–130)
GLUCOSE BLDC GLUCOMTR-MCNC: 104 MG/DL (ref 70–130)
GLUCOSE BLDC GLUCOMTR-MCNC: 105 MG/DL (ref 70–130)
GLUCOSE BLDC GLUCOMTR-MCNC: 106 MG/DL (ref 70–130)
GLUCOSE BLDC GLUCOMTR-MCNC: 108 MG/DL (ref 70–130)
GLUCOSE BLDC GLUCOMTR-MCNC: 108 MG/DL (ref 70–130)
GLUCOSE BLDC GLUCOMTR-MCNC: 120 MG/DL (ref 70–130)
GLUCOSE BLDC GLUCOMTR-MCNC: 121 MG/DL (ref 70–130)
GLUCOSE BLDC GLUCOMTR-MCNC: 126 MG/DL (ref 70–130)
GLUCOSE BLDC GLUCOMTR-MCNC: 126 MG/DL (ref 70–130)
GLUCOSE BLDC GLUCOMTR-MCNC: 130 MG/DL (ref 70–130)
GLUCOSE BLDC GLUCOMTR-MCNC: 134 MG/DL (ref 70–130)
GLUCOSE BLDC GLUCOMTR-MCNC: 78 MG/DL (ref 70–130)
GLUCOSE BLDC GLUCOMTR-MCNC: 88 MG/DL (ref 70–130)
GLUCOSE BLDC GLUCOMTR-MCNC: 90 MG/DL (ref 70–130)
GLUCOSE BLDC GLUCOMTR-MCNC: 93 MG/DL (ref 70–130)
GLUCOSE BLDC GLUCOMTR-MCNC: 95 MG/DL (ref 70–130)
GLUCOSE SERPL-MCNC: 101 MG/DL (ref 65–99)
GLUCOSE SERPL-MCNC: 101 MG/DL (ref 65–99)
GLUCOSE SERPL-MCNC: 103 MG/DL (ref 65–99)
GLUCOSE SERPL-MCNC: 104 MG/DL (ref 65–99)
GLUCOSE SERPL-MCNC: 105 MG/DL (ref 65–99)
GLUCOSE SERPL-MCNC: 106 MG/DL (ref 65–99)
GLUCOSE SERPL-MCNC: 106 MG/DL (ref 65–99)
GLUCOSE SERPL-MCNC: 107 MG/DL (ref 65–99)
GLUCOSE SERPL-MCNC: 110 MG/DL (ref 65–99)
GLUCOSE SERPL-MCNC: 111 MG/DL (ref 65–99)
GLUCOSE SERPL-MCNC: 114 MG/DL (ref 65–99)
GLUCOSE SERPL-MCNC: 114 MG/DL (ref 65–99)
GLUCOSE SERPL-MCNC: 130 MG/DL (ref 65–99)
GLUCOSE SERPL-MCNC: 131 MG/DL (ref 65–99)
GLUCOSE SERPL-MCNC: 132 MG/DL (ref 65–99)
GLUCOSE SERPL-MCNC: 133 MG/DL (ref 65–99)
GLUCOSE SERPL-MCNC: 135 MG/DL (ref 65–99)
GLUCOSE SERPL-MCNC: 136 MG/DL (ref 65–99)
GLUCOSE SERPL-MCNC: 139 MG/DL (ref 65–99)
GLUCOSE SERPL-MCNC: 140 MG/DL (ref 65–99)
GLUCOSE SERPL-MCNC: 158 MG/DL (ref 65–99)
GLUCOSE SERPL-MCNC: 79 MG/DL (ref 65–99)
GLUCOSE SERPL-MCNC: 90 MG/DL (ref 65–99)
GLUCOSE SERPL-MCNC: 96 MG/DL (ref 65–99)
GLUCOSE SERPL-MCNC: 99 MG/DL (ref 65–99)
GLUCOSE UR STRIP-MCNC: NEGATIVE MG/DL
HADV DNA SPEC NAA+PROBE: NOT DETECTED
HBA1C MFR BLD: 5.2 % (ref 4.8–5.6)
HBV SURFACE AG SERPL QL IA: NORMAL
HCO3 BLDA-SCNC: 14.1 MMOL/L (ref 22–28)
HCOV 229E RNA SPEC QL NAA+PROBE: NOT DETECTED
HCOV HKU1 RNA SPEC QL NAA+PROBE: NOT DETECTED
HCOV NL63 RNA SPEC QL NAA+PROBE: NOT DETECTED
HCOV OC43 RNA SPEC QL NAA+PROBE: NOT DETECTED
HCT VFR BLD AUTO: 24.9 % (ref 34–46.6)
HCT VFR BLD AUTO: 27.8 % (ref 34–46.6)
HCT VFR BLD AUTO: 28.3 % (ref 34–46.6)
HCT VFR BLD AUTO: 28.6 % (ref 34–46.6)
HCT VFR BLD AUTO: 29 % (ref 34–46.6)
HCT VFR BLD AUTO: 29.1 % (ref 34–46.6)
HCT VFR BLD AUTO: 29.3 % (ref 34–46.6)
HCT VFR BLD AUTO: 30.2 % (ref 34–46.6)
HCT VFR BLD AUTO: 30.6 % (ref 34–46.6)
HCT VFR BLD AUTO: 30.7 % (ref 34–46.6)
HCT VFR BLD AUTO: 30.9 % (ref 34–46.6)
HCT VFR BLD AUTO: 30.9 % (ref 34–46.6)
HCT VFR BLD AUTO: 31.1 % (ref 34–46.6)
HCT VFR BLD AUTO: 31.5 % (ref 34–46.6)
HCT VFR BLD AUTO: 31.8 % (ref 34–46.6)
HCT VFR BLD AUTO: 32.7 % (ref 34–46.6)
HCT VFR BLD AUTO: 32.7 % (ref 34–46.6)
HCT VFR BLD AUTO: 33.3 % (ref 34–46.6)
HCT VFR BLD AUTO: 33.4 % (ref 34–46.6)
HCT VFR BLD AUTO: 34.6 % (ref 34–46.6)
HCT VFR BLD AUTO: 34.6 % (ref 34–46.6)
HCT VFR BLD AUTO: 36.2 % (ref 34–46.6)
HCT VFR BLD AUTO: 37 % (ref 34–46.6)
HCT VFR BLD AUTO: 40.8 % (ref 34–46.6)
HCT VFR BLD AUTO: 41.1 % (ref 34–46.6)
HGB BLD-MCNC: 10 G/DL (ref 12–15.9)
HGB BLD-MCNC: 10.4 G/DL (ref 12–15.9)
HGB BLD-MCNC: 10.5 G/DL (ref 12–15.9)
HGB BLD-MCNC: 10.6 G/DL (ref 12–15.9)
HGB BLD-MCNC: 10.6 G/DL (ref 12–15.9)
HGB BLD-MCNC: 10.8 G/DL (ref 12–15.9)
HGB BLD-MCNC: 11 G/DL (ref 12–15.9)
HGB BLD-MCNC: 11.3 G/DL (ref 12–15.9)
HGB BLD-MCNC: 11.6 G/DL (ref 12–15.9)
HGB BLD-MCNC: 12.3 G/DL (ref 12–15.9)
HGB BLD-MCNC: 13.4 G/DL (ref 12–15.9)
HGB BLD-MCNC: 13.5 G/DL (ref 12–15.9)
HGB BLD-MCNC: 8.4 G/DL (ref 12–15.9)
HGB BLD-MCNC: 9.1 G/DL (ref 12–15.9)
HGB BLD-MCNC: 9.3 G/DL (ref 12–15.9)
HGB BLD-MCNC: 9.4 G/DL (ref 12–15.9)
HGB BLD-MCNC: 9.5 G/DL (ref 12–15.9)
HGB BLD-MCNC: 9.5 G/DL (ref 12–15.9)
HGB BLD-MCNC: 9.7 G/DL (ref 12–15.9)
HGB BLD-MCNC: 9.8 G/DL (ref 12–15.9)
HGB BLD-MCNC: 9.9 G/DL (ref 12–15.9)
HGB BLD-MCNC: 9.9 G/DL (ref 12–15.9)
HGB UR QL STRIP.AUTO: ABNORMAL
HGB UR QL STRIP.AUTO: NEGATIVE
HGB UR QL STRIP.AUTO: NEGATIVE
HMPV RNA NPH QL NAA+NON-PROBE: NOT DETECTED
HOLD SPECIMEN: NORMAL
HPIV1 RNA ISLT QL NAA+PROBE: NOT DETECTED
HPIV2 RNA SPEC QL NAA+PROBE: NOT DETECTED
HPIV3 RNA NPH QL NAA+PROBE: NOT DETECTED
HPIV4 P GENE NPH QL NAA+PROBE: NOT DETECTED
HYALINE CASTS UR QL AUTO: ABNORMAL /LPF
IMM GRANULOCYTES # BLD AUTO: 0.06 10*3/MM3 (ref 0–0.05)
IMM GRANULOCYTES # BLD AUTO: 0.07 10*3/MM3 (ref 0–0.05)
IMM GRANULOCYTES # BLD AUTO: 0.08 10*3/MM3 (ref 0–0.05)
IMM GRANULOCYTES # BLD AUTO: 0.08 10*3/MM3 (ref 0–0.05)
IMM GRANULOCYTES # BLD AUTO: 0.09 10*3/MM3 (ref 0–0.05)
IMM GRANULOCYTES # BLD AUTO: 0.09 10*3/MM3 (ref 0–0.05)
IMM GRANULOCYTES # BLD AUTO: 0.11 10*3/MM3 (ref 0–0.05)
IMM GRANULOCYTES # BLD AUTO: 0.13 10*3/MM3 (ref 0–0.05)
IMM GRANULOCYTES # BLD AUTO: 0.13 10*3/MM3 (ref 0–0.05)
IMM GRANULOCYTES # BLD AUTO: 0.14 10*3/MM3 (ref 0–0.05)
IMM GRANULOCYTES # BLD AUTO: 0.15 10*3/MM3 (ref 0–0.05)
IMM GRANULOCYTES # BLD AUTO: 0.17 10*3/MM3 (ref 0–0.05)
IMM GRANULOCYTES # BLD AUTO: 0.2 10*3/MM3 (ref 0–0.05)
IMM GRANULOCYTES # BLD AUTO: 0.22 10*3/MM3 (ref 0–0.05)
IMM GRANULOCYTES # BLD AUTO: 0.23 10*3/MM3 (ref 0–0.05)
IMM GRANULOCYTES # BLD AUTO: 0.36 10*3/MM3 (ref 0–0.05)
IMM GRANULOCYTES # BLD AUTO: 0.36 10*3/MM3 (ref 0–0.05)
IMM GRANULOCYTES # BLD AUTO: 0.37 10*3/MM3 (ref 0–0.05)
IMM GRANULOCYTES # BLD AUTO: 0.39 10*3/MM3 (ref 0–0.05)
IMM GRANULOCYTES # BLD AUTO: 0.4 10*3/MM3 (ref 0–0.05)
IMM GRANULOCYTES NFR BLD AUTO: 0.7 % (ref 0–0.5)
IMM GRANULOCYTES NFR BLD AUTO: 0.8 % (ref 0–0.5)
IMM GRANULOCYTES NFR BLD AUTO: 0.8 % (ref 0–0.5)
IMM GRANULOCYTES NFR BLD AUTO: 0.9 % (ref 0–0.5)
IMM GRANULOCYTES NFR BLD AUTO: 1 % (ref 0–0.5)
IMM GRANULOCYTES NFR BLD AUTO: 1 % (ref 0–0.5)
IMM GRANULOCYTES NFR BLD AUTO: 1.2 % (ref 0–0.5)
IMM GRANULOCYTES NFR BLD AUTO: 1.3 % (ref 0–0.5)
IMM GRANULOCYTES NFR BLD AUTO: 1.4 % (ref 0–0.5)
IMM GRANULOCYTES NFR BLD AUTO: 1.6 % (ref 0–0.5)
IMM GRANULOCYTES NFR BLD AUTO: 1.7 % (ref 0–0.5)
IMM GRANULOCYTES NFR BLD AUTO: 1.8 % (ref 0–0.5)
IMM GRANULOCYTES NFR BLD AUTO: 2 % (ref 0–0.5)
IMM GRANULOCYTES NFR BLD AUTO: 2.1 % (ref 0–0.5)
IMM GRANULOCYTES NFR BLD AUTO: 2.2 % (ref 0–0.5)
IMM GRANULOCYTES NFR BLD AUTO: 3.2 % (ref 0–0.5)
INR PPP: 1.11 (ref 0.9–1.1)
INR PPP: 1.11 (ref 0.9–1.1)
INR PPP: 1.22 (ref 0.9–1.1)
INR PPP: 1.33 (ref 0.9–1.1)
IRON 24H UR-MRATE: 82 MCG/DL (ref 37–145)
IRON SATN MFR SERPL: 29 % (ref 20–50)
KETONES UR QL STRIP: ABNORMAL
KETONES UR QL STRIP: ABNORMAL
KETONES UR QL STRIP: NEGATIVE
LA 2 SCREEN W REFLEX-IMP: NORMAL
LDH SERPL-CCNC: 128 U/L (ref 135–214)
LEFT ATRIUM VOLUME INDEX: 16.1 ML/M2
LEUKOCYTE ESTERASE UR QL STRIP.AUTO: ABNORMAL
LEUKOCYTE ESTERASE UR QL STRIP.AUTO: NEGATIVE
LIPASE SERPL-CCNC: 29 U/L (ref 13–60)
LIPASE SERPL-CCNC: 45 U/L (ref 13–60)
LIPASE SERPL-CCNC: 47 U/L (ref 13–60)
LYMPHOCYTES # BLD AUTO: 1.26 10*3/MM3 (ref 0.7–3.1)
LYMPHOCYTES # BLD AUTO: 1.56 10*3/MM3 (ref 0.7–3.1)
LYMPHOCYTES # BLD AUTO: 1.86 10*3/MM3 (ref 0.7–3.1)
LYMPHOCYTES # BLD AUTO: 2.12 10*3/MM3 (ref 0.7–3.1)
LYMPHOCYTES # BLD AUTO: 2.14 10*3/MM3 (ref 0.7–3.1)
LYMPHOCYTES # BLD AUTO: 2.27 10*3/MM3 (ref 0.7–3.1)
LYMPHOCYTES # BLD AUTO: 2.3 10*3/MM3 (ref 0.7–3.1)
LYMPHOCYTES # BLD AUTO: 2.37 10*3/MM3 (ref 0.7–3.1)
LYMPHOCYTES # BLD AUTO: 2.44 10*3/MM3 (ref 0.7–3.1)
LYMPHOCYTES # BLD AUTO: 2.68 10*3/MM3 (ref 0.7–3.1)
LYMPHOCYTES # BLD AUTO: 2.7 10*3/MM3 (ref 0.7–3.1)
LYMPHOCYTES # BLD AUTO: 3.03 10*3/MM3 (ref 0.7–3.1)
LYMPHOCYTES # BLD AUTO: 3.12 10*3/MM3 (ref 0.7–3.1)
LYMPHOCYTES # BLD AUTO: 3.12 10*3/MM3 (ref 0.7–3.1)
LYMPHOCYTES # BLD AUTO: 3.2 10*3/MM3 (ref 0.7–3.1)
LYMPHOCYTES # BLD AUTO: 3.21 10*3/MM3 (ref 0.7–3.1)
LYMPHOCYTES # BLD AUTO: 3.45 10*3/MM3 (ref 0.7–3.1)
LYMPHOCYTES # BLD AUTO: 3.47 10*3/MM3 (ref 0.7–3.1)
LYMPHOCYTES # BLD AUTO: 3.92 10*3/MM3 (ref 0.7–3.1)
LYMPHOCYTES # BLD AUTO: 4.43 10*3/MM3 (ref 0.7–3.1)
LYMPHOCYTES # BLD MANUAL: 2.55 10*3/MM3 (ref 0.7–3.1)
LYMPHOCYTES # BLD MANUAL: 4.64 10*3/MM3 (ref 0.7–3.1)
LYMPHOCYTES NFR BLD AUTO: 10.4 % (ref 19.6–45.3)
LYMPHOCYTES NFR BLD AUTO: 18.7 % (ref 19.6–45.3)
LYMPHOCYTES NFR BLD AUTO: 19 % (ref 19.6–45.3)
LYMPHOCYTES NFR BLD AUTO: 20.1 % (ref 19.6–45.3)
LYMPHOCYTES NFR BLD AUTO: 21.7 % (ref 19.6–45.3)
LYMPHOCYTES NFR BLD AUTO: 22.4 % (ref 19.6–45.3)
LYMPHOCYTES NFR BLD AUTO: 22.6 % (ref 19.6–45.3)
LYMPHOCYTES NFR BLD AUTO: 22.7 % (ref 19.6–45.3)
LYMPHOCYTES NFR BLD AUTO: 22.7 % (ref 19.6–45.3)
LYMPHOCYTES NFR BLD AUTO: 23.8 % (ref 19.6–45.3)
LYMPHOCYTES NFR BLD AUTO: 24.2 % (ref 19.6–45.3)
LYMPHOCYTES NFR BLD AUTO: 24.7 % (ref 19.6–45.3)
LYMPHOCYTES NFR BLD AUTO: 24.8 % (ref 19.6–45.3)
LYMPHOCYTES NFR BLD AUTO: 24.9 % (ref 19.6–45.3)
LYMPHOCYTES NFR BLD AUTO: 25.3 % (ref 19.6–45.3)
LYMPHOCYTES NFR BLD AUTO: 27.2 % (ref 19.6–45.3)
LYMPHOCYTES NFR BLD AUTO: 28.4 % (ref 19.6–45.3)
LYMPHOCYTES NFR BLD AUTO: 29.5 % (ref 19.6–45.3)
LYMPHOCYTES NFR BLD AUTO: 32 % (ref 19.6–45.3)
LYMPHOCYTES NFR BLD AUTO: 5.5 % (ref 19.6–45.3)
LYMPHOCYTES NFR BLD MANUAL: 3 % (ref 5–12)
LYMPHOCYTES NFR BLD MANUAL: 5 % (ref 5–12)
M PNEUMO IGG SER IA-ACNC: NOT DETECTED
MAGNESIUM SERPL-MCNC: 1.4 MG/DL (ref 1.6–2.6)
MAGNESIUM SERPL-MCNC: 1.5 MG/DL (ref 1.6–2.6)
MAGNESIUM SERPL-MCNC: 1.5 MG/DL (ref 1.6–2.6)
MAGNESIUM SERPL-MCNC: 1.6 MG/DL (ref 1.6–2.6)
MAGNESIUM SERPL-MCNC: 1.6 MG/DL (ref 1.6–2.6)
MAGNESIUM SERPL-MCNC: 1.7 MG/DL (ref 1.6–2.6)
MAGNESIUM SERPL-MCNC: 1.7 MG/DL (ref 1.6–2.6)
MAGNESIUM SERPL-MCNC: 1.8 MG/DL (ref 1.6–2.6)
MAGNESIUM SERPL-MCNC: 2.1 MG/DL (ref 1.6–2.6)
MAGNESIUM SERPL-MCNC: 2.2 MG/DL (ref 1.6–2.6)
MAGNESIUM SERPL-MCNC: 2.2 MG/DL (ref 1.6–2.6)
MAGNESIUM SERPL-MCNC: 2.7 MG/DL (ref 1.6–2.6)
MAXIMAL PREDICTED HEART RATE: 171 BPM
MCH RBC QN AUTO: 30.2 PG (ref 26.6–33)
MCH RBC QN AUTO: 30.3 PG (ref 26.6–33)
MCH RBC QN AUTO: 30.3 PG (ref 26.6–33)
MCH RBC QN AUTO: 30.4 PG (ref 26.6–33)
MCH RBC QN AUTO: 30.6 PG (ref 26.6–33)
MCH RBC QN AUTO: 30.6 PG (ref 26.6–33)
MCH RBC QN AUTO: 30.7 PG (ref 26.6–33)
MCH RBC QN AUTO: 30.8 PG (ref 26.6–33)
MCH RBC QN AUTO: 30.9 PG (ref 26.6–33)
MCH RBC QN AUTO: 30.9 PG (ref 26.6–33)
MCH RBC QN AUTO: 31 PG (ref 26.6–33)
MCH RBC QN AUTO: 31 PG (ref 26.6–33)
MCH RBC QN AUTO: 31.1 PG (ref 26.6–33)
MCH RBC QN AUTO: 31.2 PG (ref 26.6–33)
MCH RBC QN AUTO: 31.3 PG (ref 26.6–33)
MCHC RBC AUTO-ENTMCNC: 31.1 G/DL (ref 31.5–35.7)
MCHC RBC AUTO-ENTMCNC: 31.4 G/DL (ref 31.5–35.7)
MCHC RBC AUTO-ENTMCNC: 31.7 G/DL (ref 31.5–35.7)
MCHC RBC AUTO-ENTMCNC: 31.8 G/DL (ref 31.5–35.7)
MCHC RBC AUTO-ENTMCNC: 32 G/DL (ref 31.5–35.7)
MCHC RBC AUTO-ENTMCNC: 32 G/DL (ref 31.5–35.7)
MCHC RBC AUTO-ENTMCNC: 32.2 G/DL (ref 31.5–35.7)
MCHC RBC AUTO-ENTMCNC: 32.4 G/DL (ref 31.5–35.7)
MCHC RBC AUTO-ENTMCNC: 32.6 G/DL (ref 31.5–35.7)
MCHC RBC AUTO-ENTMCNC: 32.7 G/DL (ref 31.5–35.7)
MCHC RBC AUTO-ENTMCNC: 32.8 G/DL (ref 31.5–35.7)
MCHC RBC AUTO-ENTMCNC: 33 G/DL (ref 31.5–35.7)
MCHC RBC AUTO-ENTMCNC: 33 G/DL (ref 31.5–35.7)
MCHC RBC AUTO-ENTMCNC: 33.2 G/DL (ref 31.5–35.7)
MCHC RBC AUTO-ENTMCNC: 33.2 G/DL (ref 31.5–35.7)
MCHC RBC AUTO-ENTMCNC: 33.7 G/DL (ref 31.5–35.7)
MCHC RBC AUTO-ENTMCNC: 33.9 G/DL (ref 31.5–35.7)
MCHC RBC AUTO-ENTMCNC: 34.2 G/DL (ref 31.5–35.7)
MCV RBC AUTO: 90.8 FL (ref 79–97)
MCV RBC AUTO: 91.2 FL (ref 79–97)
MCV RBC AUTO: 91.3 FL (ref 79–97)
MCV RBC AUTO: 92.3 FL (ref 79–97)
MCV RBC AUTO: 93 FL (ref 79–97)
MCV RBC AUTO: 93.7 FL (ref 79–97)
MCV RBC AUTO: 93.8 FL (ref 79–97)
MCV RBC AUTO: 93.9 FL (ref 79–97)
MCV RBC AUTO: 93.9 FL (ref 79–97)
MCV RBC AUTO: 94.3 FL (ref 79–97)
MCV RBC AUTO: 94.5 FL (ref 79–97)
MCV RBC AUTO: 94.5 FL (ref 79–97)
MCV RBC AUTO: 95.1 FL (ref 79–97)
MCV RBC AUTO: 95.4 FL (ref 79–97)
MCV RBC AUTO: 95.6 FL (ref 79–97)
MCV RBC AUTO: 95.9 FL (ref 79–97)
MCV RBC AUTO: 96 FL (ref 79–97)
MCV RBC AUTO: 96.4 FL (ref 79–97)
MCV RBC AUTO: 96.7 FL (ref 79–97)
MCV RBC AUTO: 96.9 FL (ref 79–97)
MCV RBC AUTO: 97.4 FL (ref 79–97)
MCV RBC AUTO: 97.6 FL (ref 79–97)
MCV RBC AUTO: 97.7 FL (ref 79–97)
METAMYELOCYTES NFR BLD MANUAL: 1 % (ref 0–0)
METHADONE UR QL SCN: NEGATIVE
MODALITY: ABNORMAL
MONOCYTES # BLD AUTO: 0.37 10*3/MM3 (ref 0.1–0.9)
MONOCYTES # BLD AUTO: 0.43 10*3/MM3 (ref 0.1–0.9)
MONOCYTES # BLD AUTO: 0.43 10*3/MM3 (ref 0.1–0.9)
MONOCYTES # BLD AUTO: 0.44 10*3/MM3 (ref 0.1–0.9)
MONOCYTES # BLD AUTO: 0.46 10*3/MM3 (ref 0.1–0.9)
MONOCYTES # BLD AUTO: 0.49 10*3/MM3 (ref 0.1–0.9)
MONOCYTES # BLD AUTO: 0.5 10*3/MM3 (ref 0.1–0.9)
MONOCYTES # BLD AUTO: 0.51 10*3/MM3 (ref 0.1–0.9)
MONOCYTES # BLD AUTO: 0.51 10*3/MM3 (ref 0.1–0.9)
MONOCYTES # BLD AUTO: 0.55 10*3/MM3 (ref 0.1–0.9)
MONOCYTES # BLD AUTO: 0.57 10*3/MM3 (ref 0.1–0.9)
MONOCYTES # BLD AUTO: 0.59 10*3/MM3 (ref 0.1–0.9)
MONOCYTES # BLD AUTO: 0.62 10*3/MM3 (ref 0.1–0.9)
MONOCYTES # BLD AUTO: 0.66 10*3/MM3 (ref 0.1–0.9)
MONOCYTES # BLD AUTO: 0.7 10*3/MM3 (ref 0.1–0.9)
MONOCYTES # BLD AUTO: 0.73 10*3/MM3 (ref 0.1–0.9)
MONOCYTES # BLD AUTO: 0.77 10*3/MM3 (ref 0.1–0.9)
MONOCYTES # BLD AUTO: 0.91 10*3/MM3 (ref 0.1–0.9)
MONOCYTES # BLD AUTO: 0.94 10*3/MM3 (ref 0.1–0.9)
MONOCYTES # BLD AUTO: 0.97 10*3/MM3 (ref 0.1–0.9)
MONOCYTES # BLD: 0.95 10*3/MM3 (ref 0.1–0.9)
MONOCYTES # BLD: 1.78 10*3/MM3 (ref 0.1–0.9)
MONOCYTES NFR BLD AUTO: 3.4 % (ref 5–12)
MONOCYTES NFR BLD AUTO: 3.9 % (ref 5–12)
MONOCYTES NFR BLD AUTO: 4.1 % (ref 5–12)
MONOCYTES NFR BLD AUTO: 4.1 % (ref 5–12)
MONOCYTES NFR BLD AUTO: 4.4 % (ref 5–12)
MONOCYTES NFR BLD AUTO: 4.6 % (ref 5–12)
MONOCYTES NFR BLD AUTO: 4.7 % (ref 5–12)
MONOCYTES NFR BLD AUTO: 4.9 % (ref 5–12)
MONOCYTES NFR BLD AUTO: 5 % (ref 5–12)
MONOCYTES NFR BLD AUTO: 5 % (ref 5–12)
MONOCYTES NFR BLD AUTO: 5.4 % (ref 5–12)
MONOCYTES NFR BLD AUTO: 5.5 % (ref 5–12)
MONOCYTES NFR BLD AUTO: 5.6 % (ref 5–12)
MONOCYTES NFR BLD AUTO: 5.6 % (ref 5–12)
MONOCYTES NFR BLD AUTO: 6.5 % (ref 5–12)
NEUTROPHILS # BLD AUTO: 27.41 10*3/MM3 (ref 1.7–7)
NEUTROPHILS # BLD AUTO: 28.89 10*3/MM3 (ref 1.7–7)
NEUTROPHILS NFR BLD AUTO: 10.21 10*3/MM3 (ref 1.7–7)
NEUTROPHILS NFR BLD AUTO: 10.58 10*3/MM3 (ref 1.7–7)
NEUTROPHILS NFR BLD AUTO: 12.78 10*3/MM3 (ref 1.7–7)
NEUTROPHILS NFR BLD AUTO: 13.79 10*3/MM3 (ref 1.7–7)
NEUTROPHILS NFR BLD AUTO: 25.21 10*3/MM3 (ref 1.7–7)
NEUTROPHILS NFR BLD AUTO: 4.65 10*3/MM3 (ref 1.7–7)
NEUTROPHILS NFR BLD AUTO: 5.26 10*3/MM3 (ref 1.7–7)
NEUTROPHILS NFR BLD AUTO: 5.48 10*3/MM3 (ref 1.7–7)
NEUTROPHILS NFR BLD AUTO: 5.51 10*3/MM3 (ref 1.7–7)
NEUTROPHILS NFR BLD AUTO: 52.4 % (ref 42.7–76)
NEUTROPHILS NFR BLD AUTO: 6.49 10*3/MM3 (ref 1.7–7)
NEUTROPHILS NFR BLD AUTO: 6.54 10*3/MM3 (ref 1.7–7)
NEUTROPHILS NFR BLD AUTO: 6.54 10*3/MM3 (ref 1.7–7)
NEUTROPHILS NFR BLD AUTO: 6.81 10*3/MM3 (ref 1.7–7)
NEUTROPHILS NFR BLD AUTO: 6.94 10*3/MM3 (ref 1.7–7)
NEUTROPHILS NFR BLD AUTO: 60.3 % (ref 42.7–76)
NEUTROPHILS NFR BLD AUTO: 60.4 % (ref 42.7–76)
NEUTROPHILS NFR BLD AUTO: 61.9 % (ref 42.7–76)
NEUTROPHILS NFR BLD AUTO: 62.2 % (ref 42.7–76)
NEUTROPHILS NFR BLD AUTO: 62.2 % (ref 42.7–76)
NEUTROPHILS NFR BLD AUTO: 63.1 % (ref 42.7–76)
NEUTROPHILS NFR BLD AUTO: 63.3 % (ref 42.7–76)
NEUTROPHILS NFR BLD AUTO: 63.4 % (ref 42.7–76)
NEUTROPHILS NFR BLD AUTO: 64.5 % (ref 42.7–76)
NEUTROPHILS NFR BLD AUTO: 64.6 % (ref 42.7–76)
NEUTROPHILS NFR BLD AUTO: 67.5 % (ref 42.7–76)
NEUTROPHILS NFR BLD AUTO: 67.7 % (ref 42.7–76)
NEUTROPHILS NFR BLD AUTO: 68.1 % (ref 42.7–76)
NEUTROPHILS NFR BLD AUTO: 68.7 % (ref 42.7–76)
NEUTROPHILS NFR BLD AUTO: 69.2 % (ref 42.7–76)
NEUTROPHILS NFR BLD AUTO: 7.27 10*3/MM3 (ref 1.7–7)
NEUTROPHILS NFR BLD AUTO: 7.84 10*3/MM3 (ref 1.7–7)
NEUTROPHILS NFR BLD AUTO: 70.2 % (ref 42.7–76)
NEUTROPHILS NFR BLD AUTO: 72.1 % (ref 42.7–76)
NEUTROPHILS NFR BLD AUTO: 8.06 10*3/MM3 (ref 1.7–7)
NEUTROPHILS NFR BLD AUTO: 8.37 10*3/MM3 (ref 1.7–7)
NEUTROPHILS NFR BLD AUTO: 82.1 % (ref 42.7–76)
NEUTROPHILS NFR BLD AUTO: 88.7 % (ref 42.7–76)
NEUTROPHILS NFR BLD AUTO: 9 10*3/MM3 (ref 1.7–7)
NEUTROPHILS NFR BLD AUTO: 9.96 10*3/MM3 (ref 1.7–7)
NEUTROPHILS NFR BLD MANUAL: 81 % (ref 42.7–76)
NEUTROPHILS NFR BLD MANUAL: 86.9 % (ref 42.7–76)
NITRITE UR QL STRIP: NEGATIVE
NITRITE UR QL STRIP: POSITIVE
NOROVIRUS GI+II RNA STL QL NAA+NON-PROBE: NOT DETECTED
NRBC BLD AUTO-RTO: 0 /100 WBC (ref 0–0.2)
NRBC BLD AUTO-RTO: 0.1 /100 WBC (ref 0–0.2)
NT-PROBNP SERPL-MCNC: ABNORMAL PG/ML (ref 0–450)
OPIATES UR QL: NEGATIVE
OXYCODONE UR QL SCN: NEGATIVE
P SHIGELLOIDES DNA STL QL NAA+NON-PROBE: NOT DETECTED
PCO2 BLDA: 29.6 MM HG (ref 35–45)
PH BLDA: 7.29 PH UNITS (ref 7.35–7.45)
PH UR STRIP.AUTO: 5.5 [PH] (ref 5–8)
PH UR STRIP.AUTO: 6.5 [PH] (ref 5–8)
PH UR STRIP.AUTO: <=5 [PH] (ref 5–8)
PHOSPHATE SERPL-MCNC: 10 MG/DL (ref 2.5–4.5)
PHOSPHATE SERPL-MCNC: 10.2 MG/DL (ref 2.5–4.5)
PHOSPHATE SERPL-MCNC: 11.6 MG/DL (ref 2.5–4.5)
PHOSPHATE SERPL-MCNC: 12.5 MG/DL (ref 2.5–4.5)
PHOSPHATE SERPL-MCNC: 13.1 MG/DL (ref 2.5–4.5)
PHOSPHATE SERPL-MCNC: 13.3 MG/DL (ref 2.5–4.5)
PHOSPHATE SERPL-MCNC: 4.5 MG/DL (ref 2.5–4.5)
PHOSPHATE SERPL-MCNC: 5.3 MG/DL (ref 2.5–4.5)
PHOSPHATE SERPL-MCNC: 5.9 MG/DL (ref 2.5–4.5)
PHOSPHATE SERPL-MCNC: 6.3 MG/DL (ref 2.5–4.5)
PHOSPHATE SERPL-MCNC: 6.8 MG/DL (ref 2.5–4.5)
PHOSPHATE SERPL-MCNC: 7.3 MG/DL (ref 2.5–4.5)
PHOSPHATE SERPL-MCNC: 7.8 MG/DL (ref 2.5–4.5)
PHOSPHATE SERPL-MCNC: 8 MG/DL (ref 2.5–4.5)
PHOSPHATE SERPL-MCNC: 8.3 MG/DL (ref 2.5–4.5)
PHOSPHATE SERPL-MCNC: 8.6 MG/DL (ref 2.5–4.5)
PHOSPHATE SERPL-MCNC: 8.6 MG/DL (ref 2.5–4.5)
PLAT MORPH BLD: NORMAL
PLAT MORPH BLD: NORMAL
PLATELET # BLD AUTO: 261 10*3/MM3 (ref 140–450)
PLATELET # BLD AUTO: 264 10*3/MM3 (ref 140–450)
PLATELET # BLD AUTO: 276 10*3/MM3 (ref 140–450)
PLATELET # BLD AUTO: 277 10*3/MM3 (ref 140–450)
PLATELET # BLD AUTO: 293 10*3/MM3 (ref 140–450)
PLATELET # BLD AUTO: 312 10*3/MM3 (ref 140–450)
PLATELET # BLD AUTO: 319 10*3/MM3 (ref 140–450)
PLATELET # BLD AUTO: 333 10*3/MM3 (ref 140–450)
PLATELET # BLD AUTO: 336 10*3/MM3 (ref 140–450)
PLATELET # BLD AUTO: 344 10*3/MM3 (ref 140–450)
PLATELET # BLD AUTO: 347 10*3/MM3 (ref 140–450)
PLATELET # BLD AUTO: 348 10*3/MM3 (ref 140–450)
PLATELET # BLD AUTO: 357 10*3/MM3 (ref 140–450)
PLATELET # BLD AUTO: 358 10*3/MM3 (ref 140–450)
PLATELET # BLD AUTO: 368 10*3/MM3 (ref 140–450)
PLATELET # BLD AUTO: 370 10*3/MM3 (ref 140–450)
PLATELET # BLD AUTO: 371 10*3/MM3 (ref 140–450)
PLATELET # BLD AUTO: 374 10*3/MM3 (ref 140–450)
PLATELET # BLD AUTO: 409 10*3/MM3 (ref 140–450)
PLATELET # BLD AUTO: 412 10*3/MM3 (ref 140–450)
PLATELET # BLD AUTO: 424 10*3/MM3 (ref 140–450)
PLATELET # BLD AUTO: 427 10*3/MM3 (ref 140–450)
PLATELET # BLD AUTO: 466 10*3/MM3 (ref 140–450)
PLATELET # BLD AUTO: 546 10*3/MM3 (ref 140–450)
PLATELET # BLD AUTO: 619 10*3/MM3 (ref 140–450)
PMV BLD AUTO: 10 FL (ref 6–12)
PMV BLD AUTO: 10 FL (ref 6–12)
PMV BLD AUTO: 10.1 FL (ref 6–12)
PMV BLD AUTO: 10.2 FL (ref 6–12)
PMV BLD AUTO: 10.2 FL (ref 6–12)
PMV BLD AUTO: 10.3 FL (ref 6–12)
PMV BLD AUTO: 10.4 FL (ref 6–12)
PMV BLD AUTO: 10.4 FL (ref 6–12)
PMV BLD AUTO: 10.5 FL (ref 6–12)
PMV BLD AUTO: 10.5 FL (ref 6–12)
PMV BLD AUTO: 10.6 FL (ref 6–12)
PMV BLD AUTO: 11 FL (ref 6–12)
PMV BLD AUTO: 11.1 FL (ref 6–12)
PMV BLD AUTO: 9.8 FL (ref 6–12)
PMV BLD AUTO: 9.8 FL (ref 6–12)
PMV BLD AUTO: 9.9 FL (ref 6–12)
PO2 BLDA: 75.9 MM HG (ref 80–100)
POLYCHROMASIA BLD QL SMEAR: ABNORMAL
POTASSIUM SERPL-SCNC: 2.8 MMOL/L (ref 3.5–5.2)
POTASSIUM SERPL-SCNC: 3 MMOL/L (ref 3.5–5.2)
POTASSIUM SERPL-SCNC: 3.1 MMOL/L (ref 3.5–5.2)
POTASSIUM SERPL-SCNC: 3.2 MMOL/L (ref 3.5–5.2)
POTASSIUM SERPL-SCNC: 3.3 MMOL/L (ref 3.5–5.2)
POTASSIUM SERPL-SCNC: 3.4 MMOL/L (ref 3.5–5.2)
POTASSIUM SERPL-SCNC: 3.5 MMOL/L (ref 3.5–5.2)
POTASSIUM SERPL-SCNC: 3.5 MMOL/L (ref 3.5–5.2)
POTASSIUM SERPL-SCNC: 3.6 MMOL/L (ref 3.5–5.2)
POTASSIUM SERPL-SCNC: 3.7 MMOL/L (ref 3.5–5.2)
POTASSIUM SERPL-SCNC: 3.7 MMOL/L (ref 3.5–5.2)
POTASSIUM SERPL-SCNC: 3.9 MMOL/L (ref 3.5–5.2)
POTASSIUM SERPL-SCNC: 4 MMOL/L (ref 3.5–5.2)
POTASSIUM SERPL-SCNC: 4.4 MMOL/L (ref 3.5–5.2)
POTASSIUM SERPL-SCNC: 4.7 MMOL/L (ref 3.5–5.2)
PROCALCITONIN SERPL-MCNC: 0.64 NG/ML (ref 0–0.25)
PROCALCITONIN SERPL-MCNC: 0.65 NG/ML (ref 0–0.25)
PROCALCITONIN SERPL-MCNC: 0.74 NG/ML (ref 0–0.25)
PROCALCITONIN SERPL-MCNC: 0.76 NG/ML (ref 0–0.25)
PROT S ACT/NOR PPP: 109 % (ref 70–127)
PROT S FREE PPP-ACNC: 112 % (ref 49–138)
PROT SERPL-MCNC: 5.9 G/DL (ref 6–8.5)
PROT SERPL-MCNC: 6 G/DL (ref 6–8.5)
PROT SERPL-MCNC: 6.7 G/DL (ref 6–8.5)
PROT SERPL-MCNC: 6.8 G/DL (ref 6–8.5)
PROT SERPL-MCNC: 6.8 G/DL (ref 6–8.5)
PROT SERPL-MCNC: 7.1 G/DL (ref 6–8.5)
PROT SERPL-MCNC: 7.3 G/DL (ref 6–8.5)
PROT SERPL-MCNC: 7.5 G/DL (ref 6–8.5)
PROT SERPL-MCNC: 8.3 G/DL (ref 6–8.5)
PROT UR QL STRIP: ABNORMAL
PROTHROMBIN TIME: 14.2 SECONDS (ref 11.7–14.2)
PROTHROMBIN TIME: 14.2 SECONDS (ref 11.7–14.2)
PROTHROMBIN TIME: 15.2 SECONDS (ref 11.7–14.2)
PROTHROMBIN TIME: 16.3 SECONDS (ref 11.7–14.2)
QT INTERVAL: 396 MS
QT INTERVAL: 405 MS
QT INTERVAL: 423 MS
QT INTERVAL: 441 MS
QT INTERVAL: 468 MS
RBC # BLD AUTO: 2.73 10*6/MM3 (ref 3.77–5.28)
RBC # BLD AUTO: 3 10*6/MM3 (ref 3.77–5.28)
RBC # BLD AUTO: 3.06 10*6/MM3 (ref 3.77–5.28)
RBC # BLD AUTO: 3.06 10*6/MM3 (ref 3.77–5.28)
RBC # BLD AUTO: 3.08 10*6/MM3 (ref 3.77–5.28)
RBC # BLD AUTO: 3.1 10*6/MM3 (ref 3.77–5.28)
RBC # BLD AUTO: 3.1 10*6/MM3 (ref 3.77–5.28)
RBC # BLD AUTO: 3.16 10*6/MM3 (ref 3.77–5.28)
RBC # BLD AUTO: 3.2 10*6/MM3 (ref 3.77–5.28)
RBC # BLD AUTO: 3.25 10*6/MM3 (ref 3.77–5.28)
RBC # BLD AUTO: 3.25 10*6/MM3 (ref 3.77–5.28)
RBC # BLD AUTO: 3.26 10*6/MM3 (ref 3.77–5.28)
RBC # BLD AUTO: 3.27 10*6/MM3 (ref 3.77–5.28)
RBC # BLD AUTO: 3.29 10*6/MM3 (ref 3.77–5.28)
RBC # BLD AUTO: 3.35 10*6/MM3 (ref 3.77–5.28)
RBC # BLD AUTO: 3.41 10*6/MM3 (ref 3.77–5.28)
RBC # BLD AUTO: 3.42 10*6/MM3 (ref 3.77–5.28)
RBC # BLD AUTO: 3.43 10*6/MM3 (ref 3.77–5.28)
RBC # BLD AUTO: 3.49 10*6/MM3 (ref 3.77–5.28)
RBC # BLD AUTO: 3.59 10*6/MM3 (ref 3.77–5.28)
RBC # BLD AUTO: 3.67 10*6/MM3 (ref 3.77–5.28)
RBC # BLD AUTO: 3.77 10*6/MM3 (ref 3.77–5.28)
RBC # BLD AUTO: 3.94 10*6/MM3 (ref 3.77–5.28)
RBC # BLD AUTO: 4.31 10*6/MM3 (ref 3.77–5.28)
RBC # BLD AUTO: 4.35 10*6/MM3 (ref 3.77–5.28)
RBC # UR STRIP: ABNORMAL /HPF
RBC MORPH BLD: NORMAL
REF LAB TEST METHOD: ABNORMAL
RHINOVIRUS RNA SPEC NAA+PROBE: NOT DETECTED
RSV RNA NPH QL NAA+NON-PROBE: NOT DETECTED
RVA RNA STL QL NAA+NON-PROBE: NOT DETECTED
S ENT+BONG DNA STL QL NAA+NON-PROBE: NOT DETECTED
SAO2 % BLDCOA: 93.6 % (ref 92–99)
SAPO I+II+IV+V RNA STL QL NAA+NON-PROBE: NOT DETECTED
SARS-COV-2 ORF1AB RESP QL NAA+PROBE: NOT DETECTED
SARS-COV-2 ORF1AB RESP QL NAA+PROBE: NOT DETECTED
SARS-COV-2 RNA NPH QL NAA+NON-PROBE: NOT DETECTED
SARS-COV-2 RNA RESP QL NAA+PROBE: NOT DETECTED
SARS-COV-2 RNA RESP QL NAA+PROBE: NOT DETECTED
SCREEN APTT: 38.7 SEC (ref 0–51.9)
SCREEN DRVVT: 37.2 SEC (ref 0–47)
SHIGELLA SP+EIEC IPAH ST NAA+NON-PROBE: NOT DETECTED
SINUS: 2.9 CM
SODIUM SERPL-SCNC: 135 MMOL/L (ref 136–145)
SODIUM SERPL-SCNC: 136 MMOL/L (ref 136–145)
SODIUM SERPL-SCNC: 137 MMOL/L (ref 136–145)
SODIUM SERPL-SCNC: 138 MMOL/L (ref 136–145)
SODIUM SERPL-SCNC: 139 MMOL/L (ref 136–145)
SODIUM SERPL-SCNC: 140 MMOL/L (ref 136–145)
SODIUM SERPL-SCNC: 140 MMOL/L (ref 136–145)
SODIUM SERPL-SCNC: 141 MMOL/L (ref 136–145)
SODIUM SERPL-SCNC: 141 MMOL/L (ref 136–145)
SODIUM SERPL-SCNC: 143 MMOL/L (ref 136–145)
SODIUM SERPL-SCNC: 144 MMOL/L (ref 136–145)
SP GR UR STRIP: 1.01 (ref 1–1.03)
SP GR UR STRIP: 1.02 (ref 1–1.03)
SP GR UR STRIP: 1.02 (ref 1–1.03)
SQUAMOUS #/AREA URNS HPF: ABNORMAL /HPF
STRESS TARGET HR: 145 BPM
THROMBIN TIME: 16.2 SEC (ref 0–23)
TIBC SERPL-MCNC: 286 MCG/DL (ref 298–536)
TRANSFERRIN SERPL-MCNC: 192 MG/DL (ref 200–360)
TROPONIN T SERPL-MCNC: 0.02 NG/ML (ref 0–0.03)
TROPONIN T SERPL-MCNC: 0.03 NG/ML (ref 0–0.03)
TROPONIN T SERPL-MCNC: 0.04 NG/ML (ref 0–0.03)
UPPER ARTERIAL LEFT ARM BRACHIAL SYS MAX: 119 MMHG
UPPER ARTERIAL RIGHT ARM BRACHIAL SYS MAX: 88 MMHG
UROBILINOGEN UR QL STRIP: ABNORMAL
V CHOL+PARA+VUL DNA STL QL NAA+NON-PROBE: NOT DETECTED
V CHOLERAE DNA STL QL NAA+NON-PROBE: NOT DETECTED
VANCOMYCIN SERPL-MCNC: 21.6 MCG/ML (ref 5–40)
VANCOMYCIN SERPL-MCNC: 42.1 MCG/ML (ref 5–40)
VARIANT LYMPHS NFR BLD MANUAL: 13 % (ref 19.6–45.3)
VARIANT LYMPHS NFR BLD MANUAL: 8.1 % (ref 19.6–45.3)
VIT B12 BLD-MCNC: 513 PG/ML (ref 211–946)
WBC # UR STRIP: ABNORMAL /HPF
WBC MORPH BLD: NORMAL
WBC MORPH BLD: NORMAL
WBC NRBC COR # BLD: 10.03 10*3/MM3 (ref 3.4–10.8)
WBC NRBC COR # BLD: 10.03 10*3/MM3 (ref 3.4–10.8)
WBC NRBC COR # BLD: 10.14 10*3/MM3 (ref 3.4–10.8)
WBC NRBC COR # BLD: 10.36 10*3/MM3 (ref 3.4–10.8)
WBC NRBC COR # BLD: 10.76 10*3/MM3 (ref 3.4–10.8)
WBC NRBC COR # BLD: 10.84 10*3/MM3 (ref 3.4–10.8)
WBC NRBC COR # BLD: 11 10*3/MM3 (ref 3.4–10.8)
WBC NRBC COR # BLD: 11.18 10*3/MM3 (ref 3.4–10.8)
WBC NRBC COR # BLD: 12 10*3/MM3 (ref 3.4–10.8)
WBC NRBC COR # BLD: 12.12 10*3/MM3 (ref 3.4–10.8)
WBC NRBC COR # BLD: 12.61 10*3/MM3 (ref 3.4–10.8)
WBC NRBC COR # BLD: 13.46 10*3/MM3 (ref 3.4–10.8)
WBC NRBC COR # BLD: 13.93 10*3/MM3 (ref 3.4–10.8)
WBC NRBC COR # BLD: 15.06 10*3/MM3 (ref 3.4–10.8)
WBC NRBC COR # BLD: 16.18 10*3/MM3 (ref 3.4–10.8)
WBC NRBC COR # BLD: 18.6 10*3/MM3 (ref 3.4–10.8)
WBC NRBC COR # BLD: 20.4 10*3/MM3 (ref 3.4–10.8)
WBC NRBC COR # BLD: 28.42 10*3/MM3 (ref 3.4–10.8)
WBC NRBC COR # BLD: 31.54 10*3/MM3 (ref 3.4–10.8)
WBC NRBC COR # BLD: 35.67 10*3/MM3 (ref 3.4–10.8)
WBC NRBC COR # BLD: 7.35 10*3/MM3 (ref 3.4–10.8)
WBC NRBC COR # BLD: 8.71 10*3/MM3 (ref 3.4–10.8)
WBC NRBC COR # BLD: 9.07 10*3/MM3 (ref 3.4–10.8)
WBC NRBC COR # BLD: 9.54 10*3/MM3 (ref 3.4–10.8)
WBC NRBC COR # BLD: 9.7 10*3/MM3 (ref 3.4–10.8)
WHOLE BLOOD HOLD COAG: NORMAL
WHOLE BLOOD HOLD SPECIMEN: NORMAL
Y ENTEROCOL DNA STL QL NAA+NON-PROBE: NOT DETECTED

## 2022-01-01 PROCEDURE — 25010000002 ONDANSETRON PER 1 MG: Performed by: INTERNAL MEDICINE

## 2022-01-01 PROCEDURE — G0299 HHS/HOSPICE OF RN EA 15 MIN: HCPCS

## 2022-01-01 PROCEDURE — 25010000002 HEPARIN (PORCINE) PER 1000 UNITS: Performed by: UROLOGY

## 2022-01-01 PROCEDURE — 85610 PROTHROMBIN TIME: CPT | Performed by: SURGERY

## 2022-01-01 PROCEDURE — G0378 HOSPITAL OBSERVATION PER HR: HCPCS

## 2022-01-01 PROCEDURE — 0TC68ZZ EXTIRPATION OF MATTER FROM RIGHT URETER, VIA NATURAL OR ARTIFICIAL OPENING ENDOSCOPIC: ICD-10-PCS | Performed by: UROLOGY

## 2022-01-01 PROCEDURE — 96375 TX/PRO/DX INJ NEW DRUG ADDON: CPT

## 2022-01-01 PROCEDURE — 85652 RBC SED RATE AUTOMATED: CPT | Performed by: PHYSICIAN ASSISTANT

## 2022-01-01 PROCEDURE — 81001 URINALYSIS AUTO W/SCOPE: CPT | Performed by: EMERGENCY MEDICINE

## 2022-01-01 PROCEDURE — 96376 TX/PRO/DX INJ SAME DRUG ADON: CPT

## 2022-01-01 PROCEDURE — 25010000002 MORPHINE PER 10 MG: Performed by: STUDENT IN AN ORGANIZED HEALTH CARE EDUCATION/TRAINING PROGRAM

## 2022-01-01 PROCEDURE — 93010 ELECTROCARDIOGRAM REPORT: CPT | Performed by: INTERNAL MEDICINE

## 2022-01-01 PROCEDURE — 83735 ASSAY OF MAGNESIUM: CPT | Performed by: EMERGENCY MEDICINE

## 2022-01-01 PROCEDURE — 25010000002 PHENYLEPHRINE 10 MG/ML SOLUTION: Performed by: ANESTHESIOLOGY

## 2022-01-01 PROCEDURE — 82728 ASSAY OF FERRITIN: CPT | Performed by: INTERNAL MEDICINE

## 2022-01-01 PROCEDURE — A9270 NON-COVERED ITEM OR SERVICE: HCPCS | Performed by: SURGERY

## 2022-01-01 PROCEDURE — 93005 ELECTROCARDIOGRAM TRACING: CPT | Performed by: HOSPITALIST

## 2022-01-01 PROCEDURE — U0004 COV-19 TEST NON-CDC HGH THRU: HCPCS | Performed by: EMERGENCY MEDICINE

## 2022-01-01 PROCEDURE — 83735 ASSAY OF MAGNESIUM: CPT | Performed by: INTERNAL MEDICINE

## 2022-01-01 PROCEDURE — 0202U NFCT DS 22 TRGT SARS-COV-2: CPT | Performed by: PHYSICIAN ASSISTANT

## 2022-01-01 PROCEDURE — 25010000002 LORAZEPAM PER 2 MG: Performed by: NURSE PRACTITIONER

## 2022-01-01 PROCEDURE — 25010000002 HYDROMORPHONE PER 4 MG: Performed by: EMERGENCY MEDICINE

## 2022-01-01 PROCEDURE — 5A1D70Z PERFORMANCE OF URINARY FILTRATION, INTERMITTENT, LESS THAN 6 HOURS PER DAY: ICD-10-PCS | Performed by: HOSPITALIST

## 2022-01-01 PROCEDURE — 0T768DZ DILATION OF RIGHT URETER WITH INTRALUMINAL DEVICE, VIA NATURAL OR ARTIFICIAL OPENING ENDOSCOPIC: ICD-10-PCS | Performed by: UROLOGY

## 2022-01-01 PROCEDURE — 80053 COMPREHEN METABOLIC PANEL: CPT | Performed by: INTERNAL MEDICINE

## 2022-01-01 PROCEDURE — 0 IOTHALAMATE 60 % SOLUTION: Performed by: UROLOGY

## 2022-01-01 PROCEDURE — U0003 INFECTIOUS AGENT DETECTION BY NUCLEIC ACID (DNA OR RNA); SEVERE ACUTE RESPIRATORY SYNDROME CORONAVIRUS 2 (SARS-COV-2) (CORONAVIRUS DISEASE [COVID-19]), AMPLIFIED PROBE TECHNIQUE, MAKING USE OF HIGH THROUGHPUT TECHNOLOGIES AS DESCRIBED BY CMS-2020-01-R: HCPCS | Performed by: NURSE PRACTITIONER

## 2022-01-01 PROCEDURE — 80048 BASIC METABOLIC PNL TOTAL CA: CPT | Performed by: HOSPITALIST

## 2022-01-01 PROCEDURE — 63710000001 MELATONIN 1 MG TABLET: Performed by: SURGERY

## 2022-01-01 PROCEDURE — 85610 PROTHROMBIN TIME: CPT | Performed by: INTERNAL MEDICINE

## 2022-01-01 PROCEDURE — 0T778DZ DILATION OF LEFT URETER WITH INTRALUMINAL DEVICE, VIA NATURAL OR ARTIFICIAL OPENING ENDOSCOPIC: ICD-10-PCS | Performed by: UROLOGY

## 2022-01-01 PROCEDURE — 80307 DRUG TEST PRSMV CHEM ANLYZR: CPT | Performed by: INTERNAL MEDICINE

## 2022-01-01 PROCEDURE — 85025 COMPLETE CBC W/AUTO DIFF WBC: CPT | Performed by: INTERNAL MEDICINE

## 2022-01-01 PROCEDURE — 25010000002 DEXAMETHASONE PER 1 MG

## 2022-01-01 PROCEDURE — 99284 EMERGENCY DEPT VISIT MOD MDM: CPT

## 2022-01-01 PROCEDURE — 82962 GLUCOSE BLOOD TEST: CPT

## 2022-01-01 PROCEDURE — 80069 RENAL FUNCTION PANEL: CPT | Performed by: UROLOGY

## 2022-01-01 PROCEDURE — 74176 CT ABD & PELVIS W/O CONTRAST: CPT

## 2022-01-01 PROCEDURE — 25010000002 HYDROMORPHONE PER 4 MG: Performed by: NURSE PRACTITIONER

## 2022-01-01 PROCEDURE — U0005 INFEC AGEN DETEC AMPLI PROBE: HCPCS | Performed by: NURSE PRACTITIONER

## 2022-01-01 PROCEDURE — 99212 OFFICE O/P EST SF 10 MIN: CPT | Performed by: SURGERY

## 2022-01-01 PROCEDURE — 83036 HEMOGLOBIN GLYCOSYLATED A1C: CPT | Performed by: INTERNAL MEDICINE

## 2022-01-01 PROCEDURE — 99283 EMERGENCY DEPT VISIT LOW MDM: CPT

## 2022-01-01 PROCEDURE — 85007 BL SMEAR W/DIFF WBC COUNT: CPT | Performed by: INTERNAL MEDICINE

## 2022-01-01 PROCEDURE — 80069 RENAL FUNCTION PANEL: CPT | Performed by: INTERNAL MEDICINE

## 2022-01-01 PROCEDURE — 25010000002 PHENYLEPHRINE 10 MG/ML SOLUTION: Performed by: NURSE ANESTHETIST, CERTIFIED REGISTERED

## 2022-01-01 PROCEDURE — 25010000002 CEFTRIAXONE PER 250 MG: Performed by: INTERNAL MEDICINE

## 2022-01-01 PROCEDURE — 25010000002 LINEZOLID 600 MG/300ML SOLUTION: Performed by: INTERNAL MEDICINE

## 2022-01-01 PROCEDURE — 73630 X-RAY EXAM OF FOOT: CPT

## 2022-01-01 PROCEDURE — 83615 LACTATE (LD) (LDH) ENZYME: CPT | Performed by: INTERNAL MEDICINE

## 2022-01-01 PROCEDURE — 83605 ASSAY OF LACTIC ACID: CPT | Performed by: EMERGENCY MEDICINE

## 2022-01-01 PROCEDURE — 99232 SBSQ HOSP IP/OBS MODERATE 35: CPT | Performed by: STUDENT IN AN ORGANIZED HEALTH CARE EDUCATION/TRAINING PROGRAM

## 2022-01-01 PROCEDURE — 36415 COLL VENOUS BLD VENIPUNCTURE: CPT | Performed by: NURSE PRACTITIONER

## 2022-01-01 PROCEDURE — 25010000002 ONDANSETRON PER 1 MG: Performed by: UROLOGY

## 2022-01-01 PROCEDURE — 99233 SBSQ HOSP IP/OBS HIGH 50: CPT | Performed by: INTERNAL MEDICINE

## 2022-01-01 PROCEDURE — 85025 COMPLETE CBC W/AUTO DIFF WBC: CPT | Performed by: SURGERY

## 2022-01-01 PROCEDURE — 85025 COMPLETE CBC W/AUTO DIFF WBC: CPT | Performed by: NURSE PRACTITIONER

## 2022-01-01 PROCEDURE — 85300 ANTITHROMBIN III ACTIVITY: CPT | Performed by: INTERNAL MEDICINE

## 2022-01-01 PROCEDURE — 85027 COMPLETE CBC AUTOMATED: CPT | Performed by: INTERNAL MEDICINE

## 2022-01-01 PROCEDURE — 25010000002 LEVOFLOXACIN PER 250 MG: Performed by: UROLOGY

## 2022-01-01 PROCEDURE — 63710000001 SACCHAROMYCES BOULARDII 250 MG CAPSULE: Performed by: SURGERY

## 2022-01-01 PROCEDURE — 99285 EMERGENCY DEPT VISIT HI MDM: CPT

## 2022-01-01 PROCEDURE — 71045 X-RAY EXAM CHEST 1 VIEW: CPT

## 2022-01-01 PROCEDURE — 63710000001 CLOPIDOGREL 75 MG TABLET: Performed by: SURGERY

## 2022-01-01 PROCEDURE — 84484 ASSAY OF TROPONIN QUANT: CPT | Performed by: PHYSICIAN ASSISTANT

## 2022-01-01 PROCEDURE — 83690 ASSAY OF LIPASE: CPT | Performed by: EMERGENCY MEDICINE

## 2022-01-01 PROCEDURE — 63710000001 ESCITALOPRAM 20 MG TABLET: Performed by: SURGERY

## 2022-01-01 PROCEDURE — 96365 THER/PROPH/DIAG IV INF INIT: CPT

## 2022-01-01 PROCEDURE — 25010000002 HEPARIN (PORCINE) PER 1000 UNITS: Performed by: INTERNAL MEDICINE

## 2022-01-01 PROCEDURE — 85306 CLOT INHIBIT PROT S FREE: CPT | Performed by: INTERNAL MEDICINE

## 2022-01-01 PROCEDURE — 25010000002 MIDAZOLAM PER 1 MG: Performed by: ANESTHESIOLOGY

## 2022-01-01 PROCEDURE — 25010000002 CEFTRIAXONE PER 250 MG: Performed by: SURGERY

## 2022-01-01 PROCEDURE — 25010000002 MAGNESIUM SULFATE 2 GM/50ML SOLUTION: Performed by: PHYSICIAN ASSISTANT

## 2022-01-01 PROCEDURE — 85613 RUSSELL VIPER VENOM DILUTED: CPT | Performed by: INTERNAL MEDICINE

## 2022-01-01 PROCEDURE — 87493 C DIFF AMPLIFIED PROBE: CPT | Performed by: EMERGENCY MEDICINE

## 2022-01-01 PROCEDURE — 25010000002 HYDROMORPHONE PER 4 MG: Performed by: INTERNAL MEDICINE

## 2022-01-01 PROCEDURE — U0005 INFEC AGEN DETEC AMPLI PROBE: HCPCS | Performed by: EMERGENCY MEDICINE

## 2022-01-01 PROCEDURE — 84466 ASSAY OF TRANSFERRIN: CPT | Performed by: INTERNAL MEDICINE

## 2022-01-01 PROCEDURE — 83735 ASSAY OF MAGNESIUM: CPT | Performed by: PHYSICIAN ASSISTANT

## 2022-01-01 PROCEDURE — 74420 UROGRAPHY RTRGR +-KUB: CPT

## 2022-01-01 PROCEDURE — 25010000002 PROPOFOL 10 MG/ML EMULSION: Performed by: ANESTHESIOLOGY

## 2022-01-01 PROCEDURE — 87077 CULTURE AEROBIC IDENTIFY: CPT | Performed by: EMERGENCY MEDICINE

## 2022-01-01 PROCEDURE — 81015 MICROSCOPIC EXAM OF URINE: CPT | Performed by: EMERGENCY MEDICINE

## 2022-01-01 PROCEDURE — 81240 F2 GENE: CPT | Performed by: INTERNAL MEDICINE

## 2022-01-01 PROCEDURE — 0T768ZZ DILATION OF RIGHT URETER, VIA NATURAL OR ARTIFICIAL OPENING ENDOSCOPIC: ICD-10-PCS | Performed by: UROLOGY

## 2022-01-01 PROCEDURE — 90945 DIALYSIS ONE EVALUATION: CPT

## 2022-01-01 PROCEDURE — 87186 SC STD MICRODIL/AGAR DIL: CPT | Performed by: EMERGENCY MEDICINE

## 2022-01-01 PROCEDURE — 63710000001 FAMOTIDINE 20 MG TABLET: Performed by: SURGERY

## 2022-01-01 PROCEDURE — 63710000001 POTASSIUM CHLORIDE 10 MEQ TABLET CONTROLLED-RELEASE: Performed by: SURGERY

## 2022-01-01 PROCEDURE — 86146 BETA-2 GLYCOPROTEIN ANTIBODY: CPT | Performed by: INTERNAL MEDICINE

## 2022-01-01 PROCEDURE — 99214 OFFICE O/P EST MOD 30 MIN: CPT | Performed by: SURGERY

## 2022-01-01 PROCEDURE — 25010000002 FENTANYL CITRATE (PF) 50 MCG/ML SOLUTION: Performed by: NURSE ANESTHETIST, CERTIFIED REGISTERED

## 2022-01-01 PROCEDURE — 63710000001 HYDROCODONE-ACETAMINOPHEN 5-325 MG TABLET: Performed by: NURSE ANESTHETIST, CERTIFIED REGISTERED

## 2022-01-01 PROCEDURE — G0152 HHCP-SERV OF OT,EA 15 MIN: HCPCS

## 2022-01-01 PROCEDURE — 84145 PROCALCITONIN (PCT): CPT | Performed by: PHYSICIAN ASSISTANT

## 2022-01-01 PROCEDURE — 85025 COMPLETE CBC W/AUTO DIFF WBC: CPT | Performed by: PHYSICIAN ASSISTANT

## 2022-01-01 PROCEDURE — 80053 COMPREHEN METABOLIC PANEL: CPT | Performed by: EMERGENCY MEDICINE

## 2022-01-01 PROCEDURE — 25010000002 CEFTRIAXONE PER 250 MG: Performed by: UROLOGY

## 2022-01-01 PROCEDURE — 80202 ASSAY OF VANCOMYCIN: CPT | Performed by: INTERNAL MEDICINE

## 2022-01-01 PROCEDURE — 25010000002 VANCOMYCIN 10 G RECONSTITUTED SOLUTION: Performed by: PHYSICIAN ASSISTANT

## 2022-01-01 PROCEDURE — 86140 C-REACTIVE PROTEIN: CPT | Performed by: PHYSICIAN ASSISTANT

## 2022-01-01 PROCEDURE — 63710000001 LEVOTHYROXINE 150 MCG TABLET: Performed by: SURGERY

## 2022-01-01 PROCEDURE — 25010000002 MAGNESIUM SULFATE 2 GM/50ML SOLUTION: Performed by: INTERNAL MEDICINE

## 2022-01-01 PROCEDURE — 82746 ASSAY OF FOLIC ACID SERUM: CPT | Performed by: INTERNAL MEDICINE

## 2022-01-01 PROCEDURE — 73718 MRI LOWER EXTREMITY W/O DYE: CPT

## 2022-01-01 PROCEDURE — 81001 URINALYSIS AUTO W/SCOPE: CPT | Performed by: INTERNAL MEDICINE

## 2022-01-01 PROCEDURE — 49999 UNLISTED PX ABD PERTM&OMN: CPT | Performed by: SURGERY

## 2022-01-01 PROCEDURE — C9803 HOPD COVID-19 SPEC COLLECT: HCPCS

## 2022-01-01 PROCEDURE — 25010000002 MAGNESIUM SULFATE IN D5W 1G/100ML (PREMIX) 1-5 GM/100ML-% SOLUTION: Performed by: INTERNAL MEDICINE

## 2022-01-01 PROCEDURE — 84100 ASSAY OF PHOSPHORUS: CPT | Performed by: INTERNAL MEDICINE

## 2022-01-01 PROCEDURE — 83605 ASSAY OF LACTIC ACID: CPT | Performed by: NURSE PRACTITIONER

## 2022-01-01 PROCEDURE — 99222 1ST HOSP IP/OBS MODERATE 55: CPT | Performed by: INTERNAL MEDICINE

## 2022-01-01 PROCEDURE — 96367 TX/PROPH/DG ADDL SEQ IV INF: CPT

## 2022-01-01 PROCEDURE — 63710000001 MULTIVITAMIN TABLET: Performed by: SURGERY

## 2022-01-01 PROCEDURE — 25010000002 HEPARIN (PORCINE) PER 1000 UNITS: Performed by: NURSE PRACTITIONER

## 2022-01-01 PROCEDURE — C1769 GUIDE WIRE: HCPCS | Performed by: UROLOGY

## 2022-01-01 PROCEDURE — 93005 ELECTROCARDIOGRAM TRACING: CPT | Performed by: EMERGENCY MEDICINE

## 2022-01-01 PROCEDURE — P9047 ALBUMIN (HUMAN), 25%, 50ML: HCPCS | Performed by: INTERNAL MEDICINE

## 2022-01-01 PROCEDURE — A9270 NON-COVERED ITEM OR SERVICE: HCPCS | Performed by: NURSE ANESTHETIST, CERTIFIED REGISTERED

## 2022-01-01 PROCEDURE — 05HM33Z INSERTION OF INFUSION DEVICE INTO RIGHT INTERNAL JUGULAR VEIN, PERCUTANEOUS APPROACH: ICD-10-PCS | Performed by: STUDENT IN AN ORGANIZED HEALTH CARE EDUCATION/TRAINING PROGRAM

## 2022-01-01 PROCEDURE — 25010000002 ONDANSETRON PER 1 MG: Performed by: SURGERY

## 2022-01-01 PROCEDURE — 82803 BLOOD GASES ANY COMBINATION: CPT

## 2022-01-01 PROCEDURE — 25010000002 HEPARIN (PORCINE) PER 1000 UNITS: Performed by: STUDENT IN AN ORGANIZED HEALTH CARE EDUCATION/TRAINING PROGRAM

## 2022-01-01 PROCEDURE — 84145 PROCALCITONIN (PCT): CPT | Performed by: NURSE PRACTITIONER

## 2022-01-01 PROCEDURE — 83690 ASSAY OF LIPASE: CPT | Performed by: PHYSICIAN ASSISTANT

## 2022-01-01 PROCEDURE — 25010000002 PHENYLEPHRINE 10 MG/ML SOLUTION

## 2022-01-01 PROCEDURE — 93923 UPR/LXTR ART STDY 3+ LVLS: CPT

## 2022-01-01 PROCEDURE — 0097U HC BIOFIRE FILMARRAY GI PANEL: CPT | Performed by: NURSE PRACTITIONER

## 2022-01-01 PROCEDURE — 85705 THROMBOPLASTIN INHIBITION: CPT | Performed by: INTERNAL MEDICINE

## 2022-01-01 PROCEDURE — 85025 COMPLETE CBC W/AUTO DIFF WBC: CPT | Performed by: EMERGENCY MEDICINE

## 2022-01-01 PROCEDURE — 84100 ASSAY OF PHOSPHORUS: CPT | Performed by: NURSE PRACTITIONER

## 2022-01-01 PROCEDURE — 85379 FIBRIN DEGRADATION QUANT: CPT | Performed by: PHYSICIAN ASSISTANT

## 2022-01-01 PROCEDURE — C2617 STENT, NON-COR, TEM W/O DEL: HCPCS | Performed by: UROLOGY

## 2022-01-01 PROCEDURE — 25010000002 ALBUMIN HUMAN 25% PER 50 ML: Performed by: INTERNAL MEDICINE

## 2022-01-01 PROCEDURE — 82533 TOTAL CORTISOL: CPT | Performed by: UROLOGY

## 2022-01-01 PROCEDURE — 87493 C DIFF AMPLIFIED PROBE: CPT | Performed by: PHYSICIAN ASSISTANT

## 2022-01-01 PROCEDURE — 87040 BLOOD CULTURE FOR BACTERIA: CPT | Performed by: PHYSICIAN ASSISTANT

## 2022-01-01 PROCEDURE — 80053 COMPREHEN METABOLIC PANEL: CPT | Performed by: NURSE PRACTITIONER

## 2022-01-01 PROCEDURE — 0TC78ZZ EXTIRPATION OF MATTER FROM LEFT URETER, VIA NATURAL OR ARTIFICIAL OPENING ENDOSCOPIC: ICD-10-PCS | Performed by: UROLOGY

## 2022-01-01 PROCEDURE — 63710000001 MONTELUKAST 10 MG TABLET: Performed by: SURGERY

## 2022-01-01 PROCEDURE — 80048 BASIC METABOLIC PNL TOTAL CA: CPT | Performed by: INTERNAL MEDICINE

## 2022-01-01 PROCEDURE — 83735 ASSAY OF MAGNESIUM: CPT | Performed by: HOSPITALIST

## 2022-01-01 PROCEDURE — 63710000001 MIDODRINE 5 MG TABLET: Performed by: SURGERY

## 2022-01-01 PROCEDURE — 80069 RENAL FUNCTION PANEL: CPT | Performed by: HOSPITALIST

## 2022-01-01 PROCEDURE — 93000 ELECTROCARDIOGRAM COMPLETE: CPT | Performed by: INTERNAL MEDICINE

## 2022-01-01 PROCEDURE — 25010000002 PROCHLORPERAZINE 10 MG/2ML SOLUTION: Performed by: NURSE PRACTITIONER

## 2022-01-01 PROCEDURE — 87086 URINE CULTURE/COLONY COUNT: CPT | Performed by: STUDENT IN AN ORGANIZED HEALTH CARE EDUCATION/TRAINING PROGRAM

## 2022-01-01 PROCEDURE — 99223 1ST HOSP IP/OBS HIGH 75: CPT | Performed by: STUDENT IN AN ORGANIZED HEALTH CARE EDUCATION/TRAINING PROGRAM

## 2022-01-01 PROCEDURE — 80076 HEPATIC FUNCTION PANEL: CPT | Performed by: INTERNAL MEDICINE

## 2022-01-01 PROCEDURE — 99232 SBSQ HOSP IP/OBS MODERATE 35: CPT | Performed by: INTERNAL MEDICINE

## 2022-01-01 PROCEDURE — 76000 FLUOROSCOPY <1 HR PHYS/QHP: CPT

## 2022-01-01 PROCEDURE — 25010000002 CEFTRIAXONE PER 250 MG: Performed by: NURSE PRACTITIONER

## 2022-01-01 PROCEDURE — 25010000002 LORAZEPAM PER 2 MG: Performed by: STUDENT IN AN ORGANIZED HEALTH CARE EDUCATION/TRAINING PROGRAM

## 2022-01-01 PROCEDURE — 25010000002 HYDROMORPHONE PER 4 MG: Performed by: UROLOGY

## 2022-01-01 PROCEDURE — 36415 COLL VENOUS BLD VENIPUNCTURE: CPT | Performed by: UROLOGY

## 2022-01-01 PROCEDURE — 80069 RENAL FUNCTION PANEL: CPT | Performed by: NURSE PRACTITIONER

## 2022-01-01 PROCEDURE — 87040 BLOOD CULTURE FOR BACTERIA: CPT | Performed by: EMERGENCY MEDICINE

## 2022-01-01 PROCEDURE — 87040 BLOOD CULTURE FOR BACTERIA: CPT | Performed by: UROLOGY

## 2022-01-01 PROCEDURE — 25010000002 CEFTRIAXONE PER 250 MG: Performed by: EMERGENCY MEDICINE

## 2022-01-01 PROCEDURE — 84484 ASSAY OF TROPONIN QUANT: CPT | Performed by: NURSE PRACTITIONER

## 2022-01-01 PROCEDURE — 85025 COMPLETE CBC W/AUTO DIFF WBC: CPT | Performed by: UROLOGY

## 2022-01-01 PROCEDURE — 80069 RENAL FUNCTION PANEL: CPT | Performed by: SURGERY

## 2022-01-01 PROCEDURE — 86147 CARDIOLIPIN ANTIBODY EA IG: CPT | Performed by: INTERNAL MEDICINE

## 2022-01-01 PROCEDURE — 36415 COLL VENOUS BLD VENIPUNCTURE: CPT

## 2022-01-01 PROCEDURE — 97161 PT EVAL LOW COMPLEX 20 MIN: CPT

## 2022-01-01 PROCEDURE — 74018 RADEX ABDOMEN 1 VIEW: CPT

## 2022-01-01 PROCEDURE — 0097U HC BIOFIRE FILMARRAY GI PANEL: CPT | Performed by: PHYSICIAN ASSISTANT

## 2022-01-01 PROCEDURE — 99204 OFFICE O/P NEW MOD 45 MIN: CPT | Performed by: INTERNAL MEDICINE

## 2022-01-01 PROCEDURE — 93306 TTE W/DOPPLER COMPLETE: CPT | Performed by: INTERNAL MEDICINE

## 2022-01-01 PROCEDURE — 25010000002 ENOXAPARIN PER 10 MG: Performed by: PHYSICIAN ASSISTANT

## 2022-01-01 PROCEDURE — 63710000001 SODIUM BICARBONATE 650 MG TABLET: Performed by: SURGERY

## 2022-01-01 PROCEDURE — U0003 INFECTIOUS AGENT DETECTION BY NUCLEIC ACID (DNA OR RNA); SEVERE ACUTE RESPIRATORY SYNDROME CORONAVIRUS 2 (SARS-COV-2) (CORONAVIRUS DISEASE [COVID-19]), AMPLIFIED PROBE TECHNIQUE, MAKING USE OF HIGH THROUGHPUT TECHNOLOGIES AS DESCRIBED BY CMS-2020-01-R: HCPCS | Performed by: EMERGENCY MEDICINE

## 2022-01-01 PROCEDURE — C1758 CATHETER, URETERAL: HCPCS | Performed by: UROLOGY

## 2022-01-01 PROCEDURE — 87340 HEPATITIS B SURFACE AG IA: CPT | Performed by: INTERNAL MEDICINE

## 2022-01-01 PROCEDURE — 80202 ASSAY OF VANCOMYCIN: CPT | Performed by: HOSPITALIST

## 2022-01-01 PROCEDURE — 93005 ELECTROCARDIOGRAM TRACING: CPT

## 2022-01-01 PROCEDURE — 83540 ASSAY OF IRON: CPT | Performed by: INTERNAL MEDICINE

## 2022-01-01 PROCEDURE — 85670 THROMBIN TIME PLASMA: CPT | Performed by: INTERNAL MEDICINE

## 2022-01-01 PROCEDURE — 93005 ELECTROCARDIOGRAM TRACING: CPT | Performed by: PHYSICIAN ASSISTANT

## 2022-01-01 PROCEDURE — 75635 CT ANGIO ABDOMINAL ARTERIES: CPT

## 2022-01-01 PROCEDURE — 63710000001 ACETAMINOPHEN 325 MG TABLET: Performed by: SURGERY

## 2022-01-01 PROCEDURE — 93306 TTE W/DOPPLER COMPLETE: CPT

## 2022-01-01 PROCEDURE — 0TJB8ZZ INSPECTION OF BLADDER, VIA NATURAL OR ARTIFICIAL OPENING ENDOSCOPIC: ICD-10-PCS | Performed by: UROLOGY

## 2022-01-01 PROCEDURE — 81001 URINALYSIS AUTO W/SCOPE: CPT | Performed by: STUDENT IN AN ORGANIZED HEALTH CARE EDUCATION/TRAINING PROGRAM

## 2022-01-01 PROCEDURE — 93975 VASCULAR STUDY: CPT

## 2022-01-01 PROCEDURE — 83605 ASSAY OF LACTIC ACID: CPT | Performed by: PHYSICIAN ASSISTANT

## 2022-01-01 PROCEDURE — 82607 VITAMIN B-12: CPT | Performed by: INTERNAL MEDICINE

## 2022-01-01 PROCEDURE — 84145 PROCALCITONIN (PCT): CPT | Performed by: EMERGENCY MEDICINE

## 2022-01-01 PROCEDURE — 25010000002 LEVOFLOXACIN PER 250 MG: Performed by: PHYSICIAN ASSISTANT

## 2022-01-01 PROCEDURE — 25010000002 ONDANSETRON PER 1 MG: Performed by: EMERGENCY MEDICINE

## 2022-01-01 PROCEDURE — 85025 COMPLETE CBC W/AUTO DIFF WBC: CPT | Performed by: HOSPITALIST

## 2022-01-01 PROCEDURE — 81001 URINALYSIS AUTO W/SCOPE: CPT | Performed by: PHYSICIAN ASSISTANT

## 2022-01-01 PROCEDURE — 85027 COMPLETE CBC AUTOMATED: CPT | Performed by: UROLOGY

## 2022-01-01 PROCEDURE — 83735 ASSAY OF MAGNESIUM: CPT | Performed by: SURGERY

## 2022-01-01 PROCEDURE — 25010000002 PERFLUTREN (DEFINITY) 8.476 MG IN SODIUM CHLORIDE (PF) 0.9 % 10 ML INJECTION: Performed by: INTERNAL MEDICINE

## 2022-01-01 PROCEDURE — 25010000002 ONDANSETRON PER 1 MG

## 2022-01-01 PROCEDURE — G0493 RN CARE EA 15 MIN HH/HOSPICE: HCPCS

## 2022-01-01 PROCEDURE — 96366 THER/PROPH/DIAG IV INF ADDON: CPT

## 2022-01-01 PROCEDURE — 25010000002 LEVOFLOXACIN PER 250 MG: Performed by: ANESTHESIOLOGY

## 2022-01-01 PROCEDURE — 87507 IADNA-DNA/RNA PROBE TQ 12-25: CPT | Performed by: EMERGENCY MEDICINE

## 2022-01-01 PROCEDURE — 87086 URINE CULTURE/COLONY COUNT: CPT | Performed by: EMERGENCY MEDICINE

## 2022-01-01 PROCEDURE — U0004 COV-19 TEST NON-CDC HGH THRU: HCPCS | Performed by: NURSE PRACTITIONER

## 2022-01-01 PROCEDURE — 25010000002 PROPOFOL 10 MG/ML EMULSION

## 2022-01-01 PROCEDURE — 25010000002 ONDANSETRON PER 1 MG: Performed by: NURSE PRACTITIONER

## 2022-01-01 PROCEDURE — 85732 THROMBOPLASTIN TIME PARTIAL: CPT | Performed by: INTERNAL MEDICINE

## 2022-01-01 PROCEDURE — 25010000002 ONDANSETRON PER 1 MG: Performed by: NURSE ANESTHETIST, CERTIFIED REGISTERED

## 2022-01-01 PROCEDURE — 87493 C DIFF AMPLIFIED PROBE: CPT | Performed by: NURSE PRACTITIONER

## 2022-01-01 PROCEDURE — 80053 COMPREHEN METABOLIC PANEL: CPT | Performed by: PHYSICIAN ASSISTANT

## 2022-01-01 PROCEDURE — 25010000002 PROPOFOL 10 MG/ML EMULSION: Performed by: NURSE ANESTHETIST, CERTIFIED REGISTERED

## 2022-01-01 PROCEDURE — G0300 HHS/HOSPICE OF LPN EA 15 MIN: HCPCS

## 2022-01-01 PROCEDURE — 83735 ASSAY OF MAGNESIUM: CPT | Performed by: UROLOGY

## 2022-01-01 PROCEDURE — 0 LIDOCAINE 1 % SOLUTION: Performed by: STUDENT IN AN ORGANIZED HEALTH CARE EDUCATION/TRAINING PROGRAM

## 2022-01-01 PROCEDURE — 84145 PROCALCITONIN (PCT): CPT | Performed by: STUDENT IN AN ORGANIZED HEALTH CARE EDUCATION/TRAINING PROGRAM

## 2022-01-01 PROCEDURE — 83605 ASSAY OF LACTIC ACID: CPT | Performed by: INTERNAL MEDICINE

## 2022-01-01 PROCEDURE — 25010000002 MORPHINE PER 10 MG: Performed by: NURSE PRACTITIONER

## 2022-01-01 PROCEDURE — 81241 F5 GENE: CPT | Performed by: INTERNAL MEDICINE

## 2022-01-01 PROCEDURE — 63710000001 LANTHANUM 500 MG CHEWABLE TABLET: Performed by: SURGERY

## 2022-01-01 PROCEDURE — G0162 HHC RN E&M PLAN SVS, 15 MIN: HCPCS

## 2022-01-01 PROCEDURE — 83880 ASSAY OF NATRIURETIC PEPTIDE: CPT | Performed by: PHYSICIAN ASSISTANT

## 2022-01-01 PROCEDURE — 84132 ASSAY OF SERUM POTASSIUM: CPT | Performed by: HOSPITALIST

## 2022-01-01 PROCEDURE — 71275 CT ANGIOGRAPHY CHEST: CPT

## 2022-01-01 PROCEDURE — 63710000001 ASPIRIN 81 MG CHEWABLE TABLET: Performed by: SURGERY

## 2022-01-01 PROCEDURE — 36600 WITHDRAWAL OF ARTERIAL BLOOD: CPT

## 2022-01-01 PROCEDURE — 0 IOPAMIDOL PER 1 ML: Performed by: INTERNAL MEDICINE

## 2022-01-01 DEVICE — URETERAL STENT
Type: IMPLANTABLE DEVICE | Site: URETER | Status: FUNCTIONAL
Brand: CONTOUR™

## 2022-01-01 RX ORDER — FAMOTIDINE 20 MG/1
20 TABLET, FILM COATED ORAL 2 TIMES DAILY
Status: DISCONTINUED | OUTPATIENT
Start: 2022-01-01 | End: 2022-01-01 | Stop reason: HOSPADM

## 2022-01-01 RX ORDER — LANTHANUM CARBONATE 750 MG/1
750 TABLET, CHEWABLE ORAL 3 TIMES DAILY
Status: ON HOLD | COMMUNITY
Start: 2021-07-28 | End: 2022-01-01 | Stop reason: SDUPTHER

## 2022-01-01 RX ORDER — DEXTROSE MONOHYDRATE 25 G/50ML
25 INJECTION, SOLUTION INTRAVENOUS
Status: DISCONTINUED | OUTPATIENT
Start: 2022-01-01 | End: 2022-01-01

## 2022-01-01 RX ORDER — SODIUM CHLORIDE 0.9 % (FLUSH) 0.9 %
3-10 SYRINGE (ML) INJECTION AS NEEDED
Status: DISCONTINUED | OUTPATIENT
Start: 2022-01-01 | End: 2022-01-01 | Stop reason: HOSPADM

## 2022-01-01 RX ORDER — MAGNESIUM SULFATE HEPTAHYDRATE 40 MG/ML
2 INJECTION, SOLUTION INTRAVENOUS ONCE
Status: COMPLETED | OUTPATIENT
Start: 2022-01-01 | End: 2022-01-01

## 2022-01-01 RX ORDER — SODIUM CHLORIDE, SODIUM LACTATE, CALCIUM CHLORIDE, MAGNESIUM CHLORIDE AND DEXTROSE 1.5; 538; 448; 18.3; 5.08 G/100ML; MG/100ML; MG/100ML; MG/100ML; MG/100ML
2000 INJECTION, SOLUTION INTRAPERITONEAL AS NEEDED
Status: DISCONTINUED | OUTPATIENT
Start: 2022-01-01 | End: 2022-01-01 | Stop reason: HOSPADM

## 2022-01-01 RX ORDER — VANCOMYCIN HYDROCHLORIDE 125 MG/1
125 CAPSULE ORAL EVERY 24 HOURS
Qty: 7 CAPSULE | Refills: 0 | Status: SHIPPED | OUTPATIENT
Start: 2022-01-01 | End: 2022-01-01

## 2022-01-01 RX ORDER — NOREPINEPHRINE BIT/0.9 % NACL 8 MG/250ML
.02-.3 INFUSION BOTTLE (ML) INTRAVENOUS
Status: DISCONTINUED | OUTPATIENT
Start: 2022-01-01 | End: 2022-01-01

## 2022-01-01 RX ORDER — CARVEDILOL 3.12 MG/1
3.12 TABLET ORAL 2 TIMES DAILY
Qty: 180 TABLET | Refills: 3 | Status: SHIPPED | OUTPATIENT
Start: 2022-01-01 | End: 2023-01-01 | Stop reason: HOSPADM

## 2022-01-01 RX ORDER — LEVOTHYROXINE SODIUM 0.15 MG/1
175 TABLET ORAL DAILY
COMMUNITY
Start: 2023-01-01

## 2022-01-01 RX ORDER — LEVOFLOXACIN 250 MG/1
250 TABLET ORAL ONCE
Qty: 1 TABLET | Refills: 0 | Status: SHIPPED | OUTPATIENT
Start: 2022-01-01 | End: 2022-01-01

## 2022-01-01 RX ORDER — AMMONIUM LACTATE 120 MG/G
1 CREAM TOPICAL EVERY 12 HOURS PRN
Qty: 385 G | Refills: 0 | Status: ON HOLD | OUTPATIENT
Start: 2022-01-01 | End: 2022-01-01

## 2022-01-01 RX ORDER — PROMETHAZINE HYDROCHLORIDE 25 MG/1
25 SUPPOSITORY RECTAL ONCE AS NEEDED
Status: DISCONTINUED | OUTPATIENT
Start: 2022-01-01 | End: 2022-01-01 | Stop reason: HOSPADM

## 2022-01-01 RX ORDER — OXYCODONE HYDROCHLORIDE AND ACETAMINOPHEN 5; 325 MG/1; MG/1
1 TABLET ORAL EVERY 6 HOURS PRN
Status: DISCONTINUED | OUTPATIENT
Start: 2022-01-01 | End: 2022-01-01 | Stop reason: HOSPADM

## 2022-01-01 RX ORDER — SODIUM CHLORIDE, SODIUM LACTATE, CALCIUM CHLORIDE, MAGNESIUM CHLORIDE AND DEXTROSE 1.5; 538; 448; 18.3; 5.08 G/100ML; MG/100ML; MG/100ML; MG/100ML; MG/100ML
2000 INJECTION, SOLUTION INTRAPERITONEAL AS NEEDED
Status: DISCONTINUED | OUTPATIENT
Start: 2022-01-01 | End: 2022-01-01

## 2022-01-01 RX ORDER — SODIUM CHLORIDE 0.9 % (FLUSH) 0.9 %
3 SYRINGE (ML) INJECTION EVERY 12 HOURS SCHEDULED
Status: DISCONTINUED | OUTPATIENT
Start: 2022-01-01 | End: 2022-01-01 | Stop reason: HOSPADM

## 2022-01-01 RX ORDER — NICOTINE POLACRILEX 4 MG
15 LOZENGE BUCCAL
Status: DISCONTINUED | OUTPATIENT
Start: 2022-01-01 | End: 2022-01-01

## 2022-01-01 RX ORDER — POTASSIUM CHLORIDE 750 MG/1
40 TABLET, FILM COATED, EXTENDED RELEASE ORAL AS NEEDED
Status: DISCONTINUED | OUTPATIENT
Start: 2022-01-01 | End: 2022-01-01 | Stop reason: HOSPADM

## 2022-01-01 RX ORDER — ACETAMINOPHEN 500 MG
500 TABLET ORAL EVERY 6 HOURS PRN
Status: DISCONTINUED | OUTPATIENT
Start: 2022-01-01 | End: 2022-01-01 | Stop reason: HOSPADM

## 2022-01-01 RX ORDER — SODIUM CHLORIDE 9 MG/ML
75 INJECTION, SOLUTION INTRAVENOUS CONTINUOUS
Status: DISCONTINUED | OUTPATIENT
Start: 2022-01-01 | End: 2022-01-01

## 2022-01-01 RX ORDER — CLOPIDOGREL BISULFATE 75 MG/1
75 TABLET ORAL DAILY
Qty: 30 TABLET | Refills: 0 | Status: ON HOLD | OUTPATIENT
Start: 2022-01-01 | End: 2022-01-01

## 2022-01-01 RX ORDER — SODIUM CHLORIDE 0.9 % (FLUSH) 0.9 %
10 SYRINGE (ML) INJECTION AS NEEDED
Status: DISCONTINUED | OUTPATIENT
Start: 2022-01-01 | End: 2022-01-01 | Stop reason: HOSPADM

## 2022-01-01 RX ORDER — MONTELUKAST SODIUM 10 MG/1
10 TABLET ORAL NIGHTLY
Status: DISCONTINUED | OUTPATIENT
Start: 2022-01-01 | End: 2022-01-01 | Stop reason: HOSPADM

## 2022-01-01 RX ORDER — LABETALOL HYDROCHLORIDE 5 MG/ML
5 INJECTION, SOLUTION INTRAVENOUS
Status: DISCONTINUED | OUTPATIENT
Start: 2022-01-01 | End: 2022-01-01 | Stop reason: HOSPADM

## 2022-01-01 RX ORDER — ASPIRIN 81 MG/1
81 TABLET, CHEWABLE ORAL DAILY
Status: DISCONTINUED | OUTPATIENT
Start: 2022-01-01 | End: 2022-01-01 | Stop reason: HOSPADM

## 2022-01-01 RX ORDER — IBUPROFEN 600 MG/1
600 TABLET ORAL ONCE AS NEEDED
Status: DISCONTINUED | OUTPATIENT
Start: 2022-01-01 | End: 2022-01-01 | Stop reason: HOSPADM

## 2022-01-01 RX ORDER — PROPOFOL 10 MG/ML
VIAL (ML) INTRAVENOUS AS NEEDED
Status: DISCONTINUED | OUTPATIENT
Start: 2022-01-01 | End: 2022-01-01 | Stop reason: SURG

## 2022-01-01 RX ORDER — LEVOTHYROXINE SODIUM 0.15 MG/1
150 TABLET ORAL DAILY
COMMUNITY
Start: 2022-01-23 | End: 2022-01-01 | Stop reason: HOSPADM

## 2022-01-01 RX ORDER — LIDOCAINE HYDROCHLORIDE 20 MG/ML
INJECTION, SOLUTION INFILTRATION; PERINEURAL AS NEEDED
Status: DISCONTINUED | OUTPATIENT
Start: 2022-01-01 | End: 2022-01-01 | Stop reason: SURG

## 2022-01-01 RX ORDER — POTASSIUM CHLORIDE 1.5 G/1.77G
20 POWDER, FOR SOLUTION ORAL ONCE
Status: DISCONTINUED | OUTPATIENT
Start: 2022-01-01 | End: 2022-01-01

## 2022-01-01 RX ORDER — SODIUM CHLORIDE 9 MG/ML
INJECTION, SOLUTION INTRAVENOUS CONTINUOUS PRN
Status: DISCONTINUED | OUTPATIENT
Start: 2022-01-01 | End: 2022-01-01 | Stop reason: SURG

## 2022-01-01 RX ORDER — DIPHENHYDRAMINE HCL 25 MG
25 CAPSULE ORAL
Status: DISCONTINUED | OUTPATIENT
Start: 2022-01-01 | End: 2022-01-01

## 2022-01-01 RX ORDER — FAMOTIDINE 10 MG/ML
20 INJECTION, SOLUTION INTRAVENOUS ONCE
Status: COMPLETED | OUTPATIENT
Start: 2022-01-01 | End: 2022-01-01

## 2022-01-01 RX ORDER — LIDOCAINE HYDROCHLORIDE 10 MG/ML
0.5 INJECTION, SOLUTION EPIDURAL; INFILTRATION; INTRACAUDAL; PERINEURAL ONCE AS NEEDED
Status: DISCONTINUED | OUTPATIENT
Start: 2022-01-01 | End: 2022-01-01 | Stop reason: HOSPADM

## 2022-01-01 RX ORDER — MIRTAZAPINE 15 MG/1
15 TABLET, FILM COATED ORAL NIGHTLY PRN
COMMUNITY
End: 2022-01-01 | Stop reason: HOSPADM

## 2022-01-01 RX ORDER — LEVOFLOXACIN 5 MG/ML
250 INJECTION, SOLUTION INTRAVENOUS EVERY 24 HOURS
Status: DISCONTINUED | OUTPATIENT
Start: 2022-01-01 | End: 2022-01-01

## 2022-01-01 RX ORDER — FENTANYL CITRATE 50 UG/ML
50 INJECTION, SOLUTION INTRAMUSCULAR; INTRAVENOUS
Status: DISCONTINUED | OUTPATIENT
Start: 2022-01-01 | End: 2022-01-01 | Stop reason: HOSPADM

## 2022-01-01 RX ORDER — GLYCOPYRROLATE 0.2 MG/ML
INJECTION INTRAMUSCULAR; INTRAVENOUS AS NEEDED
Status: DISCONTINUED | OUTPATIENT
Start: 2022-01-01 | End: 2022-01-01 | Stop reason: SURG

## 2022-01-01 RX ORDER — NALOXONE HCL 0.4 MG/ML
0.4 VIAL (ML) INJECTION
Status: DISCONTINUED | OUTPATIENT
Start: 2022-01-01 | End: 2022-01-01 | Stop reason: HOSPADM

## 2022-01-01 RX ORDER — MORPHINE SULFATE 2 MG/ML
1 INJECTION, SOLUTION INTRAMUSCULAR; INTRAVENOUS ONCE
Status: COMPLETED | OUTPATIENT
Start: 2022-01-01 | End: 2022-01-01

## 2022-01-01 RX ORDER — FLUMAZENIL 0.1 MG/ML
0.2 INJECTION INTRAVENOUS AS NEEDED
Status: DISCONTINUED | OUTPATIENT
Start: 2022-01-01 | End: 2022-01-01 | Stop reason: HOSPADM

## 2022-01-01 RX ORDER — VANCOMYCIN HYDROCHLORIDE 125 MG/1
125 CAPSULE ORAL EVERY 12 HOURS
Qty: 14 CAPSULE | Refills: 0 | Status: SHIPPED | OUTPATIENT
Start: 2022-01-01 | End: 2022-01-01

## 2022-01-01 RX ORDER — POTASSIUM CHLORIDE 1.5 G/1.77G
40 POWDER, FOR SOLUTION ORAL AS NEEDED
Status: DISCONTINUED | OUTPATIENT
Start: 2022-01-01 | End: 2022-01-01 | Stop reason: HOSPADM

## 2022-01-01 RX ORDER — HYDROCODONE BITARTRATE AND ACETAMINOPHEN 5; 325 MG/1; MG/1
1 TABLET ORAL ONCE AS NEEDED
Status: COMPLETED | OUTPATIENT
Start: 2022-01-01 | End: 2022-01-01

## 2022-01-01 RX ORDER — LORAZEPAM 2 MG/ML
0.5 INJECTION INTRAMUSCULAR ONCE
Status: COMPLETED | OUTPATIENT
Start: 2022-01-01 | End: 2022-01-01

## 2022-01-01 RX ORDER — ALLOPURINOL 100 MG/1
100 TABLET ORAL DAILY
Status: ON HOLD | COMMUNITY
End: 2022-01-01

## 2022-01-01 RX ORDER — POTASSIUM CHLORIDE 750 MG/1
40 TABLET, FILM COATED, EXTENDED RELEASE ORAL 2 TIMES DAILY WITH MEALS
Status: DISCONTINUED | OUTPATIENT
Start: 2022-01-01 | End: 2022-01-01 | Stop reason: HOSPADM

## 2022-01-01 RX ORDER — VANCOMYCIN HYDROCHLORIDE 125 MG/1
125 CAPSULE ORAL EVERY 6 HOURS SCHEDULED
Status: DISCONTINUED | OUTPATIENT
Start: 2022-01-01 | End: 2022-01-01 | Stop reason: HOSPADM

## 2022-01-01 RX ORDER — EPHEDRINE SULFATE 50 MG/ML
5 INJECTION, SOLUTION INTRAVENOUS ONCE AS NEEDED
Status: DISCONTINUED | OUTPATIENT
Start: 2022-01-01 | End: 2022-01-01 | Stop reason: HOSPADM

## 2022-01-01 RX ORDER — TRAZODONE HYDROCHLORIDE 100 MG/1
100 TABLET ORAL NIGHTLY
Status: DISCONTINUED | OUTPATIENT
Start: 2022-01-01 | End: 2022-01-01

## 2022-01-01 RX ORDER — SODIUM CHLORIDE 9 MG/ML
9 INJECTION, SOLUTION INTRAVENOUS CONTINUOUS
Status: DISCONTINUED | OUTPATIENT
Start: 2022-01-01 | End: 2022-01-01 | Stop reason: HOSPADM

## 2022-01-01 RX ORDER — POTASSIUM CHLORIDE 750 MG/1
40 TABLET, FILM COATED, EXTENDED RELEASE ORAL ONCE
Status: COMPLETED | OUTPATIENT
Start: 2022-01-01 | End: 2022-01-01

## 2022-01-01 RX ORDER — HYDROCODONE BITARTRATE AND ACETAMINOPHEN 7.5; 325 MG/1; MG/1
1 TABLET ORAL ONCE AS NEEDED
Status: COMPLETED | OUTPATIENT
Start: 2022-01-01 | End: 2022-01-01

## 2022-01-01 RX ORDER — MAGNESIUM HYDROXIDE 1200 MG/15ML
LIQUID ORAL AS NEEDED
Status: DISCONTINUED | OUTPATIENT
Start: 2022-01-01 | End: 2022-01-01 | Stop reason: HOSPADM

## 2022-01-01 RX ORDER — GENTAMICIN SULFATE 1 MG/G
1 OINTMENT TOPICAL DAILY
Status: ON HOLD | COMMUNITY
Start: 2021-04-16 | End: 2022-01-01

## 2022-01-01 RX ORDER — SODIUM CHLORIDE 9 MG/ML
9 INJECTION, SOLUTION INTRAVENOUS CONTINUOUS PRN
Status: DISCONTINUED | OUTPATIENT
Start: 2022-01-01 | End: 2022-01-01 | Stop reason: HOSPADM

## 2022-01-01 RX ORDER — AMMONIUM LACTATE 120 MG/G
1 CREAM TOPICAL EVERY 12 HOURS PRN
Status: DISCONTINUED | OUTPATIENT
Start: 2022-01-01 | End: 2022-01-01 | Stop reason: SDUPTHER

## 2022-01-01 RX ORDER — LINEZOLID 2 MG/ML
600 INJECTION, SOLUTION INTRAVENOUS EVERY 12 HOURS
Status: DISCONTINUED | OUTPATIENT
Start: 2022-01-01 | End: 2022-01-01

## 2022-01-01 RX ORDER — SODIUM CHLORIDE 9 MG/ML
100 INJECTION, SOLUTION INTRAVENOUS CONTINUOUS
Status: DISCONTINUED | OUTPATIENT
Start: 2022-01-01 | End: 2022-01-01

## 2022-01-01 RX ORDER — HYDROMORPHONE HYDROCHLORIDE 1 MG/ML
0.5 INJECTION, SOLUTION INTRAMUSCULAR; INTRAVENOUS; SUBCUTANEOUS
Status: DISCONTINUED | OUTPATIENT
Start: 2022-01-01 | End: 2022-01-01

## 2022-01-01 RX ORDER — ESCITALOPRAM OXALATE 20 MG/1
20 TABLET ORAL DAILY
Status: DISCONTINUED | OUTPATIENT
Start: 2022-01-01 | End: 2022-01-01 | Stop reason: HOSPADM

## 2022-01-01 RX ORDER — SODIUM BICARBONATE 650 MG/1
650 TABLET ORAL 3 TIMES DAILY
Status: DISCONTINUED | OUTPATIENT
Start: 2022-01-01 | End: 2022-01-01

## 2022-01-01 RX ORDER — ONDANSETRON 2 MG/ML
4 INJECTION INTRAMUSCULAR; INTRAVENOUS EVERY 6 HOURS PRN
Status: DISCONTINUED | OUTPATIENT
Start: 2022-01-01 | End: 2022-01-01

## 2022-01-01 RX ORDER — UREA 10 %
3 LOTION (ML) TOPICAL NIGHTLY
Status: DISCONTINUED | OUTPATIENT
Start: 2022-01-01 | End: 2022-01-01 | Stop reason: HOSPADM

## 2022-01-01 RX ORDER — NALOXONE HCL 0.4 MG/ML
0.2 VIAL (ML) INJECTION AS NEEDED
Status: DISCONTINUED | OUTPATIENT
Start: 2022-01-01 | End: 2022-01-01 | Stop reason: HOSPADM

## 2022-01-01 RX ORDER — DIPHENHYDRAMINE HCL 25 MG
25 CAPSULE ORAL
Status: DISCONTINUED | OUTPATIENT
Start: 2022-01-01 | End: 2022-01-01 | Stop reason: HOSPADM

## 2022-01-01 RX ORDER — ASPIRIN 81 MG/1
1 TABLET, CHEWABLE ORAL DAILY
Status: ON HOLD | COMMUNITY
Start: 2022-01-01 | End: 2022-01-01 | Stop reason: SDUPTHER

## 2022-01-01 RX ORDER — LINEZOLID 600 MG/1
600 TABLET, FILM COATED ORAL EVERY 12 HOURS SCHEDULED
Qty: 8 TABLET | Refills: 0 | Status: SHIPPED | OUTPATIENT
Start: 2022-01-01 | End: 2022-01-01

## 2022-01-01 RX ORDER — HEPARIN SODIUM 1000 [USP'U]/ML
5000 INJECTION, SOLUTION INTRAVENOUS; SUBCUTANEOUS ONCE
Status: COMPLETED | OUTPATIENT
Start: 2022-01-01 | End: 2022-01-01

## 2022-01-01 RX ORDER — POTASSIUM CHLORIDE 1.5 G/1.77G
20 POWDER, FOR SOLUTION ORAL ONCE
Status: COMPLETED | OUTPATIENT
Start: 2022-01-01 | End: 2022-01-01

## 2022-01-01 RX ORDER — SODIUM BICARBONATE 650 MG/1
1300 TABLET ORAL 3 TIMES DAILY
Qty: 90 TABLET | Refills: 0 | Status: SHIPPED | OUTPATIENT
Start: 2022-01-01 | End: 2023-01-01 | Stop reason: HOSPADM

## 2022-01-01 RX ORDER — HEPARIN SODIUM 5000 [USP'U]/ML
5000 INJECTION, SOLUTION INTRAVENOUS; SUBCUTANEOUS EVERY 8 HOURS SCHEDULED
Status: DISCONTINUED | OUTPATIENT
Start: 2022-01-01 | End: 2022-01-01 | Stop reason: HOSPADM

## 2022-01-01 RX ORDER — LANTHANUM CARBONATE 500 MG/1
750 TABLET, CHEWABLE ORAL
Status: DISCONTINUED | OUTPATIENT
Start: 2022-01-01 | End: 2022-01-01

## 2022-01-01 RX ORDER — NITROGLYCERIN 0.4 MG/1
0.4 TABLET SUBLINGUAL
Status: DISCONTINUED | OUTPATIENT
Start: 2022-01-01 | End: 2022-01-01 | Stop reason: HOSPADM

## 2022-01-01 RX ORDER — GENTAMICIN SULFATE 1 MG/G
1 OINTMENT TOPICAL EVERY 8 HOURS SCHEDULED
Status: DISCONTINUED | OUTPATIENT
Start: 2022-01-01 | End: 2022-01-01 | Stop reason: HOSPADM

## 2022-01-01 RX ORDER — CLOPIDOGREL BISULFATE 75 MG/1
75 TABLET ORAL DAILY
Status: DISCONTINUED | OUTPATIENT
Start: 2022-01-01 | End: 2022-01-01 | Stop reason: HOSPADM

## 2022-01-01 RX ORDER — ALUMINA, MAGNESIA, AND SIMETHICONE 2400; 2400; 240 MG/30ML; MG/30ML; MG/30ML
15 SUSPENSION ORAL EVERY 6 HOURS PRN
Status: DISCONTINUED | OUTPATIENT
Start: 2022-01-01 | End: 2022-01-01 | Stop reason: HOSPADM

## 2022-01-01 RX ORDER — SACCHAROMYCES BOULARDII 250 MG
500 CAPSULE ORAL 2 TIMES DAILY
Status: DISCONTINUED | OUTPATIENT
Start: 2022-01-01 | End: 2022-01-01 | Stop reason: HOSPADM

## 2022-01-01 RX ORDER — ONDANSETRON 2 MG/ML
4 INJECTION INTRAMUSCULAR; INTRAVENOUS ONCE
Status: DISCONTINUED | OUTPATIENT
Start: 2022-01-01 | End: 2022-01-01 | Stop reason: HOSPADM

## 2022-01-01 RX ORDER — HYDROMORPHONE HYDROCHLORIDE 1 MG/ML
0.5 INJECTION, SOLUTION INTRAMUSCULAR; INTRAVENOUS; SUBCUTANEOUS ONCE
Status: COMPLETED | OUTPATIENT
Start: 2022-01-01 | End: 2022-01-01

## 2022-01-01 RX ORDER — HYDROMORPHONE HYDROCHLORIDE 1 MG/ML
0.5 INJECTION, SOLUTION INTRAMUSCULAR; INTRAVENOUS; SUBCUTANEOUS
Status: DISCONTINUED | OUTPATIENT
Start: 2022-01-01 | End: 2022-01-01 | Stop reason: HOSPADM

## 2022-01-01 RX ORDER — LIDOCAINE HYDROCHLORIDE 10 MG/ML
20 INJECTION, SOLUTION INFILTRATION; PERINEURAL ONCE
Status: COMPLETED | OUTPATIENT
Start: 2022-01-01 | End: 2022-01-01

## 2022-01-01 RX ORDER — LEVOFLOXACIN 5 MG/ML
500 INJECTION, SOLUTION INTRAVENOUS ONCE
Status: COMPLETED | OUTPATIENT
Start: 2022-01-01 | End: 2022-01-01

## 2022-01-01 RX ORDER — DIPHENOXYLATE HYDROCHLORIDE AND ATROPINE SULFATE 2.5; .025 MG/1; MG/1
1 TABLET ORAL DAILY
Status: DISCONTINUED | OUTPATIENT
Start: 2022-01-01 | End: 2022-01-01 | Stop reason: HOSPADM

## 2022-01-01 RX ORDER — SODIUM CHLORIDE, SODIUM LACTATE, CALCIUM CHLORIDE, MAGNESIUM CHLORIDE AND DEXTROSE 1.5; 538; 448; 18.3; 5.08 G/100ML; MG/100ML; MG/100ML; MG/100ML; MG/100ML
2000 INJECTION, SOLUTION INTRAPERITONEAL
Status: DISCONTINUED | OUTPATIENT
Start: 2022-01-01 | End: 2022-01-01

## 2022-01-01 RX ORDER — CHOLECALCIFEROL (VITAMIN D3) 125 MCG
5 CAPSULE ORAL NIGHTLY PRN
COMMUNITY
Start: 2022-01-01

## 2022-01-01 RX ORDER — TRAZODONE HYDROCHLORIDE 100 MG/1
100 TABLET ORAL NIGHTLY
Status: DISCONTINUED | OUTPATIENT
Start: 2022-01-01 | End: 2022-01-01 | Stop reason: HOSPADM

## 2022-01-01 RX ORDER — ACETAMINOPHEN 325 MG/1
650 TABLET ORAL EVERY 6 HOURS PRN
Status: DISCONTINUED | OUTPATIENT
Start: 2022-01-01 | End: 2022-01-01 | Stop reason: HOSPADM

## 2022-01-01 RX ORDER — DEXTROSE MONOHYDRATE, SODIUM CHLORIDE, SODIUM LACTATE, CALCIUM CHLORIDE, MAGNESIUM CHLORIDE 1.5; 538; 448; 18.4; 5.08 G/100ML; MG/100ML; MG/100ML; MG/100ML; MG/100ML
2000 SOLUTION INTRAPERITONEAL AS NEEDED
Status: DISCONTINUED | OUTPATIENT
Start: 2022-01-01 | End: 2022-01-01 | Stop reason: HOSPADM

## 2022-01-01 RX ORDER — POTASSIUM CHLORIDE 750 MG/1
20 TABLET, FILM COATED, EXTENDED RELEASE ORAL ONCE
Status: DISCONTINUED | OUTPATIENT
Start: 2022-01-01 | End: 2022-01-01

## 2022-01-01 RX ORDER — SODIUM BICARBONATE 650 MG/1
1300 TABLET ORAL 3 TIMES DAILY
Status: DISCONTINUED | OUTPATIENT
Start: 2022-01-01 | End: 2022-01-01 | Stop reason: HOSPADM

## 2022-01-01 RX ORDER — LEVOTHYROXINE SODIUM 137 UG/1
137 TABLET ORAL EVERY MORNING
Status: DISCONTINUED | OUTPATIENT
Start: 2022-01-01 | End: 2022-01-01 | Stop reason: HOSPADM

## 2022-01-01 RX ORDER — AMMONIUM LACTATE 120 MG/G
1 CREAM TOPICAL EVERY 12 HOURS PRN
Status: DISCONTINUED | OUTPATIENT
Start: 2022-01-01 | End: 2022-01-01 | Stop reason: HOSPADM

## 2022-01-01 RX ORDER — MIDODRINE HYDROCHLORIDE 5 MG/1
15 TABLET ORAL
Status: DISCONTINUED | OUTPATIENT
Start: 2022-01-01 | End: 2022-01-01 | Stop reason: HOSPADM

## 2022-01-01 RX ORDER — CEFDINIR 300 MG/1
300 CAPSULE ORAL
Qty: 5 CAPSULE | Refills: 0 | Status: SHIPPED | OUTPATIENT
Start: 2022-01-01 | End: 2022-01-01

## 2022-01-01 RX ORDER — PROMETHAZINE HYDROCHLORIDE 25 MG/1
25 TABLET ORAL ONCE AS NEEDED
Status: DISCONTINUED | OUTPATIENT
Start: 2022-01-01 | End: 2022-01-01 | Stop reason: HOSPADM

## 2022-01-01 RX ORDER — POTASSIUM CHLORIDE 750 MG/1
20 TABLET, FILM COATED, EXTENDED RELEASE ORAL 2 TIMES DAILY
Status: DISCONTINUED | OUTPATIENT
Start: 2022-01-01 | End: 2022-01-01 | Stop reason: HOSPADM

## 2022-01-01 RX ORDER — ACETAMINOPHEN 650 MG/1
650 SUPPOSITORY RECTAL EVERY 4 HOURS PRN
Status: DISCONTINUED | OUTPATIENT
Start: 2022-01-01 | End: 2022-01-01 | Stop reason: HOSPADM

## 2022-01-01 RX ORDER — ALBUMIN (HUMAN) 12.5 G/50ML
25 SOLUTION INTRAVENOUS ONCE
Status: COMPLETED | OUTPATIENT
Start: 2022-01-01 | End: 2022-01-01

## 2022-01-01 RX ORDER — MIDODRINE HYDROCHLORIDE 10 MG/1
1 TABLET ORAL 3 TIMES DAILY
Status: ON HOLD | COMMUNITY
Start: 2021-12-23 | End: 2022-01-01 | Stop reason: SDUPTHER

## 2022-01-01 RX ORDER — VANCOMYCIN HYDROCHLORIDE 125 MG/1
125 CAPSULE ORAL EVERY OTHER DAY
Status: DISCONTINUED | OUTPATIENT
Start: 2022-01-01 | End: 2022-01-01 | Stop reason: HOSPADM

## 2022-01-01 RX ORDER — SODIUM BICARBONATE 650 MG/1
650 TABLET ORAL 4 TIMES DAILY
Status: DISCONTINUED | OUTPATIENT
Start: 2022-01-01 | End: 2022-01-01 | Stop reason: HOSPADM

## 2022-01-01 RX ORDER — ALLOPURINOL 100 MG/1
100 TABLET ORAL DAILY
Status: DISCONTINUED | OUTPATIENT
Start: 2022-01-01 | End: 2022-01-01

## 2022-01-01 RX ORDER — ACETAMINOPHEN 160 MG/5ML
650 SOLUTION ORAL EVERY 4 HOURS PRN
Status: DISCONTINUED | OUTPATIENT
Start: 2022-01-01 | End: 2022-01-01 | Stop reason: HOSPADM

## 2022-01-01 RX ORDER — DIPHENHYDRAMINE HYDROCHLORIDE 50 MG/ML
12.5 INJECTION INTRAMUSCULAR; INTRAVENOUS
Status: DISCONTINUED | OUTPATIENT
Start: 2022-01-01 | End: 2022-01-01 | Stop reason: HOSPADM

## 2022-01-01 RX ORDER — LEVOTHYROXINE SODIUM 137 UG/1
137 TABLET ORAL DAILY
Status: DISCONTINUED | OUTPATIENT
Start: 2022-01-01 | End: 2022-01-01 | Stop reason: HOSPADM

## 2022-01-01 RX ORDER — DEXAMETHASONE SODIUM PHOSPHATE 10 MG/ML
INJECTION INTRAMUSCULAR; INTRAVENOUS AS NEEDED
Status: DISCONTINUED | OUTPATIENT
Start: 2022-01-01 | End: 2022-01-01 | Stop reason: SURG

## 2022-01-01 RX ORDER — VANCOMYCIN HYDROCHLORIDE 125 MG/1
125 CAPSULE ORAL EVERY 12 HOURS
Status: DISCONTINUED | OUTPATIENT
Start: 2022-01-01 | End: 2022-01-01 | Stop reason: HOSPADM

## 2022-01-01 RX ORDER — MIDODRINE HYDROCHLORIDE 5 MG/1
10 TABLET ORAL
Status: DISCONTINUED | OUTPATIENT
Start: 2022-01-01 | End: 2022-01-01 | Stop reason: HOSPADM

## 2022-01-01 RX ORDER — MIDAZOLAM HYDROCHLORIDE 1 MG/ML
1 INJECTION INTRAMUSCULAR; INTRAVENOUS
Status: DISCONTINUED | OUTPATIENT
Start: 2022-01-01 | End: 2022-01-01 | Stop reason: HOSPADM

## 2022-01-01 RX ORDER — SODIUM BICARBONATE 650 MG/1
1300 TABLET ORAL 3 TIMES DAILY
Status: DISCONTINUED | OUTPATIENT
Start: 2022-01-01 | End: 2022-01-01 | Stop reason: ALTCHOICE

## 2022-01-01 RX ORDER — MIDODRINE HYDROCHLORIDE 5 MG/1
10 TABLET ORAL ONCE
Status: COMPLETED | OUTPATIENT
Start: 2022-01-01 | End: 2022-01-01

## 2022-01-01 RX ORDER — FAMOTIDINE 10 MG/ML
20 INJECTION, SOLUTION INTRAVENOUS
Status: DISCONTINUED | OUTPATIENT
Start: 2022-01-01 | End: 2022-01-01 | Stop reason: HOSPADM

## 2022-01-01 RX ORDER — LINEZOLID 600 MG/1
600 TABLET, FILM COATED ORAL EVERY 12 HOURS SCHEDULED
Status: DISCONTINUED | OUTPATIENT
Start: 2022-01-01 | End: 2022-01-01 | Stop reason: HOSPADM

## 2022-01-01 RX ORDER — PROMETHAZINE HYDROCHLORIDE 25 MG/1
25 TABLET ORAL ONCE AS NEEDED
Status: DISCONTINUED | OUTPATIENT
Start: 2022-01-01 | End: 2022-01-01

## 2022-01-01 RX ORDER — MAGNESIUM SULFATE 1 G/100ML
1 INJECTION INTRAVENOUS AS NEEDED
Status: DISCONTINUED | OUTPATIENT
Start: 2022-01-01 | End: 2022-01-01 | Stop reason: HOSPADM

## 2022-01-01 RX ORDER — ALLOPURINOL 100 MG/1
100 TABLET ORAL DAILY
Status: DISCONTINUED | OUTPATIENT
Start: 2022-01-01 | End: 2022-01-01 | Stop reason: HOSPADM

## 2022-01-01 RX ORDER — DIPHENHYDRAMINE HYDROCHLORIDE 50 MG/ML
12.5 INJECTION INTRAMUSCULAR; INTRAVENOUS
Status: DISCONTINUED | OUTPATIENT
Start: 2022-01-01 | End: 2022-01-01

## 2022-01-01 RX ORDER — MIDODRINE HYDROCHLORIDE 5 MG/1
10 TABLET ORAL
Status: DISCONTINUED | OUTPATIENT
Start: 2022-01-01 | End: 2022-01-01

## 2022-01-01 RX ORDER — HYDROCODONE BITARTRATE AND ACETAMINOPHEN 7.5; 325 MG/1; MG/1
1 TABLET ORAL ONCE AS NEEDED
Status: DISCONTINUED | OUTPATIENT
Start: 2022-01-01 | End: 2022-01-01 | Stop reason: HOSPADM

## 2022-01-01 RX ORDER — SODIUM CHLORIDE 9 MG/ML
50 INJECTION, SOLUTION INTRAVENOUS CONTINUOUS
Status: DISCONTINUED | OUTPATIENT
Start: 2022-01-01 | End: 2022-01-01 | Stop reason: HOSPADM

## 2022-01-01 RX ORDER — PHENYLEPHRINE HYDROCHLORIDE 10 MG/ML
INJECTION INTRAVENOUS AS NEEDED
Status: DISCONTINUED | OUTPATIENT
Start: 2022-01-01 | End: 2022-01-01 | Stop reason: SURG

## 2022-01-01 RX ORDER — ONDANSETRON 2 MG/ML
INJECTION INTRAMUSCULAR; INTRAVENOUS AS NEEDED
Status: DISCONTINUED | OUTPATIENT
Start: 2022-01-01 | End: 2022-01-01 | Stop reason: SURG

## 2022-01-01 RX ORDER — SODIUM CHLORIDE 0.9 % (FLUSH) 0.9 %
10 SYRINGE (ML) INJECTION AS NEEDED
Status: DISCONTINUED | OUTPATIENT
Start: 2022-01-01 | End: 2022-01-01

## 2022-01-01 RX ORDER — SODIUM CHLORIDE 0.9 % (FLUSH) 0.9 %
10 SYRINGE (ML) INJECTION EVERY 12 HOURS SCHEDULED
Status: DISCONTINUED | OUTPATIENT
Start: 2022-01-01 | End: 2022-01-01 | Stop reason: HOSPADM

## 2022-01-01 RX ORDER — FOLIC ACID/VIT B COMPLEX AND C 0.8 MG
1 TABLET ORAL DAILY
Status: DISCONTINUED | OUTPATIENT
Start: 2022-01-01 | End: 2022-01-01 | Stop reason: HOSPADM

## 2022-01-01 RX ORDER — ONDANSETRON 2 MG/ML
4 INJECTION INTRAMUSCULAR; INTRAVENOUS ONCE
Status: COMPLETED | OUTPATIENT
Start: 2022-01-01 | End: 2022-01-01

## 2022-01-01 RX ORDER — FAMOTIDINE 20 MG/1
20 TABLET, FILM COATED ORAL 2 TIMES DAILY
Status: DISCONTINUED | OUTPATIENT
Start: 2022-01-01 | End: 2022-01-01

## 2022-01-01 RX ORDER — ONDANSETRON 2 MG/ML
4 INJECTION INTRAMUSCULAR; INTRAVENOUS ONCE AS NEEDED
Status: DISCONTINUED | OUTPATIENT
Start: 2022-01-01 | End: 2022-01-01

## 2022-01-01 RX ORDER — SODIUM CHLORIDE 450 MG/100ML
75 INJECTION, SOLUTION INTRAVENOUS CONTINUOUS
Status: ACTIVE | OUTPATIENT
Start: 2022-01-01 | End: 2022-01-01

## 2022-01-01 RX ORDER — ERGOCALCIFEROL 1.25 MG/1
1 CAPSULE ORAL WEEKLY
COMMUNITY
Start: 2022-01-01

## 2022-01-01 RX ORDER — LORAZEPAM 2 MG/ML
1 INJECTION INTRAMUSCULAR ONCE
Status: COMPLETED | OUTPATIENT
Start: 2022-01-01 | End: 2022-01-01

## 2022-01-01 RX ORDER — EPHEDRINE SULFATE 50 MG/ML
5 INJECTION, SOLUTION INTRAVENOUS ONCE AS NEEDED
Status: DISCONTINUED | OUTPATIENT
Start: 2022-01-01 | End: 2022-01-01

## 2022-01-01 RX ORDER — POTASSIUM CHLORIDE 750 MG/1
40 TABLET, FILM COATED, EXTENDED RELEASE ORAL 2 TIMES DAILY WITH MEALS
Status: DISCONTINUED | OUTPATIENT
Start: 2022-01-01 | End: 2022-01-01

## 2022-01-01 RX ORDER — OXYCODONE HYDROCHLORIDE AND ACETAMINOPHEN 5; 325 MG/1; MG/1
1 TABLET ORAL EVERY 6 HOURS PRN
Status: DISCONTINUED | OUTPATIENT
Start: 2022-01-01 | End: 2022-01-01

## 2022-01-01 RX ORDER — PROCHLORPERAZINE EDISYLATE 5 MG/ML
5 INJECTION INTRAMUSCULAR; INTRAVENOUS EVERY 6 HOURS PRN
Status: DISCONTINUED | OUTPATIENT
Start: 2022-01-01 | End: 2022-01-01 | Stop reason: HOSPADM

## 2022-01-01 RX ORDER — SODIUM CHLORIDE, SODIUM LACTATE, CALCIUM CHLORIDE, MAGNESIUM CHLORIDE AND DEXTROSE 2.5; 538; 448; 18.3; 5.08 G/100ML; MG/100ML; MG/100ML; MG/100ML; MG/100ML
2000 INJECTION, SOLUTION INTRAPERITONEAL AS NEEDED
Status: DISCONTINUED | OUTPATIENT
Start: 2022-01-01 | End: 2022-01-01 | Stop reason: HOSPADM

## 2022-01-01 RX ORDER — LEVOTHYROXINE SODIUM 137 UG/1
137 TABLET ORAL DAILY
Qty: 30 TABLET | Refills: 0 | Status: ON HOLD | OUTPATIENT
Start: 2022-01-01 | End: 2022-01-01 | Stop reason: DRUGHIGH

## 2022-01-01 RX ORDER — SACCHAROMYCES BOULARDII 250 MG
500 CAPSULE ORAL 2 TIMES DAILY
Status: DISCONTINUED | OUTPATIENT
Start: 2022-01-01 | End: 2022-01-01 | Stop reason: SDUPTHER

## 2022-01-01 RX ORDER — SODIUM BICARBONATE 650 MG/1
650 TABLET ORAL 4 TIMES DAILY
Status: DISCONTINUED | OUTPATIENT
Start: 2022-01-01 | End: 2022-01-01

## 2022-01-01 RX ORDER — LEVOFLOXACIN 5 MG/ML
INJECTION, SOLUTION INTRAVENOUS AS NEEDED
Status: DISCONTINUED | OUTPATIENT
Start: 2022-01-01 | End: 2022-01-01 | Stop reason: SURG

## 2022-01-01 RX ORDER — LEVOTHYROXINE SODIUM 137 UG/1
137 TABLET ORAL
Status: DISCONTINUED | OUTPATIENT
Start: 2022-01-01 | End: 2022-01-01 | Stop reason: HOSPADM

## 2022-01-01 RX ORDER — VANCOMYCIN HYDROCHLORIDE 125 MG/1
CAPSULE ORAL
Qty: 50 CAPSULE | Refills: 0 | Status: SHIPPED | OUTPATIENT
Start: 2022-01-01 | End: 2022-01-01

## 2022-01-01 RX ORDER — POTASSIUM CHLORIDE 1.5 G/1.77G
40 POWDER, FOR SOLUTION ORAL ONCE
Status: COMPLETED | OUTPATIENT
Start: 2022-01-01 | End: 2022-01-01

## 2022-01-01 RX ORDER — SODIUM CHLORIDE, SODIUM LACTATE, POTASSIUM CHLORIDE, CALCIUM CHLORIDE 600; 310; 30; 20 MG/100ML; MG/100ML; MG/100ML; MG/100ML
9 INJECTION, SOLUTION INTRAVENOUS CONTINUOUS
Status: DISCONTINUED | OUTPATIENT
Start: 2022-01-01 | End: 2022-01-01

## 2022-01-01 RX ORDER — DEXTROSE MONOHYDRATE, SODIUM CHLORIDE, SODIUM LACTATE, CALCIUM CHLORIDE, MAGNESIUM CHLORIDE 1.5; 538; 448; 18.4; 5.08 G/100ML; MG/100ML; MG/100ML; MG/100ML; MG/100ML
2000 SOLUTION INTRAPERITONEAL
Status: DISCONTINUED | OUTPATIENT
Start: 2022-01-01 | End: 2022-01-01 | Stop reason: HOSPADM

## 2022-01-01 RX ORDER — FAMOTIDINE 20 MG/1
20 TABLET, FILM COATED ORAL DAILY
Status: DISCONTINUED | OUTPATIENT
Start: 2022-01-01 | End: 2022-01-01 | Stop reason: HOSPADM

## 2022-01-01 RX ORDER — ONDANSETRON 4 MG/1
4 TABLET, FILM COATED ORAL EVERY 6 HOURS PRN
Status: DISCONTINUED | OUTPATIENT
Start: 2022-01-01 | End: 2022-01-01 | Stop reason: HOSPADM

## 2022-01-01 RX ORDER — SACCHAROMYCES BOULARDII 250 MG
250 CAPSULE ORAL 2 TIMES DAILY
Status: DISCONTINUED | OUTPATIENT
Start: 2022-01-01 | End: 2022-01-01 | Stop reason: HOSPADM

## 2022-01-01 RX ORDER — HYDRALAZINE HYDROCHLORIDE 20 MG/ML
5 INJECTION INTRAMUSCULAR; INTRAVENOUS
Status: DISCONTINUED | OUTPATIENT
Start: 2022-01-01 | End: 2022-01-01 | Stop reason: HOSPADM

## 2022-01-01 RX ORDER — DEXTROSE MONOHYDRATE, SODIUM CHLORIDE, SODIUM LACTATE, CALCIUM CHLORIDE, MAGNESIUM CHLORIDE 1.5; 538; 448; 18.4; 5.08 G/100ML; MG/100ML; MG/100ML; MG/100ML; MG/100ML
2000 SOLUTION INTRAPERITONEAL 4 TIMES DAILY PRN
Status: DISCONTINUED | OUTPATIENT
Start: 2022-01-01 | End: 2022-01-01

## 2022-01-01 RX ORDER — ONDANSETRON 2 MG/ML
4 INJECTION INTRAMUSCULAR; INTRAVENOUS EVERY 6 HOURS PRN
Status: DISCONTINUED | OUTPATIENT
Start: 2022-01-01 | End: 2022-01-01 | Stop reason: HOSPADM

## 2022-01-01 RX ORDER — LEVOFLOXACIN 500 MG/1
500 TABLET, FILM COATED ORAL EVERY OTHER DAY
Status: COMPLETED | OUTPATIENT
Start: 2022-01-01 | End: 2022-01-01

## 2022-01-01 RX ORDER — LANTHANUM CARBONATE 500 MG/1
1500 TABLET, CHEWABLE ORAL
Status: DISCONTINUED | OUTPATIENT
Start: 2022-01-01 | End: 2022-01-01 | Stop reason: HOSPADM

## 2022-01-01 RX ORDER — CEFDINIR 300 MG/1
300 CAPSULE ORAL
Status: DISCONTINUED | OUTPATIENT
Start: 2022-01-01 | End: 2022-01-01 | Stop reason: HOSPADM

## 2022-01-01 RX ORDER — FLUMAZENIL 0.1 MG/ML
0.2 INJECTION INTRAVENOUS AS NEEDED
Status: DISCONTINUED | OUTPATIENT
Start: 2022-01-01 | End: 2022-01-01

## 2022-01-01 RX ORDER — ONDANSETRON 2 MG/ML
4 INJECTION INTRAMUSCULAR; INTRAVENOUS ONCE AS NEEDED
Status: DISCONTINUED | OUTPATIENT
Start: 2022-01-01 | End: 2022-01-01 | Stop reason: HOSPADM

## 2022-01-01 RX ORDER — FENTANYL CITRATE 50 UG/ML
50 INJECTION, SOLUTION INTRAMUSCULAR; INTRAVENOUS
Status: DISCONTINUED | OUTPATIENT
Start: 2022-01-01 | End: 2022-01-01

## 2022-01-01 RX ORDER — GENTAMICIN SULFATE 1 MG/G
1 OINTMENT TOPICAL DAILY
Status: DISCONTINUED | OUTPATIENT
Start: 2022-01-01 | End: 2022-01-01 | Stop reason: HOSPADM

## 2022-01-01 RX ORDER — NITROGLYCERIN 0.4 MG/1
0.4 TABLET SUBLINGUAL
Status: DISCONTINUED | OUTPATIENT
Start: 2022-01-01 | End: 2022-01-01

## 2022-01-01 RX ORDER — FENTANYL CITRATE 50 UG/ML
INJECTION, SOLUTION INTRAMUSCULAR; INTRAVENOUS AS NEEDED
Status: DISCONTINUED | OUTPATIENT
Start: 2022-01-01 | End: 2022-01-01 | Stop reason: SURG

## 2022-01-01 RX ORDER — LANTHANUM CARBONATE 500 MG/1
750 TABLET, CHEWABLE ORAL
Status: DISCONTINUED | OUTPATIENT
Start: 2022-01-01 | End: 2022-01-01 | Stop reason: HOSPADM

## 2022-01-01 RX ORDER — DIPHENOXYLATE HYDROCHLORIDE AND ATROPINE SULFATE 2.5; .025 MG/1; MG/1
1 TABLET ORAL DAILY
Qty: 90 TABLET | Refills: 0 | Status: ON HOLD | OUTPATIENT
Start: 2022-01-01 | End: 2022-01-01

## 2022-01-01 RX ORDER — SODIUM CHLORIDE, SODIUM LACTATE, POTASSIUM CHLORIDE, CALCIUM CHLORIDE 600; 310; 30; 20 MG/100ML; MG/100ML; MG/100ML; MG/100ML
100 INJECTION, SOLUTION INTRAVENOUS CONTINUOUS
Status: ACTIVE | OUTPATIENT
Start: 2022-01-01 | End: 2022-01-01

## 2022-01-01 RX ORDER — OXYCODONE AND ACETAMINOPHEN 7.5; 325 MG/1; MG/1
1 TABLET ORAL EVERY 4 HOURS PRN
Status: DISCONTINUED | OUTPATIENT
Start: 2022-01-01 | End: 2022-01-01 | Stop reason: HOSPADM

## 2022-01-01 RX ORDER — MAGNESIUM SULFATE 1 G/100ML
1 INJECTION INTRAVENOUS
Status: COMPLETED | OUTPATIENT
Start: 2022-01-01 | End: 2022-01-01

## 2022-01-01 RX ORDER — HYDROCODONE BITARTRATE AND ACETAMINOPHEN 5; 325 MG/1; MG/1
1 TABLET ORAL EVERY 6 HOURS PRN
COMMUNITY
Start: 2022-01-01 | End: 2022-01-01

## 2022-01-01 RX ORDER — FENTANYL CITRATE 50 UG/ML
25 INJECTION, SOLUTION INTRAMUSCULAR; INTRAVENOUS
Status: DISCONTINUED | OUTPATIENT
Start: 2022-01-01 | End: 2022-01-01 | Stop reason: HOSPADM

## 2022-01-01 RX ORDER — LOPERAMIDE HYDROCHLORIDE 2 MG/1
2 CAPSULE ORAL 4 TIMES DAILY PRN
Status: DISCONTINUED | OUTPATIENT
Start: 2022-01-01 | End: 2022-01-01 | Stop reason: HOSPADM

## 2022-01-01 RX ORDER — OXYCODONE AND ACETAMINOPHEN 7.5; 325 MG/1; MG/1
1 TABLET ORAL EVERY 4 HOURS PRN
Status: DISCONTINUED | OUTPATIENT
Start: 2022-01-01 | End: 2022-01-01

## 2022-01-01 RX ORDER — NALOXONE HCL 0.4 MG/ML
0.2 VIAL (ML) INJECTION AS NEEDED
Status: DISCONTINUED | OUTPATIENT
Start: 2022-01-01 | End: 2022-01-01

## 2022-01-01 RX ORDER — HYDROCODONE BITARTRATE AND ACETAMINOPHEN 5; 325 MG/1; MG/1
1 TABLET ORAL EVERY 4 HOURS PRN
Status: DISCONTINUED | OUTPATIENT
Start: 2022-01-01 | End: 2022-01-01 | Stop reason: HOSPADM

## 2022-01-01 RX ORDER — LABETALOL HYDROCHLORIDE 5 MG/ML
5 INJECTION, SOLUTION INTRAVENOUS
Status: DISCONTINUED | OUTPATIENT
Start: 2022-01-01 | End: 2022-01-01

## 2022-01-01 RX ORDER — OXYCODONE HYDROCHLORIDE AND ACETAMINOPHEN 5; 325 MG/1; MG/1
1 TABLET ORAL EVERY 6 HOURS PRN
Qty: 30 TABLET | Refills: 0 | Status: ON HOLD | OUTPATIENT
Start: 2022-01-01 | End: 2022-01-01

## 2022-01-01 RX ORDER — LEVOTHYROXINE SODIUM 0.1 MG/1
137 TABLET ORAL DAILY
Status: ON HOLD
Start: 2022-01-01 | End: 2022-01-01 | Stop reason: SDUPTHER

## 2022-01-01 RX ORDER — BUPIVACAINE HYDROCHLORIDE AND EPINEPHRINE 5; 5 MG/ML; UG/ML
INJECTION, SOLUTION EPIDURAL; INTRACAUDAL; PERINEURAL AS NEEDED
Status: DISCONTINUED | OUTPATIENT
Start: 2022-01-01 | End: 2022-01-01 | Stop reason: HOSPADM

## 2022-01-01 RX ORDER — LEVOTHYROXINE SODIUM 0.15 MG/1
150 TABLET ORAL DAILY
Status: DISCONTINUED | OUTPATIENT
Start: 2022-01-01 | End: 2022-01-01 | Stop reason: HOSPADM

## 2022-01-01 RX ORDER — POTASSIUM CHLORIDE 29.8 MG/ML
20 INJECTION INTRAVENOUS
Status: DISCONTINUED | OUTPATIENT
Start: 2022-01-01 | End: 2022-01-01 | Stop reason: HOSPADM

## 2022-01-01 RX ORDER — MAGNESIUM SULFATE HEPTAHYDRATE 40 MG/ML
2 INJECTION, SOLUTION INTRAVENOUS AS NEEDED
Status: DISCONTINUED | OUTPATIENT
Start: 2022-01-01 | End: 2022-01-01 | Stop reason: HOSPADM

## 2022-01-01 RX ORDER — VANCOMYCIN HYDROCHLORIDE 125 MG/1
125 CAPSULE ORAL ONCE
Status: COMPLETED | OUTPATIENT
Start: 2022-01-01 | End: 2022-01-01

## 2022-01-01 RX ORDER — EPHEDRINE SULFATE 50 MG/ML
INJECTION, SOLUTION INTRAVENOUS AS NEEDED
Status: DISCONTINUED | OUTPATIENT
Start: 2022-01-01 | End: 2022-01-01 | Stop reason: SURG

## 2022-01-01 RX ORDER — PROPOFOL 10 MG/ML
VIAL (ML) INTRAVENOUS CONTINUOUS PRN
Status: DISCONTINUED | OUTPATIENT
Start: 2022-01-01 | End: 2022-01-01 | Stop reason: SURG

## 2022-01-01 RX ORDER — MAGNESIUM SULFATE HEPTAHYDRATE 40 MG/ML
4 INJECTION, SOLUTION INTRAVENOUS AS NEEDED
Status: DISCONTINUED | OUTPATIENT
Start: 2022-01-01 | End: 2022-01-01 | Stop reason: HOSPADM

## 2022-01-01 RX ORDER — POTASSIUM CHLORIDE 750 MG/1
40 TABLET, FILM COATED, EXTENDED RELEASE ORAL DAILY
Status: DISCONTINUED | OUTPATIENT
Start: 2022-01-01 | End: 2022-01-01 | Stop reason: HOSPADM

## 2022-01-01 RX ORDER — LEVOTHYROXINE SODIUM 137 UG/1
137 TABLET ORAL DAILY
Status: DISCONTINUED | OUTPATIENT
Start: 2022-01-01 | End: 2022-01-01

## 2022-01-01 RX ORDER — HYDROMORPHONE HYDROCHLORIDE 1 MG/ML
0.25 INJECTION, SOLUTION INTRAMUSCULAR; INTRAVENOUS; SUBCUTANEOUS
Status: DISCONTINUED | OUTPATIENT
Start: 2022-01-01 | End: 2022-01-01 | Stop reason: HOSPADM

## 2022-01-01 RX ORDER — ASPIRIN 81 MG/1
81 TABLET ORAL DAILY
COMMUNITY

## 2022-01-01 RX ORDER — VANCOMYCIN HYDROCHLORIDE 125 MG/1
125 CAPSULE ORAL EVERY 24 HOURS
Status: DISCONTINUED | OUTPATIENT
Start: 2022-01-01 | End: 2022-01-01 | Stop reason: HOSPADM

## 2022-01-01 RX ORDER — POTASSIUM CHLORIDE 20 MEQ/1
2 TABLET, EXTENDED RELEASE ORAL 2 TIMES DAILY
Status: ON HOLD | COMMUNITY
Start: 2022-01-01 | End: 2023-01-01 | Stop reason: SDUPTHER

## 2022-01-01 RX ORDER — POTASSIUM CHLORIDE 750 MG/1
40 TABLET, FILM COATED, EXTENDED RELEASE ORAL EVERY 4 HOURS
Status: COMPLETED | OUTPATIENT
Start: 2022-01-01 | End: 2022-01-01

## 2022-01-01 RX ORDER — ACETAMINOPHEN 325 MG/1
650 TABLET ORAL EVERY 4 HOURS PRN
Status: DISCONTINUED | OUTPATIENT
Start: 2022-01-01 | End: 2022-01-01 | Stop reason: HOSPADM

## 2022-01-01 RX ORDER — LEVOFLOXACIN 500 MG/1
500 TABLET, FILM COATED ORAL EVERY OTHER DAY
Qty: 3 TABLET | Refills: 0 | Status: SHIPPED | OUTPATIENT
Start: 2022-01-01 | End: 2022-01-01 | Stop reason: HOSPADM

## 2022-01-01 RX ORDER — MIDODRINE HYDROCHLORIDE 5 MG/1
10 TABLET ORAL
Status: DISCONTINUED | OUTPATIENT
Start: 2022-01-01 | End: 2022-01-01 | Stop reason: SDUPTHER

## 2022-01-01 RX ORDER — HYDRALAZINE HYDROCHLORIDE 20 MG/ML
5 INJECTION INTRAMUSCULAR; INTRAVENOUS
Status: DISCONTINUED | OUTPATIENT
Start: 2022-01-01 | End: 2022-01-01

## 2022-01-01 RX ORDER — SODIUM BICARBONATE 650 MG/1
1 TABLET ORAL EVERY 8 HOURS
COMMUNITY
Start: 2021-04-16 | End: 2022-01-01 | Stop reason: HOSPADM

## 2022-01-01 RX ORDER — PROMETHAZINE HYDROCHLORIDE 25 MG/1
25 SUPPOSITORY RECTAL ONCE AS NEEDED
Status: DISCONTINUED | OUTPATIENT
Start: 2022-01-01 | End: 2022-01-01

## 2022-01-01 RX ORDER — VANCOMYCIN HYDROCHLORIDE 125 MG/1
125 CAPSULE ORAL EVERY OTHER DAY
Qty: 7 CAPSULE | Refills: 0 | Status: SHIPPED | OUTPATIENT
Start: 2022-01-01 | End: 2022-01-01

## 2022-01-01 RX ORDER — LABETALOL HYDROCHLORIDE 5 MG/ML
20 INJECTION, SOLUTION INTRAVENOUS
Status: DISCONTINUED | OUTPATIENT
Start: 2022-01-01 | End: 2022-01-01 | Stop reason: HOSPADM

## 2022-01-01 RX ADMIN — LANTHANUM CARBONATE 750 MG: 500 TABLET, CHEWABLE ORAL at 09:10

## 2022-01-01 RX ADMIN — SODIUM CHLORIDE 9 ML/HR: 9 INJECTION, SOLUTION INTRAVENOUS at 13:30

## 2022-01-01 RX ADMIN — Medication 500 MG: at 22:17

## 2022-01-01 RX ADMIN — POTASSIUM CHLORIDE 40 MEQ: 750 TABLET, EXTENDED RELEASE ORAL at 11:01

## 2022-01-01 RX ADMIN — ENOXAPARIN SODIUM 80 MG: 100 INJECTION SUBCUTANEOUS at 21:54

## 2022-01-01 RX ADMIN — VANCOMYCIN HYDROCHLORIDE 125 MG: 125 CAPSULE ORAL at 11:23

## 2022-01-01 RX ADMIN — Medication 10 ML: at 20:22

## 2022-01-01 RX ADMIN — SODIUM BICARBONATE 1300 MG: 650 TABLET ORAL at 21:51

## 2022-01-01 RX ADMIN — VANCOMYCIN HYDROCHLORIDE 125 MG: 125 CAPSULE ORAL at 00:04

## 2022-01-01 RX ADMIN — SODIUM BICARBONATE 650 MG: 650 TABLET ORAL at 12:34

## 2022-01-01 RX ADMIN — MIDODRINE HYDROCHLORIDE 15 MG: 5 TABLET ORAL at 12:18

## 2022-01-01 RX ADMIN — VANCOMYCIN HYDROCHLORIDE 125 MG: 125 CAPSULE ORAL at 06:39

## 2022-01-01 RX ADMIN — CEFTRIAXONE SODIUM 1 G: 1 INJECTION, POWDER, FOR SOLUTION INTRAMUSCULAR; INTRAVENOUS at 02:36

## 2022-01-01 RX ADMIN — HEPARIN SODIUM 5000 UNITS: 5000 INJECTION INTRAVENOUS; SUBCUTANEOUS at 21:44

## 2022-01-01 RX ADMIN — PHENYLEPHRINE HYDROCHLORIDE 100 MCG: 10 INJECTION, SOLUTION INTRAVENOUS at 20:24

## 2022-01-01 RX ADMIN — FAMOTIDINE 20 MG: 20 TABLET ORAL at 20:21

## 2022-01-01 RX ADMIN — CEFDINIR 300 MG: 300 CAPSULE ORAL at 16:15

## 2022-01-01 RX ADMIN — Medication 10 ML: at 08:35

## 2022-01-01 RX ADMIN — ESCITALOPRAM 20 MG: 20 TABLET, FILM COATED ORAL at 13:46

## 2022-01-01 RX ADMIN — Medication 500 MG: at 20:22

## 2022-01-01 RX ADMIN — ONDANSETRON 4 MG: 2 INJECTION INTRAMUSCULAR; INTRAVENOUS at 17:16

## 2022-01-01 RX ADMIN — ESCITALOPRAM 20 MG: 20 TABLET, FILM COATED ORAL at 09:04

## 2022-01-01 RX ADMIN — SODIUM BICARBONATE 1300 MG: 650 TABLET ORAL at 17:39

## 2022-01-01 RX ADMIN — LANTHANUM CARBONATE 750 MG: 500 TABLET, CHEWABLE ORAL at 08:28

## 2022-01-01 RX ADMIN — ESCITALOPRAM 20 MG: 20 TABLET, FILM COATED ORAL at 08:54

## 2022-01-01 RX ADMIN — MIDODRINE HYDROCHLORIDE 10 MG: 5 TABLET ORAL at 17:32

## 2022-01-01 RX ADMIN — POTASSIUM CHLORIDE 20 MEQ: 1.5 POWDER, FOR SOLUTION ORAL at 17:21

## 2022-01-01 RX ADMIN — SODIUM CHLORIDE 250 ML: 9 INJECTION, SOLUTION INTRAVENOUS at 12:45

## 2022-01-01 RX ADMIN — ASPIRIN 81 MG: 81 TABLET, CHEWABLE ORAL at 08:10

## 2022-01-01 RX ADMIN — TRAZODONE HYDROCHLORIDE 100 MG: 100 TABLET ORAL at 22:01

## 2022-01-01 RX ADMIN — SODIUM BICARBONATE 1300 MG: 650 TABLET ORAL at 21:57

## 2022-01-01 RX ADMIN — ASPIRIN 81 MG: 81 TABLET, CHEWABLE ORAL at 08:54

## 2022-01-01 RX ADMIN — POTASSIUM CHLORIDE 20 MEQ: 750 TABLET, EXTENDED RELEASE ORAL at 21:51

## 2022-01-01 RX ADMIN — MAGNESIUM OXIDE 400 MG (241.3 MG MAGNESIUM) TABLET 400 MG: TABLET at 09:10

## 2022-01-01 RX ADMIN — Medication 500 MG: at 09:22

## 2022-01-01 RX ADMIN — SODIUM CHLORIDE 500 ML: 9 INJECTION, SOLUTION INTRAVENOUS at 10:56

## 2022-01-01 RX ADMIN — HEPARIN SODIUM 5000 UNITS: 1000 INJECTION INTRAVENOUS; SUBCUTANEOUS at 09:25

## 2022-01-01 RX ADMIN — Medication 500 MG: at 21:59

## 2022-01-01 RX ADMIN — PHENYLEPHRINE HYDROCHLORIDE 100 MCG: 10 INJECTION, SOLUTION INTRAVENOUS at 20:35

## 2022-01-01 RX ADMIN — MIDODRINE HYDROCHLORIDE 10 MG: 5 TABLET ORAL at 08:09

## 2022-01-01 RX ADMIN — MIDODRINE HYDROCHLORIDE 10 MG: 5 TABLET ORAL at 07:30

## 2022-01-01 RX ADMIN — HEPARIN SODIUM 5000 UNITS: 5000 INJECTION INTRAVENOUS; SUBCUTANEOUS at 03:23

## 2022-01-01 RX ADMIN — LEVOTHYROXINE SODIUM 137 MCG: 0.14 TABLET ORAL at 09:21

## 2022-01-01 RX ADMIN — LEVOTHYROXINE SODIUM 137 MCG: 0.14 TABLET ORAL at 11:32

## 2022-01-01 RX ADMIN — Medication 1 TABLET: at 11:28

## 2022-01-01 RX ADMIN — MONTELUKAST SODIUM 10 MG: 10 TABLET, FILM COATED ORAL at 20:48

## 2022-01-01 RX ADMIN — VANCOMYCIN HYDROCHLORIDE 125 MG: 125 CAPSULE ORAL at 00:00

## 2022-01-01 RX ADMIN — ASPIRIN 81 MG: 81 TABLET, CHEWABLE ORAL at 09:19

## 2022-01-01 RX ADMIN — BISMUTH SUBSALICYLATE 30 ML: 525 LIQUID ORAL at 00:28

## 2022-01-01 RX ADMIN — TRAZODONE HYDROCHLORIDE 100 MG: 100 TABLET ORAL at 21:59

## 2022-01-01 RX ADMIN — VANCOMYCIN HYDROCHLORIDE 125 MG: 125 CAPSULE ORAL at 10:55

## 2022-01-01 RX ADMIN — POTASSIUM CHLORIDE 20 MEQ: 750 TABLET, EXTENDED RELEASE ORAL at 08:33

## 2022-01-01 RX ADMIN — HYDROMORPHONE HYDROCHLORIDE 0.5 MG: 1 INJECTION, SOLUTION INTRAMUSCULAR; INTRAVENOUS; SUBCUTANEOUS at 17:17

## 2022-01-01 RX ADMIN — Medication 1 TABLET: at 09:10

## 2022-01-01 RX ADMIN — PERFLUTREN 2 ML: 6.52 INJECTION, SUSPENSION INTRAVENOUS at 15:22

## 2022-01-01 RX ADMIN — LANTHANUM CARBONATE 750 MG: 500 TABLET, CHEWABLE ORAL at 08:33

## 2022-01-01 RX ADMIN — FAMOTIDINE 20 MG: 20 TABLET ORAL at 09:22

## 2022-01-01 RX ADMIN — Medication 10 ML: at 14:47

## 2022-01-01 RX ADMIN — LEVOTHYROXINE SODIUM 137 MCG: 0.14 TABLET ORAL at 07:56

## 2022-01-01 RX ADMIN — POTASSIUM CHLORIDE 40 MEQ: 750 TABLET, EXTENDED RELEASE ORAL at 20:06

## 2022-01-01 RX ADMIN — CLOPIDOGREL 75 MG: 75 TABLET, FILM COATED ORAL at 08:09

## 2022-01-01 RX ADMIN — HYDROMORPHONE HYDROCHLORIDE 0.5 MG: 1 INJECTION, SOLUTION INTRAMUSCULAR; INTRAVENOUS; SUBCUTANEOUS at 06:33

## 2022-01-01 RX ADMIN — LEVOTHYROXINE SODIUM 137 MCG: 0.14 TABLET ORAL at 08:04

## 2022-01-01 RX ADMIN — ESCITALOPRAM 20 MG: 20 TABLET, FILM COATED ORAL at 11:32

## 2022-01-01 RX ADMIN — FAMOTIDINE 20 MG: 20 TABLET ORAL at 14:48

## 2022-01-01 RX ADMIN — PROPOFOL 70 MG: 10 INJECTION, EMULSION INTRAVENOUS at 20:18

## 2022-01-01 RX ADMIN — SODIUM CHLORIDE 500 ML: 9 INJECTION, SOLUTION INTRAVENOUS at 09:58

## 2022-01-01 RX ADMIN — Medication 500 MG: at 08:10

## 2022-01-01 RX ADMIN — CEFTRIAXONE SODIUM 1 G: 1 INJECTION, POWDER, FOR SOLUTION INTRAMUSCULAR; INTRAVENOUS at 01:41

## 2022-01-01 RX ADMIN — VANCOMYCIN HYDROCHLORIDE 125 MG: 125 CAPSULE ORAL at 23:28

## 2022-01-01 RX ADMIN — Medication 1 TABLET: at 09:55

## 2022-01-01 RX ADMIN — ALLOPURINOL 100 MG: 100 TABLET ORAL at 09:24

## 2022-01-01 RX ADMIN — Medication 10 ML: at 08:28

## 2022-01-01 RX ADMIN — LEVOTHYROXINE SODIUM 137 MCG: 0.14 TABLET ORAL at 09:18

## 2022-01-01 RX ADMIN — LEVOTHYROXINE SODIUM 137 MCG: 0.14 TABLET ORAL at 09:00

## 2022-01-01 RX ADMIN — Medication 1 TABLET: at 08:10

## 2022-01-01 RX ADMIN — GLYCOPYRROLATE 0.1 MG: 0.2 INJECTION INTRAMUSCULAR; INTRAVENOUS at 15:04

## 2022-01-01 RX ADMIN — POTASSIUM CHLORIDE 40 MEQ: 750 TABLET, EXTENDED RELEASE ORAL at 08:54

## 2022-01-01 RX ADMIN — MIDODRINE HYDROCHLORIDE 10 MG: 5 TABLET ORAL at 17:56

## 2022-01-01 RX ADMIN — MONTELUKAST SODIUM 10 MG: 10 TABLET, FILM COATED ORAL at 20:22

## 2022-01-01 RX ADMIN — Medication 10 ML: at 08:20

## 2022-01-01 RX ADMIN — SODIUM BICARBONATE 1300 MG: 650 TABLET ORAL at 09:04

## 2022-01-01 RX ADMIN — Medication 500 MG: at 09:04

## 2022-01-01 RX ADMIN — SODIUM BICARBONATE 1300 MG: 650 TABLET ORAL at 09:10

## 2022-01-01 RX ADMIN — Medication 500 MG: at 08:33

## 2022-01-01 RX ADMIN — Medication 10 ML: at 09:06

## 2022-01-01 RX ADMIN — POTASSIUM CHLORIDE 40 MEQ: 10 TABLET, EXTENDED RELEASE ORAL at 11:36

## 2022-01-01 RX ADMIN — MONTELUKAST SODIUM 10 MG: 10 TABLET, FILM COATED ORAL at 20:39

## 2022-01-01 RX ADMIN — Medication 10 ML: at 21:43

## 2022-01-01 RX ADMIN — MONTELUKAST SODIUM 10 MG: 10 TABLET, FILM COATED ORAL at 21:44

## 2022-01-01 RX ADMIN — MIDODRINE HYDROCHLORIDE 10 MG: 5 TABLET ORAL at 12:02

## 2022-01-01 RX ADMIN — Medication 500 MG: at 20:36

## 2022-01-01 RX ADMIN — ESCITALOPRAM 20 MG: 20 TABLET, FILM COATED ORAL at 14:49

## 2022-01-01 RX ADMIN — DEXTROSE MONOHYDRATE, SODIUM CHLORIDE, SODIUM LACTATE, CALCIUM CHLORIDE, MAGNESIUM CHLORIDE 2000 ML: 1.5; 538; 448; 18.4; 5.08 SOLUTION INTRAPERITONEAL at 10:27

## 2022-01-01 RX ADMIN — HEPARIN SODIUM 5000 UNITS: 5000 INJECTION INTRAVENOUS; SUBCUTANEOUS at 21:51

## 2022-01-01 RX ADMIN — HYDROMORPHONE HYDROCHLORIDE 0.5 MG: 1 INJECTION, SOLUTION INTRAMUSCULAR; INTRAVENOUS; SUBCUTANEOUS at 09:18

## 2022-01-01 RX ADMIN — SODIUM CHLORIDE 1000 ML: 9 INJECTION, SOLUTION INTRAVENOUS at 02:59

## 2022-01-01 RX ADMIN — ASPIRIN 81 MG: 81 TABLET, CHEWABLE ORAL at 08:50

## 2022-01-01 RX ADMIN — LANTHANUM CARBONATE 1500 MG: 500 TABLET, CHEWABLE ORAL at 13:49

## 2022-01-01 RX ADMIN — LEVOTHYROXINE SODIUM 137 MCG: 0.14 TABLET ORAL at 08:28

## 2022-01-01 RX ADMIN — MIDODRINE HYDROCHLORIDE 10 MG: 5 TABLET ORAL at 16:03

## 2022-01-01 RX ADMIN — HYDROMORPHONE HYDROCHLORIDE 0.5 MG: 1 INJECTION, SOLUTION INTRAMUSCULAR; INTRAVENOUS; SUBCUTANEOUS at 15:18

## 2022-01-01 RX ADMIN — DEXTROSE MONOHYDRATE, SODIUM CHLORIDE, SODIUM LACTATE, CALCIUM CHLORIDE, MAGNESIUM CHLORIDE 2000 ML: 1.5; 538; 448; 18.4; 5.08 SOLUTION INTRAPERITONEAL at 23:54

## 2022-01-01 RX ADMIN — VANCOMYCIN HYDROCHLORIDE 125 MG: 125 CAPSULE ORAL at 06:30

## 2022-01-01 RX ADMIN — POTASSIUM CHLORIDE 40 MEQ: 750 TABLET, EXTENDED RELEASE ORAL at 11:16

## 2022-01-01 RX ADMIN — SODIUM CHLORIDE 500 ML: 9 INJECTION, SOLUTION INTRAVENOUS at 19:53

## 2022-01-01 RX ADMIN — DEXTROSE MONOHYDRATE, SODIUM CHLORIDE, SODIUM LACTATE, CALCIUM CHLORIDE, MAGNESIUM CHLORIDE 2000 ML: 1.5; 538; 448; 18.4; 5.08 SOLUTION INTRAPERITONEAL at 00:03

## 2022-01-01 RX ADMIN — OXYCODONE AND ACETAMINOPHEN 1 TABLET: 5; 325 TABLET ORAL at 04:44

## 2022-01-01 RX ADMIN — Medication 10 ML: at 08:54

## 2022-01-01 RX ADMIN — DEXTROSE MONOHYDRATE, SODIUM CHLORIDE, SODIUM LACTATE, CALCIUM CHLORIDE, MAGNESIUM CHLORIDE 2000 ML: 1.5; 538; 448; 18.4; 5.08 SOLUTION INTRAPERITONEAL at 14:56

## 2022-01-01 RX ADMIN — DEXTROSE MONOHYDRATE, SODIUM CHLORIDE, SODIUM LACTATE, CALCIUM CHLORIDE, MAGNESIUM CHLORIDE 2000 ML: 1.5; 538; 448; 18.4; 5.08 SOLUTION INTRAPERITONEAL at 06:53

## 2022-01-01 RX ADMIN — FAMOTIDINE 20 MG: 20 TABLET ORAL at 09:19

## 2022-01-01 RX ADMIN — MONTELUKAST SODIUM 10 MG: 10 TABLET, FILM COATED ORAL at 20:36

## 2022-01-01 RX ADMIN — MIDODRINE HYDROCHLORIDE 10 MG: 5 TABLET ORAL at 11:46

## 2022-01-01 RX ADMIN — Medication 10 ML: at 08:05

## 2022-01-01 RX ADMIN — HEPARIN SODIUM 5000 UNITS: 5000 INJECTION INTRAVENOUS; SUBCUTANEOUS at 05:43

## 2022-01-01 RX ADMIN — SODIUM BICARBONATE 1300 MG: 650 TABLET ORAL at 20:39

## 2022-01-01 RX ADMIN — SODIUM BICARBONATE 1300 MG: 650 TABLET ORAL at 18:42

## 2022-01-01 RX ADMIN — MIDODRINE HYDROCHLORIDE 10 MG: 5 TABLET ORAL at 06:26

## 2022-01-01 RX ADMIN — POTASSIUM CHLORIDE 40 MEQ: 10 TABLET, EXTENDED RELEASE ORAL at 11:24

## 2022-01-01 RX ADMIN — POTASSIUM CHLORIDE 40 MEQ: 10 TABLET, EXTENDED RELEASE ORAL at 09:25

## 2022-01-01 RX ADMIN — ASPIRIN 81 MG: 81 TABLET, CHEWABLE ORAL at 08:04

## 2022-01-01 RX ADMIN — GENTAMICIN SULFATE 1 APPLICATION: 1 OINTMENT TOPICAL at 16:15

## 2022-01-01 RX ADMIN — HEPARIN SODIUM 5000 UNITS: 5000 INJECTION INTRAVENOUS; SUBCUTANEOUS at 13:50

## 2022-01-01 RX ADMIN — PROCHLORPERAZINE EDISYLATE 5 MG: 5 INJECTION INTRAMUSCULAR; INTRAVENOUS at 23:28

## 2022-01-01 RX ADMIN — SODIUM BICARBONATE 1300 MG: 650 TABLET ORAL at 16:21

## 2022-01-01 RX ADMIN — SODIUM CHLORIDE 100 ML/HR: 9 INJECTION, SOLUTION INTRAVENOUS at 20:17

## 2022-01-01 RX ADMIN — ASPIRIN 81 MG: 81 TABLET, CHEWABLE ORAL at 09:24

## 2022-01-01 RX ADMIN — Medication 500 MG: at 09:10

## 2022-01-01 RX ADMIN — ASPIRIN 81 MG: 81 TABLET, CHEWABLE ORAL at 10:57

## 2022-01-01 RX ADMIN — SODIUM BICARBONATE 650 MG: 650 TABLET ORAL at 17:45

## 2022-01-01 RX ADMIN — SODIUM BICARBONATE 1300 MG: 650 TABLET ORAL at 08:50

## 2022-01-01 RX ADMIN — HEPARIN SODIUM 5000 UNITS: 5000 INJECTION INTRAVENOUS; SUBCUTANEOUS at 10:57

## 2022-01-01 RX ADMIN — VANCOMYCIN HYDROCHLORIDE 125 MG: 125 CAPSULE ORAL at 06:17

## 2022-01-01 RX ADMIN — LEVOFLOXACIN 500 MG: 500 TABLET, FILM COATED ORAL at 15:59

## 2022-01-01 RX ADMIN — MONTELUKAST SODIUM 10 MG: 10 TABLET, FILM COATED ORAL at 21:51

## 2022-01-01 RX ADMIN — SODIUM BICARBONATE 1300 MG: 650 TABLET ORAL at 11:16

## 2022-01-01 RX ADMIN — HEPARIN SODIUM 5000 UNITS: 5000 INJECTION INTRAVENOUS; SUBCUTANEOUS at 21:10

## 2022-01-01 RX ADMIN — PHENYLEPHRINE HYDROCHLORIDE 100 MCG: 10 INJECTION, SOLUTION INTRAVENOUS at 12:01

## 2022-01-01 RX ADMIN — ESCITALOPRAM 20 MG: 20 TABLET, FILM COATED ORAL at 08:34

## 2022-01-01 RX ADMIN — CEFTRIAXONE SODIUM 1 G: 1 INJECTION, POWDER, FOR SOLUTION INTRAMUSCULAR; INTRAVENOUS at 02:32

## 2022-01-01 RX ADMIN — FAMOTIDINE 20 MG: 20 TABLET ORAL at 20:34

## 2022-01-01 RX ADMIN — DEXTROSE MONOHYDRATE, SODIUM CHLORIDE, SODIUM LACTATE, CALCIUM CHLORIDE, MAGNESIUM CHLORIDE 2000 ML: 1.5; 538; 448; 18.4; 5.08 SOLUTION INTRAPERITONEAL at 22:00

## 2022-01-01 RX ADMIN — ACETAMINOPHEN 650 MG: 325 TABLET, FILM COATED ORAL at 20:35

## 2022-01-01 RX ADMIN — FAMOTIDINE 20 MG: 20 TABLET ORAL at 08:44

## 2022-01-01 RX ADMIN — MIDODRINE HYDROCHLORIDE 10 MG: 5 TABLET ORAL at 08:17

## 2022-01-01 RX ADMIN — MIDODRINE HYDROCHLORIDE 10 MG: 5 TABLET ORAL at 11:04

## 2022-01-01 RX ADMIN — LANTHANUM CARBONATE 750 MG: 500 TABLET, CHEWABLE ORAL at 17:39

## 2022-01-01 RX ADMIN — OXYCODONE AND ACETAMINOPHEN 1 TABLET: 5; 325 TABLET ORAL at 21:44

## 2022-01-01 RX ADMIN — ALLOPURINOL 100 MG: 100 TABLET ORAL at 09:55

## 2022-01-01 RX ADMIN — VANCOMYCIN HYDROCHLORIDE 1500 MG: 10 INJECTION, POWDER, LYOPHILIZED, FOR SOLUTION INTRAVENOUS at 22:39

## 2022-01-01 RX ADMIN — HEPARIN SODIUM 5000 UNITS: 5000 INJECTION INTRAVENOUS; SUBCUTANEOUS at 06:20

## 2022-01-01 RX ADMIN — POTASSIUM CHLORIDE 20 MEQ: 750 TABLET, EXTENDED RELEASE ORAL at 09:23

## 2022-01-01 RX ADMIN — MIDODRINE HYDROCHLORIDE 10 MG: 5 TABLET ORAL at 03:57

## 2022-01-01 RX ADMIN — POTASSIUM CHLORIDE 40 MEQ: 750 TABLET, EXTENDED RELEASE ORAL at 17:39

## 2022-01-01 RX ADMIN — MAGNESIUM SULFATE 1 G: 1 INJECTION INTRAVENOUS at 11:22

## 2022-01-01 RX ADMIN — POTASSIUM CHLORIDE 40 MEQ: 750 TABLET, EXTENDED RELEASE ORAL at 08:44

## 2022-01-01 RX ADMIN — LIDOCAINE HYDROCHLORIDE 80 MG: 20 INJECTION, SOLUTION INFILTRATION; PERINEURAL at 20:18

## 2022-01-01 RX ADMIN — Medication 3 MG: at 21:10

## 2022-01-01 RX ADMIN — MIDODRINE HYDROCHLORIDE 10 MG: 5 TABLET ORAL at 17:54

## 2022-01-01 RX ADMIN — MAGNESIUM OXIDE 400 MG (241.3 MG MAGNESIUM) TABLET 400 MG: TABLET at 08:54

## 2022-01-01 RX ADMIN — Medication 1 TABLET: at 09:04

## 2022-01-01 RX ADMIN — DEXTROSE MONOHYDRATE, SODIUM CHLORIDE, SODIUM LACTATE, CALCIUM CHLORIDE, MAGNESIUM CHLORIDE 2000 ML: 1.5; 538; 448; 18.4; 5.08 SOLUTION INTRAPERITONEAL at 08:39

## 2022-01-01 RX ADMIN — IOPAMIDOL 95 ML: 755 INJECTION, SOLUTION INTRAVENOUS at 11:20

## 2022-01-01 RX ADMIN — DEXTROSE MONOHYDRATE, SODIUM CHLORIDE, SODIUM LACTATE, CALCIUM CHLORIDE, MAGNESIUM CHLORIDE 2000 ML: 1.5; 538; 448; 18.4; 5.08 SOLUTION INTRAPERITONEAL at 06:27

## 2022-01-01 RX ADMIN — PHENYLEPHRINE HYDROCHLORIDE 100 MCG: 10 INJECTION, SOLUTION INTRAVENOUS at 20:28

## 2022-01-01 RX ADMIN — LEVOFLOXACIN 500 MG: 500 INJECTION, SOLUTION INTRAVENOUS at 21:16

## 2022-01-01 RX ADMIN — FAMOTIDINE 20 MG: 10 INJECTION INTRAVENOUS at 18:46

## 2022-01-01 RX ADMIN — PHENYLEPHRINE HYDROCHLORIDE 100 MCG: 10 INJECTION, SOLUTION INTRAVENOUS at 20:31

## 2022-01-01 RX ADMIN — LANTHANUM CARBONATE 750 MG: 500 TABLET, CHEWABLE ORAL at 17:21

## 2022-01-01 RX ADMIN — FAMOTIDINE 20 MG: 20 TABLET ORAL at 08:04

## 2022-01-01 RX ADMIN — CLOPIDOGREL 75 MG: 75 TABLET, FILM COATED ORAL at 09:04

## 2022-01-01 RX ADMIN — HYDROMORPHONE HYDROCHLORIDE 0.5 MG: 1 INJECTION, SOLUTION INTRAMUSCULAR; INTRAVENOUS; SUBCUTANEOUS at 23:49

## 2022-01-01 RX ADMIN — HEPARIN SODIUM 5000 UNITS: 5000 INJECTION INTRAVENOUS; SUBCUTANEOUS at 13:22

## 2022-01-01 RX ADMIN — SODIUM BICARBONATE 1300 MG: 650 TABLET ORAL at 08:09

## 2022-01-01 RX ADMIN — ESCITALOPRAM 20 MG: 20 TABLET, FILM COATED ORAL at 08:04

## 2022-01-01 RX ADMIN — PHENYLEPHRINE HYDROCHLORIDE 100 MCG: 10 INJECTION, SOLUTION INTRAVENOUS at 11:58

## 2022-01-01 RX ADMIN — HYDROCODONE BITARTRATE AND ACETAMINOPHEN 1 TABLET: 5; 325 TABLET ORAL at 16:38

## 2022-01-01 RX ADMIN — VANCOMYCIN HYDROCHLORIDE 125 MG: 125 CAPSULE ORAL at 11:16

## 2022-01-01 RX ADMIN — HYDROCODONE BITARTRATE AND ACETAMINOPHEN 1 TABLET: 7.5; 325 TABLET ORAL at 16:16

## 2022-01-01 RX ADMIN — Medication 3 MG: at 20:36

## 2022-01-01 RX ADMIN — POTASSIUM CHLORIDE 40 MEQ: 10 TABLET, EXTENDED RELEASE ORAL at 08:27

## 2022-01-01 RX ADMIN — MIDODRINE HYDROCHLORIDE 15 MG: 5 TABLET ORAL at 18:42

## 2022-01-01 RX ADMIN — POTASSIUM CHLORIDE 20 MEQ: 750 TABLET, EXTENDED RELEASE ORAL at 22:19

## 2022-01-01 RX ADMIN — Medication 3 MG: at 21:44

## 2022-01-01 RX ADMIN — HYDROCODONE BITARTRATE AND ACETAMINOPHEN 1 TABLET: 5; 325 TABLET ORAL at 21:52

## 2022-01-01 RX ADMIN — VANCOMYCIN HYDROCHLORIDE 125 MG: 125 CAPSULE ORAL at 17:55

## 2022-01-01 RX ADMIN — ESCITALOPRAM 20 MG: 20 TABLET, FILM COATED ORAL at 09:24

## 2022-01-01 RX ADMIN — LEVOTHYROXINE SODIUM 150 MCG: 0.15 TABLET ORAL at 08:38

## 2022-01-01 RX ADMIN — TRAZODONE HYDROCHLORIDE 100 MG: 100 TABLET ORAL at 21:44

## 2022-01-01 RX ADMIN — HEPARIN SODIUM 5000 UNITS: 5000 INJECTION INTRAVENOUS; SUBCUTANEOUS at 00:43

## 2022-01-01 RX ADMIN — FAMOTIDINE 20 MG: 20 TABLET ORAL at 09:09

## 2022-01-01 RX ADMIN — SODIUM BICARBONATE 1300 MG: 650 TABLET ORAL at 08:10

## 2022-01-01 RX ADMIN — OXYCODONE AND ACETAMINOPHEN 1 TABLET: 5; 325 TABLET ORAL at 20:58

## 2022-01-01 RX ADMIN — MIDODRINE HYDROCHLORIDE 10 MG: 5 TABLET ORAL at 09:21

## 2022-01-01 RX ADMIN — ASPIRIN 81 MG: 81 TABLET, CHEWABLE ORAL at 09:10

## 2022-01-01 RX ADMIN — CEFTRIAXONE SODIUM 1 G: 1 INJECTION, POWDER, FOR SOLUTION INTRAMUSCULAR; INTRAVENOUS at 02:30

## 2022-01-01 RX ADMIN — SODIUM BICARBONATE 1300 MG: 650 TABLET ORAL at 20:22

## 2022-01-01 RX ADMIN — MIDODRINE HYDROCHLORIDE 10 MG: 5 TABLET ORAL at 11:24

## 2022-01-01 RX ADMIN — Medication 10 ML: at 08:12

## 2022-01-01 RX ADMIN — MIDODRINE HYDROCHLORIDE 10 MG: 5 TABLET ORAL at 08:50

## 2022-01-01 RX ADMIN — MONTELUKAST SODIUM 10 MG: 10 TABLET, FILM COATED ORAL at 03:59

## 2022-01-01 RX ADMIN — FAMOTIDINE 20 MG: 20 TABLET ORAL at 20:39

## 2022-01-01 RX ADMIN — MIDODRINE HYDROCHLORIDE 15 MG: 5 TABLET ORAL at 12:19

## 2022-01-01 RX ADMIN — MIDODRINE HYDROCHLORIDE 10 MG: 5 TABLET ORAL at 06:31

## 2022-01-01 RX ADMIN — POTASSIUM CHLORIDE 20 MEQ: 750 TABLET, EXTENDED RELEASE ORAL at 08:09

## 2022-01-01 RX ADMIN — Medication 10 ML: at 00:04

## 2022-01-01 RX ADMIN — SODIUM BICARBONATE 1300 MG: 650 TABLET ORAL at 08:34

## 2022-01-01 RX ADMIN — MAGNESIUM OXIDE 400 MG (241.3 MG MAGNESIUM) TABLET 400 MG: TABLET at 21:44

## 2022-01-01 RX ADMIN — MIDODRINE HYDROCHLORIDE 10 MG: 5 TABLET ORAL at 17:35

## 2022-01-01 RX ADMIN — POTASSIUM CHLORIDE 40 MEQ: 1.5 POWDER, FOR SOLUTION ORAL at 16:01

## 2022-01-01 RX ADMIN — SODIUM BICARBONATE 1300 MG: 650 TABLET ORAL at 22:02

## 2022-01-01 RX ADMIN — MIDODRINE HYDROCHLORIDE 10 MG: 5 TABLET ORAL at 05:43

## 2022-01-01 RX ADMIN — DEXTROSE MONOHYDRATE, SODIUM CHLORIDE, SODIUM LACTATE, CALCIUM CHLORIDE, MAGNESIUM CHLORIDE 2000 ML: 1.5; 538; 448; 18.4; 5.08 SOLUTION INTRAPERITONEAL at 22:02

## 2022-01-01 RX ADMIN — Medication 3 MG: at 20:39

## 2022-01-01 RX ADMIN — SODIUM CHLORIDE: 9 INJECTION, SOLUTION INTRAVENOUS at 14:48

## 2022-01-01 RX ADMIN — SODIUM CHLORIDE, POTASSIUM CHLORIDE, SODIUM LACTATE AND CALCIUM CHLORIDE 100 ML/HR: 600; 310; 30; 20 INJECTION, SOLUTION INTRAVENOUS at 07:52

## 2022-01-01 RX ADMIN — DEXTROSE MONOHYDRATE, SODIUM CHLORIDE, SODIUM LACTATE, CALCIUM CHLORIDE, MAGNESIUM CHLORIDE 2000 ML: 1.5; 538; 448; 18.4; 5.08 SOLUTION INTRAPERITONEAL at 06:52

## 2022-01-01 RX ADMIN — LANTHANUM CARBONATE 750 MG: 500 TABLET, CHEWABLE ORAL at 11:46

## 2022-01-01 RX ADMIN — PROPOFOL 100 MG: 10 INJECTION, EMULSION INTRAVENOUS at 14:57

## 2022-01-01 RX ADMIN — PROCHLORPERAZINE EDISYLATE 5 MG: 5 INJECTION INTRAMUSCULAR; INTRAVENOUS at 13:37

## 2022-01-01 RX ADMIN — DEXTROSE MONOHYDRATE, SODIUM CHLORIDE, SODIUM LACTATE, CALCIUM CHLORIDE, MAGNESIUM CHLORIDE 2000 ML: 1.5; 538; 448; 18.4; 5.08 SOLUTION INTRAPERITONEAL at 22:58

## 2022-01-01 RX ADMIN — MIDODRINE HYDROCHLORIDE 10 MG: 5 TABLET ORAL at 06:30

## 2022-01-01 RX ADMIN — ACETAMINOPHEN 500 MG: 500 TABLET ORAL at 14:03

## 2022-01-01 RX ADMIN — ACETAMINOPHEN 650 MG: 325 TABLET ORAL at 16:19

## 2022-01-01 RX ADMIN — POTASSIUM CHLORIDE 40 MEQ: 750 TABLET, EXTENDED RELEASE ORAL at 17:53

## 2022-01-01 RX ADMIN — HYDROMORPHONE HYDROCHLORIDE 0.5 MG: 1 INJECTION, SOLUTION INTRAMUSCULAR; INTRAVENOUS; SUBCUTANEOUS at 20:10

## 2022-01-01 RX ADMIN — VANCOMYCIN HYDROCHLORIDE 125 MG: 125 CAPSULE ORAL at 18:34

## 2022-01-01 RX ADMIN — ASPIRIN 81 MG: 81 TABLET, CHEWABLE ORAL at 08:33

## 2022-01-01 RX ADMIN — MAGNESIUM SULFATE HEPTAHYDRATE 2 G: 2 INJECTION, SOLUTION INTRAVENOUS at 20:07

## 2022-01-01 RX ADMIN — POTASSIUM CHLORIDE 40 MEQ: 750 TABLET, EXTENDED RELEASE ORAL at 16:21

## 2022-01-01 RX ADMIN — MIDODRINE HYDROCHLORIDE 10 MG: 5 TABLET ORAL at 11:42

## 2022-01-01 RX ADMIN — Medication 20 MG: at 13:50

## 2022-01-01 RX ADMIN — MONTELUKAST SODIUM 10 MG: 10 TABLET, FILM COATED ORAL at 21:10

## 2022-01-01 RX ADMIN — LEVOTHYROXINE SODIUM 137 MCG: 0.14 TABLET ORAL at 14:49

## 2022-01-01 RX ADMIN — MIDODRINE HYDROCHLORIDE 10 MG: 5 TABLET ORAL at 17:21

## 2022-01-01 RX ADMIN — VANCOMYCIN HYDROCHLORIDE 125 MG: 125 CAPSULE ORAL at 15:17

## 2022-01-01 RX ADMIN — SODIUM BICARBONATE 1300 MG: 650 TABLET ORAL at 18:36

## 2022-01-01 RX ADMIN — Medication 3 MG: at 21:59

## 2022-01-01 RX ADMIN — DEXTROSE MONOHYDRATE, SODIUM CHLORIDE, SODIUM LACTATE, CALCIUM CHLORIDE, MAGNESIUM CHLORIDE 2000 ML: 1.5; 538; 448; 18.4; 5.08 SOLUTION INTRAPERITONEAL at 18:43

## 2022-01-01 RX ADMIN — LANTHANUM CARBONATE 750 MG: 500 TABLET, CHEWABLE ORAL at 12:34

## 2022-01-01 RX ADMIN — Medication 3 MG: at 23:28

## 2022-01-01 RX ADMIN — MAGNESIUM OXIDE 400 MG (241.3 MG MAGNESIUM) TABLET 400 MG: TABLET at 22:55

## 2022-01-01 RX ADMIN — LEVOFLOXACIN 500 MG: 500 INJECTION, SOLUTION INTRAVENOUS at 18:46

## 2022-01-01 RX ADMIN — POTASSIUM CHLORIDE 40 MEQ: 750 TABLET, EXTENDED RELEASE ORAL at 11:04

## 2022-01-01 RX ADMIN — ACETAMINOPHEN 650 MG: 325 TABLET, FILM COATED ORAL at 02:50

## 2022-01-01 RX ADMIN — Medication 10 ML: at 09:20

## 2022-01-01 RX ADMIN — FAMOTIDINE 20 MG: 20 TABLET ORAL at 08:33

## 2022-01-01 RX ADMIN — MIDODRINE HYDROCHLORIDE 10 MG: 5 TABLET ORAL at 11:36

## 2022-01-01 RX ADMIN — DEXTROSE MONOHYDRATE, SODIUM CHLORIDE, SODIUM LACTATE, CALCIUM CHLORIDE, MAGNESIUM CHLORIDE 2000 ML: 1.5; 538; 448; 18.4; 5.08 SOLUTION INTRAPERITONEAL at 10:30

## 2022-01-01 RX ADMIN — ALLOPURINOL 100 MG: 100 TABLET ORAL at 09:22

## 2022-01-01 RX ADMIN — POTASSIUM CHLORIDE 40 MEQ: 750 TABLET, EXTENDED RELEASE ORAL at 08:27

## 2022-01-01 RX ADMIN — TRAZODONE HYDROCHLORIDE 100 MG: 100 TABLET ORAL at 23:28

## 2022-01-01 RX ADMIN — DEXTROSE MONOHYDRATE, SODIUM CHLORIDE, SODIUM LACTATE, CALCIUM CHLORIDE, MAGNESIUM CHLORIDE 2000 ML: 1.5; 538; 448; 18.4; 5.08 SOLUTION INTRAPERITONEAL at 17:49

## 2022-01-01 RX ADMIN — Medication 10 ML: at 09:35

## 2022-01-01 RX ADMIN — Medication 10 ML: at 08:39

## 2022-01-01 RX ADMIN — Medication 1 TABLET: at 09:21

## 2022-01-01 RX ADMIN — VANCOMYCIN HYDROCHLORIDE 125 MG: 125 CAPSULE ORAL at 13:49

## 2022-01-01 RX ADMIN — MIDODRINE HYDROCHLORIDE 10 MG: 5 TABLET ORAL at 20:19

## 2022-01-01 RX ADMIN — DEXTROSE MONOHYDRATE, SODIUM CHLORIDE, SODIUM LACTATE, CALCIUM CHLORIDE, MAGNESIUM CHLORIDE 2000 ML: 1.5; 538; 448; 18.4; 5.08 SOLUTION INTRAPERITONEAL at 06:33

## 2022-01-01 RX ADMIN — MIDODRINE HYDROCHLORIDE 10 MG: 5 TABLET ORAL at 13:22

## 2022-01-01 RX ADMIN — MONTELUKAST SODIUM 10 MG: 10 TABLET, FILM COATED ORAL at 20:30

## 2022-01-01 RX ADMIN — MIDODRINE HYDROCHLORIDE 10 MG: 5 TABLET ORAL at 17:46

## 2022-01-01 RX ADMIN — MIDODRINE HYDROCHLORIDE 10 MG: 5 TABLET ORAL at 09:19

## 2022-01-01 RX ADMIN — DEXTROSE MONOHYDRATE, SODIUM CHLORIDE, SODIUM LACTATE, CALCIUM CHLORIDE, MAGNESIUM CHLORIDE 2000 ML: 1.5; 538; 448; 18.4; 5.08 SOLUTION INTRAPERITONEAL at 18:37

## 2022-01-01 RX ADMIN — MAGNESIUM OXIDE 400 MG (241.3 MG MAGNESIUM) TABLET 400 MG: TABLET at 08:17

## 2022-01-01 RX ADMIN — ACETAMINOPHEN 650 MG: 325 TABLET, FILM COATED ORAL at 04:05

## 2022-01-01 RX ADMIN — LANTHANUM CARBONATE 750 MG: 500 TABLET, CHEWABLE ORAL at 17:45

## 2022-01-01 RX ADMIN — LANTHANUM CARBONATE 750 MG: 500 TABLET, CHEWABLE ORAL at 12:18

## 2022-01-01 RX ADMIN — ASPIRIN 81 MG: 81 TABLET, CHEWABLE ORAL at 14:49

## 2022-01-01 RX ADMIN — SODIUM BICARBONATE 1300 MG: 650 TABLET ORAL at 20:48

## 2022-01-01 RX ADMIN — Medication 1 TABLET: at 11:24

## 2022-01-01 RX ADMIN — HYDROMORPHONE HYDROCHLORIDE 0.5 MG: 1 INJECTION, SOLUTION INTRAMUSCULAR; INTRAVENOUS; SUBCUTANEOUS at 02:13

## 2022-01-01 RX ADMIN — MIDODRINE HYDROCHLORIDE 10 MG: 5 TABLET ORAL at 08:33

## 2022-01-01 RX ADMIN — DEXTROSE MONOHYDRATE, SODIUM CHLORIDE, SODIUM LACTATE, CALCIUM CHLORIDE, MAGNESIUM CHLORIDE 2000 ML: 1.5; 538; 448; 18.4; 5.08 SOLUTION INTRAPERITONEAL at 23:55

## 2022-01-01 RX ADMIN — LEVOTHYROXINE SODIUM 137 MCG: 0.14 TABLET ORAL at 17:41

## 2022-01-01 RX ADMIN — MONTELUKAST SODIUM 10 MG: 10 TABLET, FILM COATED ORAL at 22:22

## 2022-01-01 RX ADMIN — Medication 250 MG: at 20:39

## 2022-01-01 RX ADMIN — MIDODRINE HYDROCHLORIDE 10 MG: 5 TABLET ORAL at 17:48

## 2022-01-01 RX ADMIN — ESCITALOPRAM 20 MG: 20 TABLET, FILM COATED ORAL at 08:10

## 2022-01-01 RX ADMIN — Medication 0.02 MCG/KG/MIN: at 22:57

## 2022-01-01 RX ADMIN — FENTANYL CITRATE 25 MCG: 50 INJECTION INTRAMUSCULAR; INTRAVENOUS at 13:51

## 2022-01-01 RX ADMIN — ONDANSETRON 4 MG: 2 INJECTION INTRAMUSCULAR; INTRAVENOUS at 19:17

## 2022-01-01 RX ADMIN — Medication 3 MG: at 21:57

## 2022-01-01 RX ADMIN — SODIUM CHLORIDE 250 ML: 9 INJECTION, SOLUTION INTRAVENOUS at 03:30

## 2022-01-01 RX ADMIN — MAGNESIUM OXIDE 400 MG (241.3 MG MAGNESIUM) TABLET 400 MG: TABLET at 21:59

## 2022-01-01 RX ADMIN — MAGNESIUM OXIDE 400 MG (241.3 MG MAGNESIUM) TABLET 400 MG: TABLET at 08:28

## 2022-01-01 RX ADMIN — MIDODRINE HYDROCHLORIDE 15 MG: 5 TABLET ORAL at 11:28

## 2022-01-01 RX ADMIN — DEXTROSE MONOHYDRATE, SODIUM CHLORIDE, SODIUM LACTATE, CALCIUM CHLORIDE, MAGNESIUM CHLORIDE 2000 ML: 1.5; 538; 448; 18.4; 5.08 SOLUTION INTRAPERITONEAL at 11:45

## 2022-01-01 RX ADMIN — MIDODRINE HYDROCHLORIDE 10 MG: 5 TABLET ORAL at 09:26

## 2022-01-01 RX ADMIN — MORPHINE SULFATE 1 MG: 2 INJECTION, SOLUTION INTRAMUSCULAR; INTRAVENOUS at 09:25

## 2022-01-01 RX ADMIN — HYDROMORPHONE HYDROCHLORIDE 0.5 MG: 1 INJECTION, SOLUTION INTRAMUSCULAR; INTRAVENOUS; SUBCUTANEOUS at 22:16

## 2022-01-01 RX ADMIN — GENTAMICIN SULFATE 1 APPLICATION: 1 OINTMENT TOPICAL at 09:24

## 2022-01-01 RX ADMIN — Medication 500 MG: at 22:01

## 2022-01-01 RX ADMIN — ASPIRIN 81 MG: 81 TABLET, CHEWABLE ORAL at 08:18

## 2022-01-01 RX ADMIN — VANCOMYCIN HYDROCHLORIDE 125 MG: 125 CAPSULE ORAL at 11:36

## 2022-01-01 RX ADMIN — ACETAMINOPHEN 650 MG: 325 TABLET, FILM COATED ORAL at 12:02

## 2022-01-01 RX ADMIN — GENTAMICIN SULFATE 1 APPLICATION: 1 OINTMENT TOPICAL at 12:03

## 2022-01-01 RX ADMIN — Medication 1 TABLET: at 08:50

## 2022-01-01 RX ADMIN — SODIUM BICARBONATE 650 MG: 650 TABLET ORAL at 13:49

## 2022-01-01 RX ADMIN — CLOPIDOGREL 75 MG: 75 TABLET, FILM COATED ORAL at 08:28

## 2022-01-01 RX ADMIN — PHENYLEPHRINE HYDROCHLORIDE 100 MCG: 10 INJECTION, SOLUTION INTRAVENOUS at 12:04

## 2022-01-01 RX ADMIN — Medication 1 TABLET: at 08:08

## 2022-01-01 RX ADMIN — LEVOTHYROXINE SODIUM 150 MCG: 0.15 TABLET ORAL at 09:04

## 2022-01-01 RX ADMIN — DEXTROSE MONOHYDRATE, SODIUM CHLORIDE, SODIUM LACTATE, CALCIUM CHLORIDE, MAGNESIUM CHLORIDE 2000 ML: 1.5; 538; 448; 18.4; 5.08 SOLUTION INTRAPERITONEAL at 14:59

## 2022-01-01 RX ADMIN — SODIUM BICARBONATE 1300 MG: 650 TABLET ORAL at 09:24

## 2022-01-01 RX ADMIN — POTASSIUM CHLORIDE 40 MEQ: 10 TABLET, EXTENDED RELEASE ORAL at 18:43

## 2022-01-01 RX ADMIN — MIDODRINE HYDROCHLORIDE 10 MG: 5 TABLET ORAL at 18:36

## 2022-01-01 RX ADMIN — Medication 500 MG: at 11:32

## 2022-01-01 RX ADMIN — LIDOCAINE HYDROCHLORIDE 80 MG: 20 INJECTION, SOLUTION INFILTRATION; PERINEURAL at 14:57

## 2022-01-01 RX ADMIN — PHENYLEPHRINE HYDROCHLORIDE 200 MCG: 10 INJECTION, SOLUTION INTRAVENOUS at 15:18

## 2022-01-01 RX ADMIN — SODIUM BICARBONATE 1300 MG: 650 TABLET ORAL at 08:18

## 2022-01-01 RX ADMIN — TRAZODONE HYDROCHLORIDE 100 MG: 100 TABLET ORAL at 21:10

## 2022-01-01 RX ADMIN — ONDANSETRON 4 MG: 2 INJECTION INTRAMUSCULAR; INTRAVENOUS at 20:10

## 2022-01-01 RX ADMIN — Medication 500 MG: at 09:19

## 2022-01-01 RX ADMIN — FAMOTIDINE 20 MG: 20 TABLET ORAL at 11:24

## 2022-01-01 RX ADMIN — OXYCODONE AND ACETAMINOPHEN 1 TABLET: 5; 325 TABLET ORAL at 12:22

## 2022-01-01 RX ADMIN — LORAZEPAM 1 MG: 2 INJECTION INTRAMUSCULAR; INTRAVENOUS at 09:07

## 2022-01-01 RX ADMIN — ONDANSETRON 4 MG: 2 INJECTION INTRAMUSCULAR; INTRAVENOUS at 04:05

## 2022-01-01 RX ADMIN — SODIUM CHLORIDE 50 ML/HR: 9 INJECTION, SOLUTION INTRAVENOUS at 17:49

## 2022-01-01 RX ADMIN — SODIUM BICARBONATE 1300 MG: 650 TABLET ORAL at 17:17

## 2022-01-01 RX ADMIN — POTASSIUM CHLORIDE 20 MEQ: 750 TABLET, EXTENDED RELEASE ORAL at 09:04

## 2022-01-01 RX ADMIN — ASPIRIN 81 MG: 81 TABLET, CHEWABLE ORAL at 09:04

## 2022-01-01 RX ADMIN — ONDANSETRON 4 MG: 2 INJECTION INTRAMUSCULAR; INTRAVENOUS at 15:11

## 2022-01-01 RX ADMIN — MORPHINE SULFATE 1 MG: 2 INJECTION, SOLUTION INTRAMUSCULAR; INTRAVENOUS at 21:53

## 2022-01-01 RX ADMIN — MAGNESIUM OXIDE 400 MG (241.3 MG MAGNESIUM) TABLET 400 MG: TABLET at 20:38

## 2022-01-01 RX ADMIN — FAMOTIDINE 20 MG: 20 TABLET ORAL at 22:01

## 2022-01-01 RX ADMIN — Medication 10 ML: at 20:37

## 2022-01-01 RX ADMIN — POTASSIUM CHLORIDE 20 MEQ: 750 TABLET, EXTENDED RELEASE ORAL at 09:20

## 2022-01-01 RX ADMIN — ACETAMINOPHEN 650 MG: 325 TABLET, FILM COATED ORAL at 17:21

## 2022-01-01 RX ADMIN — CLOPIDOGREL 75 MG: 75 TABLET, FILM COATED ORAL at 09:24

## 2022-01-01 RX ADMIN — SODIUM BICARBONATE 650 MG: 650 TABLET ORAL at 22:55

## 2022-01-01 RX ADMIN — ACETAMINOPHEN 650 MG: 325 TABLET, FILM COATED ORAL at 11:42

## 2022-01-01 RX ADMIN — SODIUM CHLORIDE 1000 ML: 9 INJECTION, SOLUTION INTRAVENOUS at 23:24

## 2022-01-01 RX ADMIN — ESCITALOPRAM 20 MG: 20 TABLET, FILM COATED ORAL at 08:33

## 2022-01-01 RX ADMIN — DEXTROSE MONOHYDRATE, SODIUM CHLORIDE, SODIUM LACTATE, CALCIUM CHLORIDE, MAGNESIUM CHLORIDE 2000 ML: 1.5; 538; 448; 18.4; 5.08 SOLUTION INTRAPERITONEAL at 06:44

## 2022-01-01 RX ADMIN — MIDODRINE HYDROCHLORIDE 10 MG: 5 TABLET ORAL at 07:18

## 2022-01-01 RX ADMIN — OXYCODONE AND ACETAMINOPHEN 1 TABLET: 5; 325 TABLET ORAL at 15:10

## 2022-01-01 RX ADMIN — CLOPIDOGREL 75 MG: 75 TABLET, FILM COATED ORAL at 09:55

## 2022-01-01 RX ADMIN — LEVOTHYROXINE SODIUM 137 MCG: 0.14 TABLET ORAL at 09:19

## 2022-01-01 RX ADMIN — SODIUM BICARBONATE 1300 MG: 650 TABLET ORAL at 08:33

## 2022-01-01 RX ADMIN — EPHEDRINE SULFATE 10 MG: 50 INJECTION INTRAVENOUS at 12:13

## 2022-01-01 RX ADMIN — LEVOTHYROXINE SODIUM 137 MCG: 0.14 TABLET ORAL at 07:28

## 2022-01-01 RX ADMIN — MAGNESIUM SULFATE 1 G: 1 INJECTION INTRAVENOUS at 09:05

## 2022-01-01 RX ADMIN — MAGNESIUM SULFATE 1 G: 1 INJECTION INTRAVENOUS at 10:19

## 2022-01-01 RX ADMIN — CLOPIDOGREL 75 MG: 75 TABLET, FILM COATED ORAL at 08:50

## 2022-01-01 RX ADMIN — DEXTROSE MONOHYDRATE, SODIUM CHLORIDE, SODIUM LACTATE, CALCIUM CHLORIDE, MAGNESIUM CHLORIDE 2000 ML: 1.5; 538; 448; 18.4; 5.08 SOLUTION INTRAPERITONEAL at 11:11

## 2022-01-01 RX ADMIN — DEXTROSE MONOHYDRATE, SODIUM CHLORIDE, SODIUM LACTATE, CALCIUM CHLORIDE, MAGNESIUM CHLORIDE 2000 ML: 1.5; 538; 448; 18.4; 5.08 SOLUTION INTRAPERITONEAL at 23:36

## 2022-01-01 RX ADMIN — MIDODRINE HYDROCHLORIDE 10 MG: 5 TABLET ORAL at 14:49

## 2022-01-01 RX ADMIN — ESCITALOPRAM 20 MG: 20 TABLET, FILM COATED ORAL at 22:01

## 2022-01-01 RX ADMIN — LEVOTHYROXINE SODIUM 137 MCG: 0.14 TABLET ORAL at 06:26

## 2022-01-01 RX ADMIN — Medication 10 ML: at 09:19

## 2022-01-01 RX ADMIN — GENTAMICIN SULFATE 1 APPLICATION: 1 OINTMENT TOPICAL at 08:35

## 2022-01-01 RX ADMIN — DEXAMETHASONE SODIUM PHOSPHATE 6 MG: 10 INJECTION INTRAMUSCULAR; INTRAVENOUS at 15:04

## 2022-01-01 RX ADMIN — FAMOTIDINE 20 MG: 20 TABLET ORAL at 21:59

## 2022-01-01 RX ADMIN — SODIUM BICARBONATE 1300 MG: 650 TABLET ORAL at 09:54

## 2022-01-01 RX ADMIN — TRAZODONE HYDROCHLORIDE 100 MG: 100 TABLET ORAL at 04:44

## 2022-01-01 RX ADMIN — TRAZODONE HYDROCHLORIDE 100 MG: 100 TABLET ORAL at 20:39

## 2022-01-01 RX ADMIN — TRAZODONE HYDROCHLORIDE 100 MG: 100 TABLET ORAL at 21:51

## 2022-01-01 RX ADMIN — Medication 1 TABLET: at 08:28

## 2022-01-01 RX ADMIN — SODIUM BICARBONATE 650 MG: 650 TABLET ORAL at 08:54

## 2022-01-01 RX ADMIN — SODIUM CHLORIDE 9 ML/HR: 9 INJECTION, SOLUTION INTRAVENOUS at 18:46

## 2022-01-01 RX ADMIN — SODIUM BICARBONATE 650 MG: 650 TABLET ORAL at 11:23

## 2022-01-01 RX ADMIN — Medication 10 ML: at 22:21

## 2022-01-01 RX ADMIN — PHENYLEPHRINE HYDROCHLORIDE 150 MCG: 10 INJECTION, SOLUTION INTRAVENOUS at 15:02

## 2022-01-01 RX ADMIN — Medication 1 TABLET: at 09:19

## 2022-01-01 RX ADMIN — POTASSIUM CHLORIDE 40 MEQ: 10 TABLET, EXTENDED RELEASE ORAL at 09:09

## 2022-01-01 RX ADMIN — PHENYLEPHRINE HYDROCHLORIDE 100 MCG: 10 INJECTION, SOLUTION INTRAVENOUS at 12:08

## 2022-01-01 RX ADMIN — Medication 10 ML: at 20:17

## 2022-01-01 RX ADMIN — SODIUM BICARBONATE 1300 MG: 650 TABLET ORAL at 21:56

## 2022-01-01 RX ADMIN — LANTHANUM CARBONATE 750 MG: 500 TABLET, CHEWABLE ORAL at 12:02

## 2022-01-01 RX ADMIN — MIDODRINE HYDROCHLORIDE 10 MG: 5 TABLET ORAL at 11:23

## 2022-01-01 RX ADMIN — HYDROMORPHONE HYDROCHLORIDE 0.5 MG: 1 INJECTION, SOLUTION INTRAMUSCULAR; INTRAVENOUS; SUBCUTANEOUS at 12:18

## 2022-01-01 RX ADMIN — LANTHANUM CARBONATE 750 MG: 500 TABLET, CHEWABLE ORAL at 09:19

## 2022-01-01 RX ADMIN — SODIUM CHLORIDE 100 ML/HR: 9 INJECTION, SOLUTION INTRAVENOUS at 17:52

## 2022-01-01 RX ADMIN — ONDANSETRON 4 MG: 2 INJECTION INTRAMUSCULAR; INTRAVENOUS at 18:25

## 2022-01-01 RX ADMIN — VANCOMYCIN HYDROCHLORIDE 125 MG: 125 CAPSULE ORAL at 06:20

## 2022-01-01 RX ADMIN — Medication 500 MG: at 21:44

## 2022-01-01 RX ADMIN — SODIUM CHLORIDE 500 ML: 9 INJECTION, SOLUTION INTRAVENOUS at 14:42

## 2022-01-01 RX ADMIN — SODIUM BICARBONATE 1300 MG: 650 TABLET ORAL at 17:56

## 2022-01-01 RX ADMIN — SODIUM CHLORIDE 1000 ML: 9 INJECTION, SOLUTION INTRAVENOUS at 01:33

## 2022-01-01 RX ADMIN — ONDANSETRON 4 MG: 2 INJECTION INTRAMUSCULAR; INTRAVENOUS at 19:02

## 2022-01-01 RX ADMIN — FAMOTIDINE 20 MG: 20 TABLET ORAL at 08:27

## 2022-01-01 RX ADMIN — POTASSIUM CHLORIDE 40 MEQ: 750 TABLET, EXTENDED RELEASE ORAL at 21:57

## 2022-01-01 RX ADMIN — PROPOFOL 150 MG: 10 INJECTION, EMULSION INTRAVENOUS at 11:55

## 2022-01-01 RX ADMIN — PROPOFOL 50 MCG/KG/MIN: 10 INJECTION, EMULSION INTRAVENOUS at 13:46

## 2022-01-01 RX ADMIN — HEPARIN SODIUM 5000 UNITS: 5000 INJECTION INTRAVENOUS; SUBCUTANEOUS at 06:44

## 2022-01-01 RX ADMIN — MIDODRINE HYDROCHLORIDE 10 MG: 5 TABLET ORAL at 08:04

## 2022-01-01 RX ADMIN — Medication 1 TABLET: at 12:35

## 2022-01-01 RX ADMIN — LEVOTHYROXINE SODIUM 137 MCG: 0.14 TABLET ORAL at 08:34

## 2022-01-01 RX ADMIN — SODIUM CHLORIDE 500 ML: 9 INJECTION, SOLUTION INTRAVENOUS at 07:35

## 2022-01-01 RX ADMIN — LEVOTHYROXINE SODIUM 137 MCG: 0.14 TABLET ORAL at 08:54

## 2022-01-01 RX ADMIN — LANTHANUM CARBONATE 1500 MG: 500 TABLET, CHEWABLE ORAL at 11:23

## 2022-01-01 RX ADMIN — FENTANYL CITRATE 25 MCG: 0.05 INJECTION, SOLUTION INTRAMUSCULAR; INTRAVENOUS at 12:17

## 2022-01-01 RX ADMIN — Medication 500 MG: at 21:56

## 2022-01-01 RX ADMIN — ASPIRIN 81 MG: 81 TABLET, CHEWABLE ORAL at 11:04

## 2022-01-01 RX ADMIN — ESCITALOPRAM 20 MG: 20 TABLET, FILM COATED ORAL at 08:28

## 2022-01-01 RX ADMIN — Medication 250 MG: at 08:28

## 2022-01-01 RX ADMIN — SODIUM CHLORIDE 100 ML/HR: 4.5 INJECTION, SOLUTION INTRAVENOUS at 19:09

## 2022-01-01 RX ADMIN — GENTAMICIN SULFATE 1 APPLICATION: 1 OINTMENT TOPICAL at 09:09

## 2022-01-01 RX ADMIN — PROPOFOL 50 MG: 10 INJECTION, EMULSION INTRAVENOUS at 12:16

## 2022-01-01 RX ADMIN — SODIUM CHLORIDE, POTASSIUM CHLORIDE, SODIUM LACTATE AND CALCIUM CHLORIDE 100 ML/HR: 600; 310; 30; 20 INJECTION, SOLUTION INTRAVENOUS at 05:10

## 2022-01-01 RX ADMIN — Medication 10 ML: at 20:26

## 2022-01-01 RX ADMIN — LANTHANUM CARBONATE 750 MG: 500 TABLET, CHEWABLE ORAL at 08:10

## 2022-01-01 RX ADMIN — POTASSIUM CHLORIDE 40 MEQ: 750 TABLET, EXTENDED RELEASE ORAL at 21:09

## 2022-01-01 RX ADMIN — MONTELUKAST SODIUM 10 MG: 10 TABLET, FILM COATED ORAL at 21:59

## 2022-01-01 RX ADMIN — LEVOTHYROXINE SODIUM 137 MCG: 0.14 TABLET ORAL at 08:18

## 2022-01-01 RX ADMIN — SODIUM CHLORIDE, SODIUM LACTATE, CALCIUM CHLORIDE, MAGNESIUM CHLORIDE AND DEXTROSE 2000 ML: 1.5; 538; 448; 18.3; 5.08 INJECTION, SOLUTION INTRAPERITONEAL at 13:54

## 2022-01-01 RX ADMIN — CLOPIDOGREL 75 MG: 75 TABLET, FILM COATED ORAL at 08:54

## 2022-01-01 RX ADMIN — LIDOCAINE HYDROCHLORIDE 20 ML: 10 INJECTION, SOLUTION INFILTRATION; PERINEURAL at 09:26

## 2022-01-01 RX ADMIN — HEPARIN SODIUM 5000 UNITS: 5000 INJECTION INTRAVENOUS; SUBCUTANEOUS at 06:17

## 2022-01-01 RX ADMIN — FAMOTIDINE 20 MG: 20 TABLET ORAL at 22:19

## 2022-01-01 RX ADMIN — Medication 10 ML: at 11:37

## 2022-01-01 RX ADMIN — ESCITALOPRAM 20 MG: 20 TABLET, FILM COATED ORAL at 08:09

## 2022-01-01 RX ADMIN — VANCOMYCIN HYDROCHLORIDE 125 MG: 125 CAPSULE ORAL at 05:43

## 2022-01-01 RX ADMIN — ONDANSETRON 4 MG: 2 INJECTION INTRAMUSCULAR; INTRAVENOUS at 09:29

## 2022-01-01 RX ADMIN — MONTELUKAST SODIUM 10 MG: 10 TABLET, FILM COATED ORAL at 22:54

## 2022-01-01 RX ADMIN — MIDODRINE HYDROCHLORIDE 10 MG: 5 TABLET ORAL at 17:41

## 2022-01-01 RX ADMIN — MIDODRINE HYDROCHLORIDE 10 MG: 5 TABLET ORAL at 10:57

## 2022-01-01 RX ADMIN — MONTELUKAST SODIUM 10 MG: 10 TABLET, FILM COATED ORAL at 21:56

## 2022-01-01 RX ADMIN — TRAZODONE HYDROCHLORIDE 100 MG: 100 TABLET ORAL at 20:52

## 2022-01-01 RX ADMIN — HYDROMORPHONE HYDROCHLORIDE 0.5 MG: 1 INJECTION, SOLUTION INTRAMUSCULAR; INTRAVENOUS; SUBCUTANEOUS at 20:40

## 2022-01-01 RX ADMIN — Medication 500 MG: at 22:55

## 2022-01-01 RX ADMIN — VANCOMYCIN HYDROCHLORIDE 125 MG: 125 CAPSULE ORAL at 17:32

## 2022-01-01 RX ADMIN — CLOPIDOGREL 75 MG: 75 TABLET, FILM COATED ORAL at 08:33

## 2022-01-01 RX ADMIN — Medication 10 ML: at 08:45

## 2022-01-01 RX ADMIN — LANTHANUM CARBONATE 750 MG: 500 TABLET, CHEWABLE ORAL at 21:59

## 2022-01-01 RX ADMIN — MIDODRINE HYDROCHLORIDE 10 MG: 5 TABLET ORAL at 17:00

## 2022-01-01 RX ADMIN — MIDODRINE HYDROCHLORIDE 10 MG: 5 TABLET ORAL at 12:32

## 2022-01-01 RX ADMIN — HYDROMORPHONE HYDROCHLORIDE 0.5 MG: 1 INJECTION, SOLUTION INTRAMUSCULAR; INTRAVENOUS; SUBCUTANEOUS at 11:36

## 2022-01-01 RX ADMIN — ESCITALOPRAM 20 MG: 20 TABLET, FILM COATED ORAL at 09:10

## 2022-01-01 RX ADMIN — Medication 500 MG: at 08:54

## 2022-01-01 RX ADMIN — ESCITALOPRAM 20 MG: 20 TABLET, FILM COATED ORAL at 08:17

## 2022-01-01 RX ADMIN — CEFTRIAXONE SODIUM 1 G: 1 INJECTION, POWDER, FOR SOLUTION INTRAMUSCULAR; INTRAVENOUS at 03:00

## 2022-01-01 RX ADMIN — FAMOTIDINE 20 MG: 20 TABLET ORAL at 08:54

## 2022-01-01 RX ADMIN — ESCITALOPRAM 20 MG: 20 TABLET, FILM COATED ORAL at 10:57

## 2022-01-01 RX ADMIN — MIDAZOLAM 1 MG: 1 INJECTION INTRAMUSCULAR; INTRAVENOUS at 11:47

## 2022-01-01 RX ADMIN — LINEZOLID 600 MG: 600 TABLET, FILM COATED ORAL at 09:24

## 2022-01-01 RX ADMIN — MIDODRINE HYDROCHLORIDE 10 MG: 5 TABLET ORAL at 13:49

## 2022-01-01 RX ADMIN — POTASSIUM CHLORIDE 20 MEQ: 750 TABLET, EXTENDED RELEASE ORAL at 20:48

## 2022-01-01 RX ADMIN — SODIUM BICARBONATE 1300 MG: 650 TABLET ORAL at 11:25

## 2022-01-01 RX ADMIN — FENTANYL CITRATE 25 MCG: 0.05 INJECTION, SOLUTION INTRAMUSCULAR; INTRAVENOUS at 11:59

## 2022-01-01 RX ADMIN — MAGNESIUM OXIDE 400 MG (241.3 MG MAGNESIUM) TABLET 400 MG: TABLET at 22:01

## 2022-01-01 RX ADMIN — SODIUM BICARBONATE 50 MEQ: 84 INJECTION, SOLUTION INTRAVENOUS at 20:26

## 2022-01-01 RX ADMIN — ASPIRIN 81 MG: 81 TABLET, CHEWABLE ORAL at 09:55

## 2022-01-01 RX ADMIN — FAMOTIDINE 20 MG: 20 TABLET ORAL at 08:10

## 2022-01-01 RX ADMIN — LEVOFLOXACIN 500 MG: 5 INJECTION, SOLUTION INTRAVENOUS at 20:12

## 2022-01-01 RX ADMIN — PHENYLEPHRINE HYDROCHLORIDE 100 MCG: 10 INJECTION, SOLUTION INTRAVENOUS at 20:38

## 2022-01-01 RX ADMIN — FAMOTIDINE 20 MG: 10 INJECTION, SOLUTION INTRAVENOUS at 14:37

## 2022-01-01 RX ADMIN — Medication 1 TABLET: at 09:24

## 2022-01-01 RX ADMIN — FAMOTIDINE 20 MG: 10 INJECTION INTRAVENOUS at 13:30

## 2022-01-01 RX ADMIN — ASPIRIN 81 MG: 81 TABLET, CHEWABLE ORAL at 17:41

## 2022-01-01 RX ADMIN — Medication 10 ML: at 21:59

## 2022-01-01 RX ADMIN — FAMOTIDINE 20 MG: 20 TABLET ORAL at 21:56

## 2022-01-01 RX ADMIN — POTASSIUM CHLORIDE 20 MEQ: 750 TABLET, EXTENDED RELEASE ORAL at 10:00

## 2022-01-01 RX ADMIN — MIDODRINE HYDROCHLORIDE 10 MG: 5 TABLET ORAL at 06:33

## 2022-01-01 RX ADMIN — POTASSIUM CHLORIDE 40 MEQ: 750 TABLET, EXTENDED RELEASE ORAL at 17:48

## 2022-01-01 RX ADMIN — Medication 10 ML: at 21:00

## 2022-01-01 RX ADMIN — Medication 1 TABLET: at 08:54

## 2022-01-01 RX ADMIN — POTASSIUM CHLORIDE 20 MEQ: 750 TABLET, EXTENDED RELEASE ORAL at 08:50

## 2022-01-01 RX ADMIN — POTASSIUM CHLORIDE 40 MEQ: 750 TABLET, EXTENDED RELEASE ORAL at 08:40

## 2022-01-01 RX ADMIN — ASPIRIN 81 MG: 81 TABLET, CHEWABLE ORAL at 09:22

## 2022-01-01 RX ADMIN — DEXTROSE MONOHYDRATE, SODIUM CHLORIDE, SODIUM LACTATE, CALCIUM CHLORIDE, MAGNESIUM CHLORIDE 2000 ML: 1.5; 538; 448; 18.4; 5.08 SOLUTION INTRAPERITONEAL at 20:13

## 2022-01-01 RX ADMIN — LORAZEPAM 0.5 MG: 2 INJECTION, SOLUTION INTRAMUSCULAR; INTRAVENOUS at 21:52

## 2022-01-01 RX ADMIN — DEXTROSE MONOHYDRATE, SODIUM CHLORIDE, SODIUM LACTATE, CALCIUM CHLORIDE, MAGNESIUM CHLORIDE 2000 ML: 1.5; 538; 448; 18.4; 5.08 SOLUTION INTRAPERITONEAL at 23:03

## 2022-01-01 RX ADMIN — ASPIRIN 81 MG: 81 TABLET, CHEWABLE ORAL at 08:34

## 2022-01-01 RX ADMIN — MIDAZOLAM HYDROCHLORIDE 1 MG: 1 INJECTION, SOLUTION INTRAMUSCULAR; INTRAVENOUS at 13:30

## 2022-01-01 RX ADMIN — SODIUM BICARBONATE 1300 MG: 650 TABLET ORAL at 09:19

## 2022-01-01 RX ADMIN — LINEZOLID 600 MG: 600 INJECTION, SOLUTION INTRAVENOUS at 14:59

## 2022-01-01 RX ADMIN — MIDODRINE HYDROCHLORIDE 10 MG: 5 TABLET ORAL at 11:38

## 2022-01-01 RX ADMIN — MIDODRINE HYDROCHLORIDE 10 MG: 5 TABLET ORAL at 14:59

## 2022-01-01 RX ADMIN — LANTHANUM CARBONATE 750 MG: 500 TABLET, CHEWABLE ORAL at 11:42

## 2022-01-01 RX ADMIN — CEFTRIAXONE 2 G: 2 INJECTION, POWDER, FOR SOLUTION INTRAMUSCULAR; INTRAVENOUS at 09:15

## 2022-01-01 RX ADMIN — ESCITALOPRAM 20 MG: 20 TABLET, FILM COATED ORAL at 08:50

## 2022-01-01 RX ADMIN — ONDANSETRON 4 MG: 2 INJECTION INTRAMUSCULAR; INTRAVENOUS at 11:57

## 2022-01-01 RX ADMIN — SODIUM CHLORIDE 75 ML/HR: 9 INJECTION, SOLUTION INTRAVENOUS at 16:22

## 2022-01-01 RX ADMIN — LEVOTHYROXINE SODIUM 137 MCG: 0.14 TABLET ORAL at 05:42

## 2022-01-01 RX ADMIN — PHENYLEPHRINE HYDROCHLORIDE 100 MCG: 10 INJECTION, SOLUTION INTRAVENOUS at 15:06

## 2022-01-01 RX ADMIN — SODIUM BICARBONATE 650 MG: 650 TABLET ORAL at 21:44

## 2022-01-01 RX ADMIN — HEPARIN SODIUM 5000 UNITS: 5000 INJECTION INTRAVENOUS; SUBCUTANEOUS at 15:16

## 2022-01-01 RX ADMIN — LINEZOLID 600 MG: 600 INJECTION, SOLUTION INTRAVENOUS at 23:38

## 2022-01-01 RX ADMIN — POTASSIUM CHLORIDE 20 MEQ: 750 TABLET, EXTENDED RELEASE ORAL at 20:30

## 2022-01-01 RX ADMIN — Medication 10 ML: at 20:39

## 2022-01-01 RX ADMIN — Medication 3 MG: at 22:17

## 2022-01-01 RX ADMIN — FAMOTIDINE 20 MG: 10 INJECTION INTRAVENOUS at 11:14

## 2022-01-01 RX ADMIN — SODIUM CHLORIDE 9 ML/HR: 9 INJECTION, SOLUTION INTRAVENOUS at 11:15

## 2022-01-01 RX ADMIN — MIDODRINE HYDROCHLORIDE 10 MG: 5 TABLET ORAL at 12:18

## 2022-01-01 RX ADMIN — ALBUMIN HUMAN 25 G: 0.25 SOLUTION INTRAVENOUS at 09:11

## 2022-01-01 RX ADMIN — MONTELUKAST SODIUM 10 MG: 10 TABLET, FILM COATED ORAL at 20:52

## 2022-01-01 RX ADMIN — FENTANYL CITRATE 25 MCG: 50 INJECTION INTRAMUSCULAR; INTRAVENOUS at 13:46

## 2022-01-01 RX ADMIN — ESCITALOPRAM 20 MG: 20 TABLET, FILM COATED ORAL at 17:41

## 2022-01-01 RX ADMIN — CEFTRIAXONE SODIUM 1 G: 1 INJECTION, POWDER, FOR SOLUTION INTRAMUSCULAR; INTRAVENOUS at 18:57

## 2022-01-01 RX ADMIN — ASPIRIN 81 MG: 81 TABLET, CHEWABLE ORAL at 08:08

## 2022-01-01 RX ADMIN — SODIUM BICARBONATE 1300 MG: 650 TABLET ORAL at 17:00

## 2022-01-01 RX ADMIN — SODIUM BICARBONATE 1300 MG: 650 TABLET ORAL at 09:21

## 2022-01-01 RX ADMIN — HEPARIN SODIUM 5000 UNITS: 5000 INJECTION INTRAVENOUS; SUBCUTANEOUS at 22:02

## 2022-01-01 RX ADMIN — HEPARIN SODIUM 5000 UNITS: 5000 INJECTION INTRAVENOUS; SUBCUTANEOUS at 06:26

## 2022-01-01 RX ADMIN — POTASSIUM CHLORIDE 20 MEQ: 750 TABLET, EXTENDED RELEASE ORAL at 02:13

## 2022-01-01 RX ADMIN — PROPOFOL 40 MG: 10 INJECTION, EMULSION INTRAVENOUS at 15:06

## 2022-01-01 RX ADMIN — DEXTROSE MONOHYDRATE, SODIUM CHLORIDE, SODIUM LACTATE, CALCIUM CHLORIDE, MAGNESIUM CHLORIDE 2000 ML: 1.5; 538; 448; 18.4; 5.08 SOLUTION INTRAPERITONEAL at 06:49

## 2022-01-01 RX ADMIN — CLOPIDOGREL 75 MG: 75 TABLET, FILM COATED ORAL at 09:22

## 2022-01-01 RX ADMIN — VANCOMYCIN HYDROCHLORIDE 125 MG: 125 CAPSULE ORAL at 16:59

## 2022-01-01 RX ADMIN — ESCITALOPRAM 20 MG: 20 TABLET, FILM COATED ORAL at 11:04

## 2022-01-01 RX ADMIN — SODIUM BICARBONATE 1300 MG: 650 TABLET ORAL at 22:16

## 2022-01-01 RX ADMIN — Medication 10 ML: at 21:46

## 2022-01-01 RX ADMIN — FAMOTIDINE 20 MG: 20 TABLET, FILM COATED ORAL at 17:53

## 2022-01-01 RX ADMIN — CLOPIDOGREL 75 MG: 75 TABLET, FILM COATED ORAL at 13:54

## 2022-01-01 RX ADMIN — LANTHANUM CARBONATE 750 MG: 500 TABLET, CHEWABLE ORAL at 18:42

## 2022-01-01 RX ADMIN — MIDODRINE HYDROCHLORIDE 10 MG: 5 TABLET ORAL at 17:39

## 2022-01-01 RX ADMIN — TRAZODONE HYDROCHLORIDE 100 MG: 100 TABLET ORAL at 20:30

## 2022-01-01 RX ADMIN — ONDANSETRON 4 MG: 2 INJECTION INTRAMUSCULAR; INTRAVENOUS at 18:04

## 2022-01-01 RX ADMIN — Medication 3 MG: at 22:01

## 2022-01-01 RX ADMIN — CLOPIDOGREL 75 MG: 75 TABLET, FILM COATED ORAL at 08:10

## 2022-01-01 RX ADMIN — Medication 20 MG: at 13:46

## 2022-01-01 RX ADMIN — MIDODRINE HYDROCHLORIDE 10 MG: 5 TABLET ORAL at 12:34

## 2022-01-01 RX ADMIN — PHENYLEPHRINE HYDROCHLORIDE 50 MCG: 10 INJECTION, SOLUTION INTRAVENOUS at 14:00

## 2022-01-01 RX ADMIN — MIDODRINE HYDROCHLORIDE 10 MG: 5 TABLET ORAL at 07:04

## 2022-01-01 RX ADMIN — ASPIRIN 81 MG: 81 TABLET, CHEWABLE ORAL at 08:28

## 2022-01-01 RX ADMIN — CLOPIDOGREL 75 MG: 75 TABLET, FILM COATED ORAL at 09:10

## 2022-01-01 RX ADMIN — CEFTRIAXONE SODIUM 1 G: 1 INJECTION, POWDER, FOR SOLUTION INTRAMUSCULAR; INTRAVENOUS at 18:36

## 2022-01-01 RX ADMIN — OXYCODONE AND ACETAMINOPHEN 1 TABLET: 5; 325 TABLET ORAL at 15:17

## 2022-01-01 RX ADMIN — Medication 3 MG: at 20:52

## 2022-01-01 RX ADMIN — CEFTRIAXONE SODIUM 2 G: 2 INJECTION, POWDER, FOR SOLUTION INTRAMUSCULAR; INTRAVENOUS at 20:43

## 2022-01-01 RX ADMIN — FAMOTIDINE 20 MG: 20 TABLET ORAL at 08:38

## 2022-01-01 RX ADMIN — FAMOTIDINE 20 MG: 20 TABLET ORAL at 09:04

## 2022-01-01 RX ADMIN — POTASSIUM CHLORIDE 40 MEQ: 10 TABLET, EXTENDED RELEASE ORAL at 21:58

## 2022-01-01 RX ADMIN — MIDODRINE HYDROCHLORIDE 10 MG: 5 TABLET ORAL at 08:53

## 2022-01-01 RX ADMIN — MAGNESIUM OXIDE 400 MG (241.3 MG MAGNESIUM) TABLET 400 MG: TABLET at 21:57

## 2022-01-01 RX ADMIN — POTASSIUM CHLORIDE 40 MEQ: 750 TABLET, EXTENDED RELEASE ORAL at 09:19

## 2022-01-01 RX ADMIN — SODIUM CHLORIDE 500 ML: 9 INJECTION, SOLUTION INTRAVENOUS at 14:53

## 2022-01-01 RX ADMIN — Medication 500 MG: at 12:35

## 2022-01-01 RX ADMIN — HEPARIN SODIUM 5000 UNITS: 5000 INJECTION INTRAVENOUS; SUBCUTANEOUS at 23:10

## 2022-01-01 RX ADMIN — Medication 1 TABLET: at 08:33

## 2022-01-01 RX ADMIN — MIDODRINE HYDROCHLORIDE 10 MG: 5 TABLET ORAL at 17:55

## 2022-01-01 RX ADMIN — MONTELUKAST SODIUM 10 MG: 10 TABLET, FILM COATED ORAL at 22:01

## 2022-01-01 RX ADMIN — LEVOFLOXACIN 250 MG: 250 INJECTION, SOLUTION INTRAVENOUS at 18:43

## 2022-01-01 RX ADMIN — Medication 3 MG: at 20:21

## 2022-01-01 RX ADMIN — SODIUM CHLORIDE 250 ML: 9 INJECTION, SOLUTION INTRAVENOUS at 19:02

## 2022-01-01 RX ADMIN — CLOPIDOGREL 75 MG: 75 TABLET, FILM COATED ORAL at 09:19

## 2022-01-01 RX ADMIN — MAGNESIUM SULFATE HEPTAHYDRATE 2 G: 40 INJECTION, SOLUTION INTRAVENOUS at 12:47

## 2022-01-01 RX ADMIN — ONDANSETRON HYDROCHLORIDE 4 MG: 2 SOLUTION INTRAMUSCULAR; INTRAVENOUS at 20:51

## 2022-01-01 RX ADMIN — LIDOCAINE HYDROCHLORIDE 80 MG: 20 INJECTION, SOLUTION INFILTRATION; PERINEURAL at 11:55

## 2022-01-01 RX ADMIN — MIDODRINE HYDROCHLORIDE 15 MG: 5 TABLET ORAL at 06:33

## 2022-01-01 RX ADMIN — OXYCODONE AND ACETAMINOPHEN 1 TABLET: 5; 325 TABLET ORAL at 05:49

## 2022-01-01 RX ADMIN — SODIUM BICARBONATE 1300 MG: 650 TABLET ORAL at 16:42

## 2022-01-01 RX ADMIN — LANTHANUM CARBONATE 750 MG: 500 TABLET, CHEWABLE ORAL at 09:21

## 2022-01-01 RX ADMIN — POTASSIUM CHLORIDE 20 MEQ: 750 TABLET, EXTENDED RELEASE ORAL at 21:56

## 2022-01-01 RX ADMIN — MIDODRINE HYDROCHLORIDE 15 MG: 5 TABLET ORAL at 16:43

## 2022-01-01 RX ADMIN — DEXTROSE MONOHYDRATE, SODIUM CHLORIDE, SODIUM LACTATE, CALCIUM CHLORIDE, MAGNESIUM CHLORIDE 2000 ML: 1.5; 538; 448; 18.4; 5.08 SOLUTION INTRAPERITONEAL at 14:26

## 2022-01-01 RX ADMIN — VANCOMYCIN HYDROCHLORIDE 125 MG: 125 CAPSULE ORAL at 00:40

## 2022-01-01 RX ADMIN — PHENYLEPHRINE HYDROCHLORIDE 100 MCG: 10 INJECTION, SOLUTION INTRAVENOUS at 20:42

## 2022-01-01 RX ADMIN — FAMOTIDINE 20 MG: 20 TABLET, FILM COATED ORAL at 17:41

## 2022-01-01 RX ADMIN — SODIUM BICARBONATE 1300 MG: 650 TABLET ORAL at 20:52

## 2022-01-01 RX ADMIN — MAGNESIUM OXIDE 400 MG (241.3 MG MAGNESIUM) TABLET 400 MG: TABLET at 11:32

## 2022-01-01 RX ADMIN — MIDODRINE HYDROCHLORIDE 10 MG: 5 TABLET ORAL at 17:17

## 2022-01-01 RX ADMIN — MIDODRINE HYDROCHLORIDE 15 MG: 5 TABLET ORAL at 06:41

## 2022-01-01 RX ADMIN — SODIUM BICARBONATE 1300 MG: 650 TABLET ORAL at 08:28

## 2022-01-01 RX ADMIN — SODIUM BICARBONATE 1300 MG: 650 TABLET ORAL at 15:56

## 2022-01-01 RX ADMIN — HEPARIN SODIUM 5000 UNITS: 5000 INJECTION INTRAVENOUS; SUBCUTANEOUS at 14:59

## 2022-01-01 RX ADMIN — ESCITALOPRAM 20 MG: 20 TABLET, FILM COATED ORAL at 09:55

## 2022-01-01 RX ADMIN — MIDODRINE HYDROCHLORIDE 10 MG: 5 TABLET ORAL at 11:16

## 2022-01-01 RX ADMIN — LEVOTHYROXINE SODIUM 150 MCG: 0.15 TABLET ORAL at 08:10

## 2022-01-01 RX ADMIN — POTASSIUM CHLORIDE 40 MEQ: 750 TABLET, EXTENDED RELEASE ORAL at 22:22

## 2022-01-01 RX ADMIN — LANTHANUM CARBONATE 750 MG: 500 TABLET, CHEWABLE ORAL at 17:17

## 2022-01-01 RX ADMIN — SODIUM BICARBONATE 1300 MG: 650 TABLET ORAL at 20:36

## 2022-01-01 RX ADMIN — LANTHANUM CARBONATE 1500 MG: 500 TABLET, CHEWABLE ORAL at 08:55

## 2022-02-27 PROBLEM — N18.9 ACUTE RENAL FAILURE SUPERIMPOSED ON CHRONIC KIDNEY DISEASE, ON CHRONIC DIALYSIS: Status: ACTIVE | Noted: 2022-01-01

## 2022-02-27 PROBLEM — Z99.2 ACUTE RENAL FAILURE SUPERIMPOSED ON CHRONIC KIDNEY DISEASE, ON CHRONIC DIALYSIS (HCC): Status: ACTIVE | Noted: 2022-01-01

## 2022-02-27 PROBLEM — N20.0 RENAL CALCULI: Status: ACTIVE | Noted: 2022-01-01

## 2022-02-27 PROBLEM — N17.9 ACUTE RENAL FAILURE SUPERIMPOSED ON CHRONIC KIDNEY DISEASE, ON CHRONIC DIALYSIS (HCC): Status: ACTIVE | Noted: 2022-01-01

## 2022-02-27 PROBLEM — E66.9 OBESITY (BMI 30-39.9): Status: ACTIVE | Noted: 2022-01-01

## 2022-02-28 PROBLEM — I70.25 ATHEROSCLEROSIS OF NATIVE ARTERY OF EXTREMITY WITH ULCERATION: Status: ACTIVE | Noted: 2022-01-01

## 2022-02-28 PROBLEM — N13.2 HYDRONEPHROSIS WITH URINARY OBSTRUCTION DUE TO RENAL CALCULUS: Status: ACTIVE | Noted: 2022-01-01

## 2022-02-28 PROBLEM — D73.89 SPLENIC CALCIFICATION: Status: ACTIVE | Noted: 2022-01-01

## 2022-02-28 PROBLEM — N20.1 URETERAL CALCULI: Status: ACTIVE | Noted: 2022-01-01

## 2022-02-28 NOTE — ANESTHESIA POSTPROCEDURE EVALUATION
Patient: Ambar Lara    Procedure Summary     Date: 02/28/22 Room / Location: [facility] OR 01 / [facility] MAIN OR    Anesthesia Start: 1149 Anesthesia Stop: 1245    Procedure: Left CYSTOSCOPY, with stent placement (Left ) Diagnosis:       Ureteral calculus      (Left ureteral stones)    Surgeons: Sanford Puentes MD Provider: Sadi Lopez MD    Anesthesia Type: general ASA Status: 3          Anesthesia Type: general    Vitals  Vitals Value Taken Time   /64 02/28/22 1301   Temp 36.9 °C (98.4 °F) 02/28/22 1240   Pulse 90 02/28/22 1315   Resp 16 02/28/22 1315   SpO2 97 % 02/28/22 1315           Post Anesthesia Care and Evaluation    Patient location during evaluation: PACU  Patient participation: complete - patient participated  Level of consciousness: awake  Pain score: 1  Pain management: adequate  Airway patency: patent  Anesthetic complications: No anesthetic complications  PONV Status: none  Cardiovascular status: acceptable  Respiratory status: acceptable  Hydration status: acceptable

## 2022-02-28 NOTE — ANESTHESIA PREPROCEDURE EVALUATION
Anesthesia Evaluation     Patient summary reviewed and Nursing notes reviewed   NPO Solid Status: > 8 hours  NPO Liquid Status: > 2 hours           Airway   Mallampati: II  No difficulty expected  Dental      Pulmonary    (+) pulmonary embolism, sleep apnea,   Cardiovascular     NYHA Classification: III  ECG reviewed  Rhythm: regular    (+) CHF Systolic <55%, PVD, hyperlipidemia,       Neuro/Psych  (+) psychiatric history Anxiety,    GI/Hepatic/Renal/Endo    (+) obesity,  GERD,  renal disease (CKD) dialysis, CRI and ARF, thyroid problem hypothyroidism    Musculoskeletal     Abdominal    Substance History      OB/GYN          Other        ROS/Med Hx Other: Follows cards, ECHO not in chart    History of trach, grade IIa view 10/21                Anesthesia Plan    ASA 3     general       Use of blood products discussed with patient .       CODE STATUS:    Code Status (Patient has no pulse and is not breathing): CPR (Attempt to Resuscitate)  Medical Interventions (Patient has pulse or is breathing): Full Support       - - -

## 2022-02-28 NOTE — ANESTHESIA PROCEDURE NOTES
Airway  Urgency: elective    Date/Time: 2/28/2022 11:56 AM  Airway not difficult    General Information and Staff    Patient location during procedure: OR  Anesthesiologist: Sadi Lopez MD  CRNA: Liliana Dunn CRNA    Indications and Patient Condition  Indications for airway management: airway protection    Preoxygenated: yes  MILS not maintained throughout  Mask difficulty assessment: 0 - not attempted    Final Airway Details  Final airway type: supraglottic airway      Successful airway: unique  Size 4    Number of attempts at approach: 1  Assessment: lips, teeth, and gum same as pre-op and atraumatic intubation

## 2022-03-01 NOTE — PROGRESS NOTES
[facility] Progress Note      Patient Name: Ambar Lara  : 1970  MRN: [id number]  Primary Care Physician:  Jet Manuel MD  Date of admission: 2022    Subjective     Interval History:   Seen and examined. Bloody urine. No SOA or CP    Review of Systems:   As noted above    Objective     Vitals:   Temp:  [97.3 °F (36.3 °C)-98.4 °F (36.9 °C)] 98.4 °F (36.9 °C)  Heart Rate:  [] 90  Resp:  [16-18] 18  BP: ()/(50-92) 108/58    Intake/Output Summary (Last 24 hours) at 3/1/2022 1505  Last data filed at 3/1/2022 1210  Gross per 24 hour   Intake 37641 ml   Output 7900 ml   Net 2820 ml       Physical Exam:    General Appearance: alert, oriented x 3, no acute distress   Skin: warm and dry  HEENT: oral mucosa normal, nonicteric sclera  Neck: supple, no JVD  Lungs: CTA  Heart: RRR, normal S1 and S2  Abdomen: soft, nontender, nondistended  : no palpable bladder  Extremities: no edema, cyanosis or clubbing  Neuro: normal speech and mental status     Scheduled Meds:     aspirin, 81 mg, Oral, Daily  escitalopram, 20 mg, Oral, Daily  famotidine, 20 mg, Oral, Daily  heparin (porcine), 5,000 Units, Subcutaneous, Q8H  levoFLOXacin, 500 mg, Oral, Every Other Day  levothyroxine, 137 mcg, Oral, Q AM  melatonin, 3 mg, Oral, Nightly  midodrine, 10 mg, Oral, TID AC  montelukast, 10 mg, Oral, Nightly  potassium chloride, 40 mEq, Oral, BID With Meals  traZODone, 100 mg, Oral, Nightly      IV Meds:        Results Reviewed:   I have personally reviewed the results from the time of this admission to 3/1/2022 15:05 EST     Results from last 7 days   Lab Units 22  1107 22  0555 22  1855   SODIUM mmol/L 137 137 138   POTASSIUM mmol/L 3.3* 3.6 3.7   CHLORIDE mmol/L 103 105 103   CO2 mmol/L 13.0* 9.6* 9.1*   BUN mg/dL 35* 36* 35*   CREATININE mg/dL 6.60* 6.04* 6.53*   CALCIUM mg/dL 9.0 9.4 9.5   BILIRUBIN mg/dL  --   --  0.2   ALK PHOS U/L  --   --  116   ALT (SGPT)  U/L  --   --  18   AST (SGOT) U/L  --   --  14   GLUCOSE mg/dL 79 103* 131*     Estimated Creatinine Clearance: 9.6 mL/min (A) (by C-G formula based on SCr of 6.6 mg/dL (H)).  Results from last 7 days   Lab Units 02/28/22  0555 02/27/22  1855   MAGNESIUM mg/dL  --  1.5*   PHOSPHORUS mg/dL 10.0*  --          Results from last 7 days   Lab Units 03/01/22  1107 02/28/22  0555 02/27/22  1855   WBC 10*3/mm3 13.46* 16.18* 20.40*   HEMOGLOBIN g/dL 11.6* 12.3 13.5   PLATELETS 10*3/mm3 466* 546* 619*           Assessment / Plan     ASSESSMENT:  - ESRD on PD dialysis: Continue CAPD 2500 cc fill volume, 1.5 % with 4 fills  - Left kidney stone with hydronephrosis S/P stent placement by urology 02/28/2022  - Diffuse peripheral vascular disease with splenic infarct  - Diastolic CHF with Cardiomyopathy EF 25%  - HTN  - Anemia of ESRD  - Hypokalemia     PLAN:     Continue  PD  Continue to eval volume and lytes  Surveillance labs    Thank you for involving us in the care of Ambar Lara.  Please feel free to call with any questions.    Camila Salas MD  03/01/22  15:05 [facility]    [facility]  [phone]

## 2022-03-01 NOTE — PLAN OF CARE
Goal Outcome Evaluation:      Pt resting in bed quietly. Denies pain. Pt refused 6pm PD and again refused it at 8pm because she wanted to take potassium beforehand. Call placed to nephrology and Dr Waggoner gave order for potassium. Potassium given and pt agreed to have PD at 2200. In order to catch patient up on PD sessions, another session was performed at 0130. Pt says that she can not dwell for 8 hours overnight. She says that she cannot dwell more than 3-4 hours at a time max. Refusing bed alarm. Walking independently to bathroom.

## 2022-03-01 NOTE — PROGRESS NOTES
Name: Ambar Lara ADMIT: 2022   : 1970  PCP: Jet Manuel MD    MRN: [id number] LOS: 2 days   AGE/SEX: 51 y.o. female  ROOM: [facility]/[address] [place] [address]    [place], Subsequent Hospital Care    Chief Complaint   Patient presents with   • Vomiting   • Diarrhea     CC: Splenic artery thrombosis      51 y.o. 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  I suspect this is the reason for this kidney and spleen shrinking and the infarct seen.  High heavy calcification throughout the splenic artery leads me to believe this is the source however we will get a duplex today to determine if it is patent.  I also believe she has significant peripheral vascular disease affecting the right forefoot.  She is not in rest pain but I think that arterial testing will give us a better idea where she sits with this.  Neither of these issues is emergent and with her nephrolithiasis and kidney stone removal yesterday this fresh intervention would be contraindicated as well heparinization will be needed.  I believe outpatient work-up is reasonable here and once she gets her testing she is okay to go home from vascular standpoint with follow-up in the office.  She and the nursing staff are aware.    Review of Systems pain much improved after kidney stone removal    Objective     Scheduled Medications:   aspirin, 81 mg, Oral, Daily  escitalopram, 20 mg, Oral, Daily  famotidine, 20 mg, Oral, Daily  heparin (porcine), 5,000 Units, Subcutaneous, Q8H  levothyroxine, 137 mcg, Oral, Q AM  melatonin, 3 mg, Oral, Nightly  midodrine, 10 mg, Oral, TID AC  montelukast, 10 mg, Oral, Nightly  Pharmacy to dose vancomycin, , Does not apply, Once  potassium chloride, 40 mEq, Oral, BID With Meals  traZODone, 100 mg, Oral, Nightly        Active Infusions:       As Needed Medications:  •  ammonium lactate  •  nitroglycerin  •  sodium chloride  •  UltraBag/Dianeal/1.5% Dextrose low-jamison (alvarado #[id number])    Vital Signs  Vital Signs  Patient Vitals for the past 24 hrs:   BP Temp Temp src Pulse Resp SpO2 Weight   03/01/22 0714 100/63 97.9 °F (36.6 °C) Oral 95 18 98 % --   03/01/22 0626 100/51 -- -- 96 -- -- --   03/01/22 0625 -- -- -- -- -- -- 75.3 kg (166 lb)   02/28/22 2332 91/50 97.3 °F (36.3 °C) Oral 94 16 96 % --   02/28/22 1920 109/75 98.4 °F (36.9 °C) Oral 110 16 95 % --   02/28/22 1315 -- 97.8 °F (36.6 °C) Oral 90 16 97 % --   02/28/22 1300 107/64 -- -- 95 16 99 % --   02/28/22 1250 100/76 -- -- 98 16 99 % --   02/28/22 1245 100/66 -- -- 107 16 99 % --   02/28/22 1240 95/71 98.4 °F (36.9 °C) Oral 116 16 97 % --   02/28/22 1045 101/74 98.3 °F (36.8 °C) Oral 97 18 97 % --   02/28/22 1018 -- -- -- 110 -- 93 % --   02/28/22 1017 -- -- -- 109 -- 97 % --   02/28/22 1016 -- -- -- 120 -- 96 % --   02/28/22 1013 -- -- -- 118 -- -- --   02/28/22 1012 -- -- -- 115 -- -- --   02/28/22 1011 -- -- -- 95 -- -- --   02/28/22 1010 -- -- -- 107 -- -- --   02/28/22 1009 -- -- -- 111 -- -- --   02/28/22 1007 -- -- -- 99 -- -- --   02/28/22 1006 -- -- -- 96 -- -- --   02/28/22 1005 -- -- -- 104 -- -- --   02/28/22 1004 -- -- -- 107 -- -- --   02/28/22 1003 -- -- -- 103 -- -- --   02/28/22 1002 -- -- -- 114 -- -- --   02/28/22 1000 -- -- -- 99 -- -- --     Vital Signs (range)  Temp:  [97.3 °F (36.3 °C)-98.4 °F (36.9 °C)] 97.9 °F (36.6 °C)  Heart Rate:  [] 95  Resp:  [16-18] 18  BP: ()/(50-76) 100/63  I/O:  I/O last 3 completed shifts:  In: 9480 [P.O.:1230; I.V.:250; Other:7500; IV Piggyback:500]  Out: 5700 [Other:5700]  I/O:   Intake/Output Summary (Last 24 hours) at 3/1/2022 0755  Last data filed at 3/1/2022 0700  Gross per 24 hour   Intake 8620 ml   Output 5700 ml   Net 2920 ml     BMI:  Body mass index is 31.37 kg/m².    Physical Exam:  Physical Exam   Lungs clear and equal  Heart regular rate and rhythm  Abdomen soft nondistended nontender  Extremities viable with moderate ischemic changes of the right forefoot      Results Review:     CBC     Results from last 7 days   Lab Units 02/28/22  0555 02/27/22  1855   WBC 10*3/mm3 16.18* 20.40*   HEMOGLOBIN g/dL 12.3 13.5   PLATELETS 10*3/mm3 546* 619*     BMP   Results from last 7 days   Lab Units 02/28/22  0555 02/27/22  1855   SODIUM mmol/L 137 138   POTASSIUM mmol/L 3.6 3.7   CHLORIDE mmol/L 105 103   CO2 mmol/L 9.6* 9.1*   BUN mg/dL 36* 35*   CREATININE mg/dL 6.04* 6.53*   GLUCOSE mg/dL 103* 131*   MAGNESIUM mg/dL  --  1.5*   PHOSPHORUS mg/dL 10.0*  --      Cr Clearance Estimated Creatinine Clearance: 10.5 mL/min (A) (by C-G formula based on SCr of 6.04 mg/dL (H)).  Coag     HbA1C   Lab Results   Component Value Date    HGBA1C 7.3 (H) 02/21/2021    HGBA1C 5.7 (H) 03/31/2020    HGBA1C 5.9 (H) 02/28/2020     Blood Glucose No results found for: POCGLU  Infection   Results from last 7 days   Lab Units 02/27/22 2114 02/27/22  1855   BLOODCX  No growth at 24 hours  --    PROCALCITONIN ng/mL  --  0.65*     Radiology(recent) CT Abdomen Pelvis Without Contrast    Result Date: 2/27/2022  Several tiny 1 to 2 mm diameter obstructing calculi in the distal left ureter at the level of the ureterovesical junction producing moderate left hydronephrosis and hydroureter. A small nonobstructing lower pole left renal calculus is also present. Multiple bilateral renal cysts. Extensive vascular calcification with suspected areas of splenic ischemia and/or infarction resulting in overall decrease in size of the spleen compared to previous studies. Diffusely thickened left adrenal gland consistent with hyperplasia that is unchanged. Three small oblong, partially calcified mass-like lesions within the abdomen as described in more detail above. These are suspected to be sequela of the patient's peritoneal dialysis. Metastatic disease within the peritoneal cavity cannot be entirely excluded. If there is clinical suspicion of metastatic disease further evaluation with PET/CT imaging may be warranted. No formed stool is present  within the colon suggesting diffuse colitis.  This report was finalized on 2/27/2022 8:33 PM by Dr. Cecil Roldan M.D.        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 female with suspected peripheral vascular disease awaiting arterial testing.  I also suspect splenic artery thrombosis based on the large calcium load and shrinking spleen.  Neither are surgically urgent and likely we would do nothing for the spleen regardless.  We would recommend follow-up to go over testing in the near future in the office if she is discharged prior to us being able to get back to see her.  Will likely not be able to run on her again till tomorrow morning and if she is stable for discharge from of the other doctor standpoints then follow-up with with me in my office to go over the results will be arranged.  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

## 2022-03-01 NOTE — PLAN OF CARE
Goal Outcome Evaluation:           Progress: improving  Outcome Summary: Pt AOx4. On RA. PD completed 11 am and planning for 6pm tonight. See chart for output/input. Pt urine is red tinged (since cysto) Urine was sent down per ID orders. IV abx changed to oral abx. NPO at midnight tonight for doppler of SMA tomorrow. Possible d/c tomorrow. Pt not in any pain. No acute distress. Will continue to monitor

## 2022-03-01 NOTE — PLAN OF CARE
Goal Outcome Evaluation:  Plan of Care Reviewed With: patient           Outcome Summary: Pt is a 50 yo female with a complex medical history adm with L flank pain and found to have L uretal calculaus, L hydronephrosis, L hydro ureter, and is now s/p cystoscopy and L stent placement. Pt also with PVD, splenic infarct, ischemic changes R forefoot. work up ongoing for vascular issues. Pt had prolonged hospital stay last year with covid PNA, has ESRD on PD, and CM. Pt lives alone with family nearby, family assists as needed, she has a rolling walker to use if needed, her current mobility issues are related to pain R foot with weight bearing. Pt is independent with mobility, up ad jean carlos in her room, a walker is beneficial to take weight off her RLE, but pt can walk without it also, currently no PT needs identified, will sign off

## 2022-03-01 NOTE — NURSING NOTE
Patient refused bed alarm. Tried to administer 1600 dialysis but patient stated fluid was not warm enough and refused until later.

## 2022-03-01 NOTE — PROGRESS NOTES
Name: Ambar Lara ADMIT: 2022   : 1970  PCP: Jet Manuel MD    MRN: [id number] LOS: 2 days   AGE/SEX: 51 y.o. female  ROOM: [facility]/     Subjective   Subjective    She feels fine. Denies any flank pain. No chest pain or shortness of breath.    Review of Systems   Constitutional: Negative for fever.   Respiratory: Negative for cough and shortness of breath.    Cardiovascular: Negative for chest pain.   Gastrointestinal: Negative for diarrhea, nausea and vomiting.   Genitourinary: Negative for dysuria.   Neurological: Negative for headaches.      Objective   Objective   Vital Signs  Temp:  [97.3 °F (36.3 °C)-98.4 °F (36.9 °C)] 98.4 °F (36.9 °C)  Heart Rate:  [] 90  Resp:  [16-18] 18  BP: ()/(50-92) 108/58  SpO2:  [95 %-99 %] 98 %  on   ;   Device (Oxygen Therapy): room air  Body mass index is 31.37 kg/m².    Physical Exam  Constitutional:       General: She is not in acute distress.     Appearance: Normal appearance. She is ill-appearing (chronic). She is not toxic-appearing.   HENT:      Head: Normocephalic and atraumatic.      Right Ear: External ear normal.      Left Ear: External ear normal.      Nose: Nose normal.   Eyes:      Conjunctiva/sclera: Conjunctivae normal.   Cardiovascular:      Rate and Rhythm: Normal rate and regular rhythm.   Pulmonary:      Effort: Pulmonary effort is normal. No respiratory distress.      Breath sounds: Normal breath sounds. No wheezing or rhonchi.   Abdominal:      General: Bowel sounds are normal. There is no distension.      Palpations: Abdomen is soft.      Tenderness: There is no abdominal tenderness. There is no guarding or rebound.   Musculoskeletal:         General: No swelling.      Right lower leg: No edema.      Left lower leg: No edema.   Skin:     General: Skin is warm and dry.      Coloration: Skin is pale.      Findings: Erythema (distal right foot) and lesion (right lateral foot) present.   Neurological:      General: No focal  deficit present.      Mental Status: She is alert and oriented to person, place, and time.   Psychiatric:         Mood and Affect: Mood normal.         Behavior: Behavior normal.         Thought Content: Thought content normal.     Results Review  I reviewed the patient's new clinical results.  Results from last 7 days   Lab Units 02/28/22  0555 02/27/22  1855   WBC 10*3/mm3 16.18* 20.40*   HEMOGLOBIN g/dL 12.3 13.5   PLATELETS 10*3/mm3 546* 619*     Results from last 7 days   Lab Units 02/28/22  0555 02/27/22  1855   SODIUM mmol/L 137 138   POTASSIUM mmol/L 3.6 3.7   CHLORIDE mmol/L 105 103   CO2 mmol/L 9.6* 9.1*   BUN mg/dL 36* 35*   CREATININE mg/dL 6.04* 6.53*   GLUCOSE mg/dL 103* 131*     Lab Results   Component Value Date    ANIONGAP 22.4 (H) 02/28/2022     Estimated Creatinine Clearance: 10.5 mL/min (A) (by C-G formula based on SCr of 6.04 mg/dL (H)).    Results from last 7 days   Lab Units 02/28/22  0555 02/27/22  1855   ALBUMIN g/dL 4.00 4.60   BILIRUBIN mg/dL  --  0.2   ALK PHOS U/L  --  116   AST (SGOT) U/L  --  14   ALT (SGPT) U/L  --  18     Results from last 7 days   Lab Units 02/28/22  0555 02/27/22  1855   CALCIUM mg/dL 9.4 9.5   ALBUMIN g/dL 4.00 4.60   MAGNESIUM mg/dL  --  1.5*   PHOSPHORUS mg/dL 10.0*  --      Results from last 7 days   Lab Units 02/27/22  1855   PROCALCITONIN ng/mL 0.65*   LACTATE mmol/L 1.5     Scheduled Meds  aspirin, 81 mg, Oral, Daily  escitalopram, 20 mg, Oral, Daily  famotidine, 20 mg, Oral, Daily  heparin (porcine), 5,000 Units, Subcutaneous, Q8H  levoFLOXacin, 500 mg, Oral, Every Other Day  levothyroxine, 137 mcg, Oral, Q AM  melatonin, 3 mg, Oral, Nightly  midodrine, 10 mg, Oral, TID AC  montelukast, 10 mg, Oral, Nightly  potassium chloride, 40 mEq, Oral, BID With Meals  traZODone, 100 mg, Oral, Nightly    Continuous Infusions   PRN Meds  •  ammonium lactate  •  nitroglycerin  •  sodium chloride  •  UltraBag/Dianeal/1.5% Dextrose low-jamison (alvarado #[id number])     Diet  Diet  Regular  NPO Diet    I have personally reviewed:  [x]  Laboratory   [x]  Microbiology   [x]  Radiology   [x]  EKG/Telemetry QTc 539  [x]  Records    Echo from July 2021 Edis  Summary: The ejection fraction biplane was calculated at 25%. The left ventricle is mildly dilated. Normal left ventricular wall thickness. Overall left ventricular systolic function is severely depressed.          Assessment/Plan     Active Hospital Problems    Diagnosis  POA   • **Hydronephrosis with obstructing calculus [N13.2]  Yes   • Splenic calcification [D73.89]  Yes   • 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 (HCC) [I70.25]  Yes   • Ureteral calculi [N20.1]  Yes   • Obesity (BMI 30-39.9) [E66.9]  Yes   • Chronic systolic heart failure (HCC) [I50.22]  Yes   • Cardiomyopathy (HCC) [I42.9]  Yes   • ESRD on peritoneal dialysis (HCC) [N18.6, Z99.2]  Not Applicable   • Anxiety [F41.9]  Yes   • Hypothyroidism [E03.9]  Yes      Resolved Hospital Problems   No resolved problems to display.     51 y.o. female admitted with Hydronephrosis with obstructing calculus.    · Left flank pain, left ureteral calculus, moderate left hydronephrosis and hydroureter  · Status post left cystoscopy and stent placement 2/28/2022  · Appreciate ID. Difficult to exclude infection at this point, antibiotics TBD  · Peripheral vascular disease, splenic infarct, ischemic changes right forefoot  · Vascular evaluation: Arterial testing and splenic artery duplex  · Not surgically urgent can follow-up with vascular after discharge  · Cardiomyopathy EF 25%  · ESRD on PD  · Nephrology is managing  · History of Covid prolonged hospital stay went to LTAC, C. difficile  · Heparin SC for DVT prophylaxis  · Discussed with patient, nursing staff, CCP and care team on multidisciplinary rounds  · Discharge: TBD    Preet Workman MD  [facility]  03/01/22  12:59 EST    I wore protective equipment throughout this patient encounter  including a face mask, gloves, and protective eyewear.  Hand hygiene was performed before donning protective equipment and after removal when leaving the room.

## 2022-03-01 NOTE — THERAPY EVALUATION
Patient Name: Ambar Lara  : 1970    MRN: [id number]                              Today's Date: 3/1/2022       Admit Date: 2022    Visit Dx:     ICD-10-CM ICD-9-CM   1. Acute renal failure superimposed on chronic kidney disease, on chronic dialysis, unspecified acute renal failure type ([facility])  N17.9 584.9    N18.9 585.9    Z99.2 V45.11   2. Left ureteral stone  N20.1 592.1   3. Elevated brain natriuretic peptide (BNP) level  R79.89 790.99   4. Cellulitis of right foot  L03.115 682.7   5. Ulcer of right foot, unspecified ulcer stage ([facility])  L97.519 707.15   6. Sepsis without acute organ dysfunction, due to unspecified organism ([facility])  A41.9 038.9     995.91     Patient Active Problem List   Diagnosis   • Colitis, Clostridium difficile   • Anxiety   • Cardiomyopathy ([facility])   • Chronic systolic heart failure ([facility])   • ESRD on peritoneal dialysis ([facility])   • Gastroesophageal reflux disease   • Gitelman syndrome   • HLD (hyperlipidemia)   • Hypothyroidism   • Peritoneal dialysis status ([facility])   • History of tracheostomy   • Acute pulmonary embolism ([facility])   • Severe malnutrition ([facility])   • Clostridium difficile colitis   • Immobility syndrome   • Genu recurvatum of right knee   • Colitis   • PD catheter dysfunction ([facility])   • Gallstones   • Acute renal failure superimposed on chronic kidney disease, on chronic dialysis ([facility])   • Obesity (BMI 30-39.9)   • Hydronephrosis with obstructing calculus   • Splenic calcification   • 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 ([facility])   • Ureteral calculi     Past Medical History:   Diagnosis Date   • CHF (congestive heart failure) ([facility])    • Clostridioides difficile infection 2021    finished oral vanc 2021   • Dialysis patient ([facility])    • Disease of thyroid gland    • Elevated cholesterol    • GERD (gastroesophageal reflux disease)    • Pulmonary emboli ([facility]) 2021    coumadin last taken 2021   • Renal disorder    • Sleep apnea    • Ureteral  calculi 2022     Past Surgical History:   Procedure Laterality Date   • ADRENAL GLAND SURGERY N/A `   •  SECTION Bilateral 2001    Has had x2   • CYSTOSCOPY URETEROSCOPY LASER LITHOTRIPSY Left 2022    Procedure: Left CYSTOSCOPY, with stent placement;  Surgeon: Sanford Puentes MD;  Location: [facility];  Service: Urology;  Laterality: Left;   • HYSTERECTOMY     • INSERTION PERITONEAL DIALYSIS CATHETER Right 10/12/2021    Procedure: LAPAROSCOPIC REVISION OF PERITONEAL DIALYSIS CATHETER AND LAPAROSCOPIC CHOLECYSTECTOMY;  Surgeon: Jamie Figueroa MD;  Location: [facility];  Service: General;  Laterality: Right;   • PERITONEAL CATHETER INSERTION  2019   • TRACHEOSTOMY        General Information     Row Name 22 1337          Physical Therapy Time and Intention    Document Type evaluation  -PC     Mode of Treatment physical therapy  -PC     Row Name 22 1337          General Information    Patient Profile Reviewed yes  -PC     Prior Level of Function independent:  -PC     Barriers to Rehab medically complex  -PC     Row Name 22 1337          Cognition    Orientation Status (Cognition) oriented x 4  -PC     Row Name 22 1337          Safety Issues, Functional Mobility    Impairments Affecting Function (Mobility) pain  -PC           User Key  (r) = Recorded By, (t) = Taken By, (c) = Cosigned By    Initials Name Provider Type    PC Luz Marina Arrieta PT Physical Therapist               Mobility     Row Name 22 1338          Bed Mobility    Bed Mobility supine-sit; sit-supine  -PC     Supine-Sit [place] (Bed Mobility) independent  -PC     Sit-Supine [place] (Bed Mobility) independent  -PC     Row Name 22 1338          Sit-Stand Transfer    Sit-Stand [place] (Transfers) independent  -PC     Row Name 22 1338          Gait/Stairs (Locomotion)    [place] Level (Gait) independent  -PC     Assistive Device (Gait) walker,  front-wheeled  -PC     Distance in Feet (Gait) 20 ft  -PC     Deviations/Abnormal Patterns (Gait) antalgic  -PC           User Key  (r) = Recorded By, (t) = Taken By, (c) = Cosigned By    Initials Name Provider Type    PC Luz Marina Arrieta, PT Physical Therapist               Obj/Interventions     Row Name 03/01/22 4634          Range of Motion Comprehensive    General Range of Motion no range of motion deficits identified  -PC     Row Name 03/01/22 3498          Strength Comprehensive (MMT)    Comment, General Manual Muscle Testing (MMT) Assessment WFL  -PC     Row Name 03/01/22 2172          Balance    Comment, Balance no balance deficits  -PC           User Key  (r) = Recorded By, (t) = Taken By, (c) = Cosigned By    Initials Name Provider Type    Luz Marina Haque, PT Physical Therapist               Goals/Plan    No documentation.                Clinical Impression     Row Name 03/01/22 5815          Pain    Additional Documentation Pain Scale: Numbers Pre/Post-Treatment (Group)  -PC     Row Name 03/01/22 7565          Pain Scale: Numbers Pre/Post-Treatment    Pretreatment Pain Rating 5/10  -PC     Pain Location - Side Right  -PC     Pain Location foot  -PC     Pain Intervention(s) Repositioned; Medication (See MAR)  -PC     Row Name 03/01/22 9274          Plan of Care Review    Plan of Care Reviewed With patient  -PC     Outcome Summary Pt is a 52 yo female with a complex medical history adm with L flank pain and found to have L uretal calculaus, L hydronephrosis, L hydro ureter, and is now s/p cystoscopy and L stent placement. Pt also with PVD, splenic infarct, ischemic changes R forefoot. work up ongoing for vascular issues. Pt had prolonged hospital stay last year with covid PNA, has ESRD on PD, and CM. Pt lives alone with family nearby, family assists as needed, she has a rolling walker to use if needed, her current mobility issues are related to pain R foot with weight bearing. Pt is independent with  mobility, up ad jean carlos in her room, a walker is beneficial to take weight off her RLE, but pt can walk without it also, currently no PT needs identified, will sign off  -PC     Row Name 03/01/22 1339          Therapy Assessment/Plan (PT)    Criteria for Skilled Interventions Met (PT) no problems identified which require skilled intervention  -PC     Row Name 03/01/22 1339          Positioning and Restraints    Pre-Treatment Position in bed  -PC     Post Treatment Position bed  -PC     In Bed supine; call light within reach; encouraged to call for assist; exit alarm on  -PC           User Key  (r) = Recorded By, (t) = Taken By, (c) = Cosigned By    Initials Name Provider Type    PC Luz Marina Arrieta, PT Physical Therapist               Outcome Measures     Row Name 03/01/22 1344 03/01/22 0900       How much help from another person do you currently need...    Turning from your back to your side while in flat bed without using bedrails? 4  -PC 4  -PT    Moving from lying on back to sitting on the side of a flat bed without bedrails? 4  -PC 4  -PT    Moving to and from a bed to a chair (including a wheelchair)? 4  -PC 3  -PT    Standing up from a chair using your arms (e.g., wheelchair, bedside chair)? 4  -PC 3  -PT    Climbing 3-5 steps with a railing? 4  -PC 3  -PT    To walk in hospital room? 4  -PC 3  -PT    AM-PAC 6 Clicks Score (PT) 24  -PC 20  -PT          User Key  (r) = Recorded By, (t) = Taken By, (c) = Cosigned By    Initials Name Provider Type    PC Luz Marina Arrieta PT Physical Therapist    PT Sydney Watkins RN Registered Nurse                             Physical Therapy Education                 Title: PT OT SLP Therapies (Done)     Topic: Physical Therapy (Done)     Point: Mobility training (Done)     Learning Progress Summary           Patient Acceptance, E,D, DU by PC at 3/1/2022 1345                   Point: Home exercise program (Done)     Learning Progress Summary           Patient Acceptance, E,D, DU  by PC at 3/1/2022 1345                   Point: Body mechanics (Done)     Learning Progress Summary           Patient Acceptance, E,D, DU by PC at 3/1/2022 1345                   Point: Precautions (Done)     Learning Progress Summary           Patient Acceptance, E,D, DU by PC at 3/1/2022 1345                               User Key     Initials Effective Dates Name Provider Type Discipline     06/16/21 -  Luz Marina Arrieta PT Physical Therapist PT              PT Recommendation and Plan     Plan of Care Reviewed With: patient  Outcome Summary: Pt is a 50 yo female with a complex medical history adm with L flank pain and found to have L uretal calculaus, L hydronephrosis, L hydro ureter, and is now s/p cystoscopy and L stent placement. Pt also with PVD, splenic infarct, ischemic changes R forefoot. work up ongoing for vascular issues. Pt had prolonged hospital stay last year with covid PNA, has ESRD on PD, and CM. Pt lives alone with family nearby, family assists as needed, she has a rolling walker to use if needed, her current mobility issues are related to pain R foot with weight bearing. Pt is independent with mobility, up ad jean carlos in her room, a walker is beneficial to take weight off her RLE, but pt can walk without it also, currently no PT needs identified, will sign off     Time Calculation:    PT Charges     Row Name 03/01/22 1345             Time Calculation    Start Time 1257  -PC      Stop Time 1310  -PC      Time Calculation (min) 13 min  -PC      PT Received On 03/01/22  -PC            User Key  (r) = Recorded By, (t) = Taken By, (c) = Cosigned By    Initials Name Provider Type     Luz Marina Arrieta PT Physical Therapist              Therapy Charges for Today     Code Description Service Date Service Provider Modifiers Qty    38708977375 HC PT EVAL LOW COMPLEXITY 2 3/1/2022 Luz Marina Arrieta PT GP 1          PT G-Codes  AM-PAC 6 Clicks Score (PT): 24    Luz Marina Arrieta PT  3/1/2022

## 2022-03-01 NOTE — NURSING NOTE
Pt was supposed to be NPO this morning for the doppler of SMA. Pt did eat this morning. This RN was not aware she needed to be NPO for test. NPO after midnight orders have been placed.

## 2022-03-01 NOTE — CONSULTS
Referring Provider: Laura Blake MD  Reason for Consultation: Antibiotics  Chief Complaint   Patient presents with   • Vomiting   • Diarrhea         Subjective   History of present illness: Patient is a 51-year-old female with past medical history of end-stage renal disease on peritoneal dialysis who presented in the setting of left flank pain and found to have left ureteral distal calculus with moderate left hydronephrosis now status post cystoscopy and stone extraction with stent placement on 2022.  ID was consulted for antibiotic recommendations.    On presentation patient is a been afebrile and remained so with admission leukocytosis of 16.  Procalcitonin was elevated to 0.65 however this is in the setting of end-stage renal disease.  She was given levofloxacin and vancomycin in the ED prior to her procedure.  Urinalysis was consistent with acute inflammation however no culture was obtained.  Blood culture has remained negative.    Patient reports she was having significant dysuria on admission however this is resolved today.  Reports she had some pain after the procedure yesterday but this is resolved.  States she has some left lower quadrant soreness.  Denies any nausea, vomiting or diarrhea.  Her main concern now is pain of the right foot with skin changes.  She reports she has had significant dry skin and superficial scabbing.      Past Medical History:   Diagnosis Date   • CHF (congestive heart failure) ([facility])    • Clostridioides difficile infection 2021    finished oral vanc 2021   • Dialysis patient ([facility])    • Disease of thyroid gland    • Elevated cholesterol    • GERD (gastroesophageal reflux disease)    • Pulmonary emboli ([facility]) 2021    coumadin last taken 2021   • Renal disorder    • Sleep apnea    • Ureteral calculi 2022       Past Surgical History:   Procedure Laterality Date   • ADRENAL GLAND SURGERY N/A `   •  SECTION Bilateral 2001    Has had x2   •  CYSTOSCOPY URETEROSCOPY LASER LITHOTRIPSY Left 2/28/2022    Procedure: Left CYSTOSCOPY, with stent placement;  Surgeon: Sanford Puentes MD;  Location: [facility];  Service: Urology;  Laterality: Left;   • HYSTERECTOMY     • INSERTION PERITONEAL DIALYSIS CATHETER Right 10/12/2021    Procedure: LAPAROSCOPIC REVISION OF PERITONEAL DIALYSIS CATHETER AND LAPAROSCOPIC CHOLECYSTECTOMY;  Surgeon: Jamie Figueroa MD;  Location: [facility] MAIN OR;  Service: General;  Laterality: Right;   • PERITONEAL CATHETER INSERTION  04/2019   • TRACHEOSTOMY         family history is not on file.     reports that she quit smoking about 3 years ago. Her smoking use included cigarettes. She has a 25.00 pack-year smoking history. She has never used smokeless tobacco. She reports previous alcohol use. She reports previous drug use.     Allergies   Allergen Reactions   • Penicillins Anaphylaxis, Hives, Rash, Shortness Of Breath and Swelling   • Nickel Rash       Medication:  Antibiotics:  Anti-Infectives (From admission, onward)    Ordered     Dose/Rate Route Frequency Start Stop    02/27/22 2156  vancomycin 1500 mg/500 mL 0.9% NS IVPB ([facility])        Ordering Provider: Melvina Bejarano PA    20 mg/kg × 77.1 kg Intravenous Once 02/27/22 2158 02/27/22 2239 02/27/22 2148  Pharmacy to dose vancomycin        Ordering Provider: Sanford Puentes MD     Does not apply Once 02/27/22 2150 02/27/22 2047  levoFLOXacin (LEVAQUIN) 500 mg/100 mL D5W (premix) (LEVAQUIN) 500 mg        Ordering Provider: Melvina Bejarano PA    500 mg Intravenous Once 02/27/22 2049 02/27/22 2220          Review of Systems  Pertinent items are noted in HPI, all other systems reviewed and negative    Objective     Physical Exam:   Vital Signs   Temp:  [97.3 °F (36.3 °C)-98.4 °F (36.9 °C)] 97.9 °F (36.6 °C)  Heart Rate:  [] 95  Resp:  [16-18] 18  BP: ()/(50-76) 100/63    GENERAL: Awake and alert, in no acute distress.   HEENT:  Oropharynx is clear. Hearing is grossly normal.   EYES: PERRL. No conjunctival injection. No lid lag.   LYMPHATICS: No lymphadenopathy of the neck or inguinal regions.   HEART: Regular rate and regular rhythm. No peripheral edema.   LUNGS: Clear to auscultation anteriorly with normal respiratory effort.   GI: Soft, tenderness in the left lower quadrant, nondistended. No appreciable organomegaly.   SKIN: Right foot with rubor of the toes with dry cracking skin along the toes as well as the plantar aspect to the heel.  Tender to palpation diffusely over this area.  PSYCHIATRIC: Appropriate mood, affect, insight, and judgment.     Results Review:   I reviewed the patient's new clinical results.  I reviewed the patient's new imaging results and agree with the interpretation.  I reviewed the patient's other test results and agree with the interpretation    Lab Results   Component Value Date    WBC 16.18 (H) 02/28/2022    HGB 12.3 02/28/2022    HCT 37.0 02/28/2022    MCV 93.9 02/28/2022     (H) 02/28/2022       Lab Results   Component Value Date    VANCORANDOM 42.10 (C) 02/28/2022       Lab Results   Component Value Date    GLUCOSE 103 (H) 02/28/2022    BUN 36 (H) 02/28/2022    CREATININE 6.04 (H) 02/28/2022    EGFRIFNONA 7 (L) 02/28/2022    EGFRIFAFRI  10/14/2021      Comment:      <15 Indicative of kidney failure.    BCR 6.0 (L) 02/28/2022    CO2 9.6 (L) 02/28/2022    CALCIUM 9.4 02/28/2022    ALBUMIN 4.00 02/28/2022    LABIL2 1.1 07/28/2021    AST 14 02/27/2022    ALT 18 02/27/2022         Estimated Creatinine Clearance: 10.5 mL/min (A) (by C-G formula based on SCr of 6.04 mg/dL (H)).      Microbiology:  2/27 respiratory panel negative  2/27 C. difficile negative  2/27 GI pathogen panel negative  2/27 blood culture no growth to date    Radiology:  2/27 CT of the abdomen and pelvis report reviewed with left moderate hydronephrosis and hydroureter in the setting of 2 mm obstructing distal left ureter calculus.   Left lower pole renal calculus that is nonobstructing.  Extensive vascular calcifications.    2/27 right foot x-ray report reviewed with no evidence of osteomyelitis    Assessment/Plan   #Left hydronephrosis in the setting of left ureteral calculus status post cystoscopy with stone removal and stent placement  #End-stage renal disease on peritoneal dialysis  #Peripheral vascular disease    Unfortunately no urine culture culture was done prior to procedure and her urinalysis is somewhat ambiguous in the setting of a renal calculus.  Of note she did receive doses of antibiotics in the ED which could have covered her empirically for some time given her end-stage renal disease.  It would be difficult to exclude infection at this point.  We will obtain repeat urinalysis with culture if indicated.  The antibiotic plan will depend on this result and if the UA is benign could hold antibiotics.  If it is concerning would likely start an empiric course of antibiotics and patient could proceed to discharge.    Thank you for this consult.  We will continue to follow along and tailor antibiotics as the patient's clinical course evolves.

## 2022-03-02 NOTE — PLAN OF CARE
"Goal Outcome Evaluation:               Pt resting in bed quietly. Denies pain. Patient agreed to drain at 10pm but refused instillation. Agrees to instillation at 6am. Wanted to \"remain dry all night to give (her) body a break.\"   "

## 2022-03-02 NOTE — CASE MANAGEMENT/SOCIAL WORK
Continued Stay Note  [facility]     Patient Name: Ambar Lara  MRN: [id number]  Today's Date: 3/2/2022    Admit Date: 2/27/2022     Discharge Plan     Row Name 03/02/22 1116       Plan    Plan Plan home with family assistance.   RUTH Henning RN    Patient/Family in Agreement with Plan yes    Plan Comments Spoke with pt at bedside.  Pt denies any discharge needs.  Pt states her son will assist her as needed.   Plan home with family assistance.   RUTH Henning RN               Discharge Codes    No documentation.               Expected Discharge Date and Time     Expected Discharge Date Expected Discharge Time    Mar 4, 2022             Liliana Henning, RN

## 2022-03-02 NOTE — PROGRESS NOTES
Name: Ambar Lara ADMIT: 2022   : 1970  PCP: Jet Manuel MD    MRN: [id number] LOS: 3 days   AGE/SEX: 51 y.o. female  ROOM: [address]    [place]    Chief Complaint   Patient presents with   • Vomiting   • Diarrhea     CC: Splenic artery thrombosis      51 y.o. 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  I suspect this is the reason for this kidney and spleen shrinking and the infarct seen.  High heavy calcification throughout the splenic artery leads me to believe this is the source however we will get a duplex today to determine if it is patent.  Lower extremity arterial testing confirms moderate occlusive disease with no need for intervention at this time based on toe pressures.  The prior diseases in the tibial level  Review of Systems pain much improved after kidney stone removal    Objective     Scheduled Medications:   aspirin, 81 mg, Oral, Daily  escitalopram, 20 mg, Oral, Daily  famotidine, 20 mg, Oral, Daily  gentamicin, 1 application, Topical, Q8H  heparin (porcine), 5,000 Units, Subcutaneous, Q8H  levothyroxine, 137 mcg, Oral, Q AM  melatonin, 3 mg, Oral, Nightly  midodrine, 10 mg, Oral, TID AC  montelukast, 10 mg, Oral, Nightly  potassium chloride, 40 mEq, Oral, BID With Meals  traZODone, 100 mg, Oral, Nightly        Active Infusions:       As Needed Medications:  •  ammonium lactate  •  nitroglycerin  •  sodium chloride  •  UltraBag/Dianeal/1.5% Dextrose low-jamison (alvarado #[id number])    Vital Signs  Vital Signs   Patient Vitals for the past 24 hrs:   BP Temp Temp src Pulse Resp SpO2 Weight   22 0713 90/61 97.7 °F (36.5 °C) Oral 85 18 95 % --   22 0548 -- -- -- -- -- -- 76.8 kg (169 lb 4.8 oz)   22 0027 101/51 -- -- -- -- -- --   22 0004 (!) 89/50 98.6 °F (37 °C) Oral 88 18 96 % --   22 101/74 98.5 °F (36.9 °C) Oral 104 18 96 % --   22 1757 131/58 98.5 °F (36.9 °C) Oral 101 18 97 % --   22 1257 108/58 98.4 °F  (36.9 °C) Oral 90 18 -- --   03/01/22 1100 107/92 -- -- -- -- -- --     Vital Signs (range)  Temp:  [97.7 °F (36.5 °C)-98.6 °F (37 °C)] 97.7 °F (36.5 °C)  Heart Rate:  [] 85  Resp:  [18] 18  BP: ()/(50-92) 90/61  I/O:  I/O last 3 completed shifts:  In: 41937 [P.O.:1080; Other:43857]  Out: 09064 [Urine:200; Other:61698]  I/O:     Intake/Output Summary (Last 24 hours) at 3/2/2022 0828  Last data filed at 3/2/2022 0610  Gross per 24 hour   Intake 7860 ml   Output 7300 ml   Net 560 ml     BMI:  Body mass index is 31.99 kg/m².    Physical Exam:  Physical Exam         Results Review:     CBC    Results from last 7 days   Lab Units 03/01/22  1107 02/28/22  0555 02/27/22  1855   WBC 10*3/mm3 13.46* 16.18* 20.40*   HEMOGLOBIN g/dL 11.6* 12.3 13.5   PLATELETS 10*3/mm3 466* 546* 619*     BMP   Results from last 7 days   Lab Units 03/01/22  1107 02/28/22  0555 02/27/22  1855   SODIUM mmol/L 137 137 138   POTASSIUM mmol/L 3.3* 3.6 3.7   CHLORIDE mmol/L 103 105 103   CO2 mmol/L 13.0* 9.6* 9.1*   BUN mg/dL 35* 36* 35*   CREATININE mg/dL 6.60* 6.04* 6.53*   GLUCOSE mg/dL 79 103* 131*   MAGNESIUM mg/dL  --   --  1.5*   PHOSPHORUS mg/dL  --  10.0*  --      Cr Clearance Estimated Creatinine Clearance: 9.7 mL/min (A) (by C-G formula based on SCr of 6.6 mg/dL (H)).  Coag     HbA1C   Lab Results   Component Value Date    HGBA1C 7.3 (H) 02/21/2021    HGBA1C 5.7 (H) 03/31/2020    HGBA1C 5.9 (H) 02/28/2020     Blood Glucose No results found for: POCGLU  Infection   Results from last 7 days   Lab Units 03/01/22  1107 02/27/22  2114 02/27/22  1855   BLOODCX   --  No growth at 2 days  --    PROCALCITONIN ng/mL 0.76*  --  0.65*     Radiology(recent) No radiology results for the last day    Assessment/Plan     Assessment & Plan      Hydronephrosis with obstructing calculus    Anxiety    Cardiomyopathy (HCC)    Chronic systolic heart failure (HCC)    ESRD on peritoneal dialysis (HCC)    Hypothyroidism    Obesity (BMI 30-39.9)     Splenic calcification    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 female with PVD moderate in nature.  I  suspect splenic artery thrombosis based on the large calcium load and shrinking spleen.  Awaiting splenic artery scan.  Patient ate yesterday and could not get the scan.  Neither are surgically urgent and likely we would do nothing for the spleen regardless.  We would recommend follow-up to go over testing in the near future in the office if she is discharged prior to us being able to get back to see her.      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

## 2022-03-02 NOTE — PROGRESS NOTES
[facility] Progress Note      Patient Name: Ambar Lara  : 1970  MRN: [id number]  Primary Care Physician:  Jet Manuel MD  Date of admission: 2022    Subjective     Interval History:   Seen and examined. Bloody urine. No SOA or CP    Review of Systems:   As noted above    Objective     Vitals:   Temp:  [97.7 °F (36.5 °C)-98.6 °F (37 °C)] 98 °F (36.7 °C)  Heart Rate:  [] 105  Resp:  [18] 18  BP: ()/(50-79) 108/79    Intake/Output Summary (Last 24 hours) at 3/2/2022 1436  Last data filed at 3/2/2022 0610  Gross per 24 hour   Intake 5360 ml   Output 5100 ml   Net 260 ml       Physical Exam:    General Appearance: alert, oriented x 3, no acute distress   Skin: warm and dry  HEENT: oral mucosa normal, nonicteric sclera  Neck: supple, no JVD  Lungs: CTA  Heart: RRR, normal S1 and S2  Abdomen: soft, nontender, nondistended  : no palpable bladder  Extremities: no edema, cyanosis or clubbing  Neuro: normal speech and mental status     Scheduled Meds:     aspirin, 81 mg, Oral, Daily  cefdinir, 300 mg, Oral, Q24H  escitalopram, 20 mg, Oral, Daily  famotidine, 20 mg, Oral, Daily  gentamicin, 1 application, Topical, Q8H  heparin (porcine), 5,000 Units, Subcutaneous, Q8H  levothyroxine, 137 mcg, Oral, Q AM  melatonin, 3 mg, Oral, Nightly  midodrine, 10 mg, Oral, TID AC  montelukast, 10 mg, Oral, Nightly  potassium chloride, 40 mEq, Oral, BID With Meals  traZODone, 100 mg, Oral, Nightly      IV Meds:        Results Reviewed:   I have personally reviewed the results from the time of this admission to 3/2/2022 14:36 EST     Results from last 7 days   Lab Units 22  1107 22  0555 22  1855   SODIUM mmol/L 137 137 138   POTASSIUM mmol/L 3.3* 3.6 3.7   CHLORIDE mmol/L 103 105 103   CO2 mmol/L 13.0* 9.6* 9.1*   BUN mg/dL 35* 36* 35*   CREATININE mg/dL 6.60* 6.04* 6.53*   CALCIUM mg/dL 9.0 9.4 9.5   BILIRUBIN mg/dL  --   --  0.2   ALK PHOS U/L  --   --   116   ALT (SGPT) U/L  --   --  18   AST (SGOT) U/L  --   --  14   GLUCOSE mg/dL 79 103* 131*     Estimated Creatinine Clearance: 9.7 mL/min (A) (by C-G formula based on SCr of 6.6 mg/dL (H)).  Results from last 7 days   Lab Units 02/28/22  0555 02/27/22  1855   MAGNESIUM mg/dL  --  1.5*   PHOSPHORUS mg/dL 10.0*  --          Results from last 7 days   Lab Units 03/01/22  1107 02/28/22  0555 02/27/22  1855   WBC 10*3/mm3 13.46* 16.18* 20.40*   HEMOGLOBIN g/dL 11.6* 12.3 13.5   PLATELETS 10*3/mm3 466* 546* 619*           Assessment / Plan     ASSESSMENT:  - ESRD on PD dialysis: Continue CAPD 2500 cc fill volume, 1.5 % with 4 fills  - Left kidney stone with hydronephrosis S/P stent placement by urology 02/28/2022  - Diffuse peripheral vascular disease with splenic infarct  - Diastolic CHF with Cardiomyopathy EF 25%  - HTN  - Anemia of ESRD  - Hypokalemia     PLAN:     Continue  PD  Follow-up with nephrology clinic as an outpatient  Okay to discharge from kidney standpoint  Surveillance labs    Thank you for involving us in the care of Ambar JESSE Lara.  Please feel free to call with any questions.    Camila Salas MD  03/02/22  14:36 EST    [facility]  [phone]

## 2022-03-02 NOTE — CASE MANAGEMENT/SOCIAL WORK
Case Management Discharge Note      Final Note: Pt discharged home.    RUTH Henning RN         Selected Continued Care - Admitted Since 2/27/2022     Destination    No services have been selected for the patient.              Durable Medical Equipment    No services have been selected for the patient.              Dialysis/Infusion    No services have been selected for the patient.              Home Medical Care    No services have been selected for the patient.              Therapy    No services have been selected for the patient.              Community Resources    No services have been selected for the patient.              Community & DME    No services have been selected for the patient.                  Transportation Services  Private: Car    Final Discharge Disposition Code: 01 - home or self-care

## 2022-03-02 NOTE — PLAN OF CARE
Goal Outcome Evaluation:  Plan of Care Reviewed With: patient           Outcome Summary: patient alert and oriented. PD completed at 11am. Tolerated well. Dened pain nausea and SOA. VSS. Nursing will continue to monitor.

## 2022-03-02 NOTE — DISCHARGE SUMMARY
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     Procedure(s):  Left CYSTOSCOPY, with stent placement     Consults     Date and Time Order Name Status Description    2/28/2022  3:32 PM Inpatient Infectious Diseases Consult Completed     2/28/2022  3:56 AM Inpatient Vascular Surgery Consult Completed     2/28/2022  3:46 AM Inpatient Urology Consult Completed     2/28/2022  3:36 AM Inpatient Nephrology Consult Completed     2/27/2022  8:44 PM [facility] (on-call MD unless specified) Details          Hospital Course  51 y.o. female with a history of ESRD on peritoneal dialysis, cardiomyopathy EF 25% and Covid that required a long hospital stay and LTAC afterwards, and c.diff presented with left flank pain and found to have left ureteral calculus, and moderate left hydronephrosis and hydroureter. On 2/28/2022 she underwent left cystoscopy and stent placement. ID recommends cefdinir for 5 more days. Patient has prolonged QT present on EKGs as far back as 9/21/2021 in epic.    Patient with history of peripheral vascular disease found also to have splenic infarct and ischemic changes right forefoot. Vascular is felt it was moderate in nature and suspected a splenic artery thrombosis based on calcium load and shrinking spleen. Splenic artery scan  was done this morning and vascular did not feel patient had anything surgically urgent and would not do anything for the spleen anyway. They will follow up with her to go over testing in their office. Mesenteric Dopplers pending. ABIs were moderately reduced left and right.    Nephrology managed her PD while here.    I discussed the patient's findings and my recommendations with patient and nursing staff and Dr. Alvarez.    Condition on Discharge: Improved. She would very much like to go home today.    Temp:  [97.7 °F (36.5 °C)-98.6 °F (37 °C)] 98 °F (36.7 °C)  Heart Rate:  [] 105  Resp:  [18] 18  BP: ()/(50-79) 108/79  Body mass index is 31.99 kg/m².    Physical Exam  Constitutional:       General: She is not in acute distress.     Appearance: Normal appearance. She is ill-appearing (chronic). She is not toxic-appearing.   HENT:      Head: Normocephalic and atraumatic.      Right Ear: External ear normal.      Left Ear: External ear normal.      Nose: Nose normal.   Eyes:      Conjunctiva/sclera: Conjunctivae normal.   Cardiovascular:      Rate and Rhythm: Normal rate and regular rhythm.   Pulmonary:      Effort: Pulmonary effort is normal. No respiratory distress.      Breath sounds: Normal breath sounds. No wheezing or rhonchi.   Abdominal:      General: Bowel sounds are normal. There is no distension.      Palpations: Abdomen is soft.      Tenderness: There is no abdominal tenderness. There is no guarding or rebound.   Musculoskeletal:         General: No swelling.      Right lower leg: No edema.      Left lower leg: No edema.   Skin:     General: Skin is warm and dry.      Findings: Erythema (distal right foot) present.   Neurological:      General: No focal deficit present.      Mental Status: She is alert and oriented to person, place, and time.   Psychiatric:         Mood and Affect: Mood normal.         Behavior: Behavior normal.         Thought Content: Thought content normal.     While in the  room and during my examination of the patient I wore gloves, mask, eye protection.  I washed my hands before and after this patient encounter.     Disposition: Home or Self Care       Discharge Medications      New Medications      Instructions Start Date   ammonium lactate 12 % cream  Commonly known as: AMLACTIN   Apply topically to the appropriate area as directed Every 12 (Twelve) Hours As Needed for Dry Skin.      cefdinir 300 MG capsule  Commonly known as: OMNICEF   300 mg, Oral, Every 24 Hours Scheduled         Changes to Medications      Instructions Start Date   levothyroxine 100 MCG tablet  Commonly known as: SYNTHROID, LEVOTHROID  What changed: how much to take   150 mcg, Oral, Daily      multivitamin tablet tablet  What changed: These instructions start on March 9, 2022. If you are unsure what to do until then, ask your doctor or other care provider.   1 tablet, Oral, Daily   Start Date: March 9, 2022     sodium bicarbonate 650 MG tablet  What changed: when to take this   650 mg, Oral, Every 8 Hours Scheduled         Continue These Medications      Instructions Start Date   acetaminophen 500 MG tablet  Commonly known as: TYLENOL   500 mg, Oral, Every 6 Hours PRN      aspirin 81 MG chewable tablet   81 mg, Oral, Daily      diphenhydrAMINE HCl (Sleep) 25 MG capsule   25 mg, Oral, Nightly PRN, For sleep       escitalopram 20 MG tablet  Commonly known as: LEXAPRO   20 mg, Oral, Daily      famotidine 20 MG tablet  Commonly known as: PEPCID   20 mg, Oral, 2 Times Daily      gentamicin 0.1 % cream  Commonly known as: GARAMYCIN   1 application, Topical, Daily, As directed       lanthanum 500 MG chewable tablet  Commonly known as: FOSRENOL   750 mg, Oral, 3 Times Daily With Meals      melatonin 3 MG tablet   3 mg, Oral, Nightly      midodrine 10 MG tablet  Commonly known as: PROAMATINE   10 mg, Oral, 3 Times Daily      montelukast 10 MG tablet  Commonly known as: Singulair   10 mg, Oral, Nightly      nitroglycerin  0.4 MG SL tablet  Commonly known as: NITROSTAT   0.4 mg, Sublingual, Every 5 Minutes PRN, Take no more than 3 doses in 15 minutes.       potassium bicarbonate 25 MEQ disintegrating tablet  Commonly known as: K-LYTE   20 mEq, Oral, 2 Times Daily, Take 2 tablets by mouth 2 times daily.       REN-JHON PO   1 tablet, Oral, Daily      saccharomyces boulardii 250 MG capsule  Commonly known as: FLORASTOR   500 mg, Oral, 2 Times Daily      traZODone 100 MG tablet  Commonly known as: DESYREL   100 mg, Oral, Nightly, Take 0.5-1 tablet by mouth nightly          Stop These Medications    ondansetron 4 MG tablet  Commonly known as: ZOFRAN           Diet Instructions     Diet: Regular, Renal      Discharge Diet:  Regular  Renal            Activity Instructions     Activity as Tolerated           Additional Instructions for the Follow-ups that You Need to Schedule     Call MD for problems / concerns.   As directed         Follow-up Information     Gaurav Payton MD Follow up in 3 week(s).    Specialty: Vascular Surgery  Why: Discuss testing results from hospital  Contact information:  [address]  [place] [id number] [phone]             Jet Manuel MD .    Specialty: Internal Medicine  Contact information:  [address]  [facility] [id number] [phone]             Sanford Puentes MD Follow up.    Specialty: Urology  Contact information:  [address]  [place] [id number] [phone]                        Pending Labs     Order Current Status    Blood Culture - Blood, Arm, Left In process    Blood Culture - Blood, Arm, Left Preliminary result         Preet Workman MD  03/02/22  14:17 EST    Discharge time spent greater than 30 minutes.

## 2022-03-02 NOTE — PLAN OF CARE
Patient often imaging this morning.  Urine culture is pending however UA was significant for changes that could be related to infection versus postsurgical. Yesterday she was started on p.o. levofloxacin 500 mg every other day to cover for urinary pathogens to complete out a 5-day course.  This should be sufficient given her recent procedure.     Thank you for allowing me to be involved in the care of this patient. Infectious diseases will sign off at this time with antibiotics plan in place, but please call me at [phone] if any further ID questions or new ID concerns.

## 2022-03-02 NOTE — PROGRESS NOTES
"  [facility] Progress Note    Chief Complaint: Mild flank pain    She is tolerating her stent well.  Some hematuria but otherwise voiding well.  No fevers or chills.  ID plans noted for oral Levaquin for 5 more days.    Review of Systems:    The following systems were reviewed and negative;  respiratory, cardiovascular, gastrointestinal and genitourinary          Vital Signs  /79 (BP Location: Left arm, Patient Position: Lying)   Pulse 105   Temp 98 °F (36.7 °C) (Oral)   Resp 18   Ht 154.9 cm (61\")   Wt 76.8 kg (169 lb 4.8 oz)   LMP 04/16/2004 (Approximate)   SpO2 99%   BMI 31.99 kg/m²     Physical Exam:     General Appearance:    Alert, cooperative, NAD   HEENT:    No trauma, pupils reactive, hearing intact   Back:     No CVA tenderness   Lungs:     Respirations unlabored, no wheezing    Heart:    RRR, intact peripheral pulses   Abdomen:     Soft, NDNT, no masses, no guarding   :   Deferred   Extremities:   No edema, no deformity   Lymphatic:   No neck or groin LAD   Skin:   No bleeding, bruising or rashes   Neuro/Psych:   Orientation intact, mood/affect pleasant, no focal findings        Results Review:     I reviewed the patient's new clinical results.  Lab Results (last 24 hours)     Procedure Component Value Units Date/Time    Urine Culture - Urine, Urine, Clean Catch [[id number]]  (Normal) Collected: 03/01/22 0900    Specimen: Urine, Clean Catch Updated: 03/02/22 1007     Urine Culture No growth    Blood Culture - Blood, Arm, Left [[id number]]  (Normal) Collected: 02/27/22 2114    Specimen: Blood from Arm, Left Updated: 03/01/22 2145     Blood Culture No growth at 2 days    Blood Culture - Blood, Arm, Left [[id number]] Collected: 02/27/22 1855    Specimen: Blood from Arm, Left Updated: 03/01/22 1829        Imaging Results (Last 24 Hours)     ** No results found for the last 24 hours. **          Medication Review:   I have personally reviewed    Current Facility-Administered Medications:   •  " ammonium lactate (AMLACTIN) cream 1 application, 1 application, Topical, Q12H PRN, Preet Workman MD  •  aspirin chewable tablet 81 mg, 81 mg, Oral, Daily, Preet Workman MD, 81 mg at 03/02/22 1104  •  cefdinir (OMNICEF) capsule 300 mg, 300 mg, Oral, Q24H, Lenin Alvarez, , 300 mg at 03/02/22 1615  •  escitalopram (LEXAPRO) tablet 20 mg, 20 mg, Oral, Daily, Preet Workman MD, 20 mg at 03/02/22 1104  •  famotidine (PEPCID) tablet 20 mg, 20 mg, Oral, Daily, Preet Workman MD, 20 mg at 03/01/22 1753  •  gentamicin (GARAMYCIN) 0.1 % ointment 1 application, 1 application, Topical, Q8H, Camila Salas MD, 1 application at 03/02/22 1615  •  heparin (porcine) 5000 UNIT/ML injection 5,000 Units, 5,000 Units, Subcutaneous, Q8H, Sanford Puentes MD, 5,000 Units at 03/01/22 1057  •  levothyroxine (SYNTHROID, LEVOTHROID) tablet 137 mcg, 137 mcg, Oral, Q AM, Preet Workman MD, 137 mcg at 03/01/22 0626  •  melatonin tablet 3 mg, 3 mg, Oral, Nightly, Preet Workman MD, 3 mg at 03/01/22 2052  •  midodrine (PROAMATINE) tablet 10 mg, 10 mg, Oral, TID AC, Sanford Puentes MD, 10 mg at 03/02/22 1104  •  montelukast (SINGULAIR) tablet 10 mg, 10 mg, Oral, Nightly, Preet Workman MD, 10 mg at 03/01/22 2052  •  nitroglycerin (NITROSTAT) SL tablet 0.4 mg, 0.4 mg, Sublingual, Q5 Min PRN, Sanford Puentes MD  •  potassium chloride (K-DUR,KLOR-CON) ER tablet 40 mEq, 40 mEq, Oral, BID With Meals, Sanjay Waggoner MD, 40 mEq at 03/02/22 1104  •  sodium chloride 0.9 % flush 10 mL, 10 mL, Intravenous, PRN, Sanford Puentes MD  •  traZODone (DESYREL) tablet 100 mg, 100 mg, Oral, Nightly, Preet Workman MD, 100 mg at 03/01/22 2052  •  UltraBag/Dianeal/1.5% Dextrose low-jamison (alvarado #[id number]) (DIANEAL) peritoneal dialysate 2,000 mL, 2,000 mL, Intraperitoneal, PRN, Camila Salas MD    Allergies:    Penicillins and Nickel    Assessment:    Active Problems:  Patient Active Problem List    Diagnosis   • Colitis, Clostridium difficile   • Anxiety   • Cardiomyopathy (HCC)   • Chronic systolic heart failure (HCC)   • ESRD on peritoneal dialysis (HCC)   • Gastroesophageal reflux disease   • Gitelman syndrome   • HLD (hyperlipidemia)   • Hypothyroidism   • Peritoneal dialysis status (HCC)   • History of tracheostomy   • Acute pulmonary embolism (HCC)   • Severe malnutrition (HCC)   • Clostridium difficile colitis   • Immobility syndrome   • Genu recurvatum of right knee   • Colitis   • PD catheter dysfunction (HCC)   • Gallstones   • Acute renal failure superimposed on chronic kidney disease, on chronic dialysis (HCC)   • Obesity (BMI 30-39.9)   • Hydronephrosis with obstructing calculus   • Splenic calcification   • 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    Plan:    Okay to discharge from our standpoint.  We will make arrange to follow-up in the office next week for stent removal.  We will send pain medication to her pharmacy.      Sanford Puentes MD    3/2/2022  16:50 EST

## 2022-03-03 NOTE — OUTREACH NOTE
Prep Survey      Responses   [facility] facility patient discharged from? [place]   Is LACE score < 7 ? No   Emergency Room discharge w/ pulse ox? No   Eligibility Readm Mgmt   Discharge diagnosis Hydronephrosis with obstructing calculus Left CYSTOSCOPY, with stent placement   Does the patient have one of the following disease processes/diagnoses(primary or secondary)? Other   Does the patient have Home health ordered? No   Is there a DME ordered? No   Prep survey completed? Yes          Nancy Kiser RN

## 2022-03-04 NOTE — OUTREACH NOTE
Medical Week 1 Survey      Responses   [facility] patient discharged from? [place]   Does the patient have one of the following disease processes/diagnoses(primary or secondary)? Other   Week 1 attempt successful? No   Unsuccessful attempts Attempt 1          Yanira Menjivar RN

## 2022-03-07 NOTE — TELEPHONE ENCOUNTER
"She was discharged on 03/02/22- from [facility]- with a Left cystoscopy with stent placement. She has no way to go to [place] office visit. She does not drive and the urology office is [facility]. Advised to call PCP to see if could provide assistance.     Reason for Disposition  • [1] Caller requesting NON-URGENT health information AND [2] PCP's office is the best resource    Additional Information  • Negative: [1] Caller is not with the adult (patient) AND [2] reporting urgent symptoms  • Negative: Lab result questions  • Negative: Medication questions  • Negative: Caller can't be reached by phone  • Negative: Caller has already spoken to PCP or another triager  • Negative: RN needs further essential information from caller in order to complete triage  • Negative: Requesting regular office appointment  • Negative: Health Information question, no triage required and triager able to answer question  • Negative: General information question, no triage required and triager able to answer question  • Negative: Question about upcoming scheduled test, no triage required and triager able to answer question  • Negative: [1] Caller is not with the adult (patient) AND [2] probable NON-URGENT symptoms    Answer Assessment - Initial Assessment Questions  1. REASON FOR CALL or QUESTION: \"What is your reason for calling today?\" or \"How can I best help you?\" or \"What question do you have that I can help answer?\"      See note    Protocols used: INFORMATION ONLY CALL - NO TRIAGE-ADULT-      "

## 2022-03-08 NOTE — OUTREACH NOTE
"Medical Week 1 Survey    Flowsheet Row Responses   [facility] patient discharged from? [place]   Does the patient have one of the following disease processes/diagnoses(primary or secondary)? Other   Week 1 attempt successful? Yes   Call start time 1635   Call end time 1644   Discharge diagnosis Hydronephrosis with obstructing calculus Left CYSTOSCOPY, with stent placement   Person spoke with today (if not patient) and relationship patient   Meds reviewed with patient/caregiver? Yes   Is the patient having any side effects they believe may be caused by any medication additions or changes? No   Does the patient have all medications ordered at discharge? Yes   Is the patient taking all medications as directed (includes completed medication regime)? Yes   Comments regarding appointments Pt had to reschedule with podiatry.   Comments regarding PCP PCP this coming monday   Comments Pt is doing dialysis    Psychosocial issues? No   Comments foot is swollen and red, places under toes are \"not looking good\" per patient.  She is now using wheel chair due to inability to walk on foot.  She was encouraged to elevate foot, monitor for fever.  She is advised to call podiatry first thing in morning for further instructions.  She was encouraged to go to ED if worsens.  She verbalized understainding.   Did the patient receive a copy of their discharge instructions? Yes   Nursing interventions Reviewed instructions with patient   What is the patient's perception of their health status since discharge? Improving   Is the patient/caregiver able to teach back the hierarchy of who to call/visit for symptoms/problems? PCP, Specialist, Home health nurse, Urgent Care, ED, 911 Yes   Additional teach back comments Pt reports her she is urinating, no issues regarding stents.    Week 1 call completed? Yes   Wrap up additional comments Patient is dealing with inflamed foot.  We discussed f/u with podiatry tomorrow.  Verbalized " understanding.          SINGH JOLLY - Registered Nurse

## 2022-03-11 PROBLEM — A41.9 SEPSIS WITHOUT ACUTE ORGAN DYSFUNCTION: Status: ACTIVE | Noted: 2022-01-01

## 2022-03-11 NOTE — PROGRESS NOTES
Pharmacy Consult: Change proton pump inhibitors (PPIs) to famotidine unless documented or history of GI bleed or to discontinue antiperistalic agents and stool softeners/laxatives.  Consulting Provider: Armando Musa MD    No active orders for PPIs, antiperistalic agents or stool softeners/laxatives at this time.     Thank you,    Ramirez Manuel, PharmD  03/11/22 12:27 EST

## 2022-03-11 NOTE — PLAN OF CARE
Goal Outcome Evaluation:   Pt arrived from ER ~1245. Pt on RA, A&OX4. Multiple IV bolus's given, see MAR. Nephrology consulted, see note. Pt had 3 large liquid BMs throughout shift. SBP remains soft, renal aware. Pt has bilateral lower extremity wounds, wound consulted. Dr. Hui aware of increasing WBC. Awaiting call back from pulmonology on critical ABG; renal aware of critical ABG. Continuing to monitor closely.  Patient still refusing straight cath for urinalysis.

## 2022-03-11 NOTE — CONSULTS
Referring Provider: Saul Meza MD  Reason for Consultation: C. difficile  Chief Complaint   Patient presents with   • Dizziness   • Palpitations         Subjective   History of present illness: Patient is a 51-year-old female with past medical history of peritoneal dialysis due to end-stage renal disease recent history of admission for left-sided hydronephrosis in the setting of ureteral calculus requiring ureteral stent on 2/28/2022.  This was complicated by questionable pyelonephritis and she was treated empirically with p.o. Levaquin.  She now represents with fever, leukocytosis and diarrhea with labs positive for C. difficile.  ID was consulted for antibiotic recommendations.    Patient reports she has a history of C. difficile approximately 1 year ago when she was diagnosed with COVID 19 pneumonia and was subsequently treated at that time.  States she is not had any difficulty with this since that time.  Was recently on antibiotics which she completed on Sunday.    She is reporting diffuse diarrhea that is watery with 3-4 bowel movements for the last 2 days however today has had approximately 6.  She reports them as solely water.  She denies any fevers but has had weakness and fatigue.  He has tenderness to palpation in the abdomen but otherwise no standing pain.  She reports no shortness of breath or cough.  She denies any urinary symptoms.    On admission she was found to have significant leukocytosis and hypotension.  She received empiric antibiotics and received C. difficile testing.  C. difficile testing was positive and imaging is consistent with pancolitis.    Past Medical History:   Diagnosis Date   • CHF (congestive heart failure) ([facility])    • Clostridioides difficile infection 03/2021    finished oral vanc 07/2021   • Dialysis patient ([facility])    • Disease of thyroid gland    • Elevated cholesterol    • GERD (gastroesophageal reflux disease)    • Pulmonary emboli ([facility]) 03/2021    coumadin last taken  2021   • Renal disorder    • Sleep apnea    • Ureteral calculi 2022       Past Surgical History:   Procedure Laterality Date   • ADRENAL GLAND SURGERY N/A `   •  SECTION Bilateral 2001    Has had x2   • CYSTOSCOPY URETEROSCOPY LASER LITHOTRIPSY Left 2022    Procedure: Left CYSTOSCOPY, with stent placement;  Surgeon: Sanford Puentes MD;  Location: [facility];  Service: Urology;  Laterality: Left;   • HYSTERECTOMY     • INSERTION PERITONEAL DIALYSIS CATHETER Right 10/12/2021    Procedure: LAPAROSCOPIC REVISION OF PERITONEAL DIALYSIS CATHETER AND LAPAROSCOPIC CHOLECYSTECTOMY;  Surgeon: Jamie Figueroa MD;  Location: [facility];  Service: General;  Laterality: Right;   • PERITONEAL CATHETER INSERTION  2019   • TRACHEOSTOMY         family history is not on file.     reports that she quit smoking about 3 years ago. Her smoking use included cigarettes. She has a 25.00 pack-year smoking history. She has never used smokeless tobacco. She reports previous alcohol use. She reports previous drug use.     Allergies   Allergen Reactions   • Penicillins Anaphylaxis, Hives, Rash, Shortness Of Breath and Swelling   • Nickel Rash       Medication:  Antibiotics:  Anti-Infectives (From admission, onward)    Ordered     Dose/Rate Route Frequency Start Stop    22 1038  vancomycin (VANCOCIN) capsule 125 mg        Ordering Provider: Armando Musa MD    125 mg Oral Once 22 1100 22 1055    22 0840  cefTRIAXone (ROCEPHIN) 2 g in sodium chloride 0.9 % 100 mL IVPB-VTB        Ordering Provider: Armando Musa MD    2 g  200 mL/hr over 30 Minutes Intravenous Once 22 0842 22 1021          Review of Systems  Pertinent items are noted in HPI, all other systems reviewed and negative    Objective     Physical Exam:   Vital Signs   Temp:  [98.3 °F (36.8 °C)] 98.3 °F (36.8 °C)  Heart Rate:  [119-134] 123  Resp:  [] 22  BP: ()/(47-98)  83/65    GENERAL: Awake and alert, in no acute distress.   HEENT: Oropharynx is clear. Hearing is grossly normal.   EYES: PERRL. No conjunctival injection. No lid lag.   LYMPHATICS: No lymphadenopathy of the neck or inguinal regions.   HEART: Tachycardic.  Regular rate and regular rhythm. No peripheral edema.   LUNGS: Clear to auscultation anteriorly with normal respiratory effort.   GI: Soft, tender diffusely, nondistended. No appreciable organomegaly.  Peritoneal catheter in place.  SKIN: Warm and dry without cutaneous eruptions   PSYCHIATRIC: Appropriate mood, affect, insight, and judgment.     Results Review:   I reviewed the patient's new clinical results.  I reviewed the patient's new imaging results and agree with the interpretation.  I reviewed the patient's other test results and agree with the interpretation    Lab Results   Component Value Date    WBC 28.42 (H) 03/11/2022    HGB 11.3 (L) 03/11/2022    HCT 34.6 03/11/2022    MCV 94.3 03/11/2022     03/11/2022       Lab Results   Component Value Date    VANCORANDOM 21.60 03/01/2022       Lab Results   Component Value Date    GLUCOSE 158 (H) 03/11/2022    BUN 21 (H) 03/11/2022    CREATININE 4.12 (H) 03/11/2022    EGFRIFNONA 7 (L) 02/28/2022    EGFRIFAFRI  10/14/2021      Comment:      <15 Indicative of kidney failure.    BCR 5.1 (L) 03/11/2022    CO2 13.3 (L) 03/11/2022    CALCIUM 8.3 (L) 03/11/2022    ALBUMIN 3.30 (L) 03/11/2022    LABIL2 1.1 07/28/2021    AST 26 03/11/2022    ALT 19 03/11/2022         Estimated Creatinine Clearance: 15.5 mL/min (A) (by C-G formula based on SCr of 4.12 mg/dL (H)).      Microbiology:  3/11 C. difficile positive  3/11 Covid negative  3/11 GI pathogen panel negative  3/11 blood cultures in process    Radiology:  CT of the abdomen and pelvis report reviewed with pancolitis.  Pneumoperitoneum likely related to peritoneal dialysis catheter.  Left-sided ureteral stent in place with resolved hydronephrosis.  Splenic  infarcts.    Assessment/Plan   #Sepsis  #C. difficile colitis  #Splenic infarcts  #Nephrolithiasis status post left ureteral stent  #ESRD on peritoneal dialysis    C. difficile testing is positive and in the setting of her prior history and recent antibiotics this is likely the cause of her pancolitis.  I suspect this is likely also driving her sepsis. Plan to start p.o. vancomycin 125 mg 4 times daily.  Urinary source also could be a possible source however hydronephrosis has improved and no signs of pyelonephritis on imaging.Urinalysis as well as studies of the peritoneal fluid are ordered.  Plan to follow these up for further evaluation.    Will follow-up blood cultures as it is interesting she has splenic infarcts but this should help evaluate for any possible bacteremias related.  I will also order TTE as I do not see one on file and this could evaluate for any cardioembolic source.       Thank you for this consult.  We will continue to follow along and tailor antibiotics as the patient's clinical course evolves.

## 2022-03-11 NOTE — ED PROVIDER NOTES
MD ATTESTATION NOTE    The GIBSON and I have discussed this patient's history, physical exam, and treatment plan.  I have reviewed the documentation and personally had a face to face interaction with the patient. I affirm the documentation and agree with the treatment and plan.  The attached note describes my personal findings.      I provided a substantive portion of the care of the patient.  I personally performed the physical exam in its entirety, and below are my findings.  For this patient encounter, the patient wore surgical mask, I wore full protective PPE including N95 and eye protection.      Brief HPI: Multiple episodes of diarrhea which started last night, slight chest pain, palpitations, nausea and generalized weakness.  She is recently admitted for ureteral stenting due to a calculus and completed a course of cefdinir.  She is also on peritoneal dialysis and completed exchange last night.  No fevers, no vomiting.    PHYSICAL EXAM  ED Triage Vitals   Temp Heart Rate Resp BP SpO2   03/11/22 0624 03/11/22 0624 03/11/22 0624 03/11/22 0624 03/11/22 0624   98.3 °F (36.8 °C) (!) 130 22 140/98 99 %      Temp src Heart Rate Source Patient Position BP Location FiO2 (%)   -- 03/11/22 0853 -- -- --    Monitor            GENERAL: Awake and alert.  She is chronically ill-appearing but is in no no acute distress  HENT: NCAT  EYES: no scleral icterus  CV: Tachycardic, regular  RESPIRATORY: normal effort  ABDOMEN: soft, nondistended.  There is minimal tenderness throughout but certainly no guarding or rebound.  MUSCULOSKELETAL: no deformity  NEURO: alert, moves all extremities, follows commands  PSYCH:  calm, cooperative  SKIN: warm, dry    Vital signs and nursing notes reviewed.        Plan: Patient's work-up is significant for C. difficile colitis-CT shows diffuse pancolitis.  White count is elevated, lactate is elevated-blood pressure has been a bit on the low side.  P.o. vancomycin and IV Rocephin has been ordered.   She will be admitted to the ICU for further evaluation and treatment.     Armando Musa MD  03/11/22 1059

## 2022-03-11 NOTE — ED PROVIDER NOTES
EMERGENCY DEPARTMENT ENCOUNTER    Room Number:  06/06  Date of encounter:  3/11/2022  PCP: Jet Manuel MD  Historian: Patient      PPE    Patient was placed in face mask in first look. Patient was wearing facemask when I entered the room and throughout our encounter. I wore full protective equipment throughout this patient encounter including a face mask, and gloves. Hand hygiene was performed before donning protective equipment and after removal when leaving the room.        HPI:  Chief Complaint: Multiple complaints  A complete HPI/ROS/PMH/PSH/SH/FH are unobtainable due to: Nothing    Context: Ambar Lara is a 51 y.o. female who arrives to the ED via EMS from home.  Patient presents with c/o multiple episodes of diarrhea that began last night.   Patient also complains of left-sided chest pain that began around 4 AM this morning and has been intermittent and dull in nature.  Patient states that around 4 AM this morning she got up to go to the bathroom and began feeling very nauseated, was having heart palpitations and that is when her chest pain started.  She states that she has been hospitalized recently and been on antibiotics.  She is also complaining of some intermittent dizziness.  She has chronic shortness of breath.  She does peritoneal dialysis and did complete that last night.  Patient denies fever, vomiting, abdominal pain, or any other symptoms.  Patient states that nothing makes the symptoms better and nothing worsens symptoms.          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   • HLD (hyperlipidemia) 06/07/2018   • Hypothyroidism 06/06/2018   • Peritoneal dialysis status ([facility])  02/15/2021   • History of tracheostomy 04/10/2021   • Acute pulmonary embolism ([facility]) 04/10/2021   • Severe malnutrition ([facility]) 2021   • Clostridium difficile colitis 2021   • Immobility syndrome 2021   • Genu recurvatum of right knee 2021   • Colitis 2021   • PD catheter dysfunction ([facility]) 10/11/2021   • Gallstones 10/14/2021   • Acute renal failure superimposed on chronic kidney disease, on chronic dialysis ([facility]) 2022   • Obesity (BMI 30-39.9) 2022   • Hydronephrosis with obstructing calculus 2022   • Splenic calcification 2022   • 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 ([facility]) 2022   • Ureteral calculi 2022     Resolved Ambulatory Problems     Diagnosis Date Noted   • Hypotension 04/10/2021     Past Medical History:   Diagnosis Date   • CHF (congestive heart failure) ([facility])    • Clostridioides difficile infection 2021   • Dialysis patient ([facility])    • Disease of thyroid gland    • Elevated cholesterol    • GERD (gastroesophageal reflux disease)    • Pulmonary emboli ([facility]) 2021   • Renal disorder    • Sleep apnea          PAST SURGICAL HISTORY  Past Surgical History:   Procedure Laterality Date   • ADRENAL GLAND SURGERY N/A `   •  SECTION Bilateral 2001    Has had x2   • CYSTOSCOPY URETEROSCOPY LASER LITHOTRIPSY Left 2022    Procedure: Left CYSTOSCOPY, with stent placement;  Surgeon: Sanford Punetes MD;  Location: [facility];  Service: Urology;  Laterality: Left;   • HYSTERECTOMY     • INSERTION PERITONEAL DIALYSIS CATHETER Right 10/12/2021    Procedure: LAPAROSCOPIC REVISION OF PERITONEAL DIALYSIS CATHETER AND LAPAROSCOPIC CHOLECYSTECTOMY;  Surgeon: Jamie Figueroa MD;  Location: [facility];  Service: General;  Laterality: Right;   • PERITONEAL CATHETER INSERTION  2019   • TRACHEOSTOMY           FAMILY HISTORY  Family History   Problem Relation Age of Onset   • Malig Hyperthermia Neg  Hx          SOCIAL HISTORY  Social History     Socioeconomic History   • Marital status:    Tobacco Use   • Smoking status: Former Smoker     Packs/day: 1.00     Years: 25.00     Pack years: 25.00     Types: Cigarettes     Quit date: 4/16/2018     Years since quitting: 3.9   • Smokeless tobacco: Never Used   Vaping Use   • Vaping Use: Former   • Quit date: 2/14/2021   • Substances: Nicotine, Flavoring   • Devices: Pre-filled or refillable cartridge   Substance and Sexual Activity   • Alcohol use: Not Currently   • Drug use: Not Currently   • Sexual activity: Defer         ALLERGIES  Penicillins and Nickel        REVIEW OF SYSTEMS  Review of Systems     All systems reviewed and negative except for those discussed in HPI.        PHYSICAL EXAM    ED Triage Vitals [03/11/22 0624]   Temp Heart Rate Resp BP SpO2   98.3 °F (36.8 °C) (!) 130 22 140/98 99 %       Physical Exam  Musculoskeletal:        Feet:        GENERAL: Chronically ill-appearing, nontoxic appearing, mildly distressed  HENT: normocephalic, atraumatic, dry mucous membranes  EYES: no scleral icterus, PERRL, EOMI  CV: regular rhythm, tachycardic, no murmur  RESPIRATORY: normal effort, CTAB  ABDOMEN: soft, normal bowel sounds, nontender there is a peritoneal dialysis catheter noted  MUSCULOSKELETAL: no deformity  NEURO: alert, moves all extremities, follows commands, mental status normal/baseline  SKIN: warm, dry, no rash   Psych: Appropriate mood and affect  Nursing notes and vital signs reviewed      LAB RESULTS  Recent Results (from the past 24 hour(s))   ECG 12 Lead    Collection Time: 03/11/22  6:34 AM   Result Value Ref Range    QT Interval 396 ms   Green Top (Gel)    Collection Time: 03/11/22  6:39 AM   Result Value Ref Range    Extra Tube Hold for add-ons.    Lavender Top    Collection Time: 03/11/22  6:39 AM   Result Value Ref Range    Extra Tube hold for add-on    Gold Top - SST    Collection Time: 03/11/22  6:39 AM   Result Value Ref Range     Extra Tube Hold for add-ons.    Light Blue Top    Collection Time: 03/11/22  6:39 AM   Result Value Ref Range    Extra Tube hold for add-on    Comprehensive Metabolic Panel    Collection Time: 03/11/22  6:39 AM    Specimen: Blood   Result Value Ref Range    Glucose 158 (H) 65 - 99 mg/dL    BUN 21 (H) 6 - 20 mg/dL    Creatinine 4.12 (H) 0.57 - 1.00 mg/dL    Sodium 136 136 - 145 mmol/L    Potassium 4.7 3.5 - 5.2 mmol/L    Chloride 107 98 - 107 mmol/L    CO2 13.3 (L) 22.0 - 29.0 mmol/L    Calcium 8.3 (L) 8.6 - 10.5 mg/dL    Total Protein 6.8 6.0 - 8.5 g/dL    Albumin 3.30 (L) 3.50 - 5.20 g/dL    ALT (SGPT) 19 1 - 33 U/L    AST (SGOT) 26 1 - 32 U/L    Alkaline Phosphatase 93 39 - 117 U/L    Total Bilirubin 0.2 0.0 - 1.2 mg/dL    Globulin 3.5 gm/dL    A/G Ratio 0.9 g/dL    BUN/Creatinine Ratio 5.1 (L) 7.0 - 25.0    Anion Gap 15.7 (H) 5.0 - 15.0 mmol/L    eGFR 12.7 (L) >60.0 mL/min/1.73   Troponin    Collection Time: 03/11/22  6:39 AM    Specimen: Blood   Result Value Ref Range    Troponin T 0.020 0.000 - 0.030 ng/mL   CBC Auto Differential    Collection Time: 03/11/22  6:39 AM    Specimen: Blood   Result Value Ref Range    WBC 28.42 (H) 3.40 - 10.80 10*3/mm3    RBC 3.67 (L) 3.77 - 5.28 10*6/mm3    Hemoglobin 11.3 (L) 12.0 - 15.9 g/dL    Hematocrit 34.6 34.0 - 46.6 %    MCV 94.3 79.0 - 97.0 fL    MCH 30.8 26.6 - 33.0 pg    MCHC 32.7 31.5 - 35.7 g/dL    RDW 12.9 12.3 - 15.4 %    RDW-SD 44.0 37.0 - 54.0 fl    MPV 11.1 6.0 - 12.0 fL    Platelets 409 140 - 450 10*3/mm3    Neutrophil % 88.7 (H) 42.7 - 76.0 %    Lymphocyte % 5.5 (L) 19.6 - 45.3 %    Monocyte % 3.4 (L) 5.0 - 12.0 %    Eosinophil % 0.7 0.3 - 6.2 %    Basophil % 0.4 0.0 - 1.5 %    Immature Grans % 1.3 (H) 0.0 - 0.5 %    Neutrophils, Absolute 25.21 (H) 1.70 - 7.00 10*3/mm3    Lymphocytes, Absolute 1.56 0.70 - 3.10 10*3/mm3    Monocytes, Absolute 0.97 (H) 0.10 - 0.90 10*3/mm3    Eosinophils, Absolute 0.20 0.00 - 0.40 10*3/mm3    Basophils, Absolute 0.12 0.00 - 0.20  10*3/mm3    Immature Grans, Absolute 0.36 (H) 0.00 - 0.05 10*3/mm3    nRBC 0.0 0.0 - 0.2 /100 WBC   Gastrointestinal Panel, PCR - Stool, Per Rectum    Collection Time: 03/11/22  7:07 AM    Specimen: Per Rectum; Stool   Result Value Ref Range    Campylobacter Not Detected Not Detected    Plesiomonas shigelloides Not Detected Not Detected    Salmonella Not Detected Not Detected    Vibrio Not Detected Not Detected    Vibrio cholerae Not Detected Not Detected    Yersinia enterocolitica Not Detected Not Detected    Enteroaggregative E. coli (EAEC) Not Detected Not Detected    Enteropathogenic E. coli (EPEC) Not Detected Not Detected    Enterotoxigenic E. coli (ETEC) lt/st Not Detected Not Detected    Shiga-like toxin-producing E. coli (STEC) stx1/stx2 Not Detected Not Detected    Shigella/Enteroinvasive E. coli (EIEC) Not Detected Not Detected    Cryptosporidium Not Detected Not Detected    Cyclospora cayetanensis Not Detected Not Detected    Entamoeba histolytica Not Detected Not Detected    Giardia lamblia Not Detected Not Detected    Adenovirus F40/41 Not Detected Not Detected    Astrovirus Not Detected Not Detected    Norovirus GI/GII Not Detected Not Detected    Rotavirus A Not Detected Not Detected    Sapovirus (I, II, IV or V) Not Detected Not Detected   COVID-19,[facility]/ANTONELLA NP SWAB IN TRANSPORT MEDIA 8-12 HR TAT - Swab, Nasopharynx    Collection Time: 03/11/22  7:07 AM    Specimen: Nasopharynx; Swab   Result Value Ref Range    COVID19 Not Detected Not Detected - Ref. Range   Clostridium Difficile Toxin, PCR - Stool, Per Rectum    Collection Time: 03/11/22  7:07 AM    Specimen: Per Rectum; Stool   Result Value Ref Range    C. Difficile Toxins by PCR Positive (A) Negative   Lactic Acid, Plasma    Collection Time: 03/11/22  7:32 AM    Specimen: Blood   Result Value Ref Range    Lactate 2.9 (C) 0.5 - 2.0 mmol/L   Troponin    Collection Time: 03/11/22  9:07 AM    Specimen: Arm, Left; Blood   Result  Value Ref Range    Troponin T 0.028 0.000 - 0.030 ng/mL       Ordered the above labs and independently reviewed the results.      RADIOLOGY  CT Abdomen Pelvis Without Contrast    Result Date: 3/11/2022  CT ABDOMEN AND PELVIS WITHOUT CONTRAST  HISTORY: 51-year-old female with diarrhea.  TECHNIQUE: Axial CT images of the abdomen and pelvis were obtained without administration of intravenous contrast. The patient was not given oral contrast. Coronal and sagittal reformats were obtained.  COMPARISON: 02/27/2022  FINDINGS: There is pneumoperitoneum demonstrated. This is possibly related to the peritoneal dialysis catheter and this finding should be correlated with clinical history. The stomach and small bowel loops are unremarkable. There is diffuse wall thickening involving the colon in a pattern consistent with pancolitis. There is diffuse pericolic fat stranding particularly surrounding the cecum with small lymph nodes. No definite bowel wall pneumatosis is identified. There is no evidence of obstruction.  Noncontrast attenuation of the liver is normal. Gallbladder is surgically absent. No intrahepatic biliary dilatation. The spleen demonstrates wedge-shaped hypoattenuating lesions most consistent with sequela of resolving splenic infarcts. The pancreas is normal without ductal dilatation. Again demonstrated is a peripherally calcified lesion arising from or immediately adjacent to the horizontal portion of the duodenum, unchanged in appearance from prior CT. There is a similar lesion also seen within the left superior pelvis image 96 measuring 3.0 x 2.0 cm. This may represent the patient's ovary. The previous CT had demonstrated a 3rd lesion in the right lower quadrant and this is no longer seen and may have represented inspissated contrast within a small bowel loop. The uterus is surgically absent.  Diffuse low-density thickening of the left adrenal gland is present and most consistent with adenomatous hyperplasia.  Both kidneys are poorly evaluated in the absence of contrast with numerous cortical hypoattenuating lesions. Calculi are demonstrated within the inferior poles of bilateral kidneys.      1. CT findings of pancolitis. Pneumoperitoneum is demonstrated and this is possibly related to patient's peritoneal dialysis []. Clinical correlation is recommended. There is no bowel wall pneumatosis. If patient's clinical condition worsens, repeat CT may be helpful. 2. Left-sided ureteral stent in place with resolved hydronephrosis. 3. [] of spleen most suggestive of evolving splenic infarcts. 4. Peripherally calcified lesion seen adjacent to the horizontal portion of the duodenum. 5. Additional findings as above.  These findings were discussed with FACUNDO Lucas, by telephone.  [] Do not sign waiting for comparison images from [place].  Radiation dose reduction techniques were utilized, including automated exposure control and exposure modulation based on body size.       XR Foot 3+ View Right    Result Date: 3/11/2022  XR FOOT 3+ VW RIGHT-  03/11/2022  HISTORY: Right foot pain. Some plantar ulcers.  There is moderate degenerative arthritis of the first metatarsophalangeal joint.  No fractures or dislocations are seen.  Moderate size calcaneal spur seen at the insertion site of the plantar fascia. Small amount of arterial calcification is seen.  There is no evidence of osteomyelitis. There is mild soft tissue swelling of the foot but no definite abnormal air collections are seen.      1. Degenerative arthritis of the first metatarsophalangeal joint. 2. Calcaneal spur. 3. No evidence of osteomyelitis or abnormal air collection.  This report was finalized on 3/11/2022 8:41 AM by Dr. Lawson Beckford M.D.      XR Chest 1 View    Result Date: 3/11/2022  XR CHEST 1 VW-  03/11/2022  HISTORY: Chest pain.  Heart size is at the upper limits of normal. Lungs are underinflated with some mild vascular crowding. No focal  infiltrates are seen.  No pneumothorax is seen. Bony structures appear unremarkable.      1. Borderline cardiomegaly with underinflation of the lungs. 2. No focal infiltrates are seen.  This report was finalized on 3/11/2022 8:36 AM by Dr. Lawson Beckford M.D.      MRI Outside Films    Result Date: 3/11/2022  This procedure was auto-finalized with no dictation required.    CT Outside Films    Result Date: 3/11/2022  This procedure was auto-finalized with no dictation required.      I ordered the above noted radiological studies and viewed the images on the PACS system.       EKG      Independently viewed by me and interpreted by Dr Musa         MEDICAL RECORD REVIEW  Medical records reviewed in epic, patient was just recently discharged March 2, 2022 after being admitted for hydronephrosis with obstructing calculus.  She underwent cystoscopy with a stent placement.  She was sent out with cefdinir for 5 days.  Patient was also seen by vascular due to splenic infarct and ischemic changes to the her right foot.    PROCEDURES    Critical Care  Performed by: Tasha Carrera APRN  Authorized by: Armando Musa MD     Critical care provider statement:     Critical care time (minutes):  40    Critical care was necessary to treat or prevent imminent or life-threatening deterioration of the following conditions:  Sepsis    Critical care was time spent personally by me on the following activities:  Ordering and performing treatments and interventions, development of treatment plan with patient or surrogate, ordering and review of laboratory studies, ordering and review of radiographic studies, discussions with consultants, re-evaluation of patient's condition, evaluation of patient's response to treatment, examination of patient, review of old charts and obtaining history from patient or surrogate            DIFFERENTIAL DIAGNOSIS  Differential diagnosis for chest pain include but are not limited to the  following:  Anxiety, muscle strain, costochondritis, pleurisy, herpes zoster, MI, ACS, Aortic dissection, PE, pneumonia, pneumothorax, GERD        PROGRESS, DATA ANALYSIS, CONSULTS, AND MEDICAL DECISION MAKING        ED Course as of 03/11/22 1114   Fri Mar 11, 2022   0704 Patient is a chronically ill-appearing 51-year-old female who presents today with multiple complaints including chest pain, diarrhea throughout the night and nausea.  She is a peritoneal dialysis patient and did complete dialysis last night.  She has recently been on antibiotics so discussed that we will send stool to rule out C. difficile infection.  We will obtain labs, chest x-ray and will also x-ray her right foot due to pain potentially from peripheral vascular disease.  Patient verbalized understanding and is agreeable to the above plan. [MS]   0735 Reviewed pt's history and workup with Dr. Musa.  After a bedside evaluation, they agree with the plan of care.       [MS]   0805 Lactate(!!): 2.9 [MS]   0821 WBC(!): 28.42 [MS]   0851 Heart Rate: 119 [MS]   0904 BP(!): 77/47 [MS]   0904 Heart Rate(!): 127  Patient's blood pressure 77/47, patient has not taken her midodrine today, will order her home dose of that.  She states when she takes that she also takes 40 mEq of potassium.  Patient has been updated on work-up thus far which is concerning for sepsis.  Discussed with patient that we would like to obtain a cath urine to check for urinary tract infection.  Patient states she will think about it is not sure if she wants to have that done because she is afraid it will be painful due to recent stent placement. [MS]   0935 BP(!): 82/55 [MS]   1021 BP: 97/53 [MS]   1021 Heart Rate(!): 124   Discussed with Dr. Beltran regarding the CT abdomen and pelvis results which revealed pancolitis, pneumoperitoneum secondary to her peritoneal dialysis catheter.  No obvious microperforation seen  See dictation for official radiology interpretation.     [MS]    1025 Patient is currently refusing to allow us to do a cath urinalysis. [MS]   1033 Case discussed with Dr. Saul Meza (pulmonary critical care medicine)-he agrees to admit the patient to the ICU. [WC]   1113 BP: 91/76 [MS]   1113 Heart Rate: 119 [MS]   1113 SpO2: 92 % [MS]      ED Course User Index  [MS] Tasha Carrera, APRN  [WC] Armando Musa MD     ADMISSION    Discussed treatment plan and reason for admission with pt/family and admitting physician.  Pt/family voiced understanding of the plan for admission for further testing/treatment as needed.      DIAGNOSIS  Final diagnoses:   Sepsis without acute organ dysfunction, due to unspecified organism (HCC)   Chest pain, unspecified type   ESRD (end stage renal disease) ([facility])   Hypotension, unspecified hypotension type   C. difficile colitis   Pancolitis ([facility])   Peritoneal dialysis status ([facility])           MEDICATIONS GIVEN IN ED    Medications   sodium chloride 0.9 % flush 10 mL (has no administration in time range)   sodium chloride 0.9 % flush 10 mL (has no administration in time range)   sodium chloride 0.9 % bolus 500 mL (500 mL Intravenous New Bag 3/11/22 1056)   Pharmacy Consult (has no administration in time range)   midodrine (PROAMATINE) tablet 10 mg (has no administration in time range)   sodium chloride 0.9 % bolus 500 mL (0 mL Intravenous Stopped 3/11/22 0905)   cefTRIAXone (ROCEPHIN) 2 g in sodium chloride 0.9 % 100 mL IVPB-VTB (0 g Intravenous Stopped 3/11/22 1021)   midodrine (PROAMATINE) tablet 10 mg (10 mg Oral Given 3/11/22 0926)   potassium chloride (K-DUR,KLOR-CON) ER tablet 40 mEq (40 mEq Oral Given 3/11/22 0925)   sodium chloride 0.9 % bolus 500 mL (0 mL Intravenous Stopped 3/11/22 1058)   vancomycin (VANCOCIN) capsule 125 mg (125 mg Oral Given 3/11/22 1055)           COURSE & MEDICAL DECISION MAKING  Any/All labs and Any/All Imaging studies that were ordered were reviewed and are noted above.  Results were reviewed/discussed  with the patient and they were also made aware of online access.    Pt also made aware that some labs, such as cultures, will not be resulted during ER visit and followup with PMD is necessary.        Tasha Carrera, APRN  03/11/22 1115

## 2022-03-11 NOTE — CONSULTS
[facility] Consult Note      Patient Name: Ambar Lara  : 1970  MRN: [id number]  Primary Care Physician:  Jet Manuel MD  Referring Physician: Saul Meza MD  Date of admission: 3/11/2022    Subjective     Reason for Consult:   ESRD     HPI:   Ambar Lara is a 51 y.o. female known to have history end-stage renal disease secondary to interstitial nephritis on CCPD followed by Dr. Urias, history of left-sided hydronephrosis secondary to renal stone status post stent placement, treated empirically with antibiotics for possible pyelonephritis who presented to the emergency room with 2 days history of nausea vomiting and diarrhea.  She noted episodes of watery stool every 2 hours associated with fatigue.  She had dialysis last night and this was uneventful.  In ER she was noted to be hypotensive with blood pressure down to 77.  She received IV fluids.  Labs showed a sodium 136, potassium 4.7, bicarbonate 13, chloride 107, anion gap 15.7, creatinine 4.12, BUN 21, calcium 8.3, lactate 2.9.  Stool analysis was positive for C. difficile colitis, hemoglobin 20.8 , hemoglobin 9.3 nephrology was consulted for management of PD.  Review of Systems:   14 point review of systems is otherwise negative except for mentioned above on HPI    Personal History     Past Medical History:   Diagnosis Date   • CHF (congestive heart failure) ([facility])    • Clostridioides difficile infection 2021    finished oral vanc 2021   • Dialysis patient ([facility])    • Disease of thyroid gland    • Elevated cholesterol    • GERD (gastroesophageal reflux disease)    • Pulmonary emboli ([facility]) 2021    coumadin last taken 2021   • Renal disorder    • Sleep apnea    • Ureteral calculi 2022       Past Surgical History:   Procedure Laterality Date   • ADRENAL GLAND SURGERY N/A `   •  SECTION Bilateral 2001    Has had x2   • CYSTOSCOPY URETEROSCOPY LASER LITHOTRIPSY Left  2/28/2022    Procedure: Left CYSTOSCOPY, with stent placement;  Surgeon: Sanford Puentes MD;  Location:  [facility];  Service: Urology;  Laterality: Left;   • HYSTERECTOMY     • INSERTION PERITONEAL DIALYSIS CATHETER Right 10/12/2021    Procedure: LAPAROSCOPIC REVISION OF PERITONEAL DIALYSIS CATHETER AND LAPAROSCOPIC CHOLECYSTECTOMY;  Surgeon: Jamie Figueroa MD;  Location:  [facility];  Service: General;  Laterality: Right;   • PERITONEAL CATHETER INSERTION  04/2019   • TRACHEOSTOMY         Family History: family history is not on file.    Social History:  reports that she quit smoking about 3 years ago. Her smoking use included cigarettes. She has a 25.00 pack-year smoking history. She has never used smokeless tobacco. She reports previous alcohol use. She reports previous drug use.    Home Medications:  Prior to Admission medications    Medication Sig Start Date End Date Taking? Authorizing Provider   levothyroxine (SYNTHROID, LEVOTHROID) 100 MCG tablet Take 1.5 tablets by mouth Daily. 3/2/22  Yes Preet Workman MD   acetaminophen (TYLENOL) 500 MG tablet Take 500 mg by mouth Every 6 (Six) Hours As Needed for Mild Pain .    ProviderAdy MD   ammonium lactate (AMLACTIN) 12 % cream Apply topically to the appropriate area as directed Every 12 (Twelve) Hours As Needed for Dry Skin. 3/2/22   Preet Workman MD   aspirin 81 MG chewable tablet Chew 81 mg Daily.    ProviderAdy MD   B Complex-C-Folic Acid (REN-JHON PO) Take 1 tablet by mouth Daily.    Ady Malhotra MD   diphenhydrAMINE HCl, Sleep, 25 MG capsule Take 25 mg by mouth At Night As Needed (sleep). For sleep 4/29/21   Jose Granados MD   escitalopram (LEXAPRO) 20 MG tablet Take 1 tablet by mouth Daily. 4/29/21   Jose Granados MD   famotidine (PEPCID) 20 MG tablet Take 20 mg by mouth 2 (Two) Times a Day.    Ady Malhotra MD   gentamicin (GARAMYCIN) 0.1 % cream Apply 1 application topically  to the appropriate area as directed Daily. As directed    Ady Malhotra MD   lanthanum (FOSRENOL) 500 MG chewable tablet Chew 750 mg 3 (Three) Times a Day With Meals.    Ady Malhotra MD   melatonin 3 MG tablet Take 1 tablet by mouth Every Night. 4/29/21   Jose Granados MD   midodrine (PROAMATINE) 10 MG tablet Take 1 tablet by mouth 3 (Three) Times a Day. 4/29/21   Jose Granados MD   montelukast (Singulair) 10 MG tablet Take 1 tablet by mouth Every Night. 4/29/21   Jose Granados MD   multivitamin (THERAGRAN) tablet tablet Take 1 tablet by mouth Daily. 3/9/22   Preet Workman MD   nitroglycerin (NITROSTAT) 0.4 MG SL tablet Place 0.4 mg under the tongue Every 5 (Five) Minutes As Needed for Chest Pain. Take no more than 3 doses in 15 minutes.    Ady Malhotra MD   oxyCODONE-acetaminophen (Percocet) 5-325 MG per tablet Take 1 tablet by mouth Every 6 (Six) Hours As Needed for Moderate Pain . 3/2/22   Sanford Puentes MD   potassium bicarbonate (K-LYTE) 25 MEQ disintegrating tablet Take 20 mEq by mouth 2 (Two) Times a Day. Take 2 tablets by mouth 2 times daily.    Ady Malhotra MD   saccharomyces boulardii (FLORASTOR) 250 MG capsule Take 2 capsules by mouth 2 (Two) Times a Day. 4/29/21   Jose Granados MD   sodium bicarbonate 650 MG tablet Take 1 tablet by mouth Every 8 (Eight) Hours.  Patient taking differently: Take 650 mg by mouth 4 (Four) Times a Day. 4/29/21   Jose Granados MD   traZODone (DESYREL) 100 MG tablet Take 100 mg by mouth Every Night. Take 0.5-1 tablet by mouth nightly    Ady Malhotra MD       Allergies:  Allergies   Allergen Reactions   • Penicillins Anaphylaxis, Hives, Shortness Of Breath, Swelling and Rash     Tolerated cephalosporins in past   • Nickel Rash       Objective     Vitals:   Temp:  [98.3 °F (36.8 °C)] 98.3 °F (36.8 °C)  Heart Rate:  [119-134] 123  Resp:  [22] 22  BP: ()/(47-98)  83/65    Intake/Output Summary (Last 24 hours) at 3/11/2022 1349  Last data filed at 3/11/2022 1058  Gross per 24 hour   Intake 1100 ml   Output --   Net 1100 ml       Physical Exam:   Constitutional: Awake, ill-looking  HEENT: Sclera anicteric, no conjunctival injection  Neck: Supple, no thyromegaly, no lymphadenopathy, trachea at midline, no JVD  Respiratory: Clear to auscultation bilaterally, nonlabored respiration  Cardiovascular: RRR, no murmurs, no rubs or gallops, no carotid bruit  Gastrointestinal: Abdomen soft, not distended, mild diffuse tenderness.  No rebound no guarding.  Presence of PD catheter noted.  Exit site clean with scabs and  no surrounding erythema  : No palpable bladder  Musculoskeletal: No edema, no clubbing or cyanosis.  Multiple skin lesions of bilateral foot with no surrounding erythema.  Psychiatric: Appropriate affect, cooperative  Neurologic: Oriented x3, moving all extremities, normal speech and mental status  Skin: Warm and dry       Scheduled Meds:     aspirin, 81 mg, Oral, Daily  escitalopram, 20 mg, Oral, Daily  famotidine, 20 mg, Oral, Daily  insulin regular, 0-14 Units, Subcutaneous, Q6H  levothyroxine, 137 mcg, Oral, Daily  midodrine, 10 mg, Oral, TID AC  sodium chloride, 250 mL, Intravenous, Once  sodium chloride, 10 mL, Intravenous, Q12H  vancomycin, 125 mg, Oral, Q6H  [START ON 3/21/2022] vancomycin, 125 mg, Oral, Q12H  [START ON 3/29/2022] vancomycin, 125 mg, Oral, Q24H  [START ON 4/5/2022] vancomycin, 125 mg, Oral, Every Other Day      IV Meds:   Pharmacy Consult,   sodium chloride, 100 mL/hr        Results Reviewed:   I have personally reviewed the results from the time of this admission to 3/11/2022 13:49 EST     Lab Results   Component Value Date    GLUCOSE 158 (H) 03/11/2022    CALCIUM 8.3 (L) 03/11/2022     03/11/2022    K 4.7 03/11/2022    CO2 13.3 (L) 03/11/2022     03/11/2022    BUN 21 (H) 03/11/2022    CREATININE 4.12 (H) 03/11/2022    EGFRIFAFRI   10/14/2021      Comment:      <15 Indicative of kidney failure.    EGFRIFNONA 7 (L) 02/28/2022    BCR 5.1 (L) 03/11/2022    ANIONGAP 15.7 (H) 03/11/2022      Lab Results   Component Value Date    MG 1.5 (L) 02/27/2022    PHOS 10.0 (H) 02/28/2022    ALBUMIN 3.30 (L) 03/11/2022           Assessment / Plan     ASSESSMENT:   1.  End-stage renal disease on CCPD.  Using 1.5 and 2.5% dianeal at home.  Had dialysis yesterday.  With no reported problems.  She does not have last fill.  Currently hypovolemic and septic.  Electrolytes are acceptable she has metabolic acidosis likely secondary to diarrhea and chronic kidney disease.  2.  Hypovolemic shock/septic shock secondary to diarrhea and C. difficile colitis.  3.  High anion gap metabolic acidosis secondary to lactic acidosis due to hypoperfusion.  This is combined with normal anion gap metabolic acidosis due to diarrhea.  4.  Diarrhea.  5.  Sepsis secondary to C. difficile colitis.  6.  History of congestive heart failure  7.  C. difficile colitis on antibiotic by ID  8.  Hypothyroidism: On replacement  9.  Hyperphosphatemia on lanthanum    Plan:  -Patient currently hypovolemic and septic.  We will start IV hydration with 500 cc bolus of normal saline followed by normal saline at 100 cc an hour as she continues to have large stool output.  She does have a history of cardiomyopathy and most recent EF is around 20%  limiting fluid resuscitation.  -Consider pressors if her blood pressure continues to be low  -Holding peritoneal dialysis for today given severe sepsis and that  electrolytes are acceptable.  -We will check ABGs  -We will repeat renal panel in afternoon  -Continue surveillance labs  - Hold lanthanum     My findings and recommendation are discussed with  and nursing staff    Thank you for involving us in the care of Ambar Lara.  Please feel free to call with any questions.    Mateo Titus MD  03/11/22  13:49 EST    [facility]  [phone]      Much of this encounter note is an electronic transcription/translation of spoken language to printed text. The electronic translation of spoken language may permit erroneous, or at times, nonsensical words or phrases to be inadvertently transcribed; Although I have reviewed the note for such errors, some may still exist.

## 2022-03-11 NOTE — PROGRESS NOTES
[facility] Clinical Pharmacy Services: C. Difficile Medication Changes       Pharmacy has been consulted to look over Ambar Lara's profile to check patient's medications for changes due to C. Difficile diagnosis per Dr. Musa's request.       Current C. Diff Regimen: Vancomycin 125 mg PO X 1 dose ordered; subsequent doses to be ordered by admitting provider.     Assessment/Plan/Changes       · No acid suppression medications ordered at this time. Patient takes pepcid per admission med rec  · No antiperistaltic agents or stool softeners/laxatives ordered at this time.  · No identifiable medication changes needed at this, will continue to monitor.         Thank you for allowing me to participate in your patient's care.  Please call pharmacy with any questions or concerns.     Francis Ascencio, PharmD  Clinical Pharmacist, Emergency Medicine   Phone: [phone]

## 2022-03-11 NOTE — ED TRIAGE NOTES
Pt to ED from home via [facility] with c/o dizziness and palpitations that started last night. Pt states she has heaviness feeling on her chest that is not consistent and having nausea but no vomiting. Pt is a peritoneal dialysis pt and had treatment last night. States that she had diarrhea all night. Pt to triage with mask on along with nursing staff.

## 2022-03-11 NOTE — OUTREACH NOTE
Medical Week 2 Survey    Flowsheet Row Responses   [facility] patient discharged from? [facility]   Does the patient have one of the following disease processes/diagnoses(primary or secondary)? Other   Week 2 attempt successful? No   Revoke Readmitted          JOSE STANLEY - Registered Nurse

## 2022-03-11 NOTE — H&P
H&P NOTE    Patient Identification:  Ambar Lara  51 y.o.  female  1970  [id number]                  CC: Diarrhea weakness    History of Present Illness:  51-year-old female with history of ESRD on peritoneal dialysis recent admission to the hospital with left-sided hydronephrosis with ureteral calculus requiring ureteral stent.  Treated empirically with antibiotics for pyelonephritis.  She presented to the emergency room with fever leukocytosis and diarrhea and C. difficile positive.  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  She reports diffuse diarrhea watery 3-4 bowel movements for the last 2 days.  No fever chills were reported as such.  But reports weakness and fatigue.  No shortness of breath cough or purulent sputum was reported.  In the emergency room she was noted to have low blood pressure with lactic acidosis and decided to be admitted to ICU for further management    Review of Systems  As above rest is negative  Past Medical History:  Past Medical History:   Diagnosis Date   • CHF (congestive heart failure) ([facility])    • Clostridioides difficile infection 2021    finished oral vanc 2021   • Dialysis patient ([facility])    • Disease of thyroid gland    • Elevated cholesterol    • GERD (gastroesophageal reflux disease)    • Pulmonary emboli ([facility]) 2021    coumadin last taken 2021   • Renal disorder    • Sleep apnea    • Ureteral calculi 2022       Past Surgical History:  Past Surgical History:   Procedure Laterality Date   • ADRENAL GLAND SURGERY N/A `   •  SECTION Bilateral 2001    Has had x2   • CYSTOSCOPY URETEROSCOPY LASER LITHOTRIPSY Left 2022    Procedure: Left CYSTOSCOPY, with stent placement;  Surgeon: Sanford Puentes MD;  Location: [facility];  Service: Urology;  Laterality: Left;   • HYSTERECTOMY     • INSERTION PERITONEAL DIALYSIS CATHETER Right 10/12/2021    Procedure: LAPAROSCOPIC REVISION OF PERITONEAL  DIALYSIS CATHETER AND LAPAROSCOPIC CHOLECYSTECTOMY;  Surgeon: Jamie Figueroa MD;  Location: [facility];  Service: General;  Laterality: Right;   • PERITONEAL CATHETER INSERTION  04/2019   • TRACHEOSTOMY          Home Meds:  (Not in a hospital admission)      Allergies:  Allergies   Allergen Reactions   • Penicillins Anaphylaxis, Hives, Rash, Shortness Of Breath and Swelling   • Nickel Rash       Social History:   Social History     Socioeconomic History   • Marital status:    Tobacco Use   • Smoking status: Former Smoker     Packs/day: 1.00     Years: 25.00     Pack years: 25.00     Types: Cigarettes     Quit date: 4/16/2018     Years since quitting: 3.9   • Smokeless tobacco: Never Used   Vaping Use   • Vaping Use: Former   • Quit date: 2/14/2021   • Substances: Nicotine, Flavoring   • Devices: Pre-filled or refillable cartridge   Substance and Sexual Activity   • Alcohol use: Not Currently   • Drug use: Not Currently   • Sexual activity: Defer       Family History:  Family History   Problem Relation Age of Onset   • Malig Hyperthermia Neg Hx        Physical Exam:  BP (!) 83/65   Pulse (!) 123   Temp 98.3 °F (36.8 °C)   Resp 22   LMP 04/16/2004 (Approximate)   SpO2 93%  There is no height or weight on file to calculate BMI. 93%    Physical Exam  Patient is examined using the personal protective equipment as per guidelines from infection control for this particular patient as enacted.  Hand hygiene was performed before and after patient interaction.  Well-developed normal body habitus  Oral cavity dry mucous membrane  Eyes normal conjunctivae and pupils reactive to light  ENT Mallampati between 3 and 4 normal nasal exam  Neck midline trachea no thyromegaly  Chest no labored breathing clear  CVS regular rate and rhythm no lower extremity edema  Abdomen soft mild diffuse tenderness no rebound or guarding  CNS intact normal sensory exam  Skin no rashes no nodules  Psych oriented to time place and  person normal memory  Musculoskeletal no cyanosis no clubbing normal range of motion        LABS:  Lab Results   Component Value Date    CALCIUM 8.3 (L) 03/11/2022    PHOS 10.0 (H) 02/28/2022     Results from last 7 days   Lab Units 03/11/22  0639   SODIUM mmol/L 136   POTASSIUM mmol/L 4.7   CHLORIDE mmol/L 107   CO2 mmol/L 13.3*   BUN mg/dL 21*   CREATININE mg/dL 4.12*   GLUCOSE mg/dL 158*   CALCIUM mg/dL 8.3*   WBC 10*3/mm3 28.42*   HEMOGLOBIN g/dL 11.3*   PLATELETS 10*3/mm3 409   ALT (SGPT) U/L 19   AST (SGOT) U/L 26     Lab Results   Component Value Date    CKTOTAL 52 11/09/2018    TROPONINI 0.055 (H) 02/15/2021    TROPONINT 0.028 03/11/2022     Results from last 7 days   Lab Units 03/11/22  0907 03/11/22  0639   TROPONIN T ng/mL 0.028 0.020         Results from last 7 days   Lab Units 03/11/22  1055 03/11/22  0732   LACTATE mmol/L 2.2* 2.9*         Results from last 7 days   Lab Units 03/11/22  0707   ADENOVIRUS  Not Detected             Lab Results   Component Value Date    TSH 14.000 (H) 01/07/2022     Estimated Creatinine Clearance: 15.5 mL/min (A) (by C-G formula based on SCr of 4.12 mg/dL (H)).         Imaging: I personally visualized the images of scans/x-rays performed within last 3 days.      Assessment:  Sepsis  Septic shock  C. difficile colitis  Splenic infarction  ESRD on peritoneal dialysis  Cardiomyopathy  Chronic systolic heart failure  Recent hydronephrosis with obstructing calculus  Anxiety  Hypothyroidism      Recommendations:  At this time we have a relatively young female with recent antibiotic for pyelonephritis presenting with C. difficile colitis with hypotension.  Resuscitated with fluids and maintain MAP greater than 65.  If pressors needed will use Levophed.  Antibiotics per infectious diseases they have been consulted in the emergency room.  Appreciate their input.  Consult nephrology ESRD on peritoneal dialysis  Clinically she appears to be dry but we have to be careful with her  cardiomyopathy and low ejection fraction.  Trend lactate  ICU core measures  Monitor clinical course closely.    Critical care time 35 minutes          Everett Hui MD  3/11/2022  12:06 EST      Much of this encounter note is an electronic transcription/translation of spoken language to printed text using Dragon Software.

## 2022-03-12 NOTE — PROGRESS NOTES
"  Daily Progress Note.   [facility] CORONARY CARE  3/12/2022    Patient:  Name:  Ambar Lara  MRN:  [id number]  1970  51 y.o.  female         CC:    Interval History:  Pt maintains on a low dose NE but MAPS robust at present and remains on room air.  Wbc to 36  Lactate 2.2  +fevers  HR down since admission bp better  NE titrating down nicely  Pt feels better still fatigued but alert oriented.      Physical Exam:  /76   Pulse 81   Temp 98.2 °F (36.8 °C) (Oral)   Resp 24   Ht 154.9 cm (60.98\")   Wt 76.8 kg (169 lb 5 oz)   LMP 04/16/2004 (Approximate)   SpO2 93%   BMI 32.01 kg/m²   Body mass index is 32.01 kg/m².    Intake/Output Summary (Last 24 hours) at 3/12/2022 0756  Last data filed at 3/12/2022 0515  Gross per 24 hour   Intake 3564 ml   Output --   Net 3564 ml     General appearance: NAD, conversant   Eyes: anicteric sclerae, moist conjunctivae; no lid-lag;    HENT: Atraumatic;    Neck: Trachea midline;   Lungs: minimal crackles at bases, with normal respiratory effort and no intercostal retractions  CV: RRR, no rub  Abdomen: Soft, non rigid bs+  Extremities: No peripheral edema    Skin:warm dry  Psych: Appropriate affect, alert and oriented   Neuro moves all ext, cns 2-12 intact speech intact    Data Review:  Notable Labs:  Results from last 7 days   Lab Units 03/11/22  1503 03/11/22  0639   WBC 10*3/mm3 35.67* 28.42*   HEMOGLOBIN g/dL 10.0* 11.3*   PLATELETS 10*3/mm3 358 409     Results from last 7 days   Lab Units 03/11/22  1503 03/11/22  0639   SODIUM mmol/L 139 136   POTASSIUM mmol/L 4.0 4.7   CHLORIDE mmol/L 110* 107   CO2 mmol/L 14.0* 13.3*   BUN mg/dL 21* 21*   CREATININE mg/dL 4.70* 4.12*   GLUCOSE mg/dL 135* 158*   CALCIUM mg/dL 7.8* 8.3*   PHOSPHORUS mg/dL 5.9*  --    Estimated Creatinine Clearance: 13.6 mL/min (A) (by C-G formula based on SCr of 4.7 mg/dL (H)).    Results from last 7 days   Lab Units 03/11/22  1503 03/11/22  1055 03/11/22  0732 03/11/22  0639 "   AST (SGOT) U/L  --   --   --  26   ALT (SGPT) U/L  --   --   --  19   LACTATE mmol/L 2.2* 2.2* 2.9*  --    PLATELETS 10*3/mm3 358  --   --  409       Results from last 7 days   Lab Units 03/11/22  1749   PH, ARTERIAL pH units 7.286*   PCO2, ARTERIAL mm Hg 29.6*   PO2 ART mm Hg 75.9*   HCO3 ART mmol/L 14.1*       Imaging:  Reviewed chest images personally from past 3 days    Scheduled meds:    aspirin, 81 mg, Oral, Daily  escitalopram, 20 mg, Oral, Daily  famotidine, 20 mg, Oral, Daily  insulin regular, 0-14 Units, Subcutaneous, Q6H  levothyroxine, 137 mcg, Oral, Daily  midodrine, 10 mg, Oral, TID AC  sodium chloride, 10 mL, Intravenous, Q12H  vancomycin, 125 mg, Oral, Q6H  [START ON 3/21/2022] vancomycin, 125 mg, Oral, Q12H  [START ON 3/29/2022] vancomycin, 125 mg, Oral, Q24H  [START ON 4/5/2022] vancomycin, 125 mg, Oral, Every Other Day        ASSESSMENT  /  PLAN:  Sepsis  Septic shock  C. difficile colitis  Splenic infarction  ESRD on peritoneal dialysis  Cardiomyopathy  Chronic systolic heart failure  Recent hydronephrosis with obstructing calculus s/p stent  Anxiety  Hypothyroidism     Cont abx per ID on po vanco watching fever curve and wbc  Midodrine 10 tid for bp support  Cont levothyroxine  [id number]  Cont escitalopram  ssi  abg reviewed showing met acidosis with incomplete resp compensatory alkalosis.  Pt is a PD and with diarrhea.  Nephrology following  Wean NE, d/w RN  Total critical care time was 32 minutes, excluding any separately billable procedure time.  Time did not overlap with any other provider.       Angel Meza MD  [facility]  03/12/22  810 EST

## 2022-03-12 NOTE — PROGRESS NOTES
LOS: 1 day     Chief Complaint:  Follow-up C diff    Interval History:  She says her diarrhea is improving. Less frequent and more formation. She reports 4-months of skin changes on her 1st toe toes of the R foot.     ROS: no n/v or CP    Vital Signs  Temp:  [98.2 °F (36.8 °C)-100.8 °F (38.2 °C)] 98.3 °F (36.8 °C)  Heart Rate:  [] 102  Resp:  [24] 24  BP: ()/(41-93) 97/71    Physical Exam:  GENERAL: Awake and alert, very nice  Head: no trauma  Eyes: no scleral icterus  Neck: supple  CV: Tachycardic, regular rhythm.   LUNGS: Clear to auscultation anteriorly with normal respiratory effort.   GI: Soft, tender diffusely, nondistended. No appreciable organomegaly.  Peritoneal catheter in place.  SKIN: mild erythema but no warmth on 1st two toes of right foot  PSYCHIATRIC: Appropriate mood, affect, insight, and judgment.     Antibiotics:  •  vancomycin (VANCOCIN) capsule 125 mg, 125 mg, Oral, Q6H, Everett Hui MD, 125 mg at 03/12/22 0543  •  [START ON 3/21/2022] vancomycin (VANCOCIN) capsule 125 mg, 125 mg, Oral, Q12H, Everett Hui MD  •  [START ON 3/29/2022] vancomycin (VANCOCIN) capsule 125 mg, 125 mg, Oral, Q24H, Everett Hui MD  •  [START ON 4/5/2022] vancomycin (VANCOCIN) capsule 125 mg, 125 mg, Oral, Every Other Day, Everett Hui MD    LABS:  CBC, BMP, micro reviewed today  Lab Results   Component Value Date    WBC 31.54 (C) 03/12/2022    HGB 10.6 (L) 03/12/2022    HCT 33.3 (L) 03/12/2022    MCV 97.7 (H) 03/12/2022     03/12/2022     Lab Results   Component Value Date    GLUCOSE 139 (H) 03/12/2022    CALCIUM 8.0 (L) 03/12/2022     03/12/2022    K 3.2 (L) 03/12/2022    CO2 15.0 (L) 03/12/2022     03/12/2022    BUN 26 (H) 03/12/2022    CREATININE 4.54 (H) 03/12/2022    EGFRIFAFRI  10/14/2021      Comment:      <15 Indicative of kidney failure.    EGFRIFNONA 7 (L) 02/28/2022    BCR 5.7 (L) 03/12/2022    ANIONGAP 17.0 (H) 03/12/2022       Microbiology:  3/11 C. difficile  positive  3/11 Covid negative  3/11 GI pathogen panel negative  3/11 blood cultures: NGTD    Radiology (personally reviewed report):   CT of the abdomen and pelvis report reviewed with pancolitis.  Pneumoperitoneum likely related to peritoneal dialysis catheter.  Left-sided ureteral stent in place with resolved hydronephrosis.  Splenic infarcts.    TTE w/ EF 20%      Assessment/Plan   #Sepsis  #C. difficile colitis  #Splenic infarcts  #Nephrolithiasis status post left ureteral stent  #ESRD on PD  #Right foot skin changes  #systolic CHF  #Mitral regurgitation     C diff symptoms better. Continue oral vancomycin w/ prolonged taper.     She says she's had toe discoloration for about 4 months following a traumatic injury. No heat or spreading erythema but some concern about a deep infection so will get a an MRI non-con.     F/U BCx. TTE was negative for vegetations sawyer does have moderate to severe MR.     ID will follow. All problems were new to me today.

## 2022-03-12 NOTE — PROGRESS NOTES
[facility] Progress Note      Patient Name: Ambar Lara  : 1970  MRN: [id number]  Primary Care Physician:  Jet Manuel MD  Date of admission: 3/11/2022    Subjective     Interval History:   Feeling much better today.  Afebrile this morning.  Blood pressure has improved on pressors.  More awake.  Asking for food.      Review of Systems:   As noted above    Objective     Vitals:   Temp:  [98.2 °F (36.8 °C)-100.8 °F (38.2 °C)] 98.2 °F (36.8 °C)  Heart Rate:  [] 81  Resp:  [24] 24  BP: ()/(41-82) 107/76  Flow (L/min):  [60] 60    Intake/Output Summary (Last 24 hours) at 3/12/2022 0855  Last data filed at 3/12/2022 0515  Gross per 24 hour   Intake 3564 ml   Output --   Net 3564 ml       Physical Exam:    General Appearance: alert, oriented x 3, no acute distress   Skin: warm and dry  HEENT: oral mucosa normal, nonicteric sclera  Neck: supple, no JVD  Lungs: CTA  Heart: RRR, normal S1 and S2  Abdomen: Mildly distended mild tenderness noted no rebound and no guarding.  PD catheter in place. Exit site with no erythema or discharge.  : no palpable bladder  Extremities: no edema, cyanosis or clubbing  Neuro: normal speech and mental status     Scheduled Meds:     aspirin, 81 mg, Oral, Daily  escitalopram, 20 mg, Oral, Daily  famotidine, 20 mg, Oral, Daily  insulin regular, 0-14 Units, Subcutaneous, Q6H  levothyroxine, 137 mcg, Oral, Daily  midodrine, 10 mg, Oral, TID AC  sodium chloride, 10 mL, Intravenous, Q12H  vancomycin, 125 mg, Oral, Q6H  [START ON 3/21/2022] vancomycin, 125 mg, Oral, Q12H  [START ON 3/29/2022] vancomycin, 125 mg, Oral, Q24H  [START ON 2022] vancomycin, 125 mg, Oral, Every Other Day      IV Meds:   norepinephrine, 0.02-0.3 mcg/kg/min, Last Rate: 0.06 mcg/kg/min (22 0800)  Pharmacy Consult,         Results Reviewed:   I have personally reviewed the results from the time of this admission to 3/12/2022 08:55 EST     Results from last 7  days   Lab Units 03/11/22  1503 03/11/22  0639   SODIUM mmol/L 139 136   POTASSIUM mmol/L 4.0 4.7   CHLORIDE mmol/L 110* 107   CO2 mmol/L 14.0* 13.3*   BUN mg/dL 21* 21*   CREATININE mg/dL 4.70* 4.12*   CALCIUM mg/dL 7.8* 8.3*   BILIRUBIN mg/dL  --  0.2   ALK PHOS U/L  --  93   ALT (SGPT) U/L  --  19   AST (SGOT) U/L  --  26   GLUCOSE mg/dL 135* 158*       Estimated Creatinine Clearance: 13.6 mL/min (A) (by C-G formula based on SCr of 4.7 mg/dL (H)).    Results from last 7 days   Lab Units 03/11/22  1503   PHOSPHORUS mg/dL 5.9*             Results from last 7 days   Lab Units 03/11/22  1503 03/11/22  0639   WBC 10*3/mm3 35.67* 28.42*   HEMOGLOBIN g/dL 10.0* 11.3*   PLATELETS 10*3/mm3 358 409             Assessment / Plan     ASSESSMENT:  1.  End-stage renal disease on CCPD.  Using 1.5 and 2.5% dianeal at home.    Her PD treatment usually is uneventful.  PD was held yesterday due to sepsis and massive diarrhea.  She is feeling much better today.  Volume status seems to be better and acceptable.    2.  Hypovolemic shock/septic shock secondary to diarrhea and C. difficile colitis.  Improved with IV fluids and pressors  3.  High anion gap metabolic acidosis secondary to lactic acidosis due to hypoperfusion.  This is combined with normal anion gap metabolic acidosis due to diarrhea.  On IV bicarbonate  4.  Diarrhea.  Due to C. difficile improved  5.  Sepsis secondary to C. difficile colitis.  6.  History of congestive heart failure with ejection fraction 20%  7.  C. difficile colitis on antibiotic by ID  8.  Hypothyroidism: On replacement  9.  Hyperphosphatemia on lanthanum     Plan:  - Patient seems to be improving on room air.  Tolerating her fluid boluses yesterday well.    - Blood pressure is better on pressors.  - Nursing staff is in the process of weaning her pressors.  - We will start PD treatment with 4 manual exchanges of 2 L each using 1.5% solution.  - Awaiting today's labs  - We will continue surveillance  labs.  - Advance diet    Thank you for involving us in the care of Ambar Lara.  Please feel free to call with any questions.    Mateo Titus MD  03/12/22  08:55 [facility]    [facility]  [phone]      Much of this encounter note is an electronic transcription/translation of spoken language to printed text. The electronic translation of spoken language may permit erroneous, or at times, nonsensical words or phrases to be inadvertently transcribed; Although I have reviewed the note for such errors, some may still exist.

## 2022-03-12 NOTE — PLAN OF CARE
Goal Outcome Evaluation:      Pt remains in CCU. Levo titrated off at 0945. Pt still having frequent liquid watery stools. PD was done X1 this shift, next due at 2000. MRI of toes ordered, MRI checklist obtained, per Dr. NICOLETTE fitzpatrick to go off tele for test. Will continue to monitor closely.

## 2022-03-13 PROBLEM — D63.8 ANEMIA, CHRONIC DISEASE: Status: ACTIVE | Noted: 2022-01-01

## 2022-03-13 PROBLEM — J96.01 ACUTE RESPIRATORY FAILURE WITH HYPOXIA: Status: ACTIVE | Noted: 2022-01-01

## 2022-03-13 PROBLEM — R50.9 FEVER IN ADULT: Status: ACTIVE | Noted: 2022-01-01

## 2022-03-13 PROBLEM — E87.6 HYPOKALEMIA: Status: ACTIVE | Noted: 2022-01-01

## 2022-03-13 NOTE — PROGRESS NOTES
"    Name: Ambar Lara ADMIT: 3/11/2022   : 1970  PCP: Jet Manuel MD    MRN: [id number] LOS: 2 days   AGE/SEX: 51 y.o. female  ROOM: [facility]     Subjective   Subjective   Patient appears chronically ill, generally weak, relatively comfortable, in no apparent distress.  Reports chronic diarrhea associated with IBS.  Tolerating p.o.  RN at bedside.  Voices no new concerns.  Eager to transfer from ICU secondary to reported anxiety.    Review of Systems   Constitutional: Negative for chills and fever.   Respiratory: Negative for cough and shortness of breath.    Cardiovascular: Negative for chest pain and leg swelling.   Gastrointestinal: Positive for diarrhea (chronic) and nausea. Negative for abdominal pain, constipation and vomiting.   Genitourinary: Negative for difficulty urinating and dysuria.   Musculoskeletal: Positive for gait problem (2/2 generalized weakness & right foot ). Negative for myalgias.   Skin: Positive for wound (right foot).   Neurological: Positive for weakness (generalized). Negative for light-headedness.   Psychiatric/Behavioral: Negative for confusion and hallucinations.        Objective   Objective   Vital Signs  Temp:  [97.3 °F (36.3 °C)-98.7 °F (37.1 °C)] 98.4 °F (36.9 °C)  Heart Rate:  [] 91  Resp:  [20] 20  BP: ()/() 111/70  SpO2:  [93 %-99 %] 97 %  on   ;   Device (Oxygen Therapy): room air  Body mass index is 32.01 kg/m².     Physical Exam  Constitutional:       General: She is not in acute distress.     Appearance: She is ill-appearing. She is not toxic-appearing.   Cardiovascular:      Rate and Rhythm: Normal rate.      Heart sounds: Normal heart sounds.   Pulmonary:      Effort: Pulmonary effort is normal.      Breath sounds: Normal breath sounds.   Abdominal:      General: Bowel sounds are normal. There is no distension.      Palpations: Abdomen is soft.      Tenderness: There is abdominal tenderness (LUQ proximal \"stent\"). There is no guarding. "   Musculoskeletal:         General: Tenderness (right foot) present.      Right lower leg: Edema present.   Skin:     General: Skin is warm and dry.      Findings: Erythema (right foot) present.   Neurological:      Mental Status: She is alert and oriented to person, place, and time.      Cranial Nerves: No cranial nerve deficit.      Motor: Weakness (generalized) present.   Psychiatric:         Behavior: Behavior normal.         Thought Content: Thought content normal.         Results Review     I reviewed the patient's new clinical results.  Results from last 7 days   Lab Units 03/13/22 0613 03/12/22 0755 03/11/22  1503 03/11/22  0639   WBC 10*3/mm3 15.06* 31.54* 35.67* 28.42*   HEMOGLOBIN g/dL 9.9* 10.6* 10.0* 11.3*   PLATELETS 10*3/mm3 374 424 358 409     Results from last 7 days   Lab Units 03/13/22 0613 03/12/22 0755 03/11/22  1503 03/11/22  0639   SODIUM mmol/L 139 138 139 136   POTASSIUM mmol/L 2.8* 3.2* 4.0 4.7   CHLORIDE mmol/L 107 106 110* 107   CO2 mmol/L 17.6* 15.0* 14.0* 13.3*   BUN mg/dL 27* 26* 21* 21*   CREATININE mg/dL 4.31* 4.54* 4.70* 4.12*   GLUCOSE mg/dL 111* 139* 135* 158*   EGFR mL/min/1.73 12.0* 11.3* 10.8* 12.7*     Results from last 7 days   Lab Units 03/13/22 0613 03/12/22 0755 03/11/22  1503 03/11/22  0639   ALBUMIN g/dL 3.10* 3.70 3.50 3.30*   BILIRUBIN mg/dL <0.2  --   --  0.2   ALK PHOS U/L 79  --   --  93   AST (SGOT) U/L 8  --   --  26   ALT (SGPT) U/L 10  --   --  19     Results from last 7 days   Lab Units 03/13/22 0613 03/12/22 0755 03/11/22  1503 03/11/22  0639   CALCIUM mg/dL 8.0* 8.0* 7.8* 8.3*   ALBUMIN g/dL 3.10* 3.70 3.50 3.30*   PHOSPHORUS mg/dL 7.3* 8.3* 5.9*  --      Results from last 7 days   Lab Units 03/12/22  0755 03/11/22  1503 03/11/22  1055 03/11/22  0732   LACTATE mmol/L 0.9 2.2* 2.2* 2.9*     Glucose   Date/Time Value Ref Range Status   03/13/2022 1142 105 70 - 130 mg/dL Final     Comment:     Meter: [id number] : [id number] Antonio Lara RN    03/13/2022 0529 90 70 - 130 mg/dL Final     Comment:     Meter: [id number] : [id number] Rui Jaimes MACRINA   03/12/2022 2324 130 70 - 130 mg/dL Final     Comment:     Meter: [id number] : [id number] Rui Jaimes MACRINA   03/12/2022 1759 126 70 - 130 mg/dL Final     Comment:     Meter: [id number] : [id number] Heri Renteria RN   03/12/2022 1120 78 70 - 130 mg/dL Final     Comment:     Meter: [id number] : [id number] Antonio Lara RN   03/12/2022 0534 104 70 - 130 mg/dL Final     Comment:     Meter: [id number] : [id number] Wren Crystal NA   03/11/2022 2319 121 70 - 130 mg/dL Final     Comment:     Meter: [id number] : [id number] Wren Crystal NA       Adult Transthoracic Echo Complete W/ Cont if Necessary Per Protocol  · Left ventricular ejection fraction appears to be less than 20%. Left   ventricular systolic function is severely decreased. Normal left   ventricular wall thickness noted. The left ventricular cavity is severely   dilated. Left ventricular diastolic function was indeterminate. No   evidence of left ventricular thrombus or mass present.  · The following left ventricular wall segments are hypokinetic: basal   anterolateral, mid anterolateral, apical lateral, basal inferolateral, mid   inferolateral, mid inferior, basal anterior and basal inferior. The   following left ventricular wall segments are akinetic: mid anterior,   apical anterior, apical inferior, apical septal, basal inferoseptal, mid   inferoseptal, apex and mid anteroseptal.  · Apical tethering and severe restriction of posterior leaflet excursion.   Moderate to severe mitral valve regurgitation is present. No significant   mitral valve stenosis is present.     CT Abdomen Pelvis Without Contrast  Narrative: CT ABDOMEN AND PELVIS WITHOUT CONTRAST     HISTORY: 51-year-old female with diarrhea.     TECHNIQUE: Axial CT images of the abdomen and pelvis were obtained  without administration of intravenous  contrast. The patient was not  given oral contrast. Coronal and sagittal reformats were obtained.     COMPARISON: 02/27/2022     FINDINGS: There is pneumoperitoneum demonstrated. This is possibly  related to the peritoneal dialysis catheter and this finding should be  correlated with clinical history. The stomach and small bowel loops are  unremarkable. There is diffuse wall thickening involving the colon in a  pattern consistent with pancolitis. There is diffuse pericolic fat  stranding particularly surrounding the cecum with small lymph nodes. No  definite bowel wall pneumatosis is identified. There is no evidence of  obstruction.     Noncontrast attenuation of the liver is normal. Gallbladder is  surgically absent. No intrahepatic biliary dilatation. The spleen  demonstrates wedge-shaped hypoattenuating lesions most consistent with  sequela of resolving splenic infarcts. The pancreas is normal without  ductal dilatation. Again demonstrated is a peripherally calcified lesion  arising from or immediately adjacent to the horizontal portion of the  duodenum, unchanged in appearance from prior CT. There is a similar  lesion also seen within the left superior pelvis image 96 measuring 3.0  x 2.0 cm. This may represent the patient's ovary. The previous CT had  demonstrated a 3rd lesion in the right lower quadrant and this is no  longer seen and may have represented inspissated contrast within a small  bowel loop. The uterus is surgically absent.     Diffuse low-density thickening of the left adrenal gland is present and  most consistent with adenomatous hyperplasia. Both kidneys are poorly  evaluated in the absence of contrast with numerous cortical  hypoattenuating lesions. Calculi are demonstrated within the inferior  poles of bilateral kidneys.     Impression: 1. CT findings of pancolitis. Pneumoperitoneum is demonstrated and this  is possibly related to patient's peritoneal dialysis []. Clinical  correlation is  recommended. There is no bowel wall pneumatosis. If  patient's clinical condition worsens, repeat CT may be helpful.  2. Left-sided ureteral stent in place with resolved hydronephrosis.  3. [] of spleen most suggestive of evolving splenic infarcts.  4. Peripherally calcified lesion seen adjacent to the horizontal portion  of the duodenum.  5. Additional findings as above.     These findings were discussed with FACUNDO Lucas, by  telephone.     [] Do not sign waiting for comparison images from [place].     Radiation dose reduction techniques were utilized, including automated  exposure control and exposure modulation based on body size.        CT Outside Films  This procedure was auto-finalized with no dictation required.  MRI Outside Films  This procedure was auto-finalized with no dictation required.  XR Foot 3+ View Right  Narrative: XR FOOT 3+ VW RIGHT-  03/11/2022     HISTORY: Right foot pain. Some plantar ulcers.     There is moderate degenerative arthritis of the first  metatarsophalangeal joint.     No fractures or dislocations are seen.     Moderate size calcaneal spur seen at the insertion site of the plantar  fascia. Small amount of arterial calcification is seen.     There is no evidence of osteomyelitis. There is mild soft tissue  swelling of the foot but no definite abnormal air collections are seen.     Impression: 1. Degenerative arthritis of the first metatarsophalangeal joint.  2. Calcaneal spur.  3. No evidence of osteomyelitis or abnormal air collection.     This report was finalized on 3/11/2022 8:41 AM by Dr. Lawson Beckford M.D.     XR Chest 1 View  Narrative: XR CHEST 1 VW-  03/11/2022     HISTORY: Chest pain.     Heart size is at the upper limits of normal. Lungs are underinflated  with some mild vascular crowding. No focal infiltrates are seen.     No pneumothorax is seen. Bony structures appear unremarkable.     Impression: 1. Borderline cardiomegaly with underinflation of  the lungs.  2. No focal infiltrates are seen.     This report was finalized on 3/11/2022 8:36 AM by Dr. Lawson Beckford M.D.       Scheduled Medications  aspirin, 81 mg, Oral, Daily  escitalopram, 20 mg, Oral, Daily  famotidine, 20 mg, Oral, Daily  insulin regular, 0-14 Units, Subcutaneous, Q6H  levothyroxine, 137 mcg, Oral, Daily  midodrine, 10 mg, Oral, TID AC  sodium bicarbonate, 1,300 mg, Oral, TID  sodium chloride, 10 mL, Intravenous, Q12H  vancomycin, 125 mg, Oral, Q6H  [START ON 3/21/2022] vancomycin, 125 mg, Oral, Q12H  [START ON 3/29/2022] vancomycin, 125 mg, Oral, Q24H  [START ON 4/5/2022] vancomycin, 125 mg, Oral, Every Other Day    Infusions  Pharmacy Consult,     Diet  Diet Regular       Assessment/Plan     Active Hospital Problems    Diagnosis  POA   • **Sepsis without acute organ dysfunction (HCC) [A41.9]  Yes   • Fever in adult [R50.9]  Yes   • Acute respiratory failure with hypoxia (HCC) [J96.01]  Yes   • Hypokalemia [E87.6]  Unknown   • Anemia, chronic disease [D63.8]  Yes   • Obesity (BMI 30-39.9) [E66.9]  Yes   • Clostridium difficile colitis [A04.72]  Yes   • Peritoneal dialysis status (HCC) [Z99.2]  Not Applicable   • Chronic systolic heart failure (HCC) [I50.22]  Yes   • ESRD on peritoneal dialysis (HCC) [N18.6, Z99.2]  Not Applicable      Resolved Hospital Problems   No resolved problems to display.       51 y.o. female admitted with Sepsis without acute organ dysfunction (HCC).    Urology evaluated patient for previous left distal stone following uteroscopy stent placement last month.  Plan for stent removal during hospital admission in the next 24 to 48 hours.    Infectious disease following C. difficile colitis and skin changes of first toe of right foot.  Recommend continue oral vancomycin with prolonged taper.  Plan MRI noncontrast right foot.  Noted TTE negative for vegetation however evidence of moderate to severe MR noted.  Blood cultures NGTD.    Nephrology following ESRD and  managing peritoneal dialysis, electrolytes, fluid management.    Pulmonology approves transfer from ICU to stepdown unit following stabilization of blood pressure.  Recommend continue midodrine 10 mg 3 times daily.  Leukocytosis normalizing with trend.    Chronic systolic heart failure: Follows with Dr. Ervin at [facility].    Appears medically stable for transfer from ICU to stepdown unit on 3/13/2022.    · SCDs for DVT prophylaxis.  · CPR  · Discussed with Dr. Otero.   · Anticipate discharge peding clinical course / consult CCP for anticipated needs at [place]      FACUNDO Taylor  [facility]  03/13/22  15:19 EDT

## 2022-03-13 NOTE — PROGRESS NOTES
"  Daily Progress Note.   [facility] CORONARY CARE  3/13/2022    Patient:  Name:  Ambar Lara  MRN:  [id number]  1970  51 y.o.  female         CC: septic shock    Interval History:  Weaned off NE  BP okay overnight  Awake alert  Non toxic    Physical Exam:  /82   Pulse 81   Temp 98.4 °F (36.9 °C) (Oral)   Resp 24   Ht 154.9 cm (60.98\")   Wt 76.8 kg (169 lb 5 oz)   LMP 04/16/2004 (Approximate)   SpO2 96%   BMI 32.01 kg/m²   Body mass index is 32.01 kg/m².    Intake/Output Summary (Last 24 hours) at 3/13/2022 0944  Last data filed at 3/13/2022 0920  Gross per 24 hour   Intake 8480 ml   Output 4400 ml   Net 4080 ml     General appearance: non toxic , conversant   Eyes: anicteric sclerae, moist conjunctivae; no lid-lag;    HENT: Atraumatic;    Neck: Trachea midline;   Lungs: no sig crackles at bases, with normal respiratory effort and no intercostal retractions  CV: RRR, no rub  Abdomen: Soft, non rigid bs+  Extremities: No peripheral edema    Skin:warm dry  Psych: Appropriate affect, alert and oriented   Neuro moves all ext, cns 2-12 intact speech intact    Data Review:  Notable Labs:  Results from last 7 days   Lab Units 03/13/22  0613 03/12/22  0755 03/11/22  1503 03/11/22  0639   WBC 10*3/mm3 15.06* 31.54* 35.67* 28.42*   HEMOGLOBIN g/dL 9.9* 10.6* 10.0* 11.3*   PLATELETS 10*3/mm3 374 424 358 409     Results from last 7 days   Lab Units 03/13/22  0613 03/12/22  0755 03/11/22  1503 03/11/22  0639   SODIUM mmol/L 139 138 139 136   POTASSIUM mmol/L 2.8* 3.2* 4.0 4.7   CHLORIDE mmol/L 107 106 110* 107   CO2 mmol/L 17.6* 15.0* 14.0* 13.3*   BUN mg/dL 27* 26* 21* 21*   CREATININE mg/dL 4.31* 4.54* 4.70* 4.12*   GLUCOSE mg/dL 111* 139* 135* 158*   CALCIUM mg/dL 8.0* 8.0* 7.8* 8.3*   PHOSPHORUS mg/dL 7.3* 8.3* 5.9*  --    Estimated Creatinine Clearance: 14.8 mL/min (A) (by C-G formula based on SCr of 4.31 mg/dL (H)).    Results from last 7 days   Lab Units 03/13/22  0613 03/12/22  0755 " 03/11/22  1503 03/11/22  1055 03/11/22  0732 03/11/22  0639   AST (SGOT) U/L 8  --   --   --   --  26   ALT (SGPT) U/L 10  --   --   --   --  19   LACTATE mmol/L  --  0.9 2.2* 2.2*   < >  --    PLATELETS 10*3/mm3 374 424 358  --   --  409    < > = values in this interval not displayed.       Results from last 7 days   Lab Units 03/11/22  1749   PH, ARTERIAL pH units 7.286*   PCO2, ARTERIAL mm Hg 29.6*   PO2 ART mm Hg 75.9*   HCO3 ART mmol/L 14.1*       Imaging:  Reviewed chest images personally from past 3 days  Tte:  · Left ventricular ejection fraction appears to be less than 20%. Left ventricular systolic function is severely decreased. Normal left ventricular wall thickness noted. The left ventricular cavity is severely dilated. Left ventricular diastolic function was indeterminate. No evidence of left ventricular thrombus or mass present.  · The following left ventricular wall segments are hypokinetic: basal anterolateral, mid anterolateral, apical lateral, basal inferolateral, mid inferolateral, mid inferior, basal anterior and basal inferior. The following left ventricular wall segments are akinetic: mid anterior, apical anterior, apical inferior, apical septal, basal inferoseptal, mid inferoseptal, apex and mid anteroseptal.  · Apical tethering and severe restriction of posterior leaflet excursion. Moderate to severe mitral valve regurgitation is present. No significant mitral valve stenosis is present.      Scheduled meds:    aspirin, 81 mg, Oral, Daily  escitalopram, 20 mg, Oral, Daily  famotidine, 20 mg, Oral, Daily  insulin regular, 0-14 Units, Subcutaneous, Q6H  levothyroxine, 137 mcg, Oral, Daily  midodrine, 10 mg, Oral, TID AC  sodium bicarbonate, 1,300 mg, Oral, TID  sodium chloride, 10 mL, Intravenous, Q12H  vancomycin, 125 mg, Oral, Q6H  [START ON 3/21/2022] vancomycin, 125 mg, Oral, Q12H  [START ON 3/29/2022] vancomycin, 125 mg, Oral, Q24H  [START ON 4/5/2022] vancomycin, 125 mg, Oral, Every Other  Day        ASSESSMENT  /  PLAN:  Sepsis  Septic shock  C. difficile colitis  Splenic infarction  ESRD on peritoneal dialysis  Cardiomyopathy ef 20-25%  Moderate severre MVR  Chronic systolic heart failure  Recent hydronephrosis with obstructing calculus s/p stent  Anxiety  Hypothyroidism     Cont abx per ID on po vanco    Midodrine 10 tid for bp support  Cont levothyroxine  Asa 81  Cont escitalopram  ssi  Wbc downtrending  abg reviewed showing met acidosis with incomplete resp compensatory alkalosis.  Pt is a PD and with diarrhea.  Nephrology following  Off NE  Sees Dr Ervin with pham for her hf, dont see mention of MV regurg in notes but see discussions of work up for HT which is ongoing per pt  Move out of icu    AIDA Meza MD  [facility]  03/13/22  [id number]

## 2022-03-13 NOTE — NURSING NOTE
Transferred from CCU this shift. VSS, NSR, RA, A&O, up with assistive devices to BSC. Contact spore precautions for CDIFF. No c/o pain, resting in bed eating dinner.      Patient had dialysate drained this afternoon by CCU and per ccu RN Dr Titus ordered no more dialysate infused until correct concentration of 2.5 was available so dialysate was not dwelling upon arrival to unit. As ordered, 2000cc of 2.5% dialysate was infused into patient at 1730. This is to dwell until 2130 when a full transfer will be done.

## 2022-03-13 NOTE — NURSING NOTE
Hypotensive sys 70-80's. Mean 50's. Dr. Titus ordered levo to kep map 65 and above. Levo titrated up to .08 to achieve desired map. Continues with green watery stools. Urine always mixed with stool, so unable to measure accurate output of each. Pt has refused SCD's, refused straight cath for sample, refuses to be stuck more than once for IV placement and labs. Made a lab stick and IVT stick. Charge nurse informed of refusals. Benfits and consequences of refusals explained. Patient states understanding

## 2022-03-13 NOTE — PROGRESS NOTES
[facility] Progress Note      Patient Name: Ambar Lara  : 1970  MRN: [id number]  Primary Care Physician:  Jet Manuel MD  Date of admission: 3/11/2022    Subjective     Interval History:   Upset because she did not receive her sleeping medicine.  Diarrhea seems to have much improved.  Denies any chest pain, no shortness of breath, no lower extremity edema    Review of Systems:   As noted above    Objective     Vitals:   Temp:  [97.3 °F (36.3 °C)-98.7 °F (37.1 °C)] 98.4 °F (36.9 °C)  Heart Rate:  [] 77  BP: ()/() 95/67    Intake/Output Summary (Last 24 hours) at 3/13/2022 1150  Last data filed at 3/13/2022 0920  Gross per 24 hour   Intake 8480 ml   Output 4400 ml   Net 4080 ml       Physical Exam:    General Appearance: alert, oriented x 3, no acute distress   Skin: warm and dry  HEENT: oral mucosa normal, nonicteric sclera  Neck: supple, no JVD  Lungs: CTA  Heart: RRR, normal S1 and S2  Abdomen: Mildly distended mild tenderness noted no rebound and no guarding.  PD catheter in place. Exit site with no erythema or discharge.  : no palpable bladder  Extremities: no edema, cyanosis or clubbing  Neuro: normal speech and mental status     Scheduled Meds:     aspirin, 81 mg, Oral, Daily  escitalopram, 20 mg, Oral, Daily  famotidine, 20 mg, Oral, Daily  insulin regular, 0-14 Units, Subcutaneous, Q6H  levothyroxine, 137 mcg, Oral, Daily  midodrine, 10 mg, Oral, TID AC  sodium bicarbonate, 1,300 mg, Oral, TID  sodium chloride, 10 mL, Intravenous, Q12H  vancomycin, 125 mg, Oral, Q6H  [START ON 3/21/2022] vancomycin, 125 mg, Oral, Q12H  [START ON 3/29/2022] vancomycin, 125 mg, Oral, Q24H  [START ON 2022] vancomycin, 125 mg, Oral, Every Other Day      IV Meds:   norepinephrine, 0.02-0.3 mcg/kg/min, Last Rate: Stopped (22 0945)  Pharmacy Consult,         Results Reviewed:   I have personally reviewed the results from the time of this admission to  3/13/2022 11:50 EDT     Results from last 7 days   Lab Units 03/13/22  0613 03/12/22  0755 03/11/22  1503 03/11/22  0639   SODIUM mmol/L 139 138 139 136   POTASSIUM mmol/L 2.8* 3.2* 4.0 4.7   CHLORIDE mmol/L 107 106 110* 107   CO2 mmol/L 17.6* 15.0* 14.0* 13.3*   BUN mg/dL 27* 26* 21* 21*   CREATININE mg/dL 4.31* 4.54* 4.70* 4.12*   CALCIUM mg/dL 8.0* 8.0* 7.8* 8.3*   BILIRUBIN mg/dL <0.2  --   --  0.2   ALK PHOS U/L 79  --   --  93   ALT (SGPT) U/L 10  --   --  19   AST (SGOT) U/L 8  --   --  26   GLUCOSE mg/dL 111* 139* 135* 158*       Estimated Creatinine Clearance: 14.8 mL/min (A) (by C-G formula based on SCr of 4.31 mg/dL (H)).    Results from last 7 days   Lab Units 03/13/22  0613 03/12/22  0755 03/11/22  1503   PHOSPHORUS mg/dL 7.3* 8.3* 5.9*             Results from last 7 days   Lab Units 03/13/22  0613 03/12/22  0755 03/11/22  1503 03/11/22  0639   WBC 10*3/mm3 15.06* 31.54* 35.67* 28.42*   HEMOGLOBIN g/dL 9.9* 10.6* 10.0* 11.3*   PLATELETS 10*3/mm3 374 424 358 409             Assessment / Plan     ASSESSMENT:  1.  End-stage renal disease on CCPD.  Using 1.5 and 2.5% dianeal at home.     2.  Hypovolemic shock/septic shock secondary to diarrhea and C. difficile colitis.  Improved with IV fluids and pressors.  Now she is off pressors on midodrine  3.  High anion gap metabolic acidosis secondary to lactic acidosis due to hypoperfusion.  Improved oral bicarbonate  4.  Diarrhea.  Due to C. difficile improved on antibiotic therapy by ID 5.  Sepsis secondary to C. difficile colitis.  6.  History of congestive heart failure with ejection fraction 20%  7.  C. difficile colitis on antibiotic by ID  8.  Hypothyroidism: On replacement  9.  Hyperphosphatemia on lanthanum  10. Splenic infarction: Etiology unknown could be secondary to severe hypotension     Plan:  Patient reported that she is absorbing most of her PD fluid yesterday this could be secondary to recent bowel inflammation due to C. difficile colitis versus  being rapid transporter.  We will decrease her dwell time to 4 hours.   We will use 2.5% solutions  Continue surveillance labs  Given splenic infarction we will proceed with hypercoagulable work-up.   Antibiotic therapy per ID  Replace potassium   Check magnesium     Thank you for involving us in the care of Ambar Lara.  Please feel free to call with any questions.    Mateo Titus MD  03/13/22  11:50 EDT    [facility]  [phone]      Much of this encounter note is an electronic transcription/translation of spoken language to printed text. The electronic translation of spoken language may permit erroneous, or at times, nonsensical words or phrases to be inadvertently transcribed; Although I have reviewed the note for such errors, some may still exist.

## 2022-03-13 NOTE — CONSULTS
FIRST UROLOGY CONSULT      Patient Identification:  NAME:  Ambar Lara  Age:  51 y.o.   Sex:  female   :  1970   MRN:  [id number]       Chief complaint: Diarrhea    History of present illness: 51-year-old female recently seen and evaluated for left distal stone underwent ureteroscopy stent placement last month.  She has an indwelling stent she has not followed up to get this removed.  She is now here with diarrhea likely manifestation from her prior antibiotics.      Past medical history:  Past Medical History:   Diagnosis Date   • CHF (congestive heart failure) ([facility])    • Clostridioides difficile infection 2021    finished oral vanc 2021   • Dialysis patient ([facility])    • Disease of thyroid gland    • Elevated cholesterol    • GERD (gastroesophageal reflux disease)    • Pulmonary emboli ([facility]) 2021    coumadin last taken 2021   • Renal disorder    • Sleep apnea    • Ureteral calculi 2022       Past surgical history:  Past Surgical History:   Procedure Laterality Date   • ADRENAL GLAND SURGERY N/A `   •  SECTION Bilateral 2001    Has had x2   • CYSTOSCOPY URETEROSCOPY LASER LITHOTRIPSY Left 2022    Procedure: Left CYSTOSCOPY, with stent placement;  Surgeon: Sanford Puentes MD;  Location: [facility]  Service: Urology;  Laterality: Left;   • HYSTERECTOMY     • INSERTION PERITONEAL DIALYSIS CATHETER Right 10/12/2021    Procedure: LAPAROSCOPIC REVISION OF PERITONEAL DIALYSIS CATHETER AND LAPAROSCOPIC CHOLECYSTECTOMY;  Surgeon: Jamie Figueroa MD;  Location: [facility]  Service: General;  Laterality: Right;   • PERITONEAL CATHETER INSERTION  2019   • TRACHEOSTOMY         Allergies:  Penicillins and Nickel    Home medications:  Medications Prior to Admission   Medication Sig Dispense Refill Last Dose   • levothyroxine (SYNTHROID, LEVOTHROID) 100 MCG tablet Take 1.5 tablets by mouth Daily.   3/11/2022 at Unknown time   • acetaminophen  (TYLENOL) 500 MG tablet Take 500 mg by mouth Every 6 (Six) Hours As Needed for Mild Pain .      • ammonium lactate (AMLACTIN) 12 % cream Apply topically to the appropriate area as directed Every 12 (Twelve) Hours As Needed for Dry Skin. 385 g 0    • aspirin 81 MG chewable tablet Chew 81 mg Daily.      • B Complex-C-Folic Acid (REN-JHON PO) Take 1 tablet by mouth Daily.      • diphenhydrAMINE HCl, Sleep, 25 MG capsule Take 25 mg by mouth At Night As Needed (sleep). For sleep 28 capsule     • escitalopram (LEXAPRO) 20 MG tablet Take 1 tablet by mouth Daily. 30 tablet 1    • famotidine (PEPCID) 20 MG tablet Take 20 mg by mouth 2 (Two) Times a Day.      • gentamicin (GARAMYCIN) 0.1 % cream Apply 1 application topically to the appropriate area as directed Daily. As directed      • lanthanum (FOSRENOL) 500 MG chewable tablet Chew 750 mg 3 (Three) Times a Day With Meals.      • melatonin 3 MG tablet Take 1 tablet by mouth Every Night. 30 tablet 1    • midodrine (PROAMATINE) 10 MG tablet Take 1 tablet by mouth 3 (Three) Times a Day. 90 tablet 1    • montelukast (Singulair) 10 MG tablet Take 1 tablet by mouth Every Night. 30 tablet 1    • multivitamin (THERAGRAN) tablet tablet Take 1 tablet by mouth Daily. 90 tablet 0    • nitroglycerin (NITROSTAT) 0.4 MG SL tablet Place 0.4 mg under the tongue Every 5 (Five) Minutes As Needed for Chest Pain. Take no more than 3 doses in 15 minutes.      • oxyCODONE-acetaminophen (Percocet) 5-325 MG per tablet Take 1 tablet by mouth Every 6 (Six) Hours As Needed for Moderate Pain . 30 tablet 0    • potassium bicarbonate (K-LYTE) 25 MEQ disintegrating tablet Take 20 mEq by mouth 2 (Two) Times a Day. Take 2 tablets by mouth 2 times daily.      • saccharomyces boulardii (FLORASTOR) 250 MG capsule Take 2 capsules by mouth 2 (Two) Times a Day. 120 capsule 1    • sodium bicarbonate 650 MG tablet Take 1 tablet by mouth Every 8 (Eight) Hours. (Patient taking differently: Take 650 mg by mouth 4  (Four) Times a Day.) 90 tablet 1    • traZODone (DESYREL) 100 MG tablet Take 100 mg by mouth Every Night. Take 0.5-1 tablet by mouth nightly          Hospital medications:  aspirin, 81 mg, Oral, Daily  escitalopram, 20 mg, Oral, Daily  famotidine, 20 mg, Oral, Daily  insulin regular, 0-14 Units, Subcutaneous, Q6H  levothyroxine, 137 mcg, Oral, Daily  midodrine, 10 mg, Oral, TID AC  sodium bicarbonate, 1,300 mg, Oral, TID  sodium chloride, 10 mL, Intravenous, Q12H  vancomycin, 125 mg, Oral, Q6H  [START ON 3/21/2022] vancomycin, 125 mg, Oral, Q12H  [START ON 3/29/2022] vancomycin, 125 mg, Oral, Q24H  [START ON 2022] vancomycin, 125 mg, Oral, Every Other Day      norepinephrine, 0.02-0.3 mcg/kg/min, Last Rate: Stopped (22 0945)  Pharmacy Consult,       •  acetaminophen  •  dextrose  •  dextrose  •  glucagon (human recombinant)  •  oxyCODONE-acetaminophen  •  Pharmacy Consult  •  sodium chloride  •  [COMPLETED] Insert peripheral IV **AND** sodium chloride  •  sodium chloride    Family history:  Family History   Problem Relation Age of Onset   • Malig Hyperthermia Neg Hx        Social history:  Social History     Tobacco Use   • Smoking status: Former Smoker     Packs/day: 1.00     Years: 25.00     Pack years: 25.00     Types: Cigarettes     Quit date: 2018     Years since quitting: 3.9   • Smokeless tobacco: Never Used   Vaping Use   • Vaping Use: Former   • Quit date: 2021   • Substances: Nicotine, Flavoring   • Devices: Pre-filled or refillable cartridge   Substance Use Topics   • Alcohol use: Not Currently   • Drug use: Not Currently       Review of systems:    Negative 12-system ROS except for the following: Mild irritable urinary symptoms.      Objective:  TMax 24 hours:   Temp (24hrs), Av.2 °F (36.8 °C), Min:97.3 °F (36.3 °C), Max:98.7 °F (37.1 °C)      Vitals Ranges:   Temp:  [97.3 °F (36.3 °C)-98.7 °F (37.1 °C)] 98.4 °F (36.9 °C)  Heart Rate:  [] 85  BP: ()/()  99/61    Intake/Output Last 3 shifts:  I/O last 3 completed shifts:  In: 9304 [P.O.:1080; I.V.:2224; Other:6000]  Out: 2800 [Other:2800]     Physical Exam:       General Appearance:    Alert, cooperative, in no acute distress   Head:    Normocephalic, without obvious abnormality, atraumatic   Eyes:          PERRL, conjunctivae and corneas clear   Ears:    Normal external inspection   Throat:   No oral lesions, oral mucosa moist   Neck:   Supple, no LAD, trachea midline   Back:     No CVA tenderness   Lungs:     Respirations unlabored, symmetric excursion    Heart:    RRR, intact peripheral pulses   Abdomen:    No CVA tenderness   :   Deferred   Extremities:   No edema, no deformity   Skin:   No bleeding, bruising or rashes   Neuro/Psych:   Orientation intact, mood/affect pleasant, no focal findings       Results review:   I reviewed the patient's new clinical results.    Data review:  Lab Results (last 24 hours)     Procedure Component Value Units Date/Time    POC Glucose Once [[id number]]  (Normal) Collected: 03/13/22 1142    Specimen: Blood Updated: 03/13/22 1144     Glucose 105 mg/dL      Comment: Meter: [id number] : [id number] Antonio Lara RN       Blood Culture - Blood, Blood, Central Line [[id number]]  (Normal) Collected: 03/11/22 0849    Specimen: Blood, Central Line Updated: 03/13/22 1015     Blood Culture No growth at 2 days    Blood Culture - Blood, Arm, Left [[id number]]  (Normal) Collected: 03/11/22 0907    Specimen: Blood from Arm, Left Updated: 03/13/22 1015     Blood Culture No growth at 2 days    Hepatic Function Panel [[id number]]  (Abnormal) Collected: 03/13/22 0613    Specimen: Blood Updated: 03/13/22 0712     Total Protein 6.0 g/dL      Albumin 3.10 g/dL      ALT (SGPT) 10 U/L      AST (SGOT) 8 U/L      Alkaline Phosphatase 79 U/L      Total Bilirubin <0.2 mg/dL      Bilirubin, Direct <0.2 mg/dL      Bilirubin, Indirect --     Comment: Unable to calculate       Basic Metabolic Panel [[id number]]   (Abnormal) Collected: 03/13/22 0613    Specimen: Blood Updated: 03/13/22 0712     Glucose 111 mg/dL      BUN 27 mg/dL      Creatinine 4.31 mg/dL      Sodium 139 mmol/L      Potassium 2.8 mmol/L      Chloride 107 mmol/L      CO2 17.6 mmol/L      Calcium 8.0 mg/dL      BUN/Creatinine Ratio 6.3     Anion Gap 14.4 mmol/L      eGFR 12.0 mL/min/1.73      Comment: <15 Indicative of kidney failure       Narrative:      GFR Normal >60  Chronic Kidney Disease <60  Kidney Failure <15      Phosphorus [[id number]]  (Abnormal) Collected: 03/13/22 0613    Specimen: Blood Updated: 03/13/22 0712     Phosphorus 7.3 mg/dL     CBC & Differential [[id number]]  (Abnormal) Collected: 03/13/22 0613    Specimen: Blood Updated: 03/13/22 0638    Narrative:      The following orders were created for panel order CBC & Differential.  Procedure                               Abnormality         Status                     ---------                               -----------         ------                     CBC Auto Differential[[id number]]        Abnormal            Final result                 Please view results for these tests on the individual orders.    CBC Auto Differential [[id number]]  (Abnormal) Collected: 03/13/22 0613    Specimen: Blood Updated: 03/13/22 0638     WBC 15.06 10*3/mm3      RBC 3.26 10*6/mm3      Hemoglobin 9.9 g/dL      Hematocrit 30.6 %      MCV 93.9 fL      MCH 30.4 pg      MCHC 32.4 g/dL      RDW 13.4 %      RDW-SD 45.4 fl      MPV 10.3 fL      Platelets 374 10*3/mm3      Neutrophil % 70.2 %      Lymphocyte % 20.1 %      Monocyte % 4.1 %      Eosinophil % 3.9 %      Basophil % 0.8 %      Immature Grans % 0.9 %      Neutrophils, Absolute 10.58 10*3/mm3      Lymphocytes, Absolute 3.03 10*3/mm3      Monocytes, Absolute 0.62 10*3/mm3      Eosinophils, Absolute 0.58 10*3/mm3      Basophils, Absolute 0.12 10*3/mm3      Immature Grans, Absolute 0.13 10*3/mm3      nRBC 0.0 /100 WBC     POC Glucose Once [[id number]]  (Normal)  Collected: 03/13/22 0529    Specimen: Blood Updated: 03/13/22 0530     Glucose 90 mg/dL      Comment: Meter: [id number] : [id number] RuiSlideShareara MACRINA       Urinalysis With Culture If Indicated - Urine, Catheter [[id number]]  (Abnormal) Collected: 03/13/22 0058    Specimen: Urine, Catheter Updated: 03/13/22 0137     Color, UA Yellow     Appearance, UA Clear     pH, UA 6.5     Specific Gravity, UA 1.010     Glucose, UA Negative     Ketones, UA Negative     Bilirubin, UA Negative     Blood, UA Moderate (2+)     Protein, UA 30 mg/dL (1+)     Leuk Esterase, UA Small (1+)     Nitrite, UA Negative     Urobilinogen, UA 0.2 E.U./dL    Urinalysis, Microscopic Only - Urine, Catheter [[id number]]  (Abnormal) Collected: 03/13/22 0058    Specimen: Urine, Catheter Updated: 03/13/22 0137     RBC, UA Too Numerous to Count /HPF      WBC, UA 3-5 /HPF      Bacteria, UA None Seen /HPF      Squamous Epithelial Cells, UA 7-12 /HPF      Hyaline Casts, UA 3-6 /LPF      Methodology Automated Microscopy    POC Glucose Once [[id number]]  (Normal) Collected: 03/12/22 2324    Specimen: Blood Updated: 03/12/22 2324     Glucose 130 mg/dL      Comment: Meter: [id number] : [id number] Rui Fiona NAT       POC Glucose Once [[id number]]  (Normal) Collected: 03/12/22 1759    Specimen: Blood Updated: 03/12/22 1800     Glucose 126 mg/dL      Comment: Meter: [id number] : [id number] Heri Renteria RN              Imaging:  Imaging Results (Last 24 Hours)     ** No results found for the last 24 hours. **             Assessment:       Sepsis without acute organ dysfunction (HCC)    Indwelling left ureteral stent status post ureteroscopy    Plan:     We will arrange for stent removal here in house in the next 24 to 48 hours.    Sanford Puentes MD  03/13/22  14:38 EDT

## 2022-03-14 NOTE — NURSING NOTE
Wound/ostomy - consult received regarding BLE. Per epic review patient has been sen by Vascular for the foot concern and arterial testing of LE showed mild occlusive disease. Patient has had an MRI of the foot this hospitalization, ID and [facility] are following. Per ID note there is no drainage and no indication at this time topical wound care treatment is needed.

## 2022-03-14 NOTE — CONSULTS
Name: Ambar Lara ADMIT: 3/11/2022   : 1970  PCP: Jet Manuel MD    MRN: [id number] LOS: 3 days   AGE/SEX: 51 y.o. female  ROOM: [facility]/     Inpatient Vascular Surgery Consult  Consult performed by: Sommer Dowd MD  Consult ordered by: Castillo Otero DO Ashlee Ann Vinyard, MD     LOS: 3 days   Patient Care Team:  Jet Manuel MD as PCP - General (Internal Medicine)    Subjective     History of Present Illness  51 y.o. female with a h/o CHF, recent EF ~20%, h/o PE, HLD, and ESRD on PD who presented with C. Diff on 3/11/22 following a kidney stone s/p left ureteral stent placement on 22.  She was seen on 22 by my partner Dr. Yadiel Payton for splenic artery thrombosis and right foot wounds.  She had adequate toe pressures for healing at the time.  We were asked to re evaluate her right foot wounds.  She says they are worsening and she is beginning to have the same feelings on the left.   She has pain associated with the wounds and they are limiting her mobility.   Her right foot began as a dull ache across her metatarsals, then she developed swelling and wounds in quick succession.  She does not currently take any anticoagulation.  She has had a PE in the past.  She is on peritoneal dialysis but still makes urine.  She had a ureteral stent placed during her last hospitalization with plans for removal during this hospitalization.  She denies symptoms of claudication or rest pain.     Review of Systems   Constitutional: Negative.    HENT: Negative.    Eyes: Negative.    Respiratory: Negative.    Cardiovascular: Negative.    Gastrointestinal: Positive for diarrhea and nausea.   Endocrine: Negative.    Genitourinary: Negative.    Musculoskeletal: Negative.    Skin: Negative.    Allergic/Immunologic: Negative.    Neurological: Negative.    Hematological: Negative.    Psychiatric/Behavioral: Negative.        Past Medical History:   Diagnosis Date   • CHF (congestive heart  failure) ([facility])    • Clostridioides difficile infection 2021    finished oral vanc 2021   • Dialysis patient ([facility])    • Disease of thyroid gland    • Elevated cholesterol    • GERD (gastroesophageal reflux disease)    • Pulmonary emboli ([facility]) 2021    coumadin last taken 2021   • Renal disorder    • Sleep apnea    • Ureteral calculi 2022       Past Surgical History:   Procedure Laterality Date   • ADRENAL GLAND SURGERY N/A `   •  SECTION Bilateral 2001    Has had x2   • CYSTOSCOPY URETEROSCOPY LASER LITHOTRIPSY Left 2022    Procedure: Left CYSTOSCOPY, with stent placement;  Surgeon: Sanford Puentes MD;  Location: [facility];  Service: Urology;  Laterality: Left;   • HYSTERECTOMY     • INSERTION PERITONEAL DIALYSIS CATHETER Right 10/12/2021    Procedure: LAPAROSCOPIC REVISION OF PERITONEAL DIALYSIS CATHETER AND LAPAROSCOPIC CHOLECYSTECTOMY;  Surgeon: Jamie Figueroa MD;  Location: [facility] OR;  Service: General;  Laterality: Right;   • PERITONEAL CATHETER INSERTION  2019   • TRACHEOSTOMY         Family History   Problem Relation Age of Onset   • Malig Hyperthermia Neg Hx        Social History     Tobacco Use   • Smoking status: Former Smoker     Packs/day: 1.00     Years: 25.00     Pack years: 25.00     Types: Cigarettes     Quit date: 2018     Years since quitting: 3.9   • Smokeless tobacco: Never Used   Vaping Use   • Vaping Use: Former   • Quit date: 2021   • Substances: Nicotine, Flavoring   • Devices: Pre-filled or refillable cartridge   Substance Use Topics   • Alcohol use: Not Currently   • Drug use: Not Currently       Allergies: Penicillins and Nickel    Medications Prior to Admission   Medication Sig Dispense Refill Last Dose   • levothyroxine (SYNTHROID, LEVOTHROID) 100 MCG tablet Take 1.5 tablets by mouth Daily.   3/11/2022 at Unknown time   • acetaminophen (TYLENOL) 500 MG tablet Take 500 mg by mouth Every 6 (Six) Hours As Needed for  Mild Pain .      • ammonium lactate (AMLACTIN) 12 % cream Apply topically to the appropriate area as directed Every 12 (Twelve) Hours As Needed for Dry Skin. 385 g 0    • aspirin 81 MG chewable tablet Chew 81 mg Daily.      • B Complex-C-Folic Acid (REN-JHON PO) Take 1 tablet by mouth Daily.      • diphenhydrAMINE HCl, Sleep, 25 MG capsule Take 25 mg by mouth At Night As Needed (sleep). For sleep 28 capsule     • escitalopram (LEXAPRO) 20 MG tablet Take 1 tablet by mouth Daily. 30 tablet 1    • famotidine (PEPCID) 20 MG tablet Take 20 mg by mouth 2 (Two) Times a Day.      • gentamicin (GARAMYCIN) 0.1 % cream Apply 1 application topically to the appropriate area as directed Daily. As directed      • lanthanum (FOSRENOL) 500 MG chewable tablet Chew 750 mg 3 (Three) Times a Day With Meals.      • melatonin 3 MG tablet Take 1 tablet by mouth Every Night. 30 tablet 1    • midodrine (PROAMATINE) 10 MG tablet Take 1 tablet by mouth 3 (Three) Times a Day. 90 tablet 1    • montelukast (Singulair) 10 MG tablet Take 1 tablet by mouth Every Night. 30 tablet 1    • multivitamin (THERAGRAN) tablet tablet Take 1 tablet by mouth Daily. 90 tablet 0    • nitroglycerin (NITROSTAT) 0.4 MG SL tablet Place 0.4 mg under the tongue Every 5 (Five) Minutes As Needed for Chest Pain. Take no more than 3 doses in 15 minutes.      • oxyCODONE-acetaminophen (Percocet) 5-325 MG per tablet Take 1 tablet by mouth Every 6 (Six) Hours As Needed for Moderate Pain . 30 tablet 0    • potassium bicarbonate (K-LYTE) 25 MEQ disintegrating tablet Take 20 mEq by mouth 2 (Two) Times a Day. Take 2 tablets by mouth 2 times daily.      • saccharomyces boulardii (FLORASTOR) 250 MG capsule Take 2 capsules by mouth 2 (Two) Times a Day. 120 capsule 1    • sodium bicarbonate 650 MG tablet Take 1 tablet by mouth Every 8 (Eight) Hours. (Patient taking differently: Take 650 mg by mouth 4 (Four) Times a Day.) 90 tablet 1    • traZODone (DESYREL) 100 MG tablet Take 100 mg  by mouth Every Night. Take 0.5-1 tablet by mouth nightly        aspirin, 81 mg, Oral, Daily  b complex-vitamin c-folic acid, 1 tablet, Oral, Daily  escitalopram, 20 mg, Oral, Daily  famotidine, 20 mg, Oral, Daily  heparin (porcine), 5,000 Units, Subcutaneous, Q8H  lanthanum, 750 mg, Oral, TID With Meals  levothyroxine, 137 mcg, Oral, Daily  magnesium oxide, 400 mg, Oral, BID  melatonin, 3 mg, Oral, Nightly  midodrine, 10 mg, Oral, TID AC  montelukast, 10 mg, Oral, Nightly  potassium chloride, 40 mEq, Oral, Daily  saccharomyces boulardii, 500 mg, Oral, BID  sodium bicarbonate, 650 mg, Oral, 4x Daily  sodium chloride, 10 mL, Intravenous, Q12H  traZODone, 100 mg, Oral, Nightly  vancomycin, 125 mg, Oral, Q6H  [START ON 3/21/2022] vancomycin, 125 mg, Oral, Q12H  [START ON 3/29/2022] vancomycin, 125 mg, Oral, Q24H  [START ON 4/5/2022] vancomycin, 125 mg, Oral, Every Other Day      Pharmacy Consult,       •  acetaminophen  •  magnesium sulfate **OR** magnesium sulfate in D5W 1g/100mL (PREMIX) **OR** magnesium sulfate  •  oxyCODONE-acetaminophen  •  Pharmacy Consult  •  sodium chloride  •  [COMPLETED] Insert peripheral IV **AND** sodium chloride  •  sodium chloride  •  UltraBag/Dianeal/2.5% Dextrose low-jamison (alvarado #[id number])      Objective   Temp:  [97.7 °F (36.5 °C)-98.7 °F (37.1 °C)] 97.8 °F (36.6 °C)  Heart Rate:  [] 98  Resp:  [16-22] 16  BP: ()/(61-80) 122/77    I/O this shift:  In: 240 [P.O.:240]  Out: -     Physical Exam  Constitutional:       General: She is not in acute distress.     Appearance: Normal appearance.   HENT:      Head: Normocephalic and atraumatic.      Right Ear: External ear normal.      Left Ear: External ear normal.      Nose: Nose normal.      Mouth/Throat:      Mouth: Mucous membranes are moist.      Pharynx: Oropharynx is clear.   Eyes:      Conjunctiva/sclera: Conjunctivae normal.      Pupils: Pupils are equal, round, and reactive to light.   Cardiovascular:      Rate and Rhythm:  Normal rate and regular rhythm.      Comments: Palpable femoral pulses bilaterally  Doppler DP and PT pulses bilaterally  See photos re: wounds  Pulmonary:      Effort: Pulmonary effort is normal. No respiratory distress.   Abdominal:      General: Abdomen is flat.      Palpations: Abdomen is soft.   Musculoskeletal:      Cervical back: Normal range of motion and neck supple.      Right lower leg: No edema.      Left lower leg: No edema.   Skin:     General: Skin is warm and dry.      Capillary Refill: Capillary refill takes less than 2 seconds.   Neurological:      General: No focal deficit present.      Mental Status: She is alert and oriented to person, place, and time.   Psychiatric:         Mood and Affect: Mood normal.         Behavior: Behavior normal.         Results from last 7 days   Lab Units 03/14/22  1138 03/13/22  0613 03/12/22  0755 03/11/22  1503 03/11/22  0639   WBC 10*3/mm3 13.93* 15.06* 31.54* 35.67* 28.42*   HEMOGLOBIN g/dL 11.0* 9.9* 10.6* 10.0* 11.3*   PLATELETS 10*3/mm3 412 374 424 358 409     Results from last 7 days   Lab Units 03/13/22  1316 03/13/22  0613 03/12/22  0755 03/11/22  1503 03/11/22  0639   SODIUM mmol/L  --  139 138 139 136   POTASSIUM mmol/L 3.1* 2.8* 3.2* 4.0 4.7   CHLORIDE mmol/L  --  107 106 110* 107   CO2 mmol/L  --  17.6* 15.0* 14.0* 13.3*   BUN mg/dL  --  27* 26* 21* 21*   CREATININE mg/dL  --  4.31* 4.54* 4.70* 4.12*   GLUCOSE mg/dL  --  111* 139* 135* 158*   Estimated Creatinine Clearance: 15.2 mL/min (A) (by C-G formula based on SCr of 4.31 mg/dL (H)).      Imaging Studies:    MRI Foot Right Without Contrast [[id number]] (Order [id number])  Order  Status: Final result         Patient Location    Patient Class Location   Inpatient [address], [facility], 1    [phone]       Study Notes       Guerrero Cortez on 3/13/2022  7:57 PM   Hx: right forefoot redness, swelling and pain     Renal failure   No surgery or prev MRI               Appointment Information      PACS  "Images     Radiology Images    Study Result    Narrative & Impression   MRI RIGHT MID AND FOREFOOT     HISTORY: Endstage renal disease on peritoneal dialysis. \"Skin changes\"  at the right great toe for 4 months. Erythema at the 1st and 2nd toes.     TECHNIQUE: MRI of the right mid and forefoot is correlated with foot  x-rays 02/27/2022, 03/11/2022, and the consultation note by Dr. Colvin 03/12/2022.     FINDINGS: There is mild soft tissue edema throughout the mid and  forefoot. There is no focal fluid collection. There is subtle marrow  signal abnormality at the middle and distal phalanges of the 2nd toe on  the fat-saturated T2 sequences. This is not evident on the T1 images,  where the marrow signal in these phalanges appears normal. Marrow signal  at the great toe is normal.     There is heterogeneous marrow signal along the distal 2nd metatarsal  shaft with mild cortical deformity and linear subcortical low signal  deep to the 2nd metatarsal head. These are changes of nonacute  osteonecrosis with minimal collapse of the articular cortex of the  metatarsal head. No joint effusion is present. Marrow signal throughout  the mid and forefoot is otherwise normal.     Flexor and extensor tendons are intact. The Lisfranc ligament complex is  intact. There is atrophy of the intermetatarsal musculature between the  1st through 3rd metatarsals. Other foot musculature appears preserved.     There is advanced, chronic degenerative change at the 1st  metatarsophalangeal joint. The other joints appear normal.     IMPRESSION:  Mild, nonspecific soft tissue edema in the right forefoot.  No soft tissue wound or focal fluid collection is present. Minimal  marrow signal abnormality in the middle and distal phalanges of the 2nd  toe is not specific. Early osteomyelitis could have this appearance.     There is old osteonecrosis along the distal 2nd metatarsal with mild  cortical deformity of the metatarsal head articular " surface.     This report was finalized on 3/14/2022 10:35 AM by Dr. Jose Cazares M.D.     XR Foot 3+ View Right [[id number]] (Order [id number])  Order  Status: Final result         Patient Location    Patient Class Location   Inpatient [address], [address], [address]    [phone]       Study Notes       Magdalena Casarez on 3/11/2022  8:11 AM   Pt states she is having diarrhea, heart palpitations, nauseous, and chest pain. Pt also has   Ulcers on the right foot that she said are starting on her left too.       Student: Toni performed x-ray               Appointment Information      PACS Images     Radiology Images    Study Result    Narrative & Impression   XR FOOT 3+ VW RIGHT-  03/11/2022     HISTORY: Right foot pain. Some plantar ulcers.     There is moderate degenerative arthritis of the first  metatarsophalangeal joint.     No fractures or dislocations are seen.     Moderate size calcaneal spur seen at the insertion site of the plantar  fascia. Small amount of arterial calcification is seen.     There is no evidence of osteomyelitis. There is mild soft tissue  swelling of the foot but no definite abnormal air collections are seen.     IMPRESSION:  1. Degenerative arthritis of the first metatarsophalangeal joint.  2. Calcaneal spur.  3. No evidence of osteomyelitis or abnormal air collection.     This report was finalized on 3/11/2022 8:41 AM by Dr. Lawson Beckford M.D.         Active Hospital Problems    Diagnosis  POA   • **Sepsis without acute organ dysfunction (HCC) [A41.9]  Yes   • Fever in adult [R50.9]  Yes   • Acute respiratory failure with hypoxia (HCC) [J96.01]  Yes   • Hypokalemia [E87.6]  Unknown   • Anemia, chronic disease [D63.8]  Yes   • Obesity (BMI 30-39.9) [E66.9]  Yes   • Clostridium difficile colitis [A04.72]  Yes   • Peritoneal dialysis status (HCC) [Z99.2]  Not Applicable   • Chronic systolic heart failure (HCC) [I50.22]  Yes   • ESRD on peritoneal dialysis (HCC) [N18.6, Z99.2]  Not Applicable       Resolved Hospital Problems   No resolved problems to display.     Problem Points:  2:  Patient has an established problem, worsening  Total problem points:2    Data Points:  1:  I have reviewed or order clinical lab test  1:  I have reviewed or order radiology test (except heart catheterization or echo)  2:  I have personally and independently review of image, tracing, or specimen  2:  I have reviewed and summation of old records and/or discussed the patients care with another health care provider  Total data points:4 or more    Risk Points:  High:  Any illness that poses threat to life or body funciton    MDM Prob point Data point Risk   SF 1 1 Minimal   Low 2 2 Low   Mod 3 3 Moderate   High 4 4 High     Code MDM History Exam Time   03374 SF/Low Detailed Detailed 30   46781 Mod Comprehensive Comprehensive 50   04812 High Comprehensive Comprehensive 70     Detailed history:  4 elements HPI or status of 3 chronic problems; 2-9 system ROS  Comprehensive:  4 elements HPI or status of 3 chronic problems;  10 system ROS    Detailed Exam:  12 findings from any organ system  Comprehensive Exam:  2 findings from each of 9 systems.     [place]illin    Assessment/Plan       Sepsis without acute organ dysfunction (HCC)    Chronic systolic heart failure (HCC)    ESRD on peritoneal dialysis (HCC)    Peritoneal dialysis status (HCC)    Clostridium difficile colitis    Obesity (BMI 30-39.9)    Fever in adult    Acute respiratory failure with hypoxia (HCC)    Hypokalemia    Anemia, chronic disease        51 y.o. female with a h/o CHF, recent EF ~20%, h/o PE, HLD, and ESRD on PD who presented with C. Diff on 3/11/22 following a kidney stone s/p left ureteral stent placement on 22.  We were asked to see her for her dry gangrene of the right foot and toes (see photos).  She has adequate toe pressures for healing but I am suspicious that this may be an embolic phenomenon due multiple factors-the distribution and punctate  nature of the lesions on her right foot and her history of thrombosis with recent splenic infarction.  Any issues are being exacerbated by her heart failure with an EF of 20%.  The primary team has a concern regarding mesenteric venous thrombosis and has also ordered a portal venous duplex.             -I have discussed this patient at length with nephrology, Dr. Calhoun.  She needs a CT angiogram to evaluate for an ulcerated plaque or thrombus that may be showering emboli to her feet.  We will plan to obtain this tomorrow.  She will need hemodialysis after the contrast load so I will place a Shiley catheter tomorrow in anticipation of this.  I will discuss with the patient.      I discussed the patients findings and my recommendations with patient and consulting provider.  Please call my office with any question: [phone]    Sommer Dowd MD  03/14/22  12:35 EDT

## 2022-03-14 NOTE — PROGRESS NOTES
LOS: 3 days     Chief Complaint:  Follow-up C diff    Interval History: Patient reports her bowel movements are back to her baseline.  She reports IBS at baseline however no further watery bowel movements consistent with a diarrhea on presentation.  Denies any fevers or chills.  States she is tolerating antibiotics well.    Fever curve remains within normal limits.  Leukocytosis improving.    Vital Signs  Temp:  [97.7 °F (36.5 °C)-98.7 °F (37.1 °C)] 97.7 °F (36.5 °C)  Heart Rate:  [] 91  Resp:  [17-22] 22  BP: ()/(61-81) 101/65    Physical Exam:  GENERAL: Awake and alert, no acute distress  Head: no trauma  Eyes: no scleral icterus  CV: Tachycardic, regular rhythm.   LUNGS: Normal respiratory effort.  No wheezing.  GI: Soft, tender diffusely, nondistended. No appreciable organomegaly.  Peritoneal catheter in place.  SKIN: Erythema of the right lower extremity toes without any warmth or drainage.  PSYCHIATRIC: Appropriate mood, affect, insight, and judgment.     Antibiotics:  •  vancomycin (VANCOCIN) capsule 125 mg, 125 mg, Oral, Q6H, Everett Hui MD, 125 mg at 03/12/22 0543  •  [START ON 3/21/2022] vancomycin (VANCOCIN) capsule 125 mg, 125 mg, Oral, Q12H, Everett Hui MD  •  [START ON 3/29/2022] vancomycin (VANCOCIN) capsule 125 mg, 125 mg, Oral, Q24H, Everett Hui MD  •  [START ON 4/5/2022] vancomycin (VANCOCIN) capsule 125 mg, 125 mg, Oral, Every Other Day, Everett Hui MD    LABS:  CBC, BMP, micro reviewed today  Lab Results   Component Value Date    WBC 15.06 (H) 03/13/2022    HGB 9.9 (L) 03/13/2022    HCT 30.6 (L) 03/13/2022    MCV 93.9 03/13/2022     03/13/2022     Lab Results   Component Value Date    GLUCOSE 111 (H) 03/13/2022    CALCIUM 8.0 (L) 03/13/2022     03/13/2022    K 3.1 (L) 03/13/2022    CO2 17.6 (L) 03/13/2022     03/13/2022    BUN 27 (H) 03/13/2022    CREATININE 4.31 (H) 03/13/2022    EGFRIFAFRI  10/14/2021      Comment:      <15 Indicative of kidney  failure.    EGFRIFNONA 7 (L) 02/28/2022    BCR 6.3 (L) 03/13/2022    ANIONGAP 14.4 03/13/2022       Microbiology:  3/11 C. difficile positive  3/11 Covid negative  3/11 GI pathogen panel negative  3/11 blood cultures: NGTD    Imaging:  TTE w/ EF 20%    Assessment/Plan   #Sepsis  #C. difficile colitis  #Splenic infarcts  #Nephrolithiasis status post left ureteral stent  #ESRD on PD  #Right foot skin changes  #systolic CHF  #Mitral regurgitation     Continue oral vancomycin w/ prolonged taper for C. difficile colitis.  She continues to improve symptomatically and from a laboratory standpoint.    MRI of the right lower extremity has been completed and will follow up report.    Blood cultures have remained negative at 2 days. TTE was negative for vegetations sawyer does have moderate to severe MR.     ID will follow.

## 2022-03-14 NOTE — PROGRESS NOTES
[facility]  [phone]  Dr. Reggie Rodriguez    Subjective:  LOS: 3    Chief Complaint: Sepsis    Patient is sitting beside her bed.  Currently on room air.  Denies nausea vomiting.  Denies diarrhea.  No headaches dizziness.  Feels quite well and is wanting to go home.  Certainly no shortness of breath or cough.    Objective   Vital Signs past 24hrs    Temp range: Temp (24hrs), Av.4 °F (36.9 °C), Min:97.7 °F (36.5 °C), Max:98.7 °F (37.1 °C)    BP range: BP: ()/(61-81) 101/65  Pulse range: Heart Rate:  [] 91  Resp rate range: Resp:  [17-22] 22    Device (Oxygen Therapy): room air   Oxygen range:SpO2:  [95 %-99 %] 97 %      81.1 kg (178 lb 12.8 oz); Body mass index is 33.8 kg/m².    Intake/Output Summary (Last 24 hours) at 3/14/2022 0916  Last data filed at 3/14/2022 0651  Gross per 24 hour   Intake 6000 ml   Output 7600 ml   Net -1600 ml       Physical Exam  Constitutional:       Appearance: She is obese.   Eyes:      Pupils: Pupils are equal, round, and reactive to light.   Cardiovascular:      Rate and Rhythm: Normal rate and regular rhythm.      Heart sounds: No murmur heard.  Pulmonary:      Effort: Pulmonary effort is normal.      Breath sounds: Normal breath sounds.   Abdominal:      General: Bowel sounds are normal.      Palpations: Abdomen is soft. There is no mass.      Tenderness: There is no abdominal tenderness.   Musculoskeletal:         General: No swelling.      Comments: Right foot redness and swelling   Neurological:      Mental Status: She is alert.       Results Review:    I have reviewed the laboratory and imaging data since the last note by [facility] physician.  My annotations are noted in assessment and plan.    Results from last 7 days   Lab Units 22  0755 22  1503 22  1055   LACTATE mmol/L 0.9 2.2* 2.2*       Medication Review:  I have reviewed the current MAR.  My annotations are noted in assessment and plan.    Pharmacy Consult,       Plan   [facility]  Problems  Septic shock, improved  C. difficile colitis  Splenic infarction  Chronic hypotension, on midodrine  ESRD on peritoneal dialysis  Cardiomyopathy ef 20-25%  Moderate severe MR  Chronic systolic heart failure  Recent hydronephrosis with obstructing calculus s/p ureteral stent  Right foot wound, pending MRI  Anxiety  Hypothyroidism      THESE ARE NEW MEDICAL PROBLEMS TO ME.    Plan of Treatment    Sepsis appears to have resolved.  Remains on midodrine for chronic hypotension.    Recent CxR only with cardiomegaly and now on RA.    Pulmonary will sign off.  A has resumed care.    Reggie Rodriguez MD  03/14/22  09:16 EDT    While in the room and during my examination of the patient I wore gloves, gown, mask, eye protection and followed enhanced droplet/contact isolation protocol and precautions.  I washed my hands before and after this patient encounter.    Part of this note may be an electronic transcription/translation of spoken language to printed text using the Dragon Dictation System.

## 2022-03-14 NOTE — CASE MANAGEMENT/SOCIAL WORK
Discharge Planning Assessment  [facility]     Patient Name: Ambar Lara  MRN: [id number]  Today's Date: 3/14/2022    Admit Date: 3/11/2022     Discharge Needs Assessment     Row Name 03/14/22 1026       Living Environment    People in Home child(richar), adult    Current Living Arrangements home    Primary Care Provided by self    Provides Primary Care For no one    Family Caregiver if Needed child(richar), adult    Quality of Family Relationships helpful;involved    Able to Return to Prior Arrangements yes       Resource/Environmental Concerns    Resource/Environmental Concerns none       Transition Planning    Patient/Family Anticipates Transition to home with family    Patient/Family Anticipated Services at Transition none       Discharge Needs Assessment    Readmission Within the Last 30 Days unable to assess    Equipment Currently Used at Home cane, straight;walker, standard;wheelchair;other (see comments)  PD dialysis    Equipment Needed After Discharge none    Outpatient/Agency/Support Group Needs outpatient peritoneal dialysis               Discharge Plan     Row Name 03/14/22 1028       Plan    Plan home with family; follow for d/c needs    Patient/Family in Agreement with Plan yes    Plan Comments Spoke with pt via phone as pt is in isolation. Confirmed facesheet correct. Explained role of CCP. Pt reports she lives alone. Her daughter Carol comes home from college every couple of weeks and is able to assist then. Her son Farhan is her [phone] and also assist her with needs. Pt reports she uses a cane, walker and wheelchair when needed. She has used [facility] in past and has been to [facility] and [facility]. Verified PCP (Dr. Manuel) and Pharmacy ([facility]). Pt receives Peritoneal dialysis from [facility]. She reports at times it is hard for her to lift the bags and her children have to come help her. She reports she is wanting to see if there is anyone that can assist a couple days a week  with lifting PD bags and helping with machine, CSW is unaware of any such assistance however CSW left message for [facility]  Nora [phone] to see if she is aware of any programs pt may qualify for. Pt reports she plans home with family assist at d/c. She currently denies HH needs. CCP will continue to follow.              Continued Care and Services - Admitted Since 3/11/2022    Coordination has not been started for this encounter.          Demographic Summary     Row Name 03/14/22 1020       General Information    Admission Type inpatient    Arrived From home    Required Notices Provided Important Message from Medicare    Reason for Consult discharge planning    Preferred Language English       Contact Information    Permission Granted to Share Info With facility ;family/designee               Functional Status     Row Name 03/14/22 1026       Functional Status    Usual Activity Tolerance good    Current Activity Tolerance moderate       Functional Status, IADL    Medications independent    Meal Preparation independent    Housekeeping independent    Laundry independent    Shopping independent       Mental Status    General Appearance WDL WDL       Mental Status Summary    Recent Changes in Mental Status/Cognitive Functioning unable to assess               Psychosocial    No documentation.                Abuse/Neglect    No documentation.                Legal    No documentation.                Substance Abuse    No documentation.                Patient Forms    No documentation.                   BLANCA Rosa

## 2022-03-14 NOTE — PLAN OF CARE
Problem: Adult Inpatient Plan of Care  Goal: Absence of Hospital-Acquired Illness or Injury  Intervention: Identify and Manage Fall Risk  Recent Flowsheet Documentation  Taken 3/14/2022 1200 by Lou Montero RN  Safety Promotion/Fall Prevention: activity supervised  Taken 3/14/2022 0800 by Lou Montero RN  Safety Promotion/Fall Prevention:   activity supervised   assistive device/personal items within reach   clutter free environment maintained   fall prevention program maintained  Intervention: Prevent Skin Injury  Recent Flowsheet Documentation  Taken 3/14/2022 0800 by Lou Montero RN  Body Position: position changed independently     Problem: Skin Injury Risk Increased  Goal: Skin Health and Integrity  Intervention: Optimize Skin Protection  Recent Flowsheet Documentation  Taken 3/14/2022 0800 by Lou Montero RN  Pressure Reduction Techniques: heels elevated off bed  Pressure Reduction Devices:   specialty bed utilized   heel offloading device utilized     Problem: Fall Injury Risk  Goal: Absence of Fall and Fall-Related Injury  Intervention: Promote Injury-Free Environment  Recent Flowsheet Documentation  Taken 3/14/2022 1200 by Lou Montero RN  Safety Promotion/Fall Prevention: activity supervised  Taken 3/14/2022 0800 by Lou Montero RN  Safety Promotion/Fall Prevention:   activity supervised   assistive device/personal items within reach   clutter free environment maintained   fall prevention program maintained   Goal Outcome Evaluation:   Patient performed PD twice on shift, 4 hrs dwell time. Mg and potassium replaced per protocol. Uretal stent removed per urology- see note. Call placed to heme/onc to follow up on consult. Vascular consult for R foot. Pain controlled via MAR.

## 2022-03-14 NOTE — PROGRESS NOTES
[facility] Progress Note    03/14/22 15:26 EDT        Discussed with patient plan for cystoscopy and stent removal.  Verbal consent obtained.  Female nurse in attendance.    Perineum was prepped and draped.  Lidocaine jelly was placed in and around the urethra.  Flexible cystoscope was inserted.  She had urine in her bladder so I could see her stent well and with some grasping forceps I engage it and pulled out intact.  She was cleaned up and recovered in her room.    Given the discharge from our standpoint at any point of time and we will see her as needed in the office.

## 2022-03-14 NOTE — PLAN OF CARE
Goal Outcome Evaluation:   VSS, A&OX4, no c/o pain or discomfort. Remains in contact spore isolation. RA. Up to bedside commode independently with cane, assist x 1 for longer distances. PD completed. MRI of toes completed, results pending. Will continue to monitor.

## 2022-03-14 NOTE — PROGRESS NOTES
[facility] Progress Note      Patient Name: Ambar Lara  : 1970  MRN: [id number]  Primary Care Physician:  Jet Manuel MD  Date of admission: 3/11/2022    Subjective     Interval History:   Denies any chest pain, no shortness of breath, no lower extremity edema.  Feels much better.    Review of Systems:   As noted above    Objective     Vitals:   Temp:  [97.7 °F (36.5 °C)-98.7 °F (37.1 °C)] 97.7 °F (36.5 °C)  Heart Rate:  [] 91  Resp:  [17-22] 22  BP: ()/(61-81) 101/65    Intake/Output Summary (Last 24 hours) at 3/14/2022 0827  Last data filed at 3/14/2022 0651  Gross per 24 hour   Intake 6000 ml   Output 9200 ml   Net -3200 ml       Physical Exam:    General Appearance: alert, oriented x 3, no acute distress   Skin: warm and dry  HEENT: oral mucosa normal, nonicteric sclera  Neck: supple, no JVD  Lungs: CTA  Heart: RRR, normal S1 and S2  Abdomen: Mildly distended mild tenderness noted no rebound and no guarding.  PD catheter in place. Exit site with no erythema or discharge.  : no palpable bladder  Extremities: no edema, cyanosis or clubbing  Neuro: normal speech and mental status     Scheduled Meds:     aspirin, 81 mg, Oral, Daily  b complex-vitamin c-folic acid, 1 tablet, Oral, Daily  escitalopram, 20 mg, Oral, Daily  famotidine, 20 mg, Oral, Daily  heparin (porcine), 5,000 Units, Subcutaneous, Q8H  lanthanum, 750 mg, Oral, TID With Meals  levothyroxine, 137 mcg, Oral, Daily  magnesium oxide, 400 mg, Oral, BID  melatonin, 3 mg, Oral, Nightly  midodrine, 10 mg, Oral, TID AC  montelukast, 10 mg, Oral, Nightly  saccharomyces boulardii, 500 mg, Oral, BID  sodium bicarbonate, 650 mg, Oral, 4x Daily  sodium chloride, 10 mL, Intravenous, Q12H  traZODone, 100 mg, Oral, Nightly  vancomycin, 125 mg, Oral, Q6H  [START ON 3/21/2022] vancomycin, 125 mg, Oral, Q12H  [START ON 3/29/2022] vancomycin, 125 mg, Oral, Q24H  [START ON 2022] vancomycin, 125 mg, Oral,  Every Other Day      IV Meds:   Pharmacy Consult,         Results Reviewed:   I have personally reviewed the results from the time of this admission to 3/14/2022 08:27 EDT     Results from last 7 days   Lab Units 03/13/22  1316 03/13/22  0613 03/12/22  0755 03/11/22  1503 03/11/22  0639   SODIUM mmol/L  --  139 138 139 136   POTASSIUM mmol/L 3.1* 2.8* 3.2* 4.0 4.7   CHLORIDE mmol/L  --  107 106 110* 107   CO2 mmol/L  --  17.6* 15.0* 14.0* 13.3*   BUN mg/dL  --  27* 26* 21* 21*   CREATININE mg/dL  --  4.31* 4.54* 4.70* 4.12*   CALCIUM mg/dL  --  8.0* 8.0* 7.8* 8.3*   BILIRUBIN mg/dL  --  <0.2  --   --  0.2   ALK PHOS U/L  --  79  --   --  93   ALT (SGPT) U/L  --  10  --   --  19   AST (SGOT) U/L  --  8  --   --  26   GLUCOSE mg/dL  --  111* 139* 135* 158*       Estimated Creatinine Clearance: 15.2 mL/min (A) (by C-G formula based on SCr of 4.31 mg/dL (H)).    Results from last 7 days   Lab Units 03/13/22  1316 03/13/22  0613 03/12/22  0755 03/11/22  1503   MAGNESIUM mg/dL 1.4*  --   --   --    PHOSPHORUS mg/dL  --  7.3* 8.3* 5.9*             Results from last 7 days   Lab Units 03/13/22  0613 03/12/22  0755 03/11/22  1503 03/11/22  0639   WBC 10*3/mm3 15.06* 31.54* 35.67* 28.42*   HEMOGLOBIN g/dL 9.9* 10.6* 10.0* 11.3*   PLATELETS 10*3/mm3 374 424 358 409             Assessment / Plan     ASSESSMENT:  1.  End-stage renal disease on CCPD.  Using 1.5 and 2.5% dianeal at home.     2.  Hypovolemic shock/septic shock secondary to diarrhea and C. difficile colitis.  Now she is  on midodrine  3.  High anion gap metabolic acidosis secondary to lactic acidosis due to hypoperfusion.  Improved oral bicarbonate  4.  Diarrhea.  Due to C. difficile improved on antibiotic therapy by ID   5.  Sepsis secondary to C. difficile colitis.  6.  History of congestive heart failure with ejection fraction 20%  7.  C. difficile colitis on antibiotic by ID  8.  Hypothyroidism: On replacement  9.  Hyperphosphatemia on lanthanum  10. Splenic  infarction: Etiology unknown could be secondary to severe hypotension  11. PVD: Had MRI yesterday     Plan:    Continue  dwell time  4 hours and 2.5% solutions  Continue surveillance labs  Hypercoagulable work-up.   Antibiotic therapy per ID  And removal today or tomorrow  Replace potassium   Check magnesium     Thank you for involving us in the care of Ambar Lara.  Please feel free to call with any questions.    Camila Salas MD  03/14/22  08:27 EDT    [facility]  [phone]      Much of this encounter note is an electronic transcription/translation of spoken language to printed text. The electronic translation of spoken language may permit erroneous, or at times, nonsensical words or phrases to be inadvertently transcribed; Although I have reviewed the note for such errors, some may still exist.

## 2022-03-14 NOTE — PROGRESS NOTES
[facility]    PROGRESS NOTE    Name:  Ambar Lara   Age:  51 y.o.  Sex:  female  :  1970  MRN:  [id number]   Visit Number:  [id number]  Admission Date:  3/11/2022  Date Of Service:  22  Primary Care Physician:  Jet Manuel MD     LOS: 3 days :  Patient Care Team:  Jet Manuel MD as PCP - General (Internal Medicine):    History taken from:     patient    Chief Complaint:      Right foot pain    Subjective     Interval History:     Patient seen and examined again today.    Patient is a 51-year-old with C. difficile colitis, admitted for sepsis with septic shock.  She has chronic systolic congestive heart failure, end-stage renal disease on peritoneal dialysis, cardiomyopathy with EF 20%, severe mitral valve regurgitation, right foot wound, recent stent with nephrolithiasis.  Nephrology, pulmonology, ID are consulted.  Did consult urology due to the fact that she still has the stent in place.  They plan to pull the stent later this week.    She feels much better. She currently denies any chest pressure, shortness of breath, nausea, vomiting.  She is still having diarrhea.  She is also noted to have splenic infarcts, hematology has been consulted await input.      She has pain in her right foot with some areas of ischemia on the toes.  She had previous arterial Doppler which showed mild ischemia.  She has not seen vascular surgery in the past.  She had been on warfarin previously for PE, warfarin was stopped in 2021.    Review of Systems:     All systems were reviewed and negative except for:  Musculoskeletal: positive for  muscle weakness    Objective     Vital Signs:    Temp:  [97.7 °F (36.5 °C)-98.7 °F (37.1 °C)] 97.8 °F (36.6 °C)  Heart Rate:  [] 98  Resp:  [16-22] 16  BP: (101-130)/(65-80) 122/77    Physical Exam:    General: Alert and oriented x4, mild distress.  Heart: Regular rate and rhythm without murmur rub or thrill.  Lungs: Clear to  auscultation bilaterally without use of accessory muscles or respiration.  Abdomen: Soft/nontender/nondistended.  No HSM noted.  MSK: 4/5 strength in upper/lower extremities bilaterally.  Right foot with decreased pulses, multiple areas of eschar noted.     Results Review:      I reviewed the patient's new clinical results.  I reviewed the patient's new imaging results and agree with the interpretation.  I reviewed the patient's other test results and agree with the interpretation    Labs:    Lab Results (last 24 hours)     Procedure Component Value Units Date/Time    Renal Function Panel [[id number]]  (Abnormal) Collected: 03/14/22 1138    Specimen: Blood Updated: 03/14/22 1243     Glucose 90 mg/dL      BUN 22 mg/dL      Creatinine 3.98 mg/dL      Sodium 141 mmol/L      Potassium 3.5 mmol/L      Chloride 106 mmol/L      CO2 21.0 mmol/L      Calcium 8.4 mg/dL      Albumin 3.60 g/dL      Phosphorus 5.3 mg/dL      Anion Gap 14.0 mmol/L      BUN/Creatinine Ratio 5.5     eGFR 13.2 mL/min/1.73      Comment: <15 Indicative of kidney failure       Narrative:      GFR Normal >60  Chronic Kidney Disease <60  Kidney Failure <15      Magnesium [[id number]]  (Normal) Collected: 03/14/22 1138    Specimen: Blood Updated: 03/14/22 1240     Magnesium 1.6 mg/dL     CBC & Differential [[id number]]  (Abnormal) Collected: 03/14/22 1138    Specimen: Blood Updated: 03/14/22 1223    Narrative:      The following orders were created for panel order CBC & Differential.  Procedure                               Abnormality         Status                     ---------                               -----------         ------                     CBC Auto Differential[[id number]]        Abnormal            Final result                 Please view results for these tests on the individual orders.    CBC Auto Differential [[id number]]  (Abnormal) Collected: 03/14/22 1138    Specimen: Blood Updated: 03/14/22 1223     WBC 13.93 10*3/mm3      RBC 3.59  "10*6/mm3      Hemoglobin 11.0 g/dL      Hematocrit 34.6 %      MCV 96.4 fL      MCH 30.6 pg      MCHC 31.8 g/dL      RDW 13.5 %      RDW-SD 47.8 fl      MPV 10.3 fL      Platelets 412 10*3/mm3      Neutrophil % 64.6 %      Lymphocyte % 24.8 %      Monocyte % 4.7 %      Eosinophil % 3.6 %      Basophil % 0.9 %      Immature Grans % 1.4 %      Neutrophils, Absolute 9.00 10*3/mm3      Lymphocytes, Absolute 3.45 10*3/mm3      Monocytes, Absolute 0.66 10*3/mm3      Eosinophils, Absolute 0.50 10*3/mm3      Basophils, Absolute 0.12 10*3/mm3      Immature Grans, Absolute 0.20 10*3/mm3      nRBC 0.0 /100 WBC     Blood Culture - Blood, Arm, Left [[id number]]  (Normal) Collected: 03/11/22 0907    Specimen: Blood from Arm, Left Updated: 03/14/22 1017     Blood Culture No growth at 3 days    Blood Culture - Blood, Blood, Central Line [[id number]]  (Normal) Collected: 03/11/22 0849    Specimen: Blood, Central Line Updated: 03/14/22 1017     Blood Culture No growth at 3 days    Hemoglobin A1c [[id number]]  (Normal) Collected: 03/13/22 0613    Specimen: Blood Updated: 03/13/22 1659     Hemoglobin A1C 5.20 %     Narrative:      Hemoglobin A1C Ranges:    Increased Risk for Diabetes  5.7% to 6.4%  Diabetes                     >= 6.5%  Diabetic Goal                < 7.0%    Potassium [[id number]]  (Abnormal) Collected: 03/13/22 1316    Specimen: Blood Updated: 03/13/22 1555     Potassium 3.1 mmol/L     Magnesium [[id number]]  (Abnormal) Collected: 03/13/22 1316    Specimen: Blood Updated: 03/13/22 1555     Magnesium 1.4 mg/dL            Radiology:    Imaging Results (Last 24 Hours)     Procedure Component Value Units Date/Time    MRI Foot Right Without Contrast [[id number]] Collected: 03/14/22 0942     Updated: 03/14/22 1038    Narrative:      MRI RIGHT MID AND FOREFOOT     HISTORY: Endstage renal disease on peritoneal dialysis. \"Skin changes\"  at the right great toe for 4 months. Erythema at the 1st and 2nd toes.     TECHNIQUE: MRI of the " right mid and forefoot is correlated with foot  x-rays 02/27/2022, 03/11/2022, and the consultation note by Dr. Colvin 03/12/2022.     FINDINGS: There is mild soft tissue edema throughout the mid and  forefoot. There is no focal fluid collection. There is subtle marrow  signal abnormality at the middle and distal phalanges of the 2nd toe on  the fat-saturated T2 sequences. This is not evident on the T1 images,  where the marrow signal in these phalanges appears normal. Marrow signal  at the great toe is normal.     There is heterogeneous marrow signal along the distal 2nd metatarsal  shaft with mild cortical deformity and linear subcortical low signal  deep to the 2nd metatarsal head. These are changes of nonacute  osteonecrosis with minimal collapse of the articular cortex of the  metatarsal head. No joint effusion is present. Marrow signal throughout  the mid and forefoot is otherwise normal.     Flexor and extensor tendons are intact. The Lisfranc ligament complex is  intact. There is atrophy of the intermetatarsal musculature between the  1st through 3rd metatarsals. Other foot musculature appears preserved.     There is advanced, chronic degenerative change at the 1st  metatarsophalangeal joint. The other joints appear normal.       Impression:      Mild, nonspecific soft tissue edema in the right forefoot.  No soft tissue wound or focal fluid collection is present. Minimal  marrow signal abnormality in the middle and distal phalanges of the 2nd  toe is not specific. Early osteomyelitis could have this appearance.     There is old osteonecrosis along the distal 2nd metatarsal with mild  cortical deformity of the metatarsal head articular surface.     This report was finalized on 3/14/2022 10:35 AM by Dr. Jose Cazares M.D.             Medication Review:     aspirin, 81 mg, Oral, Daily  b complex-vitamin c-folic acid, 1 tablet, Oral, Daily  escitalopram, 20 mg, Oral, Daily  famotidine, 20 mg, Oral,  Daily  heparin (porcine), 5,000 Units, Subcutaneous, Q8H  lanthanum, 750 mg, Oral, TID With Meals  levothyroxine, 137 mcg, Oral, Daily  magnesium oxide, 400 mg, Oral, BID  melatonin, 3 mg, Oral, Nightly  midodrine, 10 mg, Oral, TID AC  montelukast, 10 mg, Oral, Nightly  potassium chloride, 40 mEq, Oral, Daily  saccharomyces boulardii, 500 mg, Oral, BID  sodium bicarbonate, 650 mg, Oral, 4x Daily  sodium chloride, 10 mL, Intravenous, Q12H  traZODone, 100 mg, Oral, Nightly  vancomycin, 125 mg, Oral, Q6H  [START ON 3/21/2022] vancomycin, 125 mg, Oral, Q12H  [START ON 3/29/2022] vancomycin, 125 mg, Oral, Q24H  [START ON 4/5/2022] vancomycin, 125 mg, Oral, Every Other Day        Pharmacy Consult,         Assessment/Plan     Principal Problem:    Sepsis without acute organ dysfunction (HCC)  Active Problems:    Chronic systolic heart failure (HCC)    ESRD on peritoneal dialysis (HCC)    Peritoneal dialysis status (HCC)    Clostridium difficile colitis    Obesity (BMI 30-39.9)    Fever in adult    Acute respiratory failure with hypoxia (HCC)    Hypokalemia    Anemia, chronic disease      1.  Septic shock, resolved   2.  C. difficile colitis   3.  Multiple splenic infarcts   4.  Nephrolithiasis status post left ureteral stent   5.  End-stage renal disease on peritoneal dialysis   6.  Cardiomyopathy ejection fraction 20%   7.  Severe mitral regurgitation   8.  Right foot wounds   9.  Hypothyroidism   10.  Previous diabetes type 2, normal hemoglobin A1c here.  11.  PAD with ischemic changes noted on toes  12.  Previous pulmonary emboli, with warfarin last taken 9/29/2021  13.  GENI    Plan:    Hematology to see the patient regarding the splenic infarcts.  We will check abdominal Doppler for thrombosis.  Consult vascular surgery for opinion to see this patient.  Continue with oral vancomycin.  Infectious disease following, MRI of the right foot has been ordered.  Urology to pull stent during this visit.  Nephrology following as  well.  Lab work in the a.m.  Further recommendations will depend on clinical course.    Castillo Otero DO  03/14/22  14:20 EDT

## 2022-03-15 NOTE — NURSING NOTE
Unable to complete 2130 fill on time d/t solution not being warm enough. Kpad adjusted, solution warming. Will complete as soon as warm enough.     2230- Pt now nauseous and vomiting. Per pt unable to take zofran, requesting pepto. Page placed, n/o received.     0030- Pepto arrived from pharmacy, given. Pt declining PD at this time wanting to let her stomach settle. Will check back in one hour.     0130- Pt states not ready at this time to do PD. Will check back.    0400- PD exchange started, filling now, will be due to drain at 0800. Will continue to monitor.

## 2022-03-15 NOTE — OP NOTE
Date of Admission:  3/11/2022  Today's Date:  03/15/22  Sommer Dowd MD  [facility]    Procedure Note  Non-tunneled central venous catheter placement for hemodialysis    Pre-op Diagnosis:   Chronic kidney disease    Post-op Diagnosis:     Same     Procedure:      1) Insertion of non-tunneled central venous catheter (24724)  2) Ultrasound-guided vascular access (14833)    Surgeon: Sommer Dowd MD    Assistant:  None    Anesthesia:   lidocaine 1% without epinephrine    Estimated Blood Loss:   Minimal    Complications:  None    Findings:   Successful placement of non tunneled dialysis catheter    Indications:    The patient is an 51 y.o. female referred for acute dialysis catheter placement secondary to Chronic kidney disease.  Written consent was obtained.  The risks, benefits, and turns were also discussed.  The patient was identified via the arm band and hospital assigned identification number.  Immediately prior to the procedure a timeout was called to verify the correct patient, procedure, equipment, support staff and the site/side was marked as required.       Procedure:  Site: Right internal jugular vein  Preparation: skin prepped with 2% chlorhexidine  Skin prep agent dried: skin prep agent completely dried prior to procedure  Sterile barriers: all five maximum sterile barriers used - cap, mask, sterile gown, sterile gloves, and large sterile sheet  Hand hygiene: hand hygiene performed prior to central venous catheter insertion  Patient position: Trendelenberg  Catheter type: double lumen with pigtail  Catheter size: 14 Fr 20 cm in the Right internal jugular vein  Ultrasound guidance: yes, with photographic documentation recorded in the chart  Number of attempts: 1  Successful placement: yes  Post-procedure: line sutured and dressing applied  Assessment: blood return through all ports and was locked with concentrated heparin (1000units/cc)  Patient tolerated the procedure well with no immediate  complications    Sommer Dowd MD     Date: 3/15/2022  Time: 10:12 EDT    Active Hospital Problems    Diagnosis  POA   • **Sepsis without acute organ dysfunction (HCC) [A41.9]  Yes   • Fever in adult [R50.9]  Yes   • Acute respiratory failure with hypoxia (HCC) [J96.01]  Yes   • Hypokalemia [E87.6]  Unknown   • Anemia, chronic disease [D63.8]  Yes   • Obesity (BMI 30-39.9) [E66.9]  Yes   • Clostridium difficile colitis [A04.72]  Yes   • Peritoneal dialysis status (HCC) [Z99.2]  Not Applicable   • Chronic systolic heart failure (HCC) [I50.22]  Yes   • ESRD on peritoneal dialysis (HCC) [N18.6, Z99.2]  Not Applicable      Resolved Hospital Problems   No resolved problems to display.

## 2022-03-15 NOTE — CONSULTS
.     REASON FOR CONSULTATION:     Provide an opinion on any further workup or treatment  Clinic infarction                             REQUESTING PHYSICIAN: Saul Meza MD  RECORDS OBTAINED:  Records of the patient's history including those obtained from the referring provider were reviewed and summarized in detail.    HISTORY OF PRESENT ILLNESS:  The patient is a 51 y.o. year old female  who is here for follow-up with the above-mentioned history.  Patient is a 51-year-old with history of pulmonary embolism previously on Coumadin, cardiomyopathy with ejection fraction of 20%, severe mitral valve regurgitation, end-stage renal disease on peritoneal dialysis, recent nephrolithiasis thought to be complicated by pyelonephritis and treated with Levaquin, status post left ureteral stent which was removed 3/14/2022, hypothyroidism, diabetes who presented to the emergency room with fever, leukocytosis and diarrhea tested positive for C. difficile.  She has had history of C. difficile colitis following COVID-19 infection a year ago.  On presentation she was hypotensive and tachycardic.  3/11/2022-CT abdomen consistent with pancolitis.  Pneumoperitoneum likely secondary to the peritoneal dialysis.  Left-sided ureteral stent and resolved hydronephrosis.  Evolving splenic infarct noted.  ID has been consulted for management of C. difficile colitis.  She is currently on treatment with vancomycin 125 mg 4 times daily for the same.  She was also noted to have dry gangrene of the right foot and toes for which vascular surgery has been consulted.  Vascular surgery is concerned about an embolic phenomena given the distribution of the lesions and presence of splenic infarction.   A CT angiogram is  to be performed to evaluate for an ulcerated plaque/thrombus.   She is currently on prophylactic heparin only.  Hematology has been asked to see due to the presence of  splenic infarct.  Besides the previous history of DVT/PE  around the time of Covid she does not have any prior history of thrombosis, no pregnancy losses, no history of illicit drug use.      Past Medical History:   Diagnosis Date   • CHF (congestive heart failure) ([facility])    • Clostridioides difficile infection 2021    finished oral vanc 2021   • Dialysis patient ([facility])    • Disease of thyroid gland    • Elevated cholesterol    • GERD (gastroesophageal reflux disease)    • Pulmonary emboli (HCC) 2021    coumadin last taken 2021   • Renal disorder    • Sleep apnea    • Ureteral calculi 2022     Past Surgical History:   Procedure Laterality Date   • ADRENAL GLAND SURGERY N/A `   •  SECTION Bilateral 2001    Has had x2   • CYSTOSCOPY URETEROSCOPY LASER LITHOTRIPSY Left 2022    Procedure: Left CYSTOSCOPY, with stent placement;  Surgeon: Sanford Puentes MD;  Location: [facility];  Service: Urology;  Laterality: Left;   • HYSTERECTOMY     • INSERTION PERITONEAL DIALYSIS CATHETER Right 10/12/2021    Procedure: LAPAROSCOPIC REVISION OF PERITONEAL DIALYSIS CATHETER AND LAPAROSCOPIC CHOLECYSTECTOMY;  Surgeon: Jamie Figueroa MD;  Location: [facility];  Service: General;  Laterality: Right;   • PERITONEAL CATHETER INSERTION  2019   • TRACHEOSTOMY         MEDICATIONS    Current Facility-Administered Medications:   •  acetaminophen (TYLENOL) tablet 650 mg, 650 mg, Oral, Q6H PRN, Angel Meza MD, 650 mg at 22 1619  •  aspirin chewable tablet 81 mg, 81 mg, Oral, Daily, Angel Meza MD, 81 mg at 22 0818  •  b complex-vitamin c-folic acid (NEPHRO-JHON) tablet 1 tablet, 1 tablet, Oral, Daily, Castillo Otero DO, 1 tablet at 22 1235  •  bismuth subsalicylate (PEPTO BISMOL) 262 MG/15ML suspension 30 mL, 30 mL, Oral, Q6H PRN, Mauricio Allen APRN, 30 mL at 03/15/22 0028  •  escitalopram (LEXAPRO) tablet 20 mg, 20 mg, Oral, Daily, Angel Meza MD, 20 mg at 22 0817  •   famotidine (PEPCID) tablet 20 mg, 20 mg, Oral, Daily, Angel Meza MD, 20 mg at 03/14/22 0844  •  heparin (porcine) 5000 UNIT/ML injection 5,000 Units, 5,000 Units, Subcutaneous, Q8H, Castillo Otero DO, 5,000 Units at 03/14/22 2144  •  lanthanum (FOSRENOL) chewable tablet 750 mg, 750 mg, Oral, TID With Meals, Castillo Otero DO, 750 mg at 03/14/22 1745  •  levothyroxine (SYNTHROID, LEVOTHROID) tablet 137 mcg, 137 mcg, Oral, Daily, Angel Meza MD, 137 mcg at 03/14/22 0818  •  magnesium oxide (MAG-OX) tablet 400 mg, 400 mg, Oral, BID, Darci Wang MD, 400 mg at 03/14/22 2144  •  magnesium sulfate 4 gram infusion - Mg less than or equal to 1mg/dL, 4 g, Intravenous, PRN **OR** magnesium sulfate 3 gram infusion (1gm x 3) - Mg 1.1 - 1.5 mg/dL, 1 g, Intravenous, PRN **OR** Magnesium Sulfate 2 gram infusion- Mg 1.6 - 1.9 mg/dL, 2 g, Intravenous, PRN, Castillo Otero DO, Last Rate: 25 mL/hr at 03/14/22 1247, 2 g at 03/14/22 1247  •  melatonin tablet 3 mg, 3 mg, Oral, Nightly, Ha Negron APRN, 3 mg at 03/14/22 2144  •  midodrine (PROAMATINE) tablet 10 mg, 10 mg, Oral, TID AC, Angel Meza MD, 10 mg at 03/14/22 1746  •  montelukast (SINGULAIR) tablet 10 mg, 10 mg, Oral, Nightly, Castillo Otero DO, 10 mg at 03/14/22 2144  •  oxyCODONE-acetaminophen (PERCOCET) 5-325 MG per tablet 1 tablet, 1 tablet, Oral, Q6H PRN, Angel Meza MD, 1 tablet at 03/14/22 2144  •  Pharmacy Consult, , Does not apply, Continuous PRN, Angel Meza MD  •  potassium chloride (K-DUR,KLOR-CON) ER tablet 40 mEq, 40 mEq, Oral, Daily, Castillo Otero, , 40 mEq at 03/14/22 0844  •  saccharomyces boulardii (FLORASTOR) capsule 500 mg, 500 mg, Oral, BID, Castillo Otero, , 500 mg at 03/14/22 2144  •  sodium bicarbonate tablet 650 mg, 650 mg, Oral, 4x Daily, Castillo Otero, , 650 mg at 03/14/22 2144  •  sodium chloride 0.9 % flush 10 mL, 10 mL, Intravenous,  PRN, Angel Meza MD  •  [COMPLETED] Insert peripheral IV, , , Once **AND** sodium chloride 0.9 % flush 10 mL, 10 mL, Intravenous, PRN, Angel Meza MD  •  sodium chloride 0.9 % flush 10 mL, 10 mL, Intravenous, Q12H, Angel Meza MD, 10 mL at 03/14/22 2146  •  sodium chloride 0.9 % flush 10 mL, 10 mL, Intravenous, PRN, Angel Meza MD  •  traZODone (DESYREL) tablet 100 mg, 100 mg, Oral, Nightly, Castillo Otero, DO, 100 mg at 03/14/22 2144  •  UltraBag/Dianeal/2.5% Dextrose low-jamison (alvarado #[id number]) (DIANEAL) peritoneal dialysate 2,000 mL, 2,000 mL, Intraperitoneal, PRN, Mateo Titus MD  •  vancomycin (VANCOCIN) capsule 125 mg, 125 mg, Oral, Q6H, Angel Meza MD, 125 mg at 03/14/22 1834  •  [START ON 3/21/2022] vancomycin (VANCOCIN) capsule 125 mg, 125 mg, Oral, Q12H, Angel Meza MD  •  [START ON 3/29/2022] vancomycin (VANCOCIN) capsule 125 mg, 125 mg, Oral, Q24H, Angel Meza MD  •  [START ON 4/5/2022] vancomycin (VANCOCIN) capsule 125 mg, 125 mg, Oral, Every Other Day, Angel Meza MD    ALLERGIES:     Allergies   Allergen Reactions   • Penicillins Anaphylaxis, Hives, Shortness Of Breath, Swelling and Rash     Tolerated cephalosporins in past   • Nickel Rash       SOCIAL HISTORY:       Social History     Socioeconomic History   • Marital status:    Tobacco Use   • Smoking status: Former Smoker     Packs/day: 1.00     Years: 25.00     Pack years: 25.00     Types: Cigarettes     Quit date: 4/16/2018     Years since quitting: 3.9   • Smokeless tobacco: Never Used   Vaping Use   • Vaping Use: Former   • Quit date: 2/14/2021   • Substances: Nicotine, Flavoring   • Devices: Pre-filled or refillable cartridge   Substance and Sexual Activity   • Alcohol use: Not Currently   • Drug use: Not Currently   • Sexual activity: Defer         FAMILY HISTORY:  Family History   Problem Relation Age of Onset   • Malig Hyperthermia Neg Hx         REVIEW OF SYSTEMS:  Review of Systems   Constitutional: Positive for fatigue.   HENT: Negative.    Eyes: Negative.    Respiratory: Negative.    Cardiovascular: Negative.    Gastrointestinal: Positive for diarrhea and nausea.   Endocrine: Negative.    Genitourinary: Negative.    Musculoskeletal: Negative.    Skin: Positive for color change and wound.   Allergic/Immunologic: Negative.    Neurological: Negative.    Hematological: Negative.    Psychiatric/Behavioral: Negative.               Vitals:    03/14/22 1100 03/14/22 1500 03/14/22 1938 03/15/22 0002   BP: 122/77 128/84 111/72 121/66   BP Location: Left arm Left arm Left arm Left arm   Patient Position: Lying Lying Lying Sitting   Pulse: 98 81 73 86   Resp: 16 16 16 16   Temp: 97.8 °F (36.6 °C) 97.8 °F (36.6 °C) 97.9 °F (36.6 °C) 97.4 °F (36.3 °C)   TempSrc: Oral Oral Oral Oral   SpO2: 96% 96% 95% 95%   Weight:       Height:         No flowsheet data found.   PHYSICAL EXAM:      CONSTITUTIONAL:  Vital signs reviewed.  No distress, looks comfortable.  EYES:  Conjunctivae and lids unremarkable.  PERRLA  EARS,NOSE,MOUTH,THROAT:  Ears and nose appear unremarkable.  Lips, teeth, gums appear unremarkable.  RESPIRATORY:  Normal respiratory effort.  Lungs clear to auscultation bilaterally.  CARDIOVASCULAR:  Normal S1, S2.  No murmurs rubs or gallops.  No significant lower extremity edema.  GASTROINTESTINAL: Abdomen appears unremarkable.  Nontender.  No hepatomegaly.  No splenomegaly.  LYMPHATIC:  No cervical, supraclavicular, axillary lymphadenopathy.  MUSCULOSKELETAL:  Unremarkable gait and station.  Unremarkable digits/nails.  No cyanosis or clubbing.  SKIN: Multiple ulcerative lesions noted on the toes on the right foot as well as on the left heel..  PSYCHIATRIC:  Normal judgment and insight.  Normal mood and affect.      RECENT LABS:        WBC   Date Value Ref Range Status   03/15/2022 11.00 (H) 3.40 - 10.80 10*3/mm3 Final   03/14/2022 13.93 (H) 3.40 - 10.80  10*3/mm3 Final   03/13/2022 15.06 (H) 3.40 - 10.80 10*3/mm3 Final   03/12/2022 31.54 (C) 3.40 - 10.80 10*3/mm3 Final     Hemoglobin   Date Value Ref Range Status   03/15/2022 9.8 (L) 12.0 - 15.9 g/dL Final   03/14/2022 11.0 (L) 12.0 - 15.9 g/dL Final   03/13/2022 9.9 (L) 12.0 - 15.9 g/dL Final   03/12/2022 10.6 (L) 12.0 - 15.9 g/dL Final     Platelets   Date Value Ref Range Status   03/15/2022 371 140 - 450 10*3/mm3 Final   03/14/2022 412 140 - 450 10*3/mm3 Final   03/13/2022 374 140 - 450 10*3/mm3 Final   03/12/2022 424 140 - 450 10*3/mm3 Final       Assessment/Plan   [unfilled]      Ambar Lara     *Splenic infarct  · Most likely embolic in etiology  · She does have cardiomyopathy with an ejection fraction of 20% which is a risk factor  · There is also concern for ulcerated plaque or thrombus showering emboli to her feet.  · She is currently on prophylactic heparin.  · CT angiogram has been ordered by vascular surgery  · Follow-up on the results  · Hypercoagulability work-up will be ordered however I suspect that this is more of an embolic process rather than underlying thrombophilia.  · She has had a previous history of PE in the setting of COVID-19 infection and prolonged hospitalization.  · She is currently seeing Dr. Sanjay Shrestha with hematology.  She was on anticoagulation for 6 months following the PE and subsequently discontinued.    *C. difficile colitis  · Currently on treatment with vancomycin  · Diarrhea has improved      *Left-sided nephrolithiasis and pyelonephritis  · Status post stent placement which has been removed now.  · Resolved.    *Cardiomyopathy  · EF low at 20%  · Also with mitral valve regurgitation.  · Documented to be nonischemic cardiomyopathy.   · She was evaluated for transplant.    *Diabetes  · Management per primary team      *End-stage renal disease  · Currently undergoing peritoneal dialysis  · Plan for dialysis after CT angiogram    *Anemia  · Baseline hemoglobin  between 9 and 10.  · Hemoglobin 9.8 this morning which is pretty much around baseline  · Iron studies suggestive of anemia of chronic disease  · B12 and folic acid normal    *Leukocytosis  · Improving  · Secondary to C. difficile infection    *Right toe dry gangrene  · Concern for plaque rupture and embolic phenomena  · CT angiogram ordered    *Previous history of PE  · After severe COVID-19 infection  · Treated with Coumadin for 6 months  · Seen by Dr. Sanjay Shrestha    Recommendations  1.most likely embolic from a ruptured plaque.  2.CT angiogram ordered, follow-up on results  3.currently on heparin prophylactic dose  4.she will benefit from full dose anticoagulation with or without Plavix, I will defer the final decision to vascular surgery and cardiology.  Consider Eliquis or Coumadin.

## 2022-03-15 NOTE — SIGNIFICANT NOTE
CTA C/A/P reviewed.  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  Calcified lower extremity vessels that are grossly patent without severe stenosis and adequate toe pressures for wound healing.  Would recommend dual antiplatelet therapy for the infrarenal ulceration with mural thrombus and the splenic artery dissection.  Patient already on aspirin, plavix ordered.  Recommend continued local wound care to the right foot with betadine paint.  I have no further plans for IV contrast administration during this hospitalization so Shiley catheter can be discontinued at any time per nephrology recommendation.    Sommer Dowd MD  Vascular Surgery  [facility]  O: [phone]  F: [phone]

## 2022-03-15 NOTE — PROGRESS NOTES
LOS: 4 days     Chief Complaint:  Follow-up C diff    Interval History: Patient reports he is doing well this morning.  Continues to have no diarrhea.  States her bowel movements are at baseline.  Denies any fevers or chills.    Fever curve remains within normal limits.  Leukocytosis markedly improved.    Vital Signs  Temp:  [97.4 °F (36.3 °C)-97.9 °F (36.6 °C)] 97.4 °F (36.3 °C)  Heart Rate:  [73-98] 95  Resp:  [16] 16  BP: ()/(63-84) 96/63    Physical Exam:  GENERAL: Awake and alert, no acute distress  Head: no trauma  Eyes: no scleral icterus  CV:  Regular rate and rhythm.  LUNGS: Normal respiratory effort.  No wheezing.  GI: Soft, tender diffusely, nondistended. No appreciable organomegaly.  Peritoneal catheter in place.  SKIN: Erythema of the right lower extremity toes without any warmth or drainage.  PSYCHIATRIC: Appropriate mood, affect, insight, and judgment.     Antibiotics:  •  vancomycin (VANCOCIN) capsule 125 mg, 125 mg, Oral, Q6H, Everett Hui MD, 125 mg at 03/12/22 0543  •  [START ON 3/21/2022] vancomycin (VANCOCIN) capsule 125 mg, 125 mg, Oral, Q12H, Everett Hui MD  •  [START ON 3/29/2022] vancomycin (VANCOCIN) capsule 125 mg, 125 mg, Oral, Q24H, Everett Hui MD  •  [START ON 4/5/2022] vancomycin (VANCOCIN) capsule 125 mg, 125 mg, Oral, Every Other Day, Everett Hui MD    LABS:  CBC, BMP, micro reviewed today  Lab Results   Component Value Date    WBC 11.00 (H) 03/15/2022    HGB 9.8 (L) 03/15/2022    HCT 31.5 (L) 03/15/2022    MCV 96.9 03/15/2022     03/15/2022     Lab Results   Component Value Date    GLUCOSE 99 03/15/2022    CALCIUM 8.7 03/15/2022     03/15/2022    K 3.4 (L) 03/15/2022    CO2 21.8 (L) 03/15/2022     03/15/2022    BUN 22 (H) 03/15/2022    CREATININE 4.05 (H) 03/15/2022    EGFRIFAFRI  10/14/2021      Comment:      <15 Indicative of kidney failure.    EGFRIFNONA 7 (L) 02/28/2022    BCR 5.4 (L) 03/15/2022    ANIONGAP 16.2 (H) 03/15/2022        Microbiology:  3/11 C. difficile positive  3/11 Covid negative  3/11 GI pathogen panel negative  3/11 blood cultures: NGTD    Imaging:  TTE w/ EF 20%    Assessment/Plan   #Sepsis  #C. difficile colitis  #Splenic infarcts  #Nephrolithiasis status post left ureteral stent  #ESRD on PD  #Right foot skin changes  #systolic CHF  #Mitral regurgitation     Continue oral vancomycin w/ prolonged taper for C. difficile colitis.  She is largely resolved symptomatically which is a positive sign.    MRI of the right foot has been completed with somewhat equivocal findings for infection.  These could be consistent with chronic osteomyelitis however given her underlying C. difficile I would have a very high threshold to empirically treat her for osteomyelitis for 6 weeks with broad-spectrum empiric antibiotics for chronic osteomyelitis.  This was discussed with Dr. Doss and currently there are plans for further imaging with CTA of the lower extremities to evaluate for other possible embolic sources.    ID will follow.

## 2022-03-15 NOTE — PROGRESS NOTES
[facility]    PROGRESS NOTE    Name:  Ambar Lara   Age:  51 y.o.  Sex:  female  :  1970  MRN:  [id number]   Visit Number:  [id number]  Admission Date:  3/11/2022  Date Of Service:  03/15/22  Primary Care Physician:  Jet Manuel MD     LOS: 4 days :  Patient Care Team:  Jet Manuel MD as PCP - General (Internal Medicine):    History taken from:     patient    Chief Complaint:      Right foot pain    Subjective     Interval History:     Patient seen and examined again today.    Patient is a 51-year-old with C. difficile colitis, admitted for sepsis with septic shock.  She has chronic systolic congestive heart failure, end-stage renal disease on peritoneal dialysis, cardiomyopathy with EF 20%, severe mitral valve regurgitation, right foot wound, recent stent with nephrolithiasis.  Nephrology, pulmonology, ID are consulted.  Did consult urology due to the fact that she still has the stent in place.  They did pull the stent on 2020.    She feels much better. She currently denies any chest pressure, shortness of breath, nausea, vomiting.  She is still having diarrhea, but feels this is her normal.    She currently denies any abdominal pain.  Will discontinue hepatic Doppler at this present as this does not appear to be warranted.    MRI of the lower extremity was done which was equivocal for osteomyelitis.  Infectious diseases following, they advised the patient on vancomycin taper for C. difficile.  They are holding off on any further antibiotics for the lower extremity, as this appears to be due to emboli.    She is also noted to have splenic infarcts, hematology has been consulted await input.  She also had pain in her right foot with some areas of ischemia on the toes.  She had previous arterial Doppler which showed mild ischemia.  She has not seen vascular surgery in the past.  Consulted vascular surgery and spoke with Dr. Dowd.  She felt that this may be  arterial embolism and recommended CT angiogram.  This was coordinated with nephrology who recommended the patient undergo hemodialysis after this procedure.  Shiley catheter was placed.    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  Dual antiplatelet therapy was recommended.  Patient is now on aspirin and Plavix.    Review of Systems:     All systems were reviewed and negative except for:  Musculoskeletal: positive for  muscle weakness    Objective     Vital Signs:    Temp:  [97.4 °F (36.3 °C)-97.9 °F (36.6 °C)] 97.4 °F (36.3 °C)  Heart Rate:  [73-99] 99  Resp:  [16] 16  BP: ()/(63-84) 96/63    Physical Exam:    General: Alert and oriented x4, mild distress.  Heart: Regular rate and rhythm without murmur rub or thrill.  Lungs: Clear to auscultation bilaterally without use of accessory muscles or respiration.  Abdomen: Soft/nontender/nondistended.  No HSM noted.  MSK: 4/5 strength in upper/lower extremities bilaterally.  Right foot with decreased pulses, multiple areas of eschar noted.     Results Review:      I reviewed the patient's new clinical results.  I reviewed the patient's new imaging results and agree with the interpretation.  I reviewed the patient's other test results and agree with the interpretation    Labs:    Lab Results (last 24 hours)     Procedure Component Value Units Date/Time    Ferritin [[id number]]  (Abnormal) Collected: 03/15/22 1156    Specimen: Blood Updated: 03/15/22 1315     Ferritin 453.00 ng/mL     Narrative:      Results may be falsely decreased if patient taking Biotin.      Folate [[id number]]  (Normal) Collected: 03/15/22 1156    Specimen: Blood Updated: 03/15/22 1315     Folate >20.00 ng/mL     Narrative:      Results may be falsely increased if patient taking Biotin.      Vitamin B12 [[id number]]  (Normal) Collected: 03/15/22 1156    Specimen: Blood Updated: 03/15/22  1315     Vitamin B-12 513 pg/mL     Narrative:      Results may be falsely increased if patient taking Biotin.      Iron Profile [[id number]]  (Abnormal) Collected: 03/15/22 1156    Specimen: Blood Updated: 03/15/22 1300     Iron 82 mcg/dL      Iron Saturation 29 %      Transferrin 192 mg/dL      TIBC 286 mcg/dL     Lactate Dehydrogenase [[id number]]  (Abnormal) Collected: 03/15/22 1156    Specimen: Blood Updated: 03/15/22 1300      U/L     Protein S Functional [[id number]]  (Normal) Collected: 03/15/22 1156    Specimen: Blood Updated: 03/15/22 1248     Protein S Functional 109 %     Narrative:      Lupus anticoagulants and/or anti-phospholipid antibodies (APA) have been noted to interfere in the assay. Highly elevated levels of Factor VIII (greater than 250%) may lead to an under-estimation of the functional protein S level. Thrombin inhibitors and heparin may lead to an over-estimation of the functional protein S level.    Protein S Antigen, Free [[id number]]  (Normal) Collected: 03/15/22 1156    Specimen: Blood Updated: 03/15/22 1240     Protein S Ag, Free 112.0 %     Narrative:      Deficiency of Protein S is associated with a high risk of developing venous thromboembolism, especially in young adults.  Acquired deficiencies of Protein S are associated with pregnancy, hepatic disorder, oral anticoagulant therapy, disseminated intravascular coagulation (DIC), newborns, and other clinical conditions.      Antithrombin III [[id number]]  (Normal) Collected: 03/15/22 1156    Specimen: Blood from Arm, Left Updated: 03/15/22 1232     Antithrombin Activity 102 %     Factor 5 Leiden [[id number]] Collected: 03/15/22 1156    Specimen: Blood Updated: 03/15/22 1214    Anticardiolipin Antibody, IgG / M, Qn [[id number]] Collected: 03/15/22 1156    Specimen: Blood Updated: 03/15/22 1214    Beta-2 Glycoprotein Antibodies [[id number]] Collected: 03/15/22 1156    Specimen: Blood Updated: 03/15/22 1214    Lupus Anticoagulant  [[id number]] Collected: 03/15/22 1156    Specimen: Blood Updated: 03/15/22 1214    Factor II, DNA Analysis [[id number]] Collected: 03/15/22 1156    Specimen: Blood Updated: 03/15/22 1214    Blood Culture - Blood, Arm, Left [[id number]]  (Normal) Collected: 03/11/22 0907    Specimen: Blood from Arm, Left Updated: 03/15/22 1017     Blood Culture No growth at 4 days    Blood Culture - Blood, Blood, Central Line [[id number]]  (Normal) Collected: 03/11/22 0849    Specimen: Blood, Central Line Updated: 03/15/22 1017     Blood Culture No growth at 4 days    Magnesium [[id number]]  (Normal) Collected: 03/15/22 0513    Specimen: Blood Updated: 03/15/22 0616     Magnesium 2.2 mg/dL     Comprehensive Metabolic Panel [[id number]]  (Abnormal) Collected: 03/15/22 0513    Specimen: Blood Updated: 03/15/22 0614     Glucose 99 mg/dL      BUN 22 mg/dL      Creatinine 4.05 mg/dL      Sodium 144 mmol/L      Potassium 3.4 mmol/L      Chloride 106 mmol/L      CO2 21.8 mmol/L      Calcium 8.7 mg/dL      Total Protein 5.9 g/dL      Albumin 3.30 g/dL      ALT (SGPT) 12 U/L      AST (SGOT) 9 U/L      Alkaline Phosphatase 72 U/L      Total Bilirubin <0.2 mg/dL      Globulin 2.6 gm/dL      A/G Ratio 1.3 g/dL      BUN/Creatinine Ratio 5.4     Anion Gap 16.2 mmol/L      eGFR 12.9 mL/min/1.73      Comment: <15 Indicative of kidney failure       Narrative:      GFR Normal >60  Chronic Kidney Disease <60  Kidney Failure <15      Phosphorus [[id number]]  (Abnormal) Collected: 03/15/22 0513    Specimen: Blood Updated: 03/15/22 0614     Phosphorus 6.3 mg/dL     CBC & Differential [[id number]]  (Abnormal) Collected: 03/15/22 0513    Specimen: Blood Updated: 03/15/22 0548    Narrative:      The following orders were created for panel order CBC & Differential.  Procedure                               Abnormality         Status                     ---------                               -----------         ------                     CBC Auto  Differential[[id number]]        Abnormal            Final result                 Please view results for these tests on the individual orders.    CBC Auto Differential [[id number]]  (Abnormal) Collected: 03/15/22 0513    Specimen: Blood Updated: 03/15/22 0548     WBC 11.00 10*3/mm3      RBC 3.25 10*6/mm3      Hemoglobin 9.8 g/dL      Hematocrit 31.5 %      MCV 96.9 fL      MCH 30.2 pg      MCHC 31.1 g/dL      RDW 13.1 %      RDW-SD 46.1 fl      MPV 10.0 fL      Platelets 371 10*3/mm3      Neutrophil % 61.9 %      Lymphocyte % 28.4 %      Monocyte % 3.9 %      Eosinophil % 2.9 %      Basophil % 0.9 %      Immature Grans % 2.0 %      Neutrophils, Absolute 6.81 10*3/mm3      Lymphocytes, Absolute 3.12 10*3/mm3      Monocytes, Absolute 0.43 10*3/mm3      Eosinophils, Absolute 0.32 10*3/mm3      Basophils, Absolute 0.10 10*3/mm3      Immature Grans, Absolute 0.22 10*3/mm3      nRBC 0.0 /100 WBC            Radiology:    Imaging Results (Last 24 Hours)     Procedure Component Value Units Date/Time    CT Angiogram Chest [[id number]] Collected: 03/15/22 1325     Updated: 03/15/22 1325    Narrative:      CT ANGIOGRAM OF THE CHEST, ABDOMEN, AND PELVIS. MULTIPLE CORONAL,  SAGITTAL, AND 3D RECONSTRUCTIONS     HISTORY: 51-year-old female with end-stage renal disease on peritoneal  dialysis, CHF, C. difficile, dry gangrene of the right foot. Possible  arterial emboli.     TECHNIQUE: Radiation dose reduction techniques were utilized, including  automated exposure control and exposure modulation based on body size.   CT angiogram of the chest, abdomen, and pelvis was performed following  the administration of IV contrast. Multiple coronal, sagittal, and 3D  reconstruction images were obtained. Compared with noncontrasted CT  abdomen and pelvis from 02/27/2022 and previous contrast enhanced CTs  from 04/09/2021.     FINDINGS: There is motion artifact at the aortic root and ascending  thoracic aorta, but there is no evidence for  aneurysmal dilatation.  Aortic arch and descending thoracic aorta appear unremarkable. The  caliber of the abdominal aorta is normal. There is an irregular contour  of the splenic artery and there are dissections within splenic artery  branches at the hilum of the spleen, images 131-138 and these are also  seen on the coronal and sagittal reconstructions. The celiac artery and  hepatic artery branches are patent. The SMA and renal arteries appear  patent. Inferior to the renal arteries along the left wall of the  abdominal aorta, is a slightly irregular contour with suggestion of an  ulcerated plaque or dissection, image 181. The plaque along the right  wall of the distal abdominal aorta was present previously and appears  largely unchanged. The common iliac, internal and external iliac  arteries appear patent. The superficial femoral and profunda femoral  arteries are diminutive in caliber and peripherally calcified. There are  no dominant stenoses. The popliteal arteries are patent as are the  trifurcations and enhancement is seen at the ankles and feet.     The spleen is very heterogeneous with variable aged appearing involving  splenic infarcts. Unfortunately, the portal venous system cannot be  assessed as a delayed venous phase was not performed.     No acute abnormality is seen involving the liver. The gallbladder is  surgically absent. No acute abnormality is seen involving the pancreas  or kidneys. There are multiple variable size renal cysts. There is  extensive hyperplasia of the left adrenal gland and the right adrenal  gland is surgically absent. The oblong partially mineralized 2.5 cm  cystic or vascular lesion along the transverse duodenum is stable, image  195. The lesion was larger on the previous CT of the abdomen from  04/09/2021. There is no bowel ileus or obstruction. The colonic wall  appears thicker than on the recent CT of the abdomen from 02/27/2022,  but had a similar appearing thickening  on the CT from 09/21/2021 and was  more severe on the CT from 04/09/2021. There is peritoneal dialysis  related free air and free fluid.       Impression:      1. 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  There is also possibly a dissection or an ulcerated plaque along the  left wall of the infrarenal abdominal aorta.  2. There is no aneurysmal dilatation of the thoracic aorta. Motion  artifact is noted at the aortic root and ascending portion.  3. The superficial, femoral and profunda femoral arteries are diminutive  and diffusely peripherally calcified. There are no dominant stenoses.  Popliteal artery and trifurcation are patent and enhancement of branches  is seen at the ankles and feet.  4. There has been near complete resolution of the pulmonary infiltrates  since the 04/09/2021 CTA. There are chronic-appearing reticular markings  and patchy air trapping is suspected bilaterally.  5. The small lesion along the transverse duodenum is of uncertain  etiology at this point and may possibly have a vascular origin.  Conservative surveillance is recommended.     Most of these findings were discussed with Dr. Dowd.             CT Angio Abdominal Aorta Bilateral Iliofem Runoff [[id number]] Collected: 03/15/22 1325     Updated: 03/15/22 1325    Narrative:      CT ANGIOGRAM OF THE CHEST, ABDOMEN, AND PELVIS. MULTIPLE CORONAL,  SAGITTAL, AND 3D RECONSTRUCTIONS     HISTORY: 51-year-old female with end-stage renal disease on peritoneal  dialysis, CHF, C. difficile, dry gangrene of the right foot. Possible  arterial emboli.     TECHNIQUE: Radiation dose reduction techniques were utilized, including  automated exposure control and exposure modulation based on body size.   CT angiogram of the chest, abdomen, and pelvis was performed following  the administration of IV contrast. Multiple coronal, sagittal, and 3D  reconstruction  images were obtained. Compared with noncontrasted CT  abdomen and pelvis from 02/27/2022 and previous contrast enhanced CTs  from 04/09/2021.     FINDINGS: There is motion artifact at the aortic root and ascending  thoracic aorta, but there is no evidence for aneurysmal dilatation.  Aortic arch and descending thoracic aorta appear unremarkable. The  caliber of the abdominal aorta is normal. There is an irregular contour  of the splenic artery and there are dissections within splenic artery  branches at the hilum of the spleen, images 131-138 and these are also  seen on the coronal and sagittal reconstructions. The celiac artery and  hepatic artery branches are patent. The SMA and renal arteries appear  patent. Inferior to the renal arteries along the left wall of the  abdominal aorta, is a slightly irregular contour with suggestion of an  ulcerated plaque or dissection, image 181. The plaque along the right  wall of the distal abdominal aorta was present previously and appears  largely unchanged. The common iliac, internal and external iliac  arteries appear patent. The superficial femoral and profunda femoral  arteries are diminutive in caliber and peripherally calcified. There are  no dominant stenoses. The popliteal arteries are patent as are the  trifurcations and enhancement is seen at the ankles and feet.     The spleen is very heterogeneous with variable aged appearing involving  splenic infarcts. Unfortunately, the portal venous system cannot be  assessed as a delayed venous phase was not performed.     No acute abnormality is seen involving the liver. The gallbladder is  surgically absent. No acute abnormality is seen involving the pancreas  or kidneys. There are multiple variable size renal cysts. There is  extensive hyperplasia of the left adrenal gland and the right adrenal  gland is surgically absent. The oblong partially mineralized 2.5 cm  cystic or vascular lesion along the transverse duodenum is  stable, image  195. The lesion was larger on the previous CT of the abdomen from  04/09/2021. There is no bowel ileus or obstruction. The colonic wall  appears thicker than on the recent CT of the abdomen from 02/27/2022,  but had a similar appearing thickening on the CT from 09/21/2021 and was  more severe on the CT from 04/09/2021. There is peritoneal dialysis  related free air and free fluid.       Impression:      1. 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  There is also possibly a dissection or an ulcerated plaque along the  left wall of the infrarenal abdominal aorta.  2. There is no aneurysmal dilatation of the thoracic aorta. Motion  artifact is noted at the aortic root and ascending portion.  3. The superficial, femoral and profunda femoral arteries are diminutive  and diffusely peripherally calcified. There are no dominant stenoses.  Popliteal artery and trifurcation are patent and enhancement of branches  is seen at the ankles and feet.  4. There has been near complete resolution of the pulmonary infiltrates  since the 04/09/2021 CTA. There are chronic-appearing reticular markings  and patchy air trapping is suspected bilaterally.  5. The small lesion along the transverse duodenum is of uncertain  etiology at this point and may possibly have a vascular origin.  Conservative surveillance is recommended.     Most of these findings were discussed with Dr. Dowd.             XR Chest Post CVA Port [[id number]] Collected: 03/15/22 1043     Updated: 03/15/22 1047    Narrative:      XR CHEST POST CVA PORT-  03/15/2022     HISTORY: Central line placement.     Right internal jugular central venous catheter seen with its tip  overlying the superior cavoatrial junction.     No pneumothorax is seen.     There is mild cardiomegaly. Lungs appear clear.       Impression:      . Central venous catheter placement as  described. No  pneumothorax is seen.     This report was finalized on 3/15/2022 10:43 AM by Dr. Lawson Beckford M.D.       CT Abdomen Pelvis Without Contrast [[id number]] Collected: 03/11/22 1050     Updated: 03/14/22 1420    Narrative:      CT ABDOMEN AND PELVIS WITHOUT CONTRAST     HISTORY: 51-year-old female with diarrhea.     TECHNIQUE: Axial CT images of the abdomen and pelvis were obtained  without administration of intravenous contrast. The patient was not  given oral contrast. Coronal and sagittal reformats were obtained.     COMPARISON: 02/27/2022     FINDINGS: There is pneumoperitoneum demonstrated. This is possibly  related to the peritoneal dialysis catheter and this finding should be  correlated with clinical history. The stomach and small bowel loops are  unremarkable. There is diffuse wall thickening involving the colon in a  pattern consistent with pancolitis. There is diffuse pericolic fat  stranding particularly surrounding the cecum with small lymph nodes. No  definite bowel wall pneumatosis is identified. There is no evidence of  obstruction.     Noncontrast attenuation of the liver is normal. Gallbladder is  surgically absent. No intrahepatic biliary dilatation. The spleen  demonstrates wedge-shaped hypoattenuating lesions most consistent with  sequela of resolving splenic infarcts. The pancreas is normal without  ductal dilatation. Again demonstrated is a peripherally calcified lesion  arising from or immediately adjacent to the horizontal portion of the  duodenum, unchanged in appearance from prior CT. There is a similar  lesion also seen within the left superior pelvis image 96 measuring 3.0  x 2.0 cm. This is favored to represent the patient's ovary. The previous  CT had demonstrated a 3rd lesion in the right lower quadrant and this is  no longer seen and may have represented inspissated contrast within a  small bowel loop. The uterus is surgically absent.     Diffuse low-density  thickening of the left adrenal gland is present and  most consistent with adenomatous hyperplasia. There has been a prior  right adrenalectomy. Both kidneys are poorly evaluated in the absence of  contrast with numerous cortical hypoattenuating lesions. Calculi are  demonstrated within the inferior poles of bilateral kidneys.       Impression:      1. CT findings of pancolitis. Pneumoperitoneum is demonstrated and this  is possibly related to patient's peritoneal dialysis. Clinical  correlation is recommended. There is no bowel wall pneumatosis. If  patient's clinical condition worsens, repeat CT may be helpful.  2. Left-sided ureteral stent in place with resolved hydronephrosis.  3. Stable appearance of spleen most suggestive of evolving splenic  infarcts.  4. Peripherally calcified lesion seen adjacent to the horizontal portion  of the duodenum. This lesion is unchanged from prior imaging decreased  in size from prior imaging in April 2021 however demonstrates interval  peripheral calcification. The etiology is uncertain and may represent a  fluid collection that is now chronic and attention on follow-up is  recommended.  5. Additional findings as above.     These findings were discussed with FACUNDO Lucas, by  telephone.     Radiation dose reduction techniques were utilized, including automated  exposure control and exposure modulation based on body size.     This report was finalized on 3/14/2022 2:17 PM by Dr. Jessika Beltran M.D.             Medication Review:     aspirin, 81 mg, Oral, Daily  b complex-vitamin c-folic acid, 1 tablet, Oral, Daily  clopidogrel, 75 mg, Oral, Daily  escitalopram, 20 mg, Oral, Daily  famotidine, 20 mg, Oral, Daily  heparin (porcine), 5,000 Units, Subcutaneous, Q8H  lanthanum, 1,500 mg, Oral, TID With Meals  levothyroxine, 137 mcg, Oral, Daily  magnesium oxide, 400 mg, Oral, BID  melatonin, 3 mg, Oral, Nightly  midodrine, 10 mg, Oral, TID AC  montelukast, 10 mg, Oral,  Nightly  potassium chloride, 40 mEq, Oral, Daily  saccharomyces boulardii, 500 mg, Oral, BID  sodium bicarbonate, 650 mg, Oral, 4x Daily  sodium chloride, 10 mL, Intravenous, Q12H  traZODone, 100 mg, Oral, Nightly  vancomycin, 125 mg, Oral, Q6H  [START ON 3/21/2022] vancomycin, 125 mg, Oral, Q12H  [START ON 3/29/2022] vancomycin, 125 mg, Oral, Q24H  [START ON 4/5/2022] vancomycin, 125 mg, Oral, Every Other Day        Pharmacy Consult,         Assessment/Plan     Principal Problem:    Sepsis without acute organ dysfunction (HCC)  Active Problems:    Chronic systolic heart failure (HCC)    ESRD on peritoneal dialysis (HCC)    Peritoneal dialysis status (HCC)    Clostridium difficile colitis    Obesity (BMI 30-39.9)    Fever in adult    Acute respiratory failure with hypoxia (HCC)    Hypokalemia    Anemia, chronic disease      1.  Septic shock, resolved   2.  C. difficile colitis   3.  Multiple splenic infarcts   4.  Nephrolithiasis status post left ureteral stent   5.  End-stage renal disease on peritoneal dialysis   6.  Cardiomyopathy ejection fraction 20%   7.  Severe mitral regurgitation   8.  Right foot wounds   9.  Hypothyroidism   10.  Previous diabetes type 2, normal hemoglobin A1c here.  11.  PAD with ischemic changes noted on toes  12.  Previous pulmonary emboli, with warfarin last taken 9/29/2021  13.  GENI  14.  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     Plan:    Vascular surgery input greatly appreciated.  Patient now on aspirin and Plavix.  Hemodialysis to be done today.  Generally catheter can be removed when nephrology is done with hemodialysis.  Continue with oral vancomycin per infectious disease following, MRI of the right foot has been ordered.    Lab work in the a.m. Possible home tomorrow if okay with all consultants.  Further recommendations will depend on clinical course.    Castillo PRYOR  DO Shanna  03/15/22  14:17 EDT

## 2022-03-15 NOTE — NURSING NOTE
Patient drained at 0818 AM. Fill time was scheduled for 1218. Patient has orders for hemodialysis. Patient does not want to be filled until after hemodialysis is completed. Will f/u with patient post HD. Will monitor.

## 2022-03-15 NOTE — PLAN OF CARE
Problem: Fall Injury Risk  Goal: Absence of Fall and Fall-Related Injury  Outcome: Ongoing, Progressing   Goal Outcome Evaluation:  Plan of Care Reviewed With: patient        Progress: no change  Outcome Evaluation: VSS. Temporary dialysis catheter placed at bedside for HD post contrast. CTA completed, no plans for vascular surgery. Awaiting hepatic ultrasound results. HD to be completed today. PD as tolerated by patient. Will monitor and provide supportive care.

## 2022-03-15 NOTE — PROGRESS NOTES
[facility] Progress Note      Patient Name: Ambar Lara  : 1970  MRN: [id number]  Primary Care Physician:  Jet Manuel MD  Date of admission: 3/11/2022    Subjective     Interval History:   Denies any chest pain, no shortness of breath, no lower extremity edema.  Vomited once overnight.  Plan for CT angiogram.    Review of Systems:   As noted above    Objective     Vitals:   Temp:  [97.4 °F (36.3 °C)-97.9 °F (36.6 °C)] 97.4 °F (36.3 °C)  Heart Rate:  [73-98] 86  Resp:  [16] 16  BP: (111-128)/(66-84) 121/66    Intake/Output Summary (Last 24 hours) at 3/15/2022 0733  Last data filed at 3/15/2022 0410  Gross per 24 hour   Intake 5380 ml   Output 2000 ml   Net 3380 ml       Physical Exam:    General Appearance: alert, oriented x 3, no acute distress   Skin: warm and dry  HEENT: oral mucosa normal, nonicteric sclera  Neck: supple, no JVD  Lungs: CTA  Heart: RRR, normal S1 and S2  Abdomen: Mildly distended mild tenderness noted no rebound and no guarding.  PD catheter in place. Exit site with no erythema or discharge.  : no palpable bladder  Extremities: no edema, cyanosis   Neuro: normal speech and mental status     Scheduled Meds:     aspirin, 81 mg, Oral, Daily  b complex-vitamin c-folic acid, 1 tablet, Oral, Daily  escitalopram, 20 mg, Oral, Daily  famotidine, 20 mg, Oral, Daily  heparin (porcine), 5,000 Units, Subcutaneous, Q8H  lanthanum, 750 mg, Oral, TID With Meals  levothyroxine, 137 mcg, Oral, Daily  magnesium oxide, 400 mg, Oral, BID  melatonin, 3 mg, Oral, Nightly  midodrine, 10 mg, Oral, TID AC  montelukast, 10 mg, Oral, Nightly  potassium chloride, 40 mEq, Oral, Daily  saccharomyces boulardii, 500 mg, Oral, BID  sodium bicarbonate, 650 mg, Oral, 4x Daily  sodium chloride, 10 mL, Intravenous, Q12H  traZODone, 100 mg, Oral, Nightly  vancomycin, 125 mg, Oral, Q6H  [START ON 3/21/2022] vancomycin, 125 mg, Oral, Q12H  [START ON 3/29/2022] vancomycin, 125 mg,  Oral, Q24H  [START ON 4/5/2022] vancomycin, 125 mg, Oral, Every Other Day      IV Meds:   Pharmacy Consult,         Results Reviewed:   I have personally reviewed the results from the time of this admission to 3/15/2022 07:33 EDT     Results from last 7 days   Lab Units 03/15/22  0513 03/14/22  1138 03/13/22  1316 03/13/22  0613 03/11/22  1503 03/11/22  0639   SODIUM mmol/L 144 141  --  139   < > 136   POTASSIUM mmol/L 3.4* 3.5 3.1* 2.8*   < > 4.7   CHLORIDE mmol/L 106 106  --  107   < > 107   CO2 mmol/L 21.8* 21.0*  --  17.6*   < > 13.3*   BUN mg/dL 22* 22*  --  27*   < > 21*   CREATININE mg/dL 4.05* 3.98*  --  4.31*   < > 4.12*   CALCIUM mg/dL 8.7 8.4*  --  8.0*   < > 8.3*   BILIRUBIN mg/dL <0.2  --   --  <0.2  --  0.2   ALK PHOS U/L 72  --   --  79  --  93   ALT (SGPT) U/L 12  --   --  10  --  19   AST (SGOT) U/L 9  --   --  8  --  26   GLUCOSE mg/dL 99 90  --  111*   < > 158*    < > = values in this interval not displayed.       Estimated Creatinine Clearance: 16.5 mL/min (A) (by C-G formula based on SCr of 4.05 mg/dL (H)).    Results from last 7 days   Lab Units 03/15/22  0513 03/14/22  1138 03/13/22  1316 03/13/22  0613   MAGNESIUM mg/dL 2.2 1.6 1.4*  --    PHOSPHORUS mg/dL 6.3* 5.3*  --  7.3*             Results from last 7 days   Lab Units 03/15/22  0513 03/14/22  1138 03/13/22  0613 03/12/22  0755 03/11/22  1503   WBC 10*3/mm3 11.00* 13.93* 15.06* 31.54* 35.67*   HEMOGLOBIN g/dL 9.8* 11.0* 9.9* 10.6* 10.0*   PLATELETS 10*3/mm3 371 412 374 424 358             Assessment / Plan     ASSESSMENT:  1.  End-stage renal disease on CCPD.  Using 1.5 and 2.5% dianeal at home.  Status post stent removal on March 14  2.  Hypovolemic shock/septic shock secondary to diarrhea and C. difficile colitis.  Better.  Will cut down midodrine to 5 mg  3.  High anion gap metabolic acidosis secondary to lactic acidosis due to hypoperfusion.  Improved oral bicarbonate  4.  Diarrhea.  Due to C. difficile improved on antibiotic therapy  by ID   5.  Sepsis secondary to C. difficile colitis.  6.  History of congestive heart failure with ejection fraction 20%  7.  C. difficile colitis on antibiotic by ID  8.  Hypothyroidism: On replacement  9.  Hyperphosphatemia on lanthanum  10. Splenic infarction: Etiology unknown could be secondary to severe hypotension  11. PVD:     Plan:    Continue  dwell time  4 hours and 2.5% solutions  Plan for 1 session of hemodialysis due to contrast exposure  Could not identify milligrams p.o. 3 times a day and the plan to taper it off or stop it shortly  Replace potassium as needed  We will increase lanthanum to 1500 p.o. 3 times a day with meals  Plan for CT angiogram today  Antibiotic per ID  Surveillance labs    Thank you for involving us in the care of Ambar JESSE Lara.  Please feel free to call with any questions.    Camila Salas MD  03/15/22  07:33 EDT    [facility]  [phone]      Much of this encounter note is an electronic transcription/translation of spoken language to printed text. The electronic translation of spoken language may permit erroneous, or at times, nonsensical words or phrases to be inadvertently transcribed; Although I have reviewed the note for such errors, some may still exist.

## 2022-03-15 NOTE — PLAN OF CARE
Goal Outcome Evaluation:   VSS. A&OX4. PRN pain medication given x1. Pt nauseous/vomiting after dose given. N/o pepto. N/V improved after dose given. PD fill done this AM, to be drained at 0800. Plans for CTA and hepatic US today. Will continue to monitor.

## 2022-03-16 NOTE — PLAN OF CARE
Goal Outcome Evaluation:   VSS. A&OX4. No c/o pain or discomfort. HD completed. Pt received morphine and ativan prior to shiley insertion this AM. Pt requesting morphine and ativan again for removal. Page placed and N/O received. Shiley removed at the bedside without difficulty. Gauze and tegaderm applied. Pt requesting PD not be done until the AM. Will continue to monitor.    0630- Gave AM medications, asked if pt was ready to do PD, pt does not want to do it at this time. Will continue to monitor.

## 2022-03-16 NOTE — PROGRESS NOTES
[facility] Progress Note      Patient Name: Ambar Lara  : 1970  MRN: [id number]  Primary Care Physician:  Jet Manuel MD  Date of admission: 3/11/2022    Subjective     Interval History:   Denies any chest pain, no shortness of breath, no lower extremity edema.      Review of Systems:   As noted above    Objective     Vitals:   Temp:  [97.7 °F (36.5 °C)-98.6 °F (37 °C)] 98.4 °F (36.9 °C)  Heart Rate:  [92-97] 97  Resp:  [16] 16  BP: ()/(54-85) 124/85    Intake/Output Summary (Last 24 hours) at 3/16/2022 1747  Last data filed at 3/16/2022 1500  Gross per 24 hour   Intake 1320 ml   Output --   Net 1320 ml       Physical Exam:    General Appearance: alert, oriented x 3, no acute distress   Skin: warm and dry  HEENT: oral mucosa normal, nonicteric sclera  Neck: supple, no JVD  Lungs: CTA  Heart: RRR, normal S1 and S2  Abdomen: Mildly distended mild tenderness noted no rebound and no guarding.  PD catheter in place. Exit site with no erythema or discharge.  : no palpable bladder  Extremities: no edema, cyanosis   Neuro: normal speech and mental status     Scheduled Meds:     aspirin, 81 mg, Oral, Daily  b complex-vitamin c-folic acid, 1 tablet, Oral, Daily  clopidogrel, 75 mg, Oral, Daily  escitalopram, 20 mg, Oral, Daily  famotidine, 20 mg, Oral, Daily  heparin (porcine), 5,000 Units, Subcutaneous, Q8H  lanthanum, 1,500 mg, Oral, TID With Meals  levothyroxine, 137 mcg, Oral, Daily  magnesium oxide, 400 mg, Oral, BID  melatonin, 3 mg, Oral, Nightly  midodrine, 10 mg, Oral, TID AC  montelukast, 10 mg, Oral, Nightly  potassium chloride, 40 mEq, Oral, Daily  saccharomyces boulardii, 500 mg, Oral, BID  sodium bicarbonate, 650 mg, Oral, 4x Daily  sodium chloride, 10 mL, Intravenous, Q12H  traZODone, 100 mg, Oral, Nightly  vancomycin, 125 mg, Oral, Q6H  [START ON 3/21/2022] vancomycin, 125 mg, Oral, Q12H  [START ON 3/29/2022] vancomycin, 125 mg, Oral, Q24H  [START ON  4/5/2022] vancomycin, 125 mg, Oral, Every Other Day      IV Meds:   Pharmacy Consult,         Results Reviewed:   I have personally reviewed the results from the time of this admission to 3/16/2022 17:47 EDT     Results from last 7 days   Lab Units 03/16/22  0619 03/15/22  0513 03/14/22  1138 03/13/22  1316 03/13/22  0613 03/11/22  1503 03/11/22  0639   SODIUM mmol/L 141 144 141  --  139   < > 136   POTASSIUM mmol/L 4.4 3.4* 3.5   < > 2.8*   < > 4.7   CHLORIDE mmol/L 107 106 106  --  107   < > 107   CO2 mmol/L 21.8* 21.8* 21.0*  --  17.6*   < > 13.3*   BUN mg/dL 10 22* 22*  --  27*   < > 21*   CREATININE mg/dL 2.77* 4.05* 3.98*  --  4.31*   < > 4.12*   CALCIUM mg/dL 9.1 8.7 8.4*  --  8.0*   < > 8.3*   BILIRUBIN mg/dL  --  <0.2  --   --  <0.2  --  0.2   ALK PHOS U/L  --  72  --   --  79  --  93   ALT (SGPT) U/L  --  12  --   --  10  --  19   AST (SGOT) U/L  --  9  --   --  8  --  26   GLUCOSE mg/dL 101* 99 90  --  111*   < > 158*    < > = values in this interval not displayed.       Estimated Creatinine Clearance: 22.8 mL/min (A) (by C-G formula based on SCr of 2.77 mg/dL (H)).    Results from last 7 days   Lab Units 03/16/22  0619 03/15/22  0513 03/14/22  1138   MAGNESIUM mg/dL 2.2 2.2 1.6   PHOSPHORUS mg/dL 4.5 6.3* 5.3*             Results from last 7 days   Lab Units 03/16/22  0619 03/15/22  0513 03/14/22  1138 03/13/22  0613 03/12/22  0755   WBC 10*3/mm3 12.61* 11.00* 13.93* 15.06* 31.54*   HEMOGLOBIN g/dL 10.6* 9.8* 11.0* 9.9* 10.6*   PLATELETS 10*3/mm3 370 371 412 374 424             Assessment / Plan     ASSESSMENT:  1.  End-stage renal disease on CCPD.  Using 1.5 and 2.5% dianeal at home.  Status post stent removal on March 14  2.  Hypovolemic shock/septic shock secondary to diarrhea and C. difficile colitis.  Better.  Will cut down midodrine to 5 mg  3.  High anion gap metabolic acidosis secondary to lactic acidosis due to hypoperfusion.  Improved oral bicarbonate  4.  Diarrhea.  Due to C. difficile  improved on antibiotic therapy by ID   5.  Sepsis secondary to C. difficile colitis.  6.  History of congestive heart failure with ejection fraction 20%  7.  C. difficile colitis on antibiotic by ID  8.  Hypothyroidism: On replacement  9.  Hyperphosphatemia on lanthanum  10. Splenic infarction: Etiology unknown could be secondary to severe hypotension  11. PVD:     Plan:    Discussed with the patient.  Patient is okay to be discharged from kidney standpoint.  She will follow up in dialysis unit next Wednesday.    Surveillance labs    Thank you for involving us in the care of Ambar Lara.  Please feel free to call with any questions.    Camila Salas MD  03/16/22  17:47 EDT    [facility]  [phone]      Much of this encounter note is an electronic transcription/translation of spoken language to printed text. The electronic translation of spoken language may permit erroneous, or at times, nonsensical words or phrases to be inadvertently transcribed; Although I have reviewed the note for such errors, some may still exist.

## 2022-03-16 NOTE — NURSING NOTE
Patient does not wish to receive PD today, since she will discharge later this evening she wants to get back on her nightly cyclic routine at home. Nephrology okay with this plan.

## 2022-03-16 NOTE — PROGRESS NOTES
REASON FOR FOLLOWUP/CHIEF COMPLAINT:        HISTORY OF PRESENT ILLNESS:   CT chest abdomen pelvis performed yesterday.  Images independently reviewed and interpreted by me.  Splenic artery dissection extending into the splenic hilum and multiple splenic infarcts noted.  Dissection of an ulcerated plaque along the left wall of the infrarenal abdominal aorta noted.  No aneurysmal dilatation of the thoracic aorta.  The superficial femoral and profundofemoral arteries are peripherally calcified.  No dominant stenosis.  Vascular surgery recommends dual antiplatelet therapy.  Iron studies show normal iron of 82, normal iron saturation of 29%, TIBC low at 286  B12 normal at 513, prothrombin gene mutation negative, factor V Leiden mutation negative, Antithrombin activity normal at 102, protein S antigen normal at 112, protein S functional normal at 109      Past Medical History, Past Surgical History, Social History, Family History have been reviewed and are without significant changes except as mentioned.    Review of Systems   Review of Systems   Constitutional: Positive for fatigue.   HENT: Negative.    Eyes: Negative.    Respiratory: Negative.    Cardiovascular: Negative.    Gastrointestinal: Positive for nausea.   Endocrine: Negative.    Genitourinary: Negative.    Musculoskeletal: Negative.    Skin: Positive for color change and wound.   Allergic/Immunologic: Negative.    Neurological: Negative.    Hematological: Negative.    Psychiatric/Behavioral: Negative.        Medications:  The current medication list was reviewed in the EMR    ALLERGIES:    Allergies   Allergen Reactions   • Penicillins Anaphylaxis, Hives, Shortness Of Breath, Swelling and Rash     Tolerated cephalosporins in past   • Nickel Rash              Vitals:    03/15/22 1900 03/15/22 2300 03/16/22 0557 03/16/22 0850   BP: 96/69 111/54  126/71   BP Location: Left arm Left arm  Left arm   Patient Position: Lying Lying  Sitting   Pulse: 92 93      Resp: 16 16  16   Temp: 97.9 °F (36.6 °C) 97.7 °F (36.5 °C)  98.6 °F (37 °C)   TempSrc: Oral Oral  Oral   SpO2: 96% 97%  98%   Weight:   75.3 kg (166 lb 1.6 oz)    Height:         Physical Exam    CONSTITUTIONAL:  Vital signs reviewed.  No distress, looks comfortable.  EYES:  Conjunctivae and lids unremarkable.  PERRLA  EARS,NOSE,MOUTH,THROAT:  Ears and nose appear unremarkable.  Lips, teeth, gums appear unremarkable.  RESPIRATORY:  Normal respiratory effort.  Lungs clear to auscultation bilaterally.  CARDIOVASCULAR:  Normal S1, S2.  No murmurs rubs or gallops.  No significant lower extremity edema.  GASTROINTESTINAL: Abdomen appears unremarkable.  Nontender.  No hepatomegaly.  No splenomegaly.  NEURO: cranial nerves 2-12 grossly intact.  No focal deficits.  Appears to have equal strength all 4 extremities.  MUSCULOSKELETAL: Multiple ulcerative lesions noted on the toes on the right, ulcers also noted on left heel.  SKIN: Ulcerated lesions on the toes.  PSYCHIATRIC:  Normal judgment and insight.  Normal mood and affect.       RECENT LABS:  WBC   Date Value Ref Range Status   03/16/2022 12.61 (H) 3.40 - 10.80 10*3/mm3 Final   03/15/2022 11.00 (H) 3.40 - 10.80 10*3/mm3 Final   03/14/2022 13.93 (H) 3.40 - 10.80 10*3/mm3 Final     Hemoglobin   Date Value Ref Range Status   03/16/2022 10.6 (L) 12.0 - 15.9 g/dL Final   03/15/2022 9.8 (L) 12.0 - 15.9 g/dL Final   03/14/2022 11.0 (L) 12.0 - 15.9 g/dL Final     Platelets   Date Value Ref Range Status   03/16/2022 370 140 - 450 10*3/mm3 Final   03/15/2022 371 140 - 450 10*3/mm3 Final   03/14/2022 412 140 - 450 10*3/mm3 Final       ASSESSMENT/PLAN:  Ambar Lara E565/1     *Splenic infarct  · Most likely embolic in etiology  · She does have cardiomyopathy with an ejection fraction of 20% which is a risk factor  · There is also concern for ulcerated plaque or thrombus showering emboli to her feet.  · CT angiogram suggestive of splenic artery dissection extending into the  hilum and also infrarenal abdominal aortic ulcerated plaques.  · This is likely the etiology of the splenic infarcts.  · Hypercoagulability work-up will be ordered however I suspect that this is more of an embolic process rather than underlying thrombophilia.  · Prothrombin gene mutation negative, factor V Leiden mutation negative, Antithrombin III protein S levels normal.  · Given that this is an embolic phenomena continue with dual antiplatelet therapy.  · No indication for Eliquis at this time.  · She is currently seeing Dr. Sanjay Shrestha with hematology.  She was on anticoagulation for 6 months following the PE and subsequently discontinued.  · Following discharge from the hospital she can follow-up with Dr. Shrestha.     *C. difficile colitis  · Currently on treatment with vancomycin  · Diarrhea resolved.        *Left-sided nephrolithiasis and pyelonephritis  · Status post stent placement which has been removed now.  · Resolved.     *Cardiomyopathy  · EF low at 20%  · Also with mitral valve regurgitation.  · Documented to be nonischemic cardiomyopathy.   · She was evaluated for transplant.     *Diabetes  · Management per primary team        *End-stage renal disease  · Shiley placed on hemodialysis performed yesterday.  · Plan for continuing peritoneal dialysis     *Anemia  · Baseline hemoglobin between 9 and 10.  · Hemoglobin stable at 10 point  · Iron studies suggestive of anemia of chronic disease  · B12 and folic acid normal     *Leukocytosis  · Improving  · WBC 12.61  · Secondary to C. difficile infection     *Right toe dry gangrene  · Concern for plaque rupture and embolic phenomena  · Secondary to ulcerated plaque in the abdominal aorta  · Continue dual antiplatelet therapy     *Previous history of PE  · After severe COVID-19 infection  · Treated with Coumadin for 6 months  · Seen by Dr. Sanjay Shrestha     Recommendations  1.embolic from a ruptured plaque.  2.Heparin discontinued  3.continue aspirin  and Plavix  4.we will sign off at this time.  Follow-up on remaining hypercoagulability work-up and if any of the labs are abnormal then could consider starting Eliquis although I think this is unlikely to be the reason for splenic infarcts.  5.she has a hematologist Dr. Sanjay Shrestha.  Recommend follow-up with Dr. Shrestha in 4 to 6 weeks.

## 2022-03-16 NOTE — DISCHARGE INSTRUCTIONS
Betadine paint to right toes daily, leave open to air.  If you need to wear a shoe, place dry gauze between toes then wrap with an ACE wrap prior to putting on a sock.  You may wear your regular shoes.

## 2022-03-16 NOTE — PROGRESS NOTES
LOS: 5 days     Chief Complaint:  Follow-up C diff    Interval History: Patient reports he is doing well this morning.  States she has some pain in the right foot but otherwise no acute complaints.  States her diarrhea has resolved.  Denies any fevers or chills.    Fever curve remains within normal limits.  Leukocytosis remains improved.    Vital Signs  Temp:  [97.7 °F (36.5 °C)-98.6 °F (37 °C)] 98.6 °F (37 °C)  Heart Rate:  [92-93] 93  Resp:  [16] 16  BP: ()/(54-71) 126/71    Physical Exam:  GENERAL: Awake and alert, no acute distress  Head: no trauma  Eyes: no scleral icterus  CV:  Regular rate and rhythm.  LUNGS: Normal respiratory effort.  No wheezing.  GI: Soft, tender diffusely, nondistended. No appreciable organomegaly.  Peritoneal catheter in place.  SKIN: Erythema of the right lower extremity toes without any warmth or drainage.  PSYCHIATRIC: Appropriate mood, affect, insight, and judgment.     Antibiotics:  •  vancomycin (VANCOCIN) capsule 125 mg, 125 mg, Oral, Q6H, Everett Hui MD, 125 mg at 03/12/22 0543  •  [START ON 3/21/2022] vancomycin (VANCOCIN) capsule 125 mg, 125 mg, Oral, Q12H, Everett Hui MD  •  [START ON 3/29/2022] vancomycin (VANCOCIN) capsule 125 mg, 125 mg, Oral, Q24H, Everett Hui MD  •  [START ON 4/5/2022] vancomycin (VANCOCIN) capsule 125 mg, 125 mg, Oral, Every Other Day, Everett Hui MD    LABS:  CBC, BMP, micro reviewed today  Lab Results   Component Value Date    WBC 12.61 (H) 03/16/2022    HGB 10.6 (L) 03/16/2022    HCT 33.4 (L) 03/16/2022    MCV 97.4 (H) 03/16/2022     03/16/2022     Lab Results   Component Value Date    GLUCOSE 101 (H) 03/16/2022    CALCIUM 9.1 03/16/2022     03/16/2022    K 4.4 03/16/2022    CO2 21.8 (L) 03/16/2022     03/16/2022    BUN 10 03/16/2022    CREATININE 2.77 (H) 03/16/2022    EGFRIFAFRI  10/14/2021      Comment:      <15 Indicative of kidney failure.    EGFRIFNONA 7 (L) 02/28/2022    BCR 3.6 (L) 03/16/2022     ANIONGAP 12.2 03/16/2022       Microbiology:  3/11 C. difficile positive  3/11 Covid negative  3/11 GI pathogen panel negative  3/11 blood cultures: NGTD    Imaging:  TTE w/ EF 20%    Assessment/Plan   #C. difficile colitis  #Splenic artery dissection with splenic infarcts  #Infrarenal aortic ulceration with mural thrombus  #Nephrolithiasis status post left ureteral stent  #ESRD on PD  #Right foot skin changes in the setting of peripheral vascular disease  #Heart failure with reduced ejection fraction  #Mitral regurgitation     Continue oral vancomycin w/ prolonged taper for C. difficile colitis.  Her has resolved and her leukocytosis has improved significantly.  At this point discharge would be reasonable.  Plan to continue her taper for 5 weeks.  She was counseled on further antibiotic use in the future as it may predispose to recurrence.    Would not plan for any therapy for acute osteomyelitis of the right foot at this time.  Would have a high bar to treat this in the future given her history of severe C. difficile.    Thank you for allowing me to be involved in the care of this patient. Infectious diseases will sign off at this time with antibiotics plan in place, but please call me at [phone] if any further ID questions or new ID concerns.

## 2022-03-16 NOTE — DISCHARGE SUMMARY
Date of Admission: 3/11/2022  Date of Discharge:  3/16/2022  Primary Care Physician: Jet Manuel MD     Discharge Diagnosis:  Active Hospital Problems    Diagnosis  POA   • **Sepsis without acute organ dysfunction (HCC) [A41.9]  Yes   • Fever in adult [R50.9]  Yes   • Acute respiratory failure with hypoxia (HCC) [J96.01]  Yes   • Hypokalemia [E87.6]  Yes   • Anemia, chronic disease [D63.8]  Yes   • Obesity (BMI 30-39.9) [E66.9]  Yes   • Clostridium difficile colitis [A04.72]  Yes   • Peritoneal dialysis status (HCC) [Z99.2]  Not Applicable   • Chronic systolic heart failure (HCC) [I50.22]  Yes   • ESRD on peritoneal dialysis (HCC) [N18.6, Z99.2]  Not Applicable      Resolved Hospital Problems   No resolved problems to display.       Presenting Problem/History of Present Illness:  Pancolitis (HCC) [K51.00]  ESRD (end stage renal disease) (HCC) [N18.6]  Peritoneal dialysis status ([facility]) [Z99.2]  C. difficile colitis [A04.72]  Follow up [Z09]  Hypotension, unspecified hypotension type [I95.9]  Chest pain, unspecified type [R07.9]  Sepsis without acute organ dysfunction, due to unspecified organism (HCC) [A41.9]   51-year-old female with history of ESRD on peritoneal dialysis recent admission to the hospital with left-sided hydronephrosis with ureteral calculus requiring ureteral stent.  Treated empirically with antibiotics for pyelonephritis.  She presented to the emergency room with fever leukocytosis and diarrhea and C. difficile positive.  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  She reports diffuse diarrhea watery 3-4 bowel movements for the last 2 days.  No fever chills were reported as such.  But reports weakness and fatigue.  No shortness of breath cough or purulent sputum was reported.  In the emergency room she was noted to have low blood pressure with lactic acidosis and decided to be admitted to ICU for further management    Hospital Course:  The patient is a 51 y.o.  female who presented with septic shock secondary to C. difficile.  She was admitted and underwent evaluation by infectious disease and pulmonology services.  She has end-stage renal disease usually on peritoneal dialysis.  Nephrology evaluated during her stay.  She was found to have emboli to her toes and ultimately her spleen.  Vascular surgery and hematology consulted and the emboli were from ruptured aortic plaque.  They have recommended dual antiplatelet therapy and so she is on aspirin and Plavix now.  She required contrast for that procedure so temporarily had hemodialysis access placed.  That has since been removed.  Her diarrhea is better and infectious disease has placed her on a prolonged vancomycin taper.  They have recommended no treatment for the abnormal MRI of her foot.  She will need to follow-up with her home hematologist.  She will also need to follow-up with nephrology since she is resuming peritoneal dialysis.  She was on additional potassium supplementation here and started on magnesium supplement.  Magnesium supplement is being continued but I am going to discharge her on her other home nephrology regimen because of her end-stage renal disease and resolution of the diarrhea.    Exam Today:    Results:  CXR  1. Borderline cardiomegaly with underinflation of the lungs.  2. No focal infiltrates are seen.    XR R Foot  1. Degenerative arthritis of the first metatarsophalangeal joint.  2. Calcaneal spur.  3. No evidence of osteomyelitis or abnormal air collection.    CT Abdomen/Pelvis  1. CT findings of pancolitis. Pneumoperitoneum is demonstrated and this  is possibly related to patient's peritoneal dialysis. Clinical  correlation is recommended. There is no bowel wall pneumatosis. If  patient's clinical condition worsens, repeat CT may be helpful.  2. Left-sided ureteral stent in place with resolved hydronephrosis.  3. Stable appearance of spleen most suggestive of evolving splenic  infarcts.  4.  Peripherally calcified lesion seen adjacent to the horizontal portion  of the duodenum. This lesion is unchanged from prior imaging decreased  in size from prior imaging in April 2021 however demonstrates interval  peripheral calcification. The etiology is uncertain and may represent a  fluid collection that is now chronic and attention on follow-up is  recommended.  5. Additional findings as above.    MRI R Foot  Mild, nonspecific soft tissue edema in the right forefoot.  No soft tissue wound or focal fluid collection is present. Minimal  marrow signal abnormality in the middle and distal phalanges of the 2nd  toe is not specific. Early osteomyelitis could have this appearance.     There is old osteonecrosis along the distal 2nd metatarsal with mild  cortical deformity of the metatarsal head articular surface.    CXR  Central venous catheter placement as described. No  pneumothorax is seen.    CTA Chest/CTA Abdominal Aorta with Runoff  1. 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  There is also possibly a dissection or an ulcerated plaque along the  left wall of the infrarenal abdominal aorta.  2. There is no aneurysmal dilatation of the thoracic aorta. Motion  artifact is noted at the aortic root and ascending portion.  3. The superficial, femoral and profunda femoral arteries are diminutive  and diffusely peripherally calcified. There are no dominant stenoses.  Popliteal artery and trifurcation are patent and enhancement of branches  is seen at the ankles and feet.  4. There has been near complete resolution of the pulmonary infiltrates  since the 04/09/2021 CTA. There are chronic-appearing reticular markings  and patchy air trapping is suspected bilaterally.  5. The small lesion along the transverse duodenum is of uncertain  etiology at this point and may possibly have a vascular origin.  Conservative surveillance is  recommended.    TTE  · Left ventricular ejection fraction appears to be less than 20%. Left ventricular systolic function is severely decreased. Normal left ventricular wall thickness noted. The left ventricular cavity is severely dilated. Left ventricular diastolic function was indeterminate. No evidence of left ventricular thrombus or mass present.  · The following left ventricular wall segments are hypokinetic: basal anterolateral, mid anterolateral, apical lateral, basal inferolateral, mid inferolateral, mid inferior, basal anterior and basal inferior. The following left ventricular wall segments are akinetic: mid anterior, apical anterior, apical inferior, apical septal, basal inferoseptal, mid inferoseptal, apex and mid anteroseptal.  · Apical tethering and severe restriction of posterior leaflet excursion. Moderate to severe mitral valve regurgitation is present. No significant mitral valve stenosis is present.      Procedures Performed:         Consults:   Consults     Date and Time Order Name Status Description    3/14/2022 10:38 AM Inpatient Vascular Surgery Consult Completed     3/13/2022  2:03 PM Inpatient Urology Consult Completed     3/13/2022 12:14 PM Inpatient Internal Medicine Consult      3/13/2022 12:14 PM Hematology & Oncology Inpatient Consult Completed     3/11/2022 10:33 AM Inpatient Nephrology Consult Completed     3/11/2022 10:33 AM Inpatient Infectious Diseases Consult Completed     3/11/2022 10:22 AM Pulmonology (on-call MD unless specified)      2/28/2022  3:32 PM Inpatient Infectious Diseases Consult Completed     2/28/2022  3:56 AM Inpatient Vascular Surgery Consult Completed     2/28/2022  3:46 AM Inpatient Urology Consult Completed     2/28/2022  3:36 AM Inpatient Nephrology Consult Completed            Discharge Disposition:  Home or Self Care    Discharge Medications:     Discharge Medications      New Medications      Instructions Start Date   clopidogrel 75 MG tablet  Commonly  known as: PLAVIX   75 mg, Oral, Daily   Start Date: March 17, 2022     magnesium oxide 400 tablet tablet  Commonly known as: MAG-OX   400 mg, Oral, 2 Times Daily      vancomycin 125 MG capsule  Commonly known as: VANCOCIN   125 mg, Oral, Every 6 Hours Scheduled      vancomycin 125 MG capsule  Commonly known as: VANCOCIN   125 mg, Oral, Every 12 Hours   Start Date: March 21, 2022     vancomycin 125 MG capsule  Commonly known as: VANCOCIN   125 mg, Oral, Every 24 Hours   Start Date: March 29, 2022     vancomycin 125 MG capsule  Commonly known as: VANCOCIN   125 mg, Oral, Every Other Day   Start Date: April 5, 2022        Changes to Medications      Instructions Start Date   levothyroxine 137 MCG tablet  Commonly known as: SYNTHROID, LEVOTHROID  What changed:   · medication strength  · how much to take   137 mcg, Oral, Daily      sodium bicarbonate 650 MG tablet  What changed: when to take this   650 mg, Oral, Every 8 Hours Scheduled         Continue These Medications      Instructions Start Date   acetaminophen 500 MG tablet  Commonly known as: TYLENOL   500 mg, Oral, Every 6 Hours PRN      ammonium lactate 12 % cream  Commonly known as: AMLACTIN   Apply topically to the appropriate area as directed Every 12 (Twelve) Hours As Needed for Dry Skin.      aspirin 81 MG chewable tablet   81 mg, Oral, Daily      Daily-Jamshid Multivitamin tablet tablet  Generic drug: multivitamin   1 tablet, Oral, Daily      diphenhydrAMINE HCl (Sleep) 25 MG capsule   25 mg, Oral, Nightly PRN, For sleep       escitalopram 20 MG tablet  Commonly known as: LEXAPRO   20 mg, Oral, Daily      famotidine 20 MG tablet  Commonly known as: PEPCID   20 mg, Oral, 2 Times Daily      gentamicin 0.1 % cream  Commonly known as: GARAMYCIN   1 application, Topical, Daily, As directed       lanthanum 500 MG chewable tablet  Commonly known as: FOSRENOL   750 mg, Oral, 3 Times Daily With Meals      melatonin 3 MG tablet   3 mg, Oral, Nightly      midodrine 10 MG  tablet  Commonly known as: PROAMATINE   10 mg, Oral, 3 Times Daily      montelukast 10 MG tablet  Commonly known as: Singulair   10 mg, Oral, Nightly      nitroglycerin 0.4 MG SL tablet  Commonly known as: NITROSTAT   0.4 mg, Sublingual, Every 5 Minutes PRN, Take no more than 3 doses in 15 minutes.       oxyCODONE-acetaminophen 5-325 MG per tablet  Commonly known as: Percocet   1 tablet, Oral, Every 6 Hours PRN      potassium bicarbonate 25 MEQ disintegrating tablet  Commonly known as: K-LYTE   20 mEq, Oral, 2 Times Daily, Take 2 tablets by mouth 2 times daily.       REN-JHON PO   1 tablet, Oral, Daily      saccharomyces boulardii 250 MG capsule  Commonly known as: FLORASTOR   500 mg, Oral, 2 Times Daily      traZODone 100 MG tablet  Commonly known as: DESYREL   100 mg, Oral, Nightly, Take 0.5-1 tablet by mouth nightly              Discharge Diet:     Activity at Discharge:     Follow-up Appointments:   Follow-up Information     Jet Manuel MD Follow up.    Specialty: Internal Medicine  [facility] information[facility]  [address] [id number] [phone]             Sanjay Shrestha MD Follow up in 1 month(s).    Specialty: Hematology and Oncology  Contact information:  [address]  [place] [address]  [phone]             Sommer Dowd MD Follow up.    Specialty: Vascular Surgery  Contact information:  [address]  [place] [id number] [phone]             Sanford Puentes MD Follow up.    Specialty: Urology  Why: As needed  Contact information:  [address]  [place] [id number] [phone]                         Test Results Pending at Discharge:  Pending Labs     Order Current Status    Beta-2 Glycoprotein Antibodies In process    Lupus Anticoagulant In process           Sanjay Nguyễn MD  03/16/22  16:27 EDT    Time Spent on Discharge Activities: >30 minutes    Dictated portions using Dragon dictation software.  During the entire  encounter, I was wearing recommended PPE including face mask and eye protection. Hand sanitization was performed prior to entering room and upon exit.

## 2022-03-16 NOTE — PLAN OF CARE
Goal Outcome Evaluation:  Plan of Care Reviewed With: patient        Progress: improving  Outcome Evaluation: VSS. Awaiting discharge clearance from nephrology, patient to discharge later this evening. Will monitor.

## 2022-03-17 NOTE — OUTREACH NOTE
Prep Survey    Flowsheet Row Responses   [facility] facility patient discharged from? [place]   Is LACE score < 7 ? No   Emergency Room discharge w/ pulse ox? No   Eligibility Readm Mgmt   Discharge diagnosis Sepsis without acute organ dysfunction    Does the patient have one of the following disease processes/diagnoses(primary or secondary)? Sepsis   Does the patient have Home health ordered? No   Is there a DME ordered? No   Prep survey completed? Yes          ELEONORA PARRA - Registered Nurse

## 2022-03-17 NOTE — CASE MANAGEMENT/SOCIAL WORK
Case Management Discharge Note      Final Note: Discharged home         Selected Continued Care - Discharged on 3/16/2022 Admission date: 3/11/2022 - Discharge disposition: Home or Self Care    Destination    No services have been selected for the patient.              Durable Medical Equipment    No services have been selected for the patient.              Dialysis/Infusion    No services have been selected for the patient.              Home Medical Care    No services have been selected for the patient.              Therapy    No services have been selected for the patient.              Community Resources    No services have been selected for the patient.              Community & DME    No services have been selected for the patient.                  Transportation Services  Private: Car    Final Discharge Disposition Code: 01 - home or self-care

## 2022-03-22 NOTE — OUTREACH NOTE
Sepsis Week 1 Survey    Flowsheet Row Responses   [facility] patient discharged from? [place]   Does the patient have one of the following disease processes/diagnoses(primary or secondary)? Sepsis   Week 1 attempt successful? Yes   Call start time 1359   Call end time 1403   Discharge diagnosis Sepsis without acute organ dysfunction    Person spoke with today (if not patient) and relationship patient   Comments regarding appointments Cardiologist at heart failure clinic on thursday.   Does the patient have a primary care provider?  Yes   Does the patient have an appointment with their PCP within 7 days of discharge? No   Nursing Interventions Advised patient to make appointment   Has the patient kept scheduled appointments due by today? Yes   Psychosocial issues? No   Comments Pt reports she is able to walk now.  She is using a prescription cream for foot wound   Did the patient receive a copy of their discharge instructions? Yes   Nursing interventions Reviewed instructions with patient   What is the patient's perception of their health status since discharge? Improving   Is the patient/caregiver able to teach back the hierarchy of who to call/visit for symptoms/problems? PCP, Specialist, Home health nurse, Urgent Care, ED, 911 Yes   Week 1 call completed? Yes   Wrap up additional comments Pt reports she feels improved this week.          SINGH JOLLY - Registered Nurse

## 2022-03-30 NOTE — OUTREACH NOTE
Sepsis Week 2 Survey    Flowsheet Row Responses   [facility] patient discharged from? [place]   Does the patient have one of the following disease processes/diagnoses(primary or secondary)? Sepsis   Week 2 attempt successful? Yes   Call start time 1654   Call end time 1658   Discharge diagnosis Sepsis without acute organ dysfunction    Person spoke with today (if not patient) and relationship patient   Meds reviewed with patient/caregiver? Yes   Is the patient having any side effects they believe may be caused by any medication additions or changes? No   Does the patient have all medications related to this admission filled (includes all antibiotics, inhalers, nebulizers,steroids,etc.) Yes   Is the patient taking all medications as directed (includes completed medication regime)? Yes   Does the patient have a primary care provider?  Yes   Does the patient have an appointment with their PCP within 7 days of discharge? No   What is preventing the patient from scheduling follow up appointments within 7 days of discharge? Unsure of when or with whom   Nursing Interventions Educated patient on importance of making appointment   Has the patient kept scheduled appointments due by today? Yes   Has home health visited the patient within 72 hours of discharge? N/A   Psychosocial issues? No   Comments Says her foot is some better, able to walk more    Did the patient receive a copy of their discharge instructions? Yes   Nursing interventions Reviewed instructions with patient   What is the patient's perception of their health status since discharge? Improving   Nursing interventions Nurse provided patient education   Is the patient/caregiver able to teach back Sepsis? S - Shivering,fever or very cold, E - Extreme pain or generalized discomfort (worst ever,especially abdomen)   Nursing interventions Nurse provided reassurance to patient   Is patient/caregiver able to teach back steps to recovery at home? Set small,  achievable goals for return to baseline health, Rest and regain strength, Eat a balanced diet, Exercise as tolerated, Make a list of questions for PCP appoinment   Is the patient/caregiver able to teach back signs and symptoms of worsening condition: Fever, Edema, Shortness of breath/rapid respiratory rate   If the patient is a current smoker, are they able to teach back resources for cessation? Not a smoker   Is the patient/caregiver able to teach back the hierarchy of who to call/visit for symptoms/problems? PCP, Specialist, Home health nurse, Urgent Care, ED, 911 Yes   Additional teach back comments Says she is doing better, encouraged to coordinate appts via patient connection hub.   Week 2 call completed? Yes   Wrap up additional comments Improving.          YAJAIRA ABEL - Registered Nurse

## 2022-04-07 NOTE — OUTREACH NOTE
Sepsis Week 3 Survey    Flowsheet Row Responses   [facility] [place] facility patient discharged from? [place]   Does the patient have one of the following disease processes/diagnoses(primary or secondary)? Sepsis   Week 3 attempt successful? No   Unsuccessful attempts Attempt 1          HUMA Mccauley Registered Nurse

## 2022-04-21 PROBLEM — N20.1 RIGHT URETERAL STONE: Status: ACTIVE | Noted: 2022-01-01

## 2022-04-22 NOTE — ANESTHESIA PREPROCEDURE EVALUATION
Anesthesia Evaluation     Patient summary reviewed and Nursing notes reviewed   NPO Solid Status: > 8 hours  NPO Liquid Status: > 2 hours           Airway   Mallampati: I  No difficulty expected  Dental - normal exam     Pulmonary - normal exam   (+) pulmonary embolism, sleep apnea,   Cardiovascular - normal exam    ECG reviewed  PT is on anticoagulation therapy    (+) CHF Systolic <55%, PVD, hyperlipidemia,     ROS comment: Reviewed echo:    · Left ventricular ejection fraction appears to be less than 20%. Left ventricular systolic function is severely decreased. Normal left ventricular wall thickness noted. The left ventricular cavity is severely dilated. Left ventricular diastolic function was indeterminate. No evidence of left ventricular thrombus or mass present.  · The following left ventricular wall segments are hypokinetic: basal anterolateral, mid anterolateral, apical lateral, basal inferolateral, mid inferolateral, mid inferior, basal anterior and basal inferior. The following left ventricular wall segments are akinetic: mid anterior, apical anterior, apical inferior, apical septal, basal inferoseptal, mid inferoseptal, apex and mid anteroseptal.  · Apical tethering and severe restriction of posterior leaflet excursion. Moderate to severe mitral valve regurgitation is present. No significant mitral valve stenosis is present.         Neuro/Psych  GI/Hepatic/Renal/Endo    (+) obesity,  GERD,  renal disease ESRD and dialysis, thyroid problem hypothyroidism    Musculoskeletal     Abdominal    Substance History      OB/GYN          Other                      Anesthesia Plan    ASA 4     general       Anesthetic plan, all risks, benefits, and alternatives have been provided, discussed and informed consent has been obtained with: patient.        CODE STATUS:    Code Status (Patient has no pulse and is not breathing): CPR (Attempt to Resuscitate)  Medical Interventions (Patient has pulse or is breathing): Full  Support

## 2022-04-23 NOTE — ANESTHESIA POSTPROCEDURE EVALUATION
"Patient: Ambar Lara    Procedure Summary     Date: 04/22/22 Room / Location: [facility] 01 / [facility]    Anesthesia Start: 2012 Anesthesia Stop: 2107    Procedure: RIGHT CYSTOSCOPY RETROGRADE PYLEOGRAM HOLMIUM LASER STENT (Right ) Diagnosis:       Ureteral calculi      (Ureteral calculi [N20.1])    Surgeons: Sanford Puentes MD Provider: Madeleine Beckwith MD    Anesthesia Type: general ASA Status: 4          Anesthesia Type: general    Vitals  Vitals Value Taken Time   BP 92/61 04/22/22 2131   Temp 37.4 °C (99.3 °F) 04/22/22 2100   Pulse 90 04/22/22 2136   Resp 12 04/22/22 2110   SpO2 95 % 04/22/22 2136   Vitals shown include unvalidated device data.        Post Anesthesia Care and Evaluation    Patient location during evaluation: PACU  Patient participation: complete - patient participated  Level of consciousness: awake  Pain management: adequate  Airway patency: patent  Anesthetic complications: No anesthetic complications    Cardiovascular status: acceptable  Respiratory status: acceptable  Hydration status: acceptable    Comments: BP (!) 89/66 (BP Location: Right arm, Patient Position: Lying)   Pulse 86   Temp 37.4 °C (99.3 °F) (Oral)   Resp 12   Ht 154.9 cm (60.98\")   Wt 78.9 kg (173 lb 15.1 oz)   LMP 04/16/2004 (Approximate)   SpO2 96%   BMI 32.88 kg/m²       "

## 2022-04-23 NOTE — ANESTHESIA PROCEDURE NOTES
Airway  Urgency: elective    Date/Time: 4/22/2022 8:21 PM  Airway not difficult    General Information and Staff    Patient location during procedure: OR  Anesthesiologist: Madeleine Beckwith MD    Indications and Patient Condition  Indications for airway management: airway protection    Preoxygenated: yes  Mask difficulty assessment: 1 - vent by mask    Final Airway Details  Final airway type: supraglottic airway      Successful airway: unique  Size 4  Cuff Pressure (cm H2O): 21  Airway Seal Pressure (cm H2O): 17    Number of attempts at approach: 1  Assessment: lips, teeth, and gum same as pre-op and atraumatic intubation

## 2022-04-25 NOTE — OUTREACH NOTE
Prep Survey    Flowsheet Row Responses   [facility] facility patient discharged from? [place]   Is LACE score < 7 ? No   Emergency Room discharge w/ pulse ox? No   Eligibility Readm Mgmt   Discharge diagnosis Hydronephrosis with obstructing calculus    Does the patient have one of the following disease processes/diagnoses(primary or secondary)? Other   Does the patient have Home health ordered? No   Is there a DME ordered? No   Prep survey completed? Yes          JUSTIN OSEGUERA - Registered Nurse

## 2022-04-27 NOTE — OUTREACH NOTE
Medical Week 1 Survey    Flowsheet Row Responses   [facility] patient discharged from? [place]   Does the patient have one of the following disease processes/diagnoses(primary or secondary)? Other   Week 1 attempt successful? Yes   Call start time 1305   Call end time 1310   Discharge diagnosis Hydronephrosis with obstructing calculus    Is patient permission given to speak with other caregiver? Yes   List who call center can speak with Any of her kids is fine to speak with   Person spoke with today (if not patient) and relationship patient   Meds reviewed with patient/caregiver? Yes   Is the patient having any side effects they believe may be caused by any medication additions or changes? No   Does the patient have all medications ordered at discharge? Yes   Is the patient taking all medications as directed (includes completed medication regime)? Yes   Does the patient have a primary care provider?  Yes   Does the patient have an appointment with their PCP within 7 days of discharge? Greater than 7 days   Nursing Interventions Verified appointment date/time/provider   Has the patient kept scheduled appointments due by today? N/A   Comments 04/29/2022 Urology  PCP 05/13/2022   Has home health visited the patient within 72 hours of discharge? N/A   Psychosocial issues? No   Did the patient receive a copy of their discharge instructions? Yes   Nursing interventions Reviewed instructions with patient   What is the patient's perception of their health status since discharge? Improving   Is the patient/caregiver able to teach back the hierarchy of who to call/visit for symptoms/problems? PCP, Specialist, Home health nurse, Urgent Care, ED, 911 Yes   Additional teach back comments No questions regarding discharge instructions.   Week 1 call completed? Yes          HUMA HOGAN - Registered Nurse

## 2022-05-04 NOTE — OUTREACH NOTE
Medical Week 2 Survey    Flowsheet Row Responses   [facility] patient discharged from? [place]   Does the patient have one of the following disease processes/diagnoses(primary or secondary)? Other   Week 2 attempt successful? Yes   Call start time 1555   Discharge diagnosis Hydronephrosis with obstructing calculus    Call end time 1559   Meds reviewed with patient/caregiver? Yes   Is the patient having any side effects they believe may be caused by any medication additions or changes? No   Does the patient have all medications ordered at discharge? Yes   Is the patient taking all medications as directed (includes completed medication regime)? Yes   Does the patient have a primary care provider?  Yes   Has the patient kept scheduled appointments due by today? Yes   Comments Stents out last Friday from kidney, stents in legs this week.   Has home health visited the patient within 72 hours of discharge? N/A   Psychosocial issues? No   Comments Foot is doing better she says. Healing she says, stents in legs will help she hopes.   Did the patient receive a copy of their discharge instructions? Yes   Nursing interventions Reviewed instructions with patient   What is the patient's perception of their health status since discharge? Improving   Is the patient/caregiver able to teach back signs and symptoms related to disease process for when to call PCP? Yes   Is the patient/caregiver able to teach back signs and symptoms related to disease process for when to call 911? Yes   Is the patient/caregiver able to teach back the hierarchy of who to call/visit for symptoms/problems? PCP, Specialist, Home health nurse, Urgent Care, ED, 911 Yes   If the patient is a current smoker, are they able to teach back resources for cessation? Not a smoker   Additional teach back comments Encouraged protein, lab work on Friday.   Week 2 Call Completed? Yes   Wrap up additional comments She has some improvement but many obstacles to  overcome she says.          YAJAIRA E - Registered Nurse

## 2022-05-12 NOTE — OUTREACH NOTE
Medical Week 3 Survey    Flowsheet Row Responses   [facility] patient discharged from? [place]   Does the patient have one of the following disease processes/diagnoses(primary or secondary)? Other   Week 3 attempt successful? Yes   Call start time 1501   Call end time 1502   Discharge diagnosis Hydronephrosis with obstructing calculus    Meds reviewed with patient/caregiver? Yes   Is the patient having any side effects they believe may be caused by any medication additions or changes? No   Does the patient have all medications ordered at discharge? Yes   Is the patient taking all medications as directed (includes completed medication regime)? Yes   Does the patient have a primary care provider?  Yes   Has the patient kept scheduled appointments due by today? Yes   Has home health visited the patient within 72 hours of discharge? N/A   Psychosocial issues? No   Did the patient receive a copy of their discharge instructions? Yes   Nursing interventions Reviewed instructions with patient   What is the patient's perception of their health status since discharge? Improving   Is the patient/caregiver able to teach back signs and symptoms related to disease process for when to call PCP? Yes   Is the patient/caregiver able to teach back signs and symptoms related to disease process for when to call 911? Yes   Is the patient/caregiver able to teach back the hierarchy of who to call/visit for symptoms/problems? PCP, Specialist, Home health nurse, Urgent Care, ED, 911 Yes   If the patient is a current smoker, are they able to teach back resources for cessation? Not a smoker   Week 3 Call Completed? Yes          PATRICIA NAVA - Licensed Nurse

## 2022-06-16 PROBLEM — N30.00 ACUTE CYSTITIS: Status: ACTIVE | Noted: 2022-01-01

## 2022-06-16 NOTE — ED TRIAGE NOTES
Patient has been having blood in her urine for 2 days now, She then had a fall today. She states that she tripped over her dialysis cord and she fell onto her knees. She is on plavix so thought she should come in and be seen due to her fall and blood in her urine    Patient has on mask nurse has on proper ppe.

## 2022-06-16 NOTE — ED PROVIDER NOTES
EMERGENCY DEPARTMENT ENCOUNTER    Room Number:  26/26  Date of encounter:  6/16/2022  PCP: Jet Manuel MD  Historian: Patient      PPE    Patient was placed in face mask in first look. Patient was wearing facemask when I entered the room and throughout our encounter. I wore full protective equipment throughout this patient encounter including a face mask, and gloves. Hand hygiene was performed before donning protective equipment and after removal when leaving the room.        HPI:  Chief Complaint: Hematuria  A complete HPI/ROS/PMH/PSH/SH/FH are unobtainable due to: Nothing    Context: Ambar Lara is a 52 y.o. female with a history kidney stone, peritoneal dialysis, on Plavix who arrives to the ED via private vehicle.  Patient states that yesterday she noticed blood in her urine.  She states today she started having right flank pain that is been pretty constant and sharp in nature for most of the day.  She states she has had a history of kidney stones in the past.  She also states that today she tripped over her dialysis cord, she landed on her knees and has bruising and tenderness to them.  Patient states that she did not hit her head, she has no neck or back pain. She denies fever, chills, chest pain, shortness of breath, dizziness, lightheadedness.  Patient states that she was seen here recently for blood in her dialysis catheter.  She states that she is using heparin with her peritoneal dialysis.        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   • HLD (hyperlipidemia) 06/07/2018   • Hypothyroidism 06/06/2018   • Peritoneal dialysis status ([facility]) 02/15/2021   • History of tracheostomy  04/10/2021   • Hypotension 04/10/2021   • Acute pulmonary embolism ([facility]) 04/10/2021   • Severe malnutrition ([facility]) 2021   • Clostridium difficile colitis 2021   • Immobility syndrome 2021   • Genu recurvatum of right knee 2021   • Colitis 2021   • PD catheter dysfunction ([facility]) 10/11/2021   • Gallstones 10/14/2021   • Acute renal failure superimposed on chronic kidney disease, on chronic dialysis ([facility]) 2022   • Obesity (BMI 30-39.9) 2022   • Hydronephrosis with obstructing calculus 2022   • Splenic calcification 2022   • 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 ([facility]) 2022   • Ureteral calculi 2022   • Sepsis without acute organ dysfunction ([facility]) 2022   • Fever in adult 2022   • Acute respiratory failure with hypoxia ([facility]) 2022   • Hypokalemia 2022   • Anemia, chronic disease 2022   • Right ureteral stone 2022     Resolved Ambulatory Problems     Diagnosis Date Noted   • No Resolved Ambulatory Problems     Past Medical History:   Diagnosis Date   • CHF (congestive heart failure) ([facility])    • Clostridioides difficile infection 2021   • Dialysis patient ([facility])    • Disease of thyroid gland    • Elevated cholesterol    • GERD (gastroesophageal reflux disease)    • Pulmonary emboli ([facility]) 2021   • Renal disorder    • Sleep apnea          PAST SURGICAL HISTORY  Past Surgical History:   Procedure Laterality Date   • ADRENAL GLAND SURGERY N/A `   •  SECTION Bilateral 2001    Has had x2   • CYSTOSCOPY URETEROSCOPY LASER LITHOTRIPSY Left 2022    Procedure: Left CYSTOSCOPY, with stent placement;  Surgeon: Sanford Puentes MD;  Location: [facility] OR;  Service: Urology;  Laterality: Left;   • CYSTOSCOPY W/ URETERAL STENT PLACEMENT Right 2022    Procedure: RIGHT CYSTOSCOPY RETROGRADE PYLEOGRAM HOLMIUM LASER STENT;  Surgeon: Sanford Puentes MD;  Location: [facility]  OR;  Service: Urology;  Laterality: Right;   • HYSTERECTOMY     • INSERTION PERITONEAL DIALYSIS CATHETER Right 10/12/2021    Procedure: LAPAROSCOPIC REVISION OF PERITONEAL DIALYSIS CATHETER AND LAPAROSCOPIC CHOLECYSTECTOMY;  Surgeon: Jamie Figueroa MD;  Location: [facility] OR;  Service: General;  Laterality: Right;   • PERITONEAL CATHETER INSERTION  2019   • TRACHEOSTOMY           FAMILY HISTORY  Family History   Problem Relation Age of Onset   • Malig Hyperthermia Neg Hx          SOCIAL HISTORY  Social History     Socioeconomic History   • Marital status:    Tobacco Use   • Smoking status: Former Smoker     Packs/day: 1.00     Years: 25.00     Pack years: 25.00     Types: Cigarettes     Quit date: 2018     Years since quittin.1   • Smokeless tobacco: Never Used   Vaping Use   • Vaping Use: Former   • Quit date: 2021   • Substances: Nicotine, Flavoring   • Devices: Pre-filled or refillable cartridge   Substance and Sexual Activity   • Alcohol use: Not Currently   • Drug use: Not Currently   • Sexual activity: Defer         ALLERGIES  Penicillins and Nickel        REVIEW OF SYSTEMS  Review of Systems     All systems reviewed and negative except for those discussed in HPI.        PHYSICAL EXAM    ED Triage Vitals   Temp Heart Rate Resp BP SpO2   22 1714 22 1714 22 1714 22 1932 22 1714   98.3 °F (36.8 °C) 110 16 100/62 97 %       Physical Exam  GENERAL: Well appearing, nontoxic appearing, not distressed  HENT: normocephalic, atraumatic  EYES: no scleral icterus, PERRL  CV: regular rhythm, regular rate, no murmur  RESPIRATORY: normal effort, CTAB  ABDOMEN: soft, normal bowel sounds, diffuse lower abdominal tenderness, no rebound, guarding or rigidity.  Peritoneal dialysis catheter noted to right lower abdomen  Patient has right flank tenderness, no CVA tenderness  MUSCULOSKELETAL: no deformity  Patient has mild ecchymosis to bilateral knees, she is able to  raise both legs off the stretcher, normal range of motion, 2+ DP and PT pulses bilaterally.  Soft compartments to bilateral lower legs.  NEURO: alert, moves all extremities, follows commands, mental status normal/baseline  SKIN: warm, dry, no rash   Psych: Appropriate mood and affect  Nursing notes and vital signs reviewed      LAB RESULTS  Recent Results (from the past 24 hour(s))   Urinalysis With Culture If Indicated - Urine, Clean Catch    Collection Time: 06/16/22  7:40 PM    Specimen: Urine, Clean Catch   Result Value Ref Range    Color, UA Red (A) Yellow, Straw    Appearance, UA Turbid (A) Clear    pH, UA 5.5 5.0 - 8.0    Specific Gravity, UA 1.015 1.005 - 1.030    Glucose, UA Negative Negative    Ketones, UA Negative Negative    Bilirubin, UA Negative Negative    Blood, UA Large (3+) (A) Negative    Protein,  mg/dL (2+) (A) Negative    Leuk Esterase, UA Large (3+) (A) Negative    Nitrite, UA Negative Negative    Urobilinogen, UA 0.2 E.U./dL 0.2 - 1.0 E.U./dL   Green Top (Gel)    Collection Time: 06/16/22  7:40 PM   Result Value Ref Range    Extra Tube Hold for add-ons.    Lavender Top    Collection Time: 06/16/22  7:40 PM   Result Value Ref Range    Extra Tube hold for add-on    Light Blue Top    Collection Time: 06/16/22  7:40 PM   Result Value Ref Range    Extra Tube Hold for add-ons.    Comprehensive Metabolic Panel    Collection Time: 06/16/22  7:40 PM    Specimen: Blood   Result Value Ref Range    Glucose 136 (H) 65 - 99 mg/dL    BUN 40 (H) 6 - 20 mg/dL    Creatinine 6.90 (H) 0.57 - 1.00 mg/dL    Sodium 137 136 - 145 mmol/L    Potassium 3.0 (L) 3.5 - 5.2 mmol/L    Chloride 97 (L) 98 - 107 mmol/L    CO2 14.9 (L) 22.0 - 29.0 mmol/L    Calcium 7.8 (L) 8.6 - 10.5 mg/dL    Total Protein 6.8 6.0 - 8.5 g/dL    Albumin 4.10 3.50 - 5.20 g/dL    ALT (SGPT) 10 1 - 33 U/L    AST (SGOT) 10 1 - 32 U/L    Alkaline Phosphatase 71 39 - 117 U/L    Total Bilirubin <0.2 0.0 - 1.2 mg/dL    Globulin 2.7 gm/dL    A/G  Ratio 1.5 g/dL    BUN/Creatinine Ratio 5.8 (L) 7.0 - 25.0    Anion Gap 25.1 (H) 5.0 - 15.0 mmol/L    eGFR 6.8 (L) >60.0 mL/min/1.73   CBC Auto Differential    Collection Time: 06/16/22  7:40 PM    Specimen: Blood   Result Value Ref Range    WBC 12.12 (H) 3.40 - 10.80 10*3/mm3    RBC 3.29 (L) 3.77 - 5.28 10*6/mm3    Hemoglobin 10.0 (L) 12.0 - 15.9 g/dL    Hematocrit 31.1 (L) 34.0 - 46.6 %    MCV 94.5 79.0 - 97.0 fL    MCH 30.4 26.6 - 33.0 pg    MCHC 32.2 31.5 - 35.7 g/dL    RDW 14.1 12.3 - 15.4 %    RDW-SD 48.0 37.0 - 54.0 fl    MPV 9.8 6.0 - 12.0 fL    Platelets 368 140 - 450 10*3/mm3    Neutrophil % 82.1 (H) 42.7 - 76.0 %    Lymphocyte % 10.4 (L) 19.6 - 45.3 %    Monocyte % 4.7 (L) 5.0 - 12.0 %    Eosinophil % 1.0 0.3 - 6.2 %    Basophil % 0.6 0.0 - 1.5 %    Immature Grans % 1.2 (H) 0.0 - 0.5 %    Neutrophils, Absolute 9.96 (H) 1.70 - 7.00 10*3/mm3    Lymphocytes, Absolute 1.26 0.70 - 3.10 10*3/mm3    Monocytes, Absolute 0.57 0.10 - 0.90 10*3/mm3    Eosinophils, Absolute 0.12 0.00 - 0.40 10*3/mm3    Basophils, Absolute 0.07 0.00 - 0.20 10*3/mm3    Immature Grans, Absolute 0.14 (H) 0.00 - 0.05 10*3/mm3    nRBC 0.0 0.0 - 0.2 /100 WBC   Urinalysis, Microscopic Only - Urine, Clean Catch    Collection Time: 06/16/22  7:40 PM    Specimen: Urine, Clean Catch   Result Value Ref Range    RBC, UA Too Numerous to Count (A) None Seen, 0-2 /HPF    WBC, UA Too Numerous to Count (A) None Seen, 0-2 /HPF    Bacteria, UA 2+ (A) None Seen /HPF    Squamous Epithelial Cells, UA 0-2 None Seen, 0-2 /HPF    Hyaline Casts, UA None Seen None Seen /LPF    Methodology Manual Light Microscopy        Ordered the above labs and independently reviewed the results.      RADIOLOGY  CT Abdomen Pelvis Without Contrast    Result Date: 6/16/2022  CT ABDOMEN PELVIS WO CONTRAST-  CLINICAL HISTORY: Right flank pain. Renal failure peritoneal dialysis. History of kidney stones.  TECHNIQUE: Spiral CT images were performed through the abdomen and pelvis with  no oral or IV contrast and were reconstructed in 3 mm thick slices.  Radiation dose reduction techniques were utilized, including automated exposure control and exposure modulation based on body size.  COMPARISON: CT scan of the abdomen and pelvis dated 05/20/2022.  FINDINGS: There is moderate right hydronephrosis and mild right hydroureter with dilatation of the ureter to the level of the ureterovesical junction where there is an approximately 1 to 2 mm diameter obstructing calculus. One or two additional smaller calculi are also suspected in the distal right ureter near the UVJ. There are 2 additional nonobstructing tiny calculi are present in the right kidney as well. There is a 7 mm diameter nonobstructing calculus in the left renal pelvis that is unchanged. Both kidneys are atrophic. The liver and spleen and pancreas are unremarkable. The right adrenal gland could not be definitely identified. The left adrenal gland appears diffusely thickened system with hyperplasia. The stomach and small and large bowel are unremarkable. A peritoneal dialysis catheter is noted in the anterior aspect of the pelvis. However, no peritoneal dialysate is evident within the abdomen or pelvis. Images through the lung bases show cardiomegaly and are otherwise unremarkable.      Tiny approximately 1 mm diameter obstructing calculus at the right ureterovesical junction producing mild right hydroureter and moderate right hydronephrosis. There are 2 additional even smaller nonobstructing calculi are suspected within the distal right ureter near the UVJ. A couple tiny nonobstructing calculi remain within the right kidney. There is a 7 mm diameter nonobstructing calculus in the left renal pelvis.  This report was finalized on 6/16/2022 8:34 PM by Dr. Cecil Roldan M.D.        I ordered the above noted radiological studies and viewed the images on the PACS system.         MEDICAL RECORD REVIEW  Medical records reviewed in epic, patient was  discharged from this hospital April 24, 2022 after being admitted for hydronephrosis with obstructing calculus.    Hospital Course:  The patient is a 52 y.o. female with a history of HTN, hypothyroidism, chronic systolic CHF, ESRD on peritoneal dialysis, and chronic hypotension who presented with right flank pain. Please see admission H&P for further details. She was found to have a right ureteral stone with obstruction. She had no evidence of infection. She was evaluated by urology and underwent cystoscopy with retrograde pyelogram holmium laser stone fragmentation and double-j stent placement on 4/22/22 which she tolerated well. Her postoperative course was uneventful and her symptoms resolved. She was started on a few days of levofloxacin due to urinary tract instrumentation. She will be discharged on one additional dose of levofloxacin on 4/25/22. She should follow up with Dr. Sanford Puentes in about a week for stent removal.   Of note her blood pressures were lower than her normal which is in the 90s-100s systolic, likely due to reduced oral intake and increased narcotic pain medications. Her midodrine was increased to 15mg TID temporarily and she did well with this. Her blood pressures have been increasing so she will be discharged on her usual 10mg of midodrine TID.     PROCEDURES    Procedures        DIFFERENTIAL DIAGNOSIS  Differential diagnosis include but not limited to the following: Pyelonephritis, urinary tract infection, hematuria, kidney stone elbow pain      PROGRESS, DATA ANALYSIS, CONSULTS, AND MEDICAL DECISION MAKING        ED Course as of 06/16/22 2122   Thu Jun 16, 2022   2000 Patient is a 52-year-old female who presents today with hematuria that she first noticed yesterday, right flank pain that started today.  She is a peritoneal dialysis patient.  She does have a history of kidney stones.  We will do a CT of the abdomen and pelvis to evaluate for kidney stone, pyelonephritis or other  abnormalities, urinalysis and labs. [MS]   2014 Discussed with Dr Engel regarding patient's relevant history, exam, workup and ED findings/concerns.  He agrees to see patient in consult.     [MS]   2022 BUN(!): 40 [MS]   2022 Creatinine(!): 6.90 [MS]   2026 Leukocytes, UA(!): Large (3+) [MS]   2026 RBC, UA(!): Too Numerous to Count [MS]   2026 WBC, UA(!): Too Numerous to Count [MS]   2026 Bacteria, UA(!): 2+ [MS]   2026 BUN(!): 40 [MS]   2026 Creatinine(!): 6.90 [MS]   2026 Potassium(!): 3.0 [MS]   2048  Discussed with Dr. Roldan regarding the CT abdomen and pelvis results which revealed 1 mm stone in the right UVJ  See dictation for official radiology interpretation.     [MS]   2054 Consult Note    Discussed care with KEV Avila  Reviewed patient's history, exam, results and need for admission secondary to infected kidney stone, peritoneal dialysis  Dr. Dean accepts the patient to be admitted to [facility] bed.     [MS]      ED Course User Index  [MS] Tasha Carrera, APRN       ADMISSION    Discussed treatment plan and reason for admission with pt/family and admitting physician.  Pt/family voiced understanding of the plan for admission for further testing/treatment as needed.      DIAGNOSIS  Final diagnoses:   Kidney stone on right side   Acute UTI   ESRD (end stage renal disease) ([facility])   Peritoneal dialysis catheter in place ([facility])         MEDICATIONS GIVEN IN ED    Medications   HYDROmorphone (DILAUDID) injection 0.5 mg (0.5 mg Intravenous Given 6/16/22 2010)   ondansetron (ZOFRAN) injection 4 mg (4 mg Intravenous Given 6/16/22 2010)   cefTRIAXone (ROCEPHIN) 2 g in sodium chloride 0.9 % 100 mL IVPB-VTB (2 g Intravenous New Bag 6/16/22 2043)           COURSE & MEDICAL DECISION MAKING  Any/All labs and Any/All Imaging studies that were ordered were reviewed and are noted above.  Results were reviewed/discussed with the patient and they were also made aware of online access.    Pt  also made aware that some labs, such as cultures, will not be resulted during ER visit and followup with PMD is necessary.        Tasha Carrera, APRN  06/16/22 2124

## 2022-06-17 PROBLEM — N20.0 KIDNEY STONE ON RIGHT SIDE: Status: ACTIVE | Noted: 2022-01-01

## 2022-06-17 NOTE — H&P
Patient Name:  Ambar Lara  YOB: 1970  MRN:  [id number]  Admit Date:  6/16/2022  Patient Care Team:  Jet Manuel MD as PCP - General (Internal Medicine)      Subjective   History Present Illness     Chief Complaint   Patient presents with   • Fall   • Blood in Urine       52F with hx  hypothyroidism, chronic systolic CHF, ESRD on peritoneal dialysis, and chronic hypotension to the hospital with right flank pain that has been going on for most the day.  Imaging the ER revealed a 1 mm obstructing calculus at the right ureterovesicular junction as well as a few other smaller nonobstructing calculi.  Urology was consulted.  She will be evaluated by urology in consultation tomorrow morning.    Of note she has a history of a similar presentation in April 2020.  At that time she was evaluated urology and underwent cystoscopy with laser lithotripsy and double-J stent placement.  Presumably her stent was removed in the urology office after discharge.    She also has a history of hypotension for which she takes midodrine 10 mg 3 times a day.    Laboratory evaluation reveals a elevated white blood count of 12.1, as well as a chemistry findings consistent with end-stage renal disease.  Her potassium was 3.0.    Review of Systems   Constitutional: Positive for fatigue. Negative for chills and fever.   HENT: Negative for dental problem and drooling.    Respiratory: Negative for chest tightness and shortness of breath.    Cardiovascular: Negative for chest pain and palpitations.   Gastrointestinal: Negative for abdominal pain and constipation.   Genitourinary: Positive for difficulty urinating and hematuria.   Musculoskeletal: Negative for back pain and neck stiffness.   Skin: Negative for color change and wound.   Neurological: Negative for facial asymmetry and light-headedness.   Hematological: Negative for adenopathy. Does not bruise/bleed easily.   Psychiatric/Behavioral: Negative for behavioral  problems and confusion.        Personal History     Past Medical History:   Diagnosis Date   • CHF (congestive heart failure) ([facility])    • Clostridioides difficile infection 2021    finished oral vanc 2021   • Dialysis patient ([facility])    • Disease of thyroid gland    • Elevated cholesterol    • ESRD on peritoneal dialysis ([facility])    • GERD (gastroesophageal reflux disease)    • Pulmonary emboli ([facility]) 2021    coumadin last taken 2021   • Renal disorder    • Sleep apnea    • Ureteral calculi 2022     Past Surgical History:   Procedure Laterality Date   • ADRENAL GLAND SURGERY N/A `   •  SECTION Bilateral 2001    Has had x2   • CYSTOSCOPY URETEROSCOPY LASER LITHOTRIPSY Left 2022    Procedure: Left CYSTOSCOPY, with stent placement;  Surgeon: Sanford Puentes MD;  Location: [facility];  Service: Urology;  Laterality: Left;   • CYSTOSCOPY W/ URETERAL STENT PLACEMENT Right 2022    Procedure: RIGHT CYSTOSCOPY RETROGRADE PYLEOGRAM HOLMIUM LASER STENT;  Surgeon: Sanford Puentes MD;  Location: [facility];  Service: Urology;  Laterality: Right;   • HYSTERECTOMY     • INSERTION PERITONEAL DIALYSIS CATHETER Right 10/12/2021    Procedure: LAPAROSCOPIC REVISION OF PERITONEAL DIALYSIS CATHETER AND LAPAROSCOPIC CHOLECYSTECTOMY;  Surgeon: Jamie Figueroa MD;  Location: [facility];  Service: General;  Laterality: Right;   • PERITONEAL CATHETER INSERTION  2019   • TRACHEOSTOMY       Family History   Problem Relation Age of Onset   • Malig Hyperthermia Neg Hx      Social History     Tobacco Use   • Smoking status: Former Smoker     Packs/day: 1.00     Years: 25.00     Pack years: 25.00     Types: Cigarettes     Quit date: 2018     Years since quittin.1   • Smokeless tobacco: Never Used   Vaping Use   • Vaping Use: Former   • Quit date: 2021   • Substances: Nicotine, Flavoring   • Devices: Pre-filled or refillable cartridge   Substance Use Topics   •  Alcohol use: Not Currently   • Drug use: Not Currently     No current facility-administered medications on file prior to encounter.     Current Outpatient Medications on File Prior to Encounter   Medication Sig Dispense Refill   • acetaminophen (TYLENOL) 500 MG tablet Take 500 mg by mouth Every 6 (Six) Hours As Needed for Mild Pain .     • allopurinol (ZYLOPRIM) 100 MG tablet Take 100 mg by mouth Daily.     • ammonium lactate (AMLACTIN) 12 % cream Apply topically to the appropriate area as directed Every 12 (Twelve) Hours As Needed for Dry Skin. 385 g 0   • aspirin 81 MG chewable tablet Chew 81 mg Daily.     • B Complex-C-Folic Acid (REN-JHON PO) Take 1 tablet by mouth Daily.     • clopidogrel (PLAVIX) 75 MG tablet Take 1 tablet by mouth Daily. 30 tablet 0   • diphenhydrAMINE HCl, Sleep, 25 MG capsule Take 25 mg by mouth At Night As Needed (sleep). For sleep 28 capsule    • ergocalciferol (ERGOCALCIFEROL) 1.25 MG (51290 UT) capsule Take 1 capsule by mouth 1 (One) Time Per Week.     • escitalopram (LEXAPRO) 20 MG tablet Take 1 tablet by mouth Daily. 30 tablet 1   • famotidine (PEPCID) 20 MG tablet Take 20 mg by mouth 2 (Two) Times a Day.     • gentamicin (GARAMYCIN) 0.1 % cream Apply 1 application topically to the appropriate area as directed Daily. As directed     • gentamicin (GARAMYCIN) 0.1 % ointment Apply 1 application topically to the appropriate area as directed Daily.     • lanthanum (FOSRENOL) 500 MG chewable tablet Chew 750 mg 3 (Three) Times a Day With Meals.     • levothyroxine (SYNTHROID, LEVOTHROID) 137 MCG tablet Take 1 tablet by mouth Daily. 30 tablet 0   • magnesium oxide (MAG-OX) 400 tablet tablet Take 1 tablet by mouth 2 (Two) Times a Day. 60 tablet 0   • melatonin 3 MG tablet Take 1 tablet by mouth Every Night. 30 tablet 1   • midodrine (PROAMATINE) 10 MG tablet Take 1 tablet by mouth 3 (Three) Times a Day. 90 tablet 1   • montelukast (Singulair) 10 MG tablet Take 1 tablet by mouth Every Night.  30 tablet 1   • multivitamin (THERAGRAN) tablet tablet Take 1 tablet by mouth Daily. 90 tablet 0   • nitroglycerin (NITROSTAT) 0.4 MG SL tablet Place 0.4 mg under the tongue Every 5 (Five) Minutes As Needed for Chest Pain. Take no more than 3 doses in 15 minutes.     • oxyCODONE-acetaminophen (Percocet) 5-325 MG per tablet Take 1 tablet by mouth Every 6 (Six) Hours As Needed for Moderate Pain . 30 tablet 0   • potassium chloride (K-DUR,KLOR-CON) 20 MEQ CR tablet Take 2 tablets by mouth 2 (Two) Times a Day.     • saccharomyces boulardii (FLORASTOR) 250 MG capsule Take 2 capsules by mouth 2 (Two) Times a Day. 120 capsule 1   • sodium bicarbonate 650 MG tablet Take 2 tablets by mouth 3 (Three) Times a Day. 90 tablet 0   • traZODone (DESYREL) 100 MG tablet Take 100 mg by mouth Every Night. Take 0.5-1 tablet by mouth nightly       Allergies   Allergen Reactions   • Penicillins Anaphylaxis, Hives, Shortness Of Breath, Swelling and Rash     Tolerated cephalosporins in past   • Nickel Rash       Objective    Objective     Vital Signs  Temp:  [98.3 °F (36.8 °C)-98.4 °F (36.9 °C)] 98.4 °F (36.9 °C)  Heart Rate:  [] 101  Resp:  [16] 16  BP: ()/(55-65) 90/65  SpO2:  [92 %-97 %] 96 %  on   ;   Device (Oxygen Therapy): room air  Body mass index is 32.58 kg/m².    Physical Exam  Constitutional:       Appearance: Normal appearance.      Comments: Chronically ill appearing    HENT:      Head: Normocephalic and atraumatic.   Eyes:      Extraocular Movements: Extraocular movements intact.      Pupils: Pupils are equal, round, and reactive to light.   Cardiovascular:      Rate and Rhythm: Normal rate and regular rhythm.   Pulmonary:      Effort: Pulmonary effort is normal. No respiratory distress.   Abdominal:      Palpations: Abdomen is soft.      Comments: + right sided flank and groin tenderness    Musculoskeletal:      Right lower leg: No edema.      Left lower leg: No edema.   Skin:     General: Skin is warm and dry.    Neurological:      General: No focal deficit present.      Mental Status: She is alert and oriented to person, place, and time.         Results Review:  I reviewed the patient's new clinical results.  I reviewed the patient's new imaging results and agree with the interpretation.  I reviewed the patient's other test results and agree with the interpretation  I personally viewed and interpreted the patient's EKG/Telemetry data  Discussed with ED provider.    Lab Results (last 24 hours)     Procedure Component Value Units Date/Time    Urinalysis With Culture If Indicated - Urine, Clean Catch [[id number]]  (Abnormal) Collected: 06/16/22 1940    Specimen: Urine, Clean Catch Updated: 06/16/22 2005     Color, UA Red     Comment: Any Substance that causes an abnormal urine color can alter the accuracy of the chemical reactions.        Appearance, UA Turbid     pH, UA 5.5     Specific Gravity, UA 1.015     Glucose, UA Negative     Ketones, UA Negative     Bilirubin, UA Negative     Blood, UA Large (3+)     Protein,  mg/dL (2+)     Leuk Esterase, UA Large (3+)     Nitrite, UA Negative     Urobilinogen, UA 0.2 E.U./dL    Narrative:      In absence of clinical symptoms, the presence of pyuria, bacteria, and/or nitrites on the urinalysis result does not correlate with infection.    CBC & Differential [[id number]]  (Abnormal) Collected: 06/16/22 1940    Specimen: Blood Updated: 06/16/22 1955    Narrative:      The following orders were created for panel order CBC & Differential.  Procedure                               Abnormality         Status                     ---------                               -----------         ------                     CBC Auto Differential[[id number]]        Abnormal            Final result                 Please view results for these tests on the individual orders.    Comprehensive Metabolic Panel [[id number]]  (Abnormal) Collected: 06/16/22 1940    Specimen: Blood Updated: 06/16/22 2016      Glucose 136 mg/dL      BUN 40 mg/dL      Creatinine 6.90 mg/dL      Sodium 137 mmol/L      Potassium 3.0 mmol/L      Chloride 97 mmol/L      CO2 14.9 mmol/L      Calcium 7.8 mg/dL      Total Protein 6.8 g/dL      Albumin 4.10 g/dL      ALT (SGPT) 10 U/L      AST (SGOT) 10 U/L      Alkaline Phosphatase 71 U/L      Total Bilirubin <0.2 mg/dL      Globulin 2.7 gm/dL      A/G Ratio 1.5 g/dL      BUN/Creatinine Ratio 5.8     Anion Gap 25.1 mmol/L      eGFR 6.8 mL/min/1.73      Comment: <15 Indicative of kidney failure       Narrative:      GFR Normal >60  Chronic Kidney Disease <60  Kidney Failure <15      CBC Auto Differential [[id number]]  (Abnormal) Collected: 06/16/22 1940    Specimen: Blood Updated: 06/16/22 1955     WBC 12.12 10*3/mm3      RBC 3.29 10*6/mm3      Hemoglobin 10.0 g/dL      Hematocrit 31.1 %      MCV 94.5 fL      MCH 30.4 pg      MCHC 32.2 g/dL      RDW 14.1 %      RDW-SD 48.0 fl      MPV 9.8 fL      Platelets 368 10*3/mm3      Neutrophil % 82.1 %      Lymphocyte % 10.4 %      Monocyte % 4.7 %      Eosinophil % 1.0 %      Basophil % 0.6 %      Immature Grans % 1.2 %      Neutrophils, Absolute 9.96 10*3/mm3      Lymphocytes, Absolute 1.26 10*3/mm3      Monocytes, Absolute 0.57 10*3/mm3      Eosinophils, Absolute 0.12 10*3/mm3      Basophils, Absolute 0.07 10*3/mm3      Immature Grans, Absolute 0.14 10*3/mm3      nRBC 0.0 /100 WBC     Urinalysis, Microscopic Only - Urine, Clean Catch [[id number]]  (Abnormal) Collected: 06/16/22 1940    Specimen: Urine, Clean Catch Updated: 06/16/22 2015     RBC, UA Too Numerous to Count /HPF      WBC, UA Too Numerous to Count /HPF      Bacteria, UA 2+ /HPF      Squamous Epithelial Cells, UA 0-2 /HPF      Hyaline Casts, UA None Seen /LPF      Methodology Manual Light Microscopy    Urine Culture - Urine, Urine, Clean Catch [[id number]] Collected: 06/16/22 1940    Specimen: Urine, Clean Catch Updated: 06/16/22 2015    Renal Function Panel [[id number]]  (Abnormal) Collected:  "06/16/22 1940    Specimen: Blood Updated: 06/16/22 2148     Glucose 140 mg/dL      BUN 39 mg/dL      Creatinine 6.76 mg/dL      Sodium 139 mmol/L      Potassium 3.1 mmol/L      Chloride 98 mmol/L      CO2 14.0 mmol/L      Calcium 7.9 mg/dL      Albumin 3.90 g/dL      Phosphorus 12.5 mg/dL      Anion Gap 27.0 mmol/L      BUN/Creatinine Ratio 5.8     eGFR 6.9 mL/min/1.73      Comment: <15 Indicative of kidney failure       Narrative:      GFR Normal >60  Chronic Kidney Disease <60  Kidney Failure <15      Procalcitonin [[id number]]  (Abnormal) Collected: 06/16/22 1940    Specimen: Blood Updated: 06/16/22 2155     Procalcitonin 0.74 ng/mL     Narrative:      As a Marker for Sepsis (Non-Neonates):    1. <0.5 ng/mL represents a low risk of severe sepsis and/or septic shock.  2. >2 ng/mL represents a high risk of severe sepsis and/or septic shock.    As a Marker for Lower Respiratory Tract Infections that require antibiotic therapy:    PCT on Admission    Antibiotic Therapy       6-12 Hrs later    >0.5                Strongly Recommended  >0.25 - <0.5        Recommended   0.1 - 0.25          Discouraged              Remeasure/reassess PCT  <0.1                Strongly Discouraged     Remeasure/reassess PCT    As 28 day mortality risk marker: \"Change in Procalcitonin Result\" (>80% or <=80%) if Day 0 (or Day 1) and Day 4 values are available. Refer to http://www.[facility]-pct-calculator.com    Change in PCT <=80%  A decrease of PCT levels below or equal to 80% defines a positive change in PCT test result representing a higher risk for 28-day all-cause mortality of patients diagnosed with severe sepsis for septic shock.    Change in PCT >80%  A decrease of PCT levels of more than 80% defines a negative change in PCT result representing a lower risk for 28-day all-cause mortality of patients diagnosed with severe sepsis or septic shock.       COVID PRE-OP / PRE-PROCEDURE SCREENING ORDER (NO ISOLATION) - Swab, Nasopharynx " [[id number]] Collected: 06/16/22 2129    Specimen: Swab from Nasopharynx Updated: 06/16/22 2133    Narrative:      The following orders were created for panel order COVID PRE-OP / PRE-PROCEDURE SCREENING ORDER (NO ISOLATION) - Swab, Nasopharynx.  Procedure                               Abnormality         Status                     ---------                               -----------         ------                     COVID-19,APTIMA PANTHER(...[[id number]]                      In process                   Please view results for these tests on the individual orders.    COVID-19,APTIMA PANTHER(PORSCHE),[facility], NP/OP SWAB IN UTM/VTM/SALINE TRANSPORT MEDIA,24 HR TAT - Swab, Nasopharynx [[id number]] Collected: 06/16/22 2129    Specimen: Swab from Nasopharynx Updated: 06/16/22 2133    Lactic Acid, Plasma [[id number]]  (Normal) Collected: 06/16/22 2131    Specimen: Blood Updated: 06/16/22 2154     Lactate 1.3 mmol/L           Imaging Results (Last 24 Hours)     Procedure Component Value Units Date/Time    CT Abdomen Pelvis Without Contrast [[id number]] Collected: 06/16/22 2016     Updated: 06/16/22 2037    Narrative:      CT ABDOMEN PELVIS WO CONTRAST-     CLINICAL HISTORY: Right flank pain. Renal failure peritoneal dialysis.  History of kidney stones.     TECHNIQUE: Spiral CT images were performed through the abdomen and  pelvis with no oral or IV contrast and were reconstructed in 3 mm thick  slices.     Radiation dose reduction techniques were utilized, including automated  exposure control and exposure modulation based on body size.     COMPARISON: CT scan of the abdomen and pelvis dated 05/20/2022.     FINDINGS: There is moderate right hydronephrosis and mild right  hydroureter with dilatation of the ureter to the level of the  ureterovesical junction where there is an approximately 1 to 2 mm  diameter obstructing calculus. One or two additional smaller calculi are  also suspected in the distal right ureter near the  UVJ. There are 2  additional nonobstructing tiny calculi are present in the right kidney  as well. There is a 7 mm diameter nonobstructing calculus in the left  renal pelvis that is unchanged. Both kidneys are atrophic. The liver and  spleen and pancreas are unremarkable. The right adrenal gland could not  be definitely identified. The left adrenal gland appears diffusely  thickened system with hyperplasia. The stomach and small and large bowel  are unremarkable. A peritoneal dialysis catheter is noted in the  anterior aspect of the pelvis. However, no peritoneal dialysate is  evident within the abdomen or pelvis. Images through the lung bases show  cardiomegaly and are otherwise unremarkable.       Impression:      Tiny approximately 1 mm diameter obstructing calculus at the  right ureterovesical junction producing mild right hydroureter and  moderate right hydronephrosis. There are 2 additional even smaller  nonobstructing calculi are suspected within the distal right ureter near  the UVJ. A couple tiny nonobstructing calculi remain within the right  kidney. There is a 7 mm diameter nonobstructing calculus in the left  renal pelvis.     This report was finalized on 6/16/2022 8:34 PM by Dr. Cecil Roldan M.D.             Results for orders placed during the hospital encounter of 03/11/22    Adult Transthoracic Echo Complete W/ Cont if Necessary Per Protocol    Interpretation Summary  · Left ventricular ejection fraction appears to be less than 20%. Left ventricular systolic function is severely decreased. Normal left ventricular wall thickness noted. The left ventricular cavity is severely dilated. Left ventricular diastolic function was indeterminate. No evidence of left ventricular thrombus or mass present.  · The following left ventricular wall segments are hypokinetic: basal anterolateral, mid anterolateral, apical lateral, basal inferolateral, mid inferolateral, mid inferior, basal anterior and basal  inferior. The following left ventricular wall segments are akinetic: mid anterior, apical anterior, apical inferior, apical septal, basal inferoseptal, mid inferoseptal, apex and mid anteroseptal.  · Apical tethering and severe restriction of posterior leaflet excursion. Moderate to severe mitral valve regurgitation is present. No significant mitral valve stenosis is present.      No orders to display        Assessment/Plan     Active Hospital Problems    Diagnosis  POA   • **Calculus of ureterovesical junction (UVJ) [N20.1]  Yes   • Acute cystitis [N30.00]  Yes   • Anemia, chronic disease [D63.8]  Yes   • Obesity (BMI 30-39.9) [E66.9]  Yes   • Chronic systolic heart failure (HCC) [I50.22]  Yes   • Cardiomyopathy (HCC) [I42.9]  Yes   • ESRD on peritoneal dialysis (HCC) [N18.6, Z99.2]  Not Applicable   • HLD (hyperlipidemia) [E78.5]  Yes   • Anxiety [F41.9]  Yes      Resolved Hospital Problems   No resolved problems to display.       Nephrolithiasis  Urinary tract infection  UTI based on urinalysis.  Urine culture pending  Continue ceftriaxone  Urology consulted for evaluation of stent placement  Pain control, fluids    ESRD on peritoneal dialysis  AGMA  Consult nephrology    Chronic hypotension  Resume midodrine     CHF   Cardiomyopathy  TTE in 2022 showed an EF of less than 20% with global hypokinesis.  Currently on aspirin and Plavix    · I discussed the patient's findings and my recommendations with patient and nursing staff.    VTE Prophylaxis - Heparin SC.  Code Status - Full code.       Jadiel Dean MD  [facility]  06/16/22  22:48 EDT

## 2022-06-17 NOTE — ANESTHESIA PREPROCEDURE EVALUATION
Anesthesia Evaluation     Patient summary reviewed and Nursing notes reviewed   NPO Solid Status: > 8 hours  NPO Liquid Status: > 2 hours           Airway   Mallampati: I  No difficulty expected  Dental - normal exam     Pulmonary - normal exam   (+) pulmonary embolism, sleep apnea,     ROS comment: Previous trach, decannulated > 1 year.   Cardiovascular - normal exam  Exercise tolerance: poor (<4 METS)    ECG reviewed  PT is on anticoagulation therapy    (+) CHF Systolic <55%, PVD, hyperlipidemia,     ROS comment: Reviewed echo:    · Left ventricular ejection fraction appears to be less than 20%. Left ventricular systolic function is severely decreased. Normal left ventricular wall thickness noted. The left ventricular cavity is severely dilated. Left ventricular diastolic function was indeterminate. No evidence of left ventricular thrombus or mass present.  · The following left ventricular wall segments are hypokinetic: basal anterolateral, mid anterolateral, apical lateral, basal inferolateral, mid inferolateral, mid inferior, basal anterior and basal inferior. The following left ventricular wall segments are akinetic: mid anterior, apical anterior, apical inferior, apical septal, basal inferoseptal, mid inferoseptal, apex and mid anteroseptal.  · Apical tethering and severe restriction of posterior leaflet excursion. Moderate to severe mitral valve regurgitation is present. No significant mitral valve stenosis is present.     Chronically Hypotensive     Neuro/Psych  (+) psychiatric history Anxiety,    GI/Hepatic/Renal/Endo    (+) obesity,  GERD,  renal disease (chronically hypokalemic 3.1 at last check ), thyroid problem hypothyroidism    Musculoskeletal     Abdominal    Substance History      OB/GYN          Other                          Anesthesia Plan    ASA 4     general       Anesthetic plan, risks, benefits, and alternatives have been provided, discussed and informed consent has been obtained with:  patient.        CODE STATUS:

## 2022-06-17 NOTE — CASE MANAGEMENT/SOCIAL WORK
Discharge Planning Assessment  [facility]     Patient Name: Ambar Lara  MRN: [id number]  Today's Date: 6/17/2022    Admit Date: 6/16/2022     Discharge Needs Assessment     Row Name 06/17/22 1331       Living Environment    People in Home child(richar), adult    Name(s) of People in Home Arthur Medina [phone]    Current Living Arrangements home    Primary Care Provided by self    Provides Primary Care For no one    Family Caregiver if Needed child(richar), adult    Family Caregiver Names Son Farhan [phone] and daughter Carol and son Roland    Quality of Family Relationships helpful    Able to Return to Prior Arrangements yes       Resource/Environmental Concerns    Resource/Environmental Concerns none       Transition Planning    Patient/Family Anticipates Transition to home with family    Patient/Family Anticipated Services at Transition none    Transportation Anticipated family or friend will provide       Discharge Needs Assessment    Equipment Currently Used at Home cane, straight;walker, standard;wheelchair  Has cane, walker and W/C but does not use them routinely    Concerns to be Addressed basic needs    Anticipated Changes Related to Illness none    Equipment Needed After Discharge none               Discharge Plan     Row Name 06/17/22 9701       Plan    Plan Return home with family    Patient/Family in Agreement with Plan yes    Plan Comments Spoke with patient at bedside.  Patient lives with daughter Carol, [phone] (home for the summer). She has a cane, walker and W/C if needed but does not use them routinely.  She has used [facility] in the past and has been to [facility].  PCP is Dr. Jet Manuel and pharmacy is [facility] in [facility].  Additional emergency contact is her son Farhan [phone] (POA).  She does not drive, states her children drive and assist her if needed.  She does PD with a cycler.  She states her children help her if her PD needs additives.  She plans to return home at [place].   CCP will follow.  BHumeniuk RN              Continued Care and Services - Admitted Since 6/16/2022    Coordination has not been started for this encounter.          Demographic Summary     Row Name 06/17/22 1330       General Information    Admission Type inpatient    Arrived From home    Required Notices Provided Important Message from Medicare    Referral Source admission list    Reason for Consult discharge planning    Preferred Language English               Functional Status     Row Name 06/17/22 1331       Functional Status    Usual Activity Tolerance moderate    Current Activity Tolerance moderate       Functional Status, IADL    Medications independent    Meal Preparation assistive person  Daughter    Housekeeping assistive person    Laundry assistive person    Shopping assistive person  Children       Mental Status    General Appearance WDL WDL       Mental Status Summary    Recent Changes in Mental Status/Cognitive Functioning no changes                      Becky S. Humeniuk, RN

## 2022-06-17 NOTE — ED PROVIDER NOTES
MD ATTESTATION NOTE    The GIBSON and I have discussed this patient's history, physical exam, and treatment plan.  I have reviewed the documentation and personally had a face to face interaction with the patient. I affirm the documentation and agree with the treatment and plan.  The attached note describes my personal findings.      I provided a substantive portion of the care of the patient.  I personally performed the physical exam in its entirety, and below are my findings.  For this patient encounter, the patient wore surgical mask, I wore full protective PPE including N95 and eye protection    Brief HPI: 52-year-old female on peritoneal dialysis with history of kidney stones on Plavix who presents to the emergency room for blood in her urine that she first noticed yesterday.  She states that today she began having right flank pain that is constant and sharp.    General : 52-year-old patient is awake alert and oriented  HEENT: NCAT  CV: Heart is regular with no murmurs  Respiratory: CTA bilaterally  Abd: Soft and nontender with peritoneal Catheter in Place.  Patient Does Have Right Flank Tenderness.  Ext: Mild bruising to bilateral knees with full range of motion and the patient has been ambulatory.  Skin: No rash  Neuro: Cranial nerves II through XII grossly intact as tested.  No acute lateralizing deficits.  Psych: Normal mood and affect      Plan: We will check labs, analysis and CT scan for further evaluation.  The patient's work-up reveals that she has an infected kidney stone and has end-stage renal disease.  We will treat her with antibiotics, pain medicine and admit her to the hospitalist and consult urology and nephrology.         Tariq Wray MD  06/16/22 5984

## 2022-06-17 NOTE — PLAN OF CARE
Goal Outcome Evaluation:              Outcome Evaluation: A/O x4. vss. ex BP, low BP, [facility]-NP notified. bolus given as ordered.  order to transfer 4P. Report given to SRIDEVI Arevalo. NPO. Fall precautioned maintained.care explained questions answered.

## 2022-06-17 NOTE — ANESTHESIA PROCEDURE NOTES
Airway  Urgency: elective    Date/Time: 6/17/2022 2:59 PM    General Information and Staff    Patient location during procedure: OR  Anesthesiologist: Jose Recinos MD  CRNA/CAA: Kim Perez CRNA    Indications and Patient Condition  Indications for airway management: airway protection    Preoxygenated: yes  MILS not maintained throughout  Mask difficulty assessment: 1 - vent by mask    Final Airway Details  Final airway type: supraglottic airway      Successful airway: unique  Size 4    Number of attempts at approach: 1  Assessment: lips, teeth, and gum same as pre-op and atraumatic intubation

## 2022-06-17 NOTE — PROGRESS NOTES
" LOS: 0 days     Name: Ambar Lara  Age: 52 y.o.  Sex: female  :  1970  MRN: [id number]         Primary Care Physician: eJt Manuel MD    Subjective   Subjective  Pain-free at present.  No acute complaints.    Objective   Vital Signs  Temp:  [98.2 °F (36.8 °C)-98.4 °F (36.9 °C)] 98.2 °F (36.8 °C)  Heart Rate:  [] 107  Resp:  [16-18] 18  BP: ()/() 137/109  Body mass index is 32.8 kg/m².    Objective:  General Appearance:  Comfortable, in no acute distress and ill-appearing (Looks chronically ill and older than stated age).    Vital signs: (most recent): Blood pressure (!) 137/109, pulse 107, temperature 98.2 °F (36.8 °C), temperature source Oral, resp. rate 18, height 154.9 cm (60.98\"), weight 78.7 kg (173 lb 8 oz), last menstrual period 2004, SpO2 95 %.    Lungs:  Normal effort and normal respiratory rate.  She is not in respiratory distress.  There are decreased breath sounds.    Heart: Normal rate.  Regular rhythm.    Abdomen: Abdomen is soft.  (PD catheter in place.  Abdomen is nontender).  Bowel sounds are normal.   There is no abdominal tenderness.     Extremities: There is no dependent edema or local swelling.    Neurological: Patient is alert and oriented to person, place and time.    Skin:  Warm and dry.              Results Review:       I reviewed the patient's new clinical results.    Results from last 7 days   Lab Units 22  1110 22  1940   WBC 10*3/mm3 8.71 12.12*   HEMOGLOBIN g/dL 9.5* 10.0*   PLATELETS 10*3/mm3 347 368     Results from last 7 days   Lab Units 22   SODIUM mmol/L 139  137   POTASSIUM mmol/L 3.1*  3.0*   CHLORIDE mmol/L 98  97*   CO2 mmol/L 14.0*  14.9*   BUN mg/dL 39*  40*   CREATININE mg/dL 6.76*  6.90*   CALCIUM mg/dL 7.9*  7.8*   GLUCOSE mg/dL 140*  136*                 Scheduled Meds:   allopurinol, 100 mg, Oral, Daily  aspirin, 81 mg, Oral, Daily  cefTRIAXone, 1 g, Intravenous, Q24H  clopidogrel, 75 mg, Oral, " Daily  Delflex-LC/1.5% Dextrose, 2,000 mL, Intraperitoneal, 5x Daily  escitalopram, 20 mg, Oral, Daily  heparin (porcine), 5,000 Units, Subcutaneous, Q8H  levothyroxine, 137 mcg, Oral, QAM  midodrine, 10 mg, Oral, TID AC  montelukast, 10 mg, Oral, Nightly  multivitamin, 1 tablet, Oral, Daily  potassium chloride, 20 mEq, Oral, BID  sodium bicarbonate, 1,300 mg, Oral, TID  traZODone, 100 mg, Oral, Nightly      PRN Meds:   •  acetaminophen **OR** acetaminophen **OR** acetaminophen  •  aluminum-magnesium hydroxide-simethicone  •  ondansetron **OR** ondansetron  •  oxyCODONE-acetaminophen  •  sodium chloride  Continuous Infusions:       Assessment & Plan   Active Hospital Problems    Diagnosis  POA   • **Calculus of ureterovesical junction (UVJ) [N20.1]  Yes   • Acute cystitis [N30.00]  Yes   • Anemia, chronic disease [D63.8]  Yes   • Obesity (BMI 30-39.9) [E66.9]  Yes   • Chronic systolic heart failure (HCC) [I50.22]  Yes   • Cardiomyopathy (HCC) [I42.9]  Yes   • ESRD on peritoneal dialysis (HCC) [N18.6, Z99.2]  Not Applicable   • HLD (hyperlipidemia) [E78.5]  Yes   • Anxiety [F41.9]  Yes      Resolved Hospital Problems   No resolved problems to display.       Assessment & Plan    -Plans noted from urology for likely ureteroscopy today.  She remains n.p.o. for now  -Continue Rocephin and await urine culture.  Leukocytosis resolved  -Nephrology following for PD management and hypokalemia  -Chronic hypotension on midodrine.  Blood pressure looks better and will continue outpatient regimen of 10 mg 3 times daily  -Continue daily thyroid replacement        I wore full protective equipment throughout the patient encounter including eye protection and facemask.  Hand hygiene was performed before donning protective equipment and after removal when leaving the room.    Jamie Odonnell MD  [facility]  06/17/22  12:12 EDT

## 2022-06-17 NOTE — CONSULTS
FIRST UROLOGY CONSULT      Patient Identification:  NAME:  Ambar Lara  Age:  52 y.o.   Sex:  female   :  1970   MRN:  [id number]       Chief complaint: Right flank pain    History of present illness: 52-year-old female known to our service.  History of nephrolithiasis.  Now comes in with right-sided flank pain.  CT imaging shows a distal 1 mm stone obstructing hydronephrosis.  No fevers or chills.  Low-grade white count on admission.  Clinically hypotensive but she is usually this way.  She has longstanding history of end-stage renal disease on peritoneal dialysis.  Takes midodrine for her hypotension.      Past medical history:  Past Medical History:   Diagnosis Date   • CHF (congestive heart failure) ([facility])    • Clostridioides difficile infection 2021    finished oral vanc 2021   • Dialysis patient ([facility])    • Disease of thyroid gland    • Elevated cholesterol    • ESRD on peritoneal dialysis ([facility])    • GERD (gastroesophageal reflux disease)    • Pulmonary emboli ([facility]) 2021    coumadin last taken 2021   • Renal disorder    • Sleep apnea    • Ureteral calculi 2022       Past surgical history:  Past Surgical History:   Procedure Laterality Date   • ADRENAL GLAND SURGERY N/A `   •  SECTION Bilateral 2001    Has had x2   • CYSTOSCOPY URETEROSCOPY LASER LITHOTRIPSY Left 2022    Procedure: Left CYSTOSCOPY, with stent placement;  Surgeon: Sanford Puentes MD;  Location: [facility];  Service: Urology;  Laterality: Left;   • CYSTOSCOPY W/ URETERAL STENT PLACEMENT Right 2022    Procedure: RIGHT CYSTOSCOPY RETROGRADE PYLEOGRAM HOLMIUM LASER STENT;  Surgeon: Sanford Puentes MD;  Location: [facility];  Service: Urology;  Laterality: Right;   • HYSTERECTOMY     • INSERTION PERITONEAL DIALYSIS CATHETER Right 10/12/2021    Procedure: LAPAROSCOPIC REVISION OF PERITONEAL DIALYSIS CATHETER AND LAPAROSCOPIC CHOLECYSTECTOMY;  Surgeon: Henry  Jamie BRUMFIELD MD;  Location: [facility] OR;  Service: General;  Laterality: Right;   • PERITONEAL CATHETER INSERTION  04/2019   • TRACHEOSTOMY         Allergies:  Penicillins and Nickel    Home medications:  Medications Prior to Admission   Medication Sig Dispense Refill Last Dose   • acetaminophen (TYLENOL) 500 MG tablet Take 500 mg by mouth Every 6 (Six) Hours As Needed for Mild Pain .   Past Week at Unknown time   • allopurinol (ZYLOPRIM) 100 MG tablet Take 100 mg by mouth Daily.   6/15/2022 at Unknown time   • aspirin 81 MG chewable tablet Chew 81 mg Daily.   6/15/2022 at Unknown time   • B Complex-C-Folic Acid (REN-JHON PO) Take 1 tablet by mouth Daily.   6/15/2022 at Unknown time   • clopidogrel (PLAVIX) 75 MG tablet Take 1 tablet by mouth Daily. 30 tablet 0 6/15/2022 at Unknown time   • diphenhydrAMINE HCl, Sleep, 25 MG capsule Take 25 mg by mouth At Night As Needed (sleep). For sleep 28 capsule  6/15/2022 at Unknown time   • escitalopram (LEXAPRO) 20 MG tablet Take 1 tablet by mouth Daily. 30 tablet 1 6/15/2022 at Unknown time   • famotidine (PEPCID) 20 MG tablet Take 20 mg by mouth 2 (Two) Times a Day.   6/15/2022 at Unknown time   • gentamicin (GARAMYCIN) 0.1 % cream Apply 1 application topically to the appropriate area as directed Daily. As directed   6/15/2022 at Unknown time   • gentamicin (GARAMYCIN) 0.1 % ointment Apply 1 application topically to the appropriate area as directed Daily.   6/15/2022 at Unknown time   • lanthanum (FOSRENOL) 500 MG chewable tablet Chew 750 mg 3 (Three) Times a Day With Meals.   6/15/2022 at Unknown time   • levothyroxine (SYNTHROID, LEVOTHROID) 137 MCG tablet Take 1 tablet by mouth Daily. 30 tablet 0 6/15/2022 at Unknown time   • magnesium oxide (MAG-OX) 400 tablet tablet Take 1 tablet by mouth 2 (Two) Times a Day. 60 tablet 0 6/15/2022 at Unknown time   • melatonin 3 MG tablet Take 1 tablet by mouth Every Night. 30 tablet 1 6/15/2022 at Unknown time   • midodrine  (PROAMATINE) 10 MG tablet Take 1 tablet by mouth 3 (Three) Times a Day. 90 tablet 1 6/16/2022 at Unknown time   • montelukast (Singulair) 10 MG tablet Take 1 tablet by mouth Every Night. 30 tablet 1 6/15/2022 at Unknown time   • multivitamin (THERAGRAN) tablet tablet Take 1 tablet by mouth Daily. 90 tablet 0 6/15/2022 at Unknown time   • saccharomyces boulardii (FLORASTOR) 250 MG capsule Take 2 capsules by mouth 2 (Two) Times a Day. 120 capsule 1 6/15/2022 at Unknown time   • sodium bicarbonate 650 MG tablet Take 2 tablets by mouth 3 (Three) Times a Day. 90 tablet 0 6/15/2022 at Unknown time   • traZODone (DESYREL) 100 MG tablet Take 100 mg by mouth Every Night. Take 0.5-1 tablet by mouth nightly   6/15/2022 at Unknown time   • ammonium lactate (AMLACTIN) 12 % cream Apply topically to the appropriate area as directed Every 12 (Twelve) Hours As Needed for Dry Skin. 385 g 0 More than a month at Unknown time   • ergocalciferol (ERGOCALCIFEROL) 1.25 MG (94078 UT) capsule Take 1 capsule by mouth 1 (One) Time Per Week.   Unknown at Unknown time   • nitroglycerin (NITROSTAT) 0.4 MG SL tablet Place 0.4 mg under the tongue Every 5 (Five) Minutes As Needed for Chest Pain. Take no more than 3 doses in 15 minutes.   Unknown at Unknown time   • oxyCODONE-acetaminophen (Percocet) 5-325 MG per tablet Take 1 tablet by mouth Every 6 (Six) Hours As Needed for Moderate Pain . 30 tablet 0    • potassium chloride (K-DUR,KLOR-CON) 20 MEQ CR tablet Take 2 tablets by mouth 2 (Two) Times a Day.   More than a month at Unknown time       Hospital medications:  allopurinol, 100 mg, Oral, Daily  aspirin, 81 mg, Oral, Daily  cefTRIAXone, 1 g, Intravenous, Q24H  clopidogrel, 75 mg, Oral, Daily  Delflex-LC/1.5% Dextrose, 2,000 mL, Intraperitoneal, 5x Daily  escitalopram, 20 mg, Oral, Daily  heparin (porcine), 5,000 Units, Subcutaneous, Q8H  levothyroxine, 137 mcg, Oral, QAM  midodrine, 10 mg, Oral, TID AC  montelukast, 10 mg, Oral,  Nightly  multivitamin, 1 tablet, Oral, Daily  potassium chloride, 20 mEq, Oral, BID  sodium bicarbonate, 1,300 mg, Oral, TID  traZODone, 100 mg, Oral, Nightly         •  acetaminophen **OR** acetaminophen **OR** acetaminophen  •  aluminum-magnesium hydroxide-simethicone  •  ondansetron **OR** ondansetron  •  oxyCODONE-acetaminophen  •  sodium chloride    Family history:  Family History   Problem Relation Age of Onset   • Malig Hyperthermia Neg Hx        Social history:  Social History     Tobacco Use   • Smoking status: Former Smoker     Packs/day: 1.00     Years: 25.00     Pack years: 25.00     Types: Cigarettes     Quit date: 2018     Years since quittin.1   • Smokeless tobacco: Never Used   Vaping Use   • Vaping Use: Former   • Quit date: 2021   • Substances: Nicotine, Flavoring   • Devices: Pre-filled or refillable cartridge   Substance Use Topics   • Alcohol use: Not Currently   • Drug use: Not Currently       Review of systems:    Negative 12-system ROS except for the following: As above.      Objective:  TMax 24 hours:   Temp (24hrs), Av.4 °F (36.9 °C), Min:98.3 °F (36.8 °C), Max:98.4 °F (36.9 °C)      Vitals Ranges:   Temp:  [98.3 °F (36.8 °C)-98.4 °F (36.9 °C)] 98.4 °F (36.9 °C)  Heart Rate:  [] 91  Resp:  [16] 16  BP: ()/(39-85) 57/39    Intake/Output Last 3 shifts:  I/O last 3 completed shifts:  In: -   Out: 200 [Urine:200]     Physical Exam:       General Appearance:    Alert, cooperative, in no acute distress   Head:    Normocephalic, without obvious abnormality, atraumatic   Eyes:          PERRL, conjunctivae and corneas clear   Ears:    Normal external inspection   Throat:   No oral lesions, oral mucosa moist   Neck:   Supple, no LAD, trachea midline   Back:     No CVA tenderness   Lungs:     Respirations unlabored, symmetric excursion    Heart:    RRR, intact peripheral pulses   Abdomen:    Soft benign no CVA tenderness on the left mild on the right.   :   Deferred    Extremities:   No edema, no deformity   Skin:   No bleeding, bruising or rashes   Neuro/Psych:   Orientation intact, mood/affect pleasant, no focal findings       Results review:   I reviewed the patient's new clinical results.    Data review:  Lab Results (last 24 hours)     Procedure Component Value Units Date/Time    COVID PRE-OP / PRE-PROCEDURE SCREENING ORDER (NO ISOLATION) - Swab, Nasopharynx [[id number]]  (Normal) Collected: 06/16/22 2129    Specimen: Swab from Nasopharynx Updated: 06/17/22 0132    Narrative:      The following orders were created for panel order COVID PRE-OP / PRE-PROCEDURE SCREENING ORDER (NO ISOLATION) - Swab, Nasopharynx.  Procedure                               Abnormality         Status                     ---------                               -----------         ------                     COVID-19,APTIMA PANTHER(...[[id number]]  Normal              Final result                 Please view results for these tests on the individual orders.    COVID-19,APTIMA PANTHER(PORSCHE),[facility], NP/OP SWAB IN UTM/VTM/SALINE TRANSPORT MEDIA,24 HR TAT - Swab, Nasopharynx [[id number]]  (Normal) Collected: 06/16/22 2129    Specimen: Swab from Nasopharynx Updated: 06/17/22 0132     COVID19 Not Detected    Narrative:      Fact sheet for providers: https://www.fda.gov/media/401091/download     Fact sheet for patients: https://www.fda.gov/media/844591/download    Test performed by RT PCR.    Procalcitonin [[id number]]  (Abnormal) Collected: 06/16/22 1940    Specimen: Blood Updated: 06/16/22 2155     Procalcitonin 0.74 ng/mL     Narrative:      As a Marker for Sepsis (Non-Neonates):    1. <0.5 ng/mL represents a low risk of severe sepsis and/or septic shock.  2. >2 ng/mL represents a high risk of severe sepsis and/or septic shock.    As a Marker for Lower Respiratory Tract Infections that require antibiotic therapy:    PCT on Admission    Antibiotic Therapy       6-12 Hrs later    >0.5                 "Strongly Recommended  >0.25 - <0.5        Recommended   0.1 - 0.25          Discouraged              Remeasure/reassess PCT  <0.1                Strongly Discouraged     Remeasure/reassess PCT    As 28 day mortality risk marker: \"Change in Procalcitonin Result\" (>80% or <=80%) if Day 0 (or Day 1) and Day 4 values are available. Refer to http://www.[facility]-pct-calculator.com    Change in PCT <=80%  A decrease of PCT levels below or equal to 80% defines a positive change in PCT test result representing a higher risk for 28-day all-cause mortality of patients diagnosed with severe sepsis for septic shock.    Change in PCT >80%  A decrease of PCT levels of more than 80% defines a negative change in PCT result representing a lower risk for 28-day all-cause mortality of patients diagnosed with severe sepsis or septic shock.       Lactic Acid, Plasma [[id number]]  (Normal) Collected: 06/16/22 2131    Specimen: Blood Updated: 06/16/22 2154     Lactate 1.3 mmol/L     Renal Function Panel [[id number]]  (Abnormal) Collected: 06/16/22 1940    Specimen: Blood Updated: 06/16/22 2148     Glucose 140 mg/dL      BUN 39 mg/dL      Creatinine 6.76 mg/dL      Sodium 139 mmol/L      Potassium 3.1 mmol/L      Chloride 98 mmol/L      CO2 14.0 mmol/L      Calcium 7.9 mg/dL      Albumin 3.90 g/dL      Phosphorus 12.5 mg/dL      Anion Gap 27.0 mmol/L      BUN/Creatinine Ratio 5.8     eGFR 6.9 mL/min/1.73      Comment: <15 Indicative of kidney failure       Narrative:      GFR Normal >60  Chronic Kidney Disease <60  Kidney Failure <15      [place] Draw [[id number]] Collected: 06/16/22 1940    Specimen: Blood Updated: 06/16/22 2047    Narrative:      The following orders were created for panel order [place] Draw.  Procedure                               Abnormality         Status                     ---------                               -----------         ------                     Green Top (Gel)[[id number]]                                  " Final result               Lavender Top[[id number]]                                     Final result               Light Blue Top[[id number]]                                   Final result                 Please view results for these tests on the individual orders.    Green Top (Gel) [[id number]] Collected: 06/16/22 1940    Specimen: Blood Updated: 06/16/22 2047     Extra Tube Hold for add-ons.     Comment: Auto resulted.       Lavender Top [[id number]] Collected: 06/16/22 1940    Specimen: Blood Updated: 06/16/22 2047     Extra Tube hold for add-on     Comment: Auto resulted       Light Blue Top [[id number]] Collected: 06/16/22 1940    Specimen: Blood Updated: 06/16/22 2047     Extra Tube Hold for add-ons.     Comment: Auto resulted       Comprehensive Metabolic Panel [[id number]]  (Abnormal) Collected: 06/16/22 1940    Specimen: Blood Updated: 06/16/22 2016     Glucose 136 mg/dL      BUN 40 mg/dL      Creatinine 6.90 mg/dL      Sodium 137 mmol/L      Potassium 3.0 mmol/L      Chloride 97 mmol/L      CO2 14.9 mmol/L      Calcium 7.8 mg/dL      Total Protein 6.8 g/dL      Albumin 4.10 g/dL      ALT (SGPT) 10 U/L      AST (SGOT) 10 U/L      Alkaline Phosphatase 71 U/L      Total Bilirubin <0.2 mg/dL      Globulin 2.7 gm/dL      A/G Ratio 1.5 g/dL      BUN/Creatinine Ratio 5.8     Anion Gap 25.1 mmol/L      eGFR 6.8 mL/min/1.73      Comment: <15 Indicative of kidney failure       Narrative:      GFR Normal >60  Chronic Kidney Disease <60  Kidney Failure <15      Urinalysis, Microscopic Only - Urine, Clean Catch [[id number]]  (Abnormal) Collected: 06/16/22 1940    Specimen: Urine, Clean Catch Updated: 06/16/22 2015     RBC, UA Too Numerous to Count /HPF      WBC, UA Too Numerous to Count /HPF      Bacteria, UA 2+ /HPF      Squamous Epithelial Cells, UA 0-2 /HPF      Hyaline Casts, UA None Seen /LPF      Methodology Manual Light Microscopy    Urine Culture - Urine, Urine, Clean Catch [[id number]] Collected: 06/16/22 1940     Specimen: Urine, Clean Catch Updated: 06/16/22 2015    Urinalysis With Culture If Indicated - Urine, Clean Catch [[id number]]  (Abnormal) Collected: 06/16/22 1940    Specimen: Urine, Clean Catch Updated: 06/16/22 2005     Color, UA Red     Comment: Any Substance that causes an abnormal urine color can alter the accuracy of the chemical reactions.        Appearance, UA Turbid     pH, UA 5.5     Specific Gravity, UA 1.015     Glucose, UA Negative     Ketones, UA Negative     Bilirubin, UA Negative     Blood, UA Large (3+)     Protein,  mg/dL (2+)     Leuk Esterase, UA Large (3+)     Nitrite, UA Negative     Urobilinogen, UA 0.2 E.U./dL    Narrative:      In absence of clinical symptoms, the presence of pyuria, bacteria, and/or nitrites on the urinalysis result does not correlate with infection.    CBC & Differential [[id number]]  (Abnormal) Collected: 06/16/22 1940    Specimen: Blood Updated: 06/16/22 1955    Narrative:      The following orders were created for panel order CBC & Differential.  Procedure                               Abnormality         Status                     ---------                               -----------         ------                     CBC Auto Differential[[id number]]        Abnormal            Final result                 Please view results for these tests on the individual orders.    CBC Auto Differential [[id number]]  (Abnormal) Collected: 06/16/22 1940    Specimen: Blood Updated: 06/16/22 1955     WBC 12.12 10*3/mm3      RBC 3.29 10*6/mm3      Hemoglobin 10.0 g/dL      Hematocrit 31.1 %      MCV 94.5 fL      MCH 30.4 pg      MCHC 32.2 g/dL      RDW 14.1 %      RDW-SD 48.0 fl      MPV 9.8 fL      Platelets 368 10*3/mm3      Neutrophil % 82.1 %      Lymphocyte % 10.4 %      Monocyte % 4.7 %      Eosinophil % 1.0 %      Basophil % 0.6 %      Immature Grans % 1.2 %      Neutrophils, Absolute 9.96 10*3/mm3      Lymphocytes, Absolute 1.26 10*3/mm3      Monocytes, Absolute 0.57 10*3/mm3       Eosinophils, Absolute 0.12 10*3/mm3      Basophils, Absolute 0.07 10*3/mm3      Immature Grans, Absolute 0.14 10*3/mm3      nRBC 0.0 /100 WBC            Imaging:  Imaging Results (Last 24 Hours)     Procedure Component Value Units Date/Time    CT Abdomen Pelvis Without Contrast [[id number]] Collected: 06/16/22 2016     Updated: 06/16/22 2037    Narrative:      CT ABDOMEN PELVIS WO CONTRAST-     CLINICAL HISTORY: Right flank pain. Renal failure peritoneal dialysis.  History of kidney stones.     TECHNIQUE: Spiral CT images were performed through the abdomen and  pelvis with no oral or IV contrast and were reconstructed in 3 mm thick  slices.     Radiation dose reduction techniques were utilized, including automated  exposure control and exposure modulation based on body size.     COMPARISON: CT scan of the abdomen and pelvis dated 05/20/2022.     FINDINGS: There is moderate right hydronephrosis and mild right  hydroureter with dilatation of the ureter to the level of the  ureterovesical junction where there is an approximately 1 to 2 mm  diameter obstructing calculus. One or two additional smaller calculi are  also suspected in the distal right ureter near the UVJ. There are 2  additional nonobstructing tiny calculi are present in the right kidney  as well. There is a 7 mm diameter nonobstructing calculus in the left  renal pelvis that is unchanged. Both kidneys are atrophic. The liver and  spleen and pancreas are unremarkable. The right adrenal gland could not  be definitely identified. The left adrenal gland appears diffusely  thickened system with hyperplasia. The stomach and small and large bowel  are unremarkable. A peritoneal dialysis catheter is noted in the  anterior aspect of the pelvis. However, no peritoneal dialysate is  evident within the abdomen or pelvis. Images through the lung bases show  cardiomegaly and are otherwise unremarkable.       Impression:      Tiny approximately 1 mm diameter  obstructing calculus at the  right ureterovesical junction producing mild right hydroureter and  moderate right hydronephrosis. There are 2 additional even smaller  nonobstructing calculi are suspected within the distal right ureter near  the UVJ. A couple tiny nonobstructing calculi remain within the right  kidney. There is a 7 mm diameter nonobstructing calculus in the left  renal pelvis.     This report was finalized on 6/16/2022 8:34 PM by Dr. Cecil Roldan M.D.                Assessment:       Calculus of ureterovesical junction (UVJ)    Anxiety    Cardiomyopathy (HCC)    Chronic systolic heart failure (HCC)    ESRD on peritoneal dialysis (HCC)    HLD (hyperlipidemia)    Obesity (BMI 30-39.9)    Anemia, chronic disease    Acute cystitis    Right distal ureteral calculus with hydronephrosis and mild leukocytosis.  End-stage renal disease patient on dialysis.    Plan:     Likely ureteroscopy today.  Given her low urine volume she will be at higher risk for infection and lower chance for stone passage.  Keep her n.p.o. for now.    Sanford Puentes MD  06/17/22  08:01 EDT

## 2022-06-17 NOTE — OP NOTE
CYSTOSCOPY URETERAL STONE EXTRACTION  Procedure Note    Ambar Lara  6/17/2022    Pre-op Diagnosis:   Right Ureteral calculus    Post-op Diagnosis:     Post-Op Diagnosis Codes:     * Ureteral calculus [N20.1]    Procedure(s):  RIGHT URETEROSCOPY    Surgeon(s):  Sanford Puentes MD    Anesthesia: General    Staff:   Circulator: Mary Cook RN; Reba Franco RN  Radiology Technologist: Shama Soto  Scrub Person: Krupa Garsia; Mckayla Carter    Estimated Blood Loss: minimal    Specimens:                * No orders in the log *      Drains:   Peritoneal Dialysis Catheter Intermittent on hold Right lower abdomen (Active)       Findings: Mild right hydronephrosis with small distal calcification that irrigated out with dilation of the ureter.  No stent placed.    Complications: None apparent    Indications: 52-year-old female persistent right renal colic currently on peritoneal dialysis has a small distal ureteral calculus for ureteroscopy.    Procedure: She was taken to the operative suite given general anesthesia.  Placed in lithotomy.  Prepped and draped in a sterile fashion.  Surgical timeout was performed.  Cystoscope was inserted.  She had small volume of urine in her bladder.  Irrigated this out.  Carefully examine the right orifice and a guidewire was passed up.  The ureter was gently dilated the ureter was then intubated with the ureteroscope and I passed this all the way up.  There is a small little calcification the ureter which essentially just irrigated out with dilation.  It was very small could not grasp it and I looked at the remainder of the ureter all the way up and could not appreciate any calculi.  The ureter looked good I did not feel like we needed to stent it so we left it alone.  I did look inside the left ureter just briefly going through the meatus with the scope and I could not appreciate any stones on the side.  This area was not dilated.  She was  subsequently awoken and taken to recovery stable addition.  Again no stent was placed.      Sanford Puentes MD     Date: 6/17/2022  Time: 15:21 EDT

## 2022-06-17 NOTE — ANESTHESIA POSTPROCEDURE EVALUATION
Patient: Ambar Lara    Procedure Summary     Date: 06/17/22 Room / Location: [facility] OR 01 / [facility] MAIN OR    Anesthesia Start: 1448 Anesthesia Stop: 1530    Procedure: RIGHT URETEROSCOPY (Right ) Diagnosis:       Ureteral calculus      (Right Ureteral calculus)    Surgeons: Sanford Puentes MD Provider: Fernanda Holm MD    Anesthesia Type: general ASA Status: 4          Anesthesia Type: general    Vitals  Vitals Value Taken Time   BP 87/53 06/17/22 1616   Temp 36.6 °C (97.9 °F) 06/17/22 1530   Pulse 91 06/17/22 1618   Resp 16 06/17/22 1615   SpO2 96 % 06/17/22 1618   Vitals shown include unvalidated device data.        Post Anesthesia Care and Evaluation    Patient location during evaluation: PACU  Patient participation: complete - patient participated  Level of consciousness: awake  Pain management: satisfactory to patient    Airway patency: patent  Anesthetic complications: No anesthetic complications  PONV Status: controlled  Cardiovascular status: acceptable  Respiratory status: acceptable  Hydration status: acceptable    Comments: Pt mildly hypotensive in PACU. MAP ~65. This appears to be her baseline. She is asymptomatic w/no complaints.

## 2022-06-17 NOTE — CONSULTS
[facility] Consult Note      Patient Name: Ambar Lara  : 1970  MRN: [id number]  Primary Care Physician:  Jet Manuel MD  Referring Physician: Jadiel Dean MD  Date of admission: 2022    Subjective     Reason for Consult:  ESRD    HPI:   Ambar Lara is a 52 y.o. female with h/o Gitelman syn, ESRD on CCPD x3, nephrolithiasis, recent left sided stone s/p cysto/lithotripsy/stent who  presented last night with right dull flank pain associated with hematuria.  CT imaging showed a distal 1 mm stone was tracting with hydronephrosis.  She was seen by urologist and possibly ureteroscopy today.      Review of Systems:   14 point review of systems is otherwise negative except for mentioned above on HPI    Personal History     Past Medical History:   Diagnosis Date   • CHF (congestive heart failure) ([facility])    • Clostridioides difficile infection 2021    finished oral vanc 2021   • Dialysis patient ([facility])    • Disease of thyroid gland    • Elevated cholesterol    • ESRD on peritoneal dialysis ([facility])    • GERD (gastroesophageal reflux disease)    • Pulmonary emboli ([facility]) 2021    coumadin last taken 2021   • Renal disorder    • Sleep apnea    • Ureteral calculi 2022       Past Surgical History:   Procedure Laterality Date   • ADRENAL GLAND SURGERY N/A `   •  SECTION Bilateral 2001    Has had x2   • CYSTOSCOPY URETEROSCOPY LASER LITHOTRIPSY Left 2022    Procedure: Left CYSTOSCOPY, with stent placement;  Surgeon: Sanford Puentes MD;  Location: [facility];  Service: Urology;  Laterality: Left;   • CYSTOSCOPY W/ URETERAL STENT PLACEMENT Right 2022    Procedure: RIGHT CYSTOSCOPY RETROGRADE PYLEOGRAM HOLMIUM LASER STENT;  Surgeon: Sanford Puentes MD;  Location: [facility] OR;  Service: Urology;  Laterality: Right;   • HYSTERECTOMY     • INSERTION PERITONEAL DIALYSIS CATHETER Right 10/12/2021    Procedure: LAPAROSCOPIC  REVISION OF PERITONEAL DIALYSIS CATHETER AND LAPAROSCOPIC CHOLECYSTECTOMY;  Surgeon: Jamie Figueroa MD;  Location: [facility] OR;  Service: General;  Laterality: Right;   • PERITONEAL CATHETER INSERTION  04/2019   • TRACHEOSTOMY         Family History: family history is not on file.    Social History:  reports that she quit smoking about 4 years ago. Her smoking use included cigarettes. She has a 25.00 pack-year smoking history. She has never used smokeless tobacco. She reports previous alcohol use. She reports previous drug use.    Home Medications:  Prior to Admission medications    Medication Sig Start Date End Date Taking? Authorizing Provider   acetaminophen (TYLENOL) 500 MG tablet Take 500 mg by mouth Every 6 (Six) Hours As Needed for Mild Pain .   Yes Ady Malhotra MD   allopurinol (ZYLOPRIM) 100 MG tablet Take 100 mg by mouth Daily.   Yes Ady Malhotra MD   aspirin 81 MG chewable tablet Chew 81 mg Daily.   Yes Ady Malhotra MD   B Complex-C-Folic Acid (REN-JHON PO) Take 1 tablet by mouth Daily.   Yes Ady Malhotra MD   clopidogrel (PLAVIX) 75 MG tablet Take 1 tablet by mouth Daily. 3/17/22  Yes Sanjay Nguyễn MD   diphenhydrAMINE HCl, Sleep, 25 MG capsule Take 25 mg by mouth At Night As Needed (sleep). For sleep 4/29/21  Yes Jose Granados MD   escitalopram (LEXAPRO) 20 MG tablet Take 1 tablet by mouth Daily. 4/29/21  Yes Jose Granados MD   famotidine (PEPCID) 20 MG tablet Take 20 mg by mouth 2 (Two) Times a Day.   Yes Ady Malhotra MD   gentamicin (GARAMYCIN) 0.1 % cream Apply 1 application topically to the appropriate area as directed Daily. As directed   Yes Ady Malhotra MD   gentamicin (GARAMYCIN) 0.1 % ointment Apply 1 application topically to the appropriate area as directed Daily. 4/16/21  Yes Ady Malhotra MD   lanthanum (FOSRENOL) 500 MG chewable tablet Chew 750 mg 3 (Three) Times a Day With Meals.   Yes Marya  MD Ady   levothyroxine (SYNTHROID, LEVOTHROID) 137 MCG tablet Take 1 tablet by mouth Daily. 3/16/22  Yes Sanjay Nguyễn MD   magnesium oxide (MAG-OX) 400 tablet tablet Take 1 tablet by mouth 2 (Two) Times a Day. 3/16/22  Yes Sanjay Nguyễn MD   melatonin 3 MG tablet Take 1 tablet by mouth Every Night. 4/29/21  Yes Jose Granados MD   midodrine (PROAMATINE) 10 MG tablet Take 1 tablet by mouth 3 (Three) Times a Day. 4/29/21  Yes Jose Granados MD   montelukast (Singulair) 10 MG tablet Take 1 tablet by mouth Every Night. 4/29/21  Yes Jose Granados MD   multivitamin (THERAGRAN) tablet tablet Take 1 tablet by mouth Daily. 3/9/22  Yes Preet Workman MD   saccharomyces boulardii (FLORASTOR) 250 MG capsule Take 2 capsules by mouth 2 (Two) Times a Day. 4/29/21  Yes Jose Granados MD   sodium bicarbonate 650 MG tablet Take 2 tablets by mouth 3 (Three) Times a Day. 4/24/22  Yes Gaurav Rose MD   traZODone (DESYREL) 100 MG tablet Take 100 mg by mouth Every Night. Take 0.5-1 tablet by mouth nightly   Yes Ady Malhotra MD   ammonium lactate (AMLACTIN) 12 % cream Apply topically to the appropriate area as directed Every 12 (Twelve) Hours As Needed for Dry Skin. 3/2/22   Preet Workman MD   ergocalciferol (ERGOCALCIFEROL) 1.25 MG (23337 UT) capsule Take 1 capsule by mouth 1 (One) Time Per Week. 2/23/22   Ady Malhotra MD   nitroglycerin (NITROSTAT) 0.4 MG SL tablet Place 0.4 mg under the tongue Every 5 (Five) Minutes As Needed for Chest Pain. Take no more than 3 doses in 15 minutes.    Ady Malhotra MD   oxyCODONE-acetaminophen (Percocet) 5-325 MG per tablet Take 1 tablet by mouth Every 6 (Six) Hours As Needed for Moderate Pain . 3/2/22   Sanford Puentes MD   potassium chloride (K-DUR,KLOR-CON) 20 MEQ CR tablet Take 2 tablets by mouth 2 (Two) Times a Day. 3/4/22   Provider, Ady, MD       Allergies:  Allergies   Allergen Reactions   •  Penicillins Anaphylaxis, Hives, Shortness Of Breath, Swelling and Rash     Tolerated cephalosporins in past   • Nickel Rash       Objective     Vitals:   Temp:  [98.3 °F (36.8 °C)-98.4 °F (36.9 °C)] 98.4 °F (36.9 °C)  Heart Rate:  [] 91  Resp:  [16] 16  BP: ()/(39-85) 57/39  Flow (L/min):  [2] 2    Intake/Output Summary (Last 24 hours) at 6/17/2022 0907  Last data filed at 6/16/2022 2349  Gross per 24 hour   Intake --   Output 200 ml   Net -200 ml       Physical Exam:    General Appearance: alert, oriented x 3, no acute distress   Skin: warm and dry  HEENT: oral mucosa normal, nonicteric sclera  Neck: supple, no JVD  Lungs: CTA  Heart: RRR, normal S1 and S2  Abdomen: soft, nontender, nondistended  : no palpable bladder  Extremities: no edema, cyanosis or clubbing  Neuro: normal speech and mental status     Scheduled Meds:     allopurinol, 100 mg, Oral, Daily  aspirin, 81 mg, Oral, Daily  cefTRIAXone, 1 g, Intravenous, Q24H  clopidogrel, 75 mg, Oral, Daily  Delflex-LC/1.5% Dextrose, 2,000 mL, Intraperitoneal, 5x Daily  escitalopram, 20 mg, Oral, Daily  heparin (porcine), 5,000 Units, Subcutaneous, Q8H  levothyroxine, 137 mcg, Oral, QAM  midodrine, 10 mg, Oral, TID AC  montelukast, 10 mg, Oral, Nightly  multivitamin, 1 tablet, Oral, Daily  potassium chloride, 20 mEq, Oral, BID  sodium bicarbonate, 1,300 mg, Oral, TID  traZODone, 100 mg, Oral, Nightly      IV Meds:        Results Reviewed:   I have personally reviewed the results from the time of this admission to 6/17/2022 09:07 EDT     Lab Results   Component Value Date    GLUCOSE 136 (H) 06/16/2022    GLUCOSE 140 (H) 06/16/2022    CALCIUM 7.8 (L) 06/16/2022    CALCIUM 7.9 (L) 06/16/2022     06/16/2022     06/16/2022    K 3.0 (L) 06/16/2022    K 3.1 (L) 06/16/2022    CO2 14.9 (L) 06/16/2022    CO2 14.0 (L) 06/16/2022    CL 97 (L) 06/16/2022    CL 98 06/16/2022    BUN 40 (H) 06/16/2022    BUN 39 (H) 06/16/2022    CREATININE 6.90 (H)  06/16/2022    CREATININE 6.76 (H) 06/16/2022    EGFRIFAFRI  10/14/2021      Comment:      <15 Indicative of kidney failure.    EGFRIFNONA 7 (L) 02/28/2022    BCR 5.8 (L) 06/16/2022    BCR 5.8 (L) 06/16/2022    ANIONGAP 25.1 (H) 06/16/2022    ANIONGAP 27.0 (H) 06/16/2022      Lab Results   Component Value Date    MG 1.7 05/28/2022    PHOS 12.5 (H) 06/16/2022    ALBUMIN 4.10 06/16/2022    ALBUMIN 3.90 06/16/2022           Assessment / Plan     ASSESSMENT:  1. ESRD, hx Gitelman syn, on CCPD.  Initiated CAPD here, 1.5% soln.  Volume status and waste products stable.    2. Chronic hypokalemia  3.  Recurrent kidney stone: Plan for ureteroscopy later on today by urology  4. Chronic hypotension   5. MBD   6. Anemia of CKD    PLAN:    1.  We will start patient on PD according to her home protocol  2.  Replete potassium   3.  Plan for ureteroscopy later on today  Surveillance labs4.     Thank you for involving us in the care of Ambar Lara.  Please feel free to call with any questions.    Camila Salas MD  06/17/22  09:07 EDT    [facility]  [phone]

## 2022-06-17 NOTE — SIGNIFICANT NOTE
06/17/22 1246   OTHER   Discipline physical therapist   Rehab Time/Intention   Session Not Performed   (per chart, pt up ad jean carlos. PT to sign off.)

## 2022-06-17 NOTE — ED NOTES
Nursing report ED to floor  Ambar Lara  52 y.o.  female    HPI (triage note):   Chief Complaint   Patient presents with   • Fall   • Blood in Urine       Admitting doctor:   Jadiel Dean MD    Admitting diagnosis:   The primary encounter diagnosis was Kidney stone on right side. Diagnoses of Acute UTI, ESRD (end stage renal disease) (HCC), and Peritoneal dialysis catheter in place (HCC) were also pertinent to this visit.    Code status:   Current Code Status     Date Active Code Status Order ID Comments User Context       6/16/2022 2125 CPR (Attempt to Resuscitate) [id number]  Ha Negron, [facility]     Advance Care Planning Activity      Questions for Current Code Status     Question Answer    Code Status (Patient has no pulse and is not breathing) CPR (Attempt to Resuscitate)    Medical Interventions (Patient has pulse or is breathing) Full Support          Allergies:   Penicillins and Nickel    Weight:   There were no vitals filed for this visit.    Most recent vitals:   Vitals:    06/16/22 1934 06/16/22 1936 06/16/22 2009 06/16/22 2031   BP:   (!) 80/55 99/56   Pulse: 106 103 84 105   Resp:       Temp:       SpO2: 96% 94% 93% 92%       Active LDAs/IV Access:   Lines, Drains & Airways     Active LDAs     Name Placement date Placement time Site Days    Peripheral IV 06/16/22 1939 Left Forearm 06/16/22 1939  Forearm  less than 1    Peritoneal Dialysis Catheter Intermittent on hold Right lower abdomen --  2.5 years agoe  --  Right lower abdomen  --                Labs (abnormal labs have a star):   Labs Reviewed   URINALYSIS W/ CULTURE IF INDICATED - Abnormal; Notable for the following components:       Result Value    Color, UA Red (*)     Appearance, UA Turbid (*)     Blood, UA Large (3+) (*)     Protein,  mg/dL (2+) (*)     Leuk Esterase, UA Large (3+) (*)     All other components within normal limits    Narrative:     In absence of clinical symptoms, the presence of pyuria, bacteria, and/or  nitrites on the urinalysis result does not correlate with infection.   COMPREHENSIVE METABOLIC PANEL - Abnormal; Notable for the following components:    Glucose 136 (*)     BUN 40 (*)     Creatinine 6.90 (*)     Potassium 3.0 (*)     Chloride 97 (*)     CO2 14.9 (*)     Calcium 7.8 (*)     BUN/Creatinine Ratio 5.8 (*)     Anion Gap 25.1 (*)     eGFR 6.8 (*)     All other components within normal limits    Narrative:     GFR Normal >60  Chronic Kidney Disease <60  Kidney Failure <15     CBC WITH AUTO DIFFERENTIAL - Abnormal; Notable for the following components:    WBC 12.12 (*)     RBC 3.29 (*)     Hemoglobin 10.0 (*)     Hematocrit 31.1 (*)     Neutrophil % 82.1 (*)     Lymphocyte % 10.4 (*)     Monocyte % 4.7 (*)     Immature Grans % 1.2 (*)     Neutrophils, Absolute 9.96 (*)     Immature Grans, Absolute 0.14 (*)     All other components within normal limits   URINALYSIS, MICROSCOPIC ONLY - Abnormal; Notable for the following components:    RBC, UA Too Numerous to Count (*)     WBC, UA Too Numerous to Count (*)     Bacteria, UA 2+ (*)     All other components within normal limits   URINE CULTURE   COVID PRE-OP / PRE-PROCEDURE SCREENING ORDER (NO ISOLATION)    Narrative:     The following orders were created for panel order COVID PRE-OP / PRE-PROCEDURE SCREENING ORDER (NO ISOLATION) - Swab, Nasopharynx.  Procedure                               Abnormality         Status                     ---------                               -----------         ------                     COVID-19,APTIMA PANTHER(...[[id number]]                                                   Please view results for these tests on the individual orders.   COVID-19,APTIMA PANTHER (PORSCHE) ADRIANO/ KEVEN, NP/OP SWAB IN UTM/VTM/SALINE TRANSPORT MEDIA,24 HR TAT   RAINBOW DRAW    Narrative:     The following orders were created for panel order [place] Draw.  Procedure                               Abnormality         Status                     ---------                                -----------         ------                     Green Top (Gel)[[id number]]                                  Final result               Lavender Top[[id number]]                                     Final result               Light Blue Top[[id number]]                                   Final result                 Please view results for these tests on the individual orders.   RENAL FUNCTION PANEL   GREEN TOP   LAVENDER TOP   LIGHT BLUE TOP   CBC AND DIFFERENTIAL    Narrative:     The following orders were created for panel order CBC & Differential.  Procedure                               Abnormality         Status                     ---------                               -----------         ------                     CBC Auto Differential[[id number]]        Abnormal            Final result                 Please view results for these tests on the individual orders.       EKG:   No orders to display       Meds given in ED:   Medications   sodium chloride 0.9 % flush 10 mL (has no administration in time range)   acetaminophen (TYLENOL) tablet 650 mg (has no administration in time range)     Or   acetaminophen (TYLENOL) 160 MG/5ML solution 650 mg (has no administration in time range)     Or   acetaminophen (TYLENOL) suppository 650 mg (has no administration in time range)   ondansetron (ZOFRAN) tablet 4 mg (has no administration in time range)     Or   ondansetron (ZOFRAN) injection 4 mg (has no administration in time range)   aluminum-magnesium hydroxide-simethicone (MAALOX MAX) 400-400-40 MG/5ML suspension 15 mL (has no administration in time range)   HYDROmorphone (DILAUDID) injection 0.5 mg (0.5 mg Intravenous Given 6/16/22 2010)   ondansetron (ZOFRAN) injection 4 mg (4 mg Intravenous Given 6/16/22 2010)   cefTRIAXone (ROCEPHIN) 2 g in sodium chloride 0.9 % 100 mL IVPB-VTB (0 g Intravenous Stopped 6/16/22 2113)       Imaging results:  CT Abdomen Pelvis Without Contrast    Result Date:  2022  Tiny approximately 1 mm diameter obstructing calculus at the right ureterovesical junction producing mild right hydroureter and moderate right hydronephrosis. There are 2 additional even smaller nonobstructing calculi are suspected within the distal right ureter near the UVJ. A couple tiny nonobstructing calculi remain within the right kidney. There is a 7 mm diameter nonobstructing calculus in the left renal pelvis.  This report was finalized on 2022 8:34 PM by Dr. Cecil Roldan M.D.        Ambulatory status:   - ad jean carlos    Social issues:   Social History     Socioeconomic History   • Marital status:    Tobacco Use   • Smoking status: Former Smoker     Packs/day: 1.00     Years: 25.00     Pack years: 25.00     Types: Cigarettes     Quit date: 2018     Years since quittin.1   • Smokeless tobacco: Never Used   Vaping Use   • Vaping Use: Former   • Quit date: 2021   • Substances: Nicotine, Flavoring   • Devices: Pre-filled or refillable cartridge   Substance and Sexual Activity   • Alcohol use: Not Currently   • Drug use: Not Currently   • Sexual activity: Defer

## 2022-06-18 NOTE — PROGRESS NOTES
" LOS: 1 day     Name: Ambar Lara  Age: 52 y.o.  Sex: female  :  1970  MRN: [id number]         Primary Care Physician: Jet Manuel MD    Subjective   Subjective  Feels exhausted.  Did not get any sleep last night.  Denies abdominal pain.  No fevers or chills.    Objective   Vital Signs  Temp:  [97.4 °F (36.3 °C)-99 °F (37.2 °C)] 97.4 °F (36.3 °C)  Heart Rate:  [] 99  Resp:  [16-18] 16  BP: ()/() 104/68  Body mass index is 34.24 kg/m².    Objective:  General Appearance:  Comfortable and in no acute distress (Looks chronically ill and older than stated age).    Vital signs: (most recent): Blood pressure 104/68, pulse 99, temperature 97.4 °F (36.3 °C), temperature source Oral, resp. rate 16, height 154.9 cm (60.98\"), weight 82.1 kg (181 lb 1.6 oz), last menstrual period 2004, SpO2 95 %.    Lungs:  Normal effort and normal respiratory rate.  She is not in respiratory distress.  There are decreased breath sounds.    Heart: Normal rate.  Regular rhythm.    Abdomen: Abdomen is soft.  Bowel sounds are normal.   There is no abdominal tenderness.     Extremities: There is no dependent edema or local swelling.    Neurological: Patient is alert and oriented to person, place and time.    Skin:  Warm and dry.              Results Review:       I reviewed the patient's new clinical results.    Results from last 7 days   Lab Units 22  0807 22  1919 22  1110 22  1940   WBC 10*3/mm3 10.36 12.00* 8.71 12.12*   HEMOGLOBIN g/dL 9.5* 9.7* 9.5* 10.0*   PLATELETS 10*3/mm3 357 348 347 368     Results from last 7 days   Lab Units 22  0807 22  1940   SODIUM mmol/L 136 139  137   POTASSIUM mmol/L 3.4* 3.1*  3.0*   CHLORIDE mmol/L 98 98  97*   CO2 mmol/L 16.6* 14.0*  14.9*   BUN mg/dL 43* 39*  40*   CREATININE mg/dL 6.50* 6.76*  6.90*   CALCIUM mg/dL 8.0* 7.9*  7.8*   GLUCOSE mg/dL 130* 140*  136*                 Scheduled Meds:   allopurinol, 100 mg, Oral, " Daily  aspirin, 81 mg, Oral, Daily  cefTRIAXone, 1 g, Intravenous, Q24H  clopidogrel, 75 mg, Oral, Daily  Delflex-LC/1.5% Dextrose, 2,000 mL, Intraperitoneal, 5x Daily  escitalopram, 20 mg, Oral, Daily  heparin (porcine), 5,000 Units, Subcutaneous, Q8H  levothyroxine, 137 mcg, Oral, QAM  midodrine, 10 mg, Oral, TID AC  montelukast, 10 mg, Oral, Nightly  multivitamin, 1 tablet, Oral, Daily  potassium chloride, 20 mEq, Oral, BID  sodium bicarbonate, 1,300 mg, Oral, TID  traZODone, 100 mg, Oral, Nightly      PRN Meds:   •  acetaminophen **OR** acetaminophen **OR** acetaminophen  •  aluminum-magnesium hydroxide-simethicone  •  ondansetron **OR** ondansetron  •  oxyCODONE-acetaminophen  •  sodium chloride  Continuous Infusions:       Assessment & Plan   Active Hospital Problems    Diagnosis  POA   • **Calculus of ureterovesical junction (UVJ) [N20.1]  Yes   • Kidney stone on right side [N20.0]  Yes   • Acute cystitis [N30.00]  Yes   • Anemia, chronic disease [D63.8]  Yes   • Obesity (BMI 30-39.9) [E66.9]  Yes   • Chronic systolic heart failure (HCC) [I50.22]  Yes   • Cardiomyopathy (HCC) [I42.9]  Yes   • ESRD on peritoneal dialysis (HCC) [N18.6, Z99.2]  Not Applicable   • HLD (hyperlipidemia) [E78.5]  Yes   • Anxiety [F41.9]  Yes      Resolved Hospital Problems   No resolved problems to display.       Assessment & Plan    -Underwent ureteroscopy yesterday.  Remains on Rocephin and awaiting urine culture results.  -Nephrology following for PD management and hypokalemia  -Chronic hypotension on midodrine.  Blood pressure looks better and will continue outpatient regimen of 10 mg 3 times daily  -Continue daily thyroid replacement      I wore full protective equipment throughout the patient encounter including eye protection and facemask.  Hand hygiene was performed before donning protective equipment and after removal when leaving the room.    Jamie Odonnell MD  [facility]  06/18/22  09:28  EDT

## 2022-06-18 NOTE — PROGRESS NOTES
[facility] Progress Note      Patient Name: Ambar Lara  : 1970  MRN: [id number]  Primary Care Physician:  Jet Manuel MD  Date of admission: 2022    Subjective     Interval History:   Underwent ureteroscopy    Review of Systems:   Feels well.  Denies chest pain or shortness of breath  Denies abdominal discomfort    Objective     Vitals:   Temp:  [97.4 °F (36.3 °C)-99 °F (37.2 °C)] 97.4 °F (36.3 °C)  Heart Rate:  [] 99  Resp:  [16-18] 16  BP: ()/() 104/68  Flow (L/min):  [2-4] 2    Intake/Output Summary (Last 24 hours) at 2022 0803  Last data filed at 2022 0712  Gross per 24 hour   Intake 4300 ml   Output 1950 ml   Net 2350 ml       Physical Exam:    General Appearance: alert, oriented x 3, no acute distress   Skin: warm and dry  HEENT: oral mucosa normal, nonicteric sclera  Neck: supple, no JVD  Lungs: CTA  Heart: RRR, normal S1 and S2  Abdomen: soft, nontender, nondistended  : no palpable bladder  Extremities: no edema, cyanosis or clubbing  Neuro: normal speech and mental status     Scheduled Meds:     allopurinol, 100 mg, Oral, Daily  aspirin, 81 mg, Oral, Daily  cefTRIAXone, 1 g, Intravenous, Q24H  clopidogrel, 75 mg, Oral, Daily  Delflex-LC/1.5% Dextrose, 2,000 mL, Intraperitoneal, 5x Daily  escitalopram, 20 mg, Oral, Daily  heparin (porcine), 5,000 Units, Subcutaneous, Q8H  levothyroxine, 137 mcg, Oral, QAM  midodrine, 10 mg, Oral, TID AC  montelukast, 10 mg, Oral, Nightly  multivitamin, 1 tablet, Oral, Daily  potassium chloride, 20 mEq, Oral, BID  sodium bicarbonate, 1,300 mg, Oral, TID  traZODone, 100 mg, Oral, Nightly      IV Meds:        Results Reviewed:   I have personally reviewed the results from the time of this admission to 2022 08:03 EDT     Results from last 7 days   Lab Units 06/16/22  1940   SODIUM mmol/L 139  137   POTASSIUM mmol/L 3.1*  3.0*   CHLORIDE mmol/L 98  97*   CO2 mmol/L 14.0*   14.9*   BUN mg/dL 39*  40*   CREATININE mg/dL 6.76*  6.90*   CALCIUM mg/dL 7.9*  7.8*   BILIRUBIN mg/dL <0.2   ALK PHOS U/L 71   ALT (SGPT) U/L 10   AST (SGOT) U/L 10   GLUCOSE mg/dL 140*  136*       Estimated Creatinine Clearance: 9.5 mL/min (A) (by C-G formula based on SCr of 6.9 mg/dL (H)).    Results from last 7 days   Lab Units 06/16/22  1940   MAGNESIUM mg/dL 1.6   PHOSPHORUS mg/dL 12.5*             Results from last 7 days   Lab Units 06/17/22  1919 06/17/22  1110 06/16/22  1940   WBC 10*3/mm3 12.00* 8.71 12.12*   HEMOGLOBIN g/dL 9.7* 9.5* 10.0*   PLATELETS 10*3/mm3 348 347 368             Assessment / Plan     ASSESSMENT:  1. ESRD, hx Gitelman syn, on CCPD.  Initiated CAPD here, 1.5% soln.  Volume status and waste products stable.    2. Chronic hypokalemia  3.  Recurrent kidney stone:  Status post ureteroscopy  4. Chronic hypotension   5. MBD   6. Anemia of CKD    PLAN:  -Continue peritoneal dialysis.  -Repeat labs in a.m.    Thank you for involving us in the care of Ambar Lara.  Please feel free to call with any questions.    Edgardo Corea MD  06/18/22  08:03 EDT    [facility]  [phone]      Much of this encounter note is an electronic transcription/translation of spoken language to printed text. The electronic translation of spoken language may permit erroneous, or at times, nonsensical words or phrases to be inadvertently transcribed; Although I have reviewed the note for such errors, some may still exist.

## 2022-06-19 NOTE — PLAN OF CARE
Goal Outcome Evaluation:  patient in bed most of shift, reports feeling tired.  PD exchanges completed as ordered.  Patient had complaint of lower abdominal pain that radiated to mid back and received percocet x1; medication effective and no additional complaints at this time.  Placed in contact isolation today for VRE of the urine; started on Zyvox.  VSS and call light is within reach.  All needs met; WCTM.

## 2022-06-19 NOTE — PROGRESS NOTES
[facility] Progress Note      Patient Name: Ambar Lara  : 1970  MRN: [id number]  Primary Care Physician:  Jet Manuel MD  Date of admission: 2022    Subjective     Interval History:   No acute events overnight.    Review of Systems:   Feels well.  Denies chest pain or shortness of breath  Denies abdominal discomfort    Denies any problems with his peritoneal dialysis.  Fluid is clear per patient.    Objective     Vitals:   Temp:  [97.3 °F (36.3 °C)-98.1 °F (36.7 °C)] 97.8 °F (36.6 °C)  Heart Rate:  [] 97  Resp:  [16] 16  BP: (104-115)/(68-97) 111/72  Flow (L/min):  [2] 2    Intake/Output Summary (Last 24 hours) at 2022 0852  Last data filed at 2022 0714  Gross per 24 hour   Intake 8240 ml   Output 59745 ml   Net -2260 ml       Physical Exam:    General Appearance: alert, oriented x 3, no acute distress   Skin: warm and dry  HEENT: oral mucosa normal, nonicteric sclera  Neck: supple, no JVD  Lungs: CTA  Heart: RRR, normal S1 and S2  Abdomen: soft, nontender, nondistended  : no palpable bladder  Extremities: no edema, cyanosis or clubbing  Neuro: normal speech and mental status     Scheduled Meds:     allopurinol, 100 mg, Oral, Daily  aspirin, 81 mg, Oral, Daily  cefTRIAXone, 1 g, Intravenous, Q24H  clopidogrel, 75 mg, Oral, Daily  Delflex-LC/1.5% Dextrose, 2,000 mL, Intraperitoneal, 5x Daily  escitalopram, 20 mg, Oral, Daily  heparin (porcine), 5,000 Units, Subcutaneous, Q8H  levothyroxine, 137 mcg, Oral, QAM  midodrine, 10 mg, Oral, TID AC  montelukast, 10 mg, Oral, Nightly  multivitamin, 1 tablet, Oral, Daily  potassium chloride, 20 mEq, Oral, BID  sodium bicarbonate, 1,300 mg, Oral, TID  traZODone, 100 mg, Oral, Nightly      IV Meds:        Results Reviewed:   I have personally reviewed the results from the time of this admission to 2022 08:52 EDT     Results from last 7 days   Lab Units 22  0610 22  0807  06/16/22 1940   SODIUM mmol/L 140 136 139  137   POTASSIUM mmol/L 3.4* 3.4* 3.1*  3.0*   CHLORIDE mmol/L 102 98 98  97*   CO2 mmol/L 18.7* 16.6* 14.0*  14.9*   BUN mg/dL 42* 43* 39*  40*   CREATININE mg/dL 6.46* 6.50* 6.76*  6.90*   CALCIUM mg/dL 8.7 8.0* 7.9*  7.8*   BILIRUBIN mg/dL  --   --  <0.2   ALK PHOS U/L  --   --  71   ALT (SGPT) U/L  --   --  10   AST (SGOT) U/L  --   --  10   GLUCOSE mg/dL 105* 130* 140*  136*       Estimated Creatinine Clearance: 10.1 mL/min (A) (by C-G formula based on SCr of 6.46 mg/dL (H)).    Results from last 7 days   Lab Units 06/18/22  0807 06/16/22 1940   MAGNESIUM mg/dL  --  1.6   PHOSPHORUS mg/dL 11.6* 12.5*             Results from last 7 days   Lab Units 06/19/22  0610 06/18/22  0807 06/17/22  1919 06/17/22  1110 06/16/22  1940   WBC 10*3/mm3 9.70 10.36 12.00* 8.71 12.12*   HEMOGLOBIN g/dL 9.3* 9.5* 9.7* 9.5* 10.0*   PLATELETS 10*3/mm3 336 357 348 347 368             Assessment / Plan     ASSESSMENT:  1. ESRD, hx Gitelman syn, on CCPD.  Initiated CAPD here, 1.5% soln.  Volume status and waste products stable.    2. Chronic hypokalemia  3.  Recurrent kidney stone:  Status post ureteroscopy  4. Chronic hypotension   5. MBD   6. Anemia of CKD    PLAN:  -Continue peritoneal dialysis.  -Recommended low-salt diet and fluid intake 2 L/day [the patient has excellent urine output]  -Repeat labs in a.m.    Thank you for involving us in the care of Ambar Lara.  Please feel free to call with any questions.    Edgardo Corea MD  06/19/22  08:52 EDT    [facility]  [phone]      Much of this encounter note is an electronic transcription/translation of spoken language to printed text. The electronic translation of spoken language may permit erroneous, or at times, nonsensical words or phrases to be inadvertently transcribed; Although I have reviewed the note for such errors, some may still exist.

## 2022-06-19 NOTE — PROGRESS NOTES
"[facility] Progress Note    Chief Complaint:  Right ureteral stone    S/p right URS no stones seen no stent left 6/17/2022  Was able to get some rest over night  Feeling better today  Urine is clearing  No dysuria  Denies frequency or urgency          Vital Signs  /72 (BP Location: Left arm, Patient Position: Lying)   Pulse 97   Temp 97.8 °F (36.6 °C) (Oral)   Resp 16   Ht 154.9 cm (60.98\")   Wt 81.9 kg (180 lb 9.6 oz)   LMP 04/16/2004 (Approximate)   SpO2 90%   BMI 34.14 kg/m²     Physical Exam:  Alert and oriented  VSS  No CVA tenderness  Abdomen soft nontender  No edema  Respirations unlabored  Rad pulse RRR     Results Review:      Lab Results (last 24 hours)     Procedure Component Value Units Date/Time    Basic Metabolic Panel [[id number]]  (Abnormal) Collected: 06/19/22 0610    Specimen: Blood Updated: 06/19/22 0712     Glucose 105 mg/dL      BUN 42 mg/dL      Creatinine 6.46 mg/dL      Sodium 140 mmol/L      Potassium 3.4 mmol/L      Chloride 102 mmol/L      CO2 18.7 mmol/L      Calcium 8.7 mg/dL      BUN/Creatinine Ratio 6.5     Anion Gap 19.3 mmol/L      eGFR 7.3 mL/min/1.73      Comment: <15 Indicative of kidney failure       Narrative:      GFR Normal >60  Chronic Kidney Disease <60  Kidney Failure <15      CBC (No Diff) [[id number]]  (Abnormal) Collected: 06/19/22 0610    Specimen: Blood Updated: 06/19/22 0652     WBC 9.70 10*3/mm3      RBC 3.06 10*6/mm3      Hemoglobin 9.3 g/dL      Hematocrit 29.1 %      MCV 95.1 fL      MCH 30.4 pg      MCHC 32.0 g/dL      RDW 13.6 %      RDW-SD 46.6 fl      MPV 10.0 fL      Platelets 336 10*3/mm3     Urine Culture - Urine, Urine, Clean Catch [[id number]]  (Abnormal) Collected: 06/16/22 1940    Specimen: Urine, Clean Catch Updated: 06/18/22 1115     Urine Culture >100,000 CFU/mL Enterococcus species    Narrative:      Colonization of the urinary tract without infection is common. Treatment is discouraged unless the patient is symptomatic, pregnant, or " undergoing an invasive urologic procedure.    Renal Function Panel [[id number]]  (Abnormal) Collected: 06/18/22 0807    Specimen: Blood Updated: 06/18/22 0851     Glucose 130 mg/dL      BUN 43 mg/dL      Creatinine 6.50 mg/dL      Sodium 136 mmol/L      Potassium 3.4 mmol/L      Chloride 98 mmol/L      CO2 16.6 mmol/L      Calcium 8.0 mg/dL      Albumin 3.90 g/dL      Phosphorus 11.6 mg/dL      Anion Gap 21.4 mmol/L      BUN/Creatinine Ratio 6.6     eGFR 7.3 mL/min/1.73      Comment: <15 Indicative of kidney failure       Narrative:      GFR Normal >60  Chronic Kidney Disease <60  Kidney Failure <15      CBC (No Diff) [[id number]]  (Abnormal) Collected: 06/18/22 0807    Specimen: Blood Updated: 06/18/22 0828     WBC 10.36 10*3/mm3      RBC 3.06 10*6/mm3      Hemoglobin 9.5 g/dL      Hematocrit 27.8 %      MCV 90.8 fL      MCH 31.0 pg      MCHC 34.2 g/dL      RDW 13.5 %      RDW-SD 43.8 fl      MPV 10.1 fL      Platelets 357 10*3/mm3         Imaging Results (Last 24 Hours)     ** No results found for the last 24 hours. **          Medication Review:   I have personally reviewed    Current Facility-Administered Medications:   •  acetaminophen (TYLENOL) tablet 650 mg, 650 mg, Oral, Q4H PRN **OR** acetaminophen (TYLENOL) 160 MG/5ML solution 650 mg, 650 mg, Oral, Q4H PRN **OR** acetaminophen (TYLENOL) suppository 650 mg, 650 mg, Rectal, Q4H PRN, Sanford Puentes MD  •  allopurinol (ZYLOPRIM) tablet 100 mg, 100 mg, Oral, Daily, Sanford Puentes MD, 100 mg at 06/17/22 0955  •  aluminum-magnesium hydroxide-simethicone (MAALOX MAX) 400-400-40 MG/5ML suspension 15 mL, 15 mL, Oral, Q6H PRN, Sanford Puentes MD  •  aspirin chewable tablet 81 mg, 81 mg, Oral, Daily, Sanford Puentes MD, 81 mg at 06/18/22 0808  •  cefTRIAXone (ROCEPHIN) 1 g in sodium chloride 0.9 % 100 mL IVPB-VTB, 1 g, Intravenous, Q24H, Sanford Puentes MD, Last Rate: 200 mL/hr at 06/18/22 1836, 1 g at 06/18/22 1836  •  clopidogrel  (PLAVIX) tablet 75 mg, 75 mg, Oral, Daily, Sanford Puentes MD, 75 mg at 06/18/22 0809  •  Delflex-LC/1.5% Dextrose (DIANEAL) CAPD 2,000 mL, 2,000 mL, Intraperitoneal, 5x Daily, Sanford Puentes MD, 2,000 mL at 06/19/22 0644  •  escitalopram (LEXAPRO) tablet 20 mg, 20 mg, Oral, Daily, Sanford Puentes MD, 20 mg at 06/18/22 0809  •  heparin (porcine) 5000 UNIT/ML injection 5,000 Units, 5,000 Units, Subcutaneous, Q8H, Sanford Puentes MD, 5,000 Units at 06/19/22 0644  •  levothyroxine (SYNTHROID, LEVOTHROID) tablet 137 mcg, 137 mcg, Oral, QAM, Sanford Puentes MD, 137 mcg at 06/19/22 0756  •  midodrine (PROAMATINE) tablet 10 mg, 10 mg, Oral, TID AC, Sanford Puentes MD, 10 mg at 06/18/22 1836  •  montelukast (SINGULAIR) tablet 10 mg, 10 mg, Oral, Nightly, Sanford Puentes MD, 10 mg at 06/18/22 2030  •  multivitamin (THERAGRAN) tablet 1 tablet, 1 tablet, Oral, Daily, Sanford Puentes MD, 1 tablet at 06/18/22 0808  •  ondansetron (ZOFRAN) tablet 4 mg, 4 mg, Oral, Q6H PRN **OR** ondansetron (ZOFRAN) injection 4 mg, 4 mg, Intravenous, Q6H PRN, Sanford Puentes MD, 4 mg at 06/18/22 1902  •  oxyCODONE-acetaminophen (PERCOCET) 5-325 MG per tablet 1 tablet, 1 tablet, Oral, Q6H PRN, Sanford Puentes MD, 1 tablet at 06/18/22 0549  •  potassium chloride (K-DUR,KLOR-CON) ER tablet 20 mEq, 20 mEq, Oral, BID, Sanford Puentes MD, 20 mEq at 06/18/22 2030  •  sodium bicarbonate tablet 1,300 mg, 1,300 mg, Oral, TID, Sanford Puentes MD, 1,300 mg at 06/18/22 2202  •  sodium chloride 0.9 % flush 10 mL, 10 mL, Intravenous, PRN, Sanford Puentes MD  •  traZODone (DESYREL) tablet 100 mg, 100 mg, Oral, Nightly, Sanford Puentes MD, 100 mg at 06/18/22 2030    Allergies:    Penicillins and Nickel    Assessment:    Active Problems:  Patient Active Problem List   Diagnosis   • Colitis, Clostridium difficile   • Anxiety   • Cardiomyopathy (HCC)   • Chronic systolic heart  failure (HCC)   • ESRD on peritoneal dialysis (HCC)   • Gastroesophageal reflux disease   • Gitelman syndrome   • HLD (hyperlipidemia)   • Hypothyroidism   • Peritoneal dialysis status (HCC)   • History of tracheostomy   • Hypotension   • Acute pulmonary embolism (HCC)   • Severe malnutrition (HCC)   • Clostridium difficile colitis   • Immobility syndrome   • Genu recurvatum of right knee   • Colitis   • PD catheter dysfunction (HCC)   • Gallstones   • Acute renal failure superimposed on chronic kidney disease, on chronic dialysis (HCC)   • Obesity (BMI 30-39.9)   • Hydronephrosis with obstructing calculus   • Splenic calcification   • 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 (HCC)   • Ureteral calculi   • Sepsis without acute organ dysfunction (HCC)   • Fever in adult   • Acute respiratory failure with hypoxia (HCC)   • Hypokalemia   • Anemia, chronic disease   • Calculus of ureterovesical junction (UVJ)   • Acute cystitis   • Kidney stone on right side       Right ureteral stones s/p R URS  hematuria    Plan:  Hematuria resolved  Will monitor stones as outpatient  Ur cx pending - abx per primary  Will continue to follow        Analia Sampson, APRN    6/19/2022  08:20 EDT

## 2022-06-19 NOTE — PROGRESS NOTES
" LOS: 2 days     Name: Ambar Lara  Age: 52 y.o.  Sex: female  :  1970  MRN: [id number]         Primary Care Physician: Jet Manuel MD    Subjective   Subjective  Overall feels better today.  Reports resolution of hematuria.  Abdominal pain also improved.    Objective   Vital Signs  Temp:  [97.3 °F (36.3 °C)-98.1 °F (36.7 °C)] 97.8 °F (36.6 °C)  Heart Rate:  [] 97  Resp:  [16] 16  BP: (104-115)/(68-97) 111/72  Body mass index is 34.14 kg/m².    Objective:  General Appearance:  Comfortable and in no acute distress (Looks chronically ill and older than stated age).    Vital signs: (most recent): Blood pressure 111/72, pulse 97, temperature 97.8 °F (36.6 °C), temperature source Oral, resp. rate 16, height 154.9 cm (60.98\"), weight 81.9 kg (180 lb 9.6 oz), last menstrual period 2004, SpO2 90 %.    Lungs:  Normal effort and normal respiratory rate.    Heart: Normal rate.  Regular rhythm.    Abdomen: Abdomen is soft.  Bowel sounds are normal.   There is no abdominal tenderness.     Extremities: There is no dependent edema or local swelling.    Neurological: Patient is alert and oriented to person, place and time.    Skin:  Warm and dry.              Results Review:       I reviewed the patient's new clinical results.    Results from last 7 days   Lab Units 22  0610 22  0807 22  19122  1110 22  1940   WBC 10*3/mm3 9.70 10.36 12.00* 8.71 12.12*   HEMOGLOBIN g/dL 9.3* 9.5* 9.7* 9.5* 10.0*   PLATELETS 10*3/mm3 336 357 348 347 368     Results from last 7 days   Lab Units 22  0610 22  0807 22   SODIUM mmol/L 140 136 139  137   POTASSIUM mmol/L 3.4* 3.4* 3.1*  3.0*   CHLORIDE mmol/L 102 98 98  97*   CO2 mmol/L 18.7* 16.6* 14.0*  14.9*   BUN mg/dL 42* 43* 39*  40*   CREATININE mg/dL 6.46* 6.50* 6.76*  6.90*   CALCIUM mg/dL 8.7 8.0* 7.9*  7.8*   GLUCOSE mg/dL 105* 130* 140*  136*                 Scheduled Meds:   allopurinol, 100 mg, " Oral, Daily  aspirin, 81 mg, Oral, Daily  cefTRIAXone, 1 g, Intravenous, Q24H  clopidogrel, 75 mg, Oral, Daily  Delflex-LC/1.5% Dextrose, 2,000 mL, Intraperitoneal, 5x Daily  escitalopram, 20 mg, Oral, Daily  heparin (porcine), 5,000 Units, Subcutaneous, Q8H  levothyroxine, 137 mcg, Oral, QAM  midodrine, 10 mg, Oral, TID AC  montelukast, 10 mg, Oral, Nightly  multivitamin, 1 tablet, Oral, Daily  potassium chloride, 20 mEq, Oral, BID  sodium bicarbonate, 1,300 mg, Oral, TID  traZODone, 100 mg, Oral, Nightly      PRN Meds:   •  acetaminophen **OR** acetaminophen **OR** acetaminophen  •  aluminum-magnesium hydroxide-simethicone  •  ondansetron **OR** ondansetron  •  oxyCODONE-acetaminophen  •  sodium chloride  Continuous Infusions:       Assessment & Plan   Active Hospital Problems    Diagnosis  POA   • **Calculus of ureterovesical junction (UVJ) [N20.1]  Yes   • Kidney stone on right side [N20.0]  Yes   • Acute cystitis [N30.00]  Yes   • Anemia, chronic disease [D63.8]  Yes   • Obesity (BMI 30-39.9) [E66.9]  Yes   • Chronic systolic heart failure (HCC) [I50.22]  Yes   • Cardiomyopathy (HCC) [I42.9]  Yes   • ESRD on peritoneal dialysis (HCC) [N18.6, Z99.2]  Not Applicable   • HLD (hyperlipidemia) [E78.5]  Yes   • Anxiety [F41.9]  Yes      Resolved Hospital Problems   No resolved problems to display.       Assessment & Plan    -Underwent ureteroscopy on 6/17.  No stone seen and no stent placed.  Remains on Rocephin and awaiting urine culture results.  She reports resolution of hematuria  -Nephrology following for PD management and hypokalemia  -Chronic hypotension on midodrine.  Blood pressure looks better and will continue outpatient regimen of 10 mg 3 times daily  -Continue daily thyroid replacement  -Once urine culture finalized can decide upon antibiotic regimen and discharge her home.      I wore full protective equipment throughout the patient encounter including eye protection and facemask.  Hand hygiene was  performed before donning protective equipment and after removal when leaving the room.    Jamie Odonnell MD  [facility]  06/19/22  09:36 EDT    Addendum: Urine culture has grown VRE.  She has a penicillin allergy with resulting anaphylaxis.  Therefore, we will start her on linezolid.  See how she responds and anticipate discharge tomorrow.

## 2022-06-20 NOTE — CASE MANAGEMENT/SOCIAL WORK
Continued Stay Note  [facility]     Patient Name: Ambar Lara  MRN: [id number]  Today's Date: 6/20/2022    Admit Date: 6/16/2022     Discharge Plan     Row Name 06/20/22 1512       Plan    Final Discharge Disposition Code 01 - home or self-care    Final Note DC'd home via [employer]. Isela Ramos RN               Discharge Codes    No documentation.               Expected Discharge Date and Time     Expected Discharge Date Expected Discharge Time    Jun 20, 2022             Isela Ramos RN

## 2022-06-20 NOTE — PLAN OF CARE
Goal Outcome Evaluation:  Plan of Care Reviewed With: patient        Progress: improving  Outcome Evaluation: Patient had no c/o pain through the night so far. Ambulating ad jean carlos in room. PD 5 exchanges daily, tolerating well. IV Zyvox as scheduled. BP was slightly low but was in patient's normal range this evening, otherwise VSS. Daily weight. Will continue to monitor.

## 2022-06-20 NOTE — DISCHARGE INSTRUCTIONS
Follow-up with PCP in 1 week.  Continue with peritoneal dialysis.  Continue with antibiotics x1 week.  Follow-up with urology as an outpatient regarding kidney stone.  Return to the hospital if worsening symptoms.

## 2022-06-20 NOTE — DISCHARGE SUMMARY
[facility]  DISCHAR[facility]      Name:  Ambar Lara   Age:  52 y.o.  Sex:  female  :  1970  MRN:  [id number]   Visit Number:  [id number]  Primary Care Physician:  Jet Manuel MD  Date of Discharge:  2022  Admission Date:  2022      Discharge Diagnosis:     1.  UTI with VRE  2.  Right ureteral stone status post right URS  3.  Hematuria  4.  End-stage renal disease on peritoneal dialysis  5.  Chronic hypotension on midodrine  6.  Anemia of chronic kidney disease  7.  Chronic systolic heart failure with ejection fraction of 20%.  8.  Severe mitral valve regurgitation      Active Hospital Problems    Diagnosis  POA   • **Calculus of ureterovesical junction (UVJ) [N20.1]  Yes   • Kidney stone on right side [N20.0]  Yes   • Acute cystitis [N30.00]  Yes   • Anemia, chronic disease [D63.8]  Yes   • Obesity (BMI 30-39.9) [E66.9]  Yes   • Chronic systolic heart failure (HCC) [I50.22]  Yes   • Cardiomyopathy (HCC) [I42.9]  Yes   • ESRD on peritoneal dialysis (HCC) [N18.6, Z99.2]  Not Applicable   • HLD (hyperlipidemia) [E78.5]  Yes   • Anxiety [F41.9]  Yes      Resolved Hospital Problems   No resolved problems to display.         Presenting Problem/History of Present Illness:    ESRD (end stage renal disease) (HCC) [N18.6]  Acute UTI [N39.0]  Kidney stone on right side [N20.0]  Peritoneal dialysis catheter in place ([facility]) [Z99.2]         Hospital Course:    52-year-old female with chronic systolic congestive heart failure, end-stage renal disease on peritoneal dialysis, chronic hypotension, hypothyroidism came in with right flank pain.  CT abdomen pelvis was done which showed multiple kidney stones.  She had 1 mm obstructing calculus at the right ureterovesicular junction.  Urology was consulted.  Patient underwent ureteroscopy on 2022.  No stent was placed.  No stones were noted.    Urine grew out VRE.  Due to her penicillin allergy she has been placed on Zyvox.   We will continue this for 5 days total.  Patient currently denies any chest pressure, shortness of breath, nausea, vomiting, pain.    Of note, patient has severe cardiomyopathy with ejection fraction of 20%.  She is unable to tolerate beta-blocker or ACE/ARB due to hypotension.  She should follow-up with cardiology as previous scheduled.    Patient stable for discharge home.  Follow-up with PCP in 1 week.  Follow-up urology in the next 2 weeks.        Procedures Performed:    Procedure(s):  RIGHT URETEROSCOPY       Consults:     Consults     Date and Time Order Name Status Description    6/16/2022  9:28 PM Inpatient Nephrology Consult Completed     6/16/2022  9:26 PM Inpatient Urology Consult Completed     6/16/2022  8:53 PM [facility] (on-call MD unless specified) Details      6/16/2022  8:39 PM Urology (on-call MD unless specified)            Pertinent Test Results:         Results from last 7 days   Lab Units 06/19/22  0610 06/18/22  0807 06/17/22  1919 06/17/22  1110 06/16/22  1940   WBC 10*3/mm3 9.70 10.36 12.00* 8.71 12.12*   HEMOGLOBIN g/dL 9.3* 9.5* 9.7* 9.5* 10.0*   HEMATOCRIT % 29.1* 27.8* 28.6* 29.3* 31.1*   PLATELETS 10*3/mm3 336 357 348 347 368   MCV fL 95.1 90.8 92.3 95.1 94.5   SODIUM mmol/L 140 136  --   --  139  137   POTASSIUM mmol/L 3.4* 3.4*  --   --  3.1*  3.0*   CHLORIDE mmol/L 102 98  --   --  98  97*   CO2 mmol/L 18.7* 16.6*  --   --  14.0*  14.9*   BUN mg/dL 42* 43*  --   --  39*  40*   CREATININE mg/dL 6.46* 6.50*  --   --  6.76*  6.90*   GLUCOSE mg/dL 105* 130*  --   --  140*  136*   CALCIUM mg/dL 8.7 8.0*  --   --  7.9*  7.8*          Results from last 7 days   Lab Units 06/19/22  0610 06/18/22  0807 06/17/22  1919 06/17/22  1110 06/16/22 2131 06/16/22 1940   LACTATE mmol/L  --   --   --   --  1.3  --    WBC 10*3/mm3 9.70 10.36 12.00* 8.71  --  12.12*     Results from last 7 days   Lab Units 06/16/22 1940   BILIRUBIN mg/dL <0.2   ALK PHOS U/L 71   ALT (SGPT) U/L 10   AST (SGOT) U/L 10            Invalid input(s): TG, LDLCALC, LDLREALC      Brief Urine Lab Results  (Last result in the past 365 days)      Color   Clarity   Blood   Leuk Est   Nitrite   Protein   CREAT   Urine HCG        06/16/22 1940 Red  Comment: Any Substance that causes an abnormal urine color can alter the accuracy of the chemical reactions.   Turbid   Large (3+)   Large (3+)   Negative   100 mg/dL (2+)                   Results for orders placed during the hospital encounter of 02/27/22    Duplex Mesenteric Complete CAR    Interpretation Summary  · No hemodynamically significant stenosis of the celiac artery is noted.  · No hemodynamically significant stenosis of the superior mesenteric artery (SMA) is noted.  · No hemodynamically significant stenosis of the inferior mesenteric artery (MARCELO) is noted.      Results for orders placed during the hospital encounter of 03/11/22    Adult Transthoracic Echo Complete W/ Cont if Necessary Per Protocol    Interpretation Summary  · Left ventricular ejection fraction appears to be less than 20%. Left ventricular systolic function is severely decreased. Normal left ventricular wall thickness noted. The left ventricular cavity is severely dilated. Left ventricular diastolic function was indeterminate. No evidence of left ventricular thrombus or mass present.  · The following left ventricular wall segments are hypokinetic: basal anterolateral, mid anterolateral, apical lateral, basal inferolateral, mid inferolateral, mid inferior, basal anterior and basal inferior. The following left ventricular wall segments are akinetic: mid anterior, apical anterior, apical inferior, apical septal, basal inferoseptal, mid inferoseptal, apex and mid anteroseptal.  · Apical tethering and severe restriction of posterior leaflet excursion. Moderate to severe mitral valve regurgitation is present. No significant mitral valve stenosis is present.        Condition on Discharge:      Stable    Vital Signs:    BP (!)  "89/64   Pulse 82   Temp 97.6 °F (36.4 °C) (Oral)   Resp 16   Ht 154.9 cm (60.98\")   Wt 81.7 kg (180 lb 3.2 oz)   LMP 04/16/2004 (Approximate)   SpO2 97%   BMI 34.07 kg/m²     Physical Exam:    General: Alert and oriented x4, mild distress.  Heart: Regular rate and rhythm without murmur rub or thrill.  Lungs: Clear to auscultation bilaterally without use of accessory muscles respiration.  Abdomen: Soft/nontender/nondistended.  No HSM noted.  MSK: 5/5 strength in upper/lower extremities bilaterally.    Discharge Disposition:    Home or Self Care    Discharge Medication:       Discharge Medications      New Medications      Instructions Start Date   linezolid 600 MG tablet  Commonly known as: ZYVOX   600 mg, Oral, Every 12 Hours Scheduled         Continue These Medications      Instructions Start Date   acetaminophen 500 MG tablet  Commonly known as: TYLENOL   500 mg, Oral, Every 6 Hours PRN      allopurinol 100 MG tablet  Commonly known as: ZYLOPRIM   100 mg, Oral, Daily      ammonium lactate 12 % cream  Commonly known as: AMLACTIN   Apply topically to the appropriate area as directed Every 12 (Twelve) Hours As Needed for Dry Skin.      aspirin 81 MG chewable tablet   81 mg, Oral, Daily      clopidogrel 75 MG tablet  Commonly known as: PLAVIX   75 mg, Oral, Daily      Daily-Jamshid Multivitamin tablet tablet  Generic drug: multivitamin   1 tablet, Oral, Daily      diphenhydrAMINE HCl (Sleep) 25 MG capsule   25 mg, Oral, Nightly PRN, For sleep       ergocalciferol 1.25 MG (66341 UT) capsule  Commonly known as: ERGOCALCIFEROL   1 capsule, Oral, Weekly      escitalopram 20 MG tablet  Commonly known as: LEXAPRO   20 mg, Oral, Daily      famotidine 20 MG tablet  Commonly known as: PEPCID   20 mg, Oral, 2 Times Daily      gentamicin 0.1 % cream  Commonly known as: GARAMYCIN   1 application, Topical, Daily, As directed       gentamicin 0.1 % ointment  Commonly known as: GARAMYCIN   1 application, Topical, Daily    "   lanthanum 500 MG chewable tablet  Commonly known as: FOSRENOL   750 mg, Oral, 3 Times Daily With Meals      levothyroxine 137 MCG tablet  Commonly known as: SYNTHROID, LEVOTHROID   137 mcg, Oral, Daily      magnesium oxide 400 MG tablet  Commonly known as: MAG-OX   400 mg, Oral, 2 Times Daily      melatonin 3 MG tablet   3 mg, Oral, Nightly      midodrine 10 MG tablet  Commonly known as: PROAMATINE   10 mg, Oral, 3 Times Daily      montelukast 10 MG tablet  Commonly known as: Singulair   10 mg, Oral, Nightly      nitroglycerin 0.4 MG SL tablet  Commonly known as: NITROSTAT   0.4 mg, Sublingual, Every 5 Minutes PRN, Take no more than 3 doses in 15 minutes.       oxyCODONE-acetaminophen 5-325 MG per tablet  Commonly known as: Percocet   1 tablet, Oral, Every 6 Hours PRN      potassium chloride 20 MEQ CR tablet  Commonly known as: K-DUR,KLOR-CON   2 tablets, Oral, 2 Times Daily      REN-JHON PO   1 tablet, Oral, Daily      saccharomyces boulardii 250 MG capsule  Commonly known as: FLORASTOR   500 mg, Oral, 2 Times Daily      sodium bicarbonate 650 MG tablet   1,300 mg, Oral, 3 Times Daily      traZODone 100 MG tablet  Commonly known as: DESYREL   100 mg, Oral, Nightly, Take 0.5-1 tablet by mouth nightly              Discharge Diet:     Diet Instructions     Diet: Regular      Discharge Diet: Regular          Activity at Discharge:     Activity Instructions     Activity as Tolerated            Follow-up Appointments:    No future appointments.  Additional Instructions for the Follow-ups that You Need to Schedule     Discharge Follow-up with PCP   As directed       Currently Documented PCP:    Jet Manuel MD    PCP Phone Number:    [phone]     Follow Up Details: 1 week         Discharge Follow-up with Specified Provider: Urology Dr. Puentes 2 weeks.   As directed      To: Urology Dr. Puentes 2 weeks.               Test Results Pending at Discharge:  Discharge took 35 minutes including review of chart, charting,  medication reconciliation, arrangement of follow-up,, discussion with patient, with greater than 50% of time spent in coordination of care.         Castillo Otero DO  06/20/22  09:55 EDT

## 2022-06-20 NOTE — PROGRESS NOTES
[facility] Progress Note      Patient Name: Ambar Lara  : 1970  MRN: [id number]  Primary Care Physician:  Jet Manuel MD  Date of admission: 2022    Subjective     Interval History:   No acute events overnight.    Review of Systems:     Seen and examined.  No shortness of air or chest pain.    Denies any problems with his peritoneal dialysis.  Fluid is clear per patient.    Objective     Vitals:   Temp:  [97.6 °F (36.4 °C)-98.2 °F (36.8 °C)] 97.6 °F (36.4 °C)  Heart Rate:  [82-88] 82  Resp:  [16-17] 16  BP: ()/(60-74) 89/64  Flow (L/min):  [1] 1    Intake/Output Summary (Last 24 hours) at 2022 1104  Last data filed at 2022 0701  Gross per 24 hour   Intake 8420 ml   Output 8600 ml   Net -180 ml       Physical Exam:    General Appearance: alert, oriented x 3, no acute distress   Skin: warm and dry  HEENT: oral mucosa normal, nonicteric sclera  Neck: supple, no JVD  Lungs: CTA  Heart: RRR, normal S1 and S2  Abdomen: soft, nontender, nondistended  : no palpable bladder  Extremities: no edema, cyanosis or clubbing  Neuro: normal speech and mental status     Scheduled Meds:     allopurinol, 100 mg, Oral, Daily  aspirin, 81 mg, Oral, Daily  clopidogrel, 75 mg, Oral, Daily  Delflex-LC/1.5% Dextrose, 2,000 mL, Intraperitoneal, 5x Daily  escitalopram, 20 mg, Oral, Daily  heparin (porcine), 5,000 Units, Subcutaneous, Q8H  levothyroxine, 137 mcg, Oral, QAM  linezolid, 600 mg, Oral, Q12H  midodrine, 10 mg, Oral, TID AC  montelukast, 10 mg, Oral, Nightly  multivitamin, 1 tablet, Oral, Daily  potassium chloride, 20 mEq, Oral, BID  sodium bicarbonate, 1,300 mg, Oral, TID  traZODone, 100 mg, Oral, Nightly      IV Meds:        Results Reviewed:   I have personally reviewed the results from the time of this admission to 2022 11:04 EDT     Results from last 7 days   Lab Units 22  0610 22  0807 22  1940   SODIUM mmol/L 140 136 139   137   POTASSIUM mmol/L 3.4* 3.4* 3.1*  3.0*   CHLORIDE mmol/L 102 98 98  97*   CO2 mmol/L 18.7* 16.6* 14.0*  14.9*   BUN mg/dL 42* 43* 39*  40*   CREATININE mg/dL 6.46* 6.50* 6.76*  6.90*   CALCIUM mg/dL 8.7 8.0* 7.9*  7.8*   BILIRUBIN mg/dL  --   --  <0.2   ALK PHOS U/L  --   --  71   ALT (SGPT) U/L  --   --  10   AST (SGOT) U/L  --   --  10   GLUCOSE mg/dL 105* 130* 140*  136*       Estimated Creatinine Clearance: 10.1 mL/min (A) (by C-G formula based on SCr of 6.46 mg/dL (H)).    Results from last 7 days   Lab Units 06/18/22  0807 06/16/22  1940   MAGNESIUM mg/dL  --  1.6   PHOSPHORUS mg/dL 11.6* 12.5*             Results from last 7 days   Lab Units 06/19/22  0610 06/18/22  0807 06/17/22  1919 06/17/22  1110 06/16/22  1940   WBC 10*3/mm3 9.70 10.36 12.00* 8.71 12.12*   HEMOGLOBIN g/dL 9.3* 9.5* 9.7* 9.5* 10.0*   PLATELETS 10*3/mm3 336 357 348 347 368             Assessment / Plan     ASSESSMENT:  1. ESRD, hx [place] syn, on CCPD.  Initiated CAPD here, 1.5% soln.  Volume status and waste products stable.    2. Chronic hypokalemia  3.  Recurrent kidney stone:  Status post ureteroscopy  4. Chronic hypotension   5. MBD   6. Anemia of CKD    PLAN:  -Continue peritoneal dialysis.  -Cleared to be discharged from kidney standpoint to follow-up in my clinic    Thank you for involving us in the care of Ambar JESSE Lara.  Please feel free to call with any questions.    Camila Salas MD  06/20/22  11:04 EDT    [facility]  [phone]      Much of this encounter note is an electronic transcription/translation of spoken language to printed text. The electronic translation of spoken language may permit erroneous, or at times, nonsensical words or phrases to be inadvertently transcribed; Although I have reviewed the note for such errors, some may still exist.

## 2022-06-20 NOTE — PLAN OF CARE
Goal Outcome Evaluation:  Plan of Care Reviewed With: patient        Progress: improving  Outcome Evaluation: VSS, no complaints of pain this shift.  IV Zyvox switched to oral.  Plan is for patient to be discharged home this afternoon.

## 2022-06-21 NOTE — OUTREACH NOTE
Prep Survey    Flowsheet Row Responses   [facility] facility patient discharged from? [place]   Is LACE score < 7 ? No   Emergency Room discharge w/ pulse ox? No   Eligibility Readm Mgmt   Discharge diagnosis  UTI with VRE Right ureteral stone status post right    Does the patient have one of the following disease processes/diagnoses(primary or secondary)? Other   Does the patient have Home health ordered? No   Is there a DME ordered? No   Prep survey completed? Yes          ELEONORA PARRA - Registered Nurse

## 2022-06-21 NOTE — OUTREACH NOTE
Medical Week 1 Survey    Flowsheet Row Responses   [facility] patient discharged from? [place]   Does the patient have one of the following disease processes/diagnoses(primary or secondary)? Other   Week 1 attempt successful? Yes   Call start time 1424   Call end time 1427   Discharge diagnosis  UTI with VRE Right ureteral stone status post right    Meds reviewed with patient/caregiver? Yes   Is the patient having any side effects they believe may be caused by any medication additions or changes? No   Does the patient have all medications ordered at discharge? Yes   Is the patient taking all medications as directed (includes completed medication regime)? Yes   Does the patient have a primary care provider?  Yes   Does the patient have an appointment with their PCP within 7 days of discharge? No   What is preventing the patient from scheduling follow up appointments within 7 days of discharge? Haven't had time   Nursing Interventions Advised patient to make appointment, Educated patient on importance of making appointment   Has the patient kept scheduled appointments due by today? N/A   Has home health visited the patient within 72 hours of discharge? N/A   Psychosocial issues? No   Did the patient receive a copy of their discharge instructions? Yes   Nursing interventions Reviewed instructions with patient   What is the patient's perception of their health status since discharge? Improving   Is the patient/caregiver able to teach back signs and symptoms related to disease process for when to call PCP? Yes   Is the patient/caregiver able to teach back signs and symptoms related to disease process for when to call 911? Yes   Is the patient/caregiver able to teach back the hierarchy of who to call/visit for symptoms/problems? PCP, Specialist, Home health nurse, Urgent Care, ED, 911 Yes   Additional teach back comments has fatigue   Week 1 call completed? Yes          CON NAVA - Registered Nurse

## 2022-06-29 NOTE — OUTREACH NOTE
Medical Week 2 Survey    Flowsheet Row Responses   [facility] patient discharged from? [place]   Does the patient have one of the following disease processes/diagnoses(primary or secondary)? Other   Week 2 attempt successful? Yes   Call start time 1219   Discharge diagnosis  UTI with VRE Right ureteral stone status post right    Call end time 1212   Is the patient taking all medications as directed (includes completed medication regime)? Yes   Does the patient have a primary care provider?  Yes   Has the patient kept scheduled appointments due by today? Yes   Comments Has seen vascular and hematology. Has several other appts coming up.   Has home health visited the patient within 72 hours of discharge? N/A   Psychosocial issues? No   What is the patient's perception of their health status since discharge? Improving   Is the patient/caregiver able to teach back signs and symptoms related to disease process for when to call PCP? Yes   Is the patient/caregiver able to teach back signs and symptoms related to disease process for when to call 911? Yes   Is the patient/caregiver able to teach back the hierarchy of who to call/visit for symptoms/problems? PCP, Specialist, Home health nurse, Urgent Care, ED, 911 Yes   Additional teach back comments States she is doing well.   Week 2 Call Completed? Yes   Graduated Yes   Graduated/Revoked comments Denies questions or needs at this time.          LIU BRANDT - Licensed Nurse

## 2022-07-06 NOTE — PROGRESS NOTES
PATIENTINFORMATION    Date of Office Visit: 2022  Encounter Provider: Conor Alvarez MD  Place of Service: [facility] CARDIOLOGY  Patient Name: Ambar Lara  : 1970    Subjective:     Encounter Date:2022      Patient ID: Ambar Lara is a 52 y.o. female.    Chief Complaint   Patient presents with   • new patient     HPI  Ms. Lara is a pleasant 52 years old lady who came to cardiac clinic to establish care.  She was accompanied by her daughter who is also her caregiver.  Past medical history significant for nonischemic cardiomyopathy and end-stage renal disease on peritoneal dialysis known past 2-3 years.  She follows up [facility] for heart/kidney transplant evaluation.   She had a complicated year in  with protracted COVID infection that needed mechanical ventilation/tracheostomy and 6 weeks hospital stay.  Later admitted with C. difficile and blood clots for which she was temporarily anticoagulated.  Since after discharge from hospital she had problems with hypotension and guideline directed medical therapy for cardiomyopathy discontinued.  She has bilateral lower extremity peripheral arterial disease not amenable to intervention and chronic wound on right foot for which she follows up with vascular surgery and wound.  Overall wound is healing.  She ambulates using a walker at a slow pace.    Today she denies any significant shortness of breath, chest pain, orthopnea, PND, palpitations, presyncope or syncope or significant lower extremity swelling.  She used to follow-up with [facility] and she was considered for ICD implantation for primary prevention but deferred because of right leg wound that seems to be healing currently.      ROS  All systems reviewed and negative except as noted in HPI.    Past Medical History:   Diagnosis Date   • CHF (congestive heart failure) (HCC)    • Clostridioides difficile infection 2021    finished oral  vanc 2021   • Dialysis patient (HCC)    • Disease of thyroid gland    • Elevated cholesterol    • ESRD on peritoneal dialysis (HCC)    • GERD (gastroesophageal reflux disease)    • Pulmonary emboli (HCC) 2021    coumadin last taken 2021   • Renal disorder    • Sleep apnea    • Ureteral calculi 2022       Past Surgical History:   Procedure Laterality Date   • ADRENAL GLAND SURGERY N/A `   •  SECTION Bilateral 2001    Has had x2   • CYSTOSCOPY Right 2022    Procedure: RIGHT URETEROSCOPY;  Surgeon: Sanford Puentes MD;  Location: [facility];  Service: Urology;  Laterality: Right;   • CYSTOSCOPY URETEROSCOPY LASER LITHOTRIPSY Left 2022    Procedure: Left CYSTOSCOPY, with stent placement;  Surgeon: Sanford Puentes MD;  Location: [facility];  Service: Urology;  Laterality: Left;   • CYSTOSCOPY W/ URETERAL STENT PLACEMENT Right 2022    Procedure: RIGHT CYSTOSCOPY RETROGRADE PYLEOGRAM HOLMIUM LASER STENT;  Surgeon: Sanford Puentes MD;  Location: [facility];  Service: Urology;  Laterality: Right;   • HYSTERECTOMY     • INSERTION PERITONEAL DIALYSIS CATHETER Right 10/12/2021    Procedure: LAPAROSCOPIC REVISION OF PERITONEAL DIALYSIS CATHETER AND LAPAROSCOPIC CHOLECYSTECTOMY;  Surgeon: Jamie Figueroa MD;  Location: [facility];  Service: General;  Laterality: Right;   • PERITONEAL CATHETER INSERTION  2019   • TRACHEOSTOMY         Social History     Socioeconomic History   • Marital status:    Tobacco Use   • Smoking status: Former Smoker     Packs/day: 1.00     Years: 25.00     Pack years: 25.00     Types: Cigarettes     Quit date: 2018     Years since quittin.2   • Smokeless tobacco: Never Used   Vaping Use   • Vaping Use: Former   • Quit date: 2021   • Substances: Nicotine, Flavoring   • Devices: Pre-filled or refillable cartridge   Substance and Sexual Activity   • Alcohol use: Not Currently   • Drug use: Not  "Currently   • Sexual activity: Defer       Family History   Problem Relation Age of Onset   • Malig Hyperthermia Neg Hx            ECG 12 Lead    Date/Time: 7/6/2022 10:43 AM  Performed by: Conor Alvarez MD  Authorized by: Conor Alvarez MD   Comparison: compared with previous ECG from 4/21/2022  Comparison to previous ECG: Heart rate is faster and this tracing  Rhythm: sinus rhythm  Rate: normal  Conduction: conduction normal  ST Segments: ST segments normal  T Waves: T waves normal  QRS axis: normal  Other findings: left ventricular hypertrophy and left atrial abnormality    Clinical impression: abnormal EKG               Objective:     /80 (BP Location: Right arm, Patient Position: Sitting)   Pulse 108   Ht 152.4 cm (60\")   Wt 79.8 kg (176 lb)   LMP 04/16/2004 (Approximate)   BMI 34.37 kg/m²  Body mass index is 34.37 kg/m².     Constitutional:       General: Not in acute distress.     Appearance: Well-developed. Not diaphoretic.   Eyes:      Pupils: Pupils are equal, round, and reactive to light.   HENT:      Head: Normocephalic and atraumatic.   Neck:      Thyroid: No thyromegaly.   Pulmonary:      Effort: Pulmonary effort is normal. No respiratory distress.      Breath sounds: Normal breath sounds. No wheezing. No rales.   Chest:      Chest wall: Not tender to palpatation.   Cardiovascular:      Normal rate. Regular rhythm.      No gallop.   Pulses:     Intact distal pulses.   Edema:     Peripheral edema absent.   Abdominal:      General: Bowel sounds are normal. There is no distension.      Palpations: Abdomen is soft.      Tenderness: There is no guarding.   Musculoskeletal: Normal range of motion.         General: No deformity.      Cervical back: Normal range of motion and neck supple. Skin:     General: Skin is warm and dry.      Findings: No rash.   Neurological:      Mental Status: Alert and oriented to person, place, and time.      Cranial Nerves: No cranial nerve deficit.      " Deep Tendon Reflexes: Reflexes are normal and symmetric.   Psychiatric:         Judgment: Judgment normal.         Review Of Data: I have reviewed pertinent recent labs, images and documents and pertinent findings included in HPI or assessment below.          Assessment/Plan:         1.  Nonischemic cardiomyopathy: Most recent left ventricular ejection fraction of about 20% per echo done in March 2022  Unable to tolerate guideline directed medical therapy since after recovering from COVID.  Her blood pressure in office today is 112/80 and she takes midodrine 10 mg p.o. 3 times daily.    EKG with sinus tachycardia at heart rate of 108 and LVH with strain.  Coronary angiogram in June 2020-luminal irregularities without significant coronary artery disease.  Try Coreg 3.125 mg p.o. twice daily while continuing midodrine  She did not look overtly volume overloaded.  I will refer to EP for consideration for ICD    2.  Bilateral peripheral artery disease and chronic right foot wound-seems to be healing on exam without evidence for infection  On dual antiplatelet therapy and statin  Follows up with wound care and vascular surgery    3.  End-stage renal disease on peritoneal dialysis/history of Gettleman syndrome    4.  Hypercholesterolemia on statin  5.  Hypotension on midodrine-normal blood pressure during exam today  6.  Prior history of protracted and complicated COVID-19 infection/DVT-in 2021-resolved    Return to clinic in 3 months or sooner with concerning symptoms  Diagnosis and plan of care discussed with patient and verbalized understanding.            Your medication list          Accurate as of July 6, 2022 11:34 AM. If you have any questions, ask your nurse or doctor.            START taking these medications      Instructions Last Dose Given Next Dose Due   carvedilol 3.125 MG tablet  Commonly known as: COREG  Started by: Conor Alvarez MD      Take 1 tablet by mouth 2 (Two) Times a Day.          CONTINUE  taking these medications      Instructions Last Dose Given Next Dose Due   acetaminophen 500 MG tablet  Commonly known as: TYLENOL      Take 500 mg by mouth Every 6 (Six) Hours As Needed for Mild Pain .       allopurinol 100 MG tablet  Commonly known as: ZYLOPRIM      Take 100 mg by mouth Daily.       ammonium lactate 12 % cream  Commonly known as: AMLACTIN      Apply topically to the appropriate area as directed Every 12 (Twelve) Hours As Needed for Dry Skin.       aspirin 81 MG chewable tablet      Chew 81 mg Daily.       clopidogrel 75 MG tablet  Commonly known as: PLAVIX      Take 1 tablet by mouth Daily.       Daily-Jamshid Multivitamin tablet tablet  Generic drug: multivitamin      Take 1 tablet by mouth Daily.       diphenhydrAMINE HCl (Sleep) 25 MG capsule      Take 25 mg by mouth At Night As Needed (sleep). For sleep       ergocalciferol 1.25 MG (27926 UT) capsule  Commonly known as: ERGOCALCIFEROL      Take 1 capsule by mouth 1 (One) Time Per Week.       escitalopram 20 MG tablet  Commonly known as: LEXAPRO      Take 1 tablet by mouth Daily.       famotidine 20 MG tablet  Commonly known as: PEPCID      Take 20 mg by mouth 2 (Two) Times a Day.       gentamicin 0.1 % cream  Commonly known as: GARAMYCIN      Apply 1 application topically to the appropriate area as directed Daily. As directed       gentamicin 0.1 % ointment  Commonly known as: GARAMYCIN      Apply 1 application topically to the appropriate area as directed Daily.       lanthanum 500 MG chewable tablet  Commonly known as: FOSRENOL      Chew 750 mg 3 (Three) Times a Day With Meals.       levothyroxine 137 MCG tablet  Commonly known as: SYNTHROID, LEVOTHROID      Take 1 tablet by mouth Daily.       magnesium oxide 400 MG tablet  Commonly known as: MAG-OX      Take 1 tablet by mouth 2 (Two) Times a Day.       melatonin 3 MG tablet      Take 1 tablet by mouth Every Night.       midodrine 10 MG tablet  Commonly known as: PROAMATINE      Take 1 tablet  by mouth 3 (Three) Times a Day.       montelukast 10 MG tablet  Commonly known as: Singulair      Take 1 tablet by mouth Every Night.       nitroglycerin 0.4 MG SL tablet  Commonly known as: NITROSTAT      Place 0.4 mg under the tongue Every 5 (Five) Minutes As Needed for Chest Pain. Take no more than 3 doses in 15 minutes.       oxyCODONE-acetaminophen 5-325 MG per tablet  Commonly known as: Percocet      Take 1 tablet by mouth Every 6 (Six) Hours As Needed for Moderate Pain .       potassium chloride 20 MEQ CR tablet  Commonly known as: K-DUR,KLOR-CON      Take 2 tablets by mouth 2 (Two) Times a Day.       REN-JHON PO      Take 1 tablet by mouth Daily.       saccharomyces boulardii 250 MG capsule  Commonly known as: FLORASTOR      Take 2 capsules by mouth 2 (Two) Times a Day.       sodium bicarbonate 650 MG tablet      Take 2 tablets by mouth 3 (Three) Times a Day.       traZODone 100 MG tablet  Commonly known as: DESYREL      Take 100 mg by mouth Every Night. Take 0.5-1 tablet by mouth nightly             Where to Get Your Medications      These medications were sent to [id number] - [facility] [address] [address] AT SEC OF [address] [phone] [facility] [phone] [address], [facility] [address]    Phone: [phone]   · carvedilol 3.125 MG tablet             Conor Alvarez MD  07/06/22  11:34 EDT

## 2022-07-08 NOTE — TELEPHONE ENCOUNTER
----- Message from Karen Knott MA sent at 7/6/2022 12:29 PM EDT -----  Please call and schedule pt with JM. To discuss ablation...Karen

## 2022-07-08 NOTE — TELEPHONE ENCOUNTER
Marti scheduled pt to see Dr. Thao 7-19-22.    Thank you,    Cande Pierre, RN  Triage [facility]  07/08/22 16:17 EDT

## 2022-07-08 NOTE — PROGRESS NOTES
July 8, 2022    Hello, may I speak with Ambar Lara?    My name is Saadia    I am  with [facility] CARDIOLOGY  [address]  [place] [address] [phone].    Before we get started may I verify your date of birth? 1970    I am calling to officially welcome you to our practice and ask about your recent visit. Is this a good time to talk? yes    Tell me about your visit with us. What things went well?  Everything was fine.       We're always looking for ways to make our patients' experiences even better. Do you have recommendations on ways we may improve?  no    Overall were you satisfied with your first visit to our practice? yes       I appreciate you taking the time to speak with me today. Is there anything else I can do for you? no      Thank you, and have a great day.

## 2022-07-28 NOTE — PROGRESS NOTES
"Chief Complaint  Pain and Follow-up of the Right Tibia and Pain and Follow-up of the Right Fibula     Subjective      Jovany Ruiz presents to [facility] for follow up evaluation of the right leg. The patient is S/P Right Tibia/fibula Open Reduction Internal Fixation 7/13/22. Staples were removed today. He is overall doing well. He is ambulating with crutches.     No Known Allergies     Social History     Socioeconomic History   • Marital status:    Tobacco Use   • Smoking status: Never Smoker   • Smokeless tobacco: Never Used   Vaping Use   • Vaping Use: Never used   Substance and Sexual Activity   • Alcohol use: Not Currently   • Drug use: Yes     Types: Marijuana     Comment: REPORTS JUST ON OCCASION   • Sexual activity: Defer        Review of Systems     Objective   Vital Signs:   Pulse 98   Ht 182.9 cm (72\")   Wt 99.8 kg (220 lb)   SpO2 97%   BMI 29.84 kg/m²       Physical Exam  Constitutional:       Appearance: Normal appearance. The patient is well-developed and normal weight.   HENT:      Head: Normocephalic.      Right Ear: Hearing and external ear normal.      Left Ear: Hearing and external ear normal.      Nose: Nose normal.   Eyes:      Conjunctiva/sclera: Conjunctivae normal.   Cardiovascular:      Rate and Rhythm: Normal rate.   Pulmonary:      Effort: Pulmonary effort is normal.      Breath sounds: No wheezing or rales.   Abdominal:      Palpations: Abdomen is soft.      Tenderness: There is no abdominal tenderness.   Musculoskeletal:      Cervical back: Normal range of motion.   Skin:     Findings: No rash.   Neurological:      Mental Status: The patient is alert and oriented to person, place, and time.   Psychiatric:         Mood and Affect: Mood and affect normal.         Judgment: Judgment normal.       Ortho Exam      Right lower extremity- incisions well healing. No signs of infection. Calf soft, non-tender. Can move toes. Resolving bruising. " "First Urology Progress Note    Chief Complaint:  Right ureteral stone    S/p right URS no stones seen no stent left  Still with some hematuria  Urine is light pink  No dysuria  Otherwise voiding without complaints  No flank pain or renal colic  afebrile        Vital Signs  /68 (BP Location: Left arm, Patient Position: Lying)   Pulse 99   Temp 97.4 °F (36.3 °C) (Oral)   Resp 16   Ht 154.9 cm (60.98\")   Wt 82.1 kg (181 lb 1.6 oz)   LMP 04/16/2004 (Approximate)   SpO2 95%   BMI 34.24 kg/m²     Physical Exam:  Alert and oriented  VSS  No CVA tenderness  Abdomen soft nontender  No edema  Respirations unlabored  Rad pulse RRR     Results Review:      Lab Results (last 24 hours)     Procedure Component Value Units Date/Time    Renal Function Panel [[id number]]  (Abnormal) Collected: 06/18/22 0807    Specimen: Blood Updated: 06/18/22 0851     Glucose 130 mg/dL      BUN 43 mg/dL      Creatinine 6.50 mg/dL      Sodium 136 mmol/L      Potassium 3.4 mmol/L      Chloride 98 mmol/L      CO2 16.6 mmol/L      Calcium 8.0 mg/dL      Albumin 3.90 g/dL      Phosphorus 11.6 mg/dL      Anion Gap 21.4 mmol/L      BUN/Creatinine Ratio 6.6     eGFR 7.3 mL/min/1.73      Comment: <15 Indicative of kidney failure       Narrative:      GFR Normal >60  Chronic Kidney Disease <60  Kidney Failure <15      CBC (No Diff) [[id number]]  (Abnormal) Collected: 06/18/22 0807    Specimen: Blood Updated: 06/18/22 0828     WBC 10.36 10*3/mm3      RBC 3.06 10*6/mm3      Hemoglobin 9.5 g/dL      Hematocrit 27.8 %      MCV 90.8 fL      MCH 31.0 pg      MCHC 34.2 g/dL      RDW 13.5 %      RDW-SD 43.8 fl      MPV 10.1 fL      Platelets 357 10*3/mm3     CBC (No Diff) [[id number]]  (Abnormal) Collected: 06/17/22 1919    Specimen: Blood Updated: 06/17/22 1956     WBC 12.00 10*3/mm3      RBC 3.10 10*6/mm3      Hemoglobin 9.7 g/dL      Hematocrit 28.6 %      MCV 92.3 fL      MCH 31.3 pg      MCHC 33.9 g/dL      RDW 13.6 %      RDW-SD 45.0 fl      MPV 10.1 " Mild swelling. Good capillary refill.     Procedures    X-Ray Report:  RIght tib/fib X-Ray  Indication: Evaluation of right leg pain  AP and Lateral view(s)  Findings: distal tibia fracture in good alignment. Proximal fibula fracture. Intact appearing knee replacement. No signs of hardware failure.   Prior studies available for comparison: yes         Imaging Results (Most Recent)     Procedure Component Value Units Date/Time    XR Tibia Fibula 2 View Right [[id number]] Resulted: 07/28/22 1331     Updated: 07/28/22 1336           Result Review :       XR Tibia Fibula 2 View Right    Result Date: 7/13/2022  Narrative: PROCEDURE: XR TIBIA FIBULA 2 VW RIGHT  COMPARISON: [facility], CR, XR TIBIA FIBULA 2 VW RIGHT, 7/12/2022, 21:51.  INDICATIONS: right tib/fib fx/ flouro time: 2.54/mGy 4.69/3 image  FINDINGS:  Three digital spot fluoroscopic images were submitted of the right tibia and fibula.  Fine osseous detail is limited.  There has been placement of a plate which fixates a comminuted fracture of the distal shaft of the right tibia with near normal restoration of anatomic alignment.      Impression:  Intraoperative imaging of the right tibia and fibula as described.      PEE TURNER MD       Electronically Signed and Approved By: PEE TURNER MD on 7/13/2022 at 17:33             XR Tibia Fibula 2 View Right    Result Date: 7/12/2022  Narrative: PROCEDURE: XR TIBIA FIBULA 2 VW RIGHT  COMPARISONS: [facility], CR, XR KNEE 3 VW RIGHT, 5/16/2022, 11:02.   [facility], CR, XR ANKLE 3+ VW RIGHT, 7/12/2022, 21:54.  INDICATIONS: FALL FROM LADDER TODAY; RIGHT LOWER EXTREMITY PAIN, DEFORMITY.  FINDINGS: Four views reveal acute comminuted predominantly obliquely oriented displaced closed extra-articular fractures of the distal right tibial shaft, thought to be acute in nature.  Acute to subacute fractures of the proximal right fibula are noted.  On some of the images, there is the suggestion of  fL      Platelets 348 10*3/mm3     CBC & Differential [[id number]]  (Abnormal) Collected: 06/17/22 1110    Specimen: Blood Updated: 06/17/22 1150    Narrative:      The following orders were created for panel order CBC & Differential.  Procedure                               Abnormality         Status                     ---------                               -----------         ------                     CBC Auto Differential[[id number]]        Abnormal            Final result                 Please view results for these tests on the individual orders.    CBC Auto Differential [[id number]]  (Abnormal) Collected: 06/17/22 1110    Specimen: Blood Updated: 06/17/22 1150     WBC 8.71 10*3/mm3      RBC 3.08 10*6/mm3      Hemoglobin 9.5 g/dL      Hematocrit 29.3 %      MCV 95.1 fL      MCH 30.8 pg      MCHC 32.4 g/dL      RDW 13.7 %      RDW-SD 47.5 fl      MPV 10.1 fL      Platelets 347 10*3/mm3      Neutrophil % 63.3 %      Lymphocyte % 27.2 %      Monocyte % 4.9 %      Eosinophil % 2.8 %      Basophil % 0.8 %      Immature Grans % 1.0 %      Neutrophils, Absolute 5.51 10*3/mm3      Lymphocytes, Absolute 2.37 10*3/mm3      Monocytes, Absolute 0.43 10*3/mm3      Eosinophils, Absolute 0.24 10*3/mm3      Basophils, Absolute 0.07 10*3/mm3      Immature Grans, Absolute 0.09 10*3/mm3      nRBC 0.0 /100 WBC     Magnesium [[id number]]  (Normal) Collected: 06/16/22 1940    Specimen: Blood Updated: 06/17/22 1028     Magnesium 1.6 mg/dL         Imaging Results (Last 24 Hours)     ** No results found for the last 24 hours. **          Medication Review:   I have personally reviewed    Current Facility-Administered Medications:   •  acetaminophen (TYLENOL) tablet 650 mg, 650 mg, Oral, Q4H PRN **OR** acetaminophen (TYLENOL) 160 MG/5ML solution 650 mg, 650 mg, Oral, Q4H PRN **OR** acetaminophen (TYLENOL) suppository 650 mg, 650 mg, Rectal, Q4H PRN, Sanford Puentes MD  •  allopurinol (ZYLOPRIM) tablet 100 mg, 100 mg, Oral, Daily,  Sanford Puentes MD, 100 mg at 06/17/22 0955  •  aluminum-magnesium hydroxide-simethicone (MAALOX MAX) 400-400-40 MG/5ML suspension 15 mL, 15 mL, Oral, Q6H PRN, Sanford Puentes MD  •  aspirin chewable tablet 81 mg, 81 mg, Oral, Daily, Sanford Puentes MD, 81 mg at 06/18/22 0808  •  cefTRIAXone (ROCEPHIN) 1 g in sodium chloride 0.9 % 100 mL IVPB-VTB, 1 g, Intravenous, Q24H, Sanford Puentes MD, Last Rate: 200 mL/hr at 06/17/22 1857, 1 g at 06/17/22 1857  •  clopidogrel (PLAVIX) tablet 75 mg, 75 mg, Oral, Daily, Sanford Puentes MD, 75 mg at 06/18/22 0809  •  Delflex-LC/1.5% Dextrose (DIANEAL) CAPD 2,000 mL, 2,000 mL, Intraperitoneal, 5x Daily, Sanford Puentes MD, 2,000 mL at 06/18/22 0627  •  escitalopram (LEXAPRO) tablet 20 mg, 20 mg, Oral, Daily, Sanford Puentes MD, 20 mg at 06/18/22 0809  •  heparin (porcine) 5000 UNIT/ML injection 5,000 Units, 5,000 Units, Subcutaneous, Q8H, Sanford Puentes MD, 5,000 Units at 06/18/22 0543  •  levothyroxine (SYNTHROID, LEVOTHROID) tablet 137 mcg, 137 mcg, Oral, QAM, Sanford Puentes MD, 137 mcg at 06/18/22 0542  •  midodrine (PROAMATINE) tablet 10 mg, 10 mg, Oral, TID AC, Sanford Puentes MD, 10 mg at 06/18/22 0809  •  montelukast (SINGULAIR) tablet 10 mg, 10 mg, Oral, Nightly, Sanford Puentes MD, 10 mg at 06/17/22 2048  •  multivitamin (THERAGRAN) tablet 1 tablet, 1 tablet, Oral, Daily, Sanford Puentes MD, 1 tablet at 06/18/22 0808  •  ondansetron (ZOFRAN) tablet 4 mg, 4 mg, Oral, Q6H PRN **OR** ondansetron (ZOFRAN) injection 4 mg, 4 mg, Intravenous, Q6H PRN, Sanford Puentes MD  •  oxyCODONE-acetaminophen (PERCOCET) 5-325 MG per tablet 1 tablet, 1 tablet, Oral, Q6H PRN, Sanford Puentes MD, 1 tablet at 06/18/22 0549  •  potassium chloride (K-DUR,KLOR-CON) ER tablet 20 mEq, 20 mEq, Oral, BID, Sanford Puentes MD, 20 mEq at 06/18/22 0809  •  sodium bicarbonate tablet 1,300 mg, 1,300 mg, Oral, TID,  periosteal reaction about the right proximal fibular fracture fragments.  Please correlate clinically.   With regard to the tibial fractures, the large distal right tibial fracture fragment is displaced 1.6 cm anteriorly.  It is also displaced approximately 0.8 cm laterally.  The imaged right ankle joint space is intact.   With regard to the right fibular fractures, the large distal right fibular fracture fragment is displaced posteriorly about 1 cm.  It is displaced medially approximately 0.7 cm.   No other definite acute fractures are seen.  No dislocation is identified.   There is a total right knee prosthesis in place.  The prosthetic hardware is intact with near-anatomic alignment.  No definite radiographic evidence of hardware failure.       Impression:  There are acute (to subacute) comminuted displaced fractures involving the proximal right fibular shaft and the distal right tibial shaft as discussed.  No dislocation.    COMMENT:  Part of this note is an electronic transcription of spoken language to printed text. The electronic translation/transcription may permit erroneous, or at times, nonsensical (or even sensical) words or phrases to be inadvertently transcribed or omitted; this  has reviewed the note for such errors (as well as additional errors); however, some may still exist.  NATE HAMMER JR, MD       Electronically Signed and Approved By: NATE HAMMER JR, MD on 7/12/2022 at 23:31              XR Ankle 3+ View Right    Result Date: 7/12/2022  Narrative: PROCEDURE: XR ANKLE 3+ VW RIGHT  COMPARISON: [facility], CR, XR TIBIA FIBULA 2 VW RIGHT, 7/12/2022, 21:51.  INDICATIONS: FALL TODAY, RIGHT LOWER EXTREMITY PAIN, DEFORMITY.  FINDINGS: Three views were obtained.  There are acute comminuted closed extra-articular displaced fractures of the distal right tibial shaft.  The large distal right tibial fracture fragment is displaced about 1.6 cm anteriorly and approximately 0.8 cm  Sanford Puentes MD, 1,300 mg at 06/18/22 0809  •  sodium chloride 0.9 % flush 10 mL, 10 mL, Intravenous, PRN, Sanford Puentes MD  •  traZODone (DESYREL) tablet 100 mg, 100 mg, Oral, Nightly, Sanford Puentes MD, 100 mg at 06/17/22 0444    Allergies:    Penicillins and Nickel    Assessment:    Active Problems:  Patient Active Problem List   Diagnosis   • Colitis, Clostridium difficile   • Anxiety   • Cardiomyopathy ([facility])   • Chronic systolic heart failure ([facility])   • ESRD on peritoneal dialysis ([facility])   • Gastroesophageal reflux disease   • Gitelman syndrome   • HLD (hyperlipidemia)   • Hypothyroidism   • Peritoneal dialysis status ([facility])   • History of tracheostomy   • Hypotension   • Acute pulmonary embolism ([facility])   • Severe malnutrition ([facility])   • Clostridium difficile colitis   • Immobility syndrome   • Genu recurvatum of right knee   • Colitis   • PD catheter dysfunction ([facility])   • Gallstones   • Acute renal failure superimposed on chronic kidney disease, on chronic dialysis ([facility])   • Obesity (BMI 30-39.9)   • Hydronephrosis with obstructing calculus   • Splenic calcification   • 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 ([facility])   • Ureteral calculi   • Sepsis without acute organ dysfunction ([facility])   • Fever in adult   • Acute respiratory failure with hypoxia ([facility])   • Hypokalemia   • Anemia, chronic disease   • Calculus of ureterovesical junction (UVJ)   • Acute cystitis   • Kidney stone on right side       Right ureteral stones s/p R URS  hematuria    Plan:  Hematuria likely related to procedure  Drink plenty of fluids  Will monitor stones as outpatient  Ur cx pending - will follow  Continue rocephin for now  Will continue to follow        FACUNDO Reis    6/18/2022  09:07 EDT   laterally.  The right ankle joint space appears intact.  There may be minimal enthesopathic change of the retrocalcaneal region.  No other definite acute fractures are seen.  No dislocation.  Arterial calcifications are identified.       Impression:  Acute comminuted displaced fractures involve the distal right tibial shaft.  No other definite acute fractures are appreciated.  No dislocation.  Please see above comments for further detail.      COMMENT:  Part of this note is an electronic transcription of spoken language to printed text. The electronic translation/transcription may permit erroneous, or at times, nonsensical (or even sensical) words or phrases to be inadvertently transcribed or omitted; this  has reviewed the note for such errors (as well as additional errors); however, some may still exist.  NATE HAMMER JR, MD       Electronically Signed and Approved By: NATE HAMMER JR, MD on 7/12/2022 at 23:34              FL < 1 Hour    Result Date: 7/13/2022  Narrative: This procedure was auto-finalized with no dictation required.             Assessment and Plan     Diagnoses and all orders for this visit:    1. Pain of right fibula (Primary)  -     XR Tibia Fibula 2 View Right    2. S/P Right Tibia/fibula Open Reduction Internal Fixation 7/13/22.         Discussed the treatment plan with the patient.  I reviewed the x-rays that were obtained today with the patient. The patient was placed into a CAM walker boot. Cast care reviewed. Plan to continue crutches. Bone stimulator ordered today.     Call or return if worsening symptoms.    Follow Up     4 weeks with repeat x-rays.       Patient was given instructions and counseling regarding his condition or for health maintenance advice. Please see specific information pulled into the AVS if appropriate.     Scribed for Lorenzo Cowan MD by Kerri Mistry.  07/28/22   13:36 EDT    I have personally performed the services described in this  document as scribed by the above individual and it is both accurate and complete. Lorenzo Cowan MD 07/30/22

## 2022-08-29 PROBLEM — R19.7 DIARRHEA, UNSPECIFIED TYPE: Status: ACTIVE | Noted: 2022-01-01

## 2022-09-01 PROBLEM — T85.611A PD CATHETER DYSFUNCTION: Status: RESOLVED | Noted: 2021-10-11 | Resolved: 2022-01-01

## 2022-09-01 NOTE — DISCHARGE INSTRUCTIONS
- Patient to leave dressing in place for a week  - Patient to follow-up in my office in 1 week for dressing changes  - Cannot shower, only sponge bath  - Can continue peritoneal dialysis as prior

## 2022-09-01 NOTE — PLAN OF CARE
Goal Outcome Evaluation:     Pt A&Ox4. PD cath revision today, tolerated well. Pt came back from OR without a catheter extension for her PD - keep dry until a new extension gets here tomorrow per Kathleen. Medicated per orders.

## 2022-09-01 NOTE — ANESTHESIA POSTPROCEDURE EVALUATION
"Patient: Ambar Lara    Procedure Summary     Date: 09/01/22 Room / Location: [facility] OR 09 / [facility] MAIN OR    Anesthesia Start: 1338 Anesthesia Stop: 1440    Procedure: Peritoneal dialysis catheter exit site revision (Left Abdomen) Diagnosis:       Peritoneal dialysis catheter dysfunction, initial encounter ([facility])      (Peritoneal dialysis catheter dysfunction, initial encounter ([facility]) [T85.611A])    Surgeons: Ricardo Manjarrez MD Provider: Jose Harry MD    Anesthesia Type: MAC ASA Status: 4          Anesthesia Type: MAC    Vitals  Vitals Value Taken Time   BP 83/67 09/01/22 1445   Temp 36.8 °C (98.2 °F) 09/01/22 1435   Pulse 92 09/01/22 1451   Resp 18 09/01/22 1445   SpO2 100 % 09/01/22 1451   Vitals shown include unvalidated device data.        Post Anesthesia Care and Evaluation    Patient location during evaluation: bedside  Patient participation: complete - patient participated  Level of consciousness: awake and alert  Pain management: adequate    Airway patency: patent  Anesthetic complications: No anesthetic complications    Cardiovascular status: acceptable  Respiratory status: acceptable  Hydration status: acceptable    Comments: BP 90/76   Pulse 89   Temp 36.6 °C (97.9 °F) (Oral)   Resp 16   Ht 154.9 cm (61\")   Wt 81.7 kg (180 lb 1.9 oz)   LMP 04/16/2004 (Approximate)   SpO2 98%   BMI 34.03 kg/m²       "

## 2022-09-01 NOTE — PLAN OF CARE
Goal Outcome Evaluation:  Patient is resting well at this time and did request APAP earlier along with Zofran for c/o slight nausea and headache after her peritoneal dialysis treatment. VSS, she is alert and oriented, and did sign her consent for her procedure that is scheduled today.

## 2022-09-01 NOTE — OP NOTE
Date of procedure: 9/1/2022    Primary Surgeon: Dr. Manjarrez    Assistant: Tasha Durand CSA that helped with the procedure    Procedure performed:   - Peritoneal dialysis catheter exit site revision with shaving of the proximal cuff    Anesthesia: MAC    Complications: None    Findings: Extrusion of the proximal catheter cuff    Condition of patient: Stable to recovery    Indications: 52-year-old female with peritoneal dialysis catheter exit site with proximal cuff at skin level.  Discussed with the patient about further steps.  I recommend that she undergoes peritoneal dialysis catheter exit site revision, possible relocation.  Risk and benefits of procedure including bleeding, wound complications discussed in detail with the patient that verbalized understanding and agree with the plan.    Description: Patient was taken to operative room and placed in the OR table in the supine position.  Preoperative antibiotics not given because patient had just [facility].  Timeout was performed the patient was correctly identified.  Peritoneal dialysis catheter and abdomen were prepped and draped in usual sterile fashion.  Started procedure by dissecting the proximal cuff of the peritoneal dialysis catheter from the attachments to the skin.  This was done with sharp dissection.  I then proceeded to slowly shaved the peritoneal dialysis proximal cuff from the catheter.  This was done successfully without any injury to the peritoneal dialysis catheter.  The catheter was then flushed and there was no evidence of leakage.  I electrocoagulate the subcutaneous tissue surrounding the cuff.  I then placed antibiotic ointment at the catheter exit site and the catheter was secured to the abdominal wall with Steri-Strips.  Dressings were placed over.  The patient tolerated procedure well and was sent to recovery area in stable condition.  Instrument sponge count were correct    Ricardo Manjarrez MD,  FACS  General, Minimally Invasive and Endoscopic Surgery  [facility]    [address], [address]  [place], [place], [address]  P: [phone]  F: [phone]

## 2022-09-01 NOTE — PROGRESS NOTES
Name: Ambar Lara ADMIT: 2022   : 1970  PCP: Jet Manuel MD    MRN: [id number] LOS: 0 days   AGE/SEX: 52 y.o. female  ROOM: [facility]     Subjective   Subjective   OR today to fix PD cuff.  diarrhea resolved. No abdominal pain fever chills nausea or vomiting.     Review of Systems     Objective   Objective   Vital Signs  Temp:  [97.6 °F (36.4 °C)-98.1 °F (36.7 °C)] 97.8 °F (36.6 °C)  Heart Rate:  [] 92  Resp:  [16-20] 18  BP: ()/(55-95) 89/55  SpO2:  [92 %-97 %] 92 %  on   ;   Device (Oxygen Therapy): room air  Body mass index is 34.03 kg/m².  Physical Exam  Vitals reviewed.   Constitutional:       Appearance: She is well-developed.   HENT:      Head: Normocephalic and atraumatic.   Cardiovascular:      Rate and Rhythm: Normal rate and regular rhythm.   Pulmonary:      Effort: No respiratory distress.      Breath sounds: Normal breath sounds.   Abdominal:      General: Bowel sounds are normal. There is no distension.      Palpations: Abdomen is soft. There is no mass.      Tenderness: There is no abdominal tenderness.      Hernia: No hernia is present.   Musculoskeletal:      Comments: Decreased sensation right S-10 hand which she states started after   Skin:     General: Skin is warm and dry.   Neurological:      Mental Status: She is alert and oriented to person, place, and time.   Psychiatric:         Behavior: Behavior normal.         Thought Content: Thought content normal.         Judgment: Judgment normal.       Results Review     I reviewed the patient's new clinical results.  Results from last 7 days   Lab Units 22  0605 22  0644 22  0517 22  2317   WBC 10*3/mm3 10.14 10.03 10.84* 18.60*   HEMOGLOBIN g/dL 10.0* 10.5* 9.9* 13.4   PLATELETS 10*3/mm3 261 312 319 427     Results from last 7 days   Lab Units 22  0605 22  0644 22  0617 22  0937   SODIUM mmol/L 135* 138 139 143   POTASSIUM mmol/L 3.3* 3.5 3.3* 3.1*   CHLORIDE  mmol/L 98 104 105 106   CO2 mmol/L 19.8* 19.1* 18.0* 13.8*   BUN mg/dL 25* 27* 28* 29*   CREATININE mg/dL 4.87* 5.18* 5.38* 6.21*   GLUCOSE mg/dL 101* 106* 96 133*   EGFR mL/min/1.73 10.3* 9.6* 9.1* 7.7*     Results from last 7 days   Lab Units 09/01/22  0605 08/31/22  0644 08/30/22  0617 08/29/22  0937 08/28/22  2317   ALBUMIN g/dL 3.30* 3.50 3.30* 3.40* 4.20   BILIRUBIN mg/dL  --   --   --   --  0.3   ALK PHOS U/L  --   --   --   --  92   AST (SGOT) U/L  --   --   --   --  11   ALT (SGPT) U/L  --   --   --   --  6     Results from last 7 days   Lab Units 09/01/22  0605 08/31/22  0644 08/30/22  0617 08/30/22  0517 08/29/22  0937   CALCIUM mg/dL 8.2* 8.3* 8.1*  --  8.1*   ALBUMIN g/dL 3.30* 3.50 3.30*  --  3.40*   MAGNESIUM mg/dL 1.5* 1.8  --  1.7  --    PHOSPHORUS mg/dL 6.8* 8.0* 8.6*  --  10.2*     Results from last 7 days   Lab Units 08/29/22  0556 08/29/22  0116 08/28/22  2317   PROCALCITONIN ng/mL  --   --  0.64*   LACTATE mmol/L 1.2 2.4*  --      Glucose   Date/Time Value Ref Range Status   09/01/2022 0555 95 70 - 130 mg/dL Final     Comment:     Meter: [id number] : [id number] Mauricio Connors MARY KAY   08/31/2022 1959 120 70 - 130 mg/dL Final     Comment:     Meter: [id number] : [id number] Mauricio Connors NA   08/31/2022 1557 100 70 - 130 mg/dL Final     Comment:     Meter: [id number] : [id number] [facility]   08/31/2022 1058 88 70 - 130 mg/dL Final     Comment:     Meter: [id number] : [id number] [facility]   08/30/2022 0136 105 70 - 130 mg/dL Final     Comment:     Meter: [id number] : LUISA Ford RN   08/29/2022 2208 102 70 - 130 mg/dL Final     Comment:     Meter: [id number] : LUISA Ford RN   08/29/2022 1702 100 70 - 130 mg/dL Final     Comment:     Meter: [id number] : TWYLA Frost RN       No radiology results for the last day  Scheduled Medications  aspirin, 81 mg, Oral, Daily  cefTRIAXone, 1 g, Intravenous, Q24H  clopidogrel, 75 mg, Oral,  Daily  escitalopram, 20 mg, Oral, Daily  famotidine, 20 mg, Oral, BID  gentamicin, 1 application, Topical, Daily  lanthanum, 750 mg, Oral, TID With Meals  levothyroxine, 150 mcg, Oral, Daily  magnesium sulfate, 1 g, Intravenous, Q1H  melatonin, 3 mg, Oral, Nightly  midodrine, 10 mg, Oral, TID AC  montelukast, 10 mg, Oral, Nightly  multivitamin, 1 tablet, Oral, Daily  ondansetron, 4 mg, Intravenous, Once  potassium chloride, 20 mEq, Oral, BID  saccharomyces boulardii, 500 mg, Oral, BID  sodium bicarbonate, 1,300 mg, Oral, TID  sodium chloride, 10 mL, Intravenous, Q12H    Infusions   Diet  NPO Diet NPO Type: Sips with Meds       Assessment/Plan     Active Hospital Problems    Diagnosis  POA   • **PD catheter dysfunction (HCC) [T85.611A]  Unknown   • Diarrhea, unspecified type [R19.7]  Yes   • Anemia, chronic disease [D63.8]  Yes   • Obesity (BMI 30-39.9) [E66.9]  Yes   • Hypotension [I95.9]  Yes   • Peritoneal dialysis status (HCC) [Z99.2]  Not Applicable   • Cardiomyopathy (HCC) [I42.9]  Yes   • Chronic systolic heart failure (HCC) [I50.22]  Yes   • 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       52 y.o. female with ESRD on PD admitted with diarrhea, hypotension, metabolic acidosis     Diarrhea/ IBS   Improving.  Recommend following up with GI as outpatient for management.  Discussed continuing probiotic and adding fiber.     ESRD on PD-General surgery has been consulted to evaluate PD external catheter cuff abnormality.  Dr. Manjarrez will perform peritoneal dialysis catheter exit site revision possible relocation today     Hypokalemia/ hypomagnesia- replace per renal     Chronic hypotension-asymptomatic.     Cardiomyopathy-severe LV dysfunction EF less than 20%.  AICD present.    Anemia of CKD- Stable.    Patient  appears stable and close to discharge.     Discussed with Dr. Cortez, patient, RN. Likely DC tmrw    FACUNDO Garcia  [facility]  09/01/22  10:04 EDT

## 2022-09-01 NOTE — ANESTHESIA PREPROCEDURE EVALUATION
Anesthesia Evaluation                  Airway   Mallampati: II  TM distance: >3 FB  Neck ROM: full  No difficulty expected  Comment: Had trach in 2020  2/2 covid pna   Dental - normal exam     Pulmonary    (+) sleep apnea (no longer uses CPAP),   (-) rhonchi, decreased breath sounds, wheezes  Cardiovascular   Exercise tolerance: poor (<4 METS)    Patient on routine beta blocker  Rhythm: regular  Rate: normal    (+) pacemaker ([employer] placed 2 weeks ago) ICD interrogated <1 month ago, CHF (LVEF 19%) Systolic <55%, PVD, hyperlipidemia,   (-) murmur      Neuro/Psych  (+) psychiatric history Anxiety,    (-) seizures  GI/Hepatic/Renal/Endo    (+) obesity,  GERD,  renal disease ESRD and dialysis, thyroid problem hypothyroidism    Musculoskeletal     Abdominal     Abdomen: soft.   Substance History      OB/GYN          Other        ROS/Med Hx Other: K3.3   Mag 1.5 has had 3 doses of replacement                 Anesthesia Plan    ASA 4     MAC   total IV anesthesia  intravenous induction     Anesthetic plan, risks, benefits, and alternatives have been provided, discussed and informed consent has been obtained with: patient.        CODE STATUS:    Code Status (Patient has no pulse and is not breathing): CPR (Attempt to Resuscitate)  Medical Interventions (Patient has pulse or is breathing): Full Support

## 2022-09-01 NOTE — PROGRESS NOTES
[facility] Progress Note      Patient Name: Ambar Lara  : 1970  MRN: [id number]  Primary Care Physician:  Jet Manuel MD  Date of admission: 2022    Subjective     Interval History:   Follow up ESRD. On PD.    Her diarrhea is slowing down significantly, she had mild nausea but no vomiting, no chest pain or shortness of air, no orthopnea or PND, no dysuria or gross hematuria.  Has been tolerating her peritoneal dialysis.  Was evaluated by Dr. Manjarrez for the exclusion of the PD dialysis cath out of the exit site  Review of Systems:   As noted above    Objective     Vitals:   Temp:  [97.6 °F (36.4 °C)-98.1 °F (36.7 °C)] 97.8 °F (36.6 °C)  Heart Rate:  [] 92  Resp:  [16-20] 18  BP: ()/(55-95) 89/55    Intake/Output Summary (Last 24 hours) at 2022 0749  Last data filed at 2022 0347  Gross per 24 hour   Intake 50055 ml   Output 67667 ml   Net 1900 ml       Physical Exam:    General Appearance: alert, oriented x 3, no acute distress, chronically  Skin: warm and dry  HEENT: oral mucosa normal, nonicteric sclera  Neck: supple, no JVD  Lungs: CTA, unlabored breathing effort  Heart: RRR, normal S1 and S2, no S3, no rub  Abdomen: soft, nontender, nondistended, normoactive bowels, PD catheter in place and the external cuff is protruding out of the exit site, no tunnel tenderness  Extremities: no edema, cyanosis or clubbing  Neuro: normal speech and mental status     Scheduled Meds:     aspirin, 81 mg, Oral, Daily  cefTRIAXone, 1 g, Intravenous, Q24H  clopidogrel, 75 mg, Oral, Daily  escitalopram, 20 mg, Oral, Daily  famotidine, 20 mg, Oral, BID  gentamicin, 1 application, Topical, Daily  lanthanum, 750 mg, Oral, TID With Meals  levothyroxine, 150 mcg, Oral, Daily  melatonin, 3 mg, Oral, Nightly  midodrine, 10 mg, Oral, TID AC  montelukast, 10 mg, Oral, Nightly  multivitamin, 1 tablet, Oral, Daily  ondansetron, 4 mg, Intravenous, Once  potassium chloride,  20 mEq, Oral, BID  saccharomyces boulardii, 500 mg, Oral, BID  sodium bicarbonate, 1,300 mg, Oral, TID  sodium chloride, 10 mL, Intravenous, Q12H      IV Meds:        Results Reviewed:   I have personally reviewed the results from the time of this admission to 9/1/2022 07:49 EDT     Results from last 7 days   Lab Units 09/01/22  0605 08/31/22  0644 08/30/22  0617 08/29/22  0937 08/28/22  2317   SODIUM mmol/L 135* 138 139   < > 136   POTASSIUM mmol/L 3.3* 3.5 3.3*   < > 3.2*   CHLORIDE mmol/L 98 104 105   < > 99   CO2 mmol/L 19.8* 19.1* 18.0*   < > 12.7*   BUN mg/dL 25* 27* 28*   < > 31*   CREATININE mg/dL 4.87* 5.18* 5.38*   < > 6.51*   CALCIUM mg/dL 8.2* 8.3* 8.1*   < > 9.5   BILIRUBIN mg/dL  --   --   --   --  0.3   ALK PHOS U/L  --   --   --   --  92   ALT (SGPT) U/L  --   --   --   --  6   AST (SGOT) U/L  --   --   --   --  11   GLUCOSE mg/dL 101* 106* 96   < > 114*    < > = values in this interval not displayed.       Estimated Creatinine Clearance: 13.4 mL/min (A) (by C-G formula based on SCr of 4.87 mg/dL (H)).    Results from last 7 days   Lab Units 09/01/22  0605 08/31/22  0644 08/30/22  0617 08/30/22  0517   MAGNESIUM mg/dL 1.5* 1.8  --  1.7   PHOSPHORUS mg/dL 6.8* 8.0* 8.6*  --              Results from last 7 days   Lab Units 09/01/22  0605 08/31/22  0644 08/30/22  0517 08/28/22  2317   WBC 10*3/mm3 10.14 10.03 10.84* 18.60*   HEMOGLOBIN g/dL 10.0* 10.5* 9.9* 13.4   PLATELETS 10*3/mm3 261 312 319 427       Results from last 7 days   Lab Units 09/01/22  0605 08/31/22  0644 08/30/22  0517   INR  1.11* 1.11* 1.33*       Assessment / Plan     ASSESSMENT:  1. ESRD on PD.  Tolerating the treatment without any difficulties her PD catheter external cuff is protruding out of the exit site but there is no drainage, potassium today is slightly lower down to 3.3 and magnesium 1.5 and patient still have mild metabolic acidosis with slowly improving the patient had extrusion of the cuff out of the exit site, with plan  for revision today  2 . Diarrhea improving.   3. Chronic hypotension.  Severe LV dysfunction with EF less than 20% on last echo 3/11/22. SP AICD 8/18/22.   4. Gitelmans syndrome.  5. Anemia CKD. Gets JULEE as outpt .  6. Recent AVF right brachiobasilic 7/26/22. Hand feels numb.  She will be seeing her vascular surgeon after discharge     PLAN:  1.  Plan for revision of the PD catheter exit  2.  Replete potassium  3.  Replete magnesium  4.  Surveillance labs    Darci Wang MD  09/01/22  07:49 EDT    [facility]  [phone]

## 2022-09-02 NOTE — PLAN OF CARE
Goal Outcome Evaluation:      Pt alert and oriented x 4 /room air, sb- NSR , denies pain, up ad jean carlos to bathroom, pd not done tonight per doctor instruction due to no new pd extension tubing present postop. Nephrology to bring extension tubing. Pt has nausea and vomiting x 3 this shift, treated with prn medication. Potasium and magnesium replaced on day shift.           Problem: Adult Inpatient Plan of Care  Goal: Plan of Care Review  Outcome: Ongoing, Progressing  Goal: Optimal Comfort and Wellbeing  Outcome: Ongoing, Progressing  Intervention: Provide Person-Centered Care  Recent Flowsheet Documentation  Taken 9/1/2022 2328 by Chris Norwood RN  Trust Relationship/Rapport:  • care explained  • emotional support provided  • questions encouraged  • thoughts/feelings acknowledged  Taken 9/1/2022 2132 by Chris Norwood RN  Trust Relationship/Rapport:  • emotional support provided  • thoughts/feelings acknowledged  • reassurance provided  Goal: Readiness for Transition of Care  Outcome: Ongoing, Progressing     Problem: Fall Injury Risk  Goal: Absence of Fall and Fall-Related Injury  Outcome: Ongoing, Progressing  Intervention: Identify and Manage Contributors  Recent Flowsheet Documentation  Taken 9/1/2022 2328 by Chris Norwood RN  Medication Review/Management: medications reviewed  Self-Care Promotion: independence encouraged  Taken 9/1/2022 2215 by Chris Norwood RN  Medication Review/Management: medications reviewed  Taken 9/1/2022 2132 by Chris Norwood RN  Medication Review/Management: medications reviewed  Self-Care Promotion: independence encouraged  Intervention: Promote Injury-Free Environment  Recent Flowsheet Documentation  Taken 9/1/2022 2328 by Chris Norwood RN  Safety Promotion/Fall Prevention: safety round/check completed  Taken 9/1/2022 2215 by Chris Norwood RN  Safety Promotion/Fall Prevention: safety round/check completed  Taken 9/1/2022 2132 by Chris Norwood  RN  Safety Promotion/Fall Prevention:  • activity supervised  • clutter free environment maintained  • lighting adjusted

## 2022-09-02 NOTE — PROGRESS NOTES
[facility] Progress Note      Patient Name: Ambar Lara  : 1970  MRN: [id number]  Primary Care Physician:  Jet Manuel MD  Date of admission: 2022    Subjective     Interval History:   Follow up ESRD. On PD.    Her diarrhea improved significantly, she had revision of her peritoneal dialysis exit site and she need a transfer extension placed waiting for that to be done for her to resume her peritoneal dialysis.  Overall she is feeling well denies any chest pain or shortness of air, no orthopnea or PND, no nausea or vomiting.    Review of Systems:   As noted above    Objective     Vitals:   Temp:  [97.2 °F (36.2 °C)-98.2 °F (36.8 °C)] 98.2 °F (36.8 °C)  Heart Rate:  [85-99] 99  Resp:  [12-18] 16  BP: ()/(61-76) 100/65  Flow (L/min):  [2] 2    Intake/Output Summary (Last 24 hours) at 2022 1012  Last data filed at 2022 2316  Gross per 24 hour   Intake 1011 ml   Output 400 ml   Net 611 ml       Physical Exam:    General Appearance: alert, oriented x 3, no acute distress, chronically  Skin: warm and dry  HEENT: oral mucosa normal, nonicteric sclera  Neck: supple, no JVD  Lungs: CTA, unlabored breathing effort  Heart: RRR, normal S1 and S2, no S3, no rub  Abdomen: soft, nontender, nondistended, normoactive bowels, PD catheter in place with no exit site  Extremities: no edema, cyanosis or clubbing  Neuro: normal speech and mental status     Scheduled Meds:     aspirin, 81 mg, Oral, Daily  cefTRIAXone, 1 g, Intravenous, Q24H  clopidogrel, 75 mg, Oral, Daily  escitalopram, 20 mg, Oral, Daily  famotidine, 20 mg, Oral, BID  gentamicin, 1 application, Topical, Daily  lanthanum, 750 mg, Oral, TID With Meals  levothyroxine, 150 mcg, Oral, Daily  melatonin, 3 mg, Oral, Nightly  midodrine, 10 mg, Oral, TID AC  montelukast, 10 mg, Oral, Nightly  multivitamin, 1 tablet, Oral, Daily  ondansetron, 4 mg, Intravenous, Once  ondansetron, 4 mg, Intravenous, Once  potassium  chloride, 20 mEq, Oral, BID  saccharomyces boulardii, 500 mg, Oral, BID  sodium bicarbonate, 1,300 mg, Oral, TID  sodium chloride, 10 mL, Intravenous, Q12H      IV Meds:   sodium chloride, 50 mL/hr, Last Rate: 50 mL/hr (09/01/22 2132)  sodium chloride, 9 mL/hr, Last Rate: Stopped (09/01/22 1711)        Results Reviewed:   I have personally reviewed the results from the time of this admission to 9/2/2022 10:12 EDT     Results from last 7 days   Lab Units 09/02/22  0804 09/01/22  0605 08/31/22  0644 08/29/22  0937 08/28/22  2317   SODIUM mmol/L 136 135* 138   < > 136   POTASSIUM mmol/L 3.9 3.3* 3.5   < > 3.2*   CHLORIDE mmol/L 101 98 104   < > 99   CO2 mmol/L 16.4* 19.8* 19.1*   < > 12.7*   BUN mg/dL 28* 25* 27*   < > 31*   CREATININE mg/dL 5.51* 4.87* 5.18*   < > 6.51*   CALCIUM mg/dL 8.9 8.2* 8.3*   < > 9.5   BILIRUBIN mg/dL  --   --   --   --  0.3   ALK PHOS U/L  --   --   --   --  92   ALT (SGPT) U/L  --   --   --   --  6   AST (SGOT) U/L  --   --   --   --  11   GLUCOSE mg/dL 132* 101* 106*   < > 114*    < > = values in this interval not displayed.       Estimated Creatinine Clearance: 12 mL/min (A) (by C-G formula based on SCr of 5.51 mg/dL (H)).    Results from last 7 days   Lab Units 09/02/22  0804 09/02/22  0729 09/01/22  0605 08/31/22  0644   MAGNESIUM mg/dL  --  2.7* 1.5* 1.8   PHOSPHORUS mg/dL 7.8*  --  6.8* 8.0*             Results from last 7 days   Lab Units 09/02/22  0803 09/01/22  0605 08/31/22  0644 08/30/22  0517 08/28/22  2317   WBC 10*3/mm3 9.54 10.14 10.03 10.84* 18.60*   HEMOGLOBIN g/dL 10.4* 10.0* 10.5* 9.9* 13.4   PLATELETS 10*3/mm3 293 261 312 319 427       Results from last 7 days   Lab Units 09/02/22  0729 09/01/22  0605 08/31/22  0644 08/30/22  0517   INR  1.22* 1.11* 1.11* 1.33*       Assessment / Plan     ASSESSMENT:  1. ESRD on PD.  She had revision of the exit site yesterday did not see any peritoneal dialysis since surgery waiting for transfer set.    2 . Diarrhea improving.   3.  Chronic hypotension.  Severe LV dysfunction with EF less than 20% on last echo 3/11/22. SP AICD 8/18/22.   4. Gitelmans syndrome.  5. Anemia CKD. Gets JULEE as outpt .  6. Recent AVF right brachiobasilic 7/26/22. Hand feels numb.  She will be seeing her vascular surgeon after discharge     PLAN:  1.  The patient will resume her CCPD after discharge  2.  The patient is cleared from the renal standpoint to be discharged after her transfer set is placed today.    Darci Wang MD  09/02/22  10:12 EDT    [facility]  [phone]

## 2022-09-02 NOTE — PROGRESS NOTES
Discharge Planning Assessment  [facility]     Patient Name: Ambar Lara  MRN: [id number]  Today's Date: 9/2/2022    Admit Date: 8/28/2022     Discharge Needs Assessment    No documentation.                Discharge Plan     Row Name 09/02/22 1444       Plan    Plan Home with [facility]    Final Discharge Disposition Code 06 - home with home health care    Final Note Home with [facility]              Continued Care and Services - Admitted Since 8/28/2022     Home Medical Care Coordination complete.    Service Provider Request Status Selected Services Address Phone Fax Patient Preferred    [facility]   Selected Home Nursing ,  Home Health Services ,  Home Rehabilitation [address] [address] [phone] --              Expected Discharge Date and Time     Expected Discharge Date Expected Discharge Time    Sep 2, 2022          Demographic Summary    No documentation.                Functional Status    No documentation.                Psychosocial    No documentation.                Abuse/Neglect    No documentation.                Legal    No documentation.                Substance Abuse    No documentation.                Patient Forms    No documentation.                   Zandra Akers, RN

## 2022-09-02 NOTE — PROGRESS NOTES
Status post peritoneal dialysis catheter exit site revision    No issues overnight, waiting for extension for peritoneal dialysis so she can start peritoneal dialysis  Abdomen soft  Dressing in place clean dry and intact    Plan:   -No shower for 2 weeks  -Follow-up in my office in 1 week for dressing change  -Keep dressing in place

## 2022-09-02 NOTE — DISCHARGE SUMMARY
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 Lara is a 52 y.o. female with a history of ESRD on PD admitted with diarrhea, chronic hypotension and acidosis.  She was initially admitted to the ICU then transferred to telemetry with [facility] consulted to manage.  Her diarrhea has been negative for infectious source.  She has chronic diarrhea not followed by gastroenterologist.  Diarrhea has improved this admission with Imodium and she will need to follow-up with GI.  Her ESRD has been managed by nephrology.  Her hypotension has improved but BP remains low although she is asymptomatic.  Dr. Manjarrez, general surgery saw the patient yesterday to evaluate for external catheter cuff abnormality.  She is status post peritoneal dialysis catheter exit site revision.  Overall she is back to baseline and stable for discharge today to home.           Day of Discharge     Subjective:  No new complaints    Physical Exam:  Temp:  [97.2 °F (36.2 °C)-98.2 °F (36.8 °C)] 98.2 °F (36.8 °C)  Heart Rate:  [85-99] 99  Resp:  [12-18] 16  BP: ()/(61-76) 100/65  Body mass  index is 34.82 kg/m².  Physical Exam  Vitals reviewed.   Constitutional:       Appearance: Normal appearance. She is well-developed. She is obese.   HENT:      Head: Normocephalic and atraumatic.   Cardiovascular:      Rate and Rhythm: Normal rate and regular rhythm.   Pulmonary:      Effort: Pulmonary effort is normal. No respiratory distress.      Breath sounds: Normal breath sounds.   Abdominal:      General: Bowel sounds are normal. There is no distension.      Palpations: Abdomen is soft. There is no mass.      Tenderness: There is no abdominal tenderness.      Hernia: No hernia is present.   Skin:     General: Skin is warm and dry.   Neurological:      Mental Status: She is alert and oriented to person, place, and time.   Psychiatric:         Mood and Affect: Mood normal.         Behavior: Behavior normal.         Thought Content: Thought content normal.         Consultants     Consult Orders (all) (From admission, onward)     Start     Ordered    08/31/22 0752  Inpatient General Surgery Consult  Once        Specialty:  General Surgery  Provider:  Ricardo Manjarrez MD    08/31/22 0753    08/30/22 1126  Inpatient Internal Medicine Consult  Once        Specialty:  Internal Medicine  Provider:  Jeb Sabillon MD    08/30/22 1126    08/29/22 0701  Inpatient Nephrology Consult  Once        Specialty:  Nephrology  Provider:  Camila Salas MD    08/29/22 0700    08/29/22 0239  Pulmonology (on-call MD unless specified)  Once        Specialty:  Pulmonary Disease  Provider:  (Not yet assigned)    08/29/22 0238    08/29/22 0144  [facility] (on-call MD unless specified) Details  Once        Specialty:  Hospitalist  Provider:  (Not yet assigned)    08/29/22 0143              Procedures     Peritoneal dialysis catheter exit site revision      Imaging Results (All)     None        Results for orders placed during the hospital encounter of 02/27/22    Duplex Mesenteric Complete CAR    Interpretation Summary  · No  hemodynamically significant stenosis of the celiac artery is noted.  · No hemodynamically significant stenosis of the superior mesenteric artery (SMA) is noted.  · No hemodynamically significant stenosis of the inferior mesenteric artery (MARCELO) is noted.    Results for orders placed during the hospital encounter of 03/11/22    Adult Transthoracic Echo Complete W/ Cont if Necessary Per Protocol    Interpretation Summary  · Left ventricular ejection fraction appears to be less than 20%. Left ventricular systolic function is severely decreased. Normal left ventricular wall thickness noted. The left ventricular cavity is severely dilated. Left ventricular diastolic function was indeterminate. No evidence of left ventricular thrombus or mass present.  · The following left ventricular wall segments are hypokinetic: basal anterolateral, mid anterolateral, apical lateral, basal inferolateral, mid inferolateral, mid inferior, basal anterior and basal inferior. The following left ventricular wall segments are akinetic: mid anterior, apical anterior, apical inferior, apical septal, basal inferoseptal, mid inferoseptal, apex and mid anteroseptal.  · Apical tethering and severe restriction of posterior leaflet excursion. Moderate to severe mitral valve regurgitation is present. No significant mitral valve stenosis is present.    Pertinent Labs     Results from last 7 days   Lab Units 09/02/22  0803 09/01/22  0605 08/31/22  0644 08/30/22  0517   WBC 10*3/mm3 9.54 10.14 10.03 10.84*   HEMOGLOBIN g/dL 10.4* 10.0* 10.5* 9.9*   PLATELETS 10*3/mm3 293 261 312 319     Results from last 7 days   Lab Units 09/02/22  0804 09/01/22  0605 08/31/22  0644 08/30/22  0617   SODIUM mmol/L 136 135* 138 139   POTASSIUM mmol/L 3.9 3.3* 3.5 3.3*   CHLORIDE mmol/L 101 98 104 105   CO2 mmol/L 16.4* 19.8* 19.1* 18.0*   BUN mg/dL 28* 25* 27* 28*   CREATININE mg/dL 5.51* 4.87* 5.18* 5.38*   GLUCOSE mg/dL 132* 101* 106* 96   EGFR mL/min/1.73 8.9* 10.3*  9.6* 9.1*     Results from last 7 days   Lab Units 09/02/22  0804 09/01/22  0605 08/31/22  0644 08/30/22  0617 08/29/22  0937 08/28/22  2317   ALBUMIN g/dL 3.40* 3.30* 3.50 3.30*   < > 4.20   BILIRUBIN mg/dL  --   --   --   --   --  0.3   ALK PHOS U/L  --   --   --   --   --  92   AST (SGOT) U/L  --   --   --   --   --  11   ALT (SGPT) U/L  --   --   --   --   --  6    < > = values in this interval not displayed.     Results from last 7 days   Lab Units 09/02/22  0804 09/02/22  0729 09/01/22  0605 08/31/22  0644 08/30/22  0617 08/30/22  0517   CALCIUM mg/dL 8.9  --  8.2* 8.3* 8.1*  --    ALBUMIN g/dL 3.40*  --  3.30* 3.50 3.30*  --    MAGNESIUM mg/dL  --  2.7* 1.5* 1.8  --  1.7   PHOSPHORUS mg/dL 7.8*  --  6.8* 8.0* 8.6*  --      Results from last 7 days   Lab Units 08/28/22  2317   LIPASE U/L 29             Invalid input(s): LDLCALC  Results from last 7 days   Lab Units 08/29/22  0556 08/29/22  0232   BLOODCX  No growth at 4 days No growth at 4 days     Results from last 7 days   Lab Units 08/29/22  0302   COVID19  Not Detected       Test Results Pending at Discharge     Pending Labs     Order Current Status    Blood Culture - Blood, Arm, Left Preliminary result    Blood Culture - Blood, Arm, Left Preliminary result          Discharge Details        Discharge Medications      Continue These Medications      Instructions Start Date   aspirin 81 MG EC tablet   81 mg, Oral, Daily      carvedilol 3.125 MG tablet  Commonly known as: COREG   3.125 mg, Oral, 2 Times Daily      ergocalciferol 1.25 MG (80346 UT) capsule  Commonly known as: ERGOCALCIFEROL   1 capsule, Oral, Weekly, On Sundays      escitalopram 20 MG tablet  Commonly known as: LEXAPRO   20 mg, Oral, Daily      famotidine 20 MG tablet  Commonly known as: PEPCID   20 mg, Oral, 2 Times Daily      HYDROcodone-acetaminophen 5-325 MG per tablet  Commonly known as: NORCO   1 tablet, Oral, Every 6 Hours PRN, MAX 4 tabs daily      lanthanum 750 MG chewable  tablet  Commonly known as: FOSRENOL   2,250 mg, Oral, 3 Times Daily With Meals      levothyroxine 150 MCG tablet  Commonly known as: SYNTHROID, LEVOTHROID   150 mcg, Oral, Daily      magnesium oxide 400 MG tablet  Commonly known as: MAG-OX   400 mg, Oral, 2 Times Daily      melatonin 5 MG tablet tablet   5 mg, Oral, Nightly PRN      midodrine 10 MG tablet  Commonly known as: PROAMATINE   10 mg, Oral, 3 Times Daily      montelukast 10 MG tablet  Commonly known as: Singulair   10 mg, Oral, Nightly      potassium chloride 20 MEQ CR tablet  Commonly known as: K-DUR,KLOR-CON   2 tablets, Oral, 2 Times Daily      sodium bicarbonate 650 MG tablet   1,300 mg, Oral, 3 Times Daily      traZODone 100 MG tablet  Commonly known as: DESYREL   50 mg, Oral, Nightly         Stop These Medications    mirtazapine 7.5 MG half tablet  Commonly known as: REMERON            Allergies   Allergen Reactions   • Penicillins Anaphylaxis, Hives, Shortness Of Breath, Swelling and Rash     Tolerated cephalosporins in past   • Nickel Rash       Discharge Disposition:  Home or Self Care      Discharge Diet:  Diet Order   Procedures   • Diet Regular; Thin       Discharge Activity:       CODE STATUS:    Code Status and Medical Interventions:   Ordered at: 08/29/22 0654     Code Status (Patient has no pulse and is not breathing):    CPR (Attempt to Resuscitate)     Medical Interventions (Patient has pulse or is breathing):    Full Support       Future Appointments   Date Time Provider Department Center   9/8/2022  8:50 AM Ricardo Manjarrez MD [facility]   10/7/2022 12:45 PM Conor Alvarez MD [facility]      Contact information for follow-up providers     Jet Manuel MD .    Specialty: Internal Medicine  Contact information:  [address]  [facility] [address] [phone]             Ricardo Manjarrez MD. Schedule an appointment as soon as possible for a visit in 1 week(s).    Specialty: General  Surgery  Contact information:  [address]  [facility] [address]  [place] [address]  [phone]             Nicole Boles MD Follow up.    Specialty: Gastroenterology  Why: Call this group to make an appt regarding chronic diarrhea  Contact information:  [address]  [facility] [address]  [place] [address]  [phone]                   Contact information for after-discharge care     Home Medical Care     [facility] .    Services: Home Nursing, Home Health Services, Home Rehabilitation  Contact information:  [address] [facility] [address]  [facility] [address] [phone]                             Time Spent on Discharge:  Greater than 38 minutes      FACUNDO Garcia  [facility]  09/02/22  11:53 EDT

## 2022-09-02 NOTE — NURSING NOTE
Paged [facility] (FACUNDO Avila on call) to report pt received Zofran 4 mg IV at 1917 and since then has vomited 2x approx 200 mL each time.  Pt would like something else for nausea/vomiting.    FACUNDO Negron called back and I reported the above.  She will input orders.    0044 - Paged FACUNDO Duarte to report pt still having n/v after Compazine dose which was given ~2328.  Requesting re-order for Zofran or additional/alternate medication as indicated.  FACUNDO Cox called back and will place orders.

## 2022-09-03 NOTE — OUTREACH NOTE
Prep Survey    Flowsheet Row Responses   [facility] patient discharged from? [place]   Is LACE score < 7 ? No   Emergency Room discharge w/ pulse ox? No   Eligibility Readm Mgmt   Discharge diagnosis  Diarrhea   Does the patient have one of the following disease processes/diagnoses(primary or secondary)? Other   Does the patient have Home health ordered? Yes   What is the Home health agency?  [facility]   Is there a DME ordered? No   Prep survey completed? Yes          ELEONORA PARRA - Registered Nurse

## 2022-09-06 NOTE — OUTREACH NOTE
Medical Week 1 Survey    Flowsheet Row Responses   [facility] patient discharged from? [place]   Does the patient have one of the following disease processes/diagnoses(primary or secondary)? Other   Week 1 attempt successful? Yes   Call start time 0845   Call end time 0846   Discharge diagnosis  Diarrhea   Meds reviewed with patient/caregiver? Yes   Is the patient having any side effects they believe may be caused by any medication additions or changes? No   Does the patient have all medications ordered at discharge? Yes   Is the patient taking all medications as directed (includes completed medication regime)? Yes   Does the patient have a primary care provider?  Yes   Does the patient have an appointment with their PCP within 7 days of discharge? Yes   Comments regarding PCP 9/13/22   Has the patient kept scheduled appointments due by today? N/A   What is the Home health agency?  [facility]   Has home health visited the patient within 72 hours of discharge? Yes   Psychosocial issues? No   Did the patient receive a copy of their discharge instructions? Yes   Nursing interventions Reviewed instructions with patient   What is the patient's perception of their health status since discharge? Improving   Is the patient/caregiver able to teach back the hierarchy of who to call/visit for symptoms/problems? PCP, Specialist, Home health nurse, Urgent Care, ED, 911 Yes   If the patient is a current smoker, are they able to teach back resources for cessation? Not a smoker   Week 1 call completed? Yes          JASVIR NAVA - Registered Nurse

## 2022-09-12 NOTE — HOME HEALTH
PATIENT WAS VERY TEARFUL DURING VISIT BECAUSE IT WAS THE ANNIVERSARY OF HUSBANDS DEATH. PATIENT STATED THAT SHE HAD BEEN DOWN BECAISE HER HEALTH HAS BEEN DETERIORATING AND THAT JUST ADDED TO HER DEPRESSION FROM HER HUSBANDS DEATH. SN T/I TO ENSURE SHE IS TAKING HER ANTIDEPRESSANTS AND TO TALK TO A PROFESSIONAL/SN TO HELP HER MANAGE HER DEPRESSION. PATIENT STATED THAT SHE HAS NO THOUGHTS OF SELF HARM AND VERBALIZED UNDERSTANDING.     PLANS FOR NEXT VISIT: DM EDUCATION

## 2022-09-12 NOTE — HOME HEALTH
SN T/I PT ON TAKING ALL MEDS AS ORDERED AND REPORTING ANY ABNORMAL SYMPTOMS . PATIENT VERBALIZED UNDERSTANDING    PLANS FOR NEXT VISIT: ASSESS DEPRESSION

## 2022-09-13 NOTE — HOME HEALTH
Patient referred to [facility] following hospitalization from 8/28-9/2 for S/P peritoneal dialysis cath revision, persistent hypotension, UTI, ESRD on PD, chronic diarrhea (has not been seen by GI and is to f/u with GI). 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 SBP runs in 80's-[id number]'s per pt. She checks it regularly. She is up in home with supervision. Daughter in town temporarily to assist and son lives in walking distance and is involved in care. RLQ dressing to be left in place until f/u 9/8. KONG chest incision with steristrips- defibrillator placed 2 weeks ago. Fistula placed 7/26 to RUE, + thrill and bruit. She has been in the hospital multiple times in the last year. SN FOC: post-op care following PD cath revision, monitoring and teaching.

## 2022-09-14 NOTE — HOME HEALTH
SN T/I PT ON HYPOTENSION: RISE SLOWLY FROM SITTING, DRINK WATER, REPORT ANY DIZZINESS TO SN. PATIENT VERBALIZED UNDERSTANDING    PLANS FOR NEXT VISIT: MEDICATION EDUCATION

## 2022-09-16 NOTE — OUTREACH NOTE
Medical Week 2 Survey    Flowsheet Row Responses   [facility] patient discharged from? [place]   Does the patient have one of the following disease processes/diagnoses(primary or secondary)? Other   Week 2 attempt successful? Yes   Call start time 1030   Discharge diagnosis  Diarrhea   Call end time 1031   Is the patient taking all medications as directed (includes completed medication regime)? Yes   Does the patient have a primary care provider?  Yes   Comments regarding PCP seen PCP on 9/13   Has the patient kept scheduled appointments due by today? Yes   What is the Home health agency?  [facility]   Home health comments home health is still coming   Psychosocial issues? No   What is the patient's perception of their health status since discharge? Improving   Is the patient/caregiver able to teach back the hierarchy of who to call/visit for symptoms/problems? PCP, Specialist, Home health nurse, Urgent Care, ED, 911 Yes   Week 2 Call Completed? Yes   Graduated Yes   Did the patient feel the follow up calls were helpful during their recovery period? Yes   Was the number of calls appropriate? Yes   Graduated/Revoked comments Doing well, no further calls needed.          ALYSSIA MOLINA - Registered Nurse

## 2022-09-20 NOTE — HOME HEALTH
SN T/I ON MEDICATIONS AND TAKING THEM AS ORDERED DAILY. SN INSTRUCTED PT ON HER BP BEING LOW AND SYMPTOMS TO REPORT TO  OR MD ASAP. PATIENT VERBALIZED UNDERSTANDING. PATIENT HAD A CHANGE IN HER PD AND SHE STATED THAT SHE IS FEELING MUCH BETTER WITH THE NEW AMOUNT SHE IS DOING EACH NIGHT.     PLAN FOR NEXT VISIT: MONITOR DEPRESSION

## 2022-09-21 NOTE — HOME HEALTH
SN T/I PT ON S/S OF HYPOTENSION TO REPORT TO SN OR MD: LIGHTHEADEDNESS, FEELING SICK/FATIGUED, CONFUSION, FAINTING, WEAKNESS. PATIENT VERBALIZED UNDERSTANDING. PT WAS TEARFUL DURING VISIT DUE TO FEELINGS OF FRUSTRATION FROM DEALING WITH HER CHRONIC ILLNESS AND LOSS OF  AND A CLOSE FRIEND IN SHORT PERIOD OF TIME. SN T/I PATIENT TO MAKE SURE SHE TAKES HER MEDS AS ORDERED AND TALK TO SOMEONE ABOUT ALL THE FEELINGS SHE IS HAVING INCLUDING A THERAPIST IF NEEDED. PATIENT VERBALIZED UNDERSTANDING. PATIENT DOES NOT HAVE ANY FEELINGS OF SELF HARM.     PLANS FOR NEXT VISIT: EDUCATE PATIENT ON DEPRESSION

## 2022-09-26 NOTE — HOME HEALTH
SN T/I PATIENT ON SKIN BREAKDOWN PREVENTION. PATIENT VERBALIZED UDNERSTANDING. PATIENT IS STILL VERY TEARFUL AND GRIEVING OVER THE LOSS OF HER . SN ALLOWED PATIENT TO TALK THROUGH SOME OF HER EMOTIONS AND SHE SEEMED TO IMPROVE SOMEWHAT BY THE END OF THE VISIT. PATIENT HAS NOT EXPRESSED ANY THOUGHTS OF SELF HARM AND HAS NOT EXHIBITED ANY INDICATIONS THAT SHE WOULD PERFORM SELF HARM. SN WILL CONTINUE TO EDUCATE PATIENT ON S/S OF DEPRESSION TO REPORT TO SN OR MD ASAP AND PROVIDE A SAFE SPACE FOR PATIENT TO GRIEVE.    PLANS FOR NEXT VISIT: DEPRESSION EDUCATION

## 2022-09-29 NOTE — HOME HEALTH
PATIENT WAS LESS TEARFUL THIS VISIT AND ABLE TO DISCUSS HOW SHE WAS FEELING MORE CLEARLY. SHE STATED THAT SHE IS TIRED OF FEELING BAD ALL THE TIME AND TIRED OF BEING SO SAD OVER THE LOSS OF HER . SN T/I PATIENT TO MAKE AN APPOINMENT WITH HER THERAPSIT ADN MAKE SURE SHE TAKES HER MEDS AS ORDERED DAILY. PATIENT VERBALIZED UNDERSTANDING. SN T/I MONITOR FOR FEELINGS OF SELF HARM AND TO SEEK HELP IMMEDIATELY IF SHE HAS THOSE FEELINGS. PATIENT DENIES HAVING THEM AND VERBALIZED UNDERSTANDING.    PLANS FOR NEXT VISIT: DEPRESSION EDUCATION, PD EDUCATION

## 2022-10-14 NOTE — HOME HEALTH
PT STATED THAT SHE IS HAVING A LOT OF PAIN IN HER BACK AROUND HER KIDNEY TODAY BUT THAT IT HAPPENS OFF AND ON FOR HER. SN T/I HER TO TRY SOME PAIN RELIEF MEASURES SUCH AS HEAT/ICE, REST, MEDITATION. PT VERBALIZED UNDERSTANDING. SN T/I PT ON S/S OF INFECTION AROUND PD CATHETER SITE: FOUL ODOR, DISCOLORED DRAINAGE, FEVER, PAIN AT SITE    PLANS FOR NEXT VISIT: MEDICATION EDUCATION

## 2022-10-21 NOTE — HOME HEALTH
PATIENT WILL HAVE SECOND PART OF THE SURGERY TO COMPLETE AV FISTULA FOR HEMODIALYSIS ON EITHER OCT 26TH OR SECOND WEEK OF NOVEMBER DEPENDING ON THE DOCTORS SCHEDULE. SHE WILL KNOW WHICH DATE NEXT WEEK. PATIENTS REVISION OF PD CATHETER IN ANDOMEN HAS HEALED AND HAS NO DRAINAGE AT THIS TIME. PATIENT HAS BEEN HAVING INCREASED PAIN ON RIGHT SIDE OF BACK NEAR KIDNEY BUT SEEMS TO BE MORE MUSCLE RELATED. SN CALLED MD AND THEY STATED THAT THEY ARE SEEING PATIENT AT THE CLINIC THIS WEEK AND HE WILL ADDRESS IT THEN AND IF IT GETS WORSE TO GO TO ER. PATIENT VERBALIZED UNDERSTANDING. PATIENTS BP WAS 88/48 WHEN ASSESSED. PATIENT TOOK HER MIDODRINE THAT IS PRESCRIBED FOR HER TO TAKE TO BRING BP UP. SN HAD PATIENT RECHECK BP IN 1-2 HOURS AND CALL ME WITH RESULTS. SN ALSO INSTRUCTED PT TO CALL IF SHE BECOMES SYMPTOMATIC SUCH AS: FAINTING, DIZZINESS, EXTREME FATIGUE. PATIENT VERBALIZED UNDERSTANDING.    PLANS FOR NEXT VISIT: MONITOR BP, HYPOTENSION EDUCATION

## 2022-10-27 NOTE — HOME HEALTH
PATIENT IS HAVING OUTPATIENT SURGERY TOMORROW TO HAVE HEMODIALYSIS CATHETER PLACED. SHE IS SWITCHING FROM HOME PD TO HOME HD. SN T/I PATIENT ON PLAVIX AND MAKING SURE SHE STOPPED THAT PRIOR TO SURGERY. PATIENT STATED THAT SHE HAD STOPPED IT. SN INSTRUCTED PT ON HYPOTENSION AND KEEPING LOG OF BP TO KNOW HOW OFTEN BP IS DROPPING AND WHEN IT IS NORMAL. PATIENT VERBALIZED UNDERSTANDING.    PLANS FOR NEXT VISIT: ASSESS NEW HD CATHETER SITE

## 2022-11-03 NOTE — HOME HEALTH
PATIENT IS BEING RECERTED DUE TO SOME UNMET GOALS AND SOME NEW GOALS THAT WILL BE ADDED SUCH AS SWITCHING TO HOME HEMODIALYSIS. SHE HAD AN AV FISTULA PLACED LAST WEEK AND IT IS STILL HEALING AT THIS TIME AND WE WILL BE MONITORING THAT FOR THE THRILL AND BRUIT UNTIL SHE IS ABLE TO USE IT.

## 2022-11-15 NOTE — HOME HEALTH
Patient voicing anxiety due to grieving her . Patient reports tenderness to shunt placement site. LPN reviewed S/S of infection.

## 2022-11-16 NOTE — HOME HEALTH
SN ASSESSED INCISION ON RUE WHERE HEMODIAYSIS FISTULA WAS PLACED. INCISION IS CLEAN, DRY, AND GLUE IS STILL INTACT. PT STATED THAT SITE IS SORE BUT IT FEELS A GREAT DEAL BETTER THAN WHEN IT WAS INITIALLY. SN T/I PT S/S OF INFECTION TO REPORT ASAP. PT VERBALIZED UNDERSTANDING. PT ALSO IS HAVING INCREASED ISSUES WITH CIRCULATION AND IT IS CAUSING A LOT OF PAIN IN BLE ESPECIALLY IN FEET AND TOES. MD TOLD HER THERE ISNT ANYTHING THEY CAN DO TO FIX IT AT THIS TIME.     PLANS FOR NEXT VISIT:

## 2022-11-28 NOTE — HOME HEALTH
Incision on right up has started to dehisce. Two new open areas seen. Patient is unsure of when wound started to open. Could not remember if it started this week or last. Measurements and pictures seen. Drainage is serosanguinous in small amount. no redness warmth or swelling. Patient is applying bordered dressing over wounds. Teaching on how to apply without taping wound. Called vascular surgeon office and notified. Teaching provided on wound healing diet. Patient's appetite is very poor.    Plan for next visit: CP assessments and continue to assess RUE incision

## 2022-12-02 NOTE — HOME HEALTH
SN PERFORMED WOUND CARE TO LUE PER ORDERS. PATIENT TOLERATED WELL. SN T/I PATIENT S/S OF INFECTION TO [facility] FOR AND REPORT ASAP: FEVER, ODOR AT WOUND SITE, PURULENT DRAINAGE. PATIENT VERBALIZED UNDERSTANDING.    PLANS FOR NEXT VISIT: WOUND CARE, INFECTION EDUCATION

## 2022-12-03 NOTE — HOME HEALTH
RUE wound on underside of arm,  W/ MOD AMOUNT BROWNISH/ CREAMY DRAINAGE NOTED ON TELFA, 2X WOUNDS , BOTH WITH PINK BASE, MORE DISTAL WOUND W/ SOME SLOUGH ON EDGES.     12/2-  LATERAL EDGES OF BILT FEET W/ S/S WOUNDS DEVELOPING, LT EDGE W/ 3X SMALL DARK BROWN AREAS  SURROUNDED BY REDDENED INTEG, RT LATERAL W/ SMALL CIRCULAT AREA ABOUT 0.3 CM DIAMETER W/ PINK OPEN APPEARING INTEG W/ SLOUGH CENTER- SN CLEANSED AND COVERED , INSTRUCT ON S/S COMPLICATIONS TO REPORT TO MD/SN- PT ABLE TO TEACH BACK    PLANS FOR NEXT VISIT: WOUND CARE, INFECTION EDUCATION

## 2022-12-05 NOTE — CASE COMMUNICATION
ERROR IN VISIT FREQUENCY. SEEN 12/2/22      For your records only.   As per home health protocol, MD must be notified of missed/cancelled visits; therefore the prescribed frequency was not met.

## 2022-12-08 NOTE — HOME HEALTH
WOUND CARE PERFORMED PER ORDERS. PATIENT TOLERATED WELL. SN T/I PATIENT TO MONITOR FOR S/S OF INFECTION SUCH AS: FOUL ODOR, PURULENT DRAINAGE, REDNESS/WARMTH AROUND SITE, FEVER. PATIENT VERBALIZED UNDERSTANDING.    PLANS FOR NEXT VISIT: WOUND CARE TO [place]

## 2022-12-12 NOTE — HOME HEALTH
WOUND CARE PERFORMED TO RUE PER ORDERS. PATIENT TOLERATED WELL. SN APPLIED BETADINE AND DRY DRESSING. PATIENT IS UNABLE TO PERFORM WOUND CARE INDEPENDENTLY SO FAMILY WILL DO IT IN ABSENCE OF SN.SN T/I PATIENT S/S OF INFECTION TO REPORT ASAP. PATIENT VERBALIZED UNDERSTANDING.    PLANS FOR NEXT VISIT: WOUND CARE TO RUE, T/I S/S OF INFECTION TO REPORT

## 2022-12-15 NOTE — HOME HEALTH
DRESSING CHANGED ON RUE PER ORDERS. SN CLEANSED WITH SALINE AND PAT DRY. APPLIED BETADINE AND COVERED WITH DRY DRESSING. PATIENT TOLERATED WELL. PATIENT HAS A FOLLOW UP APPT WITH SURGEON WHO PLACED THE CATHETER ON FRIDAY MORNING AT 930AM WHICH TOOK 3 WEEKS AFTER INCISION OPENED FOR HER TO GET IN FOR HIM TO ASSESS INCISION. SN T/I PATIENT TO KEEP DRESSING AS CLEAN AND DRY AS POSSIBLE BETWEEN DRESSING CHANGES SINCE SHE HAS NOT HAD ANYONE WHO CAN CHANGE IT FOR HER IN ABSENCE OF SN AT THIS TIME AND TO CALL SN IF DRESSING COMES OFF AND SHE CANNOT REAPPLY HERSELF OR DOESNT HAVE ANYONE WHO CAN DO IT FOR HER. SN T/I PATIENT ON S/S OF DEPRESSION TO REPORT TO SN ASAP. THIS IS A VERY DIFFICULT TIME OF YEAR FOR PATIENT AND HER FAMILY DUE TO THE DEATH OF HER  AND IT IS ALSO HIS BIRTHDAY MONTH.    PLANS FOR NEXT VISIT: DRESSING CHANGE TO RUE, T/I MANAGING DEPRESSION AND ANXIETY

## 2022-12-19 NOTE — HOME HEALTH
SN PERFORMED WOUND CARE TO [facility] PER ORDERS. PATIENT TOLERATED WELL. SN T/I PATIENT ON PROPER NUTRIYION TO PROMOTE WOUND HEALING: EAT PLENTY OF FRUITS AND VEGETABLES AND PROTEIN WITH EVERY MEAL. PATIENT VERBALIZED UNDERSTANDING.    PLANS FOR NEXT VISIT: WOUND CARE TO [facility]

## 2022-12-22 NOTE — HOME HEALTH
SN PERFORMED WOUND CARE PER ORDERS TO RUE. PATIENT TOLERATED WELL.    PLANS FOR NEXT VISIT: WOUND CARE

## 2022-12-28 NOTE — HOME HEALTH
PERFORMED WOUND CARE TO E. PATIENT TOLERATED WELL. PATIENT IS HAVING VASCULAR ISSUES IN BLE BUT IT IS WORSE IN RLE. SHE HAS REDNESS, PAIN, AND IS NOW HAVING TO USE A WHEELCHAIR FOR MOBILITY. SHE SAW DR HOWELL AND HE STATED FOR HER TO KEEP WEIGHT OFF OF HER FEET AND LEGS AS MUCH AS POSSIBLE TO HELP WITH THE PAIN AND IS DOING MORE TESTING TO FIGURE OUT THE CAUSE AND HOW TO TREAT IT. PATIENT STATED THAT MD TOLD HER SEVERAL TIMES THAT SHE IS STARTING TO DETIORATE. PATIENT ALSO IS HAVING AN INCREASE IN DEPRESSION AT THIS TIME DUE TO THE HOLIDAYS AND HER  WHO PASSED AWAY [place] BIRTHDAY BEING THIS FRIDAY.     PLAN FOR NEXT VISIT: WOUND CARE TO [facility], MONITOR DEPRESSION

## 2023-01-01 ENCOUNTER — APPOINTMENT (OUTPATIENT)
Dept: CT IMAGING | Facility: HOSPITAL | Age: 53
DRG: 853 | End: 2023-01-01
Payer: MEDICARE

## 2023-01-01 ENCOUNTER — ANESTHESIA (OUTPATIENT)
Dept: PERIOP | Facility: HOSPITAL | Age: 53
DRG: 853 | End: 2023-01-01
Payer: MEDICARE

## 2023-01-01 ENCOUNTER — APPOINTMENT (OUTPATIENT)
Dept: GENERAL RADIOLOGY | Facility: HOSPITAL | Age: 53
DRG: 291 | End: 2023-01-01
Payer: MEDICARE

## 2023-01-01 ENCOUNTER — HOME CARE VISIT (OUTPATIENT)
Dept: HOME HEALTH SERVICES | Facility: HOME HEALTHCARE | Age: 53
End: 2023-01-01
Payer: MEDICARE

## 2023-01-01 ENCOUNTER — TRANSCRIBE ORDERS (OUTPATIENT)
Dept: HOME HEALTH SERVICES | Facility: HOME HEALTHCARE | Age: 53
End: 2023-01-01
Payer: MEDICARE

## 2023-01-01 ENCOUNTER — READMISSION MANAGEMENT (OUTPATIENT)
Dept: CALL CENTER | Facility: HOSPITAL | Age: 53
End: 2023-01-01
Payer: MEDICARE

## 2023-01-01 ENCOUNTER — HOSPITAL ENCOUNTER (OUTPATIENT)
Facility: HOSPITAL | Age: 53
Setting detail: SURGERY ADMIT
DRG: 853 | End: 2023-01-01
Attending: SURGERY | Admitting: SURGERY
Payer: MEDICARE

## 2023-01-01 ENCOUNTER — APPOINTMENT (OUTPATIENT)
Dept: CARDIOLOGY | Facility: HOSPITAL | Age: 53
DRG: 853 | End: 2023-01-01
Payer: MEDICARE

## 2023-01-01 ENCOUNTER — HOSPITAL ENCOUNTER (INPATIENT)
Facility: HOSPITAL | Age: 53
LOS: 2 days | DRG: 291 | End: 2023-02-03
Attending: EMERGENCY MEDICINE | Admitting: INTERNAL MEDICINE
Payer: MEDICARE

## 2023-01-01 ENCOUNTER — APPOINTMENT (OUTPATIENT)
Dept: CT IMAGING | Facility: HOSPITAL | Age: 53
DRG: 871 | End: 2023-01-01
Payer: MEDICARE

## 2023-01-01 ENCOUNTER — APPOINTMENT (OUTPATIENT)
Dept: GENERAL RADIOLOGY | Facility: HOSPITAL | Age: 53
DRG: 853 | End: 2023-01-01
Payer: MEDICARE

## 2023-01-01 ENCOUNTER — ANESTHESIA EVENT (OUTPATIENT)
Dept: PERIOP | Facility: HOSPITAL | Age: 53
DRG: 853 | End: 2023-01-01
Payer: MEDICARE

## 2023-01-01 ENCOUNTER — HOSPITAL ENCOUNTER (INPATIENT)
Facility: HOSPITAL | Age: 53
LOS: 7 days | Discharge: HOME HEALTH | DRG: 853 | End: 2023-01-13
Attending: EMERGENCY MEDICINE | Admitting: INTERNAL MEDICINE
Payer: MEDICARE

## 2023-01-01 ENCOUNTER — APPOINTMENT (OUTPATIENT)
Dept: GENERAL RADIOLOGY | Facility: HOSPITAL | Age: 53
DRG: 871 | End: 2023-01-01
Payer: MEDICARE

## 2023-01-01 ENCOUNTER — APPOINTMENT (OUTPATIENT)
Dept: CARDIOLOGY | Facility: HOSPITAL | Age: 53
DRG: 291 | End: 2023-01-01
Payer: MEDICARE

## 2023-01-01 ENCOUNTER — HOSPITAL ENCOUNTER (INPATIENT)
Facility: HOSPITAL | Age: 53
LOS: 6 days | Discharge: HOME HEALTH | DRG: 871 | End: 2023-01-25
Attending: EMERGENCY MEDICINE | Admitting: STUDENT IN AN ORGANIZED HEALTH CARE EDUCATION/TRAINING PROGRAM
Payer: MEDICARE

## 2023-01-01 VITALS
WEIGHT: 161 LBS | SYSTOLIC BLOOD PRESSURE: 90 MMHG | HEART RATE: 80 BPM | RESPIRATION RATE: 16 BRPM | BODY MASS INDEX: 21.81 KG/M2 | DIASTOLIC BLOOD PRESSURE: 62 MMHG | HEIGHT: 72 IN | OXYGEN SATURATION: 99 % | TEMPERATURE: 96.4 F

## 2023-01-01 VITALS
HEART RATE: 101 BPM | BODY MASS INDEX: 31.43 KG/M2 | DIASTOLIC BLOOD PRESSURE: 61 MMHG | OXYGEN SATURATION: 96 % | RESPIRATION RATE: 18 BRPM | HEIGHT: 61 IN | TEMPERATURE: 98.1 F | SYSTOLIC BLOOD PRESSURE: 96 MMHG | WEIGHT: 166.45 LBS

## 2023-01-01 VITALS
RESPIRATION RATE: 16 BRPM | HEART RATE: 83 BPM | HEIGHT: 64 IN | BODY MASS INDEX: 28.8 KG/M2 | WEIGHT: 168.7 LBS | DIASTOLIC BLOOD PRESSURE: 81 MMHG | TEMPERATURE: 97.5 F | OXYGEN SATURATION: 91 % | SYSTOLIC BLOOD PRESSURE: 98 MMHG

## 2023-01-01 VITALS
DIASTOLIC BLOOD PRESSURE: 60 MMHG | OXYGEN SATURATION: 98 % | HEART RATE: 90 BPM | TEMPERATURE: 97.2 F | SYSTOLIC BLOOD PRESSURE: 92 MMHG | RESPIRATION RATE: 18 BRPM

## 2023-01-01 VITALS
SYSTOLIC BLOOD PRESSURE: 108 MMHG | RESPIRATION RATE: 18 BRPM | OXYGEN SATURATION: 100 % | TEMPERATURE: 95 F | HEART RATE: 98 BPM | DIASTOLIC BLOOD PRESSURE: 68 MMHG

## 2023-01-01 VITALS
SYSTOLIC BLOOD PRESSURE: 92 MMHG | HEART RATE: 92 BPM | DIASTOLIC BLOOD PRESSURE: 68 MMHG | TEMPERATURE: 99.1 F | OXYGEN SATURATION: 98 % | RESPIRATION RATE: 16 BRPM

## 2023-01-01 VITALS
OXYGEN SATURATION: 96 % | SYSTOLIC BLOOD PRESSURE: 87 MMHG | RESPIRATION RATE: 16 BRPM | TEMPERATURE: 97.5 F | HEART RATE: 82 BPM | DIASTOLIC BLOOD PRESSURE: 66 MMHG

## 2023-01-01 VITALS
HEART RATE: 80 BPM | SYSTOLIC BLOOD PRESSURE: 83 MMHG | DIASTOLIC BLOOD PRESSURE: 58 MMHG | OXYGEN SATURATION: 97 % | RESPIRATION RATE: 16 BRPM

## 2023-01-01 VITALS
TEMPERATURE: 97.8 F | SYSTOLIC BLOOD PRESSURE: 120 MMHG | WEIGHT: 162 LBS | BODY MASS INDEX: 30.58 KG/M2 | HEIGHT: 61 IN | RESPIRATION RATE: 16 BRPM | OXYGEN SATURATION: 98 % | DIASTOLIC BLOOD PRESSURE: 80 MMHG | HEART RATE: 97 BPM

## 2023-01-01 VITALS
RESPIRATION RATE: 16 BRPM | OXYGEN SATURATION: 95 % | SYSTOLIC BLOOD PRESSURE: 103 MMHG | DIASTOLIC BLOOD PRESSURE: 63 MMHG | TEMPERATURE: 97.1 F | HEART RATE: 61 BPM

## 2023-01-01 VITALS
OXYGEN SATURATION: 94 % | TEMPERATURE: 97.3 F | HEART RATE: 88 BPM | SYSTOLIC BLOOD PRESSURE: 96 MMHG | RESPIRATION RATE: 16 BRPM | DIASTOLIC BLOOD PRESSURE: 81 MMHG

## 2023-01-01 VITALS — TEMPERATURE: 100.2 F | HEIGHT: 61 IN | HEART RATE: 116 BPM | BODY MASS INDEX: 31.68 KG/M2 | WEIGHT: 167.77 LBS

## 2023-01-01 DIAGNOSIS — I96 GANGRENE OF LEFT FOOT: ICD-10-CM

## 2023-01-01 DIAGNOSIS — I73.9 PAD (PERIPHERAL ARTERY DISEASE): ICD-10-CM

## 2023-01-01 DIAGNOSIS — I96 DRY GANGRENE: Primary | ICD-10-CM

## 2023-01-01 DIAGNOSIS — E83.42 GITELMAN SYNDROME: ICD-10-CM

## 2023-01-01 DIAGNOSIS — R65.21 SEPTIC SHOCK: Primary | ICD-10-CM

## 2023-01-01 DIAGNOSIS — R77.8 ELEVATED TROPONIN: ICD-10-CM

## 2023-01-01 DIAGNOSIS — Z99.2 ESRD ON PERITONEAL DIALYSIS: ICD-10-CM

## 2023-01-01 DIAGNOSIS — I73.9 PVD (PERIPHERAL VASCULAR DISEASE): ICD-10-CM

## 2023-01-01 DIAGNOSIS — A41.9 SEPTIC SHOCK: Primary | ICD-10-CM

## 2023-01-01 DIAGNOSIS — E87.5 HYPERKALEMIA: ICD-10-CM

## 2023-01-01 DIAGNOSIS — I96 NECROTIC TOES: ICD-10-CM

## 2023-01-01 DIAGNOSIS — L08.9 LEFT FOOT INFECTION: Primary | ICD-10-CM

## 2023-01-01 DIAGNOSIS — I42.9 CARDIOMYOPATHY, UNSPECIFIED TYPE: ICD-10-CM

## 2023-01-01 DIAGNOSIS — E87.6 GITELMAN SYNDROME: ICD-10-CM

## 2023-01-01 DIAGNOSIS — J96.01 SEPSIS WITH ACUTE HYPOXIC RESPIRATORY FAILURE AND SEPTIC SHOCK, DUE TO UNSPECIFIED ORGANISM: Primary | ICD-10-CM

## 2023-01-01 DIAGNOSIS — A41.9 SEPSIS WITH ACUTE HYPOXIC RESPIRATORY FAILURE AND SEPTIC SHOCK, DUE TO UNSPECIFIED ORGANISM: Primary | ICD-10-CM

## 2023-01-01 DIAGNOSIS — N18.6 CHRONIC KIDNEY DISEASE WITH END STAGE RENAL FAILURE ON DIALYSIS: ICD-10-CM

## 2023-01-01 DIAGNOSIS — A41.9 SEPSIS WITHOUT ACUTE ORGAN DYSFUNCTION, DUE TO UNSPECIFIED ORGANISM: ICD-10-CM

## 2023-01-01 DIAGNOSIS — L08.9 LEFT FOOT INFECTION: ICD-10-CM

## 2023-01-01 DIAGNOSIS — N39.0 ACUTE UTI: ICD-10-CM

## 2023-01-01 DIAGNOSIS — N18.6 ESRD ON PERITONEAL DIALYSIS: ICD-10-CM

## 2023-01-01 DIAGNOSIS — I73.9 PERIPHERAL ARTERIAL DISEASE: ICD-10-CM

## 2023-01-01 DIAGNOSIS — R65.21 SEPSIS WITH ACUTE HYPOXIC RESPIRATORY FAILURE AND SEPTIC SHOCK, DUE TO UNSPECIFIED ORGANISM: Primary | ICD-10-CM

## 2023-01-01 DIAGNOSIS — I96 NECROTIC TOES: Primary | ICD-10-CM

## 2023-01-01 DIAGNOSIS — Z99.2 CHRONIC KIDNEY DISEASE WITH END STAGE RENAL FAILURE ON DIALYSIS: ICD-10-CM

## 2023-01-01 DIAGNOSIS — J96.91 RESPIRATORY FAILURE WITH HYPOXIA, UNSPECIFIED CHRONICITY: ICD-10-CM

## 2023-01-01 DIAGNOSIS — R44.3 HALLUCINATIONS: ICD-10-CM

## 2023-01-01 LAB
ALBUMIN SERPL-MCNC: 1.4 G/DL (ref 3.5–5.2)
ALBUMIN SERPL-MCNC: 2.4 G/DL (ref 3.5–5.2)
ALBUMIN SERPL-MCNC: 2.7 G/DL (ref 3.5–5.2)
ALBUMIN SERPL-MCNC: 2.8 G/DL (ref 3.5–5.2)
ALBUMIN SERPL-MCNC: 2.9 G/DL (ref 3.5–5.2)
ALBUMIN SERPL-MCNC: 2.9 G/DL (ref 3.5–5.2)
ALBUMIN SERPL-MCNC: 3 G/DL (ref 3.5–5.2)
ALBUMIN SERPL-MCNC: 3.1 G/DL (ref 3.5–5.2)
ALBUMIN SERPL-MCNC: 3.1 G/DL (ref 3.5–5.2)
ALBUMIN SERPL-MCNC: 3.2 G/DL (ref 3.5–5.2)
ALBUMIN SERPL-MCNC: 3.3 G/DL (ref 3.5–5.2)
ALBUMIN SERPL-MCNC: 3.3 G/DL (ref 3.5–5.2)
ALBUMIN/GLOB SERPL: 0.9 G/DL
ALBUMIN/GLOB SERPL: 1.2 G/DL
ALP SERPL-CCNC: 108 U/L (ref 39–117)
ALP SERPL-CCNC: 109 U/L (ref 39–117)
ALP SERPL-CCNC: 121 U/L (ref 39–117)
ALP SERPL-CCNC: 160 U/L (ref 39–117)
ALP SERPL-CCNC: 51 U/L (ref 39–117)
ALT SERPL W P-5'-P-CCNC: 13 U/L (ref 1–33)
ALT SERPL W P-5'-P-CCNC: 13 U/L (ref 1–33)
ALT SERPL W P-5'-P-CCNC: 55 U/L (ref 1–33)
ALT SERPL W P-5'-P-CCNC: 6 U/L (ref 1–33)
ALT SERPL W P-5'-P-CCNC: 79 U/L (ref 1–33)
AMPHET+METHAMPHET UR QL: NEGATIVE
ANION GAP SERPL CALCULATED.3IONS-SCNC: 11 MMOL/L (ref 5–15)
ANION GAP SERPL CALCULATED.3IONS-SCNC: 12.1 MMOL/L (ref 5–15)
ANION GAP SERPL CALCULATED.3IONS-SCNC: 13 MMOL/L (ref 5–15)
ANION GAP SERPL CALCULATED.3IONS-SCNC: 13.2 MMOL/L (ref 5–15)
ANION GAP SERPL CALCULATED.3IONS-SCNC: 13.6 MMOL/L (ref 5–15)
ANION GAP SERPL CALCULATED.3IONS-SCNC: 14 MMOL/L (ref 5–15)
ANION GAP SERPL CALCULATED.3IONS-SCNC: 14.3 MMOL/L (ref 5–15)
ANION GAP SERPL CALCULATED.3IONS-SCNC: 14.4 MMOL/L (ref 5–15)
ANION GAP SERPL CALCULATED.3IONS-SCNC: 14.9 MMOL/L (ref 5–15)
ANION GAP SERPL CALCULATED.3IONS-SCNC: 15.3 MMOL/L (ref 5–15)
ANION GAP SERPL CALCULATED.3IONS-SCNC: 15.4 MMOL/L (ref 5–15)
ANION GAP SERPL CALCULATED.3IONS-SCNC: 15.7 MMOL/L (ref 5–15)
ANION GAP SERPL CALCULATED.3IONS-SCNC: 16 MMOL/L (ref 5–15)
ANION GAP SERPL CALCULATED.3IONS-SCNC: 16 MMOL/L (ref 5–15)
ANION GAP SERPL CALCULATED.3IONS-SCNC: 16.3 MMOL/L (ref 5–15)
ANION GAP SERPL CALCULATED.3IONS-SCNC: 16.4 MMOL/L (ref 5–15)
ANION GAP SERPL CALCULATED.3IONS-SCNC: 16.8 MMOL/L (ref 5–15)
ANION GAP SERPL CALCULATED.3IONS-SCNC: 17.8 MMOL/L (ref 5–15)
ANION GAP SERPL CALCULATED.3IONS-SCNC: 18 MMOL/L (ref 5–15)
ANION GAP SERPL CALCULATED.3IONS-SCNC: 19 MMOL/L (ref 5–15)
ANION GAP SERPL CALCULATED.3IONS-SCNC: 20 MMOL/L (ref 5–15)
ANION GAP SERPL CALCULATED.3IONS-SCNC: 20.9 MMOL/L (ref 5–15)
ANION GAP SERPL CALCULATED.3IONS-SCNC: 20.9 MMOL/L (ref 5–15)
ANION GAP SERPL CALCULATED.3IONS-SCNC: 21 MMOL/L (ref 5–15)
ANION GAP SERPL CALCULATED.3IONS-SCNC: 23.8 MMOL/L (ref 5–15)
AORTIC DIMENSIONLESS INDEX: 0.4 (DI)
APPEARANCE FLD: ABNORMAL
APTT PPP: 32.5 SECONDS (ref 22.7–35.4)
APTT PPP: 37.7 SECONDS (ref 22.7–35.4)
APTT PPP: 37.9 SECONDS (ref 22.7–35.4)
APTT PPP: 38.1 SECONDS (ref 22.7–35.4)
APTT PPP: 38.6 SECONDS (ref 22.7–35.4)
APTT PPP: 42.3 SECONDS (ref 22.7–35.4)
APTT PPP: 58 SECONDS (ref 22.7–35.4)
ARTERIAL PATENCY WRIST A: ABNORMAL
ARTERIAL PATENCY WRIST A: ABNORMAL
ASCENDING AORTA: 2.5 CM
AST SERPL-CCNC: 125 U/L (ref 1–32)
AST SERPL-CCNC: 165 U/L (ref 1–32)
AST SERPL-CCNC: 21 U/L (ref 1–32)
AST SERPL-CCNC: 47 U/L (ref 1–32)
AST SERPL-CCNC: 9 U/L (ref 1–32)
ATMOSPHERIC PRESS: 756.7 MMHG
ATMOSPHERIC PRESS: 757 MMHG
ATMOSPHERIC PRESS: 758.1 MMHG
ATMOSPHERIC PRESS: 758.3 MMHG
BACTERIA SPEC AEROBE CULT: ABNORMAL
BACTERIA SPEC AEROBE CULT: ABNORMAL
BACTERIA SPEC AEROBE CULT: NORMAL
BACTERIA SPEC AEROBE CULT: NORMAL
BACTERIA UR QL AUTO: ABNORMAL /HPF
BARBITURATES UR QL SCN: NEGATIVE
BASE EXCESS BLDA CALC-SCNC: -22 MMOL/L (ref 0–2)
BASE EXCESS BLDA CALC-SCNC: -23.9 MMOL/L (ref 0–2)
BASE EXCESS BLDV CALC-SCNC: -2.8 MMOL/L (ref -2–2)
BASE EXCESS BLDV CALC-SCNC: 1.9 MMOL/L (ref -2–2)
BASOPHILS # BLD AUTO: 0.09 10*3/MM3 (ref 0–0.2)
BASOPHILS # BLD AUTO: 0.1 10*3/MM3 (ref 0–0.2)
BASOPHILS # BLD AUTO: 0.1 10*3/MM3 (ref 0–0.2)
BASOPHILS # BLD AUTO: 0.13 10*3/MM3 (ref 0–0.2)
BASOPHILS # BLD MANUAL: 0.17 10*3/MM3 (ref 0–0.2)
BASOPHILS NFR BLD AUTO: 0.7 % (ref 0–1.5)
BASOPHILS NFR BLD AUTO: 0.9 % (ref 0–1.5)
BASOPHILS NFR BLD AUTO: 1.3 % (ref 0–1.5)
BASOPHILS NFR BLD AUTO: 1.3 % (ref 0–1.5)
BASOPHILS NFR BLD MANUAL: 1 % (ref 0–1.5)
BDY SITE: ABNORMAL
BENZODIAZ UR QL SCN: NEGATIVE
BH CV ECHO MEAS - ACS: 1.02 CM
BH CV ECHO MEAS - AO MAX PG: 25.1 MMHG
BH CV ECHO MEAS - AO MEAN PG: 13 MMHG
BH CV ECHO MEAS - AO ROOT DIAM: 2.6 CM
BH CV ECHO MEAS - AO V2 MAX: 250.6 CM/SEC
BH CV ECHO MEAS - AO V2 VTI: 34.1 CM
BH CV ECHO MEAS - AVA(I,D): 1.08 CM2
BH CV ECHO MEAS - EDV(CUBED): 322.9 ML
BH CV ECHO MEAS - EDV(MOD-SP2): 306 ML
BH CV ECHO MEAS - EDV(MOD-SP4): 279 ML
BH CV ECHO MEAS - EF(MOD-BP): 20.7 %
BH CV ECHO MEAS - EF(MOD-SP2): 16.3 %
BH CV ECHO MEAS - EF(MOD-SP4): 20.8 %
BH CV ECHO MEAS - ESV(CUBED): 245.9 ML
BH CV ECHO MEAS - ESV(MOD-SP2): 256 ML
BH CV ECHO MEAS - ESV(MOD-SP4): 221 ML
BH CV ECHO MEAS - FS: 8.7 %
BH CV ECHO MEAS - IVS/LVPW: 1.02 CM
BH CV ECHO MEAS - IVSD: 1.05 CM
BH CV ECHO MEAS - LAT PEAK E' VEL: 14.1 CM/SEC
BH CV ECHO MEAS - LV MASS(C)D: 326 GRAMS
BH CV ECHO MEAS - LV MAX PG: 2.8 MMHG
BH CV ECHO MEAS - LV MEAN PG: 1.43 MMHG
BH CV ECHO MEAS - LV V1 MAX: 82.9 CM/SEC
BH CV ECHO MEAS - LV V1 VTI: 11.6 CM
BH CV ECHO MEAS - LVIDD: 6.9 CM
BH CV ECHO MEAS - LVIDS: 6.3 CM
BH CV ECHO MEAS - LVOT AREA: 3.2 CM2
BH CV ECHO MEAS - LVOT DIAM: 2.01 CM
BH CV ECHO MEAS - LVPWD: 1.03 CM
BH CV ECHO MEAS - MED PEAK E' VEL: 5.4 CM/SEC
BH CV ECHO MEAS - MR MAX PG: 93.6 MMHG
BH CV ECHO MEAS - MR MAX VEL: 483.7 CM/SEC
BH CV ECHO MEAS - MV A MAX VEL: 55.8 CM/SEC
BH CV ECHO MEAS - MV DEC SLOPE: 611.9 CM/SEC2
BH CV ECHO MEAS - MV DEC TIME: 0.22 MSEC
BH CV ECHO MEAS - MV E MAX VEL: 90.7 CM/SEC
BH CV ECHO MEAS - MV E/A: 1.63
BH CV ECHO MEAS - MV MAX PG: 7.2 MMHG
BH CV ECHO MEAS - MV MEAN PG: 1.95 MMHG
BH CV ECHO MEAS - MV P1/2T: 66.3 MSEC
BH CV ECHO MEAS - MV V2 VTI: 27.5 CM
BH CV ECHO MEAS - MVA(P1/2T): 3.3 CM2
BH CV ECHO MEAS - MVA(VTI): 1.34 CM2
BH CV ECHO MEAS - PA ACC TIME: 0.11 SEC
BH CV ECHO MEAS - PA PR(ACCEL): 27.9 MMHG
BH CV ECHO MEAS - PA V2 MAX: 150.7 CM/SEC
BH CV ECHO MEAS - PULM DIAS VEL: 21.9 CM/SEC
BH CV ECHO MEAS - PULM S/D: 2.06
BH CV ECHO MEAS - PULM SYS VEL: 45 CM/SEC
BH CV ECHO MEAS - QP/QS: 0.9
BH CV ECHO MEAS - RAP SYSTOLE: 3 MMHG
BH CV ECHO MEAS - RV MAX PG: 1.79 MMHG
BH CV ECHO MEAS - RV V1 MAX: 66.9 CM/SEC
BH CV ECHO MEAS - RV V1 VTI: 7.7 CM
BH CV ECHO MEAS - RVOT DIAM: 2.35 CM
BH CV ECHO MEAS - RVSP: 24 MMHG
BH CV ECHO MEAS - SV(LVOT): 36.9 ML
BH CV ECHO MEAS - SV(MOD-SP2): 50 ML
BH CV ECHO MEAS - SV(MOD-SP4): 58 ML
BH CV ECHO MEAS - SV(RVOT): 33.3 ML
BH CV ECHO MEAS - TAPSE (>1.6): 1.75 CM
BH CV ECHO MEAS - TR MAX PG: 20.6 MMHG
BH CV ECHO MEAS - TR MAX VEL: 227.2 CM/SEC
BH CV ECHO MEASUREMENTS AVERAGE E/E' RATIO: 9.3
BH CV LOWER ARTERIAL LEFT 2ND DIGIT SYS MAX: 0
BH CV LOWER ARTERIAL LEFT 3RD DIGIT SYS MAX: 0
BH CV LOWER ARTERIAL LEFT 4TH DIGIT SYS MAX: 0
BH CV LOWER ARTERIAL LEFT 5TH DIGIT SYS MAX: 0
BH CV LOWER ARTERIAL LEFT ABI RATIO: 0.71
BH CV LOWER ARTERIAL LEFT ABI RATIO: 1
BH CV LOWER ARTERIAL LEFT CALF RATIO: 0.82
BH CV LOWER ARTERIAL LEFT CALF RATIO: NORMAL
BH CV LOWER ARTERIAL LEFT DORSALIS PEDIS SYS MAX: 66
BH CV LOWER ARTERIAL LEFT DORSALIS PEDIS SYS MAX: 85
BH CV LOWER ARTERIAL LEFT GREAT TOE SYS MAX: 0
BH CV LOWER ARTERIAL LEFT GREAT TOE SYS MAX: 55
BH CV LOWER ARTERIAL LEFT HIGH THIGH RATIO: 1.15
BH CV LOWER ARTERIAL LEFT HIGH THIGH RATIO: 1.54
BH CV LOWER ARTERIAL LEFT HIGH THIGH SYS MAX: 154
BH CV LOWER ARTERIAL LEFT HIGH THIGH SYS MAX: 98
BH CV LOWER ARTERIAL LEFT LOW THIGH RATIO: 1.24
BH CV LOWER ARTERIAL LEFT LOW THIGH RATIO: 1.46
BH CV LOWER ARTERIAL LEFT LOW THIGH SYS MAX: 105
BH CV LOWER ARTERIAL LEFT LOW THIGH SYS MAX: 146
BH CV LOWER ARTERIAL LEFT POPLITEAL SYS MAX: 82
BH CV LOWER ARTERIAL LEFT POPLITEAL SYS MAX: NORMAL
BH CV LOWER ARTERIAL LEFT POST TIBIAL SYS MAX: 63
BH CV LOWER ARTERIAL LEFT POST TIBIAL SYS MAX: 71
BH CV LOWER ARTERIAL LEFT TBI RATIO: 0
BH CV LOWER ARTERIAL LEFT TBI RATIO: 0.55
BH CV LOWER ARTERIAL RIGHT 2ND DIGIT SYS MAX: 0
BH CV LOWER ARTERIAL RIGHT 3RD DIGIT SYS MAX: 0
BH CV LOWER ARTERIAL RIGHT 4TH DIGIT SYS MAX: 0
BH CV LOWER ARTERIAL RIGHT 5TH DIGIT SYS MAX: 0
BH CV LOWER ARTERIAL RIGHT ABI RATIO: 0.69
BH CV LOWER ARTERIAL RIGHT ABI RATIO: 1.25
BH CV LOWER ARTERIAL RIGHT CALF RATIO: 0.67
BH CV LOWER ARTERIAL RIGHT CALF RATIO: NORMAL
BH CV LOWER ARTERIAL RIGHT DORSALIS PEDIS SYS MAX: 125
BH CV LOWER ARTERIAL RIGHT DORSALIS PEDIS SYS MAX: 48
BH CV LOWER ARTERIAL RIGHT GREAT TOE SYS MAX: 0
BH CV LOWER ARTERIAL RIGHT GREAT TOE SYS MAX: 31
BH CV LOWER ARTERIAL RIGHT HIGH THIGH RATIO: 1.41
BH CV LOWER ARTERIAL RIGHT HIGH THIGH RATIO: 1.51
BH CV LOWER ARTERIAL RIGHT HIGH THIGH SYS MAX: 128
BH CV LOWER ARTERIAL RIGHT HIGH THIGH SYS MAX: 141
BH CV LOWER ARTERIAL RIGHT LOW THIGH RATIO: 1.21
BH CV LOWER ARTERIAL RIGHT LOW THIGH RATIO: 1.61
BH CV LOWER ARTERIAL RIGHT LOW THIGH SYS MAX: 103
BH CV LOWER ARTERIAL RIGHT LOW THIGH SYS MAX: 161
BH CV LOWER ARTERIAL RIGHT POPLITEAL SYS MAX: 57
BH CV LOWER ARTERIAL RIGHT POPLITEAL SYS MAX: NORMAL
BH CV LOWER ARTERIAL RIGHT POST TIBIAL SYS MAX: 59
BH CV LOWER ARTERIAL RIGHT POST TIBIAL SYS MAX: 91
BH CV LOWER ARTERIAL RIGHT TBI RATIO: 0
BH CV LOWER ARTERIAL RIGHT TBI RATIO: 0.31
BH CV VAS DIALYSIS ARTERIAL ANASTOMOSIS DIAMETER: 0.23 CM
BH CV VAS DIALYSIS ARTERIAL ANASTOMOSIS EDV: 243 CM/SEC
BH CV VAS DIALYSIS ARTERIAL ANASTOMOSIS PSV: 392 CM/SEC
BH CV VAS DIALYSIS CONDUIT DIST DEPTH: 0.21 CM
BH CV VAS DIALYSIS CONDUIT DIST DIAMETER: 0.59 CM
BH CV VAS DIALYSIS CONDUIT DIST EDV: 45 CM/SEC
BH CV VAS DIALYSIS CONDUIT DIST FLOW VOL: 663 ML/MIN
BH CV VAS DIALYSIS CONDUIT DIST PSV: 84 CM/SEC
BH CV VAS DIALYSIS CONDUIT MID DEPTH: 0.17 CM
BH CV VAS DIALYSIS CONDUIT MID DIAMETER: 0.54 CM
BH CV VAS DIALYSIS CONDUIT MID EDV: 42 CM/SEC
BH CV VAS DIALYSIS CONDUIT MID PSV: 68 CM/SEC
BH CV VAS DIALYSIS CONDUIT MID/DIST DEPTH: 0.19 CM
BH CV VAS DIALYSIS CONDUIT MID/DIST DIAMETER: 0.54 CM
BH CV VAS DIALYSIS CONDUIT MID/DIST EDV: 50 CM/SEC
BH CV VAS DIALYSIS CONDUIT MID/DIST FLOW VOL: 661 ML/MIN
BH CV VAS DIALYSIS CONDUIT MID/DIST PSV: 59 CM/SEC
BH CV VAS DIALYSIS CONDUIT PROX DEPTH: 0.52 CM
BH CV VAS DIALYSIS CONDUIT PROX DIAMETER: 0.61 CM
BH CV VAS DIALYSIS CONDUIT PROX EDV: 24 CM/SEC
BH CV VAS DIALYSIS CONDUIT PROX PSV: 142 CM/SEC
BH CV VAS DIALYSIS CONDUIT PROX/MID DEPTH: 0.13 CM
BH CV VAS DIALYSIS CONDUIT PROX/MID DIAMETER: 0.59 CM
BH CV VAS DIALYSIS CONDUIT PROX/MID EDV: 35 CM/SEC
BH CV VAS DIALYSIS CONDUIT PROX/MID PSV: 62 CM/SEC
BH CV VAS DIALYSIS PRE-INFLOW BRACHIAL DIAMETER: 0.33 CM
BH CV VAS DIALYSIS PRE-INFLOW BRACHIAL EDV: 119 CM/SEC
BH CV VAS DIALYSIS PRE-INFLOW BRACHIAL PSV: 176 CM/SEC
BH CV VAS DIALYSIS VENOUS OUTFLOW AXILLARY DIAMETER: 0.52 CM
BH CV VAS DIALYSIS VENOUS OUTFLOW SUBCLAVIAN DIAMETER: 0.9 CM
BH CV XLRA - RV BASE: 3.2 CM
BH CV XLRA - RV LENGTH: 7.9 CM
BH CV XLRA - RV MID: 3 CM
BH CV XLRA - TDI S': 11.7 CM/SEC
BH CV XLRA MEAS - DIST GSV CALF DIST LEFT: 0.17 CM
BH CV XLRA MEAS - DIST GSV CALF DIST RIGHT: 0.22 CM
BH CV XLRA MEAS - DIST GSV THIGH DIST LEFT: 0.27 CM
BH CV XLRA MEAS - DIST GSV THIGH DIST RIGHT: 0.25 CM
BH CV XLRA MEAS - DIST LSV CALF DIST LEFT: 0.24 CM
BH CV XLRA MEAS - DIST LSV CALF DIST RIGHT: 0.25 CM
BH CV XLRA MEAS - GSV ANKLE DIST LEFT: 0.24 CM
BH CV XLRA MEAS - GSV ANKLE DIST RIGHT: 0.12 CM
BH CV XLRA MEAS - GSV KNEE DIST LEFT: 0.26 CM
BH CV XLRA MEAS - GSV KNEE DIST RIGHT: 0.28 CM
BH CV XLRA MEAS - GSV ORIGIN DIST LEFT: 0.81 CM
BH CV XLRA MEAS - GSV ORIGIN DIST RIGHT: 0.82 CM
BH CV XLRA MEAS - MID GSV CALF LEFT: 0.23 CM
BH CV XLRA MEAS - MID GSV CALF RIGHT: 0.11 CM
BH CV XLRA MEAS - MID GSV THIGH  LEFT: 0.24 CM
BH CV XLRA MEAS - MID GSV THIGH  RIGHT: 0.31 CM
BH CV XLRA MEAS - MID LSV CALF DIST LEFT: 0.33 CM
BH CV XLRA MEAS - MID LSV CALF DIST RIGHT: 0.2 CM
BH CV XLRA MEAS - PROX GSV CALF DIST LEFT: 0.18 CM
BH CV XLRA MEAS - PROX GSV CALF DIST RIGHT: 0.16 CM
BH CV XLRA MEAS - PROX GSV THIGH  LEFT: 0.29 CM
BH CV XLRA MEAS - PROX GSV THIGH  RIGHT: 0.31 CM
BH CV XLRA MEAS - PROX LSV CALF DIST LEFT: 0.29 CM
BH CV XLRA MEAS - PROX LSV CALF DIST RIGHT: 0.31 CM
BH CV XLRA MEAS LEFT DIST CCA EDV: -11.5 CM/SEC
BH CV XLRA MEAS LEFT DIST CCA PSV: -41.7 CM/SEC
BH CV XLRA MEAS LEFT DIST ICA EDV: -13.8 CM/SEC
BH CV XLRA MEAS LEFT DIST ICA PSV: -27.3 CM/SEC
BH CV XLRA MEAS LEFT ICA/CCA RATIO: 0.92
BH CV XLRA MEAS LEFT MID ICA EDV: -18.6 CM/SEC
BH CV XLRA MEAS LEFT MID ICA PSV: -34.6 CM/SEC
BH CV XLRA MEAS LEFT PROX CCA EDV: 12 CM/SEC
BH CV XLRA MEAS LEFT PROX CCA PSV: 37.7 CM/SEC
BH CV XLRA MEAS LEFT PROX ECA EDV: -9 CM/SEC
BH CV XLRA MEAS LEFT PROX ECA PSV: -27.9 CM/SEC
BH CV XLRA MEAS LEFT PROX ICA EDV: -19.1 CM/SEC
BH CV XLRA MEAS LEFT PROX ICA PSV: -38.3 CM/SEC
BH CV XLRA MEAS LEFT PROX SCLA PSV: 70.2 CM/SEC
BH CV XLRA MEAS LEFT VERTEBRAL A EDV: -15.2 CM/SEC
BH CV XLRA MEAS LEFT VERTEBRAL A PSV: -29.5 CM/SEC
BH CV XLRA MEAS RIGHT DIST CCA EDV: -15.1 CM/SEC
BH CV XLRA MEAS RIGHT DIST CCA PSV: -32.7 CM/SEC
BH CV XLRA MEAS RIGHT DIST ICA EDV: -17.4 CM/SEC
BH CV XLRA MEAS RIGHT DIST ICA PSV: -35.7 CM/SEC
BH CV XLRA MEAS RIGHT ICA/CCA RATIO: 1.2
BH CV XLRA MEAS RIGHT MID ICA EDV: -18.3 CM/SEC
BH CV XLRA MEAS RIGHT MID ICA PSV: -39.4 CM/SEC
BH CV XLRA MEAS RIGHT PROX CCA EDV: 9.7 CM/SEC
BH CV XLRA MEAS RIGHT PROX CCA PSV: 26.8 CM/SEC
BH CV XLRA MEAS RIGHT PROX ECA EDV: -5.9 CM/SEC
BH CV XLRA MEAS RIGHT PROX ECA PSV: -23.9 CM/SEC
BH CV XLRA MEAS RIGHT PROX ICA EDV: -12.8 CM/SEC
BH CV XLRA MEAS RIGHT PROX ICA PSV: -31.4 CM/SEC
BH CV XLRA MEAS RIGHT PROX SCLA PSV: 90.7 CM/SEC
BH CV XLRA MEAS RIGHT VERTEBRAL A EDV: -7.3 CM/SEC
BH CV XLRA MEAS RIGHT VERTEBRAL A PSV: -16 CM/SEC
BILIRUB SERPL-MCNC: 0.3 MG/DL (ref 0–1.2)
BILIRUB SERPL-MCNC: 0.3 MG/DL (ref 0–1.2)
BILIRUB SERPL-MCNC: 0.5 MG/DL (ref 0–1.2)
BILIRUB SERPL-MCNC: 0.7 MG/DL (ref 0–1.2)
BILIRUB SERPL-MCNC: <0.2 MG/DL (ref 0–1.2)
BILIRUB UR QL STRIP: NEGATIVE
BUN SERPL-MCNC: 12 MG/DL (ref 6–20)
BUN SERPL-MCNC: 25 MG/DL (ref 6–20)
BUN SERPL-MCNC: 26 MG/DL (ref 6–20)
BUN SERPL-MCNC: 26 MG/DL (ref 6–20)
BUN SERPL-MCNC: 28 MG/DL (ref 6–20)
BUN SERPL-MCNC: 3 MG/DL (ref 6–20)
BUN SERPL-MCNC: 31 MG/DL (ref 6–20)
BUN SERPL-MCNC: 32 MG/DL (ref 6–20)
BUN SERPL-MCNC: 33 MG/DL (ref 6–20)
BUN SERPL-MCNC: 34 MG/DL (ref 6–20)
BUN SERPL-MCNC: 35 MG/DL (ref 6–20)
BUN SERPL-MCNC: 36 MG/DL (ref 6–20)
BUN SERPL-MCNC: 36 MG/DL (ref 6–20)
BUN SERPL-MCNC: 38 MG/DL (ref 6–20)
BUN SERPL-MCNC: 38 MG/DL (ref 6–20)
BUN SERPL-MCNC: 40 MG/DL (ref 6–20)
BUN SERPL-MCNC: 43 MG/DL (ref 6–20)
BUN SERPL-MCNC: 48 MG/DL (ref 6–20)
BUN SERPL-MCNC: 49 MG/DL (ref 6–20)
BUN SERPL-MCNC: 49 MG/DL (ref 6–20)
BUN SERPL-MCNC: 5 MG/DL (ref 6–20)
BUN SERPL-MCNC: 52 MG/DL (ref 6–20)
BUN SERPL-MCNC: 59 MG/DL (ref 6–20)
BUN SERPL-MCNC: 61 MG/DL (ref 6–20)
BUN SERPL-MCNC: 7 MG/DL (ref 6–20)
BUN/CREAT SERPL: 3.9 (ref 7–25)
BUN/CREAT SERPL: 3.9 (ref 7–25)
BUN/CREAT SERPL: 4.2 (ref 7–25)
BUN/CREAT SERPL: 4.2 (ref 7–25)
BUN/CREAT SERPL: 4.3 (ref 7–25)
BUN/CREAT SERPL: 4.7 (ref 7–25)
BUN/CREAT SERPL: 4.9 (ref 7–25)
BUN/CREAT SERPL: 5 (ref 7–25)
BUN/CREAT SERPL: 5.1 (ref 7–25)
BUN/CREAT SERPL: 5.2 (ref 7–25)
BUN/CREAT SERPL: 5.2 (ref 7–25)
BUN/CREAT SERPL: 5.3 (ref 7–25)
BUN/CREAT SERPL: 5.4 (ref 7–25)
BUN/CREAT SERPL: 5.5 (ref 7–25)
BUN/CREAT SERPL: 5.7 (ref 7–25)
BUN/CREAT SERPL: 5.9 (ref 7–25)
BUN/CREAT SERPL: 6.5 (ref 7–25)
BUN/CREAT SERPL: 6.6 (ref 7–25)
BUN/CREAT SERPL: 6.7 (ref 7–25)
BUN/CREAT SERPL: 6.8 (ref 7–25)
BUN/CREAT SERPL: 7.7 (ref 7–25)
BUN/CREAT SERPL: 7.7 (ref 7–25)
BUN/CREAT SERPL: 7.8 (ref 7–25)
BURR CELLS BLD QL SMEAR: ABNORMAL
C DIFF TOX GENS STL QL NAA+PROBE: NEGATIVE
CA-I BLD-MCNC: 4.5 MG/DL (ref 4.6–5.4)
CA-I BLD-MCNC: 4.7 MG/DL (ref 4.6–5.4)
CA-I BLD-MCNC: 4.8 MG/DL (ref 4.6–5.4)
CA-I BLD-MCNC: 4.9 MG/DL (ref 4.6–5.4)
CA-I BLD-MCNC: 4.9 MG/DL (ref 4.6–5.4)
CA-I BLD-MCNC: 5.4 MG/DL (ref 4.6–5.4)
CA-I SERPL ISE-MCNC: 1.13 MMOL/L (ref 1.15–1.35)
CA-I SERPL ISE-MCNC: 1.18 MMOL/L (ref 1.15–1.35)
CA-I SERPL ISE-MCNC: 1.21 MMOL/L (ref 1.15–1.35)
CA-I SERPL ISE-MCNC: 1.22 MMOL/L (ref 1.15–1.35)
CA-I SERPL ISE-MCNC: 1.22 MMOL/L (ref 1.15–1.35)
CA-I SERPL ISE-MCNC: 1.35 MMOL/L (ref 1.15–1.35)
CALCIUM SPEC-SCNC: 10.3 MG/DL (ref 8.6–10.5)
CALCIUM SPEC-SCNC: 4.7 MG/DL (ref 8.6–10.5)
CALCIUM SPEC-SCNC: 6.8 MG/DL (ref 8.6–10.5)
CALCIUM SPEC-SCNC: 7.1 MG/DL (ref 8.6–10.5)
CALCIUM SPEC-SCNC: 7.4 MG/DL (ref 8.6–10.5)
CALCIUM SPEC-SCNC: 7.5 MG/DL (ref 8.6–10.5)
CALCIUM SPEC-SCNC: 7.6 MG/DL (ref 8.6–10.5)
CALCIUM SPEC-SCNC: 7.7 MG/DL (ref 8.6–10.5)
CALCIUM SPEC-SCNC: 7.7 MG/DL (ref 8.6–10.5)
CALCIUM SPEC-SCNC: 7.8 MG/DL (ref 8.6–10.5)
CALCIUM SPEC-SCNC: 7.9 MG/DL (ref 8.6–10.5)
CALCIUM SPEC-SCNC: 8 MG/DL (ref 8.6–10.5)
CALCIUM SPEC-SCNC: 8 MG/DL (ref 8.6–10.5)
CALCIUM SPEC-SCNC: 8.1 MG/DL (ref 8.6–10.5)
CALCIUM SPEC-SCNC: 8.2 MG/DL (ref 8.6–10.5)
CALCIUM SPEC-SCNC: 8.3 MG/DL (ref 8.6–10.5)
CALCIUM SPEC-SCNC: 8.3 MG/DL (ref 8.6–10.5)
CALCIUM SPEC-SCNC: 8.4 MG/DL (ref 8.6–10.5)
CALCIUM SPEC-SCNC: 8.5 MG/DL (ref 8.6–10.5)
CALCIUM SPEC-SCNC: 8.5 MG/DL (ref 8.6–10.5)
CALCIUM SPEC-SCNC: 8.6 MG/DL (ref 8.6–10.5)
CALCIUM SPEC-SCNC: 8.6 MG/DL (ref 8.6–10.5)
CALCIUM SPEC-SCNC: 8.7 MG/DL (ref 8.6–10.5)
CALCIUM SPEC-SCNC: 8.7 MG/DL (ref 8.6–10.5)
CALCIUM SPEC-SCNC: 9 MG/DL (ref 8.6–10.5)
CANNABINOIDS SERPL QL: NEGATIVE
CHLORIDE SERPL-SCNC: 100 MMOL/L (ref 98–107)
CHLORIDE SERPL-SCNC: 100 MMOL/L (ref 98–107)
CHLORIDE SERPL-SCNC: 101 MMOL/L (ref 98–107)
CHLORIDE SERPL-SCNC: 102 MMOL/L (ref 98–107)
CHLORIDE SERPL-SCNC: 102 MMOL/L (ref 98–107)
CHLORIDE SERPL-SCNC: 103 MMOL/L (ref 98–107)
CHLORIDE SERPL-SCNC: 103 MMOL/L (ref 98–107)
CHLORIDE SERPL-SCNC: 104 MMOL/L (ref 98–107)
CHLORIDE SERPL-SCNC: 104 MMOL/L (ref 98–107)
CHLORIDE SERPL-SCNC: 106 MMOL/L (ref 98–107)
CHLORIDE SERPL-SCNC: 107 MMOL/L (ref 98–107)
CHLORIDE SERPL-SCNC: 117 MMOL/L (ref 98–107)
CHLORIDE SERPL-SCNC: 96 MMOL/L (ref 98–107)
CHLORIDE SERPL-SCNC: 97 MMOL/L (ref 98–107)
CHLORIDE SERPL-SCNC: 97 MMOL/L (ref 98–107)
CHLORIDE SERPL-SCNC: 98 MMOL/L (ref 98–107)
CHLORIDE SERPL-SCNC: 99 MMOL/L (ref 98–107)
CK SERPL-CCNC: 104 U/L (ref 20–180)
CLARITY UR: ABNORMAL
CLARITY UR: ABNORMAL
CLARITY UR: CLEAR
CO2 SERPL-SCNC: 11.2 MMOL/L (ref 22–29)
CO2 SERPL-SCNC: 11.9 MMOL/L (ref 22–29)
CO2 SERPL-SCNC: 15.2 MMOL/L (ref 22–29)
CO2 SERPL-SCNC: 15.7 MMOL/L (ref 22–29)
CO2 SERPL-SCNC: 17.8 MMOL/L (ref 22–29)
CO2 SERPL-SCNC: 18 MMOL/L (ref 22–29)
CO2 SERPL-SCNC: 18.1 MMOL/L (ref 22–29)
CO2 SERPL-SCNC: 18.6 MMOL/L (ref 22–29)
CO2 SERPL-SCNC: 19 MMOL/L (ref 22–29)
CO2 SERPL-SCNC: 20.4 MMOL/L (ref 22–29)
CO2 SERPL-SCNC: 21 MMOL/L (ref 22–29)
CO2 SERPL-SCNC: 21.1 MMOL/L (ref 22–29)
CO2 SERPL-SCNC: 21.6 MMOL/L (ref 22–29)
CO2 SERPL-SCNC: 22 MMOL/L (ref 22–29)
CO2 SERPL-SCNC: 22.2 MMOL/L (ref 22–29)
CO2 SERPL-SCNC: 22.6 MMOL/L (ref 22–29)
CO2 SERPL-SCNC: 22.7 MMOL/L (ref 22–29)
CO2 SERPL-SCNC: 23 MMOL/L (ref 22–29)
CO2 SERPL-SCNC: 24 MMOL/L (ref 22–29)
CO2 SERPL-SCNC: 24.1 MMOL/L (ref 22–29)
CO2 SERPL-SCNC: 24.7 MMOL/L (ref 22–29)
CO2 SERPL-SCNC: 25 MMOL/L (ref 22–29)
CO2 SERPL-SCNC: 28.3 MMOL/L (ref 22–29)
COCAINE UR QL: NEGATIVE
COLOR FLD: YELLOW
COLOR UR: YELLOW
CREAT SERPL-MCNC: 0.76 MG/DL (ref 0.57–1)
CREAT SERPL-MCNC: 0.76 MG/DL (ref 0.57–1)
CREAT SERPL-MCNC: 1.08 MG/DL (ref 0.57–1)
CREAT SERPL-MCNC: 1.79 MG/DL (ref 0.57–1)
CREAT SERPL-MCNC: 10.08 MG/DL (ref 0.57–1)
CREAT SERPL-MCNC: 12.27 MG/DL (ref 0.57–1)
CREAT SERPL-MCNC: 4.18 MG/DL (ref 0.57–1)
CREAT SERPL-MCNC: 4.52 MG/DL (ref 0.57–1)
CREAT SERPL-MCNC: 5.12 MG/DL (ref 0.57–1)
CREAT SERPL-MCNC: 5.95 MG/DL (ref 0.57–1)
CREAT SERPL-MCNC: 6.06 MG/DL (ref 0.57–1)
CREAT SERPL-MCNC: 6.13 MG/DL (ref 0.57–1)
CREAT SERPL-MCNC: 6.44 MG/DL (ref 0.57–1)
CREAT SERPL-MCNC: 6.58 MG/DL (ref 0.57–1)
CREAT SERPL-MCNC: 6.59 MG/DL (ref 0.57–1)
CREAT SERPL-MCNC: 6.61 MG/DL (ref 0.57–1)
CREAT SERPL-MCNC: 6.87 MG/DL (ref 0.57–1)
CREAT SERPL-MCNC: 7.01 MG/DL (ref 0.57–1)
CREAT SERPL-MCNC: 7.49 MG/DL (ref 0.57–1)
CREAT SERPL-MCNC: 7.55 MG/DL (ref 0.57–1)
CREAT SERPL-MCNC: 7.6 MG/DL (ref 0.57–1)
CREAT SERPL-MCNC: 7.79 MG/DL (ref 0.57–1)
CREAT SERPL-MCNC: 8.87 MG/DL (ref 0.57–1)
CREAT SERPL-MCNC: 9.23 MG/DL (ref 0.57–1)
CREAT SERPL-MCNC: 9.44 MG/DL (ref 0.57–1)
CRP SERPL-MCNC: 0.8 MG/DL (ref 0–0.5)
D-LACTATE SERPL-SCNC: 1.6 MMOL/L (ref 0.5–2)
D-LACTATE SERPL-SCNC: 1.7 MMOL/L (ref 0.5–2)
D-LACTATE SERPL-SCNC: 1.9 MMOL/L (ref 0.5–2)
D-LACTATE SERPL-SCNC: 2.6 MMOL/L (ref 0.5–2)
D-LACTATE SERPL-SCNC: 2.7 MMOL/L (ref 0.5–2)
D-LACTATE SERPL-SCNC: 3.1 MMOL/L (ref 0.5–2)
D-LACTATE SERPL-SCNC: 4 MMOL/L (ref 0.5–2)
D-LACTATE SERPL-SCNC: 6.5 MMOL/L (ref 0.5–2)
D-LACTATE SERPL-SCNC: 7.1 MMOL/L (ref 0.5–2)
D-LACTATE SERPL-SCNC: 7.6 MMOL/L (ref 0.5–2)
D-LACTATE SERPL-SCNC: 7.6 MMOL/L (ref 0.5–2)
D-LACTATE SERPL-SCNC: 7.8 MMOL/L (ref 0.5–2)
DEPRECATED RDW RBC AUTO: 47.3 FL (ref 37–54)
DEPRECATED RDW RBC AUTO: 47.7 FL (ref 37–54)
DEPRECATED RDW RBC AUTO: 49 FL (ref 37–54)
DEPRECATED RDW RBC AUTO: 49.6 FL (ref 37–54)
DEPRECATED RDW RBC AUTO: 49.7 FL (ref 37–54)
DEPRECATED RDW RBC AUTO: 49.9 FL (ref 37–54)
DEPRECATED RDW RBC AUTO: 50 FL (ref 37–54)
DEPRECATED RDW RBC AUTO: 50 FL (ref 37–54)
DEPRECATED RDW RBC AUTO: 50.1 FL (ref 37–54)
DEPRECATED RDW RBC AUTO: 51.9 FL (ref 37–54)
DEPRECATED RDW RBC AUTO: 52 FL (ref 37–54)
DEPRECATED RDW RBC AUTO: 52 FL (ref 37–54)
DEPRECATED RDW RBC AUTO: 52.3 FL (ref 37–54)
DEPRECATED RDW RBC AUTO: 52.6 FL (ref 37–54)
DEPRECATED RDW RBC AUTO: 53 FL (ref 37–54)
EGFRCR SERPLBLD CKD-EPI 2021: 11.3 ML/MIN/1.73
EGFRCR SERPLBLD CKD-EPI 2021: 12.4 ML/MIN/1.73
EGFRCR SERPLBLD CKD-EPI 2021: 3.4 ML/MIN/1.73
EGFRCR SERPLBLD CKD-EPI 2021: 34.2 ML/MIN/1.73
EGFRCR SERPLBLD CKD-EPI 2021: 4.3 ML/MIN/1.73
EGFRCR SERPLBLD CKD-EPI 2021: 4.7 ML/MIN/1.73
EGFRCR SERPLBLD CKD-EPI 2021: 4.8 ML/MIN/1.73
EGFRCR SERPLBLD CKD-EPI 2021: 5 ML/MIN/1.73
EGFRCR SERPLBLD CKD-EPI 2021: 5.9 ML/MIN/1.73
EGFRCR SERPLBLD CKD-EPI 2021: 6 ML/MIN/1.73
EGFRCR SERPLBLD CKD-EPI 2021: 6.1 ML/MIN/1.73
EGFRCR SERPLBLD CKD-EPI 2021: 6.1 ML/MIN/1.73
EGFRCR SERPLBLD CKD-EPI 2021: 6.6 ML/MIN/1.73
EGFRCR SERPLBLD CKD-EPI 2021: 6.8 ML/MIN/1.73
EGFRCR SERPLBLD CKD-EPI 2021: 62.7 ML/MIN/1.73
EGFRCR SERPLBLD CKD-EPI 2021: 7.1 ML/MIN/1.73
EGFRCR SERPLBLD CKD-EPI 2021: 7.2 ML/MIN/1.73
EGFRCR SERPLBLD CKD-EPI 2021: 7.2 ML/MIN/1.73
EGFRCR SERPLBLD CKD-EPI 2021: 7.4 ML/MIN/1.73
EGFRCR SERPLBLD CKD-EPI 2021: 7.8 ML/MIN/1.73
EGFRCR SERPLBLD CKD-EPI 2021: 7.9 ML/MIN/1.73
EGFRCR SERPLBLD CKD-EPI 2021: 8.1 ML/MIN/1.73
EGFRCR SERPLBLD CKD-EPI 2021: 9.7 ML/MIN/1.73
EGFRCR SERPLBLD CKD-EPI 2021: 95.6 ML/MIN/1.73
EGFRCR SERPLBLD CKD-EPI 2021: 95.6 ML/MIN/1.73
EOSINOPHIL # BLD AUTO: 0.09 10*3/MM3 (ref 0–0.4)
EOSINOPHIL # BLD AUTO: 0.28 10*3/MM3 (ref 0–0.4)
EOSINOPHIL # BLD AUTO: 0.33 10*3/MM3 (ref 0–0.4)
EOSINOPHIL # BLD AUTO: 0.5 10*3/MM3 (ref 0–0.4)
EOSINOPHIL # BLD MANUAL: 0.28 10*3/MM3 (ref 0–0.4)
EOSINOPHIL # BLD MANUAL: 0.85 10*3/MM3 (ref 0–0.4)
EOSINOPHIL NFR BLD AUTO: 1.1 % (ref 0.3–6.2)
EOSINOPHIL NFR BLD AUTO: 2 % (ref 0.3–6.2)
EOSINOPHIL NFR BLD AUTO: 3 % (ref 0.3–6.2)
EOSINOPHIL NFR BLD AUTO: 4.9 % (ref 0.3–6.2)
EOSINOPHIL NFR BLD MANUAL: 2.1 % (ref 0.3–6.2)
EOSINOPHIL NFR BLD MANUAL: 5.1 % (ref 0.3–6.2)
ERYTHROCYTE [DISTWIDTH] IN BLOOD BY AUTOMATED COUNT: 14 % (ref 12.3–15.4)
ERYTHROCYTE [DISTWIDTH] IN BLOOD BY AUTOMATED COUNT: 14.2 % (ref 12.3–15.4)
ERYTHROCYTE [DISTWIDTH] IN BLOOD BY AUTOMATED COUNT: 14.4 % (ref 12.3–15.4)
ERYTHROCYTE [DISTWIDTH] IN BLOOD BY AUTOMATED COUNT: 14.7 % (ref 12.3–15.4)
ERYTHROCYTE [DISTWIDTH] IN BLOOD BY AUTOMATED COUNT: 14.8 % (ref 12.3–15.4)
ERYTHROCYTE [DISTWIDTH] IN BLOOD BY AUTOMATED COUNT: 14.9 % (ref 12.3–15.4)
ERYTHROCYTE [DISTWIDTH] IN BLOOD BY AUTOMATED COUNT: 14.9 % (ref 12.3–15.4)
ERYTHROCYTE [DISTWIDTH] IN BLOOD BY AUTOMATED COUNT: 15 % (ref 12.3–15.4)
ERYTHROCYTE [DISTWIDTH] IN BLOOD BY AUTOMATED COUNT: 15 % (ref 12.3–15.4)
ERYTHROCYTE [DISTWIDTH] IN BLOOD BY AUTOMATED COUNT: 15.1 % (ref 12.3–15.4)
ERYTHROCYTE [DISTWIDTH] IN BLOOD BY AUTOMATED COUNT: 15.2 % (ref 12.3–15.4)
ERYTHROCYTE [DISTWIDTH] IN BLOOD BY AUTOMATED COUNT: 15.3 % (ref 12.3–15.4)
ERYTHROCYTE [DISTWIDTH] IN BLOOD BY AUTOMATED COUNT: 15.7 % (ref 12.3–15.4)
ERYTHROCYTE [SEDIMENTATION RATE] IN BLOOD: 17 MM/HR (ref 0–20)
ETHANOL BLD-MCNC: <10 MG/DL (ref 0–10)
ETHANOL UR QL: <0.01 %
GLOBULIN UR ELPH-MCNC: 1.6 GM/DL
GLOBULIN UR ELPH-MCNC: 2.7 GM/DL
GLOBULIN UR ELPH-MCNC: 3 GM/DL
GLOBULIN UR ELPH-MCNC: 3.1 GM/DL
GLOBULIN UR ELPH-MCNC: 3.4 GM/DL
GLUCOSE BLDC GLUCOMTR-MCNC: 106 MG/DL (ref 70–130)
GLUCOSE BLDC GLUCOMTR-MCNC: 108 MG/DL (ref 70–130)
GLUCOSE BLDC GLUCOMTR-MCNC: 125 MG/DL (ref 70–130)
GLUCOSE BLDC GLUCOMTR-MCNC: 142 MG/DL (ref 70–130)
GLUCOSE BLDC GLUCOMTR-MCNC: 145 MG/DL (ref 70–130)
GLUCOSE BLDC GLUCOMTR-MCNC: 146 MG/DL (ref 70–130)
GLUCOSE BLDC GLUCOMTR-MCNC: 192 MG/DL (ref 70–130)
GLUCOSE BLDC GLUCOMTR-MCNC: 66 MG/DL (ref 70–130)
GLUCOSE BLDC GLUCOMTR-MCNC: 74 MG/DL (ref 70–130)
GLUCOSE BLDC GLUCOMTR-MCNC: 77 MG/DL (ref 70–130)
GLUCOSE BLDC GLUCOMTR-MCNC: 80 MG/DL (ref 70–130)
GLUCOSE BLDC GLUCOMTR-MCNC: 83 MG/DL (ref 70–130)
GLUCOSE BLDC GLUCOMTR-MCNC: 89 MG/DL (ref 70–130)
GLUCOSE SERPL-MCNC: 104 MG/DL (ref 65–99)
GLUCOSE SERPL-MCNC: 107 MG/DL (ref 65–99)
GLUCOSE SERPL-MCNC: 108 MG/DL (ref 65–99)
GLUCOSE SERPL-MCNC: 113 MG/DL (ref 65–99)
GLUCOSE SERPL-MCNC: 117 MG/DL (ref 65–99)
GLUCOSE SERPL-MCNC: 125 MG/DL (ref 65–99)
GLUCOSE SERPL-MCNC: 136 MG/DL (ref 65–99)
GLUCOSE SERPL-MCNC: 140 MG/DL (ref 65–99)
GLUCOSE SERPL-MCNC: 156 MG/DL (ref 65–99)
GLUCOSE SERPL-MCNC: 157 MG/DL (ref 65–99)
GLUCOSE SERPL-MCNC: 176 MG/DL (ref 65–99)
GLUCOSE SERPL-MCNC: 183 MG/DL (ref 65–99)
GLUCOSE SERPL-MCNC: 270 MG/DL (ref 65–99)
GLUCOSE SERPL-MCNC: 72 MG/DL (ref 65–99)
GLUCOSE SERPL-MCNC: 79 MG/DL (ref 65–99)
GLUCOSE SERPL-MCNC: 82 MG/DL (ref 65–99)
GLUCOSE SERPL-MCNC: 84 MG/DL (ref 65–99)
GLUCOSE SERPL-MCNC: 85 MG/DL (ref 65–99)
GLUCOSE SERPL-MCNC: 85 MG/DL (ref 65–99)
GLUCOSE SERPL-MCNC: 86 MG/DL (ref 65–99)
GLUCOSE SERPL-MCNC: 91 MG/DL (ref 65–99)
GLUCOSE SERPL-MCNC: 96 MG/DL (ref 65–99)
GLUCOSE SERPL-MCNC: 97 MG/DL (ref 65–99)
GLUCOSE UR STRIP-MCNC: NEGATIVE MG/DL
HCO3 BLDA-SCNC: 4.4 MMOL/L (ref 22–28)
HCO3 BLDA-SCNC: 5.5 MMOL/L (ref 22–28)
HCO3 BLDV-SCNC: 22.2 MMOL/L (ref 22–28)
HCO3 BLDV-SCNC: 25.4 MMOL/L (ref 22–28)
HCT VFR BLD AUTO: 30.6 % (ref 34–46.6)
HCT VFR BLD AUTO: 31.3 % (ref 34–46.6)
HCT VFR BLD AUTO: 33.9 % (ref 34–46.6)
HCT VFR BLD AUTO: 34.1 % (ref 34–46.6)
HCT VFR BLD AUTO: 34.5 % (ref 34–46.6)
HCT VFR BLD AUTO: 35.1 % (ref 34–46.6)
HCT VFR BLD AUTO: 35.3 % (ref 34–46.6)
HCT VFR BLD AUTO: 36.5 % (ref 34–46.6)
HCT VFR BLD AUTO: 36.6 % (ref 34–46.6)
HCT VFR BLD AUTO: 37.3 % (ref 34–46.6)
HCT VFR BLD AUTO: 37.3 % (ref 34–46.6)
HCT VFR BLD AUTO: 37.9 % (ref 34–46.6)
HCT VFR BLD AUTO: 39.5 % (ref 34–46.6)
HCT VFR BLD AUTO: 41.7 % (ref 34–46.6)
HCT VFR BLD AUTO: 42.8 % (ref 34–46.6)
HGB BLD-MCNC: 10.5 G/DL (ref 12–15.9)
HGB BLD-MCNC: 10.6 G/DL (ref 12–15.9)
HGB BLD-MCNC: 10.8 G/DL (ref 12–15.9)
HGB BLD-MCNC: 11 G/DL (ref 12–15.9)
HGB BLD-MCNC: 11.1 G/DL (ref 12–15.9)
HGB BLD-MCNC: 11.2 G/DL (ref 12–15.9)
HGB BLD-MCNC: 11.4 G/DL (ref 12–15.9)
HGB BLD-MCNC: 11.4 G/DL (ref 12–15.9)
HGB BLD-MCNC: 11.9 G/DL (ref 12–15.9)
HGB BLD-MCNC: 12 G/DL (ref 12–15.9)
HGB BLD-MCNC: 12.6 G/DL (ref 12–15.9)
HGB BLD-MCNC: 12.9 G/DL (ref 12–15.9)
HGB BLD-MCNC: 13 G/DL (ref 12–15.9)
HGB BLD-MCNC: 9.8 G/DL (ref 12–15.9)
HGB BLD-MCNC: 9.8 G/DL (ref 12–15.9)
HGB UR QL STRIP.AUTO: ABNORMAL
HOLD SPECIMEN: NORMAL
HYALINE CASTS UR QL AUTO: ABNORMAL /LPF
IMM GRANULOCYTES # BLD AUTO: 0.13 10*3/MM3 (ref 0–0.05)
IMM GRANULOCYTES # BLD AUTO: 0.22 10*3/MM3 (ref 0–0.05)
IMM GRANULOCYTES # BLD AUTO: 0.29 10*3/MM3 (ref 0–0.05)
IMM GRANULOCYTES # BLD AUTO: 0.37 10*3/MM3 (ref 0–0.05)
IMM GRANULOCYTES NFR BLD AUTO: 1.6 % (ref 0–0.5)
IMM GRANULOCYTES NFR BLD AUTO: 2 % (ref 0–0.5)
IMM GRANULOCYTES NFR BLD AUTO: 2.7 % (ref 0–0.5)
IMM GRANULOCYTES NFR BLD AUTO: 2.8 % (ref 0–0.5)
INHALED O2 CONCENTRATION: 100 %
INHALED O2 CONCENTRATION: 95 %
INR PPP: 1.05 (ref 0.9–1.1)
KETONES UR QL STRIP: ABNORMAL
KETONES UR QL STRIP: NEGATIVE
KETONES UR QL STRIP: NEGATIVE
LEFT ARM BP: 100 MMHG
LEFT ATRIUM VOLUME INDEX: 30.9 ML/M2
LEUKOCYTE ESTERASE UR QL STRIP.AUTO: ABNORMAL
LYMPHOCYTES # BLD AUTO: 2.18 10*3/MM3 (ref 0.7–3.1)
LYMPHOCYTES # BLD AUTO: 2.4 10*3/MM3 (ref 0.7–3.1)
LYMPHOCYTES # BLD AUTO: 2.53 10*3/MM3 (ref 0.7–3.1)
LYMPHOCYTES # BLD AUTO: 3.08 10*3/MM3 (ref 0.7–3.1)
LYMPHOCYTES # BLD MANUAL: 0.96 10*3/MM3 (ref 0.7–3.1)
LYMPHOCYTES # BLD MANUAL: 1.88 10*3/MM3 (ref 0.7–3.1)
LYMPHOCYTES NFR BLD AUTO: 17.4 % (ref 19.6–45.3)
LYMPHOCYTES NFR BLD AUTO: 20 % (ref 19.6–45.3)
LYMPHOCYTES NFR BLD AUTO: 24.7 % (ref 19.6–45.3)
LYMPHOCYTES NFR BLD AUTO: 38.7 % (ref 19.6–45.3)
LYMPHOCYTES NFR BLD MANUAL: 5.2 % (ref 5–12)
LYMPHOCYTES NFR BLD MANUAL: 7.1 % (ref 5–12)
LYMPHOCYTES NFR FLD MANUAL: 17 %
MAGNESIUM SERPL-MCNC: 1.1 MG/DL (ref 1.6–2.6)
MAGNESIUM SERPL-MCNC: 1.7 MG/DL (ref 1.6–2.6)
MAGNESIUM SERPL-MCNC: 1.7 MG/DL (ref 1.6–2.6)
MAGNESIUM SERPL-MCNC: 1.8 MG/DL (ref 1.6–2.6)
MAGNESIUM SERPL-MCNC: 1.9 MG/DL (ref 1.6–2.6)
MAGNESIUM SERPL-MCNC: 2 MG/DL (ref 1.6–2.6)
MAGNESIUM SERPL-MCNC: 2.1 MG/DL (ref 1.6–2.6)
MAGNESIUM SERPL-MCNC: 2.2 MG/DL (ref 1.6–2.6)
MAGNESIUM SERPL-MCNC: 2.3 MG/DL (ref 1.6–2.6)
MAGNESIUM SERPL-MCNC: 2.3 MG/DL (ref 1.6–2.6)
MAGNESIUM SERPL-MCNC: 2.4 MG/DL (ref 1.6–2.6)
MAGNESIUM SERPL-MCNC: 2.5 MG/DL (ref 1.6–2.6)
MAXIMAL PREDICTED HEART RATE: 170 BPM
MCH RBC QN AUTO: 29.3 PG (ref 26.6–33)
MCH RBC QN AUTO: 29.4 PG (ref 26.6–33)
MCH RBC QN AUTO: 29.5 PG (ref 26.6–33)
MCH RBC QN AUTO: 29.7 PG (ref 26.6–33)
MCH RBC QN AUTO: 29.8 PG (ref 26.6–33)
MCH RBC QN AUTO: 29.8 PG (ref 26.6–33)
MCH RBC QN AUTO: 29.9 PG (ref 26.6–33)
MCH RBC QN AUTO: 30.1 PG (ref 26.6–33)
MCH RBC QN AUTO: 30.2 PG (ref 26.6–33)
MCH RBC QN AUTO: 30.4 PG (ref 26.6–33)
MCH RBC QN AUTO: 30.5 PG (ref 26.6–33)
MCHC RBC AUTO-ENTMCNC: 30.2 G/DL (ref 31.5–35.7)
MCHC RBC AUTO-ENTMCNC: 30.3 G/DL (ref 31.5–35.7)
MCHC RBC AUTO-ENTMCNC: 30.4 G/DL (ref 31.5–35.7)
MCHC RBC AUTO-ENTMCNC: 30.6 G/DL (ref 31.5–35.7)
MCHC RBC AUTO-ENTMCNC: 30.8 G/DL (ref 31.5–35.7)
MCHC RBC AUTO-ENTMCNC: 30.9 G/DL (ref 31.5–35.7)
MCHC RBC AUTO-ENTMCNC: 31.2 G/DL (ref 31.5–35.7)
MCHC RBC AUTO-ENTMCNC: 31.3 G/DL (ref 31.5–35.7)
MCHC RBC AUTO-ENTMCNC: 31.3 G/DL (ref 31.5–35.7)
MCHC RBC AUTO-ENTMCNC: 31.7 G/DL (ref 31.5–35.7)
MCHC RBC AUTO-ENTMCNC: 31.7 G/DL (ref 31.5–35.7)
MCHC RBC AUTO-ENTMCNC: 31.9 G/DL (ref 31.5–35.7)
MCHC RBC AUTO-ENTMCNC: 31.9 G/DL (ref 31.5–35.7)
MCHC RBC AUTO-ENTMCNC: 32 G/DL (ref 31.5–35.7)
MCHC RBC AUTO-ENTMCNC: 32.4 G/DL (ref 31.5–35.7)
MCV RBC AUTO: 92.4 FL (ref 79–97)
MCV RBC AUTO: 92.9 FL (ref 79–97)
MCV RBC AUTO: 93.5 FL (ref 79–97)
MCV RBC AUTO: 93.8 FL (ref 79–97)
MCV RBC AUTO: 94.2 FL (ref 79–97)
MCV RBC AUTO: 95.1 FL (ref 79–97)
MCV RBC AUTO: 95.2 FL (ref 79–97)
MCV RBC AUTO: 95.8 FL (ref 79–97)
MCV RBC AUTO: 96.2 FL (ref 79–97)
MCV RBC AUTO: 96.3 FL (ref 79–97)
MCV RBC AUTO: 96.4 FL (ref 79–97)
MCV RBC AUTO: 97.1 FL (ref 79–97)
MCV RBC AUTO: 97.2 FL (ref 79–97)
MCV RBC AUTO: 97.3 FL (ref 79–97)
MCV RBC AUTO: 97.5 FL (ref 79–97)
METAMYELOCYTES NFR BLD MANUAL: 1 % (ref 0–0)
METAMYELOCYTES NFR BLD MANUAL: 1 % (ref 0–0)
METHADONE UR QL SCN: NEGATIVE
METHOD: ABNORMAL
MODALITY: ABNORMAL
MONOCYTES # BLD AUTO: 0.56 10*3/MM3 (ref 0.1–0.9)
MONOCYTES # BLD AUTO: 0.63 10*3/MM3 (ref 0.1–0.9)
MONOCYTES # BLD AUTO: 0.74 10*3/MM3 (ref 0.1–0.9)
MONOCYTES # BLD AUTO: 1.15 10*3/MM3 (ref 0.1–0.9)
MONOCYTES # BLD: 0.68 10*3/MM3 (ref 0.1–0.9)
MONOCYTES # BLD: 1.19 10*3/MM3 (ref 0.1–0.9)
MONOCYTES NFR BLD AUTO: 5.5 % (ref 5–12)
MONOCYTES NFR BLD AUTO: 5.8 % (ref 5–12)
MONOCYTES NFR BLD AUTO: 8.3 % (ref 5–12)
MONOCYTES NFR BLD AUTO: 9.3 % (ref 5–12)
MONOS+MACROS NFR FLD: 36 %
MYELOCYTES NFR BLD MANUAL: 2 % (ref 0–0)
MYELOCYTES NFR BLD MANUAL: 4.2 % (ref 0–0)
NEUTROPHILS # BLD AUTO: 10.52 10*3/MM3 (ref 1.7–7)
NEUTROPHILS # BLD AUTO: 12.13 10*3/MM3 (ref 1.7–7)
NEUTROPHILS NFR BLD AUTO: 3.82 10*3/MM3 (ref 1.7–7)
NEUTROPHILS NFR BLD AUTO: 48 % (ref 42.7–76)
NEUTROPHILS NFR BLD AUTO: 6.25 10*3/MM3 (ref 1.7–7)
NEUTROPHILS NFR BLD AUTO: 60.8 % (ref 42.7–76)
NEUTROPHILS NFR BLD AUTO: 68.3 % (ref 42.7–76)
NEUTROPHILS NFR BLD AUTO: 68.9 % (ref 42.7–76)
NEUTROPHILS NFR BLD AUTO: 7.42 10*3/MM3 (ref 1.7–7)
NEUTROPHILS NFR BLD AUTO: 9.53 10*3/MM3 (ref 1.7–7)
NEUTROPHILS NFR BLD MANUAL: 72.4 % (ref 42.7–76)
NEUTROPHILS NFR BLD MANUAL: 80.2 % (ref 42.7–76)
NEUTROPHILS NFR FLD MANUAL: 47 %
NITRITE UR QL STRIP: NEGATIVE
NRBC BLD AUTO-RTO: 0.1 /100 WBC (ref 0–0.2)
NRBC BLD AUTO-RTO: 0.3 /100 WBC (ref 0–0.2)
NRBC BLD AUTO-RTO: 0.3 /100 WBC (ref 0–0.2)
NRBC BLD AUTO-RTO: 0.4 /100 WBC (ref 0–0.2)
NRBC BLD AUTO-RTO: 0.4 /100 WBC (ref 0–0.2)
NRBC SPEC MANUAL: 1 /100 WBC (ref 0–0.2)
NT-PROBNP SERPL-MCNC: ABNORMAL PG/ML (ref 0–900)
NUC CELL # FLD: 355 /MM3
O2 A-A PPRESDIFF RESPIRATORY: 0 MMHG
O2 A-A PPRESDIFF RESPIRATORY: 0.1 MMHG
OPIATES UR QL: POSITIVE
OXYCODONE UR QL SCN: NEGATIVE
PCO2 BLDA: 17.5 MM HG (ref 35–45)
PCO2 BLDA: 18 MM HG (ref 35–45)
PCO2 BLDV: 35 MM HG (ref 41–51)
PCO2 BLDV: 38.4 MM HG (ref 41–51)
PEEP RESPIRATORY: 7.5 CM[H2O]
PEEP RESPIRATORY: 7.5 CM[H2O]
PH BLDA: 7.01 PH UNITS (ref 7.35–7.45)
PH BLDA: 7.09 PH UNITS (ref 7.35–7.45)
PH BLDV: 7.37 PH UNITS (ref 7.31–7.41)
PH BLDV: 7.47 PH UNITS (ref 7.31–7.41)
PH UR STRIP.AUTO: 5.5 [PH] (ref 5–8)
PH UR STRIP.AUTO: 5.5 [PH] (ref 5–8)
PH UR STRIP.AUTO: <=5 [PH] (ref 5–8)
PHOSPHATE SERPL-MCNC: 3.8 MG/DL (ref 2.5–4.5)
PHOSPHATE SERPL-MCNC: 4 MG/DL (ref 2.5–4.5)
PHOSPHATE SERPL-MCNC: 4.2 MG/DL (ref 2.5–4.5)
PHOSPHATE SERPL-MCNC: 4.6 MG/DL (ref 2.5–4.5)
PHOSPHATE SERPL-MCNC: 5 MG/DL (ref 2.5–4.5)
PHOSPHATE SERPL-MCNC: 6.4 MG/DL (ref 2.5–4.5)
PHOSPHATE SERPL-MCNC: 6.7 MG/DL (ref 2.5–4.5)
PHOSPHATE SERPL-MCNC: 6.8 MG/DL (ref 2.5–4.5)
PHOSPHATE SERPL-MCNC: 6.9 MG/DL (ref 2.5–4.5)
PHOSPHATE SERPL-MCNC: 7.3 MG/DL (ref 2.5–4.5)
PHOSPHATE SERPL-MCNC: 7.6 MG/DL (ref 2.5–4.5)
PHOSPHATE SERPL-MCNC: 7.8 MG/DL (ref 2.5–4.5)
PHOSPHATE SERPL-MCNC: 7.9 MG/DL (ref 2.5–4.5)
PHOSPHATE SERPL-MCNC: 9.3 MG/DL (ref 2.5–4.5)
PHOSPHATE SERPL-MCNC: 9.8 MG/DL (ref 2.5–4.5)
PLAT MORPH BLD: NORMAL
PLAT MORPH BLD: NORMAL
PLATELET # BLD AUTO: 154 10*3/MM3 (ref 140–450)
PLATELET # BLD AUTO: 167 10*3/MM3 (ref 140–450)
PLATELET # BLD AUTO: 176 10*3/MM3 (ref 140–450)
PLATELET # BLD AUTO: 177 10*3/MM3 (ref 140–450)
PLATELET # BLD AUTO: 201 10*3/MM3 (ref 140–450)
PLATELET # BLD AUTO: 219 10*3/MM3 (ref 140–450)
PLATELET # BLD AUTO: 221 10*3/MM3 (ref 140–450)
PLATELET # BLD AUTO: 257 10*3/MM3 (ref 140–450)
PLATELET # BLD AUTO: 284 10*3/MM3 (ref 140–450)
PLATELET # BLD AUTO: 285 10*3/MM3 (ref 140–450)
PLATELET # BLD AUTO: 290 10*3/MM3 (ref 140–450)
PLATELET # BLD AUTO: 355 10*3/MM3 (ref 140–450)
PLATELET # BLD AUTO: 369 10*3/MM3 (ref 140–450)
PLATELET # BLD AUTO: 398 10*3/MM3 (ref 140–450)
PLATELET # BLD AUTO: 418 10*3/MM3 (ref 140–450)
PMV BLD AUTO: 10.2 FL (ref 6–12)
PMV BLD AUTO: 10.4 FL (ref 6–12)
PMV BLD AUTO: 10.4 FL (ref 6–12)
PMV BLD AUTO: 10.6 FL (ref 6–12)
PMV BLD AUTO: 10.8 FL (ref 6–12)
PMV BLD AUTO: 10.9 FL (ref 6–12)
PMV BLD AUTO: 11.2 FL (ref 6–12)
PMV BLD AUTO: 11.3 FL (ref 6–12)
PMV BLD AUTO: 11.5 FL (ref 6–12)
PMV BLD AUTO: 11.7 FL (ref 6–12)
PMV BLD AUTO: 11.8 FL (ref 6–12)
PO2 BLDA: 32 MM HG (ref 80–100)
PO2 BLDA: 43.3 MM HG (ref 80–100)
PO2 BLDV: 36.6 MM HG (ref 35–45)
PO2 BLDV: 37.9 MM HG (ref 35–45)
POIKILOCYTOSIS BLD QL SMEAR: ABNORMAL
POTASSIUM SERPL-SCNC: 2.5 MMOL/L (ref 3.5–5.2)
POTASSIUM SERPL-SCNC: 2.6 MMOL/L (ref 3.5–5.2)
POTASSIUM SERPL-SCNC: 3.3 MMOL/L (ref 3.5–5.2)
POTASSIUM SERPL-SCNC: 3.4 MMOL/L (ref 3.5–5.2)
POTASSIUM SERPL-SCNC: 3.5 MMOL/L (ref 3.5–5.2)
POTASSIUM SERPL-SCNC: 3.6 MMOL/L (ref 3.5–5.2)
POTASSIUM SERPL-SCNC: 3.6 MMOL/L (ref 3.5–5.2)
POTASSIUM SERPL-SCNC: 3.7 MMOL/L (ref 3.5–5.2)
POTASSIUM SERPL-SCNC: 3.8 MMOL/L (ref 3.5–5.2)
POTASSIUM SERPL-SCNC: 4.2 MMOL/L (ref 3.5–5.2)
POTASSIUM SERPL-SCNC: 4.3 MMOL/L (ref 3.5–5.2)
POTASSIUM SERPL-SCNC: 4.4 MMOL/L (ref 3.5–5.2)
POTASSIUM SERPL-SCNC: 4.6 MMOL/L (ref 3.5–5.2)
POTASSIUM SERPL-SCNC: 4.8 MMOL/L (ref 3.5–5.2)
POTASSIUM SERPL-SCNC: 5 MMOL/L (ref 3.5–5.2)
POTASSIUM SERPL-SCNC: 5.1 MMOL/L (ref 3.5–5.2)
POTASSIUM SERPL-SCNC: 5.2 MMOL/L (ref 3.5–5.2)
POTASSIUM SERPL-SCNC: 5.7 MMOL/L (ref 3.5–5.2)
POTASSIUM SERPL-SCNC: 6 MMOL/L (ref 3.5–5.2)
POTASSIUM SERPL-SCNC: 6.6 MMOL/L (ref 3.5–5.2)
POTASSIUM SERPL-SCNC: 8 MMOL/L (ref 3.5–5.2)
PROCALCITONIN SERPL-MCNC: 0.5 NG/ML (ref 0–0.25)
PROCALCITONIN SERPL-MCNC: 0.51 NG/ML (ref 0–0.25)
PROCALCITONIN SERPL-MCNC: 0.66 NG/ML (ref 0–0.25)
PROT SERPL-MCNC: 3 G/DL (ref 6–8.5)
PROT SERPL-MCNC: 5.8 G/DL (ref 6–8.5)
PROT SERPL-MCNC: 5.9 G/DL (ref 6–8.5)
PROT SERPL-MCNC: 6 G/DL (ref 6–8.5)
PROT SERPL-MCNC: 6.6 G/DL (ref 6–8.5)
PROT UR QL STRIP: ABNORMAL
PROTHROMBIN TIME: 13.8 SECONDS (ref 11.7–14.2)
QT INTERVAL: 401 MS
QT INTERVAL: 426 MS
QT INTERVAL: 451 MS
QT INTERVAL: 527 MS
QT INTERVAL: 592 MS
QT INTERVAL: 620 MS
QT INTERVAL: 692 MS
RBC # BLD AUTO: 3.25 10*6/MM3 (ref 3.77–5.28)
RBC # BLD AUTO: 3.29 10*6/MM3 (ref 3.77–5.28)
RBC # BLD AUTO: 3.51 10*6/MM3 (ref 3.77–5.28)
RBC # BLD AUTO: 3.6 10*6/MM3 (ref 3.77–5.28)
RBC # BLD AUTO: 3.65 10*6/MM3 (ref 3.77–5.28)
RBC # BLD AUTO: 3.67 10*6/MM3 (ref 3.77–5.28)
RBC # BLD AUTO: 3.69 10*6/MM3 (ref 3.77–5.28)
RBC # BLD AUTO: 3.76 10*6/MM3 (ref 3.77–5.28)
RBC # BLD AUTO: 3.81 10*6/MM3 (ref 3.77–5.28)
RBC # BLD AUTO: 3.84 10*6/MM3 (ref 3.77–5.28)
RBC # BLD AUTO: 3.92 10*6/MM3 (ref 3.77–5.28)
RBC # BLD AUTO: 4.1 10*6/MM3 (ref 3.77–5.28)
RBC # BLD AUTO: 4.21 10*6/MM3 (ref 3.77–5.28)
RBC # BLD AUTO: 4.33 10*6/MM3 (ref 3.77–5.28)
RBC # BLD AUTO: 4.44 10*6/MM3 (ref 3.77–5.28)
RBC # FLD AUTO: 205 /MM3
RBC # UR STRIP: ABNORMAL /HPF
RBC MORPH BLD: NORMAL
REF LAB TEST METHOD: ABNORMAL
SAO2 % BLDCOA: 43.3 % (ref 92–99)
SAO2 % BLDCOA: 56.9 % (ref 92–99)
SAO2 % BLDCOA: 68.4 % (ref 92–99)
SAO2 % BLDCOA: 76.1 % (ref 92–99)
SET MECH RESP RATE: 14
SET MECH RESP RATE: 16
SET MECH RESP RATE: 26
SET MECH RESP RATE: 26
SINUS: 2.9 CM
SODIUM SERPL-SCNC: 135 MMOL/L (ref 136–145)
SODIUM SERPL-SCNC: 136 MMOL/L (ref 136–145)
SODIUM SERPL-SCNC: 137 MMOL/L (ref 136–145)
SODIUM SERPL-SCNC: 138 MMOL/L (ref 136–145)
SODIUM SERPL-SCNC: 139 MMOL/L (ref 136–145)
SODIUM SERPL-SCNC: 139 MMOL/L (ref 136–145)
SODIUM SERPL-SCNC: 140 MMOL/L (ref 136–145)
SODIUM SERPL-SCNC: 141 MMOL/L (ref 136–145)
SODIUM SERPL-SCNC: 142 MMOL/L (ref 136–145)
SODIUM SERPL-SCNC: 143 MMOL/L (ref 136–145)
SP GR UR STRIP: 1.01 (ref 1–1.03)
SP GR UR STRIP: 1.02 (ref 1–1.03)
SP GR UR STRIP: 1.02 (ref 1–1.03)
SQUAMOUS #/AREA URNS HPF: ABNORMAL /HPF
STJ: 1.95 CM
STRESS TARGET HR: 145 BPM
TOTAL RATE: 16 BREATHS/MINUTE
TOTAL RATE: 26 BREATHS/MINUTE
TOTAL RATE: 26 BREATHS/MINUTE
TOTAL RATE: 27 BREATHS/MINUTE
TROPONIN T SERPL-MCNC: 0.06 NG/ML (ref 0–0.03)
TROPONIN T SERPL-MCNC: 0.19 NG/ML (ref 0–0.03)
TROPONIN T SERPL-MCNC: 0.61 NG/ML (ref 0–0.03)
TSH SERPL DL<=0.05 MIU/L-ACNC: 18.2 UIU/ML (ref 0.27–4.2)
UPPER ARTERIAL LEFT ARM BRACHIAL SYS MAX: 100 MMHG
UPPER ARTERIAL LEFT ARM BRACHIAL SYS MAX: 85 MMHG
UPPER ARTERIAL RIGHT ARM BRACHIAL SYS MAX: NORMAL MMHG
UROBILINOGEN UR QL STRIP: ABNORMAL
VARIANT LYMPHS NFR BLD MANUAL: 11.2 % (ref 19.6–45.3)
VARIANT LYMPHS NFR BLD MANUAL: 7.3 % (ref 19.6–45.3)
VENTILATOR MODE: ABNORMAL
WBC # UR STRIP: ABNORMAL /HPF
WBC MORPH BLD: NORMAL
WBC MORPH BLD: NORMAL
WBC NRBC COR # BLD: 10.26 10*3/MM3 (ref 3.4–10.8)
WBC NRBC COR # BLD: 10.35 10*3/MM3 (ref 3.4–10.8)
WBC NRBC COR # BLD: 10.88 10*3/MM3 (ref 3.4–10.8)
WBC NRBC COR # BLD: 11.35 10*3/MM3 (ref 3.4–10.8)
WBC NRBC COR # BLD: 11.62 10*3/MM3 (ref 3.4–10.8)
WBC NRBC COR # BLD: 11.75 10*3/MM3 (ref 3.4–10.8)
WBC NRBC COR # BLD: 13.12 10*3/MM3 (ref 3.4–10.8)
WBC NRBC COR # BLD: 13.82 10*3/MM3 (ref 3.4–10.8)
WBC NRBC COR # BLD: 16.75 10*3/MM3 (ref 3.4–10.8)
WBC NRBC COR # BLD: 19.05 10*3/MM3 (ref 3.4–10.8)
WBC NRBC COR # BLD: 20.05 10*3/MM3 (ref 3.4–10.8)
WBC NRBC COR # BLD: 6.97 10*3/MM3 (ref 3.4–10.8)
WBC NRBC COR # BLD: 7.96 10*3/MM3 (ref 3.4–10.8)
WBC NRBC COR # BLD: 9.31 10*3/MM3 (ref 3.4–10.8)
WBC NRBC COR # BLD: 9.32 10*3/MM3 (ref 3.4–10.8)
WHOLE BLOOD HOLD COAG: NORMAL
WHOLE BLOOD HOLD SPECIMEN: NORMAL

## 2023-01-01 PROCEDURE — 82803 BLOOD GASES ANY COMBINATION: CPT

## 2023-01-01 PROCEDURE — 25010000002 CEFTRIAXONE PER 250 MG: Performed by: INTERNAL MEDICINE

## 2023-01-01 PROCEDURE — 83735 ASSAY OF MAGNESIUM: CPT | Performed by: INTERNAL MEDICINE

## 2023-01-01 PROCEDURE — 80053 COMPREHEN METABOLIC PANEL: CPT | Performed by: EMERGENCY MEDICINE

## 2023-01-01 PROCEDURE — 94760 N-INVAS EAR/PLS OXIMETRY 1: CPT

## 2023-01-01 PROCEDURE — C1725 CATH, TRANSLUMIN NON-LASER: HCPCS | Performed by: SURGERY

## 2023-01-01 PROCEDURE — 25010000002 AMIODARONE IN DEXTROSE 5% 360-4.14 MG/200ML-% SOLUTION: Performed by: INTERNAL MEDICINE

## 2023-01-01 PROCEDURE — 25010000002 HEPARIN (PORCINE) PER 1000 UNITS: Performed by: INTERNAL MEDICINE

## 2023-01-01 PROCEDURE — 94761 N-INVAS EAR/PLS OXIMETRY MLT: CPT

## 2023-01-01 PROCEDURE — 94002 VENT MGMT INPAT INIT DAY: CPT

## 2023-01-01 PROCEDURE — B41G1ZZ FLUOROSCOPY OF LEFT LOWER EXTREMITY ARTERIES USING LOW OSMOLAR CONTRAST: ICD-10-PCS | Performed by: SURGERY

## 2023-01-01 PROCEDURE — 93010 ELECTROCARDIOGRAM REPORT: CPT | Performed by: INTERNAL MEDICINE

## 2023-01-01 PROCEDURE — G0151 HHCP-SERV OF PT,EA 15 MIN: HCPCS

## 2023-01-01 PROCEDURE — 25010000002 ONDANSETRON PER 1 MG: Performed by: STUDENT IN AN ORGANIZED HEALTH CARE EDUCATION/TRAINING PROGRAM

## 2023-01-01 PROCEDURE — 85025 COMPLETE CBC W/AUTO DIFF WBC: CPT | Performed by: EMERGENCY MEDICINE

## 2023-01-01 PROCEDURE — 71045 X-RAY EXAM CHEST 1 VIEW: CPT

## 2023-01-01 PROCEDURE — 85025 COMPLETE CBC W/AUTO DIFF WBC: CPT | Performed by: INTERNAL MEDICINE

## 2023-01-01 PROCEDURE — 36415 COLL VENOUS BLD VENIPUNCTURE: CPT

## 2023-01-01 PROCEDURE — 94799 UNLISTED PULMONARY SVC/PX: CPT

## 2023-01-01 PROCEDURE — 25010000002 VANCOMYCIN 10 G RECONSTITUTED SOLUTION: Performed by: PHYSICIAN ASSISTANT

## 2023-01-01 PROCEDURE — 02HV33Z INSERTION OF INFUSION DEVICE INTO SUPERIOR VENA CAVA, PERCUTANEOUS APPROACH: ICD-10-PCS

## 2023-01-01 PROCEDURE — G0299 HHS/HOSPICE OF RN EA 15 MIN: HCPCS

## 2023-01-01 PROCEDURE — 25010000002 ONDANSETRON PER 1 MG: Performed by: PHYSICIAN ASSISTANT

## 2023-01-01 PROCEDURE — 84132 ASSAY OF SERUM POTASSIUM: CPT | Performed by: SURGERY

## 2023-01-01 PROCEDURE — 0 MAGNESIUM SULFATE 4 GM/100ML SOLUTION

## 2023-01-01 PROCEDURE — 83735 ASSAY OF MAGNESIUM: CPT

## 2023-01-01 PROCEDURE — 84132 ASSAY OF SERUM POTASSIUM: CPT | Performed by: INTERNAL MEDICINE

## 2023-01-01 PROCEDURE — 82330 ASSAY OF CALCIUM: CPT | Performed by: HOSPITALIST

## 2023-01-01 PROCEDURE — 82550 ASSAY OF CK (CPK): CPT | Performed by: HOSPITALIST

## 2023-01-01 PROCEDURE — 83735 ASSAY OF MAGNESIUM: CPT | Performed by: SURGERY

## 2023-01-01 PROCEDURE — 0BH17EZ INSERTION OF ENDOTRACHEAL AIRWAY INTO TRACHEA, VIA NATURAL OR ARTIFICIAL OPENING: ICD-10-PCS | Performed by: INTERNAL MEDICINE

## 2023-01-01 PROCEDURE — 25010000002 ONDANSETRON PER 1 MG: Performed by: NURSE PRACTITIONER

## 2023-01-01 PROCEDURE — 70450 CT HEAD/BRAIN W/O DYE: CPT

## 2023-01-01 PROCEDURE — 80069 RENAL FUNCTION PANEL: CPT | Performed by: INTERNAL MEDICINE

## 2023-01-01 PROCEDURE — 0 POTASSIUM CHLORIDE 10 MEQ/100ML SOLUTION: Performed by: INTERNAL MEDICINE

## 2023-01-01 PROCEDURE — 81001 URINALYSIS AUTO W/SCOPE: CPT

## 2023-01-01 PROCEDURE — 82962 GLUCOSE BLOOD TEST: CPT

## 2023-01-01 PROCEDURE — 80048 BASIC METABOLIC PNL TOTAL CA: CPT | Performed by: STUDENT IN AN ORGANIZED HEALTH CARE EDUCATION/TRAINING PROGRAM

## 2023-01-01 PROCEDURE — 83735 ASSAY OF MAGNESIUM: CPT | Performed by: HOSPITALIST

## 2023-01-01 PROCEDURE — 93005 ELECTROCARDIOGRAM TRACING: CPT | Performed by: INTERNAL MEDICINE

## 2023-01-01 PROCEDURE — 74176 CT ABD & PELVIS W/O CONTRAST: CPT

## 2023-01-01 PROCEDURE — 85027 COMPLETE CBC AUTOMATED: CPT | Performed by: STUDENT IN AN ORGANIZED HEALTH CARE EDUCATION/TRAINING PROGRAM

## 2023-01-01 PROCEDURE — 99222 1ST HOSP IP/OBS MODERATE 55: CPT | Performed by: INTERNAL MEDICINE

## 2023-01-01 PROCEDURE — 047U3ZZ DILATION OF LEFT PERONEAL ARTERY, PERCUTANEOUS APPROACH: ICD-10-PCS | Performed by: SURGERY

## 2023-01-01 PROCEDURE — 5A12012 PERFORMANCE OF CARDIAC OUTPUT, SINGLE, MANUAL: ICD-10-PCS | Performed by: INTERNAL MEDICINE

## 2023-01-01 PROCEDURE — 71275 CT ANGIOGRAPHY CHEST: CPT

## 2023-01-01 PROCEDURE — 36415 COLL VENOUS BLD VENIPUNCTURE: CPT | Performed by: NURSE PRACTITIONER

## 2023-01-01 PROCEDURE — 25010000002 EPINEPHRINE 1 MG/ML SOLUTION 30 ML VIAL: Performed by: INTERNAL MEDICINE

## 2023-01-01 PROCEDURE — 25010000002 HYDROMORPHONE 1 MG/ML SOLUTION: Performed by: INTERNAL MEDICINE

## 2023-01-01 PROCEDURE — B41F1ZZ FLUOROSCOPY OF RIGHT LOWER EXTREMITY ARTERIES USING LOW OSMOLAR CONTRAST: ICD-10-PCS | Performed by: SURGERY

## 2023-01-01 PROCEDURE — 83605 ASSAY OF LACTIC ACID: CPT | Performed by: PHYSICIAN ASSISTANT

## 2023-01-01 PROCEDURE — 25010000002 HEPARIN (PORCINE) PER 1000 UNITS: Performed by: SURGERY

## 2023-01-01 PROCEDURE — 99223 1ST HOSP IP/OBS HIGH 75: CPT | Performed by: STUDENT IN AN ORGANIZED HEALTH CARE EDUCATION/TRAINING PROGRAM

## 2023-01-01 PROCEDURE — 25010000002 PROPOFOL 10 MG/ML EMULSION: Performed by: INTERNAL MEDICINE

## 2023-01-01 PROCEDURE — 93005 ELECTROCARDIOGRAM TRACING: CPT

## 2023-01-01 PROCEDURE — 25010000002 CEFTRIAXONE PER 250 MG: Performed by: NURSE PRACTITIONER

## 2023-01-01 PROCEDURE — 83605 ASSAY OF LACTIC ACID: CPT | Performed by: INTERNAL MEDICINE

## 2023-01-01 PROCEDURE — 80048 BASIC METABOLIC PNL TOTAL CA: CPT | Performed by: NURSE PRACTITIONER

## 2023-01-01 PROCEDURE — 93880 EXTRACRANIAL BILAT STUDY: CPT

## 2023-01-01 PROCEDURE — 99232 SBSQ HOSP IP/OBS MODERATE 35: CPT | Performed by: INTERNAL MEDICINE

## 2023-01-01 PROCEDURE — 87086 URINE CULTURE/COLONY COUNT: CPT

## 2023-01-01 PROCEDURE — 25010000002 CALCIUM GLUCONATE-NACL 1-0.675 GM/50ML-% SOLUTION: Performed by: EMERGENCY MEDICINE

## 2023-01-01 PROCEDURE — 06HN33Z INSERTION OF INFUSION DEVICE INTO LEFT FEMORAL VEIN, PERCUTANEOUS APPROACH: ICD-10-PCS | Performed by: INTERNAL MEDICINE

## 2023-01-01 PROCEDURE — 80053 COMPREHEN METABOLIC PANEL: CPT | Performed by: INTERNAL MEDICINE

## 2023-01-01 PROCEDURE — 25010000002 CALCIUM GLUCONATE-NACL 1-0.675 GM/50ML-% SOLUTION: Performed by: HOSPITALIST

## 2023-01-01 PROCEDURE — 84100 ASSAY OF PHOSPHORUS: CPT | Performed by: INTERNAL MEDICINE

## 2023-01-01 PROCEDURE — 02HV33Z INSERTION OF INFUSION DEVICE INTO SUPERIOR VENA CAVA, PERCUTANEOUS APPROACH: ICD-10-PCS | Performed by: INTERNAL MEDICINE

## 2023-01-01 PROCEDURE — 83735 ASSAY OF MAGNESIUM: CPT | Performed by: EMERGENCY MEDICINE

## 2023-01-01 PROCEDURE — 80307 DRUG TEST PRSMV CHEM ANLYZR: CPT | Performed by: PHYSICIAN ASSISTANT

## 2023-01-01 PROCEDURE — 25010000002 HEPARIN (PORCINE) PER 1000 UNITS: Performed by: NURSE ANESTHETIST, CERTIFIED REGISTERED

## 2023-01-01 PROCEDURE — 85025 COMPLETE CBC W/AUTO DIFF WBC: CPT

## 2023-01-01 PROCEDURE — 25010000002 DOPAMINE PER 40 MG: Performed by: INTERNAL MEDICINE

## 2023-01-01 PROCEDURE — 83605 ASSAY OF LACTIC ACID: CPT | Performed by: EMERGENCY MEDICINE

## 2023-01-01 PROCEDURE — 86140 C-REACTIVE PROTEIN: CPT | Performed by: STUDENT IN AN ORGANIZED HEALTH CARE EDUCATION/TRAINING PROGRAM

## 2023-01-01 PROCEDURE — 25010000002 HEPARIN (PORCINE) PER 1000 UNITS: Performed by: HOSPITALIST

## 2023-01-01 PROCEDURE — 85652 RBC SED RATE AUTOMATED: CPT | Performed by: STUDENT IN AN ORGANIZED HEALTH CARE EDUCATION/TRAINING PROGRAM

## 2023-01-01 PROCEDURE — 63710000001 INSULIN REGULAR HUMAN PER 5 UNITS: Performed by: INTERNAL MEDICINE

## 2023-01-01 PROCEDURE — 94003 VENT MGMT INPAT SUBQ DAY: CPT

## 2023-01-01 PROCEDURE — 80069 RENAL FUNCTION PANEL: CPT | Performed by: HOSPITALIST

## 2023-01-01 PROCEDURE — 93923 UPR/LXTR ART STDY 3+ LVLS: CPT

## 2023-01-01 PROCEDURE — P9612 CATHETERIZE FOR URINE SPEC: HCPCS

## 2023-01-01 PROCEDURE — 87186 SC STD MICRODIL/AGAR DIL: CPT

## 2023-01-01 PROCEDURE — 84484 ASSAY OF TROPONIN QUANT: CPT

## 2023-01-01 PROCEDURE — 82077 ASSAY SPEC XCP UR&BREATH IA: CPT | Performed by: PHYSICIAN ASSISTANT

## 2023-01-01 PROCEDURE — C1769 GUIDE WIRE: HCPCS | Performed by: SURGERY

## 2023-01-01 PROCEDURE — 84443 ASSAY THYROID STIM HORMONE: CPT | Performed by: INTERNAL MEDICINE

## 2023-01-01 PROCEDURE — 25010000002 HEPARIN (PORCINE) 25000-0.45 UT/250ML-% SOLUTION: Performed by: HOSPITALIST

## 2023-01-01 PROCEDURE — 0 IOPAMIDOL PER 1 ML: Performed by: HOSPITALIST

## 2023-01-01 PROCEDURE — 93306 TTE W/DOPPLER COMPLETE: CPT | Performed by: INTERNAL MEDICINE

## 2023-01-01 PROCEDURE — 25010000002 PHENYLEPHRINE 10 MG/ML SOLUTION

## 2023-01-01 PROCEDURE — 25010000002 PERFLUTREN (DEFINITY) 8.476 MG IN SODIUM CHLORIDE (PF) 0.9 % 10 ML INJECTION: Performed by: INTERNAL MEDICINE

## 2023-01-01 PROCEDURE — 85025 COMPLETE CBC W/AUTO DIFF WBC: CPT | Performed by: HOSPITALIST

## 2023-01-01 PROCEDURE — 84484 ASSAY OF TROPONIN QUANT: CPT | Performed by: EMERGENCY MEDICINE

## 2023-01-01 PROCEDURE — 83880 ASSAY OF NATRIURETIC PEPTIDE: CPT | Performed by: PHYSICIAN ASSISTANT

## 2023-01-01 PROCEDURE — 85007 BL SMEAR W/DIFF WBC COUNT: CPT

## 2023-01-01 PROCEDURE — 93990 DOPPLER FLOW TESTING: CPT

## 2023-01-01 PROCEDURE — 85730 THROMBOPLASTIN TIME PARTIAL: CPT | Performed by: HOSPITALIST

## 2023-01-01 PROCEDURE — 3E03329 INTRODUCTION OF OTHER ANTI-INFECTIVE INTO PERIPHERAL VEIN, PERCUTANEOUS APPROACH: ICD-10-PCS | Performed by: PHYSICIAN ASSISTANT

## 2023-01-01 PROCEDURE — 80069 RENAL FUNCTION PANEL: CPT | Performed by: NURSE PRACTITIONER

## 2023-01-01 PROCEDURE — C1894 INTRO/SHEATH, NON-LASER: HCPCS | Performed by: SURGERY

## 2023-01-01 PROCEDURE — 85027 COMPLETE CBC AUTOMATED: CPT | Performed by: INTERNAL MEDICINE

## 2023-01-01 PROCEDURE — 87493 C DIFF AMPLIFIED PROBE: CPT | Performed by: NURSE PRACTITIONER

## 2023-01-01 PROCEDURE — 36415 COLL VENOUS BLD VENIPUNCTURE: CPT | Performed by: INTERNAL MEDICINE

## 2023-01-01 PROCEDURE — 25010000002 EPINEPHRINE 1 MG/10ML SOLUTION PREFILLED SYRINGE: Performed by: INTERNAL MEDICINE

## 2023-01-01 PROCEDURE — 84145 PROCALCITONIN (PCT): CPT | Performed by: EMERGENCY MEDICINE

## 2023-01-01 PROCEDURE — 80053 COMPREHEN METABOLIC PANEL: CPT

## 2023-01-01 PROCEDURE — 5A1945Z RESPIRATORY VENTILATION, 24-96 CONSECUTIVE HOURS: ICD-10-PCS | Performed by: INTERNAL MEDICINE

## 2023-01-01 PROCEDURE — 25010000002 EPINEPHRINE 1 MG/ML SOLUTION: Performed by: INTERNAL MEDICINE

## 2023-01-01 PROCEDURE — 63710000001 INSULIN REGULAR HUMAN PER 5 UNITS: Performed by: EMERGENCY MEDICINE

## 2023-01-01 PROCEDURE — 84132 ASSAY OF SERUM POTASSIUM: CPT | Performed by: HOSPITALIST

## 2023-01-01 PROCEDURE — 75716 ARTERY X-RAYS ARMS/LEGS: CPT

## 2023-01-01 PROCEDURE — 85027 COMPLETE CBC AUTOMATED: CPT | Performed by: NURSE PRACTITIONER

## 2023-01-01 PROCEDURE — 87086 URINE CULTURE/COLONY COUNT: CPT | Performed by: PSYCHIATRY & NEUROLOGY

## 2023-01-01 PROCEDURE — 99223 1ST HOSP IP/OBS HIGH 75: CPT | Performed by: INTERNAL MEDICINE

## 2023-01-01 PROCEDURE — C1887 CATHETER, GUIDING: HCPCS | Performed by: SURGERY

## 2023-01-01 PROCEDURE — 93005 ELECTROCARDIOGRAM TRACING: CPT | Performed by: EMERGENCY MEDICINE

## 2023-01-01 PROCEDURE — 25010000002 AMIODARONE PER 30 MG: Performed by: INTERNAL MEDICINE

## 2023-01-01 PROCEDURE — 25010000002 CEFTRIAXONE PER 250 MG: Performed by: SURGERY

## 2023-01-01 PROCEDURE — 25010000002 CEFEPIME PER 500 MG: Performed by: PHYSICIAN ASSISTANT

## 2023-01-01 PROCEDURE — 93970 EXTREMITY STUDY: CPT

## 2023-01-01 PROCEDURE — 0 LIDOCAINE 1 % SOLUTION 20 ML VIAL: Performed by: SURGERY

## 2023-01-01 PROCEDURE — 25010000002 FENTANYL CITRATE (PF) 50 MCG/ML SOLUTION

## 2023-01-01 PROCEDURE — 89051 BODY FLUID CELL COUNT: CPT | Performed by: HOSPITALIST

## 2023-01-01 PROCEDURE — 84145 PROCALCITONIN (PCT): CPT | Performed by: PHYSICIAN ASSISTANT

## 2023-01-01 PROCEDURE — 87040 BLOOD CULTURE FOR BACTERIA: CPT | Performed by: PHYSICIAN ASSISTANT

## 2023-01-01 PROCEDURE — B548ZZA ULTRASONOGRAPHY OF SUPERIOR VENA CAVA, GUIDANCE: ICD-10-PCS

## 2023-01-01 PROCEDURE — 80069 RENAL FUNCTION PANEL: CPT | Performed by: SURGERY

## 2023-01-01 PROCEDURE — 25010000002 PROPOFOL 500 MG/50ML EMULSION: Performed by: NURSE ANESTHETIST, CERTIFIED REGISTERED

## 2023-01-01 PROCEDURE — 85610 PROTHROMBIN TIME: CPT | Performed by: EMERGENCY MEDICINE

## 2023-01-01 PROCEDURE — 3E1M39Z IRRIGATION OF PERITONEAL CAVITY USING DIALYSATE, PERCUTANEOUS APPROACH: ICD-10-PCS | Performed by: INTERNAL MEDICINE

## 2023-01-01 PROCEDURE — 25010000002 PHENYLEPHRINE 10 MG/ML SOLUTION 5 ML VIAL: Performed by: NURSE ANESTHETIST, CERTIFIED REGISTERED

## 2023-01-01 PROCEDURE — 85007 BL SMEAR W/DIFF WBC COUNT: CPT | Performed by: INTERNAL MEDICINE

## 2023-01-01 PROCEDURE — 92950 HEART/LUNG RESUSCITATION CPR: CPT

## 2023-01-01 PROCEDURE — 25010000002 DOPAMINE PER 40 MG

## 2023-01-01 PROCEDURE — 81001 URINALYSIS AUTO W/SCOPE: CPT | Performed by: PSYCHIATRY & NEUROLOGY

## 2023-01-01 PROCEDURE — 25010000002 CEFTRIAXONE PER 250 MG: Performed by: PHYSICIAN ASSISTANT

## 2023-01-01 PROCEDURE — 73630 X-RAY EXAM OF FOOT: CPT

## 2023-01-01 PROCEDURE — 97530 THERAPEUTIC ACTIVITIES: CPT

## 2023-01-01 PROCEDURE — C1760 CLOSURE DEV, VASC: HCPCS | Performed by: SURGERY

## 2023-01-01 PROCEDURE — 81001 URINALYSIS AUTO W/SCOPE: CPT | Performed by: PHYSICIAN ASSISTANT

## 2023-01-01 PROCEDURE — 93306 TTE W/DOPPLER COMPLETE: CPT

## 2023-01-01 PROCEDURE — 90791 PSYCH DIAGNOSTIC EVALUATION: CPT

## 2023-01-01 PROCEDURE — 93005 ELECTROCARDIOGRAM TRACING: CPT | Performed by: PHYSICIAN ASSISTANT

## 2023-01-01 PROCEDURE — 87040 BLOOD CULTURE FOR BACTERIA: CPT | Performed by: EMERGENCY MEDICINE

## 2023-01-01 PROCEDURE — 5A1D90Z PERFORMANCE OF URINARY FILTRATION, CONTINUOUS, GREATER THAN 18 HOURS PER DAY: ICD-10-PCS | Performed by: HOSPITALIST

## 2023-01-01 PROCEDURE — 94660 CPAP INITIATION&MGMT: CPT

## 2023-01-01 PROCEDURE — 99285 EMERGENCY DEPT VISIT HI MDM: CPT

## 2023-01-01 PROCEDURE — 87077 CULTURE AEROBIC IDENTIFY: CPT

## 2023-01-01 PROCEDURE — 36556 INSERT NON-TUNNEL CV CATH: CPT

## 2023-01-01 PROCEDURE — 83735 ASSAY OF MAGNESIUM: CPT | Performed by: NURSE PRACTITIONER

## 2023-01-01 PROCEDURE — 75635 CT ANGIO ABDOMINAL ARTERIES: CPT

## 2023-01-01 PROCEDURE — 25010000002 PROCHLORPERAZINE 10 MG/2ML SOLUTION: Performed by: INTERNAL MEDICINE

## 2023-01-01 PROCEDURE — 3E03329 INTRODUCTION OF OTHER ANTI-INFECTIVE INTO PERIPHERAL VEIN, PERCUTANEOUS APPROACH: ICD-10-PCS | Performed by: INTERNAL MEDICINE

## 2023-01-01 PROCEDURE — 36600 WITHDRAWAL OF ARTERIAL BLOOD: CPT

## 2023-01-01 PROCEDURE — 25010000002 FENTANYL CITRATE (PF) 50 MCG/ML SOLUTION: Performed by: ANESTHESIOLOGY

## 2023-01-01 PROCEDURE — 0 POTASSIUM CHLORIDE PER 2 MEQ

## 2023-01-01 PROCEDURE — 84145 PROCALCITONIN (PCT): CPT | Performed by: INTERNAL MEDICINE

## 2023-01-01 PROCEDURE — 97161 PT EVAL LOW COMPLEX 20 MIN: CPT

## 2023-01-01 PROCEDURE — 36415 COLL VENOUS BLD VENIPUNCTURE: CPT | Performed by: SURGERY

## 2023-01-01 RX ORDER — ONDANSETRON 2 MG/ML
4 INJECTION INTRAMUSCULAR; INTRAVENOUS EVERY 6 HOURS PRN
Status: DISCONTINUED | OUTPATIENT
Start: 2023-01-01 | End: 2023-01-01 | Stop reason: HOSPADM

## 2023-01-01 RX ORDER — HYDRALAZINE HYDROCHLORIDE 20 MG/ML
5 INJECTION INTRAMUSCULAR; INTRAVENOUS
Status: DISCONTINUED | OUTPATIENT
Start: 2023-01-01 | End: 2023-01-01 | Stop reason: HOSPADM

## 2023-01-01 RX ORDER — POTASSIUM CHLORIDE 750 MG/1
40 TABLET, FILM COATED, EXTENDED RELEASE ORAL ONCE
Status: COMPLETED | OUTPATIENT
Start: 2023-01-01 | End: 2023-01-01

## 2023-01-01 RX ORDER — HYDROXYZINE HYDROCHLORIDE 25 MG/1
50 TABLET, FILM COATED ORAL 3 TIMES DAILY PRN
Status: DISCONTINUED | OUTPATIENT
Start: 2023-01-01 | End: 2023-01-01

## 2023-01-01 RX ORDER — HYDROMORPHONE HYDROCHLORIDE 1 MG/ML
0.5 INJECTION, SOLUTION INTRAMUSCULAR; INTRAVENOUS; SUBCUTANEOUS
Status: DISCONTINUED | OUTPATIENT
Start: 2023-01-01 | End: 2023-01-01 | Stop reason: HOSPADM

## 2023-01-01 RX ORDER — DEXTROSE MONOHYDRATE, SODIUM CHLORIDE, SODIUM LACTATE, CALCIUM CHLORIDE, MAGNESIUM CHLORIDE 2.5; 538; 448; 18.4; 5.08 G/100ML; MG/100ML; MG/100ML; MG/100ML; MG/100ML
2000 SOLUTION INTRAPERITONEAL 4 TIMES DAILY
Status: DISCONTINUED | OUTPATIENT
Start: 2023-01-01 | End: 2023-01-01 | Stop reason: HOSPADM

## 2023-01-01 RX ORDER — CALCIUM GLUCONATE 20 MG/ML
1 INJECTION, SOLUTION INTRAVENOUS ONCE
Status: COMPLETED | OUTPATIENT
Start: 2023-01-01 | End: 2023-01-01

## 2023-01-01 RX ORDER — TRAZODONE HYDROCHLORIDE 50 MG/1
50 TABLET ORAL NIGHTLY PRN
Status: DISCONTINUED | OUTPATIENT
Start: 2023-01-01 | End: 2023-01-01 | Stop reason: HOSPADM

## 2023-01-01 RX ORDER — ONDANSETRON 4 MG/1
4 TABLET, FILM COATED ORAL EVERY 6 HOURS PRN
Status: DISCONTINUED | OUTPATIENT
Start: 2023-01-01 | End: 2023-01-01 | Stop reason: HOSPADM

## 2023-01-01 RX ORDER — OXYCODONE AND ACETAMINOPHEN 7.5; 325 MG/1; MG/1
1 TABLET ORAL EVERY 4 HOURS PRN
Status: DISCONTINUED | OUTPATIENT
Start: 2023-01-01 | End: 2023-01-01 | Stop reason: HOSPADM

## 2023-01-01 RX ORDER — LORAZEPAM 2 MG/ML
2 CONCENTRATE ORAL
Status: DISCONTINUED | OUTPATIENT
Start: 2023-01-01 | End: 2023-02-04 | Stop reason: HOSPADM

## 2023-01-01 RX ORDER — FERROUS SULFATE 325(65) MG
325 TABLET ORAL DAILY
Status: DISCONTINUED | OUTPATIENT
Start: 2023-01-01 | End: 2023-01-01

## 2023-01-01 RX ORDER — TRAZODONE HYDROCHLORIDE 50 MG/1
50 TABLET ORAL NIGHTLY
Status: DISCONTINUED | OUTPATIENT
Start: 2023-01-01 | End: 2023-01-01 | Stop reason: HOSPADM

## 2023-01-01 RX ORDER — LORAZEPAM 2 MG/ML
0.5 CONCENTRATE ORAL
Status: DISCONTINUED | OUTPATIENT
Start: 2023-01-01 | End: 2023-02-04 | Stop reason: HOSPADM

## 2023-01-01 RX ORDER — ONDANSETRON 2 MG/ML
4 INJECTION INTRAMUSCULAR; INTRAVENOUS EVERY 6 HOURS PRN
Status: DISCONTINUED | OUTPATIENT
Start: 2023-01-01 | End: 2023-02-04 | Stop reason: HOSPADM

## 2023-01-01 RX ORDER — HEPARIN SODIUM 5000 [USP'U]/ML
5000 INJECTION, SOLUTION INTRAVENOUS; SUBCUTANEOUS EVERY 12 HOURS SCHEDULED
Status: DISCONTINUED | OUTPATIENT
Start: 2023-01-01 | End: 2023-01-01

## 2023-01-01 RX ORDER — LEVOTHYROXINE SODIUM 0.15 MG/1
150 TABLET ORAL
Status: DISCONTINUED | OUTPATIENT
Start: 2023-01-01 | End: 2023-01-01 | Stop reason: HOSPADM

## 2023-01-01 RX ORDER — CALCIUM CHLORIDE, MAGNESIUM CHLORIDE, SODIUM CHLORIDE, SODIUM BICARBONATE, POTASSIUM CHLORIDE AND SODIUM PHOSPHATE DIBASIC DIHYDRATE 3.68; 3.05; 6.34; 3.09; .314; .187 G/L; G/L; G/L; G/L; G/L; G/L
1500 INJECTION INTRAVENOUS CONTINUOUS
Status: DISCONTINUED | OUTPATIENT
Start: 2023-01-01 | End: 2023-01-01

## 2023-01-01 RX ORDER — NICOTINE POLACRILEX 4 MG
15 LOZENGE BUCCAL
Status: DISCONTINUED | OUTPATIENT
Start: 2023-01-01 | End: 2023-01-01

## 2023-01-01 RX ORDER — FAMOTIDINE 20 MG/1
20 TABLET, FILM COATED ORAL 2 TIMES DAILY
Status: DISCONTINUED | OUTPATIENT
Start: 2023-01-01 | End: 2023-01-01

## 2023-01-01 RX ORDER — ESCITALOPRAM OXALATE 20 MG/1
20 TABLET ORAL DAILY
Status: DISCONTINUED | OUTPATIENT
Start: 2023-01-01 | End: 2023-01-01 | Stop reason: HOSPADM

## 2023-01-01 RX ORDER — CLOPIDOGREL BISULFATE 75 MG/1
75 TABLET ORAL DAILY
Status: DISCONTINUED | OUTPATIENT
Start: 2023-01-01 | End: 2023-01-01

## 2023-01-01 RX ORDER — SODIUM CHLORIDE 0.9 % (FLUSH) 0.9 %
10 SYRINGE (ML) INJECTION EVERY 12 HOURS SCHEDULED
Status: DISCONTINUED | OUTPATIENT
Start: 2023-01-01 | End: 2023-01-01 | Stop reason: HOSPADM

## 2023-01-01 RX ORDER — HYDROCODONE BITARTRATE AND ACETAMINOPHEN 5; 325 MG/1; MG/1
1 TABLET ORAL EVERY 8 HOURS PRN
Status: DISCONTINUED | OUTPATIENT
Start: 2023-01-01 | End: 2023-02-04 | Stop reason: HOSPADM

## 2023-01-01 RX ORDER — HYDROXYZINE HYDROCHLORIDE 25 MG/1
25 TABLET, FILM COATED ORAL 3 TIMES DAILY PRN
Status: DISCONTINUED | OUTPATIENT
Start: 2023-01-01 | End: 2023-01-01 | Stop reason: HOSPADM

## 2023-01-01 RX ORDER — HYDROCODONE BITARTRATE AND ACETAMINOPHEN 5; 325 MG/1; MG/1
1 TABLET ORAL EVERY 4 HOURS PRN
Status: DISCONTINUED | OUTPATIENT
Start: 2023-01-01 | End: 2023-01-01 | Stop reason: HOSPADM

## 2023-01-01 RX ORDER — ONDANSETRON 4 MG/1
4 TABLET, FILM COATED ORAL EVERY 6 HOURS PRN
Status: DISCONTINUED | OUTPATIENT
Start: 2023-01-01 | End: 2023-01-01

## 2023-01-01 RX ORDER — CLOPIDOGREL BISULFATE 75 MG/1
75 TABLET ORAL DAILY
Status: DISCONTINUED | OUTPATIENT
Start: 2023-01-01 | End: 2023-01-01 | Stop reason: HOSPADM

## 2023-01-01 RX ORDER — POTASSIUM CHLORIDE 750 MG/1
20 TABLET, FILM COATED, EXTENDED RELEASE ORAL 2 TIMES DAILY WITH MEALS
Status: DISCONTINUED | OUTPATIENT
Start: 2023-01-01 | End: 2023-01-01

## 2023-01-01 RX ORDER — DEXTROSE MONOHYDRATE, SODIUM CHLORIDE, SODIUM LACTATE, CALCIUM CHLORIDE, MAGNESIUM CHLORIDE 2.5; 538; 448; 18.4; 5.08 G/100ML; MG/100ML; MG/100ML; MG/100ML; MG/100ML
2000 SOLUTION INTRAPERITONEAL 4 TIMES DAILY
Start: 2023-01-01

## 2023-01-01 RX ORDER — ACETAMINOPHEN 160 MG/5ML
650 SOLUTION ORAL EVERY 4 HOURS PRN
Status: DISCONTINUED | OUTPATIENT
Start: 2023-01-01 | End: 2023-02-04 | Stop reason: HOSPADM

## 2023-01-01 RX ORDER — MONTELUKAST SODIUM 10 MG/1
10 TABLET ORAL NIGHTLY
Status: DISCONTINUED | OUTPATIENT
Start: 2023-01-01 | End: 2023-01-01 | Stop reason: HOSPADM

## 2023-01-01 RX ORDER — IPRATROPIUM BROMIDE AND ALBUTEROL SULFATE 2.5; .5 MG/3ML; MG/3ML
3 SOLUTION RESPIRATORY (INHALATION) EVERY 4 HOURS PRN
Status: DISCONTINUED | OUTPATIENT
Start: 2023-01-01 | End: 2023-02-04 | Stop reason: HOSPADM

## 2023-01-01 RX ORDER — EPINEPHRINE 0.1 MG/ML
SYRINGE (ML) INJECTION
Status: COMPLETED | OUTPATIENT
Start: 2023-01-01 | End: 2023-01-01

## 2023-01-01 RX ORDER — DIPHENHYDRAMINE HYDROCHLORIDE 50 MG/ML
12.5 INJECTION INTRAMUSCULAR; INTRAVENOUS
Status: DISCONTINUED | OUTPATIENT
Start: 2023-01-01 | End: 2023-01-01 | Stop reason: HOSPADM

## 2023-01-01 RX ORDER — ONDANSETRON 2 MG/ML
4 INJECTION INTRAMUSCULAR; INTRAVENOUS ONCE AS NEEDED
Status: COMPLETED | OUTPATIENT
Start: 2023-01-01 | End: 2023-01-01

## 2023-01-01 RX ORDER — DEXTROSE MONOHYDRATE 25 G/50ML
50 INJECTION, SOLUTION INTRAVENOUS ONCE
Status: COMPLETED | OUTPATIENT
Start: 2023-01-01 | End: 2023-01-01

## 2023-01-01 RX ORDER — ONDANSETRON 2 MG/ML
4 INJECTION INTRAMUSCULAR; INTRAVENOUS ONCE
Status: COMPLETED | OUTPATIENT
Start: 2023-01-01 | End: 2023-01-01

## 2023-01-01 RX ORDER — NOREPINEPHRINE BIT/0.9 % NACL 8 MG/250ML
.02-.3 INFUSION BOTTLE (ML) INTRAVENOUS
Status: DISCONTINUED | OUTPATIENT
Start: 2023-01-01 | End: 2023-01-01

## 2023-01-01 RX ORDER — EPHEDRINE SULFATE 50 MG/ML
5 INJECTION, SOLUTION INTRAVENOUS ONCE AS NEEDED
Status: DISCONTINUED | OUTPATIENT
Start: 2023-01-01 | End: 2023-01-01 | Stop reason: HOSPADM

## 2023-01-01 RX ORDER — TRAZODONE HYDROCHLORIDE 50 MG/1
50 TABLET ORAL NIGHTLY
Qty: 30 TABLET | Refills: 1 | Status: SHIPPED | OUTPATIENT
Start: 2023-01-01 | End: 2023-03-23

## 2023-01-01 RX ORDER — POTASSIUM CHLORIDE 20 MEQ/1
40 TABLET, EXTENDED RELEASE ORAL DAILY
Qty: 60 TABLET | Refills: 1 | Status: SHIPPED | OUTPATIENT
Start: 2023-01-01 | End: 2023-03-23

## 2023-01-01 RX ORDER — SODIUM CHLORIDE 0.9 % (FLUSH) 0.9 %
10 SYRINGE (ML) INJECTION AS NEEDED
Status: DISCONTINUED | OUTPATIENT
Start: 2023-01-01 | End: 2023-01-01

## 2023-01-01 RX ORDER — SODIUM CHLORIDE 0.9 % (FLUSH) 0.9 %
10 SYRINGE (ML) INJECTION AS NEEDED
Status: DISCONTINUED | OUTPATIENT
Start: 2023-01-01 | End: 2023-01-01 | Stop reason: HOSPADM

## 2023-01-01 RX ORDER — MORPHINE SULFATE 20 MG/ML
10 SOLUTION ORAL
Status: DISCONTINUED | OUTPATIENT
Start: 2023-01-01 | End: 2023-02-04 | Stop reason: HOSPADM

## 2023-01-01 RX ORDER — ATORVASTATIN CALCIUM 20 MG/1
20 TABLET, FILM COATED ORAL NIGHTLY
Status: DISCONTINUED | OUTPATIENT
Start: 2023-01-01 | End: 2023-01-01 | Stop reason: HOSPADM

## 2023-01-01 RX ORDER — MIDODRINE HYDROCHLORIDE 5 MG/1
10 TABLET ORAL
Status: DISCONTINUED | OUTPATIENT
Start: 2023-01-01 | End: 2023-01-01 | Stop reason: HOSPADM

## 2023-01-01 RX ORDER — SODIUM CHLORIDE, SODIUM LACTATE, POTASSIUM CHLORIDE, CALCIUM CHLORIDE 600; 310; 30; 20 MG/100ML; MG/100ML; MG/100ML; MG/100ML
9 INJECTION, SOLUTION INTRAVENOUS CONTINUOUS
Status: DISCONTINUED | OUTPATIENT
Start: 2023-01-01 | End: 2023-01-01

## 2023-01-01 RX ORDER — FENTANYL CITRATE 50 UG/ML
50 INJECTION, SOLUTION INTRAMUSCULAR; INTRAVENOUS ONCE
Status: COMPLETED | OUTPATIENT
Start: 2023-01-01 | End: 2023-01-01

## 2023-01-01 RX ORDER — HYDROXYZINE 50 MG/1
50 TABLET, FILM COATED ORAL 3 TIMES DAILY PRN
Qty: 90 TABLET | Refills: 0 | Status: SHIPPED | OUTPATIENT
Start: 2023-01-01 | End: 2023-02-21

## 2023-01-01 RX ORDER — ETOMIDATE 2 MG/ML
20 INJECTION INTRAVENOUS ONCE
Status: COMPLETED | OUTPATIENT
Start: 2023-01-01 | End: 2023-01-01

## 2023-01-01 RX ORDER — LORAZEPAM 2 MG/ML
0.5 INJECTION INTRAMUSCULAR
Status: DISCONTINUED | OUTPATIENT
Start: 2023-01-01 | End: 2023-02-04 | Stop reason: HOSPADM

## 2023-01-01 RX ORDER — PHENYLEPHRINE HCL IN 0.9% NACL 0.5 MG/5ML
.5-3 SYRINGE (ML) INTRAVENOUS
Status: DISCONTINUED | OUTPATIENT
Start: 2023-01-01 | End: 2023-01-01

## 2023-01-01 RX ORDER — PROMETHAZINE HYDROCHLORIDE 25 MG/1
25 SUPPOSITORY RECTAL ONCE AS NEEDED
Status: DISCONTINUED | OUTPATIENT
Start: 2023-01-01 | End: 2023-01-01 | Stop reason: HOSPADM

## 2023-01-01 RX ORDER — GLYCOPYRROLATE 0.2 MG/ML
0.2 INJECTION INTRAMUSCULAR; INTRAVENOUS
Status: DISCONTINUED | OUTPATIENT
Start: 2023-01-01 | End: 2023-02-04 | Stop reason: HOSPADM

## 2023-01-01 RX ORDER — FAMOTIDINE 20 MG/1
20 TABLET, FILM COATED ORAL DAILY
Status: DISCONTINUED | OUTPATIENT
Start: 2023-01-01 | End: 2023-01-01 | Stop reason: HOSPADM

## 2023-01-01 RX ORDER — DEXTROSE MONOHYDRATE 25 G/50ML
50 INJECTION, SOLUTION INTRAVENOUS
Status: DISCONTINUED | OUTPATIENT
Start: 2023-01-01 | End: 2023-01-01

## 2023-01-01 RX ORDER — MORPHINE SULFATE 10 MG/ML
6 INJECTION INTRAMUSCULAR; INTRAVENOUS; SUBCUTANEOUS
Status: DISCONTINUED | OUTPATIENT
Start: 2023-01-01 | End: 2023-02-04 | Stop reason: HOSPADM

## 2023-01-01 RX ORDER — CALCIUM CHLORIDE 100 MG/ML
INJECTION INTRAVENOUS; INTRAVENTRICULAR
Status: COMPLETED
Start: 2023-01-01 | End: 2023-01-01

## 2023-01-01 RX ORDER — LORAZEPAM 2 MG/ML
2 INJECTION INTRAMUSCULAR
Status: DISCONTINUED | OUTPATIENT
Start: 2023-01-01 | End: 2023-02-04 | Stop reason: HOSPADM

## 2023-01-01 RX ORDER — PROPOFOL 10 MG/ML
INJECTION, EMULSION INTRAVENOUS CONTINUOUS PRN
Status: DISCONTINUED | OUTPATIENT
Start: 2023-01-01 | End: 2023-01-01 | Stop reason: SURG

## 2023-01-01 RX ORDER — HYDROCODONE BITARTRATE AND ACETAMINOPHEN 7.5; 325 MG/1; MG/1
1 TABLET ORAL ONCE AS NEEDED
Status: DISCONTINUED | OUTPATIENT
Start: 2023-01-01 | End: 2023-01-01 | Stop reason: HOSPADM

## 2023-01-01 RX ORDER — LABETALOL HYDROCHLORIDE 5 MG/ML
5 INJECTION, SOLUTION INTRAVENOUS
Status: DISCONTINUED | OUTPATIENT
Start: 2023-01-01 | End: 2023-01-01 | Stop reason: HOSPADM

## 2023-01-01 RX ORDER — LEVOTHYROXINE SODIUM 175 UG/1
175 TABLET ORAL
Status: DISCONTINUED | OUTPATIENT
Start: 2023-01-01 | End: 2023-01-01 | Stop reason: HOSPADM

## 2023-01-01 RX ORDER — NALOXONE HCL 0.4 MG/ML
0.2 VIAL (ML) INJECTION AS NEEDED
Status: DISCONTINUED | OUTPATIENT
Start: 2023-01-01 | End: 2023-01-01 | Stop reason: HOSPADM

## 2023-01-01 RX ORDER — POTASSIUM CHLORIDE 750 MG/1
30 TABLET, FILM COATED, EXTENDED RELEASE ORAL 2 TIMES DAILY WITH MEALS
Status: DISCONTINUED | OUTPATIENT
Start: 2023-01-01 | End: 2023-01-01 | Stop reason: HOSPADM

## 2023-01-01 RX ORDER — LOPERAMIDE HCL 1 MG/7.5ML
2 SOLUTION ORAL 3 TIMES DAILY
Status: DISCONTINUED | OUTPATIENT
Start: 2023-01-01 | End: 2023-01-01 | Stop reason: HOSPADM

## 2023-01-01 RX ORDER — LOPERAMIDE HCL 1 MG/7.5ML
2 SOLUTION ORAL 2 TIMES DAILY PRN
Status: DISCONTINUED | OUTPATIENT
Start: 2023-01-01 | End: 2023-01-01 | Stop reason: HOSPADM

## 2023-01-01 RX ORDER — LINEZOLID 600 MG/1
600 TABLET, FILM COATED ORAL EVERY 12 HOURS SCHEDULED
Status: DISCONTINUED | OUTPATIENT
Start: 2023-01-01 | End: 2023-01-01 | Stop reason: HOSPADM

## 2023-01-01 RX ORDER — SODIUM CHLORIDE 9 MG/ML
40 INJECTION, SOLUTION INTRAVENOUS AS NEEDED
Status: DISCONTINUED | OUTPATIENT
Start: 2023-01-01 | End: 2023-01-01

## 2023-01-01 RX ORDER — SODIUM BICARBONATE 650 MG/1
1300 TABLET ORAL 2 TIMES DAILY
Qty: 120 TABLET | Refills: 0 | Status: SHIPPED | OUTPATIENT
Start: 2023-01-01

## 2023-01-01 RX ORDER — DOBUTAMINE HYDROCHLORIDE 100 MG/100ML
2-20 INJECTION INTRAVENOUS
Status: DISCONTINUED | OUTPATIENT
Start: 2023-01-01 | End: 2023-02-04 | Stop reason: HOSPADM

## 2023-01-01 RX ORDER — GLYCOPYRROLATE 0.2 MG/ML
0.4 INJECTION INTRAMUSCULAR; INTRAVENOUS
Status: DISCONTINUED | OUTPATIENT
Start: 2023-01-01 | End: 2023-02-04 | Stop reason: HOSPADM

## 2023-01-01 RX ORDER — ONDANSETRON 4 MG/1
4 TABLET, FILM COATED ORAL EVERY 6 HOURS PRN
Status: DISCONTINUED | OUTPATIENT
Start: 2023-01-01 | End: 2023-02-04 | Stop reason: HOSPADM

## 2023-01-01 RX ORDER — ATORVASTATIN CALCIUM 20 MG/1
20 TABLET, FILM COATED ORAL NIGHTLY
Qty: 30 TABLET | Refills: 0 | Status: SHIPPED | OUTPATIENT
Start: 2023-01-01

## 2023-01-01 RX ORDER — ASPIRIN 81 MG/1
81 TABLET, CHEWABLE ORAL DAILY
Status: DISCONTINUED | OUTPATIENT
Start: 2023-01-01 | End: 2023-01-01

## 2023-01-01 RX ORDER — DEXMEDETOMIDINE HYDROCHLORIDE 4 UG/ML
.2-1.5 INJECTION, SOLUTION INTRAVENOUS
Status: DISCONTINUED | OUTPATIENT
Start: 2023-01-01 | End: 2023-02-04 | Stop reason: HOSPADM

## 2023-01-01 RX ORDER — DOPAMINE HYDROCHLORIDE 160 MG/100ML
2-20 INJECTION, SOLUTION INTRAVENOUS
Status: DISCONTINUED | OUTPATIENT
Start: 2023-01-01 | End: 2023-01-01 | Stop reason: SDUPTHER

## 2023-01-01 RX ORDER — LORAZEPAM 2 MG/ML
1 INJECTION INTRAMUSCULAR
Status: DISCONTINUED | OUTPATIENT
Start: 2023-01-01 | End: 2023-02-04 | Stop reason: HOSPADM

## 2023-01-01 RX ORDER — FENTANYL CITRATE 50 UG/ML
INJECTION, SOLUTION INTRAMUSCULAR; INTRAVENOUS
Status: COMPLETED
Start: 2023-01-01 | End: 2023-01-01

## 2023-01-01 RX ORDER — CLINDAMYCIN PHOSPHATE 600 MG/50ML
600 INJECTION INTRAVENOUS ONCE
Status: COMPLETED | OUTPATIENT
Start: 2023-01-01 | End: 2023-01-01

## 2023-01-01 RX ORDER — POTASSIUM CHLORIDE 29.8 MG/ML
20 INJECTION INTRAVENOUS AS NEEDED
Status: DISCONTINUED | OUTPATIENT
Start: 2023-01-01 | End: 2023-01-01

## 2023-01-01 RX ORDER — SODIUM BICARBONATE IN D5W 150/1000ML
150 PLASTIC BAG, INJECTION (ML) INTRAVENOUS CONTINUOUS
Status: DISCONTINUED | OUTPATIENT
Start: 2023-01-01 | End: 2023-01-01

## 2023-01-01 RX ORDER — NOREPINEPHRINE BITARTRATE/D5W 8 MG/250ML
.02-.3 PLASTIC BAG, INJECTION (ML) INTRAVENOUS
Status: DISCONTINUED | OUTPATIENT
Start: 2023-01-01 | End: 2023-02-04 | Stop reason: HOSPADM

## 2023-01-01 RX ORDER — NITROGLYCERIN 0.4 MG/1
0.4 TABLET SUBLINGUAL
Status: DISCONTINUED | OUTPATIENT
Start: 2023-01-01 | End: 2023-01-01 | Stop reason: HOSPADM

## 2023-01-01 RX ORDER — SODIUM BICARBONATE IN D5W 150/1000ML
150 PLASTIC BAG, INJECTION (ML) INTRAVENOUS ONCE
Status: COMPLETED | OUTPATIENT
Start: 2023-01-01 | End: 2023-01-01

## 2023-01-01 RX ORDER — DIPHENHYDRAMINE HCL 25 MG
25 CAPSULE ORAL
Status: DISCONTINUED | OUTPATIENT
Start: 2023-01-01 | End: 2023-01-01 | Stop reason: HOSPADM

## 2023-01-01 RX ORDER — FERROUS SULFATE 325(65) MG
325 TABLET ORAL DAILY
Status: DISCONTINUED | OUTPATIENT
Start: 2023-01-01 | End: 2023-01-01 | Stop reason: HOSPADM

## 2023-01-01 RX ORDER — SODIUM BICARBONATE 650 MG/1
1300 TABLET ORAL 2 TIMES DAILY
Status: DISCONTINUED | OUTPATIENT
Start: 2023-01-01 | End: 2023-01-01 | Stop reason: HOSPADM

## 2023-01-01 RX ORDER — ALUMINA, MAGNESIA, AND SIMETHICONE 2400; 2400; 240 MG/30ML; MG/30ML; MG/30ML
15 SUSPENSION ORAL EVERY 6 HOURS PRN
Status: DISCONTINUED | OUTPATIENT
Start: 2023-01-01 | End: 2023-01-01 | Stop reason: HOSPADM

## 2023-01-01 RX ORDER — HEPARIN SODIUM 5000 [USP'U]/ML
5000 INJECTION, SOLUTION INTRAVENOUS; SUBCUTANEOUS EVERY 8 HOURS SCHEDULED
Status: DISCONTINUED | OUTPATIENT
Start: 2023-01-01 | End: 2023-01-01 | Stop reason: HOSPADM

## 2023-01-01 RX ORDER — MAGNESIUM SULFATE HEPTAHYDRATE 40 MG/ML
4 INJECTION, SOLUTION INTRAVENOUS ONCE
Status: COMPLETED | OUTPATIENT
Start: 2023-01-01 | End: 2023-01-01

## 2023-01-01 RX ORDER — ONDANSETRON 2 MG/ML
4 INJECTION INTRAMUSCULAR; INTRAVENOUS EVERY 6 HOURS PRN
Status: DISCONTINUED | OUTPATIENT
Start: 2023-01-01 | End: 2023-01-01

## 2023-01-01 RX ORDER — HYDROXYZINE HYDROCHLORIDE 25 MG/1
50 TABLET, FILM COATED ORAL 3 TIMES DAILY PRN
Status: DISCONTINUED | OUTPATIENT
Start: 2023-01-01 | End: 2023-01-01 | Stop reason: HOSPADM

## 2023-01-01 RX ORDER — MORPHINE SULFATE 2 MG/ML
4 INJECTION, SOLUTION INTRAMUSCULAR; INTRAVENOUS
Status: DISCONTINUED | OUTPATIENT
Start: 2023-01-01 | End: 2023-02-04 | Stop reason: HOSPADM

## 2023-01-01 RX ORDER — ALPRAZOLAM 1 MG/1
1 TABLET ORAL ONCE
Status: COMPLETED | OUTPATIENT
Start: 2023-01-01 | End: 2023-01-01

## 2023-01-01 RX ORDER — OLANZAPINE 5 MG/1
5 TABLET ORAL NIGHTLY
Status: DISCONTINUED | OUTPATIENT
Start: 2023-01-01 | End: 2023-01-01 | Stop reason: HOSPADM

## 2023-01-01 RX ORDER — SODIUM CHLORIDE, SODIUM LACTATE, CALCIUM CHLORIDE, MAGNESIUM CHLORIDE AND DEXTROSE 2.5; 538; 448; 18.3; 5.08 G/100ML; MG/100ML; MG/100ML; MG/100ML; MG/100ML
2000 INJECTION, SOLUTION INTRAPERITONEAL AS NEEDED
Status: DISCONTINUED | OUTPATIENT
Start: 2023-01-01 | End: 2023-01-01

## 2023-01-01 RX ORDER — CALCIUM CHLORIDE 100 MG/ML
1 INJECTION INTRAVENOUS; INTRAVENTRICULAR ONCE
Status: COMPLETED | OUTPATIENT
Start: 2023-01-01 | End: 2023-01-01

## 2023-01-01 RX ORDER — FAMOTIDINE 10 MG/ML
20 INJECTION, SOLUTION INTRAVENOUS ONCE
Status: COMPLETED | OUTPATIENT
Start: 2023-01-01 | End: 2023-01-01

## 2023-01-01 RX ORDER — PROCHLORPERAZINE EDISYLATE 5 MG/ML
10 INJECTION INTRAMUSCULAR; INTRAVENOUS ONCE
Status: COMPLETED | OUTPATIENT
Start: 2023-01-01 | End: 2023-01-01

## 2023-01-01 RX ORDER — MORPHINE SULFATE 2 MG/ML
2 INJECTION, SOLUTION INTRAMUSCULAR; INTRAVENOUS
Status: DISCONTINUED | OUTPATIENT
Start: 2023-01-01 | End: 2023-02-04 | Stop reason: HOSPADM

## 2023-01-01 RX ORDER — INSULIN LISPRO 100 [IU]/ML
0-7 INJECTION, SOLUTION INTRAVENOUS; SUBCUTANEOUS
Status: DISCONTINUED | OUTPATIENT
Start: 2023-01-01 | End: 2023-01-01

## 2023-01-01 RX ORDER — PANTOPRAZOLE SODIUM 40 MG/10ML
40 INJECTION, POWDER, LYOPHILIZED, FOR SOLUTION INTRAVENOUS
Status: DISCONTINUED | OUTPATIENT
Start: 2023-01-01 | End: 2023-01-01

## 2023-01-01 RX ORDER — HYDROCODONE BITARTRATE AND ACETAMINOPHEN 5; 325 MG/1; MG/1
1 TABLET ORAL EVERY 6 HOURS PRN
Status: DISCONTINUED | OUTPATIENT
Start: 2023-01-01 | End: 2023-01-01

## 2023-01-01 RX ORDER — CALCIUM GLUCONATE 20 MG/ML
2 INJECTION, SOLUTION INTRAVENOUS ONCE
Status: COMPLETED | OUTPATIENT
Start: 2023-01-01 | End: 2023-01-01

## 2023-01-01 RX ORDER — HYDROCODONE BITARTRATE AND ACETAMINOPHEN 5; 325 MG/1; MG/1
1 TABLET ORAL EVERY 8 HOURS PRN
Status: DISCONTINUED | OUTPATIENT
Start: 2023-01-01 | End: 2023-01-01

## 2023-01-01 RX ORDER — ALBUMIN (HUMAN) 12.5 G/50ML
12.5 SOLUTION INTRAVENOUS AS NEEDED
Status: DISCONTINUED | OUTPATIENT
Start: 2023-01-01 | End: 2023-01-01

## 2023-01-01 RX ORDER — ASPIRIN 81 MG/1
81 TABLET ORAL DAILY
Status: DISCONTINUED | OUTPATIENT
Start: 2023-01-01 | End: 2023-01-01 | Stop reason: HOSPADM

## 2023-01-01 RX ORDER — OLANZAPINE 5 MG/1
5 TABLET ORAL NIGHTLY
Status: DISCONTINUED | OUTPATIENT
Start: 2023-01-01 | End: 2023-01-01

## 2023-01-01 RX ORDER — HYDROMORPHONE HYDROCHLORIDE 1 MG/ML
0.5 INJECTION, SOLUTION INTRAMUSCULAR; INTRAVENOUS; SUBCUTANEOUS
Status: DISCONTINUED | OUTPATIENT
Start: 2023-01-01 | End: 2023-02-04 | Stop reason: HOSPADM

## 2023-01-01 RX ORDER — HYDROCODONE BITARTRATE AND ACETAMINOPHEN 5; 325 MG/1; MG/1
1 TABLET ORAL EVERY 6 HOURS PRN
Status: DISCONTINUED | OUTPATIENT
Start: 2023-01-01 | End: 2023-01-01 | Stop reason: HOSPADM

## 2023-01-01 RX ORDER — SODIUM CHLORIDE 9 MG/ML
40 INJECTION, SOLUTION INTRAVENOUS AS NEEDED
Status: DISCONTINUED | OUTPATIENT
Start: 2023-01-01 | End: 2023-01-01 | Stop reason: HOSPADM

## 2023-01-01 RX ORDER — HYDROCODONE BITARTRATE AND ACETAMINOPHEN 5; 325 MG/1; MG/1
1 TABLET ORAL EVERY 6 HOURS PRN
Qty: 18 TABLET | Refills: 0 | Status: SHIPPED | OUTPATIENT
Start: 2023-01-01 | End: 2023-01-01 | Stop reason: SDUPTHER

## 2023-01-01 RX ORDER — MORPHINE SULFATE 20 MG/ML
20 SOLUTION ORAL
Status: DISCONTINUED | OUTPATIENT
Start: 2023-01-01 | End: 2023-02-04 | Stop reason: HOSPADM

## 2023-01-01 RX ORDER — ACETAMINOPHEN 650 MG/1
650 SUPPOSITORY RECTAL EVERY 4 HOURS PRN
Status: DISCONTINUED | OUTPATIENT
Start: 2023-01-01 | End: 2023-02-04 | Stop reason: HOSPADM

## 2023-01-01 RX ORDER — POTASSIUM CHLORIDE 750 MG/1
10 TABLET, FILM COATED, EXTENDED RELEASE ORAL DAILY
Status: DISCONTINUED | OUTPATIENT
Start: 2023-01-01 | End: 2023-01-01

## 2023-01-01 RX ORDER — DOPAMINE HYDROCHLORIDE 160 MG/100ML
INJECTION, SOLUTION INTRAVENOUS
Status: COMPLETED
Start: 2023-01-01 | End: 2023-01-01

## 2023-01-01 RX ORDER — SODIUM CHLORIDE 0.9 % (FLUSH) 0.9 %
10 SYRINGE (ML) INJECTION EVERY 12 HOURS SCHEDULED
Status: DISCONTINUED | OUTPATIENT
Start: 2023-01-01 | End: 2023-01-01

## 2023-01-01 RX ORDER — LEVOTHYROXINE SODIUM 175 UG/1
175 TABLET ORAL
Status: DISCONTINUED | OUTPATIENT
Start: 2023-01-01 | End: 2023-01-01

## 2023-01-01 RX ORDER — FENTANYL CITRATE 50 UG/ML
50 INJECTION, SOLUTION INTRAMUSCULAR; INTRAVENOUS
Status: DISCONTINUED | OUTPATIENT
Start: 2023-01-01 | End: 2023-01-01 | Stop reason: HOSPADM

## 2023-01-01 RX ORDER — POTASSIUM CHLORIDE 29.8 MG/ML
20 INJECTION INTRAVENOUS
Status: COMPLETED | OUTPATIENT
Start: 2023-01-01 | End: 2023-01-01

## 2023-01-01 RX ORDER — CHOLECALCIFEROL (VITAMIN D3) 125 MCG
5 CAPSULE ORAL NIGHTLY PRN
Status: DISCONTINUED | OUTPATIENT
Start: 2023-01-01 | End: 2023-01-01 | Stop reason: HOSPADM

## 2023-01-01 RX ORDER — ACETAMINOPHEN 325 MG/1
650 TABLET ORAL EVERY 4 HOURS PRN
Status: DISCONTINUED | OUTPATIENT
Start: 2023-01-01 | End: 2023-02-04 | Stop reason: HOSPADM

## 2023-01-01 RX ORDER — MONTELUKAST SODIUM 10 MG/1
10 TABLET ORAL NIGHTLY
Status: DISCONTINUED | OUTPATIENT
Start: 2023-01-01 | End: 2023-01-01

## 2023-01-01 RX ORDER — MIDODRINE HYDROCHLORIDE 5 MG/1
10 TABLET ORAL ONCE
Status: DISCONTINUED | OUTPATIENT
Start: 2023-01-01 | End: 2023-01-01

## 2023-01-01 RX ORDER — DOPAMINE HYDROCHLORIDE 160 MG/100ML
2-20 INJECTION, SOLUTION INTRAVENOUS
Status: DISCONTINUED | OUTPATIENT
Start: 2023-01-01 | End: 2023-02-04 | Stop reason: HOSPADM

## 2023-01-01 RX ORDER — PROCHLORPERAZINE EDISYLATE 5 MG/ML
50 INJECTION INTRAMUSCULAR; INTRAVENOUS ONCE
Status: DISCONTINUED | OUTPATIENT
Start: 2023-01-01 | End: 2023-01-01

## 2023-01-01 RX ORDER — MAGNESIUM SULFATE HEPTAHYDRATE 40 MG/ML
2 INJECTION, SOLUTION INTRAVENOUS AS NEEDED
Status: DISCONTINUED | OUTPATIENT
Start: 2023-01-01 | End: 2023-01-01

## 2023-01-01 RX ORDER — ACETAMINOPHEN 325 MG/1
650 TABLET ORAL EVERY 4 HOURS PRN
Status: DISCONTINUED | OUTPATIENT
Start: 2023-01-01 | End: 2023-01-01 | Stop reason: HOSPADM

## 2023-01-01 RX ORDER — KETOROLAC TROMETHAMINE 30 MG/ML
15 INJECTION, SOLUTION INTRAMUSCULAR; INTRAVENOUS EVERY 6 HOURS PRN
Status: DISCONTINUED | OUTPATIENT
Start: 2023-01-01 | End: 2023-01-01

## 2023-01-01 RX ORDER — ESCITALOPRAM OXALATE 20 MG/1
20 TABLET ORAL DAILY
Status: DISCONTINUED | OUTPATIENT
Start: 2023-01-01 | End: 2023-01-01 | Stop reason: SDUPTHER

## 2023-01-01 RX ORDER — IBUPROFEN 600 MG/1
1 TABLET ORAL
Status: DISCONTINUED | OUTPATIENT
Start: 2023-01-01 | End: 2023-01-01

## 2023-01-01 RX ORDER — LOPERAMIDE HCL 1 MG/7.5ML
2 SOLUTION ORAL ONCE
Status: COMPLETED | OUTPATIENT
Start: 2023-01-01 | End: 2023-01-01

## 2023-01-01 RX ORDER — SEVELAMER CARBONATE 800 MG/1
800 TABLET, FILM COATED ORAL
Status: DISCONTINUED | OUTPATIENT
Start: 2023-01-01 | End: 2023-01-01

## 2023-01-01 RX ORDER — LOPERAMIDE HCL 1 MG/7.5ML
2 SOLUTION ORAL 4 TIMES DAILY PRN
Status: DISCONTINUED | OUTPATIENT
Start: 2023-01-01 | End: 2023-01-01 | Stop reason: HOSPADM

## 2023-01-01 RX ORDER — HYDROMORPHONE HCL 110MG/55ML
1.5 PATIENT CONTROLLED ANALGESIA SYRINGE INTRAVENOUS
Status: DISCONTINUED | OUTPATIENT
Start: 2023-01-01 | End: 2023-02-04 | Stop reason: HOSPADM

## 2023-01-01 RX ORDER — CARVEDILOL 3.12 MG/1
3.12 TABLET ORAL 2 TIMES DAILY WITH MEALS
COMMUNITY

## 2023-01-01 RX ORDER — PHENYLEPHRINE HCL IN 0.9% NACL 1 MG/10 ML
SYRINGE (ML) INTRAVENOUS AS NEEDED
Status: DISCONTINUED | OUTPATIENT
Start: 2023-01-01 | End: 2023-01-01 | Stop reason: SURG

## 2023-01-01 RX ORDER — ASPIRIN 81 MG/1
81 TABLET ORAL DAILY
Status: DISCONTINUED | OUTPATIENT
Start: 2023-01-01 | End: 2023-01-01

## 2023-01-01 RX ORDER — IPRATROPIUM BROMIDE AND ALBUTEROL SULFATE 2.5; .5 MG/3ML; MG/3ML
3 SOLUTION RESPIRATORY (INHALATION) EVERY 6 HOURS PRN
Status: DISCONTINUED | OUTPATIENT
Start: 2023-01-01 | End: 2023-01-01

## 2023-01-01 RX ORDER — POTASSIUM CHLORIDE 750 MG/1
40 TABLET, FILM COATED, EXTENDED RELEASE ORAL DAILY
Status: DISCONTINUED | OUTPATIENT
Start: 2023-01-01 | End: 2023-01-01

## 2023-01-01 RX ORDER — DIPHENOXYLATE HYDROCHLORIDE AND ATROPINE SULFATE 2.5; .025 MG/1; MG/1
1 TABLET ORAL
Status: DISCONTINUED | OUTPATIENT
Start: 2023-01-01 | End: 2023-02-04 | Stop reason: HOSPADM

## 2023-01-01 RX ORDER — FOLIC ACID/VIT B COMPLEX AND C 0.8 MG
1 TABLET ORAL DAILY
Status: DISCONTINUED | OUTPATIENT
Start: 2023-01-01 | End: 2023-01-01 | Stop reason: HOSPADM

## 2023-01-01 RX ORDER — HEPARIN SODIUM 1000 [USP'U]/ML
INJECTION, SOLUTION INTRAVENOUS; SUBCUTANEOUS AS NEEDED
Status: DISCONTINUED | OUTPATIENT
Start: 2023-01-01 | End: 2023-01-01

## 2023-01-01 RX ORDER — PROMETHAZINE HYDROCHLORIDE 25 MG/1
25 TABLET ORAL ONCE AS NEEDED
Status: DISCONTINUED | OUTPATIENT
Start: 2023-01-01 | End: 2023-01-01 | Stop reason: HOSPADM

## 2023-01-01 RX ORDER — FLUMAZENIL 0.1 MG/ML
0.2 INJECTION INTRAVENOUS AS NEEDED
Status: DISCONTINUED | OUTPATIENT
Start: 2023-01-01 | End: 2023-01-01 | Stop reason: HOSPADM

## 2023-01-01 RX ORDER — HYDROCODONE BITARTRATE AND ACETAMINOPHEN 5; 325 MG/1; MG/1
1 TABLET ORAL EVERY 8 HOURS PRN
Qty: 30 TABLET | Refills: 0 | Status: SHIPPED | OUTPATIENT
Start: 2023-01-01

## 2023-01-01 RX ORDER — POTASSIUM CHLORIDE 750 MG/1
60 TABLET, FILM COATED, EXTENDED RELEASE ORAL ONCE
Status: COMPLETED | OUTPATIENT
Start: 2023-01-01 | End: 2023-01-01

## 2023-01-01 RX ORDER — MORPHINE SULFATE 20 MG/ML
5 SOLUTION ORAL
Status: DISCONTINUED | OUTPATIENT
Start: 2023-01-01 | End: 2023-02-04 | Stop reason: HOSPADM

## 2023-01-01 RX ORDER — TRAZODONE HYDROCHLORIDE 50 MG/1
50 TABLET ORAL NIGHTLY
Status: DISCONTINUED | OUTPATIENT
Start: 2023-01-01 | End: 2023-01-01

## 2023-01-01 RX ORDER — DEXTROSE MONOHYDRATE 25 G/50ML
25 INJECTION, SOLUTION INTRAVENOUS
Status: DISCONTINUED | OUTPATIENT
Start: 2023-01-01 | End: 2023-01-01

## 2023-01-01 RX ORDER — DEXTROSE MONOHYDRATE 25 G/50ML
25 INJECTION, SOLUTION INTRAVENOUS ONCE
Status: COMPLETED | OUTPATIENT
Start: 2023-01-01 | End: 2023-01-01

## 2023-01-01 RX ORDER — LORAZEPAM 2 MG/ML
1 CONCENTRATE ORAL
Status: DISCONTINUED | OUTPATIENT
Start: 2023-01-01 | End: 2023-02-04 | Stop reason: HOSPADM

## 2023-01-01 RX ORDER — HEPARIN SODIUM 1000 [USP'U]/ML
INJECTION, SOLUTION INTRAVENOUS; SUBCUTANEOUS AS NEEDED
Status: DISCONTINUED | OUTPATIENT
Start: 2023-01-01 | End: 2023-01-01 | Stop reason: SURG

## 2023-01-01 RX ORDER — ESCITALOPRAM OXALATE 20 MG/1
20 TABLET ORAL DAILY
Status: DISCONTINUED | OUTPATIENT
Start: 2023-01-01 | End: 2023-01-01

## 2023-01-01 RX ORDER — OLANZAPINE 5 MG/1
5 TABLET ORAL NIGHTLY
Qty: 30 TABLET | Refills: 0 | Status: SHIPPED | OUTPATIENT
Start: 2023-01-01

## 2023-01-01 RX ORDER — HEPARIN SODIUM 10000 [USP'U]/100ML
200 INJECTION, SOLUTION INTRAVENOUS CONTINUOUS
Status: DISCONTINUED | OUTPATIENT
Start: 2023-01-01 | End: 2023-02-04 | Stop reason: HOSPADM

## 2023-01-01 RX ORDER — POTASSIUM CHLORIDE 7.45 MG/ML
10 INJECTION INTRAVENOUS
Status: COMPLETED | OUTPATIENT
Start: 2023-01-01 | End: 2023-01-01

## 2023-01-01 RX ORDER — AMIODARONE HYDROCHLORIDE 50 MG/ML
INJECTION, SOLUTION INTRAVENOUS
Status: COMPLETED | OUTPATIENT
Start: 2023-01-01 | End: 2023-01-01

## 2023-01-01 RX ORDER — POTASSIUM CHLORIDE 7.45 MG/ML
10 INJECTION INTRAVENOUS
Status: DISPENSED | OUTPATIENT
Start: 2023-01-01 | End: 2023-01-01

## 2023-01-01 RX ORDER — GENTAMICIN SULFATE 1 MG/G
1 CREAM TOPICAL AS NEEDED
Status: DISCONTINUED | OUTPATIENT
Start: 2023-01-01 | End: 2023-01-01 | Stop reason: HOSPADM

## 2023-01-01 RX ORDER — POTASSIUM CHLORIDE 750 MG/1
40 TABLET, FILM COATED, EXTENDED RELEASE ORAL
Status: COMPLETED | OUTPATIENT
Start: 2023-01-01 | End: 2023-01-01

## 2023-01-01 RX ORDER — MIDODRINE HYDROCHLORIDE 5 MG/1
10 TABLET ORAL ONCE
Status: COMPLETED | OUTPATIENT
Start: 2023-01-01 | End: 2023-01-01

## 2023-01-01 RX ORDER — DEXTROSE MONOHYDRATE, SODIUM CHLORIDE, SODIUM LACTATE, CALCIUM CHLORIDE, MAGNESIUM CHLORIDE 2.5; 538; 448; 18.4; 5.08 G/100ML; MG/100ML; MG/100ML; MG/100ML; MG/100ML
2000 SOLUTION INTRAPERITONEAL AS NEEDED
Status: DISCONTINUED | OUTPATIENT
Start: 2023-01-01 | End: 2023-01-01 | Stop reason: HOSPADM

## 2023-01-01 RX ADMIN — DEXTROSE MONOHYDRATE, SODIUM CHLORIDE, SODIUM LACTATE, CALCIUM CHLORIDE, MAGNESIUM CHLORIDE 2000 ML: 2.5; 538; 448; 18.4; 5.08 SOLUTION INTRAPERITONEAL at 20:54

## 2023-01-01 RX ADMIN — CLOPIDOGREL 75 MG: 75 TABLET, FILM COATED ORAL at 18:23

## 2023-01-01 RX ADMIN — HYDROXYZINE HYDROCHLORIDE 25 MG: 25 TABLET ORAL at 04:44

## 2023-01-01 RX ADMIN — MIDODRINE HYDROCHLORIDE 10 MG: 5 TABLET ORAL at 07:25

## 2023-01-01 RX ADMIN — HYDROXYZINE HYDROCHLORIDE 25 MG: 25 TABLET ORAL at 21:28

## 2023-01-01 RX ADMIN — HEPARIN SODIUM 5000 UNITS: 5000 INJECTION INTRAVENOUS; SUBCUTANEOUS at 05:17

## 2023-01-01 RX ADMIN — CEFTRIAXONE SODIUM 1 G: 1 INJECTION, POWDER, FOR SOLUTION INTRAMUSCULAR; INTRAVENOUS at 17:10

## 2023-01-01 RX ADMIN — Medication 10 ML: at 21:50

## 2023-01-01 RX ADMIN — CLOPIDOGREL 75 MG: 75 TABLET, FILM COATED ORAL at 10:23

## 2023-01-01 RX ADMIN — Medication 150 MEQ: at 18:23

## 2023-01-01 RX ADMIN — LOPERAMIDE HCL 2 MG: 1 SUSPENSION ORAL at 20:37

## 2023-01-01 RX ADMIN — POTASSIUM CHLORIDE 20 MEQ: 750 TABLET, EXTENDED RELEASE ORAL at 17:27

## 2023-01-01 RX ADMIN — Medication 0.3 MCG/KG/MIN: at 03:41

## 2023-01-01 RX ADMIN — PROPOFOL 100 MCG/KG/MIN: 10 INJECTION, EMULSION INTRAVENOUS at 13:12

## 2023-01-01 RX ADMIN — LINEZOLID 600 MG: 600 TABLET, FILM COATED ORAL at 20:28

## 2023-01-01 RX ADMIN — HEPARIN SODIUM 5000 UNITS: 5000 INJECTION INTRAVENOUS; SUBCUTANEOUS at 05:47

## 2023-01-01 RX ADMIN — CALCIUM CHLORIDE, MAGNESIUM CHLORIDE, SODIUM CHLORIDE, SODIUM BICARBONATE, POTASSIUM CHLORIDE AND SODIUM PHOSPHATE DIBASIC DIHYDRATE 1500 ML/HR: 3.68; 3.05; 6.34; 3.09; .314; .187 INJECTION INTRAVENOUS at 12:30

## 2023-01-01 RX ADMIN — Medication 1 TABLET: at 16:06

## 2023-01-01 RX ADMIN — DEXTROSE MONOHYDRATE, SODIUM CHLORIDE, SODIUM LACTATE, CALCIUM CHLORIDE, MAGNESIUM CHLORIDE 2000 ML: 2.5; 538; 448; 18.4; 5.08 SOLUTION INTRAPERITONEAL at 21:50

## 2023-01-01 RX ADMIN — ATORVASTATIN CALCIUM 20 MG: 20 TABLET, FILM COATED ORAL at 20:47

## 2023-01-01 RX ADMIN — ASPIRIN 81 MG: 81 TABLET, CHEWABLE ORAL at 09:31

## 2023-01-01 RX ADMIN — ATORVASTATIN CALCIUM 20 MG: 20 TABLET, FILM COATED ORAL at 20:09

## 2023-01-01 RX ADMIN — LINEZOLID 600 MG: 600 TABLET, FILM COATED ORAL at 08:04

## 2023-01-01 RX ADMIN — CALCIUM CHLORIDE, MAGNESIUM CHLORIDE, SODIUM CHLORIDE, SODIUM BICARBONATE, POTASSIUM CHLORIDE AND SODIUM PHOSPHATE DIBASIC DIHYDRATE 1500 ML/HR: 3.68; 3.05; 6.34; 3.09; .314; .187 INJECTION INTRAVENOUS at 09:08

## 2023-01-01 RX ADMIN — DEXTROSE MONOHYDRATE, SODIUM CHLORIDE, SODIUM LACTATE, CALCIUM CHLORIDE, MAGNESIUM CHLORIDE 2000 ML: 2.5; 538; 448; 18.4; 5.08 SOLUTION INTRAPERITONEAL at 08:10

## 2023-01-01 RX ADMIN — EPINEPHRINE 0.4 MCG/KG/MIN: 1 INJECTION INTRAMUSCULAR; INTRAVENOUS; SUBCUTANEOUS at 23:56

## 2023-01-01 RX ADMIN — LINEZOLID 600 MG: 600 TABLET, FILM COATED ORAL at 08:31

## 2023-01-01 RX ADMIN — AMIODARONE HYDROCHLORIDE 0.5 MG/MIN: 1.8 INJECTION, SOLUTION INTRAVENOUS at 11:41

## 2023-01-01 RX ADMIN — POTASSIUM CHLORIDE 10 MEQ: 7.46 INJECTION, SOLUTION INTRAVENOUS at 13:41

## 2023-01-01 RX ADMIN — Medication 0.3 MCG/KG/MIN: at 21:35

## 2023-01-01 RX ADMIN — CALCIUM GLUCONATE 1 G: 20 INJECTION, SOLUTION INTRAVENOUS at 10:23

## 2023-01-01 RX ADMIN — Medication 10 ML: at 00:05

## 2023-01-01 RX ADMIN — LOPERAMIDE HCL 2 MG: 1 SOLUTION ORAL at 09:05

## 2023-01-01 RX ADMIN — CALCIUM CHLORIDE, MAGNESIUM CHLORIDE, SODIUM CHLORIDE, SODIUM BICARBONATE, POTASSIUM CHLORIDE AND SODIUM PHOSPHATE DIBASIC DIHYDRATE 1500 ML/HR: 3.68; 3.05; 6.34; 3.09; .314; .187 INJECTION INTRAVENOUS at 06:34

## 2023-01-01 RX ADMIN — MIDODRINE HYDROCHLORIDE 10 MG: 5 TABLET ORAL at 16:47

## 2023-01-01 RX ADMIN — DEXTROSE MONOHYDRATE, SODIUM CHLORIDE, SODIUM LACTATE, CALCIUM CHLORIDE, MAGNESIUM CHLORIDE 2000 ML: 2.5; 538; 448; 18.4; 5.08 SOLUTION INTRAPERITONEAL at 18:14

## 2023-01-01 RX ADMIN — HYDROCODONE BITARTRATE AND ACETAMINOPHEN 1 TABLET: 5; 325 TABLET ORAL at 14:08

## 2023-01-01 RX ADMIN — HEPARIN SODIUM 5000 UNITS: 5000 INJECTION INTRAVENOUS; SUBCUTANEOUS at 12:06

## 2023-01-01 RX ADMIN — LEVOTHYROXINE SODIUM 175 MCG: 0.17 TABLET ORAL at 07:25

## 2023-01-01 RX ADMIN — POTASSIUM CHLORIDE 30 MEQ: 750 TABLET, EXTENDED RELEASE ORAL at 08:05

## 2023-01-01 RX ADMIN — MIDODRINE HYDROCHLORIDE 10 MG: 5 TABLET ORAL at 18:06

## 2023-01-01 RX ADMIN — ATORVASTATIN CALCIUM 20 MG: 20 TABLET, FILM COATED ORAL at 21:50

## 2023-01-01 RX ADMIN — Medication 10 ML: at 15:51

## 2023-01-01 RX ADMIN — HEPARIN SODIUM 5000 UNITS: 5000 INJECTION INTRAVENOUS; SUBCUTANEOUS at 14:59

## 2023-01-01 RX ADMIN — DEXMEDETOMIDINE HYDROCHLORIDE 1.5 MCG/KG/HR: 4 INJECTION, SOLUTION INTRAVENOUS at 13:46

## 2023-01-01 RX ADMIN — CALCIUM CHLORIDE, MAGNESIUM CHLORIDE, SODIUM CHLORIDE, SODIUM BICARBONATE, POTASSIUM CHLORIDE AND SODIUM PHOSPHATE DIBASIC DIHYDRATE 1500 ML/HR: 3.68; 3.05; 6.34; 3.09; .314; .187 INJECTION INTRAVENOUS at 09:45

## 2023-01-01 RX ADMIN — DEXMEDETOMIDINE HYDROCHLORIDE 0.7 MCG/KG/HR: 4 INJECTION, SOLUTION INTRAVENOUS at 03:33

## 2023-01-01 RX ADMIN — LINEZOLID 600 MG: 600 TABLET, FILM COATED ORAL at 08:18

## 2023-01-01 RX ADMIN — HYDROCODONE BITARTRATE AND ACETAMINOPHEN 1 TABLET: 5; 325 TABLET ORAL at 20:51

## 2023-01-01 RX ADMIN — NOREPINEPHRINE BITARTRATE 0.1 MCG/KG/MIN: 8 INJECTION, SOLUTION INTRAVENOUS at 18:44

## 2023-01-01 RX ADMIN — HEPARIN SODIUM 5000 UNITS: 5000 INJECTION INTRAVENOUS; SUBCUTANEOUS at 13:09

## 2023-01-01 RX ADMIN — MIDODRINE HYDROCHLORIDE 10 MG: 5 TABLET ORAL at 17:27

## 2023-01-01 RX ADMIN — NOREPINEPHRINE BITARTRATE 0.3 MCG/KG/MIN: 8 INJECTION, SOLUTION INTRAVENOUS at 07:10

## 2023-01-01 RX ADMIN — FERROUS SULFATE TAB 325 MG (65 MG ELEMENTAL FE) 325 MG: 325 (65 FE) TAB at 16:06

## 2023-01-01 RX ADMIN — AMIODARONE HYDROCHLORIDE 0.5 MG/MIN: 1.8 INJECTION, SOLUTION INTRAVENOUS at 12:18

## 2023-01-01 RX ADMIN — HYDROXYZINE HYDROCHLORIDE 50 MG: 25 TABLET ORAL at 09:31

## 2023-01-01 RX ADMIN — REMIFENTANIL HYDROCHLORIDE 0.1 MCG/KG/MIN: 1 INJECTION, POWDER, LYOPHILIZED, FOR SOLUTION INTRAVENOUS at 00:57

## 2023-01-01 RX ADMIN — CALCIUM CHLORIDE, MAGNESIUM CHLORIDE, SODIUM CHLORIDE, SODIUM BICARBONATE, POTASSIUM CHLORIDE AND SODIUM PHOSPHATE DIBASIC DIHYDRATE 1500 ML/HR: 3.68; 3.05; 6.34; 3.09; .314; .187 INJECTION INTRAVENOUS at 03:54

## 2023-01-01 RX ADMIN — MIDODRINE HYDROCHLORIDE 10 MG: 5 TABLET ORAL at 08:05

## 2023-01-01 RX ADMIN — Medication 200 MCG: at 13:31

## 2023-01-01 RX ADMIN — POTASSIUM CHLORIDE 20 MEQ: 750 TABLET, EXTENDED RELEASE ORAL at 08:24

## 2023-01-01 RX ADMIN — ASPIRIN 81 MG: 81 TABLET, COATED ORAL at 08:04

## 2023-01-01 RX ADMIN — MIDODRINE HYDROCHLORIDE 10 MG: 5 TABLET ORAL at 08:30

## 2023-01-01 RX ADMIN — POTASSIUM CHLORIDE 30 MEQ: 750 TABLET, EXTENDED RELEASE ORAL at 18:03

## 2023-01-01 RX ADMIN — Medication 200 MCG: at 13:45

## 2023-01-01 RX ADMIN — MIDODRINE HYDROCHLORIDE 10 MG: 5 TABLET ORAL at 10:42

## 2023-01-01 RX ADMIN — CLOPIDOGREL 75 MG: 75 TABLET, FILM COATED ORAL at 09:06

## 2023-01-01 RX ADMIN — SODIUM CHLORIDE, POTASSIUM CHLORIDE, SODIUM LACTATE AND CALCIUM CHLORIDE: 600; 310; 30; 20 INJECTION, SOLUTION INTRAVENOUS at 13:03

## 2023-01-01 RX ADMIN — LINEZOLID 600 MG: 600 TABLET, FILM COATED ORAL at 20:32

## 2023-01-01 RX ADMIN — IOPAMIDOL 95 ML: 755 INJECTION, SOLUTION INTRAVENOUS at 10:20

## 2023-01-01 RX ADMIN — Medication 1 TABLET: at 07:45

## 2023-01-01 RX ADMIN — LINEZOLID 600 MG: 600 TABLET, FILM COATED ORAL at 21:50

## 2023-01-01 RX ADMIN — ESCITALOPRAM 20 MG: 20 TABLET, FILM COATED ORAL at 08:04

## 2023-01-01 RX ADMIN — POTASSIUM CHLORIDE 40 MEQ: 750 TABLET, EXTENDED RELEASE ORAL at 09:34

## 2023-01-01 RX ADMIN — ONDANSETRON 4 MG: 2 INJECTION INTRAMUSCULAR; INTRAVENOUS at 13:15

## 2023-01-01 RX ADMIN — ESCITALOPRAM 20 MG: 20 TABLET, FILM COATED ORAL at 16:06

## 2023-01-01 RX ADMIN — FAMOTIDINE 20 MG: 20 TABLET, FILM COATED ORAL at 08:18

## 2023-01-01 RX ADMIN — HYDROCODONE BITARTRATE AND ACETAMINOPHEN 1 TABLET: 5; 325 TABLET ORAL at 04:50

## 2023-01-01 RX ADMIN — PERFLUTREN 2 ML: 6.52 INJECTION, SUSPENSION INTRAVENOUS at 10:34

## 2023-01-01 RX ADMIN — HYDROCODONE BITARTRATE AND ACETAMINOPHEN 1 TABLET: 5; 325 TABLET ORAL at 10:27

## 2023-01-01 RX ADMIN — Medication 5 MG: at 20:16

## 2023-01-01 RX ADMIN — CALCIUM CHLORIDE, MAGNESIUM CHLORIDE, SODIUM CHLORIDE, SODIUM BICARBONATE, POTASSIUM CHLORIDE AND SODIUM PHOSPHATE DIBASIC DIHYDRATE 1500 ML/HR: 3.68; 3.05; 6.34; 3.09; .314; .187 INJECTION INTRAVENOUS at 20:18

## 2023-01-01 RX ADMIN — ESCITALOPRAM 20 MG: 20 TABLET, FILM COATED ORAL at 09:10

## 2023-01-01 RX ADMIN — DEXTROSE MONOHYDRATE, SODIUM CHLORIDE, SODIUM LACTATE, CALCIUM CHLORIDE, MAGNESIUM CHLORIDE 2000 ML: 2.5; 538; 448; 18.4; 5.08 SOLUTION INTRAPERITONEAL at 05:29

## 2023-01-01 RX ADMIN — INSULIN HUMAN 2 UNITS: 100 INJECTION, SOLUTION PARENTERAL at 12:06

## 2023-01-01 RX ADMIN — LEVOTHYROXINE SODIUM 150 MCG: 0.15 TABLET ORAL at 06:47

## 2023-01-01 RX ADMIN — Medication 0.2 MCG/KG/MIN: at 09:27

## 2023-01-01 RX ADMIN — MIDODRINE HYDROCHLORIDE 10 MG: 5 TABLET ORAL at 17:17

## 2023-01-01 RX ADMIN — ESCITALOPRAM 20 MG: 20 TABLET, FILM COATED ORAL at 08:30

## 2023-01-01 RX ADMIN — AMIODARONE HYDROCHLORIDE 1 MG/MIN: 1.8 INJECTION, SOLUTION INTRAVENOUS at 01:17

## 2023-01-01 RX ADMIN — ATORVASTATIN CALCIUM 20 MG: 20 TABLET, FILM COATED ORAL at 20:32

## 2023-01-01 RX ADMIN — VANCOMYCIN HYDROCHLORIDE 1500 MG: 10 INJECTION, POWDER, LYOPHILIZED, FOR SOLUTION INTRAVENOUS at 20:47

## 2023-01-01 RX ADMIN — FERROUS SULFATE TAB 325 MG (65 MG ELEMENTAL FE) 325 MG: 325 (65 FE) TAB at 08:10

## 2023-01-01 RX ADMIN — CLOPIDOGREL 75 MG: 75 TABLET, FILM COATED ORAL at 12:48

## 2023-01-01 RX ADMIN — LINEZOLID 600 MG: 600 TABLET, FILM COATED ORAL at 20:13

## 2023-01-01 RX ADMIN — FAMOTIDINE 20 MG: 20 TABLET, FILM COATED ORAL at 07:45

## 2023-01-01 RX ADMIN — CEFTRIAXONE 2 G: 2 INJECTION, POWDER, FOR SOLUTION INTRAMUSCULAR; INTRAVENOUS at 15:49

## 2023-01-01 RX ADMIN — Medication 150 MEQ: at 09:27

## 2023-01-01 RX ADMIN — FAMOTIDINE 20 MG: 20 TABLET, FILM COATED ORAL at 18:18

## 2023-01-01 RX ADMIN — POTASSIUM CHLORIDE 40 MEQ: 750 TABLET, EXTENDED RELEASE ORAL at 04:38

## 2023-01-01 RX ADMIN — LEVOTHYROXINE SODIUM 175 MCG: 0.17 TABLET ORAL at 05:23

## 2023-01-01 RX ADMIN — MIDODRINE HYDROCHLORIDE 10 MG: 5 TABLET ORAL at 11:33

## 2023-01-01 RX ADMIN — CLOPIDOGREL 75 MG: 75 TABLET, FILM COATED ORAL at 08:04

## 2023-01-01 RX ADMIN — CLOPIDOGREL 75 MG: 75 TABLET, FILM COATED ORAL at 09:10

## 2023-01-01 RX ADMIN — ESCITALOPRAM 20 MG: 20 TABLET, FILM COATED ORAL at 09:06

## 2023-01-01 RX ADMIN — SODIUM BICARBONATE 1300 MG: 650 TABLET ORAL at 09:06

## 2023-01-01 RX ADMIN — MIDODRINE HYDROCHLORIDE 10 MG: 5 TABLET ORAL at 18:03

## 2023-01-01 RX ADMIN — EPINEPHRINE 1 MG: 0.1 INJECTION INTRACARDIAC; INTRAVENOUS at 22:21

## 2023-01-01 RX ADMIN — AMIODARONE HYDROCHLORIDE 1 MG/MIN: 1.8 INJECTION, SOLUTION INTRAVENOUS at 06:19

## 2023-01-01 RX ADMIN — LOPERAMIDE HCL 2 MG: 1 SUSPENSION ORAL at 13:09

## 2023-01-01 RX ADMIN — FERROUS SULFATE TAB 325 MG (65 MG ELEMENTAL FE) 325 MG: 325 (65 FE) TAB at 10:23

## 2023-01-01 RX ADMIN — Medication 150 MEQ: at 00:22

## 2023-01-01 RX ADMIN — SODIUM CHLORIDE 250 ML: 9 INJECTION, SOLUTION INTRAVENOUS at 19:35

## 2023-01-01 RX ADMIN — PHENYLEPHRINE HYDROCHLORIDE 1 MCG/KG/MIN: 10 INJECTION INTRAVENOUS at 13:52

## 2023-01-01 RX ADMIN — EPINEPHRINE 0.3 MCG/KG/MIN: 1 INJECTION INTRAMUSCULAR; INTRAVENOUS; SUBCUTANEOUS at 14:59

## 2023-01-01 RX ADMIN — Medication 10 ML: at 08:31

## 2023-01-01 RX ADMIN — EPINEPHRINE 1 MG: 0.1 INJECTION INTRACARDIAC; INTRAVENOUS at 22:18

## 2023-01-01 RX ADMIN — LOPERAMIDE HCL 2 MG: 1 SUSPENSION ORAL at 22:03

## 2023-01-01 RX ADMIN — HYDROCODONE BITARTRATE AND ACETAMINOPHEN 1 TABLET: 5; 325 TABLET ORAL at 08:05

## 2023-01-01 RX ADMIN — SODIUM CHLORIDE 500 ML: 9 INJECTION, SOLUTION INTRAVENOUS at 15:46

## 2023-01-01 RX ADMIN — CALCIUM CHLORIDE, MAGNESIUM CHLORIDE, SODIUM CHLORIDE, SODIUM BICARBONATE, POTASSIUM CHLORIDE AND SODIUM PHOSPHATE DIBASIC DIHYDRATE 1500 ML/HR: 3.68; 3.05; 6.34; 3.09; .314; .187 INJECTION INTRAVENOUS at 01:15

## 2023-01-01 RX ADMIN — CALCIUM GLUCONATE 1 G: 20 INJECTION, SOLUTION INTRAVENOUS at 19:35

## 2023-01-01 RX ADMIN — DOPAMINE HYDROCHLORIDE IN DEXTROSE 10 MCG/KG/MIN: 1.6 INJECTION, SOLUTION INTRAVENOUS at 05:35

## 2023-01-01 RX ADMIN — CLOPIDOGREL 75 MG: 75 TABLET, FILM COATED ORAL at 09:31

## 2023-01-01 RX ADMIN — NOREPINEPHRINE BITARTRATE 0.3 MCG/KG/MIN: 8 INJECTION, SOLUTION INTRAVENOUS at 01:10

## 2023-01-01 RX ADMIN — CALCIUM CHLORIDE, MAGNESIUM CHLORIDE, SODIUM CHLORIDE, SODIUM BICARBONATE, POTASSIUM CHLORIDE AND SODIUM PHOSPHATE DIBASIC DIHYDRATE 1500 ML/HR: 3.68; 3.05; 6.34; 3.09; .314; .187 INJECTION INTRAVENOUS at 01:25

## 2023-01-01 RX ADMIN — Medication 5 MG: at 22:43

## 2023-01-01 RX ADMIN — DEXTROSE MONOHYDRATE, SODIUM CHLORIDE, SODIUM LACTATE, CALCIUM CHLORIDE, MAGNESIUM CHLORIDE 2000 ML: 2.5; 538; 448; 18.4; 5.08 SOLUTION INTRAPERITONEAL at 20:03

## 2023-01-01 RX ADMIN — ASPIRIN 81 MG: 81 TABLET, COATED ORAL at 07:45

## 2023-01-01 RX ADMIN — CALCIUM CHLORIDE, MAGNESIUM CHLORIDE, SODIUM CHLORIDE, SODIUM BICARBONATE, POTASSIUM CHLORIDE AND SODIUM PHOSPHATE DIBASIC DIHYDRATE 1500 ML/HR: 3.68; 3.05; 6.34; 3.09; .314; .187 INJECTION INTRAVENOUS at 04:07

## 2023-01-01 RX ADMIN — POTASSIUM CHLORIDE 20 MEQ: 29.8 INJECTION, SOLUTION INTRAVENOUS at 01:58

## 2023-01-01 RX ADMIN — HEPARIN SODIUM 5000 UNITS: 5000 INJECTION INTRAVENOUS; SUBCUTANEOUS at 21:50

## 2023-01-01 RX ADMIN — FAMOTIDINE 20 MG: 20 TABLET, FILM COATED ORAL at 08:04

## 2023-01-01 RX ADMIN — CLOPIDOGREL 75 MG: 75 TABLET, FILM COATED ORAL at 18:00

## 2023-01-01 RX ADMIN — CALCIUM CHLORIDE, MAGNESIUM CHLORIDE, SODIUM CHLORIDE, SODIUM BICARBONATE, POTASSIUM CHLORIDE AND SODIUM PHOSPHATE DIBASIC DIHYDRATE 1500 ML/HR: 3.68; 3.05; 6.34; 3.09; .314; .187 INJECTION INTRAVENOUS at 13:55

## 2023-01-01 RX ADMIN — ESCITALOPRAM 20 MG: 20 TABLET, FILM COATED ORAL at 08:18

## 2023-01-01 RX ADMIN — HEPARIN SODIUM 7500 UNITS: 1000 INJECTION INTRAVENOUS; SUBCUTANEOUS at 13:30

## 2023-01-01 RX ADMIN — HYDROCODONE BITARTRATE AND ACETAMINOPHEN 1 TABLET: 5; 325 TABLET ORAL at 09:35

## 2023-01-01 RX ADMIN — PHENYLEPHRINE HYDROCHLORIDE 0.5 MCG/KG/MIN: 50 INJECTION INTRAVENOUS at 21:50

## 2023-01-01 RX ADMIN — MIDODRINE HYDROCHLORIDE 10 MG: 5 TABLET ORAL at 12:52

## 2023-01-01 RX ADMIN — LEVOTHYROXINE SODIUM 175 MCG: 0.17 TABLET ORAL at 06:35

## 2023-01-01 RX ADMIN — DOPAMINE HYDROCHLORIDE IN DEXTROSE 10 MCG/KG/MIN: 1.6 INJECTION, SOLUTION INTRAVENOUS at 08:04

## 2023-01-01 RX ADMIN — HYDROXYZINE HYDROCHLORIDE 50 MG: 25 TABLET ORAL at 12:11

## 2023-01-01 RX ADMIN — POTASSIUM CHLORIDE 40 MEQ: 750 TABLET, EXTENDED RELEASE ORAL at 12:20

## 2023-01-01 RX ADMIN — ESCITALOPRAM 20 MG: 20 TABLET, FILM COATED ORAL at 08:10

## 2023-01-01 RX ADMIN — ALPRAZOLAM 1 MG: 1 TABLET ORAL at 07:03

## 2023-01-01 RX ADMIN — HEPARIN SODIUM 2000 UNITS: 1000 INJECTION INTRAVENOUS; SUBCUTANEOUS at 12:23

## 2023-01-01 RX ADMIN — MAGNESIUM SULFATE HEPTAHYDRATE 4 G: 40 INJECTION, SOLUTION INTRAVENOUS at 01:58

## 2023-01-01 RX ADMIN — HEPARIN SODIUM 5000 UNITS: 5000 INJECTION INTRAVENOUS; SUBCUTANEOUS at 09:31

## 2023-01-01 RX ADMIN — DEXTROSE MONOHYDRATE, SODIUM CHLORIDE, SODIUM LACTATE, CALCIUM CHLORIDE, MAGNESIUM CHLORIDE 2000 ML: 2.5; 538; 448; 18.4; 5.08 SOLUTION INTRAPERITONEAL at 10:09

## 2023-01-01 RX ADMIN — EPINEPHRINE 0.22 MCG/KG/MIN: 1 INJECTION INTRAMUSCULAR; INTRAVENOUS; SUBCUTANEOUS at 08:13

## 2023-01-01 RX ADMIN — FENTANYL CITRATE 50 MCG: 50 INJECTION, SOLUTION INTRAMUSCULAR; INTRAVENOUS at 22:56

## 2023-01-01 RX ADMIN — DEXTROSE MONOHYDRATE, SODIUM CHLORIDE, SODIUM LACTATE, CALCIUM CHLORIDE, MAGNESIUM CHLORIDE 2000 ML: 2.5; 538; 448; 18.4; 5.08 SOLUTION INTRAPERITONEAL at 01:56

## 2023-01-01 RX ADMIN — DEXMEDETOMIDINE HYDROCHLORIDE 0.4 MCG/KG/HR: 4 INJECTION, SOLUTION INTRAVENOUS at 09:59

## 2023-01-01 RX ADMIN — LEVOTHYROXINE SODIUM 150 MCG: 0.15 TABLET ORAL at 05:39

## 2023-01-01 RX ADMIN — POTASSIUM CHLORIDE 40 MEQ: 750 TABLET, EXTENDED RELEASE ORAL at 02:01

## 2023-01-01 RX ADMIN — HYDROCODONE BITARTRATE AND ACETAMINOPHEN 1 TABLET: 5; 325 TABLET ORAL at 23:28

## 2023-01-01 RX ADMIN — HYDROCODONE BITARTRATE AND ACETAMINOPHEN 1 TABLET: 5; 325 TABLET ORAL at 22:18

## 2023-01-01 RX ADMIN — ASPIRIN 81 MG: 81 TABLET, COATED ORAL at 08:10

## 2023-01-01 RX ADMIN — HEPARIN SODIUM 5000 UNITS: 5000 INJECTION INTRAVENOUS; SUBCUTANEOUS at 14:01

## 2023-01-01 RX ADMIN — REMIFENTANIL HYDROCHLORIDE 0.12 MCG/KG/MIN: 1 INJECTION, POWDER, LYOPHILIZED, FOR SOLUTION INTRAVENOUS at 11:13

## 2023-01-01 RX ADMIN — CEFTRIAXONE 2 G: 2 INJECTION, POWDER, FOR SOLUTION INTRAMUSCULAR; INTRAVENOUS at 16:24

## 2023-01-01 RX ADMIN — CEFTRIAXONE SODIUM 1 G: 1 INJECTION, POWDER, FOR SOLUTION INTRAMUSCULAR; INTRAVENOUS at 16:49

## 2023-01-01 RX ADMIN — DEXTROSE MONOHYDRATE, SODIUM CHLORIDE, SODIUM LACTATE, CALCIUM CHLORIDE, MAGNESIUM CHLORIDE 2000 ML: 2.5; 538; 448; 18.4; 5.08 SOLUTION INTRAPERITONEAL at 10:13

## 2023-01-01 RX ADMIN — Medication 200 MCG: at 13:22

## 2023-01-01 RX ADMIN — Medication 150 MEQ: at 03:06

## 2023-01-01 RX ADMIN — DOPAMINE HYDROCHLORIDE IN DEXTROSE 10 MCG/KG/MIN: 1.6 INJECTION, SOLUTION INTRAVENOUS at 23:28

## 2023-01-01 RX ADMIN — MONTELUKAST SODIUM 10 MG: 10 TABLET, FILM COATED ORAL at 20:51

## 2023-01-01 RX ADMIN — HYDROCODONE BITARTRATE AND ACETAMINOPHEN 1 TABLET: 5; 325 TABLET ORAL at 09:05

## 2023-01-01 RX ADMIN — DEXTROSE MONOHYDRATE, SODIUM CHLORIDE, SODIUM LACTATE, CALCIUM CHLORIDE, MAGNESIUM CHLORIDE 2000 ML: 2.5; 538; 448; 18.4; 5.08 SOLUTION INTRAPERITONEAL at 12:38

## 2023-01-01 RX ADMIN — HEPARIN SODIUM 5000 UNITS: 5000 INJECTION INTRAVENOUS; SUBCUTANEOUS at 22:14

## 2023-01-01 RX ADMIN — POTASSIUM CHLORIDE 40 MEQ: 750 TABLET, EXTENDED RELEASE ORAL at 10:09

## 2023-01-01 RX ADMIN — LEVOTHYROXINE SODIUM 150 MCG: 0.15 TABLET ORAL at 05:34

## 2023-01-01 RX ADMIN — LINEZOLID 600 MG: 600 TABLET, FILM COATED ORAL at 20:47

## 2023-01-01 RX ADMIN — TRAZODONE HYDROCHLORIDE 50 MG: 50 TABLET ORAL at 20:50

## 2023-01-01 RX ADMIN — POTASSIUM CHLORIDE 10 MEQ: 7.46 INJECTION, SOLUTION INTRAVENOUS at 12:13

## 2023-01-01 RX ADMIN — HEPARIN SODIUM 5000 UNITS: 5000 INJECTION INTRAVENOUS; SUBCUTANEOUS at 06:09

## 2023-01-01 RX ADMIN — Medication 150 MEQ: at 08:43

## 2023-01-01 RX ADMIN — SODIUM BICARBONATE 50 MEQ: 84 INJECTION, SOLUTION INTRAVENOUS at 14:14

## 2023-01-01 RX ADMIN — CLOPIDOGREL 75 MG: 75 TABLET, FILM COATED ORAL at 07:45

## 2023-01-01 RX ADMIN — REMIFENTANIL HYDROCHLORIDE 0.22 MCG/KG/MIN: 1 INJECTION, POWDER, LYOPHILIZED, FOR SOLUTION INTRAVENOUS at 17:03

## 2023-01-01 RX ADMIN — Medication 10 ML: at 20:07

## 2023-01-01 RX ADMIN — LOPERAMIDE HCL 2 MG: 1 SUSPENSION ORAL at 00:30

## 2023-01-01 RX ADMIN — MIDODRINE HYDROCHLORIDE 10 MG: 5 TABLET ORAL at 18:14

## 2023-01-01 RX ADMIN — DEXMEDETOMIDINE HYDROCHLORIDE 0.2 MCG/KG/HR: 4 INJECTION, SOLUTION INTRAVENOUS at 23:21

## 2023-01-01 RX ADMIN — EPINEPHRINE 0.2 MCG/KG/MIN: 1 INJECTION INTRAMUSCULAR; INTRAVENOUS; SUBCUTANEOUS at 14:10

## 2023-01-01 RX ADMIN — POTASSIUM CHLORIDE 60 MEQ: 750 TABLET, EXTENDED RELEASE ORAL at 08:30

## 2023-01-01 RX ADMIN — POTASSIUM CHLORIDE 20 MEQ: 29.8 INJECTION, SOLUTION INTRAVENOUS at 03:00

## 2023-01-01 RX ADMIN — ONDANSETRON 4 MG: 2 INJECTION INTRAMUSCULAR; INTRAVENOUS at 20:55

## 2023-01-01 RX ADMIN — LEVOTHYROXINE SODIUM 150 MCG: 0.15 TABLET ORAL at 05:29

## 2023-01-01 RX ADMIN — POTASSIUM CHLORIDE 40 MEQ: 750 TABLET, EXTENDED RELEASE ORAL at 07:45

## 2023-01-01 RX ADMIN — POTASSIUM CHLORIDE 10 MEQ: 7.46 INJECTION, SOLUTION INTRAVENOUS at 14:58

## 2023-01-01 RX ADMIN — MIDODRINE HYDROCHLORIDE 10 MG: 5 TABLET ORAL at 07:32

## 2023-01-01 RX ADMIN — DOPAMINE HYDROCHLORIDE IN DEXTROSE 10 MCG/KG/MIN: 1.6 INJECTION, SOLUTION INTRAVENOUS at 13:45

## 2023-01-01 RX ADMIN — Medication 10 ML: at 08:05

## 2023-01-01 RX ADMIN — ONDANSETRON 4 MG: 2 INJECTION INTRAMUSCULAR; INTRAVENOUS at 17:33

## 2023-01-01 RX ADMIN — SODIUM BICARBONATE 50 MEQ: 84 INJECTION INTRAVENOUS at 23:15

## 2023-01-01 RX ADMIN — Medication 10 ML: at 20:55

## 2023-01-01 RX ADMIN — DEXTROSE MONOHYDRATE, SODIUM CHLORIDE, SODIUM LACTATE, CALCIUM CHLORIDE, MAGNESIUM CHLORIDE 2000 ML: 2.5; 538; 448; 18.4; 5.08 SOLUTION INTRAPERITONEAL at 18:16

## 2023-01-01 RX ADMIN — MIDODRINE HYDROCHLORIDE 10 MG: 5 TABLET ORAL at 12:29

## 2023-01-01 RX ADMIN — DEXTROSE MONOHYDRATE, SODIUM CHLORIDE, SODIUM LACTATE, CALCIUM CHLORIDE, MAGNESIUM CHLORIDE 2000 ML: 2.5; 538; 448; 18.4; 5.08 SOLUTION INTRAPERITONEAL at 18:00

## 2023-01-01 RX ADMIN — DEXTROSE MONOHYDRATE 25 G: 25 INJECTION, SOLUTION INTRAVENOUS at 12:23

## 2023-01-01 RX ADMIN — HEPARIN SODIUM 5000 UNITS: 5000 INJECTION INTRAVENOUS; SUBCUTANEOUS at 13:45

## 2023-01-01 RX ADMIN — ONDANSETRON 4 MG: 2 INJECTION INTRAMUSCULAR; INTRAVENOUS at 13:19

## 2023-01-01 RX ADMIN — MIDODRINE HYDROCHLORIDE 10 MG: 5 TABLET ORAL at 17:52

## 2023-01-01 RX ADMIN — POTASSIUM CHLORIDE 10 MEQ: 7.46 INJECTION, SOLUTION INTRAVENOUS at 11:02

## 2023-01-01 RX ADMIN — DEXTROSE MONOHYDRATE, SODIUM CHLORIDE, SODIUM LACTATE, CALCIUM CHLORIDE, MAGNESIUM CHLORIDE 2000 ML: 2.5; 538; 448; 18.4; 5.08 SOLUTION INTRAPERITONEAL at 07:30

## 2023-01-01 RX ADMIN — LOPERAMIDE HCL 2 MG: 1 SOLUTION ORAL at 13:37

## 2023-01-01 RX ADMIN — DEXTROSE MONOHYDRATE, SODIUM CHLORIDE, SODIUM LACTATE, CALCIUM CHLORIDE, MAGNESIUM CHLORIDE 2000 ML: 2.5; 538; 448; 18.4; 5.08 SOLUTION INTRAPERITONEAL at 20:48

## 2023-01-01 RX ADMIN — HEPARIN SODIUM 5000 UNITS: 5000 INJECTION INTRAVENOUS; SUBCUTANEOUS at 06:47

## 2023-01-01 RX ADMIN — MIDODRINE HYDROCHLORIDE 10 MG: 5 TABLET ORAL at 08:18

## 2023-01-01 RX ADMIN — Medication 0.02 MCG/KG/MIN: at 13:50

## 2023-01-01 RX ADMIN — HYDROCODONE BITARTRATE AND ACETAMINOPHEN 1 TABLET: 5; 325 TABLET ORAL at 20:21

## 2023-01-01 RX ADMIN — ETOMIDATE 20 MG: 2 INJECTION, SOLUTION INTRAVENOUS at 22:17

## 2023-01-01 RX ADMIN — MIDODRINE HYDROCHLORIDE 10 MG: 5 TABLET ORAL at 06:54

## 2023-01-01 RX ADMIN — MONTELUKAST SODIUM 10 MG: 10 TABLET, FILM COATED ORAL at 20:16

## 2023-01-01 RX ADMIN — HYDROCODONE BITARTRATE AND ACETAMINOPHEN 1 TABLET: 5; 325 TABLET ORAL at 22:53

## 2023-01-01 RX ADMIN — PROPOFOL INJECTABLE EMULSION 5 MCG/KG/MIN: 10 INJECTION, EMULSION INTRAVENOUS at 10:17

## 2023-01-01 RX ADMIN — MIDODRINE HYDROCHLORIDE 10 MG: 5 TABLET ORAL at 17:33

## 2023-01-01 RX ADMIN — PROCHLORPERAZINE EDISYLATE 10 MG: 5 INJECTION INTRAMUSCULAR; INTRAVENOUS at 00:38

## 2023-01-01 RX ADMIN — CLOPIDOGREL 75 MG: 75 TABLET, FILM COATED ORAL at 08:10

## 2023-01-01 RX ADMIN — ONDANSETRON 4 MG: 2 INJECTION INTRAMUSCULAR; INTRAVENOUS at 09:19

## 2023-01-01 RX ADMIN — CALCIUM CHLORIDE, MAGNESIUM CHLORIDE, SODIUM CHLORIDE, SODIUM BICARBONATE, POTASSIUM CHLORIDE AND SODIUM PHOSPHATE DIBASIC DIHYDRATE 1500 ML/HR: 3.68; 3.05; 6.34; 3.09; .314; .187 INJECTION INTRAVENOUS at 13:09

## 2023-01-01 RX ADMIN — Medication 10 ML: at 08:29

## 2023-01-01 RX ADMIN — LOPERAMIDE HCL 2 MG: 1 SUSPENSION ORAL at 04:08

## 2023-01-01 RX ADMIN — ESCITALOPRAM 20 MG: 20 TABLET, FILM COATED ORAL at 07:46

## 2023-01-01 RX ADMIN — HEPARIN SODIUM 5000 UNITS: 5000 INJECTION INTRAVENOUS; SUBCUTANEOUS at 20:32

## 2023-01-01 RX ADMIN — DOPAMINE HYDROCHLORIDE 20 MCG/KG/MIN: 160 INJECTION, SOLUTION INTRAVENOUS at 23:46

## 2023-01-01 RX ADMIN — SODIUM BICARBONATE 50 MEQ: 84 INJECTION, SOLUTION INTRAVENOUS at 00:17

## 2023-01-01 RX ADMIN — HEPARIN SODIUM 5000 UNITS: 5000 INJECTION INTRAVENOUS; SUBCUTANEOUS at 21:32

## 2023-01-01 RX ADMIN — DEXTROSE MONOHYDRATE, SODIUM CHLORIDE, SODIUM LACTATE, CALCIUM CHLORIDE, MAGNESIUM CHLORIDE 2000 ML: 2.5; 538; 448; 18.4; 5.08 SOLUTION INTRAPERITONEAL at 09:45

## 2023-01-01 RX ADMIN — SODIUM BICARBONATE 50 MEQ: 84 INJECTION INTRAVENOUS at 22:00

## 2023-01-01 RX ADMIN — LINEZOLID 600 MG: 600 TABLET, FILM COATED ORAL at 09:10

## 2023-01-01 RX ADMIN — POTASSIUM CHLORIDE 30 MEQ: 750 TABLET, EXTENDED RELEASE ORAL at 08:31

## 2023-01-01 RX ADMIN — HYDROXYZINE HYDROCHLORIDE 25 MG: 25 TABLET ORAL at 08:58

## 2023-01-01 RX ADMIN — HYDROCODONE BITARTRATE AND ACETAMINOPHEN 1 TABLET: 5; 325 TABLET ORAL at 08:09

## 2023-01-01 RX ADMIN — FAMOTIDINE 20 MG: 20 TABLET, FILM COATED ORAL at 08:13

## 2023-01-01 RX ADMIN — DEXTROSE MONOHYDRATE, SODIUM CHLORIDE, SODIUM LACTATE, CALCIUM CHLORIDE, MAGNESIUM CHLORIDE 2000 ML: 2.5; 538; 448; 18.4; 5.08 SOLUTION INTRAPERITONEAL at 01:00

## 2023-01-01 RX ADMIN — HEPARIN SODIUM 5000 UNITS: 5000 INJECTION INTRAVENOUS; SUBCUTANEOUS at 05:39

## 2023-01-01 RX ADMIN — Medication 200 MCG: at 14:20

## 2023-01-01 RX ADMIN — MIDODRINE HYDROCHLORIDE 10 MG: 5 TABLET ORAL at 17:01

## 2023-01-01 RX ADMIN — DEXTROSE MONOHYDRATE, SODIUM CHLORIDE, SODIUM LACTATE, CALCIUM CHLORIDE, MAGNESIUM CHLORIDE 2000 ML: 2.5; 538; 448; 18.4; 5.08 SOLUTION INTRAPERITONEAL at 14:15

## 2023-01-01 RX ADMIN — MIDODRINE HYDROCHLORIDE 10 MG: 5 TABLET ORAL at 11:31

## 2023-01-01 RX ADMIN — ESCITALOPRAM 20 MG: 20 TABLET, FILM COATED ORAL at 08:05

## 2023-01-01 RX ADMIN — HEPARIN SODIUM 5000 UNITS: 5000 INJECTION INTRAVENOUS; SUBCUTANEOUS at 05:29

## 2023-01-01 RX ADMIN — HYDROCODONE BITARTRATE AND ACETAMINOPHEN 1 TABLET: 5; 325 TABLET ORAL at 20:54

## 2023-01-01 RX ADMIN — MIDODRINE HYDROCHLORIDE 10 MG: 5 TABLET ORAL at 08:04

## 2023-01-01 RX ADMIN — SODIUM BICARBONATE 50 MEQ: 84 INJECTION INTRAVENOUS at 23:14

## 2023-01-01 RX ADMIN — FAMOTIDINE 20 MG: 20 TABLET, FILM COATED ORAL at 08:28

## 2023-01-01 RX ADMIN — SODIUM CHLORIDE 40 ML: 9 INJECTION, SOLUTION INTRAVENOUS at 12:46

## 2023-01-01 RX ADMIN — DOPAMINE HYDROCHLORIDE IN DEXTROSE 20 MCG/KG/MIN: 1.6 INJECTION, SOLUTION INTRAVENOUS at 23:46

## 2023-01-01 RX ADMIN — POTASSIUM CHLORIDE 30 MEQ: 750 TABLET, EXTENDED RELEASE ORAL at 18:06

## 2023-01-01 RX ADMIN — LEVOTHYROXINE SODIUM 175 MCG: 0.17 TABLET ORAL at 06:17

## 2023-01-01 RX ADMIN — DEXMEDETOMIDINE HYDROCHLORIDE 1.5 MCG/KG/HR: 4 INJECTION, SOLUTION INTRAVENOUS at 10:17

## 2023-01-01 RX ADMIN — POTASSIUM CHLORIDE 30 MEQ: 750 TABLET, EXTENDED RELEASE ORAL at 17:53

## 2023-01-01 RX ADMIN — ESCITALOPRAM 20 MG: 20 TABLET, FILM COATED ORAL at 08:31

## 2023-01-01 RX ADMIN — EPINEPHRINE 0.24 MCG/KG/MIN: 1 INJECTION INTRAMUSCULAR; INTRAVENOUS; SUBCUTANEOUS at 03:19

## 2023-01-01 RX ADMIN — MIDODRINE HYDROCHLORIDE 10 MG: 5 TABLET ORAL at 11:03

## 2023-01-01 RX ADMIN — Medication 10 ML: at 20:33

## 2023-01-01 RX ADMIN — Medication 10 ML: at 17:23

## 2023-01-01 RX ADMIN — MIDODRINE HYDROCHLORIDE 10 MG: 5 TABLET ORAL at 06:51

## 2023-01-01 RX ADMIN — LEVOTHYROXINE SODIUM 175 MCG: 0.17 TABLET ORAL at 07:22

## 2023-01-01 RX ADMIN — FAMOTIDINE 20 MG: 20 TABLET, FILM COATED ORAL at 20:09

## 2023-01-01 RX ADMIN — NOREPINEPHRINE BITARTRATE 0.3 MCG/KG/MIN: 8 INJECTION, SOLUTION INTRAVENOUS at 17:16

## 2023-01-01 RX ADMIN — CEFEPIME 2 G: 2 INJECTION, POWDER, FOR SOLUTION INTRAVENOUS at 20:27

## 2023-01-01 RX ADMIN — TRAZODONE HYDROCHLORIDE 50 MG: 50 TABLET ORAL at 20:19

## 2023-01-01 RX ADMIN — MIDODRINE HYDROCHLORIDE 10 MG: 5 TABLET ORAL at 08:28

## 2023-01-01 RX ADMIN — HYDROCODONE BITARTRATE AND ACETAMINOPHEN 1 TABLET: 5; 325 TABLET ORAL at 02:35

## 2023-01-01 RX ADMIN — OLANZAPINE 5 MG: 5 TABLET ORAL at 20:51

## 2023-01-01 RX ADMIN — CEFTRIAXONE SODIUM 1 G: 1 INJECTION, POWDER, FOR SOLUTION INTRAMUSCULAR; INTRAVENOUS at 21:50

## 2023-01-01 RX ADMIN — HYDROCODONE BITARTRATE AND ACETAMINOPHEN 1 TABLET: 5; 325 TABLET ORAL at 16:23

## 2023-01-01 RX ADMIN — CLOPIDOGREL 75 MG: 75 TABLET, FILM COATED ORAL at 08:05

## 2023-01-01 RX ADMIN — DEXTROSE MONOHYDRATE, SODIUM CHLORIDE, SODIUM LACTATE, CALCIUM CHLORIDE, MAGNESIUM CHLORIDE 2000 ML: 2.5; 538; 448; 18.4; 5.08 SOLUTION INTRAPERITONEAL at 18:46

## 2023-01-01 RX ADMIN — EPINEPHRINE 0.3 MCG/KG/MIN: 1 INJECTION INTRAMUSCULAR; INTRAVENOUS; SUBCUTANEOUS at 22:04

## 2023-01-01 RX ADMIN — HYDROXYZINE HYDROCHLORIDE 50 MG: 25 TABLET ORAL at 17:17

## 2023-01-01 RX ADMIN — DEXMEDETOMIDINE HYDROCHLORIDE 1.5 MCG/KG/HR: 4 INJECTION, SOLUTION INTRAVENOUS at 16:44

## 2023-01-01 RX ADMIN — CLOPIDOGREL 75 MG: 75 TABLET, FILM COATED ORAL at 08:30

## 2023-01-01 RX ADMIN — FAMOTIDINE 20 MG: 20 TABLET, FILM COATED ORAL at 20:28

## 2023-01-01 RX ADMIN — DEXTROSE MONOHYDRATE, SODIUM CHLORIDE, SODIUM LACTATE, CALCIUM CHLORIDE, MAGNESIUM CHLORIDE 2000 ML: 2.5; 538; 448; 18.4; 5.08 SOLUTION INTRAPERITONEAL at 17:47

## 2023-01-01 RX ADMIN — SODIUM BICARBONATE 50 MEQ: 84 INJECTION, SOLUTION INTRAVENOUS at 22:20

## 2023-01-01 RX ADMIN — FERROUS SULFATE TAB 325 MG (65 MG ELEMENTAL FE) 325 MG: 325 (65 FE) TAB at 07:46

## 2023-01-01 RX ADMIN — CALCIUM CHLORIDE, MAGNESIUM CHLORIDE, SODIUM CHLORIDE, SODIUM BICARBONATE, POTASSIUM CHLORIDE AND SODIUM PHOSPHATE DIBASIC DIHYDRATE 1500 ML/HR: 3.68; 3.05; 6.34; 3.09; .314; .187 INJECTION INTRAVENOUS at 22:44

## 2023-01-01 RX ADMIN — MIDODRINE HYDROCHLORIDE 10 MG: 5 TABLET ORAL at 17:04

## 2023-01-01 RX ADMIN — LINEZOLID 600 MG: 600 TABLET, FILM COATED ORAL at 08:05

## 2023-01-01 RX ADMIN — CLOPIDOGREL 75 MG: 75 TABLET, FILM COATED ORAL at 08:31

## 2023-01-01 RX ADMIN — GENTAMICIN SULFATE 1 APPLICATION: 1 CREAM TOPICAL at 11:30

## 2023-01-01 RX ADMIN — LOPERAMIDE HCL 2 MG: 1 SOLUTION ORAL at 20:51

## 2023-01-01 RX ADMIN — ONDANSETRON 4 MG: 2 INJECTION INTRAMUSCULAR; INTRAVENOUS at 15:35

## 2023-01-01 RX ADMIN — Medication 10 ML: at 17:54

## 2023-01-01 RX ADMIN — AMIODARONE HYDROCHLORIDE 0.5 MG/MIN: 1.8 INJECTION, SOLUTION INTRAVENOUS at 21:18

## 2023-01-01 RX ADMIN — NOREPINEPHRINE BITARTRATE 0.28 MCG/KG/MIN: 8 INJECTION, SOLUTION INTRAVENOUS at 05:54

## 2023-01-01 RX ADMIN — CLOPIDOGREL 75 MG: 75 TABLET, FILM COATED ORAL at 08:28

## 2023-01-01 RX ADMIN — LINEZOLID 600 MG: 600 TABLET, FILM COATED ORAL at 08:28

## 2023-01-01 RX ADMIN — MONTELUKAST SODIUM 10 MG: 10 TABLET, FILM COATED ORAL at 20:09

## 2023-01-01 RX ADMIN — TRAZODONE HYDROCHLORIDE 50 MG: 50 TABLET ORAL at 20:16

## 2023-01-01 RX ADMIN — HEPARIN SODIUM 5000 UNITS: 5000 INJECTION INTRAVENOUS; SUBCUTANEOUS at 17:20

## 2023-01-01 RX ADMIN — Medication 1 TABLET: at 08:10

## 2023-01-01 RX ADMIN — POTASSIUM CHLORIDE 30 MEQ: 750 TABLET, EXTENDED RELEASE ORAL at 09:11

## 2023-01-01 RX ADMIN — CALCIUM CHLORIDE, MAGNESIUM CHLORIDE, SODIUM CHLORIDE, SODIUM BICARBONATE, POTASSIUM CHLORIDE AND SODIUM PHOSPHATE DIBASIC DIHYDRATE 1500 ML/HR: 3.68; 3.05; 6.34; 3.09; .314; .187 INJECTION INTRAVENOUS at 23:40

## 2023-01-01 RX ADMIN — CALCIUM CHLORIDE, MAGNESIUM CHLORIDE, SODIUM CHLORIDE, SODIUM BICARBONATE, POTASSIUM CHLORIDE AND SODIUM PHOSPHATE DIBASIC DIHYDRATE 1500 ML/HR: 3.68; 3.05; 6.34; 3.09; .314; .187 INJECTION INTRAVENOUS at 03:05

## 2023-01-01 RX ADMIN — INSULIN HUMAN 10 UNITS: 100 INJECTION, SOLUTION PARENTERAL at 14:11

## 2023-01-01 RX ADMIN — DOPAMINE HYDROCHLORIDE IN DEXTROSE 10 MCG/KG/MIN: 1.6 INJECTION, SOLUTION INTRAVENOUS at 16:13

## 2023-01-01 RX ADMIN — HYDROXYZINE HYDROCHLORIDE 50 MG: 25 TABLET ORAL at 11:29

## 2023-01-01 RX ADMIN — Medication 1 TABLET: at 08:04

## 2023-01-01 RX ADMIN — DEXTROSE MONOHYDRATE 50 ML: 25 INJECTION, SOLUTION INTRAVENOUS at 14:42

## 2023-01-01 RX ADMIN — MIDODRINE HYDROCHLORIDE 10 MG: 5 TABLET ORAL at 13:09

## 2023-01-01 RX ADMIN — NOREPINEPHRINE BITARTRATE 0.28 MCG/KG/MIN: 8 INJECTION, SOLUTION INTRAVENOUS at 23:28

## 2023-01-01 RX ADMIN — FAMOTIDINE 20 MG: 20 TABLET, FILM COATED ORAL at 09:11

## 2023-01-01 RX ADMIN — Medication 150 MEQ: at 01:32

## 2023-01-01 RX ADMIN — IOPAMIDOL 83 ML: 510 INJECTION, SOLUTION INTRAVASCULAR at 14:08

## 2023-01-01 RX ADMIN — CEFTRIAXONE 2 G: 2 INJECTION, POWDER, FOR SOLUTION INTRAMUSCULAR; INTRAVENOUS at 17:17

## 2023-01-01 RX ADMIN — CEFTRIAXONE 2 G: 2 INJECTION, POWDER, FOR SOLUTION INTRAMUSCULAR; INTRAVENOUS at 15:50

## 2023-01-01 RX ADMIN — EPINEPHRINE 0.3 MCG/KG/MIN: 1 INJECTION INTRAMUSCULAR; INTRAVENOUS; SUBCUTANEOUS at 08:14

## 2023-01-01 RX ADMIN — LEVOTHYROXINE SODIUM 150 MCG: 0.15 TABLET ORAL at 05:48

## 2023-01-01 RX ADMIN — HEPARIN SODIUM 5000 UNITS: 5000 INJECTION INTRAVENOUS; SUBCUTANEOUS at 15:48

## 2023-01-01 RX ADMIN — NOREPINEPHRINE BITARTRATE 0.28 MCG/KG/MIN: 8 INJECTION, SOLUTION INTRAVENOUS at 18:14

## 2023-01-01 RX ADMIN — MIDODRINE HYDROCHLORIDE 10 MG: 5 TABLET ORAL at 11:36

## 2023-01-01 RX ADMIN — FERROUS SULFATE TAB 325 MG (65 MG ELEMENTAL FE) 325 MG: 325 (65 FE) TAB at 08:04

## 2023-01-01 RX ADMIN — CEFTRIAXONE SODIUM 1 G: 1 INJECTION, POWDER, FOR SOLUTION INTRAMUSCULAR; INTRAVENOUS at 17:18

## 2023-01-01 RX ADMIN — POTASSIUM CHLORIDE 20 MEQ: 29.8 INJECTION, SOLUTION INTRAVENOUS at 04:08

## 2023-01-01 RX ADMIN — FAMOTIDINE 20 MG: 20 TABLET, FILM COATED ORAL at 08:10

## 2023-01-01 RX ADMIN — HYDROXYZINE HYDROCHLORIDE 25 MG: 25 TABLET ORAL at 16:22

## 2023-01-01 RX ADMIN — FAMOTIDINE 20 MG: 20 TABLET, FILM COATED ORAL at 09:05

## 2023-01-01 RX ADMIN — CLINDAMYCIN PHOSPHATE 600 MG: 600 INJECTION, SOLUTION INTRAVENOUS at 12:57

## 2023-01-01 RX ADMIN — Medication 10 ML: at 08:32

## 2023-01-01 RX ADMIN — OLANZAPINE 5 MG: 5 TABLET ORAL at 20:19

## 2023-01-01 RX ADMIN — DEXTROSE MONOHYDRATE, SODIUM CHLORIDE, SODIUM LACTATE, CALCIUM CHLORIDE, MAGNESIUM CHLORIDE 2000 ML: 2.5; 538; 448; 18.4; 5.08 SOLUTION INTRAPERITONEAL at 08:05

## 2023-01-01 RX ADMIN — TRAZODONE HYDROCHLORIDE 50 MG: 50 TABLET ORAL at 20:08

## 2023-01-01 RX ADMIN — ASPIRIN 81 MG: 81 TABLET, COATED ORAL at 10:23

## 2023-01-01 RX ADMIN — PANTOPRAZOLE SODIUM 40 MG: 40 INJECTION, POWDER, FOR SOLUTION INTRAVENOUS at 12:06

## 2023-01-01 RX ADMIN — HYDROCODONE BITARTRATE AND ACETAMINOPHEN 1 TABLET: 5; 325 TABLET ORAL at 22:47

## 2023-01-01 RX ADMIN — Medication 10 ML: at 08:19

## 2023-01-01 RX ADMIN — ASPIRIN 81 MG: 81 TABLET, COATED ORAL at 09:05

## 2023-01-01 RX ADMIN — ONDANSETRON 4 MG: 2 INJECTION INTRAMUSCULAR; INTRAVENOUS at 02:17

## 2023-01-01 RX ADMIN — EPINEPHRINE 0.3 MCG/KG/MIN: 1 INJECTION INTRAMUSCULAR; INTRAVENOUS; SUBCUTANEOUS at 18:24

## 2023-01-01 RX ADMIN — CALCIUM CHLORIDE, MAGNESIUM CHLORIDE, SODIUM CHLORIDE, SODIUM BICARBONATE, POTASSIUM CHLORIDE AND SODIUM PHOSPHATE DIBASIC DIHYDRATE 1500 ML/HR: 3.68; 3.05; 6.34; 3.09; .314; .187 INJECTION INTRAVENOUS at 10:30

## 2023-01-01 RX ADMIN — MONTELUKAST SODIUM 10 MG: 10 TABLET, FILM COATED ORAL at 20:19

## 2023-01-01 RX ADMIN — DEXTROSE MONOHYDRATE, SODIUM CHLORIDE, SODIUM LACTATE, CALCIUM CHLORIDE, MAGNESIUM CHLORIDE 2000 ML: 2.5; 538; 448; 18.4; 5.08 SOLUTION INTRAPERITONEAL at 13:48

## 2023-01-01 RX ADMIN — FAMOTIDINE 20 MG: 20 TABLET, FILM COATED ORAL at 08:31

## 2023-01-01 RX ADMIN — EPINEPHRINE 1 MG: 0.1 INJECTION INTRACARDIAC; INTRAVENOUS at 22:24

## 2023-01-01 RX ADMIN — EPINEPHRINE 0.36 MCG/KG/MIN: 1 INJECTION INTRAMUSCULAR; INTRAVENOUS; SUBCUTANEOUS at 04:36

## 2023-01-01 RX ADMIN — HEPARIN SODIUM 5000 UNITS: 5000 INJECTION INTRAVENOUS; SUBCUTANEOUS at 22:18

## 2023-01-01 RX ADMIN — Medication 10 ML: at 14:01

## 2023-01-01 RX ADMIN — Medication 0.08 MCG/KG/MIN: at 02:12

## 2023-01-01 RX ADMIN — Medication 200 MCG: at 14:03

## 2023-01-01 RX ADMIN — DEXTROSE MONOHYDRATE, SODIUM CHLORIDE, SODIUM LACTATE, CALCIUM CHLORIDE, MAGNESIUM CHLORIDE 2000 ML: 2.5; 538; 448; 18.4; 5.08 SOLUTION INTRAPERITONEAL at 21:44

## 2023-01-01 RX ADMIN — CALCIUM CHLORIDE, MAGNESIUM CHLORIDE, SODIUM CHLORIDE, SODIUM BICARBONATE, POTASSIUM CHLORIDE AND SODIUM PHOSPHATE DIBASIC DIHYDRATE 1500 ML/HR: 3.68; 3.05; 6.34; 3.09; .314; .187 INJECTION INTRAVENOUS at 09:07

## 2023-01-01 RX ADMIN — FAMOTIDINE 20 MG: 10 INJECTION INTRAVENOUS at 12:36

## 2023-01-01 RX ADMIN — SODIUM BICARBONATE 50 MEQ: 84 INJECTION, SOLUTION INTRAVENOUS at 00:10

## 2023-01-01 RX ADMIN — HEPARIN SODIUM 5000 UNITS: 5000 INJECTION INTRAVENOUS; SUBCUTANEOUS at 00:05

## 2023-01-01 RX ADMIN — POTASSIUM CHLORIDE 40 MEQ: 750 TABLET, EXTENDED RELEASE ORAL at 14:22

## 2023-01-01 RX ADMIN — VANCOMYCIN HYDROCHLORIDE 1500 MG: 10 INJECTION, POWDER, LYOPHILIZED, FOR SOLUTION INTRAVENOUS at 23:38

## 2023-01-01 RX ADMIN — EPINEPHRINE 0.4 MCG/KG/MIN: 1 INJECTION INTRAMUSCULAR; INTRAVENOUS; SUBCUTANEOUS at 01:29

## 2023-01-01 RX ADMIN — Medication 10 ML: at 20:48

## 2023-01-01 RX ADMIN — MIDODRINE HYDROCHLORIDE 10 MG: 5 TABLET ORAL at 11:41

## 2023-01-01 RX ADMIN — Medication 10 ML: at 20:29

## 2023-01-01 RX ADMIN — FERROUS SULFATE TAB 325 MG (65 MG ELEMENTAL FE) 325 MG: 325 (65 FE) TAB at 09:06

## 2023-01-01 RX ADMIN — MIDODRINE HYDROCHLORIDE 10 MG: 5 TABLET ORAL at 19:01

## 2023-01-01 RX ADMIN — Medication 1 TABLET: at 09:06

## 2023-01-01 RX ADMIN — DEXMEDETOMIDINE HYDROCHLORIDE 0.6 MCG/KG/HR: 4 INJECTION, SOLUTION INTRAVENOUS at 21:18

## 2023-01-01 RX ADMIN — DEXTROSE MONOHYDRATE, SODIUM CHLORIDE, SODIUM LACTATE, CALCIUM CHLORIDE, MAGNESIUM CHLORIDE 2000 ML: 2.5; 538; 448; 18.4; 5.08 SOLUTION INTRAPERITONEAL at 15:58

## 2023-01-01 RX ADMIN — Medication 10 ML: at 08:04

## 2023-01-01 RX ADMIN — CALCIUM CHLORIDE, MAGNESIUM CHLORIDE, SODIUM CHLORIDE, SODIUM BICARBONATE, POTASSIUM CHLORIDE AND SODIUM PHOSPHATE DIBASIC DIHYDRATE 1500 ML/HR: 3.68; 3.05; 6.34; 3.09; .314; .187 INJECTION INTRAVENOUS at 14:37

## 2023-01-01 RX ADMIN — CEFTRIAXONE 2 G: 2 INJECTION, POWDER, FOR SOLUTION INTRAMUSCULAR; INTRAVENOUS at 20:09

## 2023-01-01 RX ADMIN — Medication 1 MG: at 18:40

## 2023-01-01 RX ADMIN — ONDANSETRON 4 MG: 2 INJECTION INTRAMUSCULAR; INTRAVENOUS at 19:02

## 2023-01-01 RX ADMIN — Medication 10 ML: at 20:16

## 2023-01-01 RX ADMIN — OLANZAPINE 5 MG: 5 TABLET ORAL at 20:09

## 2023-01-01 RX ADMIN — Medication 150 MEQ: at 14:58

## 2023-01-01 RX ADMIN — Medication 10 ML: at 09:11

## 2023-01-01 RX ADMIN — MIDODRINE HYDROCHLORIDE 10 MG: 5 TABLET ORAL at 12:39

## 2023-01-01 RX ADMIN — DEXTROSE MONOHYDRATE, SODIUM CHLORIDE, SODIUM LACTATE, CALCIUM CHLORIDE, MAGNESIUM CHLORIDE 2000 ML: 2.5; 538; 448; 18.4; 5.08 SOLUTION INTRAPERITONEAL at 21:30

## 2023-01-01 RX ADMIN — HEPARIN SODIUM 100 UNITS/HR: 10000 INJECTION, SOLUTION INTRAVENOUS at 01:33

## 2023-01-01 RX ADMIN — AMIODARONE HYDROCHLORIDE 300 MG: 50 INJECTION, SOLUTION INTRAVENOUS at 22:13

## 2023-01-01 RX ADMIN — PHENYLEPHRINE HYDROCHLORIDE 1 MCG/KG/MIN: 10 INJECTION INTRAVENOUS at 13:58

## 2023-01-01 RX ADMIN — LEVOTHYROXINE SODIUM 150 MCG: 0.15 TABLET ORAL at 05:17

## 2023-01-01 RX ADMIN — FAMOTIDINE 20 MG: 20 TABLET, FILM COATED ORAL at 08:30

## 2023-01-01 RX ADMIN — EPINEPHRINE 0.3 MCG/KG/MIN: 1 INJECTION INTRAMUSCULAR; INTRAVENOUS; SUBCUTANEOUS at 11:40

## 2023-01-01 RX ADMIN — HYDROXYZINE HYDROCHLORIDE 25 MG: 25 TABLET ORAL at 11:32

## 2023-01-01 RX ADMIN — ASPIRIN 81 MG: 81 TABLET, COATED ORAL at 12:11

## 2023-01-01 RX ADMIN — CLOPIDOGREL 75 MG: 75 TABLET, FILM COATED ORAL at 17:19

## 2023-01-01 RX ADMIN — MIDODRINE HYDROCHLORIDE 10 MG: 5 TABLET ORAL at 05:27

## 2023-01-01 RX ADMIN — Medication 10 ML: at 02:21

## 2023-01-01 RX ADMIN — CALCIUM CHLORIDE 1 G: 100 INJECTION INTRAVENOUS; INTRAVENTRICULAR at 23:49

## 2023-01-01 RX ADMIN — MIDODRINE HYDROCHLORIDE 10 MG: 5 TABLET ORAL at 17:31

## 2023-01-01 RX ADMIN — CALCIUM GLUCONATE 2 G: 20 INJECTION, SOLUTION INTRAVENOUS at 23:04

## 2023-01-01 RX ADMIN — HYDROXYZINE HYDROCHLORIDE 25 MG: 25 TABLET ORAL at 22:21

## 2023-01-01 RX ADMIN — Medication 10 ML: at 10:24

## 2023-01-01 RX ADMIN — HEPARIN SODIUM 5000 UNITS: 5000 INJECTION INTRAVENOUS; SUBCUTANEOUS at 22:55

## 2023-01-01 RX ADMIN — DEXTROSE MONOHYDRATE, SODIUM CHLORIDE, SODIUM LACTATE, CALCIUM CHLORIDE, MAGNESIUM CHLORIDE 2000 ML: 2.5; 538; 448; 18.4; 5.08 SOLUTION INTRAPERITONEAL at 06:17

## 2023-01-01 RX ADMIN — ATORVASTATIN CALCIUM 20 MG: 20 TABLET, FILM COATED ORAL at 20:16

## 2023-01-01 RX ADMIN — CALCIUM CHLORIDE, MAGNESIUM CHLORIDE, SODIUM CHLORIDE, SODIUM BICARBONATE, POTASSIUM CHLORIDE AND SODIUM PHOSPHATE DIBASIC DIHYDRATE 1500 ML/HR: 3.68; 3.05; 6.34; 3.09; .314; .187 INJECTION INTRAVENOUS at 20:19

## 2023-01-01 RX ADMIN — NOREPINEPHRINE BITARTRATE 0.28 MCG/KG/MIN: 8 INJECTION, SOLUTION INTRAVENOUS at 11:42

## 2023-01-01 RX ADMIN — Medication 200 MCG: at 13:38

## 2023-01-01 RX ADMIN — ESCITALOPRAM 20 MG: 20 TABLET, FILM COATED ORAL at 08:28

## 2023-01-01 RX ADMIN — CALCIUM GLUCONATE 1 G: 20 INJECTION, SOLUTION INTRAVENOUS at 15:47

## 2023-01-01 RX ADMIN — LEVOTHYROXINE SODIUM 175 MCG: 0.17 TABLET ORAL at 05:27

## 2023-01-01 RX ADMIN — ATORVASTATIN CALCIUM 20 MG: 20 TABLET, FILM COATED ORAL at 20:54

## 2023-01-01 RX ADMIN — REMIFENTANIL HYDROCHLORIDE 0.12 MCG/KG/MIN: 1 INJECTION, POWDER, LYOPHILIZED, FOR SOLUTION INTRAVENOUS at 21:10

## 2023-01-01 RX ADMIN — POTASSIUM CHLORIDE 30 MEQ: 750 TABLET, EXTENDED RELEASE ORAL at 12:36

## 2023-01-01 RX ADMIN — MIDODRINE HYDROCHLORIDE 10 MG: 5 TABLET ORAL at 06:47

## 2023-01-01 RX ADMIN — MIDODRINE HYDROCHLORIDE 10 MG: 5 TABLET ORAL at 11:34

## 2023-01-01 RX ADMIN — Medication 200 MCG: at 13:16

## 2023-01-01 RX ADMIN — FENTANYL CITRATE 50 MCG: 50 INJECTION, SOLUTION INTRAMUSCULAR; INTRAVENOUS at 12:36

## 2023-01-01 RX ADMIN — LINEZOLID 600 MG: 600 TABLET, FILM COATED ORAL at 20:54

## 2023-01-01 RX ADMIN — DEXTROSE MONOHYDRATE, SODIUM CHLORIDE, SODIUM LACTATE, CALCIUM CHLORIDE, MAGNESIUM CHLORIDE 2000 ML: 2.5; 538; 448; 18.4; 5.08 SOLUTION INTRAPERITONEAL at 00:00

## 2023-01-01 RX ADMIN — MIDODRINE HYDROCHLORIDE 10 MG: 5 TABLET ORAL at 07:22

## 2023-01-01 RX ADMIN — HEPARIN SODIUM 5000 UNITS: 5000 INJECTION INTRAVENOUS; SUBCUTANEOUS at 22:03

## 2023-01-01 RX ADMIN — DEXTROSE MONOHYDRATE, SODIUM CHLORIDE, SODIUM LACTATE, CALCIUM CHLORIDE, MAGNESIUM CHLORIDE 2000 ML: 2.5; 538; 448; 18.4; 5.08 SOLUTION INTRAPERITONEAL at 15:13

## 2023-01-01 RX ADMIN — FAMOTIDINE 20 MG: 20 TABLET, FILM COATED ORAL at 08:05

## 2023-01-01 RX ADMIN — CALCIUM CHLORIDE, MAGNESIUM CHLORIDE, SODIUM CHLORIDE, SODIUM BICARBONATE, POTASSIUM CHLORIDE AND SODIUM PHOSPHATE DIBASIC DIHYDRATE 1500 ML/HR: 3.68; 3.05; 6.34; 3.09; .314; .187 INJECTION INTRAVENOUS at 13:56

## 2023-01-01 RX ADMIN — Medication 150 MEQ: at 16:54

## 2023-01-01 RX ADMIN — NOREPINEPHRINE BITARTRATE 0.28 MCG/KG/MIN: 8 INJECTION, SOLUTION INTRAVENOUS at 12:28

## 2023-01-01 RX ADMIN — PANTOPRAZOLE SODIUM 40 MG: 40 INJECTION, POWDER, FOR SOLUTION INTRAVENOUS at 06:34

## 2023-01-02 NOTE — CASE COMMUNICATION
60 Day Summary PT HAS DEVELOPED WOUND RUE INCISION LINE WHICH APPEAR TO BE HEALING AND HAS NEW WOUND LATERAL EDGE  LT FOOT   Home Health need continues for: NEW HEMODIALYSIS SHUNT PLACED SO PATIENT CAN TRANSITION FROM PERITONEAL DIALYSIS TO HOME HEMO DIALYSIS  Primary diagnoses/co-morbidities/recent procedures in past 60 days that impact current episode: ESRD, DEHYDRATION, HYPOTENSION, INFECTION THAT REQUIRED REVISION OF PD CATHETER  Go als accomplished and/or measurable progress toward unmet goals in past 60 days:NUTRITION/DEHYDRATION NEEDS ADDITIONAL EDUCATION BUT PATIENT KNOWS HOW MUCH INTAKE SHE SHOULD HAVE DAILY NOW  Focus of care WAS: NEW AV FISTULA PLACEMENT CARE, DEPRESSION EDUCATION, MEDICATION EDUCATION          SOC--Patient referred to [facility] following hospitalization from 8/28-9/2 for S/P peritoneal dialysis cath revision, persistent hypotension, UTI, ESRD on  PD, chronic diarrhea (has not been seen by GI and is to f/u with GI). 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 SBP runs in [id number]'[id number]'s per pt. She checks it regularly. She is up in home with supervision. Daughter in town temporarily to assist and son lives in walking distance and is involved in care. RLQ dressing to be left in place until f/u 9/8. KONG chest incision with st eristrips- defibrillator placed 2 weeks ago. Fistula placed 7/26 to RUE, + thrill and bruit. She has been in the hospital multiple times in the last year. SN FOC: post-op care following PD cath revision, monitoring and teaching.    Home Health need continues for: WOUND CARE RUE, LT FOOT WOUNDS    Primary diagnoses/co-morbidities/recent procedures in past 60 days that impact current episode:     Current level of functional ability: REQUI RES ASSISTANCE    Homebound status and living arrangements: LIVES IN OWN HOME W/ DAUGHTER    Goals accomplished and/or measurable progress toward unmet goals in past 60 days: RUE WOUND  HEALING W/O S/S COMPLCIATIONS, NEW LT LATERAL FOOT WOUND    Focus of care for next 60 days for each discipline ordered: WOUND HEALING    Skin integrity/wound status: PT W/ WOUNDS    Code status: FULL    Most recent fall risk: AT RISK

## 2023-01-02 NOTE — HOME HEALTH
PLAN FOR NEXT SNV- WOUND ASSESSMENT/CARES RUE, LT FOOT    FOCUS OF CARE- WOUND HEALING, TEACH WOUND CARES/DRSG CHANGES    12/30-RECERT- RUE WOUND ALMOST FULLY EPITHELIALIZED, PT W/ ESCAHR APPEARING WOUND TO LATERAL EDGE OF LEFT FOOT, BELIEVED TO BE NEUROGENIC ULCERATION. PT ONLY CHANGES DRESSING W/ SNV. PT STS CAN'T DO HER OWN AND DAUHGTER IS RESISTANT. PT CONTINUES W/ PD IN THE HOME, MAINTAINS OWN PD CATH.              HX- 11/1/22- 12/21/22 CERT          Home Health need continues for: NEW HEMODIALYSIS SHUNT PLACED SO PATIENT CAN TRANSITION FROM PERITONEAL DIALYSIS TO HOME HEMO DIALYSIS  Primary diagnoses/co-morbidities/recent procedures in past 60 days that impact current episode: ESRD, DEHYDRATION, HYPOTENSION, INFECTION THAT REQUIRED REVISION OF PD CATHETER  Goals accomplished and/or measurable progress toward unmet goals in past 60 days: DEPRESSION HAS IMPROVED BUT STILL NEEDS SOME EDUCATION, NUTRITION/DEHYDRATION NEEDS ADDITIONAL EDUCATION BUT PATIENT KNOWS HOW MUCH INTAKE SHE SHOULD HAVE DAILY NOW  Focus of care for next 60 days for each discipline ordered: NEW AV FISTULA PLACEMENT CARE, DEPRESSION EDUCATION, MEDICATION EDUCATION          HX- SOC-9/2 - 10/31/22 -Patient referred to [facility] following hospitalization from 8/28-9/2 for S/P peritoneal dialysis cath revision, persistent hypotension, UTI, ESRD on PD, chronic diarrhea (has not been seen by GI and is to f/u with GI). 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 SBP runs in 80's-[id number]'s per pt. She checks it regularly. She is up in home with supervision. Daughter in town temporarily to assist and son lives in walking distance and is involved in care. RLQ dressing to be left in place until f/u 9/8. KONG chest incision with steristrips- defibrillator placed 2 weeks ago. Fistula placed 7/26 to RUE, + thrill and bruit. She has been in the hospital multiple times in the last year. SN FOC: post-op care  following PD cath revision, monitoring and teaching.

## 2023-01-06 PROBLEM — A41.9 SEPTIC SHOCK (HCC): Status: ACTIVE | Noted: 2023-01-01

## 2023-01-06 PROBLEM — R65.21 SEPTIC SHOCK: Status: ACTIVE | Noted: 2023-01-01

## 2023-01-07 PROBLEM — Z95.810 ICD (IMPLANTABLE CARDIOVERTER-DEFIBRILLATOR) IN PLACE: Chronic | Status: ACTIVE | Noted: 2023-01-01

## 2023-01-07 PROBLEM — I77.0 ARTERIOVENOUS FISTULA, ACQUIRED: Chronic | Status: ACTIVE | Noted: 2023-01-01

## 2023-01-08 NOTE — HOME HEALTH
PT WITH ESRD DOES PD AT HOME HAS INCISON TO [facility] AFTER FISTULA PLACEMENT  SL OPEN UPPER 1/3 WOUND TO L FOOT DUE TO PAD.  RN SENT PT TO ER FOR EVLAUATION ON 1/6 DUE TO HYPOTENSION,  INCREASE WEAKNESS AND DECREASE APPETITE

## 2023-01-09 PROBLEM — Z22.39 VRE (VANCOMYCIN RESISTANT ENTEROCOCCUS) CULTURE POSITIVE: Status: ACTIVE | Noted: 2023-01-01

## 2023-01-09 PROBLEM — R26.2 IMPAIRED AMBULATION: Status: ACTIVE | Noted: 2023-01-01

## 2023-01-09 PROBLEM — I96 NECROTIC TOES: Status: ACTIVE | Noted: 2023-01-01

## 2023-01-11 NOTE — ANESTHESIA PREPROCEDURE EVALUATION
Anesthesia Evaluation     Patient summary reviewed and Nursing notes reviewed   no history of anesthetic complications:  NPO Solid Status: > 6 hours  NPO Liquid Status: > 6 hours           Airway   Mallampati: II  TM distance: >3 FB  Neck ROM: full  no difficulty expected and No difficulty expected  Dental - normal exam     Pulmonary - normal exam    breath sounds clear to auscultation  (+) pulmonary embolism, sleep apnea,   (-) rhonchi, decreased breath sounds, wheezes, rales, stridor  Cardiovascular - normal exam    NYHA Classification: I  Patient on routine beta blocker and Beta blocker given within 24 hours of surgery  Rhythm: regular  Rate: normal    (+) CHF , PVD (Severe PVD.  Aorto bifem and stenosis of renal arteries), hyperlipidemia,   (-) murmur, weak pulses, friction rub, systolic click, carotid bruits, JVD, peripheral edema      Neuro/Psych  (+) psychiatric history Anxiety,    GI/Hepatic/Renal/Endo    (+) obesity,  GERD,  renal disease CRI and stones, thyroid problem hypothyroidism    Musculoskeletal (-) negative ROS    Abdominal  - normal exam    Abdomen: soft.   Substance History - negative use     OB/GYN negative ob/gyn ROS         Other - negative ROS                       Anesthesia Plan    ASA 3     MAC     intravenous induction     Anesthetic plan, risks, benefits, and alternatives have been provided, discussed and informed consent has been obtained with: patient.        CODE STATUS:    Code Status (Patient has no pulse and is not breathing): CPR (Attempt to Resuscitate)  Medical Interventions (Patient has pulse or is breathing): Full Support

## 2023-01-11 NOTE — ANESTHESIA POSTPROCEDURE EVALUATION
Patient: Ambar Lara    Procedure Summary     Date: 01/11/23 Room / Location: [facility] OR 18 Critical access hospital / [facility] HYBRID OR 18/19    Anesthesia Start: 1303 Anesthesia Stop: 1430    Procedure: LEFT LEG ANGIOGRAM WITH PERONEAL ANGIOPLASTY (Left: Thigh) Diagnosis:     Surgeons: Diony Mcmahan MD Provider: Jose Harry MD    Anesthesia Type: MAC ASA Status: 3          Anesthesia Type: MAC    Vitals  Vitals Value Taken Time   BP 96/78 01/11/23 1541   Temp 36.6 °C (97.8 °F) 01/11/23 1433   Pulse 94 01/11/23 1547   Resp 16 01/11/23 1540   SpO2 93 % 01/11/23 1547   Vitals shown include unvalidated device data.        Post Anesthesia Care and Evaluation    Patient location during evaluation: PACU  Patient participation: complete - patient participated  Level of consciousness: awake and alert  Pain management: adequate    Airway patency: patent  Anesthetic complications: No anesthetic complications    Cardiovascular status: acceptable  Respiratory status: acceptable  Hydration status: acceptable    Comments: --------------------            01/11/23               1540     --------------------   BP:       96/78      Pulse:      93       Resp:       16       Temp:                SpO2:      93%      --------------------

## 2023-01-14 NOTE — OUTREACH NOTE
Prep Survey    Flowsheet Row Responses   [facility] patient discharged from? [place]   Is LACE score < 7 ? No   Eligibility Readm Mgmt   Discharge diagnosis septic shock, Acute UTI, ischemia left foot with necrotic toes, ESRD, cardiomyopathy   Does the patient have one of the following disease processes/diagnoses(primary or secondary)? Sepsis   Does the patient have Home health ordered? Yes   What is the Home health agency?  [facility]   Is there a DME ordered? No   Prep survey completed? Yes          DUSTIN NAVA - Registered Nurse

## 2023-01-15 NOTE — HOME HEALTH
PT HOSPITALIZED 1/6-1/12 FOR SEPTIC SHOCK , ISCHEMIC L FOOT WITH NECROTIC TOES, PVD S/P L PERONEAL ARTERY PTCA 1/11/22, ESRD ON PD TO TRANSITION TO HD LATER ONCE FISTULA MATURE (POSITIVE BRUIT NO THRILL) HYPOTENSION ( ACUTE ON CHRONIC) , CARDIOMYOPATHY MOD-SEVERE MV REGURG,   THEY WERE TO AMPUTATE IN HOSPITAL BUT AFTER PTCA BLOOD FLOW IMPROVED AND TRYING TO SALVAGE TOES     CONTACTED NEPHROLOGY OFFICE ON MONDAY RELATED TO  PT TAKING PD 4XDAY AND AT HS RESUMING NABICARB, RENAGEL, AND MAG OX  ALSO ASKED TO SPEAK TO [facility] PD DEPT DUE TO UNABLE TO CONTACT OVER WEEKEND TALKED TO MILENA. AWAITING RETURN CALL  NOTED THEY ARE AWARE AND [facility] DOES MONTHLY LABS .    PT TO PERFORM BETADINE PAINT TO L FOOT BID AND LEAVE OPEN TO AIR,  RN TRIED TO CALL PD FRESENUS ABOUT PT BEING HOME.    ON [facility] NOTED PD 4 XDAY,,  PT NOTED SHE HAS ALWAYS DONE 4XDAY AND HS PD  UNABLE TO CONTACT THEM  PT NOTED THEY ARE AWARE SHE IS HOME  PT RESUMING HOME NA BICARB, FE, MAG, JOVANA AND KENRICK    RN QUESTIONED THIS AND DA NOTED IN HOSPITAL GAVE IV MG  WILL CALL NEPHROLOGIST           FOC L FOOT WOUND,

## 2023-01-16 NOTE — HOME HEALTH
Patient is a 51yo female recently discharged from [facility] for Status post left peroneal artery angioplasty 1/11/2023. Patient lives with daughter in 1 story home, steps to enter. Patient has rolling walker, wheelchair, does dialysis at home. This therapist performed [place] Hallpike and Roll Test. Patient exhibited mild nystagmus and complaints of dizziness with Roll Test to the left. Skilled Physical Therapy is medically necessary to establish home exercise program after recent functional decline from hospitalization. Patient education necessary for appropriate progression of home exercise program, and gait training to reduce reliance on assist device. Without skilled physical therapy, patient at risk for falls, increased burden of care.    PLAN FOR NEXT VISIT:  --Treat positive left roll test  --Establish home exercise program  --Continue gait training  --Continue transfer training

## 2023-01-17 NOTE — HOME HEALTH
VSS, PT BP REMAINS LOW, CG ADMINISTERED MIDODRINE DURING VISIT. PT DENIES PAIN AT THIS TIME. SN CLEANED WOUNDS WITH NS & GAUZE, PAINTED WITH BETADINE, LEFT OPEN TO AIR. PT TOLERATED WELL.         FOC L FOOT WOUND

## 2023-01-18 NOTE — OUTREACH NOTE
Sepsis Week 1 Survey    Flowsheet Row Responses   [facility] patient discharged from? [place]   Does the patient have one of the following disease processes/diagnoses(primary or secondary)? Sepsis   Week 1 attempt successful? Yes   Call start time 1251   Call end time 1302   Discharge diagnosis septic shock, Acute UTI, ischemia left foot with necrotic toes, ESRD, cardiomyopathy   Is patient permission given to speak with other caregiver? Yes   Person spoke with today (if not patient) and relationship Carol   Meds reviewed with patient/caregiver? Yes   Is the patient having any side effects they believe may be caused by any medication additions or changes? No   Does the patient have all medications related to this admission filled (includes all antibiotics, inhalers, nebulizers,steroids,etc.) Yes   Is the patient taking all medications as directed (includes completed medication regime)? Yes   Does the patient have a primary care provider?  Yes   Does the patient have an appointment with their PCP within 7 days of discharge? Yes   Has the patient kept scheduled appointments due by today? N/A   What is the Home health agency?  [facility]   Has home health visited the patient within 72 hours of discharge? Yes   Psychosocial issues? No   Did the patient receive a copy of their discharge instructions? Yes   Nursing interventions Reviewed instructions with patient   What is the patient's perception of their health status since discharge? Same   Nursing interventions Nurse provided patient education   Is the patient/caregiver able to teach back TIME? T emperature - higher or lower than normal, I nfection - may have signs and symptoms of an infection, E xtremely Ill - severe pain, discomfort, shortness of breath   Nursing interventions Nurse provided patient education   Is patient/caregiver able to teach back steps to recovery at home? Set small, achievable goals for return to baseline health, Rest and regain strength, Eat  a balanced diet, Make a list of questions for PCP appoinment   Is the patient/caregiver able to teach back signs and symptoms of worsening condition: Fever, Shortness of breath/rapid respiratory rate, Edema   If the patient is a current smoker, are they able to teach back resources for cessation? Not a smoker   Is the patient/caregiver able to teach back the hierarchy of who to call/visit for symptoms/problems? PCP, Specialist, Home health nurse, Urgent Care, ED, 911 Yes   Additional teach back comments Dtr is concerned with her glassy eyed appearance, more spacy gloss than illness look.   Week 1 call completed? Yes   Wrap up additional comments Dtr says she is sleeping today but her foot is bright angry red. She will outline and upload picture to vascular and pcp today.          YAJAIRA WILL - Registered Nurse

## 2023-01-19 PROBLEM — L08.9 LEFT FOOT INFECTION: Status: ACTIVE | Noted: 2023-01-01

## 2023-01-19 NOTE — HOME HEALTH
Patient lying in bed upon arrival, complained of dizziness. No falls reported, no changes in meds. Reported she is having some procedure tomorrow.     ASSESSMENT: Treated left side bppv with barbecue maneuver. Will retest next visit    PLAN FOR NEXT VISIT:  --Retest BPPV  --Continue therapeutic exercises  --Continue gait training

## 2023-01-20 NOTE — HOME HEALTH
VSS, PT BP 96/81 , PT STATED SHE TOOK HER  MIDODRINE PRIOR TO NURSE ARRIVAL. PT COMPLAINS OF PAIN TO BLE'S. SN CLEANED WOUNDS WITH NS & GAUZE, PAINTED WITH BETADINE, COVERED WITH DRESSING DUE TO PT GOING TO APPOINTMENT TODAY TO HAVE SHUNT CHECKED FOR DIALYSIS AND HER (L) FOOT LOOKED AT. PT ON ATORVASTATIN 20MG AT HS SINCE BEING HOME FROM LAST HOSPITALIZATION. PT STATED SHE HAS BEEN DIZZY AND IT HAS GOTTEN WORSE SINCE SHE HAS BEEN ON MEDICATION. SN CALLED PT PCP- DR JAVIER ROY AND LEFT A MESSAGE WITH DETAIL OF PROBLEM.     FOC L FOOT WOUND

## 2023-01-20 NOTE — OUTREACH NOTE
Sepsis Week 2 Survey    Flowsheet Row Responses   [facility] patient discharged from? [place]   Does the patient have one of the following disease processes/diagnoses(primary or secondary)? Sepsis   Week 2 attempt successful? No   Unsuccessful attempts Attempt 1   Revoke Readmitted          ELEONORA PARRA - Registered Nurse

## 2023-01-23 PROBLEM — R82.90 ABNORMAL URINALYSIS: Status: ACTIVE | Noted: 2023-01-01

## 2023-01-23 PROBLEM — A41.9 SEPSIS WITHOUT ACUTE ORGAN DYSFUNCTION (HCC): Status: RESOLVED | Noted: 2022-01-01 | Resolved: 2023-01-01

## 2023-01-25 PROBLEM — R19.7 DIARRHEA: Status: RESOLVED | Noted: 2022-01-01 | Resolved: 2023-01-01

## 2023-01-26 NOTE — OUTREACH NOTE
Prep Survey    Flowsheet Row Responses   [facility] patient discharged from? [place]   Is LACE score < 7 ? No   Eligibility Readm Mgmt   Discharge diagnosis Left foot infection,   Necrotic L toes,   Sever PAD   Does the patient have one of the following disease processes/diagnoses(primary or secondary)? Other   Does the patient have Home health ordered? Yes   What is the Home health agency?   [facility]   Is there a DME ordered? No   General alerts for this patient Patient will need elective left BKA as an outpatient, and likely right lower extremity will also require BKA in future.   Prep survey completed? Yes          MAKI STANLEY - Registered Nurse

## 2023-01-29 NOTE — HOME HEALTH
HH VISIT OMER DUE TO PT HOSPITALIZED 1/19-1/22/23 DUE TO INFECTION TO (L) FOOT. WITH NECROTIC TOES. PT STATED SHE HAS A VASCULAR SURGERY CONSULT AND IS PLANNING ON A (L) BKA AND THEN IN THE FUTURE A (R) BKA. PT HAS A HX: ESRD AND IS ON PD AT HOME, HLD, CHRONIC SYSTOLIC HEART FAILURE, ANEMIA AND ICD. PT STATED SHE WAS TAKEN OFF OF ATORVASTATIN DUE TO INCREASED COMPLAINTS OF DIZZINESS, AND STARTED BACK ON HER TRAZODONE IN THE HOSPITAL AND IS SLEEPING BETTER.    PT/CG TO PERFORM BETADINE PAINT TO L FOOT BID AND LEAVE OPEN TO AIR UNTIL SHE HAS SURGERY.   FOC L FOOT WOUND,

## 2023-01-30 NOTE — OUTREACH NOTE
Medical Week 1 Survey    Flowsheet Row Responses   [facility] patient discharged from? [place]   Does the patient have one of the following disease processes/diagnoses(primary or secondary)? Other   Week 1 attempt successful? Yes   Call start time 0948   Call end time 0955   Meds reviewed with patient/caregiver? Yes   Is the patient having any side effects they believe may be caused by any medication additions or changes? No   Does the patient have all medications ordered at discharge? Yes   Prescription comments Pt reports she does not have imodium, encouraged to call pharmacy as may not have been filled as available OTC   Is the patient taking all medications as directed (includes completed medication regime)? Yes   Medication comments reviewed AVS in regards to pain control--pt was to resume Norco (originally prescribed on 1/13/23 with quanitity of 18, pt does not have any--encouraged to call MD to discuss refill if needed)   Does the patient have a primary care provider?  Yes   Does the patient have an appointment with their PCP within 7 days of discharge? No   What is preventing the patient from scheduling follow up appointments within 7 days of discharge? Haven't had time   Nursing Interventions Advised patient to make appointment  [reports she is planning on scheduling appts today]   Has the patient kept scheduled appointments due by today? N/A   What is the Home health agency?   [facility]   Has home health visited the patient within 72 hours of discharge? Yes   Has all DME been delivered? No   Psychosocial issues? No   Did the patient receive a copy of their discharge instructions? Yes   Nursing interventions Reviewed instructions with patient   What is the patient's perception of their health status since discharge? Same  [Pt complains of pain to left foot, she is elevating and denies any s/s infection--she will call her MD regarding pain control.]   Is the patient/caregiver able to teach back signs  and symptoms related to disease process for when to call PCP? Yes   Is the patient/caregiver able to teach back signs and symptoms related to disease process for when to call 911? Yes   Week 1 call completed? Yes          TOD HOGAN - Registered Nurse

## 2023-01-31 NOTE — HOME HEALTH
VSS, PT /63  , PT STATED SHE TOOK HER  MIDODRINE PRIOR TO NURSE ARRIVAL. PT COMPLAINS OF PAIN TO BLE'S, 6/10. SN CLEANED WOUNDS WITH NS & GAUZE, PAINTED WITH BETADINE. PT IN GOOD SPIRITS, VERY TALKATIVE, AND STATED SHE FEELS BETTER TODAY. PT STATED SHE HAS AN APPOINTMENT THIS THURSDAY, 2/2/23 WITH HER PCP, AND THEN ON 2/7/23 SHE HAS APPOINTMENT WITH THE SURGEON TO SET UP A DATE FOR (L) BKA.     FOC L FOOT WOUND, NEXT SNV: ASSESS CARDIOPULMONARY STATUS, MEDICATION EDUCATION AND WOUND CARE

## 2023-02-01 NOTE — ED NOTES
Pt arrived by EMS from home. C/o SOA and CP starting today  2pm.       Patient was placed in face mask during first look triage.  Patient was wearing a face mask throughout encounter.  I wore personal protective equipment throughout the encounter.  Hand hygiene was performed before and after patient encounter.

## 2023-02-01 NOTE — ED PROVIDER NOTES
EMERGENCY DEPARTMENT ENCOUNTER    Room Number:  I387/1  Date seen:  2/1/2023  PCP: Jet Manuel MD  Historian: Patient, paramedics      HPI:  Chief Complaint: Weakness, dyspnea, chest pain  A complete HPI/ROS/PMH/PSH/SH/FH are limited due to: Patient's condition  Context: Ambar Lara is a 52 y.o. female who presents to the ED c/o patient presents via EMS from home for evaluation of new onset shortness of breath with weakness and chest pain that started around 2:00 this afternoon and has worsened.  History is a little bit limited because of the patient's condition on arrival here.  She is known to have multiple medical problems including severe peripheral arterial disease with chronic necrotic wound to the left foot.  She was admitted in this hospital last month for septic shock.  Patient's main complaint when I first assessed her is shortness of breath.      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   • HLD (hyperlipidemia) 06/07/2018   • Hypothyroidism 06/06/2018   • Peritoneal dialysis status (Formerly [facility]) 02/15/2021   • History of tracheostomy 04/10/2021   • Hypotension 04/10/2021   • Acute pulmonary embolism (HCC) 04/10/2021   • Severe malnutrition ([facility]) 04/11/2021   • Clostridium difficile colitis 04/16/2021   • Immobility syndrome 04/24/2021   • Genu recurvatum of right knee 04/28/2021   • Colitis 09/21/2021   • Gallstones 10/14/2021   • Acute renal failure superimposed on chronic kidney disease, on chronic dialysis ([facility]) 02/27/2022   • Obesity (BMI 30-39.9) 02/27/2022   • Hydronephrosis with obstructing calculus 02/27/2022   • Splenic calcification 02/28/2022   • Atherosclerosis of native  artery of extremity with ulceration ([facility]) 2022   • Ureteral calculi 2022   • Fever in adult 2022   • Acute respiratory failure with hypoxia ([facility]) 2022   • Hypokalemia 2022   • Anemia, chronic disease 2022   • Calculus of ureterovesical junction (UVJ) 2022   • Acute cystitis 2022   • Kidney stone on right side 2022   • Septic shock ([facility]) 2023   • Arteriovenous fistula, acquired ([facility]) 2023   • ICD (implantable cardioverter-defibrillator) in place 2023   • Necrotic toes ([facility]) 2023   • Impaired ambulation 2023   • VRE (vancomycin resistant enterococcus) culture positive 2023   • Left foot infection 2023   • Abnormal urinalysis 2023     Resolved Ambulatory Problems     Diagnosis Date Noted   • PD catheter dysfunction ([facility]) 10/11/2021   • Sepsis without acute organ dysfunction ([facility]) 2022   • Diarrhea 2022     Past Medical History:   Diagnosis Date   • CHF (congestive heart failure) ([facility])    • Clostridioides difficile infection 2021   • Dialysis patient ([facility])    • Disease of thyroid gland    • Elevated cholesterol    • GERD (gastroesophageal reflux disease)    • Pulmonary emboli ([facility]) 2021   • Renal disorder    • Sleep apnea          PAST SURGICAL HISTORY  Past Surgical History:   Procedure Laterality Date   • ADRENAL GLAND SURGERY N/A `   • ANGIOPLASTY FEMORAL ARTERY Left 2023    Procedure: LEFT LEG ANGIOGRAM WITH PERONEAL ANGIOPLASTY;  Surgeon: Diony Mcmahan MD;  Location: [facility] ;  Service: Vascular;  Laterality: Left;   •  SECTION Bilateral 2001    Has had x2   • CYSTOSCOPY Right 2022    Procedure: RIGHT URETEROSCOPY;  Surgeon: Sanford Puentes MD;  Location: [facility];  Service: Urology;  Laterality: Right;   • CYSTOSCOPY URETEROSCOPY LASER LITHOTRIPSY Left 2022    Procedure: Left CYSTOSCOPY, with stent placement;  Surgeon: Dhaval  Sanford ABEL MD;  Location: [facility] MAIN OR;  Service: Urology;  Laterality: Left;   • CYSTOSCOPY W/ URETERAL STENT PLACEMENT Right 2022    Procedure: RIGHT CYSTOSCOPY RETROGRADE PYLEOGRAM HOLMIUM LASER STENT;  Surgeon: Sanford Puentes MD;  Location: [facility] MAIN OR;  Service: Urology;  Laterality: Right;   • HYSTERECTOMY     • INSERT CENTRAL LINE AT BEDSIDE  2023        • INSERTION PERITONEAL DIALYSIS CATHETER Right 10/12/2021    Procedure: LAPAROSCOPIC REVISION OF PERITONEAL DIALYSIS CATHETER AND LAPAROSCOPIC CHOLECYSTECTOMY;  Surgeon: Jamie Figueroa MD;  Location: [facility] MAIN OR;  Service: General;  Laterality: Right;   • INTUBATION  2023        • PERITONEAL CATHETER INSERTION  2019   • REMOVAL PERITONEAL DIALYSIS CATHETER Left 2022    Procedure: Peritoneal dialysis catheter exit site revision;  Surgeon: Ricardo Manjarrez MD;  Location: [facility] MAIN OR;  Service: General;  Laterality: Left;   • TRACHEOSTOMY           FAMILY HISTORY  Family History   Problem Relation Age of Onset   • Malig Hyperthermia Neg Hx          SOCIAL HISTORY  Social History     Socioeconomic History   • Marital status:    Tobacco Use   • Smoking status: Former     Packs/day: 1.00     Years: 25.00     Pack years: 25.00     Types: Cigarettes     Quit date: 2018     Years since quittin.8   • Smokeless tobacco: Never   Vaping Use   • Vaping Use: Former   • Quit date: 2021   • Substances: Nicotine, Flavoring   • Devices: Pre-filled or refillable cartridge   Substance and Sexual Activity   • Alcohol use: Not Currently   • Drug use: Not Currently   • Sexual activity: Defer         ALLERGIES  Penicillins and Nickel        REVIEW OF SYSTEMS  Review of Systems   Unable to perform ROS: Acuity of condition   Respiratory: Positive for shortness of breath.    Neurological: Positive for weakness.            PHYSICAL EXAM  ED Triage Vitals [23 1801]   Temp Heart Rate Resp BP SpO2   98.6 °F  (37 °C) 84 18 128/72 100 %      Temp src Heart Rate Source Patient Position BP Location FiO2 (%)   Tympanic Monitor Lying Right arm --       Physical Exam      GENERAL: Ill-appearing lady in some respiratory distress, will follow commands  HENT: nares patent, normocephalic and atraumatic, mucous membranes are dry  EYES: no scleral icterus, EOMI, normal conjunctiva  CV: regular rhythm, normal rate, diminished pulses, barely palpable  RESPIRATORY: Moderate amount of accessory muscle use notable.  Breath sounds diminished bilaterally with rales and wheezes noted.  No stridor.  No coughing.  ABDOMEN: soft, no focal peritoneal features evident.  MUSCULOSKELETAL: Chronic, necrotic wound notable to the left foot and toes.  Bilateral lower extremities are cool and clammy to touch.  NEURO: alert, moves all extremities, follows commands, no obvious focal motor deficits apparent.  PSYCH: Anxious, cooperative  SKIN: warm, dry    Vital signs and nursing notes reviewed.          LAB RESULTS  Recent Results (from the past 24 hour(s))   ECG 12 Lead ED Triage Standing Order; Chest Pain    Collection Time: 02/01/23  6:35 PM   Result Value Ref Range    QT Interval 592 ms   ECG 12 Lead Dyspnea    Collection Time: 02/01/23  7:03 PM   Result Value Ref Range    QT Interval 620 ms   Green Top (Gel)    Collection Time: 02/01/23  8:19 PM   Result Value Ref Range    Extra Tube Hold for add-ons.    Lavender Top    Collection Time: 02/01/23  8:19 PM   Result Value Ref Range    Extra Tube hold for add-on    Gold Top - SST    Collection Time: 02/01/23  8:19 PM   Result Value Ref Range    Extra Tube Hold for add-ons.    Light Blue Top    Collection Time: 02/01/23  8:19 PM   Result Value Ref Range    Extra Tube Hold for add-ons.    CBC Auto Differential    Collection Time: 02/01/23  8:19 PM    Specimen: Arm, Left; Blood   Result Value Ref Range    WBC 7.96 3.40 - 10.80 10*3/mm3    RBC 3.76 (L) 3.77 - 5.28 10*6/mm3    Hemoglobin 11.1 (L) 12.0 - 15.9  g/dL    Hematocrit 36.6 34.0 - 46.6 %    MCV 97.3 (H) 79.0 - 97.0 fL    MCH 29.5 26.6 - 33.0 pg    MCHC 30.3 (L) 31.5 - 35.7 g/dL    RDW 15.0 12.3 - 15.4 %    RDW-SD 52.0 37.0 - 54.0 fl    MPV 11.5 6.0 - 12.0 fL    Platelets 369 140 - 450 10*3/mm3    Neutrophil % 48.0 42.7 - 76.0 %    Lymphocyte % 38.7 19.6 - 45.3 %    Monocyte % 9.3 5.0 - 12.0 %    Eosinophil % 1.1 0.3 - 6.2 %    Basophil % 1.3 0.0 - 1.5 %    Immature Grans % 1.6 (H) 0.0 - 0.5 %    Neutrophils, Absolute 3.82 1.70 - 7.00 10*3/mm3    Lymphocytes, Absolute 3.08 0.70 - 3.10 10*3/mm3    Monocytes, Absolute 0.74 0.10 - 0.90 10*3/mm3    Eosinophils, Absolute 0.09 0.00 - 0.40 10*3/mm3    Basophils, Absolute 0.10 0.00 - 0.20 10*3/mm3    Immature Grans, Absolute 0.13 (H) 0.00 - 0.05 10*3/mm3    nRBC 0.4 (H) 0.0 - 0.2 /100 WBC   Procalcitonin    Collection Time: 02/01/23  8:19 PM    Specimen: Arm, Left; Blood   Result Value Ref Range    Procalcitonin 0.51 (H) 0.00 - 0.25 ng/mL   Magnesium    Collection Time: 02/01/23  8:19 PM    Specimen: Arm, Left; Blood   Result Value Ref Range    Magnesium 2.2 1.6 - 2.6 mg/dL   POC Glucose Once    Collection Time: 02/01/23  9:18 PM    Specimen: Blood   Result Value Ref Range    Glucose 80 70 - 130 mg/dL   Comprehensive Metabolic Panel    Collection Time: 02/01/23  9:31 PM    Specimen: Arm, Left; Blood   Result Value Ref Range    Glucose 79 65 - 99 mg/dL    BUN 32 (H) 6 - 20 mg/dL    Creatinine 4.18 (H) 0.57 - 1.00 mg/dL    Sodium 141 136 - 145 mmol/L    Potassium 5.0 3.5 - 5.2 mmol/L    Chloride 117 (H) 98 - 107 mmol/L    CO2 11.9 (L) 22.0 - 29.0 mmol/L    Calcium 4.7 (C) 8.6 - 10.5 mg/dL    Total Protein 3.0 (L) 6.0 - 8.5 g/dL    Albumin 1.4 (L) 3.5 - 5.2 g/dL    ALT (SGPT) 13 1 - 33 U/L    AST (SGOT) 21 1 - 32 U/L    Alkaline Phosphatase 51 39 - 117 U/L    Total Bilirubin <0.2 0.0 - 1.2 mg/dL    Globulin 1.6 gm/dL    A/G Ratio 0.9 g/dL    BUN/Creatinine Ratio 7.7 7.0 - 25.0    Anion Gap 12.1 5.0 - 15.0 mmol/L    eGFR  12.4 (L) >60.0 mL/min/1.73   Troponin    Collection Time: 02/01/23  9:31 PM    Specimen: Arm, Left; Blood   Result Value Ref Range    Troponin T 0.055 (C) 0.000 - 0.030 ng/mL   BNP    Collection Time: 02/01/23  9:31 PM    Specimen: Arm, Left; Blood   Result Value Ref Range    proBNP >70,000.0 (H) 0.0 - 900.0 pg/mL   Lactic Acid, Plasma    Collection Time: 02/01/23  9:31 PM    Specimen: Arm, Left; Blood   Result Value Ref Range    Lactate 3.1 (C) 0.5 - 2.0 mmol/L   ECG 12 Lead Rhythm Change    Collection Time: 02/01/23  9:33 PM   Result Value Ref Range    QT Interval 692 ms   Blood Gas, Arterial -    Collection Time: 02/01/23  9:44 PM    Specimen: Arterial Blood   Result Value Ref Range    Site Arterial: left brachial     Kaleb's Test N/A     pH, Arterial 7.094 (C) 7.350 - 7.450 pH units    pCO2, Arterial 18.0 (C) 35.0 - 45.0 mm Hg    pO2, Arterial 32.0 (C) 80.0 - 100.0 mm Hg    HCO3, Arterial 5.5 (L) 22.0 - 28.0 mmol/L    Base Excess, Arterial -22.0 (L) 0.0 - 2.0 mmol/L    O2 Saturation Calculated 43.3 (L) 92.0 - 99.0 %    A-a DO2 0.0 mmHg    Barometric Pressure for Blood Gas 758.3 mmHg    Modality BiPap     FIO2 100 %    Ventilator Mode NIV     Set [facility] Resp Rate 14     Rate 27 Breaths/minute   Blood Gas, Arterial -    Collection Time: 02/01/23 10:01 PM    Specimen: Arterial Blood   Result Value Ref Range    Site Arterial: left brachial     Kaleb's Test N/A     pH, Arterial 7.006 (C) 7.350 - 7.450 pH units    pCO2, Arterial 17.5 (C) 35.0 - 45.0 mm Hg    pO2, Arterial 43.3 (C) 80.0 - 100.0 mm Hg    HCO3, Arterial 4.4 (L) 22.0 - 28.0 mmol/L    Base Excess, Arterial -23.9 (L) 0.0 - 2.0 mmol/L    O2 Saturation Calculated 56.9 (L) 92.0 - 99.0 %    A-a DO2 0.1 mmHg    Barometric Pressure for Blood Gas 758.1 mmHg    Modality BiPap     FIO2 100 %    Ventilator Mode NIV     Set [facility] Resp Rate 16     Rate 16 Breaths/minute   ECG 12 Lead Rhythm Change    Collection Time: 02/01/23 10:11 PM   Result Value Ref Range    QT  Interval 527 ms       Ordered the above labs and reviewed the results.        RADIOLOGY  XR Chest 1 View    Result Date: 2/1/2023  XR CHEST 1 VW-  02/01/2023 at 2234 hours  HISTORY: ET tube placement. Patient coded.  Heart size is moderately enlarged. There is moderately severe bilateral interstitial and alveolar fluid right worse than left consistent with pulmonary edema and this finding appears slightly more severe than the earlier study today.  Right internal jugular central venous catheter seen terminating over the SVC. Endotracheal tube is seen with its tip approximately 1.7 cm above the lorna. Cardiac pacemaker seen in good position.  No pneumothorax is seen.      1. Endotracheal tube placement with its tip approximately 1.7 cm above the lorna. 2. Central venous catheter placement as described. 3. No pneumothorax is seen. 4. Cardiomegaly. 5. FINDINGS consistent with pulmonary edema, right greater than left and this finding appears slightly more severe than the earlier study today.  This report was finalized on 2/1/2023 11:12 PM by Dr. Lawson Beckford M.D.      XR Chest 1 View    Result Date: 2/1/2023  XR CHEST 1 VW-  02/01/2023  HISTORY: Chest pain. Shortness of breath.  Heart size is mild to moderately enlarged. Lungs are underinflated. There is bilateral interstitial and alveolar fluid most severe in the right lower lung and findings may represent moderate pulmonary edema though there could be an element of pneumonia in the right lower lung.  Cardiac pacemaker seen in good position.  No pneumothorax is seen.      1. Cardiomegaly. 2. FINDINGS consistent with pulmonary edema most severe on the right though there could be an element of pneumonia particularly in the right lower lung. Please correlate with the clinical findings. 3. Follow-up films recommended.  This report was finalized on 2/1/2023 7:54 PM by Dr. Lawson Beckford M.D.        Ordered the above noted radiological studies. Reviewed by me in  PACS.        PROCEDURES  Critical Care  Performed by: Larry Garces MD  Authorized by: Larry Garces MD     Critical care provider statement:     Critical care time (minutes):  55    Critical care time was exclusive of:  Separately billable procedures and treating other patients    Critical care was necessary to treat or prevent imminent or life-threatening deterioration of the following conditions:  Cardiac failure, circulatory failure, metabolic crisis, respiratory failure, sepsis and shock    Critical care was time spent personally by me on the following activities:  Development of treatment plan with patient or surrogate, discussions with consultants, evaluation of patient's response to treatment, examination of patient, interpretation of cardiac output measurements, obtaining history from patient or surrogate, ordering and performing treatments and interventions, ordering and review of laboratory studies, ordering and review of radiographic studies, pulse oximetry, re-evaluation of patient's condition and review of old charts    I assumed direction of critical care for this patient from another provider in my specialty: no      Care discussed with: admitting provider          EKG           EKG time/Interp time: 1835/1836  Rhythm/Rate: Sinus rhythm, 72 bpm  P waves and MA: Present, 158 ms  QRS, axis: 202 ms  ST and T waves: Abnormal appearance with Neer's sign wave morphology.  Limited interpretation with regard to ST segments.    Independently interpreted by me contemporaneously with treatment    EKG           EKG time/Interp time: 1903/1905  Rhythm/Rate: Sinus rhythm, 73 bpm  P waves and MA: Present, 104 ms  QRS, axis: 271 ms, left bundle branch block  ST and T waves: Abnormal appearance with near sine wave morphology, suggesting electrolyte derangement.  Limited interpretation of ST segments and T waves due to abnormal morphology.    Independently interpreted by me contemporaneously with  treatment          MEDICATIONS GIVEN IN ER  Medications   sodium chloride 0.9 % flush 10 mL (has no administration in time range)   norepinephrine (LEVOPHED) 8 mg in 250mL D5W infusion (0.2 mcg/kg/min × 73.5 kg Intravenous Rate/Dose Change 2/1/23 1907)   dextrose (D50W) (25 g/50 mL) IV injection 50 mL (has no administration in time range)   insulin regular (humuLIN R,novoLIN R) injection 10 Units (has no administration in time range)   DOPamine (INTROPIN) 1.6-5 MG/ML-% infusion  - ADS Override Pull (has no administration in time range)   aspirin EC tablet 81 mg (has no administration in time range)   clopidogrel (PLAVIX) tablet 75 mg (has no administration in time range)   escitalopram (LEXAPRO) tablet 20 mg (has no administration in time range)   famotidine (PEPCID) tablet 20 mg (has no administration in time range)   HYDROcodone-acetaminophen (NORCO) 5-325 MG per tablet 1 tablet (has no administration in time range)   hydrOXYzine (ATARAX) tablet 50 mg (has no administration in time range)   ferrous sulfate tablet 325 mg (has no administration in time range)   levothyroxine (SYNTHROID, LEVOTHROID) tablet 175 mcg (has no administration in time range)   montelukast (SINGULAIR) tablet 10 mg (has no administration in time range)   OLANZapine (zyPREXA) tablet 5 mg (has no administration in time range)   sevelamer (RENVELA) tablet 800 mg (has no administration in time range)   traZODone (DESYREL) tablet 50 mg (has no administration in time range)   dextrose (GLUTOSE) oral gel 15 g (has no administration in time range)   dextrose (D50W) (25 g/50 mL) IV injection 25 g (has no administration in time range)   glucagon (human recombinant) (GLUCAGEN DIAGNOSTIC) injection 1 mg (has no administration in time range)   sodium chloride 0.9 % flush 10 mL (has no administration in time range)   sodium chloride 0.9 % flush 10 mL (has no administration in time range)   sodium chloride 0.9 % infusion 40 mL (has no administration in time  range)   heparin (porcine) 5000 UNIT/ML injection 5,000 Units (has no administration in time range)   ipratropium-albuterol (DUO-NEB) nebulizer solution 3 mL (has no administration in time range)   insulin lispro (ADMELOG) injection 0-7 Units (has no administration in time range)   ondansetron (ZOFRAN) tablet 4 mg (has no administration in time range)     Or   ondansetron (ZOFRAN) injection 4 mg (has no administration in time range)   DOPamine 400 mg in 250 mL D5W infusion (has no administration in time range)   EPINEPHrine 5 mg in 250 mL NS infusion (has no administration in time range)   dexmedetomidine (PRECEDEX) 400 mcg in 100 mL NS infusion (0.2 mcg/kg/hr × 73.5 kg Intravenous New Bag 2/1/23 2321)   vancomycin 1500 mg/500 mL 0.9% NS IVPB ([facility]) (1,500 mg Intravenous New Bag 2/1/23 2047)   cefepime 2 gm IVPB in 100 ml NS (VTB) (2 g Intravenous New Bag 2/1/23 2027)   ondansetron (ZOFRAN) injection 4 mg (4 mg Intravenous Given 2/1/23 1902)   sodium chloride 0.9 % bolus 250 mL (0 mL Intravenous Stopped 2/1/23 1938)   calcium gluconate 1g/50ml 0.675% NaCl IV SOLN (0 g Intravenous Stopped 2/1/23 2056)   sodium bicarbonate injection 8.4% 50 mEq (50 mEq Intravenous Given 2/1/23 2314)   fentaNYL citrate (PF) (SUBLIMAZE) injection 50 mcg (50 mcg Intravenous Given 2/1/23 2256)   calcium gluconate 1g/50ml 0.675% NaCl IV SOLN (2 g Intravenous New Bag 2/1/23 2304)   sodium bicarbonate injection 8.4% 50 mEq (50 mEq Intravenous Given 2/1/23 2200)   sodium bicarbonate injection 8.4% 50 mEq (50 mEq Intravenous Given 2/1/23 2315)                   MEDICAL DECISION MAKING, PROGRESS, and CONSULTS    All labs have been independently reviewed by me.  All radiology studies have been reviewed by me and I have also reviewed the radiology report.   EKG's independently viewed and interpreted by me.  Discussion below represents my analysis of pertinent findings related to patient's condition, differential diagnosis, treatment plan and final  disposition.      Additional sources:  - Discussed/ obtained information from independent historians: Paramedics on arrival who provided report.    - External (non-ED) record review: I reviewed most recent discharge summary from January 22, 2023 when patient was admitted for sepsis and left foot infection.  She had consultations with infectious disease and vascular surgery as well as nephrology during that visit because she had dry gangrene in the setting of severe peripheral arterial disease.  Records indicate that the only treatment appropriate for her wound would be left below the knee amputation and arrangements were made for patient to follow-up at [facility] vascular surgery to have this surgery planned and scheduled.    - Chronic or social conditions impacting care: Multiple medical problems, severe peripheral arterial disease, nonischemic cardiomyopathy, end-stage renal disease on peritoneal dialysis.      Orders placed during this visit:  Orders Placed This Encounter   Procedures   • Critical Care   • Blood Culture - Blood,   • Blood Culture - Blood,   • XR Chest 1 View   • XR Chest 1 View   • Jenkins Draw   • Comprehensive Metabolic Panel   • Troponin   • CBC Auto Differential   • BNP   • Lactic Acid, Plasma   • Procalcitonin   • Magnesium   • Blood Gas, Arterial -   • Blood Gas, Arterial -   • STAT Lactic Acid, Reflex   • CBC (No Diff)   • Comprehensive Metabolic Panel   • Lactic Acid, Plasma   • Phosphorus   • Magnesium   • Procalcitonin   • Troponin   • TSH   • Blood Gas, Arterial -   • NPO Diet NPO Type: Strict NPO   • Undress and Gown   • Do NOT Hold Basal or Correction Scale Insulin When Patient is NPO, Hold Scheduled Mealtime (Bolus) Insulin if NPO   • Vital Signs Every Hour and Per Hospital Policy Based on Patient Condition   • Cardiac Monitoring   • Continuous Pulse Oximetry   • Height & Weight   • Daily Weights   • Intake & Output   • Oral Care - Patient Not on NPPV & Not Intubated   • Use Mobility  Guidelines for Advancement of Activity   • If Patient has BG Less Than 80 & is Symptomatic But Not on IV Insulin Protocol - Use Adult Hypoglycemia Treatment Orders   • ICU / CCU - Maintain IV Access   • ICU / CCU - Place Order Consult Intensivist For Critical Care Management (If Patient Not Admitted to Cardiology for Primary Cardiology Condition)   • ICU / CCU - Notify All Physicians When Patient is Transferred   • Code Status and Medical Interventions:   • Pulmonology (on-call MD unless specified)   • Inpatient Nephrology Consult   • Inpatient Vascular Surgery Consult   • Inpatient Nephrology Consult   • Inpatient Cardiology Consult   • Oxygen Therapy- Nasal Cannula; 2 LPM; Titrate for SPO2: equal to or greater than, 92%   • NIPPV (CPAP or BIPAP)   • Capnography (ETCO2) Monitoring   • POC Glucose Once   • POC Glucose TID AC   • ECG 12 Lead ED Triage Standing Order; Chest Pain   • ECG 12 Lead ED Triage Standing Order; Chest Pain   • ECG 12 Lead Dyspnea   • ECG 12 Lead Rhythm Change   • ECG 12 Lead Rhythm Change   • Adult Transthoracic Echo Complete W/ Cont if Necessary Per Protocol   • Insert Peripheral IV   • Insert Peripheral IV   • Inpatient Admission   • ED Bed Request   • CBC & Differential   • Green Top (Gel)   • Lavender Top   • Gold Top - SST   • Light Blue Top           Differential diagnosis:    My differential diagnosis for dyspnea includes but is not limited to:  Asthma, COPD, pneumonia, pulmonary embolus, acute respiratory distress syndrome, pneumothorax, pleural effusion, pulmonary fibrosis, congestive heart failure, myocardial infarction, DKA, uremia, acidosis, sepsis, anemia, drug related, hyperventilation, CNS disease    My differential diagnosis for chest pain includes but is not limited to:  Muscle strain, costochondritis, myositis, pleurisy, rib fracture, intercostal neuritis, herpes zoster, tumor, myocardial infarction, coronary syndrome, unstable angina, angina, aortic dissection, mitral valve  prolapse, pericarditis, palpitations, pulmonary embolus, pneumonia, pneumothorax, lung cancer, GERD, esophagitis, esophageal spasm        Independent interpretation of labs, radiology studies, and discussions with consultants:  ED Course as of 02/01/23 2324 Wed Feb 01, 2023 1820 Patient was a direct to room patient from EMS.  Patient sent for chest pain and shortness of breath.  Patient has a very complicated past medical history of heart failure, renal failure on peritoneal dialysis, peripheral arterial disease, hypotension.  Patient appears in moderate distress, hypoxia, with hypotension.  Respiratory called.  Patient started on Levophed, BiPAP, broad-spectrum antibiotics. [EE]   1856 I was called to patient's bedside by GIBSON to help with her care since she appeared unwell on arrival.  I requested that respiratory therapist start her on BiPAP for some pulmonary support since she was showing some signs of respiratory distress and also requested that we start her on Levophed for pressor support since she was hypotensive.  I received the EKG that was just completed.  It is not consistent with previous record on file.  I am paging the cardiologist right now to have their assistance with consultation regarding patient's management knowing that she has a significant cardiac history.  Also starting broad-spectrum antibiotics because of her previous recent hospitalization for septic shock [CHIDI]   1909 Just spoke with Dr. Wray on the phone and she is reviewing the EKGs and then will call me right back. [CHIDI]   1928 Just discussed with Dr. Wray about this patient once again.  She has looked at the EKGs.  She believes the change in EKG may be due to potassium level abnormality.  She recommends that we start giving some calcium right now while still awaiting lab results.  She also requests that we order a magnesium level.  Clinically, patient seems to be improving a little bit here.  She looks more comfortable with  her breathing.  Blood pressures are approaching the 90 systolic range now on Levophed drip. [CHIDI]   1929 Will page ICU to inform them of this patient's presentation here and the anticipated need to admit for ICU care [CHIDI]   1939 Discussed the case with Dr. Gusman from ICU and he agrees to accept patient for further care.  He will finish the procedure and then come down to consult on this patient. [CHIDI]   2023 Blood pressure is now improving even a little more with reliable systolic readings above 100.  I instructed the nurse to back off on the Levophed drip little bit.  We have given some gentle boluses of fluids along the way but I do not want to give large volume at once given her history of nonischemic cardiomyopathy and poor ejection fraction.  Blood samples have now been obtained and are in the laboratory for analysis.  Making arrangements to admit patient to the ICU. [CHIDI]   2024 I independently interpreted the chest x-ray and my findings are cardiomegaly, bilateral interstitial edema suggestive of CHF. [CHIDI]   2057 Recheck of patient.  She is tolerating BiPAP well.  She understands plan for admission. [EE]      ED Course User Index  [EE] Reggie Porter PA  [CHIDI] Larry Garces MD           Patient was placed in face mask during triage.  Patient was wearing face mask throughout encounter.  I wore personal protective equipment throughout the encounter.  Hand hygiene was performed before and after patient encounter.     DIAGNOSIS  Final diagnoses:   Sepsis with acute hypoxic respiratory failure and septic shock, due to unspecified organism (HCC)   Respiratory failure with hypoxia, unspecified chronicity (HCC)   Gangrene of left foot (HCC)   Elevated troponin         DISPOSITION  Admit to the ICU            Latest Documented Vital Signs:  As of 23:24 EST  BP- (!) 88/68 HR- (!) 44 Temp- (!) 100.8 °F (38.2 °C) (Rectal) O2 sat- 90%              --    Please note that portions of this were completed with a voice  recognition program.       Note Disclaimer: At [facility], we believe that sharing information builds trust and better relationships. You are receiving this note because you are receiving care at [facility] or recently visited. It is possible you will see health information before a provider has talked with you about it. This kind of information can be easy to misunderstand. To help you fully understand what it means for your health, we urge you to discuss this note with your provider.           Larry Garces MD  02/01/23 1777

## 2023-02-01 NOTE — ED PROVIDER NOTES
EMERGENCY DEPARTMENT ENCOUNTER    Room Number:  I387/1  Date of encounter:  2/2/2023  PCP: Jet Manuel MD  Historian: Patient  Chronic or social conditions impacting care: Nothing      I used full protective equipment while examining this patient.  This includes face mask, gloves and protective eyewear.  I washed my hands before entering the room and immediately upon leaving the room      HPI:  Chief Complaint: Shortness of breath  A complete HPI/ROS/PMH/PSH/SH/FH are unobtainable due to: Acuity of condition    Context: Ambar Lara is a 52 y.o. female who presents to the ED c/o acute on chronic chest pain, shortness of breath.  Patient has a complicated past medical history of cardiomyopathy, CHF, end-stage renal disease on peritoneal dialysis, PAD, hypotension.    Review of Medical Records  I reviewed Epic records from last admission to the hospital from 1/19/2023 through 1/25/2023.  Patient was admitted by Dr. Solomon.  Patient being treated for necrosis of bilateral feet.  Ultimately it was decided that she would need amputations of the lower extremities however this will be done electively outpatient.    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   • HLD (hyperlipidemia) 06/07/2018   • Hypothyroidism 06/06/2018   • Peritoneal dialysis status ([facility]) 02/15/2021   • History of tracheostomy 04/10/2021   • Hypotension 04/10/2021   • Acute pulmonary embolism (HCC) 04/10/2021   • Severe malnutrition (HCC) 04/11/2021   • Clostridium difficile colitis 04/16/2021   • Immobility syndrome 04/24/2021   • Genu recurvatum of right knee 04/28/2021   • Colitis 09/21/2021   • Gallstones 10/14/2021   • Acute  renal failure superimposed on chronic kidney disease, on chronic dialysis ([facility]) 2022   • Obesity (BMI 30-39.9) 2022   • Hydronephrosis with obstructing calculus 2022   • Splenic calcification 2022   • 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 ([facility]) 2022   • Ureteral calculi 2022   • Fever in adult 2022   • Acute respiratory failure with hypoxia ([facility]) 2022   • Hypokalemia 2022   • Anemia, chronic disease 2022   • Calculus of ureterovesical junction (UVJ) 2022   • Acute cystitis 2022   • Kidney stone on right side 2022   • Septic shock ([facility]) 2023   • Arteriovenous fistula, acquired ([facility]) 2023   • ICD (implantable cardioverter-defibrillator) in place 2023   • Necrotic toes ([facility]) 2023   • Impaired ambulation 2023   • VRE (vancomycin resistant enterococcus) culture positive 2023   • Left foot infection 2023   • Abnormal urinalysis 2023     Resolved Ambulatory Problems     Diagnosis Date Noted   • PD catheter dysfunction ([facility]) 10/11/2021   • Sepsis without acute organ dysfunction ([facility]) 2022   • Diarrhea 2022     Past Medical History:   Diagnosis Date   • CHF (congestive heart failure) ([facility])    • Clostridioides difficile infection 2021   • Dialysis patient ([facility])    • Disease of thyroid gland    • Elevated cholesterol    • GERD (gastroesophageal reflux disease)    • Pulmonary emboli ([facility]) 2021   • Renal disorder    • Sleep apnea          PAST SURGICAL HISTORY  Past Surgical History:   Procedure Laterality Date   • ADRENAL GLAND SURGERY N/A `   • ANGIOPLASTY FEMORAL ARTERY Left 2023    Procedure: LEFT LEG ANGIOGRAM WITH PERONEAL ANGIOPLASTY;  Surgeon: Diony Mcmahan MD;  Location: [facility] ;  Service: Vascular;  Laterality: Left;   •  SECTION Bilateral 2001    Has had x2   • CYSTOSCOPY Right 2022    Procedure: RIGHT  URETEROSCOPY;  Surgeon: Sanford Puentes MD;  Location: [facility] MAIN OR;  Service: Urology;  Laterality: Right;   • CYSTOSCOPY URETEROSCOPY LASER LITHOTRIPSY Left 2022    Procedure: Left CYSTOSCOPY, with stent placement;  Surgeon: Sanford Puentes MD;  [facility];  Service: Urology;  Laterality: Left;   • CYSTOSCOPY W/ URETERAL STENT PLACEMENT Right 2022    Procedure: RIGHT CYSTOSCOPY RETROGRADE PYLEOGRAM HOLMIUM LASER STENT;  Surgeon: Sanford Puentes MD;  Location: [facility] MAIN OR;  Service: Urology;  Laterality: Right;   • HYSTERECTOMY     • INSERT CENTRAL LINE AT BEDSIDE  2023        • INSERTION PERITONEAL DIALYSIS CATHETER Right 10/12/2021    Procedure: LAPAROSCOPIC REVISION OF PERITONEAL DIALYSIS CATHETER AND LAPAROSCOPIC CHOLECYSTECTOMY;  Surgeon: Jamie Figueroa MD;  Location: [facility] MAIN OR;  Service: General;  Laterality: Right;   • INTUBATION  2023        • PERITONEAL CATHETER INSERTION  2019   • REMOVAL PERITONEAL DIALYSIS CATHETER Left 2022    Procedure: Peritoneal dialysis catheter exit site revision;  Surgeon: Ricardo Manjarrez MD;  Location: [facility] MAIN OR;  Service: General;  Laterality: Left;   • TRACHEOSTOMY           FAMILY HISTORY  Family History   Problem Relation Age of Onset   • Malig Hyperthermia Neg Hx          SOCIAL HISTORY  Social History     Socioeconomic History   • Marital status:    Tobacco Use   • Smoking status: Former     Packs/day: 1.00     Years: 25.00     Pack years: 25.00     Types: Cigarettes     Quit date: 2018     Years since quittin.8   • Smokeless tobacco: Never   Vaping Use   • Vaping Use: Former   • Quit date: 2021   • Substances: Nicotine, Flavoring   • Devices: Pre-filled or refillable cartridge   Substance and Sexual Activity   • Alcohol use: Not Currently   • Drug use: Not Currently   • Sexual activity: Defer         ALLERGIES  Penicillins and Nickel        REVIEW OF  SYSTEMS  Unobtainable secondary to acuity of condition      PHYSICAL EXAM    I have reviewed the triage vital signs and nursing notes.    ED Triage Vitals [02/01/23 1801]   Temp Heart Rate Resp BP SpO2   98.6 °F (37 °C) 84 18 128/72 100 %      Temp src Heart Rate Source Patient Position BP Location FiO2 (%)   Tympanic Monitor Lying Right arm --       Physical Exam  GENERAL: Acutely ill-appearing in mild respiratory distress, lethargic  HENT: head atraumatic, no nuchal rigidity  EYES: no scleral icterus, EOMI  CV: regular rhythm, regular rate, no murmur  RESPIRATORY: Moderate respiratory distress.  Wheezing and rales noted in all lung fields.  ABDOMEN: soft, nontender  MUSCULOSKELETAL: Necrotic feet bilaterally.  Feet cool to touch.  No palpable pedal pulses.  NEURO: alert, moves all extremities, follows commands  SKIN: warm, dry        LAB RESULTS  Recent Results (from the past 24 hour(s))   ECG 12 Lead ED Triage Standing Order; Chest Pain    Collection Time: 02/01/23  6:35 PM   Result Value Ref Range    QT Interval 592 ms   ECG 12 Lead Dyspnea    Collection Time: 02/01/23  7:03 PM   Result Value Ref Range    QT Interval 620 ms   Green Top (Gel)    Collection Time: 02/01/23  8:19 PM   Result Value Ref Range    Extra Tube Hold for add-ons.    Lavender Top    Collection Time: 02/01/23  8:19 PM   Result Value Ref Range    Extra Tube hold for add-on    Gold Top - SST    Collection Time: 02/01/23  8:19 PM   Result Value Ref Range    Extra Tube Hold for add-ons.    Light Blue Top    Collection Time: 02/01/23  8:19 PM   Result Value Ref Range    Extra Tube Hold for add-ons.    CBC Auto Differential    Collection Time: 02/01/23  8:19 PM    Specimen: Arm, Left; Blood   Result Value Ref Range    WBC 7.96 3.40 - 10.80 10*3/mm3    RBC 3.76 (L) 3.77 - 5.28 10*6/mm3    Hemoglobin 11.1 (L) 12.0 - 15.9 g/dL    Hematocrit 36.6 34.0 - 46.6 %    MCV 97.3 (H) 79.0 - 97.0 fL    MCH 29.5 26.6 - 33.0 pg    MCHC 30.3 (L) 31.5 - 35.7 g/dL     RDW 15.0 12.3 - 15.4 %    RDW-SD 52.0 37.0 - 54.0 fl    MPV 11.5 6.0 - 12.0 fL    Platelets 369 140 - 450 10*3/mm3    Neutrophil % 48.0 42.7 - 76.0 %    Lymphocyte % 38.7 19.6 - 45.3 %    Monocyte % 9.3 5.0 - 12.0 %    Eosinophil % 1.1 0.3 - 6.2 %    Basophil % 1.3 0.0 - 1.5 %    Immature Grans % 1.6 (H) 0.0 - 0.5 %    Neutrophils, Absolute 3.82 1.70 - 7.00 10*3/mm3    Lymphocytes, Absolute 3.08 0.70 - 3.10 10*3/mm3    Monocytes, Absolute 0.74 0.10 - 0.90 10*3/mm3    Eosinophils, Absolute 0.09 0.00 - 0.40 10*3/mm3    Basophils, Absolute 0.10 0.00 - 0.20 10*3/mm3    Immature Grans, Absolute 0.13 (H) 0.00 - 0.05 10*3/mm3    nRBC 0.4 (H) 0.0 - 0.2 /100 WBC   Procalcitonin    Collection Time: 02/01/23  8:19 PM    Specimen: Arm, Left; Blood   Result Value Ref Range    Procalcitonin 0.51 (H) 0.00 - 0.25 ng/mL   Magnesium    Collection Time: 02/01/23  8:19 PM    Specimen: Arm, Left; Blood   Result Value Ref Range    Magnesium 2.2 1.6 - 2.6 mg/dL   POC Glucose Once    Collection Time: 02/01/23  9:18 PM    Specimen: Blood   Result Value Ref Range    Glucose 80 70 - 130 mg/dL   Comprehensive Metabolic Panel    Collection Time: 02/01/23  9:31 PM    Specimen: Arm, Left; Blood   Result Value Ref Range    Glucose 79 65 - 99 mg/dL    BUN 32 (H) 6 - 20 mg/dL    Creatinine 4.18 (H) 0.57 - 1.00 mg/dL    Sodium 141 136 - 145 mmol/L    Potassium 5.0 3.5 - 5.2 mmol/L    Chloride 117 (H) 98 - 107 mmol/L    CO2 11.9 (L) 22.0 - 29.0 mmol/L    Calcium 4.7 (C) 8.6 - 10.5 mg/dL    Total Protein 3.0 (L) 6.0 - 8.5 g/dL    Albumin 1.4 (L) 3.5 - 5.2 g/dL    ALT (SGPT) 13 1 - 33 U/L    AST (SGOT) 21 1 - 32 U/L    Alkaline Phosphatase 51 39 - 117 U/L    Total Bilirubin <0.2 0.0 - 1.2 mg/dL    Globulin 1.6 gm/dL    A/G Ratio 0.9 g/dL    BUN/Creatinine Ratio 7.7 7.0 - 25.0    Anion Gap 12.1 5.0 - 15.0 mmol/L    eGFR 12.4 (L) >60.0 mL/min/1.73   Troponin    Collection Time: 02/01/23  9:31 PM    Specimen: Arm, Left; Blood   Result Value Ref Range     Troponin T 0.055 (C) 0.000 - 0.030 ng/mL   BNP    Collection Time: 02/01/23  9:31 PM    Specimen: Arm, Left; Blood   Result Value Ref Range    proBNP >70,000.0 (H) 0.0 - 900.0 pg/mL   Lactic Acid, Plasma    Collection Time: 02/01/23  9:31 PM    Specimen: Arm, Left; Blood   Result Value Ref Range    Lactate 3.1 (C) 0.5 - 2.0 mmol/L   ECG 12 Lead Rhythm Change    Collection Time: 02/01/23  9:33 PM   Result Value Ref Range    QT Interval 692 ms   Blood Gas, Arterial -    Collection Time: 02/01/23  9:44 PM    Specimen: Arterial Blood   Result Value Ref Range    Site Arterial: left brachial     Kaleb's Test N/A     pH, Arterial 7.094 (C) 7.350 - 7.450 pH units    pCO2, Arterial 18.0 (C) 35.0 - 45.0 mm Hg    pO2, Arterial 32.0 (C) 80.0 - 100.0 mm Hg    HCO3, Arterial 5.5 (L) 22.0 - 28.0 mmol/L    Base Excess, Arterial -22.0 (L) 0.0 - 2.0 mmol/L    O2 Saturation Calculated 43.3 (L) 92.0 - 99.0 %    A-a DO2 0.0 mmHg    Barometric Pressure for Blood Gas 758.3 mmHg    Modality BiPap     FIO2 100 %    Ventilator Mode NIV     Set Mech Resp Rate 14     Rate 27 Breaths/minute   Blood Gas, Arterial -    Collection Time: 02/01/23 10:01 PM    Specimen: Arterial Blood   Result Value Ref Range    Site Arterial: left brachial     Kaleb's Test N/A     pH, Arterial 7.006 (C) 7.350 - 7.450 pH units    pCO2, Arterial 17.5 (C) 35.0 - 45.0 mm Hg    pO2, Arterial 43.3 (C) 80.0 - 100.0 mm Hg    HCO3, Arterial 4.4 (L) 22.0 - 28.0 mmol/L    Base Excess, Arterial -23.9 (L) 0.0 - 2.0 mmol/L    O2 Saturation Calculated 56.9 (L) 92.0 - 99.0 %    A-a DO2 0.1 mmHg    Barometric Pressure for Blood Gas 758.1 mmHg    Modality BiPap     FIO2 100 %    Ventilator Mode NIV     Set Mech Resp Rate 16     Rate 16 Breaths/minute   ECG 12 Lead Rhythm Change    Collection Time: 02/01/23 10:11 PM   Result Value Ref Range    QT Interval 527 ms   ECG 12 Lead Rhythm Change    Collection Time: 02/02/23 12:38 AM   Result Value Ref Range    QT Interval 426 ms        Ordered the above labs and independently reviewed the results.        RADIOLOGY  XR Chest 1 View    Result Date: 2/1/2023  XR CHEST 1 VW-  02/01/2023 at 2234 hours  HISTORY: ET tube placement. Patient coded.  Heart size is moderately enlarged. There is moderately severe bilateral interstitial and alveolar fluid right worse than left consistent with pulmonary edema and this finding appears slightly more severe than the earlier study today.  Right internal jugular central venous catheter seen terminating over the SVC. Endotracheal tube is seen with its tip approximately 1.7 cm above the lorna. Cardiac pacemaker seen in good position.  No pneumothorax is seen.      1. Endotracheal tube placement with its tip approximately 1.7 cm above the lorna. 2. Central venous catheter placement as described. 3. No pneumothorax is seen. 4. Cardiomegaly. 5. FINDINGS consistent with pulmonary edema, right greater than left and this finding appears slightly more severe than the earlier study today.  This report was finalized on 2/1/2023 11:12 PM by Dr. Lawson Beckford M.D.      XR Chest 1 View    Result Date: 2/1/2023  XR CHEST 1 VW-  02/01/2023  HISTORY: Chest pain. Shortness of breath.  Heart size is mild to moderately enlarged. Lungs are underinflated. There is bilateral interstitial and alveolar fluid most severe in the right lower lung and findings may represent moderate pulmonary edema though there could be an element of pneumonia in the right lower lung.  Cardiac pacemaker seen in good position.  No pneumothorax is seen.      1. Cardiomegaly. 2. FINDINGS consistent with pulmonary edema most severe on the right though there could be an element of pneumonia particularly in the right lower lung. Please correlate with the clinical findings. 3. Follow-up films recommended.  This report was finalized on 2/1/2023 7:54 PM by Dr. Lawson Beckford M.D.        I ordered the above noted radiological studies. Reviewed by me and  discussed with radiologist.  See dictation for official radiology interpretation.      MEDICATIONS GIVEN IN ER    Medications   sodium chloride 0.9 % flush 10 mL (has no administration in time range)   norepinephrine (LEVOPHED) 8 mg in 250mL D5W infusion (0.2 mcg/kg/min × 73.5 kg Intravenous Rate/Dose Change 2/1/23 1907)   dextrose (D50W) (25 g/50 mL) IV injection 50 mL (has no administration in time range)   insulin regular (humuLIN R,novoLIN R) injection 10 Units (has no administration in time range)   aspirin EC tablet 81 mg (has no administration in time range)   clopidogrel (PLAVIX) tablet 75 mg (has no administration in time range)   escitalopram (LEXAPRO) tablet 20 mg (has no administration in time range)   famotidine (PEPCID) tablet 20 mg (20 mg Oral Not Given 2/2/23 0006)   HYDROcodone-acetaminophen (NORCO) 5-325 MG per tablet 1 tablet (has no administration in time range)   hydrOXYzine (ATARAX) tablet 50 mg (has no administration in time range)   ferrous sulfate tablet 325 mg (has no administration in time range)   levothyroxine (SYNTHROID, LEVOTHROID) tablet 175 mcg (has no administration in time range)   montelukast (SINGULAIR) tablet 10 mg (10 mg Oral Not Given 2/2/23 0006)   OLANZapine (zyPREXA) tablet 5 mg (5 mg Oral Not Given 2/2/23 0006)   sevelamer (RENVELA) tablet 800 mg (has no administration in time range)   traZODone (DESYREL) tablet 50 mg (50 mg Oral Not Given 2/2/23 0006)   dextrose (GLUTOSE) oral gel 15 g (has no administration in time range)   dextrose (D50W) (25 g/50 mL) IV injection 25 g (has no administration in time range)   glucagon (human recombinant) (GLUCAGEN DIAGNOSTIC) injection 1 mg (has no administration in time range)   sodium chloride 0.9 % flush 10 mL (has no administration in time range)   sodium chloride 0.9 % flush 10 mL (has no administration in time range)   sodium chloride 0.9 % infusion 40 mL (has no administration in time range)   heparin (porcine) 5000 UNIT/ML  injection 5,000 Units (has no administration in time range)   ipratropium-albuterol (DUO-NEB) nebulizer solution 3 mL (has no administration in time range)   insulin lispro (ADMELOG) injection 0-7 Units (has no administration in time range)   ondansetron (ZOFRAN) tablet 4 mg (has no administration in time range)     Or   ondansetron (ZOFRAN) injection 4 mg (has no administration in time range)   DOPamine 400 mg in 250 mL D5W infusion (20 mcg/kg/min × 73.5 kg Intravenous New Bag 2/1/23 2346)   EPINEPHrine 5 mg in 250 mL NS infusion (0.4 mcg/kg/min × 73.5 kg Intravenous New Bag 2/1/23 2356)   dexmedetomidine (PRECEDEX) 400 mcg in 100 mL NS infusion (0.2 mcg/kg/hr × 73.5 kg Intravenous New Bag 2/1/23 2321)   remifentanil (ULTIVA) 50 mcg/mL in sodium chloride 0.9 % 100 mL (has no administration in time range)   sodium bicarbonate 150 mEq/1000 mL dextrose infusion (has no administration in time range)   amiodarone 360 mg in 200 mL D5W infusion (has no administration in time range)   Phoxillum BK4/2.5 dialysis solution (has no administration in time range)   Phoxillum BK4/2.5 dialysis solution (has no administration in time range)   calcium gluconate 1 g in sodium chloride 0.9 % 50 mL IVPB (has no administration in time range)     Or   calcium gluconate 2 g in sodium chloride 0.9 % 50 mL IVPB (has no administration in time range)   potassium chloride 20 mEq in 50 mL IVPB (has no administration in time range)   Magnesium Replacement (has no administration in time range)   sodium phosphates 10 mmol in sodium chloride 0.9 % 100 mL IVPB (has no administration in time range)   heparin (porcine) injection 1,000-2,000 Units (has no administration in time range)   Phoxillum BK4/2.5 dialysis solution (has no administration in time range)   albumin human 25 % IV SOLN 12.5 g (has no administration in time range)   heparin 86073 units/250 mL (100 units/mL) in 0.45 % NaCl infusion (has no administration in time range)   DOBUTamine  (DOBUTREX) 1 mg/mL infusion (has no administration in time range)   vancomycin 1500 mg/500 mL 0.9% NS IVPB ([facility]) (1,500 mg Intravenous New Bag 2/1/23 2047)   cefepime 2 gm IVPB in 100 ml NS (VTB) (2 g Intravenous New Bag 2/1/23 2027)   ondansetron (ZOFRAN) injection 4 mg (4 mg Intravenous Given 2/1/23 1902)   sodium chloride 0.9 % bolus 250 mL (0 mL Intravenous Stopped 2/1/23 1938)   calcium gluconate 1g/50ml 0.675% NaCl IV SOLN (0 g Intravenous Stopped 2/1/23 2056)   sodium bicarbonate injection 8.4% 50 mEq (50 mEq Intravenous Given 2/1/23 2314)   fentaNYL citrate (PF) (SUBLIMAZE) injection 50 mcg (50 mcg Intravenous Given 2/1/23 2256)   calcium gluconate 1g/50ml 0.675% NaCl IV SOLN (2 g Intravenous New Bag 2/1/23 2304)   sodium bicarbonate injection 8.4% 50 mEq (50 mEq Intravenous Given 2/1/23 2200)   sodium bicarbonate injection 8.4% 50 mEq (50 mEq Intravenous Given 2/1/23 2315)   etomidate (AMIDATE) injection 20 mg (20 mg Intravenous Given 2/1/23 2217)   calcium chloride injection 1 g (1 g Intravenous Given 2/1/23 2349)   prochlorperazine (COMPAZINE) injection 10 mg (10 mg Intravenous Given 2/2/23 0038)         ADDITIONAL ORDERS CONSIDERED BUT NOT ORDERED:  Nothing      PROGRESS, DATA ANALYSIS, CONSULTS, AND MEDICAL DECISION MAKING    All labs have been independently interpreted by myself.  All radiology studies have been independently interpreted by myself and discussed with radiologist dictating the report.   EKG's independently interpreted by myself.  Discussion below represents my analysis of pertinent findings related to patient's condition, differential diagnosis, treatment plan and final disposition.    I have discussed case with Dr. Garces, emergency room physician.  He has performed his own bedside examination and agrees with treatment plan.    ED Course as of 02/02/23 0046 Wed Feb 01, 2023 1820 Patient was a direct to room patient from EMS.  Patient sent for chest pain and shortness of breath.   Patient has a very complicated past medical history of heart failure, renal failure on peritoneal dialysis, peripheral arterial disease, hypotension.  Patient appears in moderate distress, hypoxia, with hypotension.  Respiratory called.  Patient started on Levophed, BiPAP, broad-spectrum antibiotics. [EE]   1856 I was called to patient's bedside by GIBSON to help with her care since she appeared unwell on arrival.  I requested that respiratory therapist start her on BiPAP for some pulmonary support since she was showing some signs of respiratory distress and also requested that we start her on Levophed for pressor support since she was hypotensive.  I received the EKG that was just completed.  It is not consistent with previous record on file.  I am paging the cardiologist right now to have their assistance with consultation regarding patient's management knowing that she has a significant cardiac history.  Also starting broad-spectrum antibiotics because of her previous recent hospitalization for septic shock [CHIDI]   1909 Just spoke with Dr. Wray on the phone and she is reviewing the EKGs and then will call me right back. [CHIDI]   1928 Just discussed with Dr. Wray about this patient once again.  She has looked at the EKGs.  She believes the change in EKG may be due to potassium level abnormality.  She recommends that we start giving some calcium right now while still awaiting lab results.  She also requests that we order a magnesium level.  Clinically, patient seems to be improving a little bit here.  She looks more comfortable with her breathing.  Blood pressures are approaching the 90 systolic range now on Levophed drip. [CHIDI]   1929 Will page ICU to inform them of this patient's presentation here and the anticipated need to admit for ICU care [CHIDI]   1939 Discussed the case with Dr. Gusman from ICU and he agrees to accept patient for further care.  He will finish the procedure and then come down to consult on  this patient. [CHIDI]   2023 Blood pressure is now improving even a little more with reliable systolic readings above 100.  I instructed the nurse to back off on the Levophed drip little bit.  We have given some gentle boluses of fluids along the way but I do not want to give large volume at once given her history of nonischemic cardiomyopathy and poor ejection fraction.  Blood samples have now been obtained and are in the laboratory for analysis.  Making arrangements to admit patient to the ICU. [CHIDI]   2024 I independently interpreted the chest x-ray and my findings are cardiomegaly, bilateral interstitial edema suggestive of CHF. [CHIDI]   2057 Recheck of patient.  She is tolerating BiPAP well.  She understands plan for admission. [EE]      ED Course User Index  [EE] Reggie Porter PA  [CHIDI] Larry Garces MD       AS OF 00:46 EST VITALS:    BP - 108/77  HR - 87  TEMP - 100.5 °F (38.1 °C) (Rectal)  O2 SATS - (!) 74%        DIAGNOSIS  Final diagnoses:   Sepsis with acute hypoxic respiratory failure and septic shock, due to unspecified organism (HCC)   Respiratory failure with hypoxia, unspecified chronicity (HCC)   Gangrene of left foot (HCC)   Elevated troponin         DISPOSITION  Admitted      Dictated utilizing Dragon dictation     Reggie Porter PA  02/02/23 0047

## 2023-02-02 NOTE — CODE DOCUMENTATION
Patient had a cardiac arrest with ventricular tachycardia initially with pulsation followed by pulseless V. tach  Patient had AICD however did not fire  Patient was started on CPR, loaded up with amiodarone and she went into a wide-complex PEA and did not have a shockable rhythm at the time of the first rhythm check since initiation of CPR  She had several rounds of epinephrine  She was given 2 amp of sodium bicarbonate since her ABGs was positive for severe acidosis  Patient had ROSC shortly after 10 minutes of resuscitation with improvement in end-tidal CO2 from the mid teens up to the high 20s low 30s  Meanwhile patient pressors have been adjusted now she is maxed on dopamine and on Levophed and on epinephrine  The amiodarone was discontinued shelter through the code  Please refer to the nurses documentation for more accurate details about the timing and the sequence of medication given

## 2023-02-02 NOTE — ED NOTES
Delay to obtain labs d/t 2 current IVs not pulling back blood and need for US guidance; prev shift also unable to get labs. US IV placed and labs obtained at this time. Antibiotics begun s/p blood cultures drawn.

## 2023-02-02 NOTE — H&P
History and Physical    Patient Name: Ambar Lara  Age/Sex: 52 y.o. female  : 1970  MRN: [id number]    Date of Admission: 2023  Date of Encounter Visit: 23  Encounter Provider: Beatriz Gusman MD  Place of Service: [facility]   Patient Care Team:  Jet Manuel MD as PCP - General (Internal Medicine)      Subjective:     Chief Complaint: Hypertension, leg pain, dyspnea and chest tightness    History of Present Illness:  Ambar Lara is a 52 y.o. female with known history of systolic congestive heart failure with EF of 20%, severe peripheral arterial disease with left foot gangrene that is being prepared for amputation, end-stage renal disease on peritoneal dialysis, recent COVID-19 in 2021 requiring prolonged mechanical ventilation with tracheostomy, history of pulmonary embolism, off anticoagulation currently only on antiplatelet therapy, irritable bowel syndrome and history of C. difficile infection who presented to the hospital by EMS because of new onset shortness of breath and weakness with chest tightness on top of progressively worsening lower extremity pain specially on the left side.  She does have dry gangrene of the foot and she does have also cyanosis of the right foot and the right leg which is slowly progressive according to the patient with worsening pain on both lower extremities.  She did have abnormal ECG suggestive of hyperkalemia, her labs where not available, but after the ED attemding consulted with the Cardiology,. They decided to give calcium chloride   She denies any productive cough or purulent secretion  She denies any nausea or vomiting or any change in her irritable bowel syndrome symptoms.  She is not anuric despite her end-stage renal disease and she denies any change in the color or the frequency of her urine production  Patient is a former smoker 1 pack/day she quit smoking 4 years ago.  She had AICD for her cardiomyopathy but denies any  worsening lower extremity edema      ECG 2/1/2023:    Echocardiogram 3/11/2022:  • Left ventricular ejection fraction appears to be less than 20%. Left ventricular systolic function is severely decreased. Normal left ventricular wall thickness noted. The left ventricular cavity is severely dilated. Left ventricular diastolic function was indeterminate. No evidence of left ventricular thrombus or mass present.  • The following left ventricular wall segments are hypokinetic: basal anterolateral, mid anterolateral, apical lateral, basal inferolateral, mid inferolateral, mid inferior, basal anterior and basal inferior. The following left ventricular wall segments are akinetic: mid anterior, apical anterior, apical inferior, apical septal, basal inferoseptal, mid inferoseptal, apex and mid anteroseptal.  • Apical tethering and severe restriction of posterior leaflet excursion. Moderate to severe mitral valve regurgitation is present. No significant mitral valve stenosis is present.      No results found for: FEV1, FVC, [id number], TLC, DLCO    Review of Systems:   Review of Systems   Constitutional: Positive for chills and fever (Documented on her recent admission but none since discharge e). Negative for fatigue and unexpected weight change.   HENT: Negative.    Eyes: Negative.    Respiratory: Positive for apnea, chest tightness and shortness of breath. Negative for cough, choking, wheezing and stridor.    Cardiovascular: Positive for chest pain. Negative for palpitations and leg swelling.   Gastrointestinal: Negative.    Endocrine: Negative.    Genitourinary: Negative.    Musculoskeletal: Positive for gait problem (Patient cannot walk because of her sever peripheral vascular disease and her foot gangrene) and neck pain. Negative for joint swelling, myalgias and neck stiffness.   Skin: Positive for color change, pallor and wound (Dry gangrene of her left foot with small area of gangrene on the right as well).    Allergic/Immunologic: Negative.    Neurological: Positive for dizziness, weakness (Generalized) and numbness (Of the lower extremities bilaterally). Negative for tremors, seizures, syncope, facial asymmetry and speech difficulty.   Hematological: Negative.    Psychiatric/Behavioral: Negative.        Past Medical History:  Past Medical History:   Diagnosis Date   • CHF (congestive heart failure) ([facility])    • Clostridioides difficile infection 2021    finished oral vanc 2021   • Dialysis patient ([facility])    • Disease of thyroid gland    • Elevated cholesterol    • ESRD on peritoneal dialysis ([facility])    • GERD (gastroesophageal reflux disease)    • Pulmonary emboli ([facility]) 2021    coumadin last taken 2021   • Renal disorder    • Sleep apnea    • Ureteral calculi 2022       Past Surgical History:   Procedure Laterality Date   • ADRENAL GLAND SURGERY N/A `   • ANGIOPLASTY FEMORAL ARTERY Left 2023    Procedure: LEFT LEG ANGIOGRAM WITH PERONEAL ANGIOPLASTY;  Surgeon: Diony Mcmahan MD;  Location: [facility] ;  Service: Vascular;  Laterality: Left;   •  SECTION Bilateral 2001    Has had x2   • CYSTOSCOPY Right 2022    Procedure: RIGHT URETEROSCOPY;  Surgeon: Sanford Puentes MD;  Location: [facility];  Service: Urology;  Laterality: Right;   • CYSTOSCOPY URETEROSCOPY LASER LITHOTRIPSY Left 2022    Procedure: Left CYSTOSCOPY, with stent placement;  Surgeon: Sanford Puentes MD;  Location: [facility];  Service: Urology;  Laterality: Left;   • CYSTOSCOPY W/ URETERAL STENT PLACEMENT Right 2022    Procedure: RIGHT CYSTOSCOPY RETROGRADE PYLEOGRAM HOLMIUM LASER STENT;  Surgeon: Sanford Puentes MD;  Location: [facility];  Service: Urology;  Laterality: Right;   • HYSTERECTOMY     • INSERTION PERITONEAL DIALYSIS CATHETER Right 10/12/2021    Procedure: LAPAROSCOPIC REVISION OF PERITONEAL DIALYSIS CATHETER AND LAPAROSCOPIC CHOLECYSTECTOMY;   Surgeon: Jamie Figueroa MD;  Location: [facility] MAIN OR;  Service: General;  Laterality: Right;   • PERITONEAL CATHETER INSERTION  04/2019   • REMOVAL PERITONEAL DIALYSIS CATHETER Left 9/1/2022    Procedure: Peritoneal dialysis catheter exit site revision;  Surgeon: Ricardo Manjarrez MD;  Location: [facility] MAIN OR;  Service: General;  Laterality: Left;   • TRACHEOSTOMY         Home Medications:   Medications Prior to Admission   Medication Sig Dispense Refill Last Dose   • aspirin 81 MG EC tablet Take 81 mg by mouth Daily.      • atorvastatin (LIPITOR) 20 MG tablet Take 1 tablet by mouth Every Night. (Patient not taking: Reported on 1/27/2023) 30 tablet 0    • B Complex-C-Folic Acid (REN-JHON PO) Take 1 tablet by mouth Daily.      • carvedilol (COREG) 3.125 MG tablet Take 3.125 mg by mouth 2 (Two) Times a Day With Meals.      • clopidogrel (PLAVIX) 75 MG tablet Take 75 mg by mouth Daily. Indications: Disease of the Peripheral Arteries      • Delflex-LC/2.5% Dextrose (DIANEAL) 394 MOSM/L solution CAPD Inject 2,000 mL into the abdomen / abdominal cavity 4 (Four) Times a Day.      • ergocalciferol (ERGOCALCIFEROL) 1.25 MG (09160 UT) capsule Take 1 capsule by mouth 1 (One) Time Per Week. On Sundays  Indications: Kidney Failure Syndrome      • escitalopram (LEXAPRO) 20 MG tablet Take 1 tablet by mouth Daily. 30 tablet 1    • famotidine (PEPCID) 20 MG tablet Take 20 mg by mouth 2 (Two) Times a Day. Indications: Heartburn      • HYDROcodone-acetaminophen (Norco) 5-325 MG per tablet Take 1 tablet by mouth Every 8 (Eight) Hours As Needed for Severe Pain. 30 tablet 0    • hydrOXYzine (ATARAX) 50 MG tablet Take 1 tablet by mouth 3 (Three) Times a Day As Needed for Itching or Anxiety. 90 tablet 0    • Iron, Ferrous Sulfate, 325 (65 Fe) MG tablet Take 325 mg by mouth Daily. Indications: Anemia From Inadequate Iron in the Body      • levothyroxine (SYNTHROID, LEVOTHROID) 150 MCG tablet Take 175 mcg by mouth Daily.  Indications: Underactive Thyroid      • loperamide (IMODIUM) 1 MG/5ML solution Take 10 mL by mouth 4 (Four) Times a Day As Needed for Diarrhea. 118 mL 0    • Magnesium 400 MG tablet Take 400 mg by mouth 2 (Two) Times a Day.      • melatonin 5 MG tablet tablet Take 5 mg by mouth At Night As Needed. for sleep  Indications: Trouble Sleeping      • midodrine (PROAMATINE) 10 MG tablet Take 1 tablet by mouth 3 (Three) Times a Day. 90 tablet 1    • montelukast (SINGULAIR) 10 MG tablet Take 10 mg by mouth Every Night. Indications: Hayfever      • OLANZapine (zyPREXA) 5 MG tablet Take 1 tablet by mouth Every Night. 30 tablet 0    • potassium chloride (K-DUR,KLOR-CON) 20 MEQ CR tablet Take 2 tablets by mouth Daily. 60 tablet 1    • sevelamer (RENAGEL) 800 MG tablet Take 2,400 mg by mouth 3 (Three) Times a Day With Meals. Indications: High Amount of Phosphate in the Blood      • sodium bicarbonate 650 MG tablet Take 2,600 mg by mouth 3 (Three) Times a Day. Indications: Heartburn      • sodium bicarbonate 650 MG tablet Take 2 tablets by mouth 2 (Two) Times a Day. 120 tablet 0    • traZODone (DESYREL) 50 MG tablet Take 1 tablet by mouth Every Night. 30 tablet 1        Inpatient Medications:  Scheduled Meds:   Continuous Infusions:norepinephrine, 0.02-0.3 mcg/kg/min, Last Rate: 0.2 mcg/kg/min (23)      PRN Meds:.•  sodium chloride    Allergies:  Allergies   Allergen Reactions   • Penicillins Anaphylaxis, Hives, Shortness Of Breath, Swelling and Rash     Tolerated cephalosporins in past   • Nickel Rash       Past Social History:  Social History     Socioeconomic History   • Marital status:    Tobacco Use   • Smoking status: Former     Packs/day: 1.00     Years: 25.00     Pack years: 25.00     Types: Cigarettes     Quit date: 2018     Years since quittin.8   • Smokeless tobacco: Never   Vaping Use   • Vaping Use: Former   • Quit date: 2021   • Substances: Nicotine, Flavoring   • Devices: Pre-filled  or refillable cartridge   Substance and Sexual Activity   • Alcohol use: Not Currently   • Drug use: Not Currently   • Sexual activity: Defer       Past Family History:  Family History   Problem Relation Age of Onset   • Malig Hyperthermia Neg Hx            Objective:   Temp:  [98.6 °F (37 °C)-100.8 °F (38.2 °C)] 100.8 °F (38.2 °C)  Heart Rate:  [] 44  Resp:  [18-26] 26  BP: ()/(32-88) 88/68   SpO2:  [80 %-100 %] 90 %  on  Flow (L/min):  [15] 15 Device (Oxygen Therapy): NPPV/NIV    Intake/Output Summary (Last 24 hours) at 2/1/2023 2142  Last data filed at 2/1/2023 2056  Gross per 24 hour   Intake 300 ml   Output --   Net 300 ml     Body mass index is 30.61 kg/m².      02/01/23  1801   Weight: 73.5 kg (162 lb)     Weight change:     Physical Exam:   Physical Exam   General:   Sickly looking, but doing better compared to initial presentation, on the BiPAP but responsive                   Head:    Normocephalic, atraumatic.   Eyes:          Conjunctivae is pale and sclerae normal, no icterus, PERRLA   Throat:   No oral lesions, no thrush, oral mucosa moist.    Neck:   Supple, trachea midline.  Slightly visible external vein distention but no internal jugular venous distention   Lungs:     Normal chest on inspection, diminished breath sounds with very minimal expiratory wheeze with no prolongation of the expiratory phase, no use of accessory muscles.     Heart:    Regular rhythm and normal rate.  No murmurs, gallops, or rubs noted.   Abdomen:     Soft, non-tender, non-distended, positive bowel sounds.    Extremities:   No clubbing, patient has positive large area of dry gangrene on the left foot and small areas of gangrene on the tip of the toes on the right foot with evidence of multiple skin over the right leg, very weakly palpable pulses over the femoral arteries bilaterally, slightly cool distal extremities bilaterally   Pulses:   Pulses nonpalpable on the lower extremities bilaterally and very weakly  palpable over the femoral arteries bilaterally, symmetrical   Skin:   No bleeding or rash.   Neuro:   Non-focal.  Moves all extremities well.    Psychiatric:   Normal mood and affect.     Lab Review:           Results from last 7 days   Lab Units 02/01/23 2019   WBC 10*3/mm3 7.96   HEMOGLOBIN g/dL 11.1*   HEMATOCRIT % 36.6   PLATELETS 10*3/mm3 369   MCV fL 97.3*   MCH pg 29.5   MCHC g/dL 30.3*   RDW % 15.0   RDW-SD fl 52.0   MPV fL 11.5   NEUTROPHIL % % 48.0   LYMPHOCYTE % % 38.7   MONOCYTES % % 9.3   EOSINOPHIL % % 1.1   BASOPHIL % % 1.3   IMM GRAN % % 1.6*   NEUTROS ABS 10*3/mm3 3.82   LYMPHS ABS 10*3/mm3 3.08   MONOS ABS 10*3/mm3 0.74   EOS ABS 10*3/mm3 0.09   BASOS ABS 10*3/mm3 0.10   IMMATURE GRANS (ABS) 10*3/mm3 0.13*   NRBC /100 WBC 0.4*         Results from last 7 days   Lab Units 02/01/23 2019   MAGNESIUM mg/dL 2.2           Invalid input(s): LDLCALC              Results from last 7 days   Lab Units 02/01/23 2019   PROCALCITONIN ng/mL 0.51*                               Imaging:  Imaging Results (Most Recent)     Procedure Component Value Units Date/Time    XR Chest 1 View [[id number]] Collected: 02/01/23 1952     Updated: 02/01/23 1957    Narrative:      XR CHEST 1 VW-  02/01/2023     HISTORY: Chest pain. Shortness of breath.     Heart size is mild to moderately enlarged. Lungs are underinflated.  There is bilateral interstitial and alveolar fluid most severe in the  right lower lung and findings may represent moderate pulmonary edema  though there could be an element of pneumonia in the right lower lung.     Cardiac pacemaker seen in good position.     No pneumothorax is seen.       Impression:      1. Cardiomegaly.  2. FINDINGS consistent with pulmonary edema most severe on the right  though there could be an element of pneumonia particularly in the right  lower lung. Please correlate with the clinical findings.  3. Follow-up films recommended.     This report was finalized on 2/1/2023 7:54 PM by   Lawson Beckford M.D.             I personally viewed and interpreted the patient's imaging studies.    Assessment:   1. Hypotension  2. Severe peripheral arterial disease with lower extremity ischemia left more than right with dry gangrene  3. Altered mental status, improved   4. Hyperkalemia   5. Lactic acidosis   6. End-stage renal disease on peritoneal dialysis  7. Systolic congestive heart failure with EF of 20% status post AICD  8. Irritable bowel syndrome with no significant changes in her history  9. Gettleman syndrome  10. Hyperlipidemia  11. Abnormal EKG likely hypokalemia admitted  12. Pulm edema    Plan:     Patient is hypotensive however her blood count is normal, she did have history of fever on her recent admission but none since discharge even though she did have some chills.  Her labs were not available by the time she was transferred to the ICU but they became available afterwards.  Her blood count was normal which goes against infection however she was having more arrhythmias with bradycardia despite being on the Levophed and her pertinent results of her chemistry was positive for hyperkalemia which was suspected based on her EKG but the sample was hemolyzed so it has to be ran.  I did talk to the chemistry lab and they do not have any of the values but they do recall that the troponin and potassium were elevated.  The lactic acid was elevated at 4.5 and the nursing staff just sent a new blood sample to reassess her chemistry  We will go ahead and give 1 amp of sodium bicarbonate with 1 amp of D50 with 10 units of insulin to treat critical hyperkalemia and will consult her nephrologist once her chemistry results are back for further assistance with any residual electrolyte problems.  Patient is hypotensive and bradycardic and we will replace the Levophed with dopamine however will be very careful not to trigger any major arrhythmias we will start with 2.5 and will titrate depending on her  response  She got the first dose of antibiotic in the ER, that does not look as much sepsis to me and rather hemodynamic and cardiogenic and we can always redose antibiotic in case she has more signs to suggest infection  Patient wants to be a full code  We will take her off the BiPAP and check an ABG and decide if further noninvasive positive pressure ventilation is necessary  Patient has pulmonary edema and should not be treated with any IV fluids or IV resuscitation rather she will be on pressors specifically with dopamine and Levophed  Discussed with the ER physician, discussed with the lab personnel and with the nursing staff  Prognosis is guarded          Time: Critical care 50 min    Beatriz Gusman MD  [facility]   02/01/23  21:42 EST    Dictated utilizing Dragon dictation

## 2023-02-02 NOTE — PROGRESS NOTES
Patient cardiac resuscitation after she had a cardiac arrest  Her labs were consistent with severe acidosis, her repeat potassium was 4.9  Her lactic acid was 4.5  Her ABG is showing the tube to be slightly above the lorna with pulmonary edema, the position of the ET tube was adjusted  Patient had a wide-complex paced rhythm, she already has AICD in position  She was not capturing from her AICD because of the severe acidosis that that did improve after resuscitation and the bicarbonate  Patient was intubated after the resuscitation and now she is on assist control, she is awake and she is able to follow commands and interact with the examiner  The case was discussed with the cardiologist, and there is nothing that can be done from the interventional standpoint given her cardiomyopathy and severe vascular disease  Patient will be started on some pain and sedation as tolerated given her labile hemodynamics.  Continue with the current IV pressors: Levophed/epinephrine/dopamine  Vent setting were adjusted  We will repeat the blood gas and consider further adjustment as needed  We will notify the nephrology team  Total additional critical care time excluding separate billable code time and procedure time was 39 minutes

## 2023-02-02 NOTE — NURSING NOTE
2230 Pt's daughter Carol called and updated on change of status. Daughter informed to head to the ICU, also informed currently her mother did not have a pulse and CPR was being performed.   2237 Spoke with Dr. Gallardo with cardiology for a stat page, md notified of events, pts rhythm and current gtts. Per md the pt is followed and [facility] and given her comorbidity's her prognosis is poor and at this time she would continue supportive care with pressors.   2243 Pts daughter called and updated, informed rosc was obtained, but pt is on a ventilator. Daughter and son en route to hospital at this time.

## 2023-02-02 NOTE — PROGRESS NOTES
PROGRESS NOTE  Patient Name: Ambar Lara  Age/Sex: 52 y.o. female  : 1970  MRN: [id number]    Date of Admission: 2023  Date of Encounter Visit: 23   LOS: 1 day   Patient Care Team:  Jet Manuel MD as PCP - General (Internal Medicine)    Chief Complaint: Cardiogenic shock    Hospital course: Came in with hypotension, patient had cardiogenic shock, had severe acidosis and had 2 cardiac arrest, ended up getting intubated, on the ventilator.  She still responsive and able to interact with the examiner.  She is on several IV pressors.  And she is about to be started on CRRT.  We did consult nephrology and the case was discussed with Dr. Shelley at the bedside and updated the family with her progress so far.    Interval History: Patient had 2 cardiac arrests, she is currently intubated, she needed a central IV access in the right IJ with Quadra lumen catheter, she had a left femoral hemodialysis catheter with a pigtail, she had an  nasogastric tube in position    REVIEW OF SYSTEMS:   CONSTITUTIONAL: no fever or chills  Limited system review  Ventilator/Non-Invasive Ventilation Settings (From admission, onward)     Start     Ordered    23 0030  Ventilator - AC/PC; Other; 26; 100%; SpO2 >/= 88%; 7.5; 20  Continuous        Question Answer Comment   Vent Mode AC/PC    Rate Other    Rate 26    FiO2 100%    Titrate FiO2 to Keep SpO2 >/= 88%    PEEP 7.5    Inspiratory Pressure 20        23 0030    23 1855  NIPPV (CPAP or BIPAP)  Until Discontinued,   Status:  Canceled        Question Answer Comment   Indication: Acute Respiratory Failure    Type: BIPAP        23 1854                  Vital Signs  Temp:  [98.6 °F (37 °C)-100.8 °F (38.2 °C)] 100.5 °F (38.1 °C)  Heart Rate:  [] 87  Resp:  [18-26] 26  BP: ()/() 108/77  SpO2:  [57 %-100 %] 74 %  on  Flow (L/min):  [15] 15 Device (Oxygen Therapy): ventilator    Intake/Output Summary (Last 24 hours) at 2023  "0054  Last data filed at 2/1/2023 2056  Gross per 24 hour   Intake 300 ml   Output --   Net 300 ml     Flowsheet Rows    Flowsheet Row First Filed Value   Admission Height 154.9 cm (61\") Documented at 02/01/2023 1801   Admission Weight 73.5 kg (162 lb) Documented at 02/01/2023 1801        Body mass index is 30.61 kg/m².      02/01/23 1801   Weight: 73.5 kg (162 lb)       Physical Exam:  GEN: Despite 2 cardiac arrest patient is still awake and responsive, orally intubated, orogastric tube in position, on the ventilator  EYES:   Sclerae clear. No icterus. slightly dilated pupils reactive, patient is on epinephrine drip  ENT:   External ears/nose normal, no oral lesions, no thrush, mucous membranes moist  NECK:  Supple, midline trachea, positive JVD  LUNGS: Normal chest on inspection, breathing in sync with the ventilator, good lung compliance, faint crackles posteriorly, no wheezes.   CV: Patient had all different kind of rhythm ranging from pasty to ventricular tachycardia to bradycardia with junctional rhythm to atrial fibrillation and the heart rhythm was different depending on the time of the examination but the last assessment patient was in regular rhythm with heart rate in the 80s normal S1/S2. No murmurs, gallops, or rubs noted.  ABD:  Soft, nontender and nondistended. Normal bowel sounds. No guarding peritoneal dialysis in position with no surrounding discharge or erythema  EXT:  Moves all extremities well.  Patient has dry gangrene of the left foot and the tip of some of the toes on the right foot. No redness. No edema.   Skin: cold distally  Results Review:    Results From Last 14 Days   Lab Units 02/01/23  2131 01/21/23  0806 01/19/23 2225 01/19/23  1927   CRP mg/dL  --  0.80*  --   --    LACTATE mmol/L 3.1*  --  1.7 2.6*   SED RATE mm/hr  --  17  --   --      Results from last 7 days   Lab Units 02/01/23 2131   SODIUM mmol/L 141   POTASSIUM mmol/L 5.0   CHLORIDE mmol/L 117*   CO2 mmol/L 11.9*   BUN " mg/dL 32*   CREATININE mg/dL 4.18*   CALCIUM mg/dL 4.7*   AST (SGOT) U/L 21   ALT (SGPT) U/L 13   ANION GAP mmol/L 12.1   ALBUMIN g/dL 1.4*     Results from last 7 days   Lab Units 02/01/23 2131   TROPONIN T ng/mL 0.055*         Results from last 7 days   Lab Units 02/01/23 2131   PROBNP pg/mL >70,000.0*     Results from last 7 days   Lab Units 02/01/23 2019   WBC 10*3/mm3 7.96   HEMOGLOBIN g/dL 11.1*   HEMATOCRIT % 36.6   PLATELETS 10*3/mm3 369   MCV fL 97.3*   NEUTROPHIL % % 48.0   LYMPHOCYTE % % 38.7   MONOCYTES % % 9.3   EOSINOPHIL % % 1.1   BASOPHIL % % 1.3   IMM GRAN % % 1.6*         Results from last 7 days   Lab Units 02/01/23 2019   MAGNESIUM mg/dL 2.2           Invalid input(s): LDLCALC  Results from last 7 days   Lab Units 02/01/23 2201 02/01/23 2144   PH, ARTERIAL pH units 7.006* 7.094*   PCO2, ARTERIAL mm Hg 17.5* 18.0*   PO2 ART mm Hg 43.3* 32.0*   HCO3 ART mmol/L 4.4* 5.5*         Glucose   Date/Time Value Ref Range Status   02/01/2023 2118 80 70 - 130 mg/dL Final     Comment:     Meter: [id number] : [id number] Dima Baltazar RN     Results from last 7 days   Lab Units 02/01/23 2131 02/01/23 2019   PROCALCITONIN ng/mL  --  0.51*   LACTATE mmol/L 3.1*  --                            Imaging:          I reviewed the patient's new clinical results.  I personally viewed and interpreted the patient's imaging results: The ET tube was pulled back by 1-1/2 cm after the chest x-ray was taken, patient has the central line in good position  Bilateral pulmonary edema noted with cardiomegaly        Medication Review:   aspirin, 81 mg, Oral, Daily  clopidogrel, 75 mg, Oral, Daily  escitalopram, 20 mg, Oral, Daily  famotidine, 20 mg, Oral, BID  ferrous sulfate, 325 mg, Oral, Daily  heparin (porcine), 5,000 Units, Subcutaneous, Q12H  insulin lispro, 0-7 Units, Subcutaneous, TID AC  insulin regular, 10 Units, Intravenous, Once  levothyroxine, 175 mcg, Oral, Q AM  montelukast, 10 mg, Oral, Nightly  OLANZapine,  5 mg, Oral, Nightly  sevelamer, 800 mg, Oral, TID With Meals  sodium bicarbonate, 150 mEq, Intravenous, Once  sodium chloride, 10 mL, Intravenous, Q12H  traZODone, 50 mg, Oral, Nightly        amiodarone, 1 mg/min  dexmedetomidine, 0.2-1.5 mcg/kg/hr, Last Rate: 0.2 mcg/kg/hr (02/01/23 2321)  DOBUTamine, 2-20 mcg/kg/min  DOPamine, 2-20 mcg/kg/min, Last Rate: 20 mcg/kg/min (02/01/23 2346)  EPINEPHrine, 0.02-0.3 mcg/kg/min, Last Rate: 0.4 mcg/kg/min (02/01/23 2356)  heparin (porcine), 100 Units/hr  norepinephrine, 0.02-0.3 mcg/kg/min, Last Rate: 0.2 mcg/kg/min (02/01/23 1907)  Phoxillum BK4/2.5, 1,500 mL/hr  Phoxillum BK4/2.5, 1,500 mL/hr  Phoxillum BK4/2.5, 1,500 mL/hr  remifentanil (ULTIVA) infusion, 0.1-0.25 mcg/kg/min (Ideal)        ASSESSMENT:   1. Cardiogenic shock  2. Severe metabolic acidosis  3. Severe peripheral arterial disease with lower extremity ischemia left more than right with dry gangrene  4. Altered mental status, improved   5. Hyperkalemia   6. Lactic acidosis   7. End-stage renal disease on peritoneal dialysis  8. Systolic congestive heart failure with EF of 20% status post AICD  9. 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 Hyperlipidemia  12. Abnormal EKG likely hyperkalemia admitted  13. Pulm edema    PLAN:  Patient already had 2 cardiac arrest  Patient is currently on several pressors with norepinephrine, epinephrine, dopamine, and dobutamine was ordered given her cardiogenic origin of her shock  Her acidosis seems to be the main trigger for her instability and recurrent cardiac arrest and patient does respond to calcium chloride and sodium bicarbonate  Nephrology consultation was obtained and the case was discussed with Dr. Silva, this was discussed with the family as well and decision was made to proceed with CRRT, patient will be started on the bicarbonate drip now that we have access to remove additional IV fluid and prevent further  worsening pulmonary edema  Vent setting were adjusted to pressure control  Patient's family were updated with the events  Prognosis is poor given her uncorrectable underlying cardiomyopathy but we will continue with maximal supportive measures per patient's wishes  We will start patient on remifentanil and Precedex for appropriate sedation  Her Q T-segment is 500, patient is on amiodarone, will continue to monitor and will consider stopping the amiodarone if the QT is further prolonged.  For the nausea we will use Compazine instead of the Zofran  We will continue with repeat labs and modify the plan accordingly  I still do not see indication for antibiotic, this looks or cardiac in origin however will reconsider that depending on her progress  Discussed with     Disposition:     Beatriz Gusman MD  02/02/23  00:54 EST      Time: Critical care 55 min      Dictated utilizing Dragon dictation

## 2023-02-02 NOTE — PROGRESS NOTES
Nutrition Services    Patient Name:  Ambar Lara  YOB: 1970  MRN: [id number]  Admit Date:  2023    Assessment Date:  23    Comment: Nutrition assessment initiated due to critical illness and wounds to feet (photos reviewed). On the vent with CRRT. On multiple pressors, drips. Currently has an NG to suction. Labs, reviewed as well. Poor prognosis noted with poor chance of survival. Will follow clinical course, nutrition support need should she begin to improve.      CLINICAL NUTRITION ASSESSMENT      Reason for Assessment Per Organizational Policy, Pressure Injury and/or Non-Healing Wound     Diagnosis/Problem   Cardiac arrest x 2, Cardiogenic shock, Severe Metabolic acidosis, severe PAV, Dry gangrene/bilateral lower extremity ischemia, ESRD- CRRT   Medical/Surgical History Past Medical History:   Diagnosis Date   • CHF (congestive heart failure) ([facility])    • Clostridioides difficile infection 2021    finished oral vanc 2021   • Dialysis patient ([facility])    • Disease of thyroid gland    • Elevated cholesterol    • ESRD on peritoneal dialysis ([facility])    • GERD (gastroesophageal reflux disease)    • Pulmonary emboli ([facility]) 2021    coumadin last taken 2021   • Renal disorder    • Sleep apnea    • Ureteral calculi 2022       Past Surgical History:   Procedure Laterality Date   • ADRENAL GLAND SURGERY N/A `   • ANGIOPLASTY FEMORAL ARTERY Left 2023    Procedure: LEFT LEG ANGIOGRAM WITH PERONEAL ANGIOPLASTY;  Surgeon: Diony Mcmahan MD;  Location: [facility] ;  Service: Vascular;  Laterality: Left;   •  SECTION Bilateral 2001    Has had x2   • CYSTOSCOPY Right 2022    Procedure: RIGHT URETEROSCOPY;  Surgeon: Sanford Puentes MD;  Location: [facility];  Service: Urology;  Laterality: Right;   • CYSTOSCOPY URETEROSCOPY LASER LITHOTRIPSY Left 2022    Procedure: Left CYSTOSCOPY, with stent placement;  Surgeon: Sanford Puentes  "MD PAGE;  Location: [facility] MAIN OR;  Service: Urology;  Laterality: Left;   • CYSTOSCOPY W/ URETERAL STENT PLACEMENT Right 4/22/2022    Procedure: RIGHT CYSTOSCOPY RETROGRADE PYLEOGRAM HOLMIUM LASER STENT;  Surgeon: Sanford Puentes MD;  Location: [facility] MAIN OR;  Service: Urology;  Laterality: Right;   • HYSTERECTOMY     • INSERT CENTRAL LINE AT BEDSIDE  2/1/2023        • INSERTION PERITONEAL DIALYSIS CATHETER Right 10/12/2021    Procedure: LAPAROSCOPIC REVISION OF PERITONEAL DIALYSIS CATHETER AND LAPAROSCOPIC CHOLECYSTECTOMY;  Surgeon: Jamie Figueroa MD;  Location: [facility] MAIN OR;  Service: General;  Laterality: Right;   • INTUBATION  2/1/2023        • PERITONEAL CATHETER INSERTION  04/2019   • ELLY CATHETER  2/2/2023        • REMOVAL PERITONEAL DIALYSIS CATHETER Left 9/1/2022    Procedure: Peritoneal dialysis catheter exit site revision;  Surgeon: Ricardo Manjarrez MD;  Location: [facility] MAIN OR;  Service: General;  Laterality: Left;   • TRACHEOSTOMY          Encounter Information        Nutrition History:     Food Preferences:    Supplements:    Factors Affecting Intake: altered respiratory status     Anthropometrics        Current Height  Current Weight  BMI kg/m2 Height: 155 cm (61.02\")  Weight: 81 kg (178 lb 9.2 oz) (02/02/23 1033)  Body mass index is 33.71 kg/m².   Adjusted BMI (if applicable)        Admission Weight 73.5kg       Ideal Body Weight (IBW) 47.9kg   Adjusted IBW (if applicable)        Usual Body Weight (UBW) 165-170lb   Weight Change/Trend Other: varies with fluid status       Weight History Wt Readings from Last 30 Encounters:   02/02/23 1033 81 kg (178 lb 9.2 oz)   02/02/23 0535 81.1 kg (178 lb 12.7 oz)   02/01/23 1801 73.5 kg (162 lb)   01/25/23 0543 75.5 kg (166 lb 7.2 oz)   01/23/23 0601 73.4 kg (161 lb 13.1 oz)   01/22/23 0420 71.9 kg (158 lb 8.2 oz)   01/21/23 0500 70.9 kg (156 lb 4.9 oz)   01/20/23 0034 73.5 kg (162 lb)   01/19/23 2321 73.5 kg (162 lb)   01/19/23 1756 " 73 kg (161 lb)   01/14/23 1159 73 kg (161 lb)   01/13/23 0554 76.5 kg (168 lb 11.2 oz)   01/12/23 0448 76.5 kg (168 lb 10.4 oz)   01/11/23 0517 79.7 kg (175 lb 11.2 oz)   01/10/23 0551 76.7 kg (169 lb 1.5 oz)   01/09/23 0815 78.7 kg (173 lb 8 oz)   01/09/23 0600 78.7 kg (173 lb 8 oz)   01/08/23 0400 76 kg (167 lb 8.8 oz)   01/07/23 0000 72.9 kg (160 lb 11.5 oz)   12/30/22 1208 73.5 kg (162 lb)   11/25/22 1156 78 kg (172 lb)   09/07/22 1228 78 kg (172 lb)   09/03/22 1019 78 kg (172 lb)   09/02/22 0639 83.6 kg (184 lb 4.9 oz)   09/01/22 0612 81.7 kg (180 lb 1.9 oz)   08/31/22 0649 83.4 kg (183 lb 13.8 oz)   08/30/22 0600 77.1 kg (169 lb 15.6 oz)   08/29/22 0504 76.9 kg (169 lb 8.5 oz)   07/06/22 1029 79.8 kg (176 lb)   06/20/22 0654 81.7 kg (180 lb 3.2 oz)   06/19/22 0500 81.9 kg (180 lb 9.6 oz)   06/18/22 0500 82.1 kg (181 lb 1.6 oz)   06/17/22 0434 78.7 kg (173 lb 8 oz)   06/16/22 2240 78.2 kg (172 lb 6.7 oz)   05/28/22 1130 78.5 kg (173 lb 1.6 oz)   04/24/22 0700 83.1 kg (183 lb 3.2 oz)   04/23/22 0649 81.7 kg (180 lb 1.9 oz)   04/22/22 0500 78.9 kg (173 lb 15.1 oz)   04/21/22 1925 78.9 kg (173 lb 14.4 oz)   03/16/22 0557 75.3 kg (166 lb 1.6 oz)   03/15/22 0626 83.5 kg (184 lb 2.5 oz)   03/14/22 0406 81.1 kg (178 lb 12.8 oz)   03/12/22 0515 76.8 kg (169 lb 5 oz)   03/11/22 1228 78.7 kg (173 lb 8 oz)   03/02/22 0548 76.8 kg (169 lb 4.8 oz)   03/01/22 0625 75.3 kg (166 lb)   02/28/22 0102 76.5 kg (168 lb 10.4 oz)   02/27/22 1840 77.1 kg (170 lb)   10/14/21 0610 80.2 kg (176 lb 12.8 oz)   10/13/21 1802 81.6 kg (179 lb 12.8 oz)   10/13/21 0515 78 kg (172 lb)   10/11/21 1838 78.1 kg (172 lb 1.6 oz)   09/23/21 1347 77.2 kg (170 lb 1.6 oz)   09/22/21 0500 76.7 kg (169 lb 1.6 oz)   09/21/21 1122 73 kg (161 lb)   04/29/21 0546 75 kg (165 lb 5.5 oz)   04/28/21 0522 74.9 kg (165 lb 2 oz)   04/27/21 0603 72.1 kg (159 lb)   04/26/21 0511 71.7 kg (158 lb 1.1 oz)   04/25/21 0629 72.4 kg (159 lb 9.6 oz)   04/24/21 0500 71.2 kg  (156 lb 14.4 oz)   04/23/21 0500 72.7 kg (160 lb 4.8 oz)   04/22/21 0500 72.3 kg (159 lb 4.8 oz)   04/21/21 0500 72.2 kg (159 lb 3.2 oz)   04/20/21 0253 72.9 kg (160 lb 12.8 oz)   04/19/21 0446 66.2 kg (146 lb)   04/18/21 1047 73.2 kg (161 lb 4.8 oz)   04/17/21 0347 74.5 kg (164 lb 3.9 oz)   04/16/21 1559 68.5 kg (151 lb)   04/14/21 0540 72.3 kg (159 lb 6.3 oz)   04/14/21 0316 72.3 kg (159 lb 6.3 oz)   04/09/21 2353 73.9 kg (162 lb 14.7 oz)   04/09/21 2000 76.6 kg (168 lb 14.4 oz)   01/20/16 1003 70.8 kg (156 lb)   11/17/15 0907 74.8 kg (164 lb 15.9 oz)           --  Tests/Procedures        Tests/Procedures Dialysis, X-Ray     Labs       Pertinent Labs    Results from last 7 days   Lab Units 02/02/23  0642 02/02/23  0154 02/02/23  0005 02/01/23  2131   SODIUM mmol/L 137 141 140 141   POTASSIUM mmol/L 6.0* 6.6* 8.0* 5.0   CHLORIDE mmol/L 98 102 101 117*   CO2 mmol/L 22.2 19.0* 15.2* 11.9*   BUN mg/dL 35* 40* 52* 32*   CREATININE mg/dL 4.52* 5.12* 7.60* 4.18*   CALCIUM mg/dL 8.7 9.0 10.3 4.7*   BILIRUBIN mg/dL  --  0.7 0.5 <0.2   ALK PHOS U/L  --  121* 109 51   ALT (SGPT) U/L  --  79* 13 13   AST (SGOT) U/L  --  165* 125* 21   GLUCOSE mg/dL 270* 176* 183* 79     Results from last 7 days   Lab Units 02/02/23  0840 02/02/23  0642 02/02/23  0154 02/02/23  0005 02/01/23  2131 02/01/23 2019   MAGNESIUM mg/dL 2.1  --   --  2.1  --  2.2   PHOSPHORUS mg/dL  --  5.0*  --  9.8*   < >  --    HEMOGLOBIN g/dL  --   --  10.6*  --   --  11.1*   HEMATOCRIT %  --   --  35.1  --   --  36.6   WBC 10*3/mm3  --   --  20.05*  --   --  7.96   ALBUMIN g/dL  --  2.7* 2.8* 2.8*   < >  --     < > = values in this interval not displayed.     Results from last 7 days   Lab Units 02/02/23  0840 02/02/23  0154 02/01/23 2019   APTT seconds 37.9*  --   --    PLATELETS 10*3/mm3  --  398 369     COVID19   Date Value Ref Range Status   08/29/2022 Not Detected Not Detected - Ref. Range Final     Lab Results   Component Value Date    HGBA1C 5.20  03/13/2022          Medications           Scheduled Medications [START ON 2/3/2023] aspirin, 81 mg, Nasogastric, Daily  [START ON 2/3/2023] clopidogrel, 75 mg, Nasogastric, Daily  ferrous sulfate, 325 mg, Oral, Daily  insulin regular, 0-7 Units, Subcutaneous, Q6H  insulin regular, 10 Units, Intravenous, Once  [START ON 2/3/2023] levothyroxine, 175 mcg, Nasogastric, Q AM  pantoprazole, 40 mg, Intravenous, Q AM  sevelamer, 800 mg, Oral, TID With Meals  sodium chloride, 10 mL, Intravenous, Q12H       Infusions amiodarone, 0.5 mg/min  dexmedetomidine, 0.2-1.5 mcg/kg/hr, Last Rate: 0.4 mcg/kg/hr (02/02/23 0959)  DOBUTamine, 2-20 mcg/kg/min, Last Rate: Stopped (02/02/23 0132)  DOPamine, 2-20 mcg/kg/min, Last Rate: 10 mcg/kg/min (02/02/23 0535)  EPINEPHrine, 0.02-0.3 mcg/kg/min, Last Rate: 0.3 mcg/kg/min (02/02/23 0814)  Heparin (Porcine), 200 Units/hr, Last Rate: 200 Units/hr (02/02/23 0849)  norepinephrine, 0.02-0.3 mcg/kg/min, Last Rate: 0.3 mcg/kg/min (02/02/23 0710)  Phoxillum BK4/2.5, 1,500 mL/hr, Last Rate: 1,500 mL/hr (02/02/23 1030)  Phoxillum BK4/2.5, 1,500 mL/hr, Last Rate: 1,500 mL/hr (02/02/23 0945)  Phoxillum BK4/2.5, 1,500 mL/hr, Last Rate: 1,500 mL/hr (02/02/23 0945)  remifentanil (ULTIVA) infusion, 0.1-0.25 mcg/kg/min (Ideal), Last Rate: 0.12 mcg/kg/min (02/02/23 1113)       PRN Medications •  albumin human  •  calcium gluconate IVPB **OR** calcium gluconate IVPB  •  dextrose  •  dextrose  •  dextrose  •  glucagon (human recombinant)  •  heparin (porcine)  •  HYDROcodone-acetaminophen  •  hydrOXYzine  •  ipratropium-albuterol  •  magnesium sulfate  •  ondansetron **OR** ondansetron  •  potassium chloride  •  sodium bicarbonate  •  sodium chloride  •  sodium chloride  •  sodium chloride  •  sodium phosphate IVPB     Physical Findings          Physical Appearance sedate, ventilator support   Oral/Mouth Cavity dry mouth   Edema  no edema (not assessed)   Gastrointestinal normoactive   Skin  bruising, other:  Feet with black eschar; cyanotic, mottled   Tubes/Drains NG tube to LWS   NFPE Not applicable at this time   --  Current Nutrition Orders & Evaluation of Intake       Oral Nutrition     Food Allergies Other: nickel   Current PO Diet NPO Diet NPO Type: Strict NPO   Supplement n/a   PO Evaluation     % PO Intake     # of Days Evaluated    --  PES STATEMENT / NUTRITION DIAGNOSIS      Nutrition Dx Problem  Problem: Needs Alternative Route  Etiology: Medical Diagnosis cardiac arrest, vent  Signs/Symptoms: NPO    Comment:    --  NUTRITION INTERVENTION / PLAN OF CARE      Intervention Goal(s) Maintain nutrition status, Meet estimated needs, Disease management/therapy, Initiate TF/PN and No significant weight loss         RD Intervention/Action Care plan reviewed and Recommend/ordered:          Prescription/Orders:       PO Diet       Supplements       Snacks       Enteral Nutrition       Parenteral Nutrition    New Prescription Ordered? Continue same per protocol   --      Monitor/Evaluation Per protocol, I&O, Pertinent labs, POC/GOC, Hemodynamic stability   Discharge Plan/Needs Pending clinical course   Education Education not appropriate at this time   --    RD to follow per protocol.    Electronically signed by:  Rebecca Smith RD  02/02/23 11:22 EST

## 2023-02-02 NOTE — CONSULTS
"Purpose of the visit was to evaluate for: goals of care/advanced care planning and support for patient/family. Spoke with MD, RN and CCP as well as family and discussed palliative care, goals of care, care options and resuscitation status.      Assessment:  Patient is palliative care appropriate for inpatient care given (list diagnosis/symptoms):  Cardiogenic shock, severe metabolic acidosis, severe peripheral arterial disease with lower extremity ischemia, ESRD on PD, CHF with EF of 20% s/p AICD. Patient has had 2 cardiac arrests since admission yesterday, she is on several vasopressors, on CRRT. She is alert and able to follow commands but unable to fully participate in goals of care conversation. Currently NPO. PPS 10%    Recommendations/Plan:Change code status to NO CPR. Continue current treatment plan for now as family plans to call in more family members to come say goodbye before transitioning to comfort care.     Other Comments: Spoke with patient's three children, Carol, Farhan, and Roland.  She has no spouse. She does not have living will or advance directive, although children state Farhan was intended to be POA but paperwork was not completed. We discussed goals of care, treatment options and code status in detail. Family has good understanding of situation. They feel patient would not want life prolonging measures continued long term. They are hoping for more family members to visit soon but they do intend to pursue comfort measures \"in the next few days\". Answered all questions and provided support, advised of availability. Updated primary SRIDEVI Mills and Dr. Meza.   "

## 2023-02-02 NOTE — CONSULTS
Patient Name: Ambar Lara  :1970  52 y.o.    Date of Admission: 2023  Date of Consultation:  23  Encounter Provider: Debbie Stanford MD  Place of Service: [facility] CARDIOLOGY  Referring Provider: Beatriz Gusman MD  Patient Care Team:  Jet Manuel MD as PCP - General (Internal Medicine)      Chief complaint:    History of Present Illness:    This is a 52-year-old woman who usually follows with  a [place] heart specialist.  She has seen Dr. Boogie Saez in the office in the past.  She has a history of nonischemic cardiomyopathy with an EF of less than 50%.  She also has end-stage renal disease on peritoneal dialysis at home.  She also has a known mature fistula which was placed earlier this year.  She was admitted to the hospital earlier this month and seen by vascular surgery for dry gangrene.  She had a fistulogram at that time which did not show any obvious issues but was suspected that it was not performing normally due to severe cardiomyopathy.  Overnight she was admitted to the hospital due to worsening dyspnea.  Her chest x-ray was reviewed personally and showed essentially a white out with diffuse pulmonary edema.  She was interactive and talking normally in the emergency room apparently.  Her EKG showed a wide-complex rhythm most consistent with hyperkalemia hypocalcemia.  She received treatment for these electrolyte abnormalities and this morning her QRS has narrowed down.  She had an episode of VT while in the ICU.  I have reviewed the telemetry it appears that she was paced out of this by her implanted defibrillator.  Apparently after that she developed PEA and received several rounds of epi, bicarb and had return of circulation after 10 minutes.  She was intubated and dialysis catheter was placed.  She then had another episode of VT.  She has been maintained on amiodarone since then.  CRRT was initiated however no ultrafiltration has been  performed.  She is currently maxed on levo, epi, and on moderate doses of dopamine.  According to the nurse she is sedated on remifentanil and Precedex however she is intermittently interactive and appears to be following commands.    Past Medical History:   Diagnosis Date   • CHF (congestive heart failure) ([facility])    • Clostridioides difficile infection 2021    finished oral vanc 2021   • Dialysis patient ([facility])    • Disease of thyroid gland    • Elevated cholesterol    • ESRD on peritoneal dialysis ([facility])    • GERD (gastroesophageal reflux disease)    • Pulmonary emboli ([facility]) 2021    coumadin last taken 2021   • Renal disorder    • Sleep apnea    • Ureteral calculi 2022       Past Surgical History:   Procedure Laterality Date   • ADRENAL GLAND SURGERY N/A `   • ANGIOPLASTY FEMORAL ARTERY Left 2023    Procedure: LEFT LEG ANGIOGRAM WITH PERONEAL ANGIOPLASTY;  Surgeon: Diony Mcmahan MD;  Location: [facility] ;  Service: Vascular;  Laterality: Left;   •  SECTION Bilateral 2001    Has had x2   • CYSTOSCOPY Right 2022    Procedure: RIGHT URETEROSCOPY;  Surgeon: Sanford Puentes MD;  Location: [facility];  Service: Urology;  Laterality: Right;   • CYSTOSCOPY URETEROSCOPY LASER LITHOTRIPSY Left 2022    Procedure: Left CYSTOSCOPY, with stent placement;  Surgeon: Sanford Puentes MD;  Location: [facility];  Service: Urology;  Laterality: Left;   • CYSTOSCOPY W/ URETERAL STENT PLACEMENT Right 2022    Procedure: RIGHT CYSTOSCOPY RETROGRADE PYLEOGRAM HOLMIUM LASER STENT;  Surgeon: Sanford Puentes MD;  Location: [facility];  Service: Urology;  Laterality: Right;   • HYSTERECTOMY     • INSERT CENTRAL LINE AT BEDSIDE  2023        • INSERTION PERITONEAL DIALYSIS CATHETER Right 10/12/2021    Procedure: LAPAROSCOPIC REVISION OF PERITONEAL DIALYSIS CATHETER AND LAPAROSCOPIC CHOLECYSTECTOMY;  Surgeon: Jamie Figueroa MD;   Location: [facility] MAIN OR;  Service: General;  Laterality: Right;   • INTUBATION  2/1/2023        • PERITONEAL CATHETER INSERTION  04/2019   • ELLY CATHETER  2/2/2023        • REMOVAL PERITONEAL DIALYSIS CATHETER Left 9/1/2022    Procedure: Peritoneal dialysis catheter exit site revision;  Surgeon: Ricardo Manjarrez MD;  Location: [facility] MAIN OR;  Service: General;  Laterality: Left;   • TRACHEOSTOMY           Prior to Admission medications    Medication Sig Start Date End Date Taking? Authorizing Provider   aspirin 81 MG EC tablet Take 81 mg by mouth Daily.    Ady Malhotra MD   atorvastatin (LIPITOR) 20 MG tablet Take 1 tablet by mouth Every Night.  Patient not taking: Reported on 1/27/2023 1/12/23   Edward Roth MD   B Complex-C-Folic Acid (REN-JHON PO) Take 1 tablet by mouth Daily. 9/3/22   Ady Malhotra MD   carvedilol (COREG) 3.125 MG tablet Take 3.125 mg by mouth 2 (Two) Times a Day With Meals.    Ady Malhotra MD   clopidogrel (PLAVIX) 75 MG tablet Take 75 mg by mouth Daily. Indications: Disease of the Peripheral Arteries 3/1/22   Ady Malhotra MD   Delflex-LC/2.5% Dextrose (DIANEAL) 394 MOSM/L solution CAPD Inject 2,000 mL into the abdomen / abdominal cavity 4 (Four) Times a Day. 1/12/23   Edward Roth MD   ergocalciferol (ERGOCALCIFEROL) 1.25 MG (79290 UT) capsule Take 1 capsule by mouth 1 (One) Time Per Week. On Sundays  Indications: Kidney Failure Syndrome 2/23/22   Ady Malhotra MD   escitalopram (LEXAPRO) 20 MG tablet Take 1 tablet by mouth Daily. 4/29/21   Jose Granados MD   famotidine (PEPCID) 20 MG tablet Take 20 mg by mouth 2 (Two) Times a Day. Indications: Heartburn    Ady Malhotra MD   HYDROcodone-acetaminophen (Norco) 5-325 MG per tablet Take 1 tablet by mouth Every 8 (Eight) Hours As Needed for Severe Pain. 1/30/23   Diony Mcmahan MD   hydrOXYzine (ATARAX) 50 MG tablet Take 1 tablet by mouth 3 (Three) Times  a Day As Needed for Itching or Anxiety. 1/22/23 2/21/23  Álvaro De Anda MD   Iron, Ferrous Sulfate, 325 (65 Fe) MG tablet Take 325 mg by mouth Daily. Indications: Anemia From Inadequate Iron in the Body 1/14/23   Camila Salas MD   levothyroxine (SYNTHROID, LEVOTHROID) 150 MCG tablet Take 175 mcg by mouth Daily. Indications: Underactive Thyroid 1/14/23   Ady Malhotra MD   loperamide (IMODIUM) 1 MG/5ML solution Take 10 mL by mouth 4 (Four) Times a Day As Needed for Diarrhea. 1/25/23   Santiago Solomon MD   Magnesium 400 MG tablet Take 400 mg by mouth 2 (Two) Times a Day. 1/14/23   Camila Salas MD   melatonin 5 MG tablet tablet Take 5 mg by mouth At Night As Needed. for sleep  Indications: Trouble Sleeping 9/3/22   Ady Malhotra MD   midodrine (PROAMATINE) 10 MG tablet Take 1 tablet by mouth 3 (Three) Times a Day. 4/29/21   Jose Granados MD   montelukast (SINGULAIR) 10 MG tablet Take 10 mg by mouth Every Night. Indications: Hayfever 1/14/23   Jet Manuel MD   OLANZapine (zyPREXA) 5 MG tablet Take 1 tablet by mouth Every Night. 1/23/23   Kimberlyn Vasques APRN   potassium chloride (K-DUR,KLOR-CON) 20 MEQ CR tablet Take 2 tablets by mouth Daily. 1/22/23 3/23/23  Álvaro De Anda MD   sevelamer (RENAGEL) 800 MG tablet Take 2,400 mg by mouth 3 (Three) Times a Day With Meals. Indications: High Amount of Phosphate in the Blood 1/14/23   Ady Malhotra MD   sodium bicarbonate 650 MG tablet Take 2,600 mg by mouth 3 (Three) Times a Day. Indications: Heartburn 1/14/23   Camila Salas MD   sodium bicarbonate 650 MG tablet Take 2 tablets by mouth 2 (Two) Times a Day. 1/25/23   Lalitha Mcintyre APRN   traZODone (DESYREL) 50 MG tablet Take 1 tablet by mouth Every Night. 1/22/23 3/23/23  Álvaro De Anda MD       Allergies   Allergen Reactions   • Penicillins Anaphylaxis, Hives, Shortness Of Breath, Swelling and Rash     Tolerated cephalosporins in  past   • Nickel Rash       Social History     Socioeconomic History   • Marital status:    Tobacco Use   • Smoking status: Former     Packs/day: 1.00     Years: 25.00     Pack years: 25.00     Types: Cigarettes     Quit date: 2018     Years since quittin.8   • Smokeless tobacco: Never   Vaping Use   • Vaping Use: Former   • Quit date: 2021   • Substances: Nicotine, Flavoring   • Devices: Pre-filled or refillable cartridge   Substance and Sexual Activity   • Alcohol use: Not Currently   • Drug use: Not Currently   • Sexual activity: Defer       Family History   Problem Relation Age of Onset   • Malig Hyperthermia Neg Hx        REVIEW OF SYSTEMS:   All systems reviewed.  Pertinent positives identified in HPI.  All other systems are negative.      Objective:     Vitals:    23 0630 23 0645 23 0700 23 0725   BP: 99/78 94/73 115/63    BP Location:       Patient Position:       Pulse: 81 81 81 82   Resp:    26   Temp:   97.9 °F (36.6 °C)    TempSrc:   Axillary    SpO2: 98% 100% 100% 100%   Weight:       Height:         Body mass index is 33.78 kg/m².    General Appearance:   Intubated, sedated   Head:    Normocephalic, without obvious abnormality, atraumatic   Eyes:            Lids and lashes normal, conjunctivae and sclerae normal, no icterus, no pallor, corneas clear, PERRLA   Ears:    Ears appear intact with no abnormalities noted   Throat:   No oral lesions, no thrush, oral mucosa moist   Neck:   No adenopathy, supple, trachea midline, no thyromegaly, no carotid bruit, no JVD   Back:     No kyphosis present, no scoliosis present, no skin lesions, erythema or scars, no tenderness to percussion or palpation, range of motion normal   Lungs:    Bilateral    Heart:    Regular rhythm and normal rate, normal S1 and S2, no murmur, no gallop, no rub, no click   Chest Wall:    No abnormalities observed   Abdomen:     Normal bowel sounds, no masses, no organomegaly, soft, nontender,  nondistended, no guarding, no rebound  tenderness   Extremities:  Gangrenous bilateral toes   Pulses:  Nonpalpable distal pulses   Skin:  Psychiatric:   No bleeding, bruising or rash    Alert and oriented x 3, normal mood and affect   Lab Review:     Results from last 7 days   Lab Units 02/02/23  0642 02/02/23  0154   SODIUM mmol/L 137 141   POTASSIUM mmol/L 6.0* 6.6*   CHLORIDE mmol/L 98 102   CO2 mmol/L 22.2 19.0*   BUN mg/dL 35* 40*   CREATININE mg/dL 4.52* 5.12*   CALCIUM mg/dL 8.7 9.0   BILIRUBIN mg/dL  --  0.7   ALK PHOS U/L  --  121*   ALT (SGPT) U/L  --  79*   AST (SGOT) U/L  --  165*   GLUCOSE mg/dL 270* 176*     Results from last 7 days   Lab Units 02/02/23  0005 02/01/23  2131   TROPONIN T ng/mL 0.193* 0.055*     Results from last 7 days   Lab Units 02/02/23  0154   WBC 10*3/mm3 20.05*   HEMOGLOBIN g/dL 10.6*   HEMATOCRIT % 35.1   PLATELETS 10*3/mm3 398     Results from last 7 days   Lab Units 02/02/23  0840   APTT seconds 37.9*     Results from last 7 days   Lab Units 02/02/23  0840   MAGNESIUM mg/dL 2.1                   I personally viewed and interpreted the patient's EKG/Telemetry data.        Assessment and Plan:       1.  Nonischemic cardiomyopathy EF less than 20%, ICD in place  2.  Cardiogenic shock, on Levophed, epi, dopamine.  May add vasopressin if needed  3.  Hyperkalemia, hypocalcemia  4.  Cardiac arrest due to VT and PEA  5.  Acute respiratory failure related to acute CHF exacerbation.  Intubated  6.  ESRD now getting CRRT.  Anuric.  Increase pressors as needed for ultrafiltration.  7.  PAD,Dry gangrene/bilateral lower extremity ischemia.  She has been seen by surgery who states there are no options for improvement here.  Likely she would need amputations if she survives this event    Unfortunately this is a young woman with multiple severe end-stage diseases.  It seems unlikely that she will survive this, however she is somehow still interactive intermittently.  Increase pressors as needed  for ultrafiltration.  We will have the device interrogated to see if there is any further adjustments that can be made. There are some visible pacer spikes on tele which suggest she received anti-tachycardia pacing for his VT as is usually programmed by the device as the first therapy before actual defibrillation.     Debbie Stanford MD  02/02/23  09:35 EST

## 2023-02-02 NOTE — CONSULTS
Patient Name: Ambar Lara Account #: [id number]    MRN: [id number] Admission Date: 2023      Consulting Service: Vascular Surgery Date of Evaluation: 2023    Requesting Provider: DR TALITA Gusman MD      [place]    CHIEF COMPLAINT: Bilateral lower extremity ischemia  HPI: Ambar Lara is a 52 y.o. female is being seen for a consultation and evaluation/management of bilateral lower extremity ischemia in the face of active multiple cardiac arrests and overall severe cardiomyopathy with end-of-life events.  At this point time the patient has no not unreconstructable lower extremity occlusive disease due to longstanding comorbidities.  Her current ejection fraction is so low that this pre-existing condition is now exacerbated with nonocclusive ischemia to the legs on top of occlusive ischemia to the legs.  We really have no options for improvement here.  If the patient were to survive long-term then amputations will be likely.  However the patient's outlook is extremely poor given her multiple codes overnight.  She has been started on hemodialysis and is tolerating CRRT but I doubt she will tolerate full dialysis.  If she were to tolerate this then long-term tunneled catheter would likely be needed as I doubt her fistula will ever develop well enough with her low cardiac output.    PAST MEDICAL HISTORY:   Past Medical History:   Diagnosis Date   • CHF (congestive heart failure) ([facility])    • Clostridioides difficile infection 2021    finished oral vanc 2021   • Dialysis patient ([facility])    • Disease of thyroid gland    • Elevated cholesterol    • ESRD on peritoneal dialysis ([facility])    • GERD (gastroesophageal reflux disease)    • Pulmonary emboli ([facility]) 2021    coumadin last taken 2021   • Renal disorder    • Sleep apnea    • Ureteral calculi 2022      PAST SURGICAL HISTORY:   Past Surgical History:   Procedure Laterality Date   • ADRENAL GLAND SURGERY N/A `   • ANGIOPLASTY  FEMORAL ARTERY Left 2023    Procedure: LEFT LEG ANGIOGRAM WITH PERONEAL ANGIOPLASTY;  Surgeon: Diony Mcmahan MD;  Location: [facility] HYBRID OR ;  Service: Vascular;  Laterality: Left;   •  SECTION Bilateral 2001    Has had x2   • CYSTOSCOPY Right 2022    Procedure: RIGHT URETEROSCOPY;  Surgeon: Sanford Puentes MD;  Location: [facility] MAIN OR;  Service: Urology;  Laterality: Right;   • CYSTOSCOPY URETEROSCOPY LASER LITHOTRIPSY Left 2022    Procedure: Left CYSTOSCOPY, with stent placement;  Surgeon: Sanford Puentes MD;  Location: [facility] MAIN OR;  Service: Urology;  Laterality: Left;   • CYSTOSCOPY W/ URETERAL STENT PLACEMENT Right 2022    Procedure: RIGHT CYSTOSCOPY RETROGRADE PYLEOGRAM HOLMIUM LASER STENT;  Surgeon: Sanford Puentes MD;  Location: [facility] MAIN OR;  Service: Urology;  Laterality: Right;   • HYSTERECTOMY     • INSERT CENTRAL LINE AT BEDSIDE  2023        • INSERTION PERITONEAL DIALYSIS CATHETER Right 10/12/2021    Procedure: LAPAROSCOPIC REVISION OF PERITONEAL DIALYSIS CATHETER AND LAPAROSCOPIC CHOLECYSTECTOMY;  Surgeon: Jamie Figueroa MD;  Location: [facility] MAIN OR;  Service: General;  Laterality: Right;   • INTUBATION  2023        • PERITONEAL CATHETER INSERTION  2019   • ELLY CATHETER  2023        • REMOVAL PERITONEAL DIALYSIS CATHETER Left 2022    Procedure: Peritoneal dialysis catheter exit site revision;  Surgeon: Ricardo Manjarrez MD;  Location: [facility] OR;  Service: General;  Laterality: Left;   • TRACHEOSTOMY        FAMILY HISTORY:   Family History   Problem Relation Age of Onset   • Malig Hyperthermia Neg Hx       SOCIAL HISTORY:   Social History     Tobacco Use   • Smoking status: Former     Packs/day: 1.00     Years: 25.00     Pack years: 25.00     Types: Cigarettes     Quit date: 2018     Years since quittin.8   • Smokeless tobacco: Never   Vaping Use   • Vaping Use: Former   • Quit date:  2/14/2021   • Substances: Nicotine, Flavoring   • Devices: Pre-filled or refillable cartridge   Substance Use Topics   • Alcohol use: Not Currently   • Drug use: Not Currently      MEDICATIONS:   No current facility-administered medications on file prior to encounter.     Current Outpatient Medications on File Prior to Encounter   Medication Sig Dispense Refill   • aspirin 81 MG EC tablet Take 81 mg by mouth Daily.     • atorvastatin (LIPITOR) 20 MG tablet Take 1 tablet by mouth Every Night. (Patient not taking: Reported on 1/27/2023) 30 tablet 0   • B Complex-C-Folic Acid (REN-JHON PO) Take 1 tablet by mouth Daily.     • carvedilol (COREG) 3.125 MG tablet Take 3.125 mg by mouth 2 (Two) Times a Day With Meals.     • clopidogrel (PLAVIX) 75 MG tablet Take 75 mg by mouth Daily. Indications: Disease of the Peripheral Arteries     • Delflex-LC/2.5% Dextrose (DIANEAL) 394 MOSM/L solution CAPD Inject 2,000 mL into the abdomen / abdominal cavity 4 (Four) Times a Day.     • ergocalciferol (ERGOCALCIFEROL) 1.25 MG (82013 UT) capsule Take 1 capsule by mouth 1 (One) Time Per Week. On Sundays  Indications: Kidney Failure Syndrome     • escitalopram (LEXAPRO) 20 MG tablet Take 1 tablet by mouth Daily. 30 tablet 1   • famotidine (PEPCID) 20 MG tablet Take 20 mg by mouth 2 (Two) Times a Day. Indications: Heartburn     • HYDROcodone-acetaminophen (Norco) 5-325 MG per tablet Take 1 tablet by mouth Every 8 (Eight) Hours As Needed for Severe Pain. 30 tablet 0   • hydrOXYzine (ATARAX) 50 MG tablet Take 1 tablet by mouth 3 (Three) Times a Day As Needed for Itching or Anxiety. 90 tablet 0   • Iron, Ferrous Sulfate, 325 (65 Fe) MG tablet Take 325 mg by mouth Daily. Indications: Anemia From Inadequate Iron in the Body     • levothyroxine (SYNTHROID, LEVOTHROID) 150 MCG tablet Take 175 mcg by mouth Daily. Indications: Underactive Thyroid     • loperamide (IMODIUM) 1 MG/5ML solution Take 10 mL by mouth 4 (Four) Times a Day As Needed for  Diarrhea. 118 mL 0   • Magnesium 400 MG tablet Take 400 mg by mouth 2 (Two) Times a Day.     • melatonin 5 MG tablet tablet Take 5 mg by mouth At Night As Needed. for sleep  Indications: Trouble Sleeping     • midodrine (PROAMATINE) 10 MG tablet Take 1 tablet by mouth 3 (Three) Times a Day. 90 tablet 1   • montelukast (SINGULAIR) 10 MG tablet Take 10 mg by mouth Every Night. Indications: Hayfever     • OLANZapine (zyPREXA) 5 MG tablet Take 1 tablet by mouth Every Night. 30 tablet 0   • potassium chloride (K-DUR,KLOR-CON) 20 MEQ CR tablet Take 2 tablets by mouth Daily. 60 tablet 1   • sevelamer (RENAGEL) 800 MG tablet Take 2,400 mg by mouth 3 (Three) Times a Day With Meals. Indications: High Amount of Phosphate in the Blood     • sodium bicarbonate 650 MG tablet Take 2,600 mg by mouth 3 (Three) Times a Day. Indications: Heartburn     • sodium bicarbonate 650 MG tablet Take 2 tablets by mouth 2 (Two) Times a Day. 120 tablet 0   • traZODone (DESYREL) 50 MG tablet Take 1 tablet by mouth Every Night. 30 tablet 1             ALLERGIES: Penicillins and Nickel   COMPLETE REVIEW OF SYSTEMS:     Intubated but responds to stimuli    PHYSICAL EXAM:   Patient Vitals for the past 24 hrs:   BP Temp Temp src Pulse Resp SpO2 Height Weight   02/02/23 0725 -- -- -- 82 26 100 % -- --   02/02/23 0700 115/63 97.9 °F (36.6 °C) Axillary 81 -- 100 % -- --   02/02/23 0645 94/73 -- -- 81 -- 100 % -- --   02/02/23 0630 99/78 -- -- 81 -- 98 % -- --   02/02/23 0615 106/58 -- -- 80 -- 99 % -- --   02/02/23 0600 116/60 -- -- 80 -- 100 % -- --   02/02/23 0545 109/61 -- -- 80 -- 99 % -- --   02/02/23 0535 108/66 -- -- 80 -- 98 % -- 81.1 kg (178 lb 12.7 oz)   02/02/23 0530 110/65 -- -- 80 -- 99 % -- --   02/02/23 0515 111/64 -- -- 80 -- 98 % -- --   02/02/23 0500 108/70 -- -- 80 -- 99 % -- --   02/02/23 0445 118/66 -- -- 75 -- 99 % -- --   02/02/23 0430 93/75 -- -- 85 -- 99 % -- --   02/02/23 0415 125/52 -- -- 86 -- 99 % -- --   02/02/23 0400 114/67  98.8 °F (37.1 °C) Oral 73 -- 100 % -- --   02/02/23 0345 114/77 -- -- 77 -- 100 % -- --   02/02/23 0330 119/70 -- -- 86 -- 94 % -- --   02/02/23 0315 119/80 -- -- 87 -- 100 % -- --   02/02/23 0300 110/70 -- -- 80 -- 100 % -- --   02/02/23 0245 119/71 -- -- 79 -- 100 % -- --   02/02/23 0230 120/68 -- -- 71 -- 97 % -- --   02/02/23 0215 123/70 -- -- 72 -- 98 % -- --   02/02/23 0200 126/58 -- -- 71 24 97 % -- --   02/02/23 0145 118/83 -- -- 83 -- 99 % -- --   02/02/23 0130 122/53 -- -- 72 -- 98 % -- --   02/02/23 0115 115/72 -- -- 82 -- 100 % -- --   02/02/23 0100 106/70 -- -- 88 -- 91 % -- --   02/02/23 0045 113/78 -- -- 85 -- 100 % -- --   02/02/23 0030 107/85 -- -- 83 -- (!) 89 % -- --   02/02/23 0015 (!) 127/103 -- -- 115 -- (!) 81 % -- --   02/02/23 0000 108/77 -- -- 87 -- -- -- --   02/01/23 2355 126/81 -- -- 87 -- -- -- --   02/01/23 2350 118/92 -- -- 87 -- -- -- --   02/01/23 2345 125/84 -- -- 81 -- -- -- --   02/01/23 2340 (!) 69/59 -- -- 89 -- (!) 74 % -- --   02/01/23 2335 (!) 85/53 -- -- (!) 134 -- (!) 75 % -- --   02/01/23 2330 95/44 -- -- (!) 122 -- 92 % -- --   02/01/23 2320 (!) 126/111 -- -- 68 -- (!) 75 % -- --   02/01/23 2315 108/86 -- -- (!) 134 -- (!) 80 % -- --   02/01/23 2310 (!) 81/30 -- -- (!) 129 -- (!) 80 % -- --   02/01/23 2305 (!) 87/34 -- -- 69 -- -- -- --   02/01/23 2300 (!) 88/53 -- -- 102 -- -- -- --   02/01/23 2255 (!) 66/40 -- -- 109 -- (!) 62 % -- --   02/01/23 2250 (!) 83/65 -- -- 70 -- (!) 67 % -- --   02/01/23 2248 (!) 86/60 -- -- 70 -- (!) 74 % -- --   02/01/23 2240 (!) 68/27 -- -- 70 -- (!) 72 % -- --   02/01/23 2230 126/90 -- -- 65 -- (!) 68 % -- --   02/01/23 2215 (!) 133/104 -- -- (!) 125 -- (!) 75 % -- --   02/01/23 2205 (!) 77/39 -- -- (!) 40 -- (!) 57 % -- --   02/01/23 2200 (!) 93/29 -- -- (!) 39 -- (!) 70 % -- --   02/01/23 2130 121/57 100.5 °F (38.1 °C) Rectal (!) 131 -- -- -- --   02/01/23 2056 (!) 88/68 -- -- (!) 44 -- -- -- --   02/01/23 2054 -- -- -- 51 -- -- -- --  "  02/01/23 2051 97/40 -- -- -- -- -- -- --   02/01/23 2038 (!) 88/60 -- -- 67 -- -- -- --   02/01/23 2020 124/72 -- -- -- -- -- -- --   02/01/23 2018 -- -- -- 68 -- 90 % -- --   02/01/23 1951 91/64 -- -- 73 -- -- -- --   02/01/23 1945 111/88 -- -- 74 -- -- -- --   02/01/23 1936 113/47 -- -- 75 -- -- -- --   02/01/23 1931 100/76 -- -- 74 -- -- -- --   02/01/23 1926 99/59 -- -- 75 -- -- -- --   02/01/23 1921 (!) 74/56 -- -- -- -- -- -- --   02/01/23 1919 -- -- -- 75 -- (!) 82 % -- --   02/01/23 1918 (!) 72/60 -- -- -- -- -- -- --   02/01/23 1917 -- -- -- -- -- (!) 80 % -- --   02/01/23 1916 (!) 65/64 -- -- 73 -- -- -- --   02/01/23 1915 -- (!) 100.8 °F (38.2 °C) Rectal -- -- -- -- --   02/01/23 1906 (!) 53/32 -- -- -- -- -- -- --   02/01/23 1903 (!) 72/43 -- -- 71 -- -- -- --   02/01/23 1849 (!) 88/49 -- -- -- -- -- -- --   02/01/23 1846 -- -- -- 73 26 (!) 85 % -- --   02/01/23 1830 (!) 65/33 -- -- 76 -- -- -- --   02/01/23 1825 (!) 65/53 -- -- (!) 176 -- -- -- --   02/01/23 1820 (!) 62/50 -- -- 78 -- -- -- --   02/01/23 1815 93/54 -- -- 79 -- -- -- --   02/01/23 1801 128/72 98.6 °F (37 °C) Tympanic 84 18 100 % 154.9 cm (61\") 73.5 kg (162 lb)        General appearance: in mild to moderate distress, chronically ill appearing, uncooperative and intubated.  Neurological exam reveals limited exam but responds to stimuli.  ENT exam reveals - ENT exam normal, no neck nodes or sinus tenderness.  CVS exam: Sinus tachycardia.  Chest: Diminished rhonchi bilateral out.  Abdominal exam: soft, nontender, nondistended, no masses or organomegaly.  Examination of the feet reveals ischemia with dry gangrene of the left leg and coolness and early ischemia to the right.  Both legs were cold up through the calves with mottling on both sides.  Nonpalpable pulses at femorals popliteals or pedal pulses sites..        LABS:      Results Review:       I reviewed the patient's new clinical results.  Results from last 7 days   Lab Units " 02/02/23 0154 02/01/23 2019   WBC 10*3/mm3 20.05* 7.96   HEMOGLOBIN g/dL 10.6* 11.1*   PLATELETS 10*3/mm3 398 369     Results from last 7 days   Lab Units 02/02/23 0642 02/02/23  0154 02/02/23 0005 02/01/23 2131   SODIUM mmol/L 137 141 140 141   POTASSIUM mmol/L 6.0* 6.6* 8.0* 5.0   CHLORIDE mmol/L 98 102 101 117*   CO2 mmol/L 22.2 19.0* 15.2* 11.9*   BUN mg/dL 35* 40* 52* 32*   CREATININE mg/dL 4.52* 5.12* 7.60* 4.18*   GLUCOSE mg/dL 270* 176* 183* 79   Estimated Creatinine Clearance: 14.4 mL/min (A) (by C-G formula based on SCr of 4.52 mg/dL (H)).  Results from last 7 days   Lab Units 02/02/23 0642 02/02/23 0154 02/02/23 0005 02/01/23 2131 02/01/23 2019   CALCIUM mg/dL 8.7 9.0 10.3 4.7*  --    ALBUMIN g/dL 2.7* 2.8* 2.8* 1.4*  --    MAGNESIUM mg/dL  --   --  2.1  --  2.2   PHOSPHORUS mg/dL 5.0*  --  9.8*  --   --            The following radiologic or non-invasive studies have been reviewed by me: Chest x-ray reviewed and prior arterial study results reviewed    Active Hospital Problems    Diagnosis  POA   • **Hypotension [I95.9]  Yes      Resolved Hospital Problems   No resolved problems to display.         ASSESSMENT/PLAN: 52 y.o. female with severe cardiogenic shock with severe cardiomyopathy and unreconstructable lower extremity occlusive disease that is severe at the digital level on both sides based on testing done earlier this month.  Current events have added to this issue.  [place] is extremely poor for limb salvage if she were to survive all of these events.  Likewise she is unlikely to ever develop her fistula to the point where it can be used.  If she survives tunneled dialysis catheter and major amputations will likely be needed.  We will follow along with you.  [place] here extremely poor.      I discussed the plan with the nursing staff at bedside who are agreeable to the plan of care at this point. Thank you for this consult.     Gaurav Payton MD   02/02/23

## 2023-02-02 NOTE — ED NOTES
This RN called pt's dtr per pt's request. Spoke with Carol, she verbalized attempting to find a way to come in to be with patient. Pt updated.

## 2023-02-02 NOTE — PROCEDURES
Ultrasound Guided hemodialysis catheter Insertion Procedure Note      Indications: Need for emergency CRRT    Procedure Details   Informed consent was obtained for the procedure, including sedation.  Risks of lung perforation, hemorrhage, arrhythmia, and adverse drug reaction were discussed.     Maximum sterile technique was used including usual patient drapes, antiseptics and physician sterile garments.  Ultrasound done over the groin area with a larger more visible femoral vein as compared to the right side.  Patient already had a central line in the right IJ and multiple scars near the site of the left subclavian access site.    Under sterile conditions the skin above the on the left femoral vein was prepped with chlorhexidine and covered with a sterile drape. A sterile ultrasound probe was used to localize the target vein. It was clearly visualized. Local anesthesia was applied to the skin and subcutaneous tissues with lidocaine 1%. An 18-gauge needle was then inserted into the vein under ultrasound guidance. A guide wire was then threaded into the vein. A 20 cm hemodialysis catheter with pigtail was then inserted into the vessel over the guide wire. The catheter was sutured into place and dressed following sterile protocol with Biopatch placed. All 3 ports were flushed and deemed patent.    Findings:  There were no changes to vital signs. All catheter ports were flushed with saline. Patient did tolerate procedure well.    Recommendations:  No chest x-ray ordered as this was a femoral vein.    Beatriz Gusman MD  2/2/2023

## 2023-02-02 NOTE — PROCEDURES
Ultrasound Guided Central Venous Catheter Insertion Procedure Note      Indications:  vascular access    Procedure Details   Informed consent was obtained for the procedure, including sedation.  Risks of lung perforation, hemorrhage, arrhythmia, and adverse drug reaction were discussed.     Maximum sterile technique was used including usual patient drapes, antiseptics and physician sterile garments.    Under sterile conditions the skin above the on the right internal jugular vein was prepped with chlorhexidine and covered with a sterile drape. A sterile ultrasound probe was used to localize the target vein. It was clearly visualized. Local anesthesia was applied to the skin and subcutaneous tissues with lidocaine 1%. An 18-gauge needle was then inserted into the vein under ultrasound guidance. A guide wire was then threaded into the vein. A central venous catheter was then inserted into the vessel over the guide wire. The catheter was sutured into place and dressed following sterile protocol with Biopatch placed. All 4 ports were flushed and deemed patent.    Findings:  There were no changes to vital signs. All catheter ports were flushed with saline. Patient did tolerate procedure well.    Recommendations:  CXR ordered to verify placement.    Beatriz Gusman MD  2/1/2023     Endotracheal Intubation Procedure Note    Indication for endotracheal intubation: respiratory failure.    Sedation: etomidate.  Equipment: A video GlideScope was used and Fior 3 laryngoscope blade.  Number of attempts: 1.  ETT location confirmed by by auscultation, by CXR and ETCO2 monitor.    Beatriz Gusman MD  2/1/2023

## 2023-02-02 NOTE — PROGRESS NOTES
[facility] Progress Note      Patient Name: Ambar Lara  : 1970  MRN: [id number]  Primary Care Physician:  Jet Manuel MD  Date of admission: 2023    Subjective     Interval History:   The patient was seen and examined today for follow-up on ESRD   Intubated and sedated.  Pressor requirements slightly better.  Oxygen requirements are high  On CRRT  Review of Systems:   As noted above    Objective     Vitals:   Temp:  [97.9 °F (36.6 °C)-100.8 °F (38.2 °C)] 97.9 °F (36.6 °C)  Heart Rate:  [] 82  Resp:  [18-26] 26  BP: ()/() 115/63  Flow (L/min):  [1-15] 1  FiO2 (%):  [95 %] 95 %    Intake/Output Summary (Last 24 hours) at 2023 0846  Last data filed at 2023 0800  Gross per 24 hour   Intake 10897.23 ml   Output 1788.1 ml   Net 21851.13 ml       Physical Exam:    General Appearance: Intubated, frail  Skin: warm and dry  HEENT: oral mucosa normal, nonicteric sclera  Neck: supple, no JVD  Lungs: CTA  Heart: RRR, normal S1 and S2  Abdomen: soft, nontender, nondistended  : no palpable bladder  Extremities: no edema, cyanotic toe.  Neuro:  Sedated     Scheduled Meds:     aspirin, 81 mg, Oral, Daily  calcium gluconate, 1 g, Intravenous, Once  clopidogrel, 75 mg, Oral, Daily  escitalopram, 20 mg, Oral, Daily  famotidine, 20 mg, Oral, BID  ferrous sulfate, 325 mg, Oral, Daily  heparin (porcine), 5,000 Units, Subcutaneous, Q12H  insulin lispro, 0-7 Units, Subcutaneous, TID AC  insulin regular, 10 Units, Intravenous, Once  levothyroxine, 175 mcg, Oral, Q AM  montelukast, 10 mg, Oral, Nightly  OLANZapine, 5 mg, Oral, Nightly  sevelamer, 800 mg, Oral, TID With Meals  sodium chloride, 10 mL, Intravenous, Q12H  traZODone, 50 mg, Oral, Nightly      IV Meds:   amiodarone, 1 mg/min, Last Rate: 1 mg/min (23 06)  dexmedetomidine, 0.2-1.5 mcg/kg/hr, Last Rate: 0.4 mcg/kg/hr (23 020)  DOBUTamine, 2-20 mcg/kg/min, Last Rate: Stopped (23  0132)  DOPamine, 2-20 mcg/kg/min, Last Rate: 10 mcg/kg/min (02/02/23 0535)  EPINEPHrine, 0.02-0.3 mcg/kg/min, Last Rate: 0.3 mcg/kg/min (02/02/23 0814)  Heparin (Porcine), 200 Units/hr, Last Rate: 100 Units/hr (02/02/23 0133)  norepinephrine, 0.02-0.3 mcg/kg/min, Last Rate: 0.3 mcg/kg/min (02/02/23 0710)  Phoxillum BK4/2.5, 1,500 mL/hr, Last Rate: 1,500 mL/hr (02/02/23 0407)  Phoxillum BK4/2.5, 1,500 mL/hr, Last Rate: 1,500 mL/hr (02/02/23 0407)  Phoxillum BK4/2.5, 1,500 mL/hr, Last Rate: 1,500 mL/hr (02/02/23 0407)  remifentanil (ULTIVA) infusion, 0.1-0.25 mcg/kg/min (Ideal), Last Rate: 0.12 mcg/kg/min (02/02/23 0600)  sodium bicarbonate, 150 mEq, Last Rate: 150 mEq (02/02/23 0843)        Results Reviewed:   I have personally reviewed the results from the time of this admission to 2/2/2023 08:46 EST     Results from last 7 days   Lab Units 02/02/23  0642 02/02/23  0154 02/02/23  0005 02/01/23  2131   SODIUM mmol/L 137 141 140 141   POTASSIUM mmol/L 6.0* 6.6* 8.0* 5.0   CHLORIDE mmol/L 98 102 101 117*   CO2 mmol/L 22.2 19.0* 15.2* 11.9*   BUN mg/dL 35* 40* 52* 32*   CREATININE mg/dL 4.52* 5.12* 7.60* 4.18*   CALCIUM mg/dL 8.7 9.0 10.3 4.7*   BILIRUBIN mg/dL  --  0.7 0.5 <0.2   ALK PHOS U/L  --  121* 109 51   ALT (SGPT) U/L  --  79* 13 13   AST (SGOT) U/L  --  165* 125* 21   GLUCOSE mg/dL 270* 176* 183* 79       Estimated Creatinine Clearance: 14.4 mL/min (A) (by C-G formula based on SCr of 4.52 mg/dL (H)).    Results from last 7 days   Lab Units 02/02/23  0642 02/02/23  0005 02/01/23 2019   MAGNESIUM mg/dL  --  2.1 2.2   PHOSPHORUS mg/dL 5.0* 9.8*  --              Results from last 7 days   Lab Units 02/02/23  0154 02/01/23 2019   WBC 10*3/mm3 20.05* 7.96   HEMOGLOBIN g/dL 10.6* 11.1*   PLATELETS 10*3/mm3 398 369             Assessment / Plan     ASSESSMENT:  1. End-stage renal disease on peritoneal dialysis now presenting with severe shock likely cardiogenic with severe metabolic acidosis and volume overload.  Initiated on CRRT   2. Cardiogenic shoc likely triggered by severe hypocalcemia with calcium down to 4.7 causing decreased cardiac  activity and arrhythmia  3. Severe  Non anion gap metabolic acidosis secondary to tissue hypoperfusion due to cardiogenic shock in the setting of end-stage renal disease.  pH of 7.0 on admission  4. Severe hypocalcemia  5. Acute respiratory failure secondary to volume overload.  Currently intubated  6. Peripheral vascular disease  7. Status post cardiac arrest  8. Systolic heart failure EF of 20% status post AICD  9. History of Gettleman syndrome.  Patient currently is anuric  10. Hyperkalemia           PLAN:  • We will increase CRRT prescription given persistent hyperkalemia  • Continue bicarbonate drip awaiting ABG results  • Give 1 amp of calcium gluconate given persistent hyperkalemia  • Recommend adding inotropic agents to help pressors in order to improve cardiac contractility and allowing volume removal  • We will increase heparin to 200 units per hour given frequent filter clotting  • Continue surveillance labs per CRRT protocol  • Discussed with nursing staff              Thank you for involving us in the care of Ambar Lara.  Please feel free to call with any questions.    Mateo Titus MD  02/02/23  08:46 [facility]    [facility]  [phone]    Parts of this note may be an electronic transcription/translation of spoken language to printed text using the Dragon dictation system.

## 2023-02-02 NOTE — CODE DOCUMENTATION
Patient had just had her hemodialysis catheter placed and secured when she had a ventricular tachycardia with initial pulse however she became unresponsive and the pulse was lost  CPR was initiated  Patient was loaded with amiodarone  Patient did wake up with a few seconds of initiating CPR but became unresponsive again and this has to be repeated 1 more time  She got 1 amp of sodium bicarbonate IV push  Her rhythm changed back to a narrow complex with regain consciousness and ROSC  Patient was awake and following commands at the end of the resuscitation  Refer to the nurses note for more details

## 2023-02-02 NOTE — PROGRESS NOTES
[facility]     Mar/chart reviewed  Follow up cardiogenic shock, ischemic feet, ESRD  Patient sedated on vent, arouses but unable to provide subjective    Vital Sign Min/Max for last 24 hours  Temp  Min: 97.9 °F (36.6 °C)  Max: 100.8 °F (38.2 °C)   BP  Min: 53/32  Max: 133/104   Pulse  Min: 39  Max: 176   Resp  Min: 18  Max: 26   SpO2  Min: 57 %  Max: 100 %   Flow (L/min)  Min: 1  Max: 15   Weight  Min: 73.5 kg (162 lb)  Max: 81.1 kg (178 lb 12.7 oz)   52524?/1459  Appears ill, sedated on vent  perrl, normal sclera  mmm, no jvd, trachea midline, neck supple,  chest fair ae bilaterally,+crackles, no wheezes,   irrg  soft, nt, nd +bs,  no clubbing/ 1+ edema +ishemic le bilaterally  Skin warm, dry no rashes    Labs: 2/2: reviewed:  Lactate 4  Glucose 270  Bun 35  Cr 4.5  Na 137  k 6  Bicarb 22  Wbc 20  hgb 10.6  plts 398    CXR: 2/1: reviewed bilateral infiltrates    ASSESSMENT:   1. Cardiogenic shock  2. Severe metabolic acidosis  3. Severe peripheral arterial disease with lower extremity ischemia left more than right with dry gangrene  4. Altered mental status, improved   5. Hyperkalemia   6. Lactic acidosis   7. End-stage renal disease on peritoneal dialysis  8. Systolic congestive heart failure with EF of 20% status post AICD  9. 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 Hyperlipidemia  12. Abnormal EKG likely hyperkalemia admitted  13. Pulm edema     PLAN:  Patient already had 2 cardiac arrest  Patient is currently on several pressors -- defer escalation further pressors to cards per her request.  I think further pressors likely futile, particularly given the ischemic extremity  CCRT ongoing per nephrology  I do not think patient can survive this and is looking a le amputatoins of some sort, ESRD and cardiomyopathy in the unlikley event she would survive.    CC 36 mins.

## 2023-02-02 NOTE — OUTREACH NOTE
Medical Week 2 Survey    Flowsheet Row Responses   [facility] patient discharged from? [place]   Does the patient have one of the following disease processes/diagnoses(primary or secondary)? Other   Week 2 attempt successful? No   Unsuccessful attempts Attempt 1   Revoke Readmitted

## 2023-02-03 NOTE — PROGRESS NOTES
Pulmonary/critical care progress note    Nursing staff called because family has decided to pursue palliative measures.    Patient's chart was reviewed.    Patient was admitted on 2/1/2023 with multiple complaints including shortness of breath, weakness, chest tightness and worsening left lower extremity pain.  She was hypotensive and bradycardic upon arrival to the ED.  She had hyperkalemia that was treated with hyperkalemia protocol.  She cardiac arrested on 2/2/2023 x 2 with ROSC.  Patient required several vasopressors with norepinephrine, epinephrine, dopamine and dobutamine for cardiogenic shock.  Nephrology was consulted and the decision was made to proceed to CRRT.    Patient's progress notes from Dr. Meza and consultants are reviewed from today.  Multiple physicians who saw patient during the day recommended palliative care due to patient's very poor prognosis.    Upon discussion with family, they have requested to compassionately extubate patient and discontinue CRRT.  They have asked for patient to remain on vasopressors initially with hopes that patient will be able to wake up and speak with them prior to death.    Total time spent reviewing patient's chart, discussing with nurse at bedside and with family: 32 minutes    FACUNDO Crawley

## 2023-02-03 NOTE — DISCHARGE PLACEMENT REQUEST
"Ambar Melo (52 y.o. Female)     Date of Birth   1970    Social Security Number       Address   [address] [place] [address]    Home Phone   [phone]    MRN   [id number]       [employer]   None    Marital Status                               Admission Date   2/1/23    Admission Type   Emergency    Admitting Provider   Beatriz Gusman MD    Attending Provider   Beatriz Gusman MD    Department, Room/Bed   [facility] INTENSIVE CARE, I387/1       Discharge Date       Discharge Disposition       Discharge Destination                               Attending Provider: Beatriz Gusman MD    Allergies: Penicillins, Nickel    Isolation: Contact   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)   Code Status: No CPR    Ht: 155 cm (61.02\")   Wt: 76.1 kg (167 lb 12.3 oz)    Admission Cmt: None   Principal Problem: Hypotension [I95.9]                 Active Insurance as of 2/1/2023     Primary Coverage     Payor Plan Insurance Group Employer/Plan Group    MEDICARE MEDICARE A & B      Payor Plan Address Payor Plan Phone Number Payor Plan Fax Number Effective Dates    [address] [phone]  4/1/2019 - None Entered    [facility] [address]       Subscriber Name Subscriber Birth Date Member ID       AMBAR MELO 1970 [id number]           Secondary Coverage     Payor Plan Insurance Group Employer/Plan Group    [facility] [id number]     Payor Plan Address Payor Plan Phone Number Payor Plan Fax Number Effective Dates    [address]   5/1/2020 - None Entered    [facility] [id number]       Subscriber Name Subscriber Birth Date Member ID       AMBAR MELO 1970 [id number]                 Emergency Contacts      (Rel.) Home Phone Work Phone Mobile Phone    loriematy (Daughter) [phone] -- --    natalie melo (Power of ) [phone] -- --    LorieRoland (Son) [phone] -- --              "

## 2023-02-03 NOTE — PROGRESS NOTES
Patient is Current with [facility]. We will follow for DC plans and HH needs.

## 2023-02-03 NOTE — PLAN OF CARE
Goal Outcome Evaluation:  VSS overnight. Epi, levo and dopamine gtt on. Tolerating CRRT w 100cc/hr fluid removal. Very restless at times.

## 2023-02-03 NOTE — PROGRESS NOTES
[facility] Progress Note      Patient Name: Ambar Lara  : 1970  MRN: [id number]  Primary Care Physician:  Jet Manuel MD  Date of admission: 2023    Subjective     Interval History:   The patient was seen and examined today for follow-up on ESRD currently on CRRT because of cardiogenic shock  Intubated and sedated.  Pressor requirements slightly better.  Tolerating CRRT well  Able to achieve net negative balance of 4.8 L  Review of Systems:   As noted above    Objective     Vitals:   Temp:  [97.7 °F (36.5 °C)-100.5 °F (38.1 °C)] 97.7 °F (36.5 °C)  Heart Rate:  [] 93  Resp:  [26-30] 26  BP: ()/(42-99) 102/72  FiO2 (%):  [65 %-85 %] 65 %    Intake/Output Summary (Last 24 hours) at 2/3/2023 0947  Last data filed at 2/3/2023 0900  Gross per 24 hour   Intake 3001.39 ml   Output 6806.3 ml   Net -3804.91 ml       Physical Exam:    General Appearance: Intubated, frail  Skin: warm and dry  HEENT: oral mucosa normal, nonicteric sclera  Neck: supple, no JVD  Lungs: CTA  Heart: RRR, normal S1 and S2  Abdomen: soft, nontender, nondistended  : no palpable bladder  Extremities: no edema, cyanotic toe.  Neuro:  Sedated     Scheduled Meds:     aspirin, 81 mg, Nasogastric, Daily  clopidogrel, 75 mg, Nasogastric, Daily  ferrous sulfate, 325 mg, Oral, Daily  heparin (porcine), 5,000 Units, Subcutaneous, Q12H  insulin regular, 0-7 Units, Subcutaneous, Q6H  insulin regular, 10 Units, Intravenous, Once  levothyroxine, 175 mcg, Nasogastric, Q AM  pantoprazole, 40 mg, Intravenous, Q AM  sevelamer, 800 mg, Oral, TID With Meals  sodium chloride, 10 mL, Intravenous, Q12H      IV Meds:   amiodarone, 0.5 mg/min, Last Rate: 0.5 mg/min (23)  dexmedetomidine, 0.2-1.5 mcg/kg/hr, Last Rate: 1.5 mcg/kg/hr (23 09)  DOBUTamine, 2-20 mcg/kg/min, Last Rate: Stopped (23)  DOPamine, 2-20 mcg/kg/min, Last Rate: 10 mcg/kg/min (23 08)  EPINEPHrine,  0.02-0.3 mcg/kg/min, Last Rate: 0.18 mcg/kg/min (02/03/23 0939)  Heparin (Porcine), 200 Units/hr, Last Rate: 200 Units/hr (02/02/23 0849)  norepinephrine, 0.02-0.3 mcg/kg/min, Last Rate: 0.28 mcg/kg/min (02/03/23 0554)  Phoxillum BK4/2.5, 1,500 mL/hr, Last Rate: 1,500 mL/hr (02/03/23 0908)  Phoxillum BK4/2.5, 1,500 mL/hr, Last Rate: 1,500 mL/hr (02/03/23 0908)  Phoxillum BK4/2.5, 1,500 mL/hr, Last Rate: 1,500 mL/hr (02/03/23 0907)  remifentanil (ULTIVA) infusion, 0.1-0.25 mcg/kg/min (Ideal), Last Rate: 0.2 mcg/kg/min (02/03/23 0938)        Results Reviewed:   I have personally reviewed the results from the time of this admission to 2/3/2023 09:47 EST     Results from last 7 days   Lab Units 02/03/23  0750 02/02/23  2334 02/02/23  1447 02/02/23  0642 02/02/23  0154 02/02/23  0005 02/01/23  2131   SODIUM mmol/L 136 137 137   < > 141 140 141   POTASSIUM mmol/L 4.6 4.8 5.2   < > 6.6* 8.0* 5.0   CHLORIDE mmol/L 100 102 101   < > 102 101 117*   CO2 mmol/L 21.1* 22.0 25.0   < > 19.0* 15.2* 11.9*   BUN mg/dL 5* 7 12   < > 40* 52* 32*   CREATININE mg/dL 0.76 1.08* 1.79*   < > 5.12* 7.60* 4.18*   CALCIUM mg/dL 8.6 8.5* 8.7   < > 9.0 10.3 4.7*   BILIRUBIN mg/dL  --   --   --   --  0.7 0.5 <0.2   ALK PHOS U/L  --   --   --   --  121* 109 51   ALT (SGPT) U/L  --   --   --   --  79* 13 13   AST (SGOT) U/L  --   --   --   --  165* 125* 21   GLUCOSE mg/dL 85 97 140*   < > 176* 183* 79    < > = values in this interval not displayed.       Estimated Creatinine Clearance: 82.8 mL/min (by C-G formula based on SCr of 0.76 mg/dL).    Results from last 7 days   Lab Units 02/03/23  0750 02/02/23  2334 02/02/23  1447   MAGNESIUM mg/dL 2.5 2.3 2.3   PHOSPHORUS mg/dL 4.0 3.8 4.6*             Results from last 7 days   Lab Units 02/03/23  0341 02/02/23  0154 02/01/23 2019   WBC 10*3/mm3 19.05* 20.05* 7.96   HEMOGLOBIN g/dL 9.8* 10.6* 11.1*   PLATELETS 10*3/mm3 128 398 369             Assessment / Plan     ASSESSMENT:  1. End-stage renal  disease on peritoneal dialysis now presenting with severe shock likely cardiogenic with severe metabolic acidosis and volume overload on presentation. Initiated on CRRT tolerating ok   2. Cardiogenic shoc likely triggered by severe hypocalcemia/ acidosis with calcium down to 4.7 causing decreased cardiac  activity and arrhythmia  3. Severe  Non anion gap metabolic acidosis secondary to tissue hypoperfusion due to cardiogenic shock in the setting of end-stage renal disease.  pH of 7.0 on admission, improved   4. Severe hypocalcemia replaced   5. Acute respiratory failure secondary to volume overload.  Currently intubated  Volume removal per CRRT   6. Peripheral vascular disease  7. Status post cardiac arrest  8. Systolic heart failure EF of < 20% status post AICD, Multivalvular heart disease   9. History of Gettleman syndrome.    10. Hyperkalemia: improved            PLAN:  • We will continue  CRRT prescription  And Attempt to UF as tolerated   • D/C bicarbonate drip  • Continue surveillance labs per CRRT protocol  • Very poor prognosis   • Discussed with nursing staff              Thank you for involving us in the care of Ambar Lara.  Please feel free to call with any questions.    Mateo Titus MD  02/03/23  09:47 [facility]    [facility]  [phone]    Parts of this note may be an electronic transcription/translation of spoken language to printed text using the Dragon dictation system.

## 2023-02-03 NOTE — NURSING NOTE
Pt had multiple family members in to see her today, her three children have decided for her to transition to palliative care. Madeleine LOREDO notified for orders. CRRT stopped. Family wishes for pt to remain on vasopressors for a short period of time to see if pt is able to wake up and speak with them.

## 2023-02-03 NOTE — PROGRESS NOTES
[facility]      Mar/chart reviewed  Follow up cardiogenic shock, ischemic feet, ESRD  Patient sedated on vent, arouses but unable to provide subjective    Vital Sign Min/Max for last 24 hours  Temp  Min: 97.7 °F (36.5 °C)  Max: 100.5 °F (38.1 °C)   BP  Min: 61/43  Max: 128/82   Pulse  Min: 80  Max: 106   Resp  Min: 26  Max: 30   SpO2  Min: 89 %  Max: 100 %   No data recorded   Weight  Min: 76.1 kg (167 lb 12.3 oz)  Max: 81 kg (178 lb 9.2 oz)   2134/6984    Appears ill, sedated on vent  perrl, normal sclera  mmm, no jvd, trachea midline, neck supple,  chest fair ae bilaterally,+crackles, no wheezes,   irrg  soft, nt, nd +bs,  no clubbing/ 1+ edema +ishemic le bilaterally  Skin warm, dry no rashes    Labs: 2/3: reviewed:  Bicarb 21  Wbc 19  hgb 9.8  plts 257    ASSESSMENT:   1. Cardiogenic shock  2. Severe metabolic acidosis  3. Severe peripheral arterial disease with lower extremity ischemia left more than right with dry gangrene  4. Altered mental status, improved   5. Hyperkalemia   6. Lactic acidosis   7. End-stage renal disease on peritoneal dialysis  8. Systolic congestive heart failure with EF of 20% status post AICD  9. 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 Hyperlipidemia  12. Abnormal EKG likely hyperkalemia admitted  13. Pulm edema     PLAN:  Patient already had 2 cardiac arrest  Patient is currently on several pressors -- defer escalation further pressors to cards per her request.  I think further pressors likely futile, particularly given the ischemic extremity  CCRT ongoing per nephrology  I do not think patient can survive this and is looking a le amputatoins of some sort, ESRD and cardiomyopathy in the unlikley event she would survive. Reviewed note from palliative care family planning to pursue comfort measures at some point.      CC 36 mins.

## 2023-02-03 NOTE — PROGRESS NOTES
Name: Ambar Lara ADMIT: 2023   : 1970  PCP: Jet Manuel MD    MRN: [id number] LOS: 2 days   AGE/SEX: 52 y.o. female  ROOM: [address]    [place], Subsequent Hospital Care    Chief Complaint   Patient presents with   • Chest Pain   • Shortness of Breath     CC: Bilateral lower extremity ischemia  Subjective     52 y.o. female with severe bilateral lower extremity ischemia due to chronic occlusive disease with acute on chronic changes exacerbated by the use of pressors.  Patient is currently also on CRRT as she is not tolerating full hemodialysis given her severe cardiomyopathy.  [place] here is grim.    Review of Systems patient is intubated but does respond    Objective     Scheduled Medications:   aspirin, 81 mg, Nasogastric, Daily  clopidogrel, 75 mg, Nasogastric, Daily  ferrous sulfate, 325 mg, Oral, Daily  heparin (porcine), 5,000 Units, Subcutaneous, Q12H  insulin regular, 0-7 Units, Subcutaneous, Q6H  insulin regular, 10 Units, Intravenous, Once  levothyroxine, 175 mcg, Nasogastric, Q AM  pantoprazole, 40 mg, Intravenous, Q AM  sevelamer, 800 mg, Oral, TID With Meals  sodium chloride, 10 mL, Intravenous, Q12H        Active Infusions:  amiodarone, 0.5 mg/min, Last Rate: 0.5 mg/min (23)  dexmedetomidine, 0.2-1.5 mcg/kg/hr, Last Rate: 1.2 mcg/kg/hr (23)  DOBUTamine, 2-20 mcg/kg/min, Last Rate: Stopped (23)  DOPamine, 2-20 mcg/kg/min, Last Rate: 10 mcg/kg/min (23)  EPINEPHrine, 0.02-0.3 mcg/kg/min, Last Rate: 0.22 mcg/kg/min (23)  Heparin (Porcine), 200 Units/hr, Last Rate: 200 Units/hr (23)  norepinephrine, 0.02-0.3 mcg/kg/min, Last Rate: 0.28 mcg/kg/min (23 05)  Phoxillum BK4/2.5, 1,500 mL/hr, Last Rate: 1,500 mL/hr (23)  Phoxillum BK4/2.5, 1,500 mL/hr, Last Rate: 1,500 mL/hr (23)  Phoxillum BK4/2.5, 1,500 mL/hr, Last Rate: 1,500 mL/hr (23)  remifentanil (ULTIVA)  infusion, 0.1-0.25 mcg/kg/min (Ideal), Last Rate: 0.16 mcg/kg/min (02/03/23 0838)        As Needed Medications:  •  albumin human  •  calcium gluconate IVPB **OR** calcium gluconate IVPB  •  dextrose  •  dextrose  •  dextrose  •  glucagon (human recombinant)  •  heparin (porcine)  •  HYDROcodone-acetaminophen  •  hydrOXYzine  •  ipratropium-albuterol  •  magnesium sulfate  •  ondansetron **OR** ondansetron  •  potassium chloride  •  sodium bicarbonate  •  sodium chloride  •  sodium chloride  •  sodium chloride  •  sodium phosphate IVPB    Vital Signs  Vital Signs Patient Vitals for the past 24 hrs:   BP Temp Temp src Pulse Resp SpO2 Height Weight   02/03/23 0800 103/70 97.7 °F (36.5 °C) Oral 83 -- 91 % -- --   02/03/23 0735 -- -- -- 84 26 100 % -- --   02/03/23 0700 105/70 -- -- 80 -- 100 % -- --   02/03/23 0645 105/69 -- -- 86 -- 100 % -- --   02/03/23 0630 99/79 -- -- 85 -- 100 % -- --   02/03/23 0615 110/65 -- -- 83 -- 100 % -- --   02/03/23 0600 107/71 -- -- 85 -- 100 % -- --   02/03/23 0545 110/68 -- -- 84 -- 100 % -- --   02/03/23 0530 103/64 -- -- 82 -- 100 % -- --   02/03/23 0515 109/69 -- -- 84 -- 100 % -- --   02/03/23 0500 109/71 -- -- 85 -- 100 % -- --   02/03/23 0445 102/57 -- -- 82 -- 100 % -- --   02/03/23 0430 101/53 -- -- 86 -- 100 % -- --   02/03/23 0415 107/58 -- -- 96 -- (!) 89 % -- --   02/03/23 0400 98/61 -- -- 84 -- 100 % -- --   02/03/23 0345 91/66 -- -- 91 -- 100 % -- --   02/03/23 0330 105/71 97.7 °F (36.5 °C) Oral 93 -- 100 % -- --   02/03/23 0317 108/60 -- -- 91 -- 100 % -- --   02/03/23 0315 (!) 61/43 -- -- 90 -- 100 % -- --   02/03/23 0300 106/70 -- -- 89 -- 100 % -- --   02/03/23 0245 108/63 -- -- 95 -- 100 % -- --   02/03/23 0230 107/63 -- -- 81 -- 100 % -- --   02/03/23 0215 111/73 -- -- 85 -- 100 % -- --   02/03/23 0200 112/74 -- -- 84 -- 100 % -- --   02/03/23 0145 109/68 -- -- 84 -- 100 % -- --   02/03/23 0130 112/75 -- -- 88 -- 100 % -- --   02/03/23 0115 107/72 -- -- 88 -- 100 %  -- --   02/03/23 0113 -- -- -- 86 -- 100 % -- 76.1 kg (167 lb 12.3 oz)   02/03/23 0100 99/73 -- -- 88 -- 100 % -- --   02/03/23 0045 (!) 87/72 -- -- 89 -- 100 % -- --   02/03/23 0030 96/70 -- -- 87 -- 99 % -- --   02/03/23 0015 109/70 -- -- 88 -- 100 % -- --   02/03/23 0000 111/73 -- -- 89 -- 100 % -- --   02/02/23 2345 117/61 -- -- 89 -- 100 % -- --   02/02/23 2340 -- -- -- -- 26 -- -- --   02/02/23 2330 109/76 100.5 °F (38.1 °C) Oral 88 -- 100 % -- --   02/02/23 2315 109/71 -- -- 92 -- 100 % -- --   02/02/23 2300 114/71 -- -- 87 -- 100 % -- --   02/02/23 2245 119/71 -- -- 89 -- 100 % -- --   02/02/23 2230 114/72 -- -- 87 -- 100 % -- --   02/02/23 2215 110/77 -- -- 89 -- 100 % -- --   02/02/23 2200 115/42 -- -- 90 -- 100 % -- --   02/02/23 2145 114/74 -- -- 98 -- 95 % -- --   02/02/23 2130 121/73 -- -- 89 -- 100 % -- --   02/02/23 2115 115/79 -- -- 87 -- 100 % -- --   02/02/23 2100 122/73 -- -- 92 -- 100 % -- --   02/02/23 2045 112/75 -- -- 92 -- 100 % -- --   02/02/23 2030 96/63 -- -- 93 -- 100 % -- --   02/02/23 2015 128/82 -- -- 84 -- 100 % -- --   02/02/23 2000 111/80 99.9 °F (37.7 °C) Oral 85 -- 100 % -- --   02/02/23 1950 98/75 -- -- 85 -- 100 % -- --   02/02/23 1945 (!) 72/52 -- -- 88 -- 91 % -- --   02/02/23 1930 96/69 -- -- 89 -- 100 % -- --   02/02/23 1915 104/61 -- -- 91 -- 91 % -- --   02/02/23 1900 118/74 -- -- 90 (!) 30 100 % -- --   02/02/23 1845 106/81 -- -- 93 -- 100 % -- --   02/02/23 1830 111/61 -- -- 87 -- 100 % -- --   02/02/23 1815 117/73 -- -- 93 -- 100 % -- --   02/02/23 1800 119/99 -- -- 93 26 100 % -- --   02/02/23 1745 117/84 -- -- 90 -- 100 % -- --   02/02/23 1730 123/80 -- -- 98 -- 100 % -- --   02/02/23 1715 120/80 -- -- 106 -- 100 % -- --   02/02/23 1700 123/80 -- -- 87 -- -- -- --   02/02/23 1645 122/77 -- -- 90 -- 100 % -- --   02/02/23 1630 120/84 -- -- 81 -- 100 % -- --   02/02/23 1615 106/69 -- -- 86 -- 91 % -- --   02/02/23 1600 118/75 100 °F (37.8 °C) Oral 89 -- 100 % -- --  "  02/02/23 1545 105/74 -- -- 81 -- 100 % -- --   02/02/23 1530 105/64 -- -- 83 -- 100 % -- --   02/02/23 1515 107/73 97.9 °F (36.6 °C) Axillary 88 -- 98 % -- --   02/02/23 1500 114/65 -- -- 82 -- 100 % -- --   02/02/23 1445 112/66 -- -- 87 -- 94 % -- --   02/02/23 1430 117/63 -- -- 81 -- 100 % -- --   02/02/23 1415 107/75 -- -- 81 -- 100 % -- --   02/02/23 1400 112/63 -- -- 82 -- 100 % -- --   02/02/23 1345 110/48 -- -- 86 -- 100 % -- --   02/02/23 1330 110/74 -- -- 81 -- 100 % -- --   02/02/23 1315 102/66 -- -- 82 -- 100 % -- --   02/02/23 1300 105/42 -- -- 82 -- 100 % -- --   02/02/23 1245 110/66 -- -- 82 -- 100 % -- --   02/02/23 1230 108/70 -- -- 82 -- 100 % -- --   02/02/23 1215 109/53 -- -- 82 -- 100 % -- --   02/02/23 1200 100/68 98 °F (36.7 °C) -- 82 26 100 % -- --   02/02/23 1145 105/65 -- -- 81 -- 100 % -- --   02/02/23 1137 -- -- -- 82 26 100 % -- --   02/02/23 1130 101/66 -- -- 83 -- 100 % -- --   02/02/23 1115 107/70 -- -- 84 -- 100 % -- --   02/02/23 1100 96/64 -- -- 84 26 97 % -- --   02/02/23 1045 117/68 -- -- 85 -- 97 % -- --   02/02/23 1033 118/78 -- -- 85 -- -- 155 cm (61.02\") 81 kg (178 lb 9.2 oz)   02/02/23 1030 102/63 -- -- 84 -- 96 % -- --   02/02/23 1015 116/65 -- -- 82 -- 97 % -- --   02/02/23 1000 100/81 -- -- 83 26 99 % -- --   02/02/23 0945 118/78 -- -- 84 -- 95 % -- --   02/02/23 0930 110/53 -- -- 83 -- 98 % -- --     Vital Signs (range)  Temp:  [97.7 °F (36.5 °C)-100.5 °F (38.1 °C)] 97.7 °F (36.5 °C)  Heart Rate:  [] 83  Resp:  [26-30] 26  BP: ()/(42-99) 103/70  FiO2 (%):  [65 %-85 %] 65 %  I/O:  I/O last 3 completed shifts:  In: 27786.6 [I.V.:11315.6; IV Piggyback:300]  Out: 8440.5 [Urine:113; Other:8327.5]  I/O:   Intake/Output Summary (Last 24 hours) at 2/3/2023 0922  Last data filed at 2/3/2023 0900  Gross per 24 hour   Intake 2971.39 ml   Output 6806.3 ml   Net -3834.91 ml     BMI:  Body mass index is 31.67 kg/m².    Physical Exam:  Physical Exam   Lungs coarse and " diminished on ventilator  Abdomen appears benign  Extremities mottled and cool from mid calf down.  Left foot dry gangrene without change.  No evidence of infection.    Results Review:     CBC    Results from last 7 days   Lab Units 02/03/23  0341 02/02/23  0154 02/01/23 2019   WBC 10*3/mm3 19.05* 20.05* 7.96   HEMOGLOBIN g/dL 9.8* 10.6* 11.1*   PLATELETS 10*3/mm3 257 398 369     BMP   Results from last 7 days   Lab Units 02/03/23  0750 02/02/23  2334 02/02/23  1447 02/02/23  0840 02/02/23  0642 02/02/23  0154 02/02/23  0005 02/01/23 2131 02/01/23 2019   SODIUM mmol/L 136 137 137  --  137 141 140 141  --    POTASSIUM mmol/L 4.6 4.8 5.2  --  6.0* 6.6* 8.0* 5.0  --    CHLORIDE mmol/L 100 102 101  --  98 102 101 117*  --    CO2 mmol/L 21.1* 22.0 25.0  --  22.2 19.0* 15.2* 11.9*  --    BUN mg/dL 5* 7 12  --  35* 40* 52* 32*  --    CREATININE mg/dL 0.76 1.08* 1.79*  --  4.52* 5.12* 7.60* 4.18*  --    GLUCOSE mg/dL 85 97 140*  --  270* 176* 183* 79  --    MAGNESIUM mg/dL 2.5 2.3 2.3 2.1  --   --  2.1  --  2.2   PHOSPHORUS mg/dL 4.0 3.8 4.6*  --  5.0*  --  9.8*  --   --      Cr Clearance Estimated Creatinine Clearance: 82.8 mL/min (by C-G formula based on SCr of 0.76 mg/dL).  Coag   Results from last 7 days   Lab Units 02/03/23  0644 02/02/23 2334 02/02/23  1834 02/02/23  1228 02/02/23  0840   APTT seconds 37.7* 42.3* 32.5 38.1* 37.9*     HbA1C   Lab Results   Component Value Date    HGBA1C 5.20 03/13/2022    HGBA1C 7.3 (H) 02/21/2021    HGBA1C 5.7 (H) 03/31/2020     Blood Glucose   Glucose   Date/Time Value Ref Range Status   02/03/2023 0638 77 70 - 130 mg/dL Final     Comment:     Meter: [id number] : [id number] Shaan Lara RN   02/02/2023 2333 83 70 - 130 mg/dL Final     Comment:     Meter: [id number] : [id number] Shaan Lara RN   02/02/2023 1829 125 70 - 130 mg/dL Final     Comment:     Meter: [id number] : [id number] [place] MUÑIZ   02/02/2023 1128 192 (H) 70 - 130 mg/dL Final      Comment:     Meter: [id number] : [id number] [place] MUÑIZ   02/01/2023 2118 80 70 - 130 mg/dL Final     Comment:     Meter: [id number] : [id number] Dima Baltazar RN     Infection   Results from last 7 days   Lab Units 02/02/23  0005 02/01/23 2019   BLOODCX   --  No growth at 24 hours  No growth at 24 hours   PROCALCITONIN ng/mL 0.50* 0.51*     CMP   Results from last 7 days   Lab Units 02/03/23  0750 02/02/23  2334 02/02/23  1447 02/02/23  0642 02/02/23  0154 02/02/23  0005 02/01/23  2131   SODIUM mmol/L 136 137 137 137 141 140 141   POTASSIUM mmol/L 4.6 4.8 5.2 6.0* 6.6* 8.0* 5.0   CHLORIDE mmol/L 100 102 101 98 102 101 117*   CO2 mmol/L 21.1* 22.0 25.0 22.2 19.0* 15.2* 11.9*   BUN mg/dL 5* 7 12 35* 40* 52* 32*   CREATININE mg/dL 0.76 1.08* 1.79* 4.52* 5.12* 7.60* 4.18*   GLUCOSE mg/dL 85 97 140* 270* 176* 183* 79   ALBUMIN g/dL 2.8* 2.7* 2.8* 2.7* 2.8* 2.8* 1.4*   BILIRUBIN mg/dL  --   --   --   --  0.7 0.5 <0.2   ALK PHOS U/L  --   --   --   --  121* 109 51   AST (SGOT) U/L  --   --   --   --  165* 125* 21   ALT (SGPT) U/L  --   --   --   --  79* 13 13     ABG    Results from last 7 days   Lab Units 02/01/23 2201 02/01/23  2144   PH, ARTERIAL pH units 7.006* 7.094*   PCO2, ARTERIAL mm Hg 17.5* 18.0*   PO2 ART mm Hg 43.3* 32.0*   BASE EXCESS ART mmol/L -23.9* -22.0*     Radiology(recent) XR Chest 1 View    Result Date: 2/1/2023  1. Endotracheal tube placement with its tip approximately 1.7 cm above the lorna. 2. Central venous catheter placement as described. 3. No pneumothorax is seen. 4. Cardiomegaly. 5. FINDINGS consistent with pulmonary edema, right greater than left and this finding appears slightly more severe than the earlier study today.  This report was finalized on 2/1/2023 11:12 PM by Dr. Lawson Beckford M.D.      XR Chest 1 View    Result Date: 2/1/2023  1. Cardiomegaly. 2. FINDINGS consistent with pulmonary edema most severe on the right though there could be an element of pneumonia  particularly in the right lower lung. Please correlate with the clinical findings. 3. Follow-up films recommended.  This report was finalized on 2/1/2023 7:54 PM by Dr. Lawson Beckford M.D.        Assessment & Plan     Assessment & Plan      Hypotension      52 y.o. female with not unreconstructable lower extremity occlusive disease that is now acutely exacerbated by her illness.  Ensign for the lower extremities is extremely poor if she survives.  Likely bilateral AKA's will be eventually needed.  If she survives she will also need tunneled dialysis catheter access as her current fistula is unable to be used due to her extremely poor cardiac output.  I do not believe she would really be able to tolerate long-term hemodialysis with her heart.  I believe that palliative care is the most reasonable option for this poor lady.  We will follow peripherally.  Please call if things change for the better.      Gaurav Payton MD  02/03/23  09:21 EST    Please call my office with any question: [phone]    Active Hospital Problems    Diagnosis  POA   • **Hypotension [I95.9]  Yes      Resolved Hospital Problems   No resolved problems to display.

## 2023-02-03 NOTE — PROGRESS NOTES
"    Patient Name: Ambar Lara  :1970  52 y.o.      Patient Care Team:  Jet Manuel MD as PCP - General (Internal Medicine)    Chief Complaint: Shock    Interval History:   Is interactive if not a little agitated.    Objective   Vital Signs  Temp:  [97.7 °F (36.5 °C)-100.5 °F (38.1 °C)] 97.7 °F (36.5 °C)  Heart Rate:  [] 83  Resp:  [26-30] 26  BP: ()/(42-99) 100/68  FiO2 (%):  [65 %-85 %] 65 %    Intake/Output Summary (Last 24 hours) at 2/3/2023 1127  Last data filed at 2/3/2023 1100  Gross per 24 hour   Intake 3330.39 ml   Output 6899.1 ml   Net -3568.71 ml     Flowsheet Rows    Flowsheet Row First Filed Value   Admission Height 154.9 cm (61\") Documented at 2023 1801   Admission Weight 73.5 kg (162 lb) Documented at 2023 1801          Physical Exam:   General Appearance:    Alert, cooperative, in no acute distress   Lungs:    Lateral rales    Heart:   Tachycardic irregular, normal S1 and S2, no murmurs, gallops or rubs.     Chest Wall:    No abnormalities observed   Abdomen:     Soft, nontender, positive bowel sounds.     Extremities:  Dusky, cold     Results Review:    Results from last 7 days   Lab Units 23  0750   SODIUM mmol/L 136   POTASSIUM mmol/L 4.6   CHLORIDE mmol/L 100   CO2 mmol/L 21.1*   BUN mg/dL 5*   CREATININE mg/dL 0.76   GLUCOSE mg/dL 85   CALCIUM mg/dL 8.6     Results from last 7 days   Lab Units 23  0840 23  0005 23  2131   CK TOTAL U/L 104  --   --    TROPONIN T ng/mL  --  0.193* 0.055*     Results from last 7 days   Lab Units 23  0341   WBC 10*3/mm3 19.05*   HEMOGLOBIN g/dL 9.8*   HEMATOCRIT % 30.6*   PLATELETS 10*3/mm3 257     Results from last 7 days   Lab Units 23  0644 23  2334 23  1834   APTT seconds 37.7* 42.3* 32.5     Results from last 7 days   Lab Units 23  0750   MAGNESIUM mg/dL 2.5                   Medication Review:   aspirin, 81 mg, Nasogastric, Daily  clopidogrel, 75 mg, Nasogastric, " Daily  ferrous sulfate, 325 mg, Oral, Daily  heparin (porcine), 5,000 Units, Subcutaneous, Q12H  insulin regular, 0-7 Units, Subcutaneous, Q6H  insulin regular, 10 Units, Intravenous, Once  levothyroxine, 175 mcg, Nasogastric, Q AM  pantoprazole, 40 mg, Intravenous, Q AM  sevelamer, 800 mg, Oral, TID With Meals  sodium chloride, 10 mL, Intravenous, Q12H         amiodarone, 0.5 mg/min, Last Rate: 0.5 mg/min (02/02/23 2118)  dexmedetomidine, 0.2-1.5 mcg/kg/hr, Last Rate: 1.5 mcg/kg/hr (02/03/23 1017)  DOBUTamine, 2-20 mcg/kg/min, Last Rate: Stopped (02/02/23 0132)  DOPamine, 2-20 mcg/kg/min, Last Rate: 10 mcg/kg/min (02/03/23 0804)  EPINEPHrine, 0.02-0.3 mcg/kg/min, Last Rate: 0.16 mcg/kg/min (02/03/23 1059)  Heparin (Porcine), 200 Units/hr, Last Rate: 200 Units/hr (02/02/23 0849)  norepinephrine, 0.02-0.3 mcg/kg/min, Last Rate: 0.28 mcg/kg/min (02/03/23 0554)  Phoxillum BK4/2.5, 1,500 mL/hr, Last Rate: 1,500 mL/hr (02/03/23 0908)  Phoxillum BK4/2.5, 1,500 mL/hr, Last Rate: 1,500 mL/hr (02/03/23 0908)  Phoxillum BK4/2.5, 1,500 mL/hr, Last Rate: 1,500 mL/hr (02/03/23 0907)  propofol, 5-50 mcg/kg/min, Last Rate: 10 mcg/kg/min (02/03/23 1059)  remifentanil (ULTIVA) infusion, 0.1-0.25 mcg/kg/min (Ideal), Last Rate: 0.22 mcg/kg/min (02/03/23 1003)        Assessment & Plan     1.  Nonischemic cardiomyopathy EF less than 20%, ICD in place  2.  Cardiogenic shock, on Levophed, epi, dopamine.    3.  Hyperkalemia, hypocalcemia, resolved  4.  Cardiac arrest due to VT and PEA  5.  Acute respiratory failure related to acute CHF exacerbation.  Intubated  6.  ESRD now getting CRRT.  Anuric.    7.  PAD,Dry gangrene/bilateral lower extremity ischemia.  She has been seen by surgery who states there are no options for improvement here.  Likely she would need amputations if she survives this event    - Device was interrogated yesterday.  Due to the severe electrolyte disturbance the device incorrectly delivered pacing with lead to VF.   Now that her electrolytes have improved the device is functioning normally.  -MAP was 85 this morning.  I instructed the nurse at the MAP goal of 65 does not need to be above this and pressors can be down titrated as tolerated  -Ultrafiltration as tolerated  -I agree with plans to move towards palliative care.    Debbie Stanford MD  [facility]  02/03/23  11:27 EST

## 2023-02-04 NOTE — NURSING NOTE
"Patient resting in bed with her two sons at bedside when this nurse assumed care at 19:30.  Patient unarousable and withdrawals from pain.  Family asked nurse to let them have some privacy with patient and nurse pulled curtain closed to room.  At 1950 patient's son came into hallway to inform nurse that he thinks his mom has .  Nurse had another nurse accompany into patient's room to assess respirations, blood pressure and heart rate.  Patient has no respirations, no blood pressure and has implanted pacemaker, but no audible heart sounds.  Magnet placed over pacemaker, unable to \"turn off\" pacemaker.  Sons at bedside made aware of patient passing, and immediately they were distressed with grief.  Nurse attempted to provide comfort to patient's sons and answer their questions.  Patient's sons began telephoning their other family members to inform them of patient's passing.  Nurse allowed sons time to grieve at the bedside.  When patient's sons were ready to leave, nurse gave them the telephone number to call into hospital when they had time to make arrangements with a  home.  Patient's sons collected and removed patient's clothing and belongings from her hospital room to take with them.    Post mortem care provided by unit charge nurse and CNA.  Provisional completed to best of ability.  [facility] notified and nurse was informed that they would not pursue any further possibilities of donation patient can be released to  home when appropriate.    "

## 2023-02-04 NOTE — NURSING NOTE
Susie with Nephrology and vascular surgery answering service notified of patient's passing.  Cardiology office message left.

## 2023-02-06 LAB
BACTERIA SPEC AEROBE CULT: NORMAL
BACTERIA SPEC AEROBE CULT: NORMAL

## 2023-02-06 NOTE — HOME HEALTH
PT WAS TRANSFERRED TO THE ER ON 23 DUE TO ACUTE CHEST PAIN, HAD ABNORMAL EKG AND WAS IN THE ICU. WHILE IN HOSPITAL PT .

## 2023-02-07 NOTE — PROGRESS NOTES
Enter Query Response Below      Query Response:     CHF related to NICM         If applicable, please update the problem list.          Patient: Ambar Lara        : 1970  Account: [id number]           Admit Date: 2023        How to Respond to this query:       a. Click New Note     b. Answer query within the yellow box.                c. Update the Problem List, if applicable.      If you have any questions about this query contact me at: [email or web address]    ,     H & P includes diagnosis of hypertension and systolic heart failure. Patient's home medications include midodrine and your progress note dated 2/3 includes diagnosis of nonischemic cardiomyopathy, EF < 20%.     Can this be further clarified as:     - heart failure due to hypertension  - heart failure due to nonischemic cardiomyopathy  - other___________________________________  - clinically indeterminable    By submitting this query, we are merely seeking further clarification of documentation to accurately reflect all conditions that you are monitoring, evaluating, treating or that extend the hospitalization or utilize additional resources of care. Please utilize your independent clinical judgment when addressing the question(s) above.     This query and your response, once completed, will be entered into the legal medical record.    Sincerely,  Thea Nunez RN,   Clinical Documentation Integrity Program

## 2023-02-14 NOTE — DISCHARGE SUMMARY
[facility]    Admit date: 2/1/2023  Discharge date: 2/3/2023    Admission/discharge diagnosis:  1. Cardiogenic shock  2. Severe metabolic acidosis  3. Severe peripheral arterial disease with lower extremity ischemia left more than right with dry gangrene  4. Altered mental status, improved   5. Hyperkalemia   6. Lactic acidosis   7. End-stage renal disease on peritoneal dialysis  8. Systolic congestive heart failure with EF of 20% status post AICD  9. 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 Hyperlipidemia  12. Abnormal EKG likely hyperkalemia admitted  13. Pulm edema    HPI: reviewed as per Dr. Gusman:  Ambar Lara is a 52 y.o. female with known history of systolic congestive heart failure with EF of 20%, severe peripheral arterial disease with left foot gangrene that is being prepared for amputation, end-stage renal disease on peritoneal dialysis, recent COVID-19 in February 2021 requiring prolonged mechanical ventilation with tracheostomy, history of pulmonary embolism, off anticoagulation currently only on antiplatelet therapy, irritable bowel syndrome and history of C. difficile infection who presented to the hospital by EMS because of new onset shortness of breath and weakness with chest tightness on top of progressively worsening lower extremity pain specially on the left side.  She does have dry gangrene of the foot and she does have also cyanosis of the right foot and the right leg which is slowly progressive according to the patient with worsening pain on both lower extremities.  She did have abnormal ECG suggestive of hyperkalemia, her labs where not available, but after the ED attemding consulted with the Cardiology,. They decided to give calcium chloride   She denies any productive cough or purulent secretion  She denies any nausea or vomiting or any change in her irritable bowel syndrome symptoms.  She is not anuric despite her  end-stage renal disease and she denies any change in the color or the frequency of her urine production  Patient is a former smoker 1 pack/day she quit smoking 4 years ago.  She had AICD for her cardiomyopathy but denies any worsening lower extremity edema    Hospital course:  Patient with maximal medical care without improvement and no realistic hope for survival, family requested comfort measures

## (undated) DEVICE — ENDOCUT SCISSOR TIP, DISPOSABLE: Brand: RENEW

## (undated) DEVICE — PERCLOSE™ PROSTYLE™ SUTURE-MEDIATED CLOSURE AND REPAIR SYSTEM: Brand: PERCLOSE™ PROSTYLE™

## (undated) DEVICE — EQUIPMENT COVER BAG TYPE 48” X 36” (122CM X 91CM): Brand: EQUIPMENT COVER BAG TYPE

## (undated) DEVICE — SUT ETHLN 2/0 PS 18IN 585H

## (undated) DEVICE — ANTIBACTERIAL UNDYED BRAIDED (POLYGLACTIN 910), SYNTHETIC ABSORBABLE SUTURE: Brand: COATED VICRYL

## (undated) DEVICE — TIDISHIELD UROLOGY DRAIN BAGS FROSTY VINYL STERILE FITS SIEMENS UROSKOP ACCESS 20 PER CASE: Brand: TIDISHIELD

## (undated) DEVICE — NDL HYPO ECLPS SFTY 22G 1 1/2IN

## (undated) DEVICE — ST ACC MICROPUNCTURE STFF .018 ECHO/PLDM/TP 4F/10CM 21G/7CM

## (undated) DEVICE — APPL CHLORAPREP HI/LITE 26ML ORNG

## (undated) DEVICE — 3M™ STERI-STRIP™ COMPOUND BENZOIN TINCTURE 40 BAGS/CARTON 4 CARTONS/CASE C1544: Brand: 3M™ STERI-STRIP™

## (undated) DEVICE — NAVICROSS SUPPORT CATHETER: Brand: NAVICROSS

## (undated) DEVICE — SOL NACL 0.9PCT 1000ML

## (undated) DEVICE — ST IRR CYSTO W/SPK 77IN LF

## (undated) DEVICE — RADIFOCUS TORQUE DEVICE MULTI-TORQUE VISE: Brand: RADIFOCUS TORQUE DEVICE

## (undated) DEVICE — 3M™ STERI-STRIP™ REINFORCED ADHESIVE SKIN CLOSURES, R1547, 1/2 IN X 4 IN (12 MM X 100 MM), 6 STRIPS/ENVELOPE: Brand: 3M™ STERI-STRIP™

## (undated) DEVICE — ENDOPATH XCEL BLUNT TIP TROCARS WITH SMOOTH SLEEVES: Brand: ENDOPATH XCEL

## (undated) DEVICE — HARMONIC ACE +7 LAPAROSCOPIC SHEARS ADVANCED HEMOSTASIS 5MM DIAMETER 36CM SHAFT LENGTH  FOR USE WITH GRAY HAND PIECE ONLY: Brand: HARMONIC ACE

## (undated) DEVICE — GLV SURG BIOGEL LTX PF 8

## (undated) DEVICE — ENDOPATH XCEL UNIVERSAL TROCAR STABLILITY SLEEVES: Brand: ENDOPATH XCEL

## (undated) DEVICE — EXTENSION SET, MALE LUER LOCK ADAPTER WITH RETRACTABLE COLLAR

## (undated) DEVICE — PK URETSCP 40

## (undated) DEVICE — GLV SURG BIOGEL LTX PF 7

## (undated) DEVICE — SUT MNCRYL PLS ANTIB UD 4/0 PS2 18IN

## (undated) DEVICE — PK ANGIO CERBRL RAD 40

## (undated) DEVICE — STPLR SKIN VISISTAT WD 35CT

## (undated) DEVICE — SUT VIC 0 CT2 CR8 18IN DYED J727D

## (undated) DEVICE — SHLD ANGIO 2LAYR CIR FEN

## (undated) DEVICE — ENDOPATH XCEL BLADELESS TROCARS WITH STABILITY SLEEVES: Brand: ENDOPATH XCEL

## (undated) DEVICE — GLV SURG BIOGEL LTX PF 7 1/2

## (undated) DEVICE — URETERAL DILATATION SYSTEM

## (undated) DEVICE — RADIFOCUS GLIDEWIRE ADVANTAGE GUIDEWIRE: Brand: GLIDEWIRE ADVANTAGE

## (undated) DEVICE — CATH GUIDE SOFTVU FLUSH HT STR .035 5F 65CM

## (undated) DEVICE — TBG PRESS 96IN M/F ROT BRAID: Brand: MEDLINE INDUSTRIES, INC.

## (undated) DEVICE — GLIDESHEATH NITINOL HYDROPHILIC COATED INTRODUCER SHEATH: Brand: GLIDESHEATH

## (undated) DEVICE — GLV SURG BIOGEL LTX PF 8 1/2

## (undated) DEVICE — Device: Brand: D-STAT® DRY SILVER CLEAR TOPICAL HEMOSTAT

## (undated) DEVICE — DRAPE,REIN 53X77,STERILE: Brand: MEDLINE

## (undated) DEVICE — DRP C/ARM W/BAND W/CLIPS 41X74IN

## (undated) DEVICE — PINNACLE R/O II INTRODUCER SHEATH WITH RADIOPAQUE MARKER: Brand: PINNACLE

## (undated) DEVICE — UNDERGLV SURG BIOGEL INDICAT PI SZ8.5 BLU

## (undated) DEVICE — BASKT RETRV STN NITNL 1.9F

## (undated) DEVICE — CATH GUIDE SOFTVU SELECT/V HT OMNI .038 5F 65CM

## (undated) DEVICE — CONTAINER,SPECIMEN,OR STERILE,4OZ: Brand: MEDLINE

## (undated) DEVICE — WIPE INST MEROCEL

## (undated) DEVICE — ADHS SKIN SURG TISS VISC PREMIERPRO EXOFIN HI/VISC FAST/DRY

## (undated) DEVICE — TRAP FLD MINIVAC MEGADYNE 100ML

## (undated) DEVICE — PTA BALLOON DILATATION CATHETER: Brand: STERLING® SL

## (undated) DEVICE — GOWN,REINF,POLY,SIRUS,BRTH SLV,XLNG/XXL: Brand: MEDLINE

## (undated) DEVICE — NDL HYPO PRECISIONGLIDE REG 25G 1 1/2

## (undated) DEVICE — LOU CYSTO: Brand: MEDLINE INDUSTRIES, INC.

## (undated) DEVICE — SUT VIC 0 TIES 18IN J912G

## (undated) DEVICE — TROCAR SITE CLOSURE DEVICE: Brand: ENDO CLOSE

## (undated) DEVICE — VISUALIZATION SYSTEM: Brand: CLEARIFY

## (undated) DEVICE — LAPAROSCOPIC SMOKE FILTRATION SYSTEM: Brand: PALL LAPAROSHIELD® PLUS LAPAROSCOPIC SMOKE FILTRATION SYSTEM

## (undated) DEVICE — LOU MINOR PROCEDURE: Brand: MEDLINE INDUSTRIES, INC.

## (undated) DEVICE — ENDOPOUCH RETRIEVER SPECIMEN RETRIEVAL BAGS: Brand: ENDOPOUCH RETRIEVER

## (undated) DEVICE — DESTINATION PERIPHERAL GUIDING SHEATH: Brand: DESTINATION

## (undated) DEVICE — SUT VIC 0 TN 27IN DYED JTN0G

## (undated) DEVICE — LOU LAP CHOLE: Brand: MEDLINE INDUSTRIES, INC.

## (undated) DEVICE — CATH URETRL FLXITP POLLACK STD 5F 70CM

## (undated) DEVICE — CVR PROB 96IN LF STRL

## (undated) DEVICE — DRSNG WND BORDR/ADHS NONADHR/GZ LF 4X4IN STRL

## (undated) DEVICE — TBG PENCL TELESCP MEGADYNE SMOKE EVAC 10FT

## (undated) DEVICE — NITINOL WIRE WITH HYDROPHILIC TIP: Brand: SENSOR

## (undated) DEVICE — SYRINGE KIT,PACKAGED,,150FT,MK 7(ANGIO-ARTERION, 150ML SYR KIT W/QFT,MC)(60729385): Brand: MEDRAD® MARK 7 ARTERION DISPOSABLE SYRINGE 150 ML WITH QUICK FILL TUBE

## (undated) DEVICE — CULT AER/ANAEROB FASTIDIOUS BACT

## (undated) DEVICE — PATIENT RETURN ELECTRODE, SINGLE-USE, CONTACT QUALITY MONITORING, ADULT, WITH 9FT CORD, FOR PATIENTS WEIGING OVER 33LBS. (15KG): Brand: MEGADYNE

## (undated) DEVICE — PRT BIOP SEALS